# Patient Record
Sex: FEMALE | Race: WHITE | Employment: UNEMPLOYED | URBAN - METROPOLITAN AREA
[De-identification: names, ages, dates, MRNs, and addresses within clinical notes are randomized per-mention and may not be internally consistent; named-entity substitution may affect disease eponyms.]

---

## 2017-02-01 ENCOUNTER — APPOINTMENT (OUTPATIENT)
Dept: GENERAL RADIOLOGY | Age: 82
DRG: 516 | End: 2017-02-01
Attending: EMERGENCY MEDICINE
Payer: MEDICARE

## 2017-02-01 ENCOUNTER — HOSPITAL ENCOUNTER (INPATIENT)
Age: 82
LOS: 6 days | Discharge: NURSING, REHAB OR LONG-TERM CARE | DRG: 516 | End: 2017-02-07
Attending: EMERGENCY MEDICINE | Admitting: INTERNAL MEDICINE
Payer: MEDICARE
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
## 2017-02-01 DIAGNOSIS — E87.1 HYPONATREMIA: ICD-10-CM

## 2017-02-01 DIAGNOSIS — S32.000A LUMBAR COMPRESSION FRACTURE, CLOSED, INITIAL ENCOUNTER (HCC): Primary | ICD-10-CM

## 2017-02-01 PROBLEM — M48.50XA COMPRESSION FRACTURE OF VERTEBRA (HCC): Status: ACTIVE | Noted: 2017-02-01

## 2017-02-01 LAB
ALBUMIN SERPL BCP-MCNC: 2.8 G/DL (ref 3.5–5)
ALBUMIN SERPL BCP-MCNC: 3.2 G/DL (ref 3.5–5)
ALBUMIN/GLOB SERPL: 0.6 {RATIO} (ref 1.1–2.2)
ALBUMIN/GLOB SERPL: 0.7 {RATIO} (ref 1.1–2.2)
ALP SERPL-CCNC: 48 U/L (ref 45–117)
ALP SERPL-CCNC: 60 U/L (ref 45–117)
ALT SERPL-CCNC: 12 U/L (ref 12–78)
ALT SERPL-CCNC: 15 U/L (ref 12–78)
ANION GAP BLD CALC-SCNC: 5 MMOL/L (ref 5–15)
ANION GAP BLD CALC-SCNC: 8 MMOL/L (ref 5–15)
APTT PPP: 35.8 SEC (ref 22.1–32.5)
AST SERPL W P-5'-P-CCNC: ABNORMAL U/L (ref 15–37)
AST SERPL W P-5'-P-CCNC: ABNORMAL U/L (ref 15–37)
BASOPHILS # BLD AUTO: 0 K/UL (ref 0–0.1)
BASOPHILS # BLD: 0 % (ref 0–1)
BILIRUB SERPL-MCNC: 1 MG/DL (ref 0.2–1)
BILIRUB SERPL-MCNC: 1.4 MG/DL (ref 0.2–1)
BUN SERPL-MCNC: 15 MG/DL (ref 6–20)
BUN SERPL-MCNC: 16 MG/DL (ref 6–20)
BUN/CREAT SERPL: 20 (ref 12–20)
BUN/CREAT SERPL: 26 (ref 12–20)
CALCIUM SERPL-MCNC: 10.1 MG/DL (ref 8.5–10.1)
CALCIUM SERPL-MCNC: 9 MG/DL (ref 8.5–10.1)
CHLORIDE SERPL-SCNC: 93 MMOL/L (ref 97–108)
CHLORIDE SERPL-SCNC: 97 MMOL/L (ref 97–108)
CO2 SERPL-SCNC: 25 MMOL/L (ref 21–32)
CO2 SERPL-SCNC: 27 MMOL/L (ref 21–32)
CREAT SERPL-MCNC: 0.58 MG/DL (ref 0.55–1.02)
CREAT SERPL-MCNC: 0.79 MG/DL (ref 0.55–1.02)
EOSINOPHIL # BLD: 0 K/UL (ref 0–0.4)
EOSINOPHIL NFR BLD: 0 % (ref 0–7)
ERYTHROCYTE [DISTWIDTH] IN BLOOD BY AUTOMATED COUNT: 13.5 % (ref 11.5–14.5)
GLOBULIN SER CALC-MCNC: 4 G/DL (ref 2–4)
GLOBULIN SER CALC-MCNC: 5.2 G/DL (ref 2–4)
GLUCOSE SERPL-MCNC: 109 MG/DL (ref 65–100)
GLUCOSE SERPL-MCNC: 92 MG/DL (ref 65–100)
HCT VFR BLD AUTO: 46 % (ref 35–47)
HGB BLD-MCNC: 16.2 G/DL (ref 11.5–16)
INR PPP: 1.9 (ref 0.9–1.1)
LYMPHOCYTES # BLD AUTO: 7 % (ref 12–49)
LYMPHOCYTES # BLD: 0.8 K/UL (ref 0.8–3.5)
MCH RBC QN AUTO: 33.6 PG (ref 26–34)
MCHC RBC AUTO-ENTMCNC: 35.2 G/DL (ref 30–36.5)
MCV RBC AUTO: 95.4 FL (ref 80–99)
MONOCYTES # BLD: 1.8 K/UL (ref 0–1)
MONOCYTES NFR BLD AUTO: 16 % (ref 5–13)
NEUTS SEG # BLD: 8.8 K/UL (ref 1.8–8)
NEUTS SEG NFR BLD AUTO: 77 % (ref 32–75)
PLATELET # BLD AUTO: 253 K/UL (ref 150–400)
POTASSIUM SERPL-SCNC: ABNORMAL MMOL/L (ref 3.5–5.1)
POTASSIUM SERPL-SCNC: ABNORMAL MMOL/L (ref 3.5–5.1)
PROT SERPL-MCNC: 6.8 G/DL (ref 6.4–8.2)
PROT SERPL-MCNC: 8.4 G/DL (ref 6.4–8.2)
PROTHROMBIN TIME: 18.9 SEC (ref 9–11.1)
RBC # BLD AUTO: 4.82 M/UL (ref 3.8–5.2)
SODIUM SERPL-SCNC: 125 MMOL/L (ref 136–145)
SODIUM SERPL-SCNC: 130 MMOL/L (ref 136–145)
THERAPEUTIC RANGE,PTTT: ABNORMAL SECS (ref 58–77)
WBC # BLD AUTO: 11.4 K/UL (ref 3.6–11)

## 2017-02-01 PROCEDURE — 80053 COMPREHEN METABOLIC PANEL: CPT | Performed by: INTERNAL MEDICINE

## 2017-02-01 PROCEDURE — 80053 COMPREHEN METABOLIC PANEL: CPT | Performed by: EMERGENCY MEDICINE

## 2017-02-01 PROCEDURE — 74011250636 HC RX REV CODE- 250/636: Performed by: EMERGENCY MEDICINE

## 2017-02-01 PROCEDURE — 74011250637 HC RX REV CODE- 250/637: Performed by: INTERNAL MEDICINE

## 2017-02-01 PROCEDURE — 93005 ELECTROCARDIOGRAM TRACING: CPT

## 2017-02-01 PROCEDURE — 85610 PROTHROMBIN TIME: CPT | Performed by: INTERNAL MEDICINE

## 2017-02-01 PROCEDURE — 65270000029 HC RM PRIVATE

## 2017-02-01 PROCEDURE — 72100 X-RAY EXAM L-S SPINE 2/3 VWS: CPT

## 2017-02-01 PROCEDURE — 96375 TX/PRO/DX INJ NEW DRUG ADDON: CPT

## 2017-02-01 PROCEDURE — 74011250636 HC RX REV CODE- 250/636: Performed by: INTERNAL MEDICINE

## 2017-02-01 PROCEDURE — 85730 THROMBOPLASTIN TIME PARTIAL: CPT | Performed by: INTERNAL MEDICINE

## 2017-02-01 PROCEDURE — 96374 THER/PROPH/DIAG INJ IV PUSH: CPT

## 2017-02-01 PROCEDURE — 99285 EMERGENCY DEPT VISIT HI MDM: CPT

## 2017-02-01 PROCEDURE — 36415 COLL VENOUS BLD VENIPUNCTURE: CPT | Performed by: INTERNAL MEDICINE

## 2017-02-01 PROCEDURE — 85025 COMPLETE CBC W/AUTO DIFF WBC: CPT | Performed by: EMERGENCY MEDICINE

## 2017-02-01 RX ORDER — FENTANYL CITRATE 50 UG/ML
50 INJECTION, SOLUTION INTRAMUSCULAR; INTRAVENOUS ONCE
Status: COMPLETED | OUTPATIENT
Start: 2017-02-01 | End: 2017-02-01

## 2017-02-01 RX ORDER — ATENOLOL 25 MG/1
25 TABLET ORAL DAILY
COMMUNITY
End: 2017-06-30 | Stop reason: SDUPTHER

## 2017-02-01 RX ORDER — ATENOLOL 25 MG/1
25 TABLET ORAL DAILY
Status: DISCONTINUED | OUTPATIENT
Start: 2017-02-02 | End: 2017-02-07 | Stop reason: HOSPADM

## 2017-02-01 RX ORDER — FAMOTIDINE 20 MG/1
20 TABLET, FILM COATED ORAL 2 TIMES DAILY
Status: DISCONTINUED | OUTPATIENT
Start: 2017-02-02 | End: 2017-02-07 | Stop reason: HOSPADM

## 2017-02-01 RX ORDER — FLUTICASONE PROPIONATE 50 MCG
2 SPRAY, SUSPENSION (ML) NASAL DAILY
Status: DISCONTINUED | OUTPATIENT
Start: 2017-02-02 | End: 2017-02-07 | Stop reason: HOSPADM

## 2017-02-01 RX ORDER — HYDROMORPHONE HYDROCHLORIDE 1 MG/ML
1 INJECTION, SOLUTION INTRAMUSCULAR; INTRAVENOUS; SUBCUTANEOUS
Status: DISCONTINUED | OUTPATIENT
Start: 2017-02-01 | End: 2017-02-05

## 2017-02-01 RX ORDER — FUROSEMIDE 40 MG/1
40 TABLET ORAL DAILY
Status: DISCONTINUED | OUTPATIENT
Start: 2017-02-02 | End: 2017-02-07 | Stop reason: HOSPADM

## 2017-02-01 RX ORDER — SODIUM CHLORIDE 0.9 % (FLUSH) 0.9 %
5-10 SYRINGE (ML) INJECTION EVERY 8 HOURS
Status: DISCONTINUED | OUTPATIENT
Start: 2017-02-01 | End: 2017-02-07 | Stop reason: HOSPADM

## 2017-02-01 RX ORDER — SPIRONOLACTONE 25 MG/1
50 TABLET ORAL DAILY
Status: DISCONTINUED | OUTPATIENT
Start: 2017-02-02 | End: 2017-02-07 | Stop reason: HOSPADM

## 2017-02-01 RX ORDER — WARFARIN 3 MG/1
1.5 TABLET ORAL
Status: ON HOLD | COMMUNITY
End: 2018-07-31

## 2017-02-01 RX ORDER — PRAVASTATIN SODIUM 20 MG/1
20 TABLET ORAL
Status: DISCONTINUED | OUTPATIENT
Start: 2017-02-01 | End: 2017-02-07 | Stop reason: HOSPADM

## 2017-02-01 RX ORDER — SODIUM CHLORIDE 0.9 % (FLUSH) 0.9 %
5-10 SYRINGE (ML) INJECTION AS NEEDED
Status: DISCONTINUED | OUTPATIENT
Start: 2017-02-01 | End: 2017-02-07 | Stop reason: HOSPADM

## 2017-02-01 RX ORDER — ACETAMINOPHEN 325 MG/1
650 TABLET ORAL
Status: DISCONTINUED | OUTPATIENT
Start: 2017-02-01 | End: 2017-02-07 | Stop reason: HOSPADM

## 2017-02-01 RX ORDER — POLYETHYLENE GLYCOL 3350 17 G/17G
17 POWDER, FOR SOLUTION ORAL DAILY
Status: DISCONTINUED | OUTPATIENT
Start: 2017-02-02 | End: 2017-02-07 | Stop reason: HOSPADM

## 2017-02-01 RX ORDER — ONDANSETRON 2 MG/ML
4 INJECTION INTRAMUSCULAR; INTRAVENOUS
Status: COMPLETED | OUTPATIENT
Start: 2017-02-01 | End: 2017-02-01

## 2017-02-01 RX ORDER — HYDROMORPHONE HYDROCHLORIDE 1 MG/ML
1 INJECTION, SOLUTION INTRAMUSCULAR; INTRAVENOUS; SUBCUTANEOUS
Status: COMPLETED | OUTPATIENT
Start: 2017-02-01 | End: 2017-02-01

## 2017-02-01 RX ORDER — WARFARIN 3 MG/1
3 TABLET ORAL
Status: ON HOLD | COMMUNITY
End: 2018-07-31

## 2017-02-01 RX ORDER — ALPRAZOLAM 0.5 MG/1
0.5 TABLET ORAL
Status: DISCONTINUED | OUTPATIENT
Start: 2017-02-01 | End: 2017-02-07 | Stop reason: HOSPADM

## 2017-02-01 RX ORDER — POTASSIUM CHLORIDE 750 MG/1
20 TABLET, FILM COATED, EXTENDED RELEASE ORAL DAILY
Status: DISCONTINUED | OUTPATIENT
Start: 2017-02-02 | End: 2017-02-07 | Stop reason: HOSPADM

## 2017-02-01 RX ORDER — LEVOTHYROXINE SODIUM 100 UG/1
100 TABLET ORAL
Status: DISCONTINUED | OUTPATIENT
Start: 2017-02-02 | End: 2017-02-07 | Stop reason: HOSPADM

## 2017-02-01 RX ADMIN — FENTANYL CITRATE 50 MCG: 50 INJECTION, SOLUTION INTRAMUSCULAR; INTRAVENOUS at 17:03

## 2017-02-01 RX ADMIN — Medication 10 ML: at 22:33

## 2017-02-01 RX ADMIN — PRAVASTATIN SODIUM 20 MG: 20 TABLET ORAL at 22:33

## 2017-02-01 RX ADMIN — ONDANSETRON 4 MG: 2 INJECTION INTRAMUSCULAR; INTRAVENOUS at 17:03

## 2017-02-01 RX ADMIN — HYDROMORPHONE HYDROCHLORIDE 1 MG: 1 INJECTION, SOLUTION INTRAMUSCULAR; INTRAVENOUS; SUBCUTANEOUS at 18:34

## 2017-02-01 RX ADMIN — HYDROMORPHONE HYDROCHLORIDE 1 MG: 1 INJECTION, SOLUTION INTRAMUSCULAR; INTRAVENOUS; SUBCUTANEOUS at 22:33

## 2017-02-01 NOTE — IP AVS SNAPSHOT
2700 [facility] [address] [place] [address] 
[phone] Patient: Arvis Cranker MRN: [id number] NGD:[birth year] You are allergic to the following Allergen Reactions Latex, Natural Rubber Other (comments) \"I get black where ever it touches\" Codeine Other (comments) \"It just sends me up the wall\" Adhesive Rash Other (comments)  
 blisters Cortisone Unknown (comments) Egg Unknown (comments) Pet pt. She states that she is allergic to eggs. Recent Documentation Height Weight BMI OB Status Smoking Status 1.6 m 68 kg 26.57 kg/m2 Hysterectomy Former Smoker Emergency Contacts Name Discharge Info Relation Home Work Mobile Vern Oro DISCHARGE CAREGIVER [3] Sister [23]   [phone] About your hospitalization You were admitted on:  February 1, 2017 You last received care in the:  [address] [place] [address] You were discharged on:  February 7, 2017 Unit phone number:  [phone] Why you were hospitalized Your primary diagnosis was:  Closed Compression Fracture Of Lumbar Vertebra (Hcc) Your diagnoses also included:  Dementia, A-Fib (Hcc), Compression Fracture Of Vertebra (Hcc), Uti (Urinary Tract Infection), Cad (Coronary Artery Disease) Providers Seen During Your Hospitalizations Provider Role Specialty Primary office phone Wilmer Underwood MD Attending Provider Emergency Medicine [phone] Alee Sibley MD Attending Provider Family Practice [phone] Your Primary Care Physician (PCP) Primary Care Physician Office Phone Office Fax Jonelle Angelique [phone] Follow-up Information Follow up With Details Comments Contact Info [place] Sibley MD   [address] [address] 
[phone] [facility] Go on 2/7/2017  [address] [phone] Your Appointments Wednesday February 22, 2017 COLONOSCOPY with Yelena Abdullahi MD  
[facility] ENDOSCOPY (RI OR PRE ASSESSMENT) [address]  
[phone] OP Registration: YUNIER Kate 78 Telephone: [phone] Fax: [phone] ** Pt will need to arrive 30 min prior unless appointment prep instructions indicate otherwise** **** Send All ENDO pt to the MAIN Admitting Department, off the [facility] main lobby at [employer]. ****  
  
    
OP Registration: YUNIER ConjuGon- [address] [place] [address] Telephone: [phone] Fax: [phone] ** Pt will need to arrive 30 min prior unless appointment prep instructions indicate otherwise** **** Send All ENDO pt to the MAIN Admitting Department, off the [facility] main lobby at [employer]. **** Current Discharge Medication List  
  
START taking these medications Dose & Instructions Dispensing Information Comments Morning Noon Evening Bedtime  
 acetaminophen 325 mg tablet Commonly known as:  TYLENOL Your next dose is: Today, Tomorrow Other:  _________ Dose:  650 mg Take 2 Tabs by mouth every four (4) hours as needed. Quantity:  60 Tab Refills:  0  
     
   
   
   
  
 bisacodyl 10 mg suppository Commonly known as:  DULCOLAX Your next dose is: Today, Tomorrow Other:  _________ Dose:  10 mg Insert 10 mg into rectum daily as needed. Quantity:  10 Each Refills:  0 HYDROcodone-acetaminophen 5-325 mg per tablet Commonly known as:  Wayna Glaze Your next dose is: Today, Tomorrow Other:  _________ Dose:  1 Tab Take 1 Tab by mouth every six (6) hours as needed. Max Daily Amount: 4 Tabs. Quantity:  30 Tab Refills:  0  
     
   
   
   
  
 levoFLOXacin 250 mg tablet Commonly known as:  Miley [place] Your next dose is: Today, Tomorrow Other:  _________ Dose:  250 mg Take 1 Tab by mouth every twenty-four (24) hours. Quantity:  10 Tab Refills:  0 CONTINUE these medications which have NOT CHANGED Dose & Instructions Dispensing Information Comments Morning Noon Evening Bedtime ALPRAZolam 0.5 mg tablet Commonly known as:  Ricardo Sequin Your next dose is: Today, Tomorrow Other:  _________ TAKE 1 TABLET BY MOUTH TWICE A DAY AS NEEDED FOR ANXIETY. Quantity:  60 Tab Refills:  0  
     
   
   
   
  
 atenolol 25 mg tablet Commonly known as:  TENORMIN Your next dose is: Today, Tomorrow Other:  _________ Dose:  25 mg Take 25 mg by mouth daily. Refills:  0 Biotin 2,500 mcg Cap Your next dose is: Today, Tomorrow Other:  _________ Dose:  5000 mcg Take 5,000 mcg by mouth. Refills:  0  
     
   
   
   
  
 famotidine 20 mg tablet Commonly known as:  PEPCID Your next dose is: Today, Tomorrow Other:  _________ TAKE 1 TABLET BY MOUTH TWICE A DAY. Quantity:  60 Tab Refills:  5  
     
   
   
   
  
 fluticasone 50 mcg/actuation nasal spray Commonly known as:  Filbert Chard Your next dose is: Today, Tomorrow Other:  _________ Dose:  2 Spray 2 Sprays by Both Nostrils route daily. Refills:  0  
     
   
   
   
  
 furosemide 40 mg tablet Commonly known as:  LASIX Your next dose is: Today, Tomorrow Other:  _________ TAKE 1 TABLET BY MOUTH DAILY. Quantity:  30 Tab Refills:  1  
     
   
   
   
  
 levothyroxine 100 mcg tablet Commonly known as:  SYNTHROID Your next dose is: Today, Tomorrow Other:  _________ TAKE 1 TABLET BY MOUTH DAILY BEFORE BREAKFAST. Quantity:  30 Tab Refills:  3  
     
   
   
   
  
 nystatin topical cream  
Commonly known as:  MYCOSTATIN  
   
 Your next dose is: Today, Tomorrow Other:  _________ APPLY TO AFFECTED AREA TWO (2) TIMES A DAY. Quantity:  30 g Refills:  2  
     
   
   
   
  
 polyethylene glycol 17 gram packet Commonly known as:  Scot Allouez Your next dose is: Today, Tomorrow Other:  _________ Dose:  17 g Take 1 Packet by mouth daily. Quantity:  1 Packet Refills:  11  
     
   
   
   
  
 potassium chloride 20 mEq tablet Commonly known as:  K-DUR, KLOR-CON Your next dose is: Today, Tomorrow Other:  _________ TAKE 1 TABLET BY MOUTH EVERY DAY. Quantity:  30 Tab Refills:  5  
     
   
   
   
  
 pravastatin 20 mg tablet Commonly known as:  PRAVACHOL Your next dose is: Today, Tomorrow Other:  _________ Dose:  20 mg Take 1 Tab by mouth nightly. Quantity:  30 Tab Refills:  5  
     
   
   
   
  
 spironolactone 50 mg tablet Commonly known as:  ALDACTONE Your next dose is: Today, Tomorrow Other:  _________ TAKE 1 TABLET BY MOUTH DAILY FOR HYPERTENSION. (SAME AS ALDACTONE) Quantity:  30 Tab Refills:  5  
     
   
   
   
  
 * warfarin 3 mg tablet Commonly known as:  COUMADIN Your next dose is: Today, Tomorrow Other:  _________ Dose:  3 mg Take 3 mg by mouth every Tuesday, Thursday, Saturday & Sunday. In afternoon Refills:  0  
     
   
   
   
  
 * warfarin 3 mg tablet Commonly known as:  COUMADIN Your next dose is: Today, Tomorrow Other:  _________ Dose:  1.5 mg Take 1.5 mg by mouth every Monday, Wednesday, Friday. In afternoon Refills:  0  
     
   
   
   
  
 * Notice: This list has 2 medication(s) that are the same as other medications prescribed for you. Read the directions carefully, and ask your doctor or other care provider to review them with you. Where to Get Your Medications These medications were sent to [address] [place] [address] Phone:  [phone]  
  bisacodyl 10 mg suppository  
 levoFLOXacin 250 mg tablet Information on where to get these meds will be given to you by the nurse or doctor. ! Ask your nurse or doctor about these medications  
  acetaminophen 325 mg tablet HYDROcodone-acetaminophen 5-325 mg per tablet Discharge Instructions Patient Discharge Instructions Melva Senior / [id number] : 1931 Admitted 2017 Discharged: 2017 Take Home Medications · It is important that you take the medication exactly as they are prescribed. · Keep your medication in the bottles provided by the pharmacist and keep a list of the medication names, dosages, and times to be taken in your wallet. · Do not take other medications without consulting your doctor. What to do at [facility] Recommended diet: Regular Diet, Recommended activity: Activity as tolerated, PT/OT consult INR in 2 days- this Thursday- call Dr. Yandel Rojas with results If you experience any of the following symptoms increased pain, please follow up with Dr. Yandel Rojas. Follow-up Appointments Procedures  FOLLOW UP VISIT Appointment in: Other (Specify) Will follow at [facility]  
  Will follow at [facility]  
  Standing Status:   Standing Number of Occurrences:   1 Order Specific Question:   Appointment in Answer: Other (Specify) Information obtained by : 
I understand that if any problems occur once I am at home I am to contact my physician. I understand and acknowledge receipt of the instructions indicated above. Physician's or R.N.'s Signature                                                                  Date/Time Patient or Representative Signature                                                          Date/Time Discharge Orders None Introducing [facility] SERVICES! Alexys Liang introduces [facility] patient portal. Now you can access parts of your medical record, email your doctor's office, and request medication refills online. 1. In your internet browser, go to [email or web address]. [place] 2. Click on the First Time User? Click Here link in the Sign In box. You will see the New Member Sign Up page. 3. Enter your [facility] Access Code exactly as it appears below. You will not need to use this code after youve completed the sign-up process. If you do not sign up before the expiration date, you must request a new code. · [facility] Access Code: [email or web address] Expires: 5/2/2017  4:25 PM 
 
4. Enter the last four digits of your Social Security Number (xxxx) and Date of Birth (mm/dd/yyyy) as indicated and click Submit. You will be taken to the next sign-up page. 5. Create a [facility] ID. This will be your [facility] login ID and cannot be changed, so think of one that is secure and easy to remember. 6. Create a [facility] password. You can change your password at any time. 7. Enter your Password Reset Question and Answer. This can be used at a later time if you forget your password. 8. Enter your e-mail address. You will receive e-mail notification when new information is available in [address] 9. Click Sign Up. You can now view and download portions of your medical record. 10. Click the Download Summary menu link to download a portable copy of your medical information.  
 
If you have questions, please visit the Frequently Asked Questions section of the [employer]. Remember, MyChart is NOT to be used for urgent needs. For medical emergencies, dial 911. Now available from your iPhone and Android! General Information Please provide this summary of care documentation to your next provider. Patient Signature:  ____________________________________________________________ Date:  ____________________________________________________________  
  
Althia Kras Provider Signature:  ____________________________________________________________ Date:  ____________________________________________________________

## 2017-02-01 NOTE — ED TRIAGE NOTES
Pt states that she fell about 5 days ago after \"blacking out\" causing her to hurt her entire back. Pt states that she was not seen in the hospital after blacking out. Pt denies chest pain, SOB, and NVD. Pt denies having any syncopal episodes since.

## 2017-02-01 NOTE — PROGRESS NOTES
Admission Medication Reconciliation:    Information obtained from: patient, Rx Query    Significant PMH/Disease States:   Past Medical History   Diagnosis Date    A-fib [facility])     Arrhythmia      A FIB    Arthritis     Back pain     CAD (coronary artery disease)      PT DENIES    Chronic pain     Dementia 2016    Hypercholesterolemia     Psychiatric disorder      ANXIETY    Shoulder pain     Thyroid disease     Tremor, essential        Chief Complaint for this Admission:    Chief Complaint   Patient presents with    Back Pain    Syncope       Allergies:  Latex, natural rubber; Codeine; Adhesive; Cortisone; and Egg    Prior to Admission Medications:   Prior to Admission Medications   Prescriptions Last Dose Informant Patient Reported? Taking? ALPRAZolam (XANAX) 0.5 mg tablet   No Yes   Sig: TAKE 1 TABLET BY MOUTH TWICE A DAY AS NEEDED FOR ANXIETY. Biotin 2,500 mcg cap   Yes Yes   Sig: Take 5,000 mcg by mouth. atenolol (TENORMIN) 25 mg tablet   Yes Yes   Sig: Take 25 mg by mouth daily. famotidine (PEPCID) 20 mg tablet   No Yes   Sig: TAKE 1 TABLET BY MOUTH TWICE A DAY. fluticasone (FLONASE) 50 mcg/actuation nasal spray   Yes Yes   Si Sprays by Both Nostrils route daily. furosemide (LASIX) 40 mg tablet   No Yes   Sig: TAKE 1 TABLET BY MOUTH DAILY. levothyroxine (SYNTHROID) 100 mcg tablet   No Yes   Sig: TAKE 1 TABLET BY MOUTH DAILY BEFORE BREAKFAST. nystatin (MYCOSTATIN) topical cream   No Yes   Sig: APPLY TO AFFECTED AREA TWO (2) TIMES A DAY. polyethylene glycol (MIRALAX) 17 gram packet   No Yes   Sig: Take 1 Packet by mouth daily. potassium chloride (K-DUR, KLOR-CON) 20 mEq tablet   No Yes   Sig: TAKE 1 TABLET BY MOUTH EVERY DAY. pravastatin (PRAVACHOL) 20 mg tablet Not Taking at Unknown time  No No   Sig: Take 1 Tab by mouth nightly.    spironolactone (ALDACTONE) 50 mg tablet   No Yes   Sig: TAKE 1 TABLET BY MOUTH DAILY FOR HYPERTENSION. (SAME AS ALDACTONE)   warfarin (COUMADIN) 3 mg tablet 2017 at pm  Yes Yes   Sig: Take 3 mg by mouth every Tuesday, Thursday, Saturday & . In afternoon   warfarin (COUMADIN) 3 mg tablet 2017  Yes Yes   Sig: Take 1.5 mg by mouth every Monday, Wednesday, Friday. In afternoon      Facility-Administered Medications: None         Comments/Recommendations: Patient not able to provide medication information. States she would not recognize names if I provided them. She did tell me her warfarin dose. She gets her medicines from the [facility] and they are closed for the evening. Records with recent Rx Query fills I marked as taking and I removed meds without and meds for acute conditions that were .

## 2017-02-01 NOTE — ED PROVIDER NOTES
HPI Comments: 80 y.o. female with past medical history significant for back pain, tremor, A-fib, thyroid disease, hypercholesterolemia, arthritis, CAD, anxiety, dementia, appendectomy, tonsillectomy, adenoidectomy who presents accompanied by sister with chief complaint of back pain. Patient presents in ED complaining of lower back pain secondary to fall ~5 days ago. Patient states she was shopping in [address] ~5 days ago when she suddenly fell backwards and  \"ended up on the ground\". Patient notes \"I do not know what happened\". She is unsure if she had syncopal episode but notes \"I came to immediately\". Patient denies any head injury. Patient claims she has an allergy to cortisone and notes she had recently received a cortisone shot in knee secondary to orthopedic procedure. She is unsure if this had anything to do with her fall. Patient reports associated gradual onset of lower back pain later that day. She notes pain has gotten progressively worse since onset and claims she has had difficulty moving and performing everyday activities because of this. In ED, patient notes her pain is \"10/10\". She denies taking any alleviating medications. Patient ambulates with cane at baseline. She reports non-acute numbness in  B/l legs secondary to \"having fluid in legs\". Patient notes she has not had BM in ~3 days likely because \"I have not been eating much lately\". Patient denies difficulty urinating, fever, chills, cough, chest pain, SOB, abdominal pain. There are no other acute medical concerns at this time. Social hx: former smoker; quit date: 1/7/1994; 0.5 packs per day for 10 years. +ETOH; 2 glasses of wine per night. Resides at [facility] in independent living. PCP: Marce Melendrez MD    Note written by Fawn Hernadez. Marcia Strong, as dictated by Rufus Rios MD 4:45 PM      The history is provided by the patient. No  was used.         Past Medical History:   Diagnosis Date    A-fib ([facility] [address]     Arrhythmia      A FIB    Arthritis     Back pain     CAD (coronary artery disease)      PT DENIES    Chronic pain     Dementia 2/9/2016    Hypercholesterolemia     Psychiatric disorder      ANXIETY    Shoulder pain     Thyroid disease     Tremor, essential        Past Surgical History:   Procedure Laterality Date    Hx appendectomy      Hx pola and bso       AGE 39    Hx tonsil and adenoidectomy           Family History:   Problem Relation Age of Onset    Dementia Mother     Arthritis-osteo Father        Social History     Social History    Marital status:      Spouse name: N/A    Number of children: N/A    Years of education: N/A     Occupational History    Not on file. Social History Main Topics    Smoking status: Former Smoker     Packs/day: 0.50     Years: 10.00     Quit date: 1/7/1994    Smokeless tobacco: Never Used    Alcohol use Yes      Comment: NIGHTLY 2 GLASSES WINE    Drug use: No    Sexual activity: Not on file     Other Topics Concern    Not on file     Social History Narrative         ALLERGIES: Latex, natural rubber; Codeine; Adhesive; Cortisone; and Egg    Review of Systems   Constitutional: Negative for chills, diaphoresis and fever. HENT: Negative for congestion, postnasal drip, rhinorrhea and sore throat. Eyes: Negative for photophobia, discharge, redness and visual disturbance. Respiratory: Negative for cough, chest tightness, shortness of breath and wheezing. Cardiovascular: Negative for chest pain, palpitations and leg swelling. Gastrointestinal: Negative for abdominal distention, abdominal pain, blood in stool, constipation, diarrhea, nausea and vomiting. Genitourinary: Negative for difficulty urinating, dysuria, frequency, hematuria and urgency. Musculoskeletal: Positive for back pain (lower). Negative for arthralgias, joint swelling and myalgias. Skin: Negative for color change and rash.    Neurological: Positive for numbness (legs b/l. non-acute). Negative for dizziness, speech difficulty, weakness, light-headedness and headaches. Syncope: possible. Psychiatric/Behavioral: Negative for confusion. The patient is not nervous/anxious. All other systems reviewed and are negative. Vitals:    02/01/17 1603 02/01/17 1845   BP: (!) 135/91 121/71   Pulse: 77 76   Resp: 16 15   Temp: 97.2 °F (36.2 °C)    SpO2: 99% 96%   Weight: 68 kg (150 lb)    Height: 5' 3\" (1.6 m)             Physical Exam   Constitutional: She is oriented to person, place, and time. She appears well-developed and well-nourished. No distress. HENT:   Head: Normocephalic and atraumatic. Right Ear: External ear normal.   Left Ear: External ear normal.   Nose: Nose normal.   Mouth/Throat: Oropharynx is clear and moist.   Eyes: Conjunctivae and EOM are normal. Pupils are equal, round, and reactive to light. No scleral icterus. Neck: Normal range of motion. Neck supple. No JVD present. No tracheal deviation present. No thyromegaly present. Cardiovascular: Normal rate and normal heart sounds. An irregularly irregular rhythm present. Exam reveals no gallop and no friction rub. No murmur heard. Pulmonary/Chest: Effort normal and breath sounds normal. No respiratory distress. She has no wheezes. She has no rales. She exhibits no tenderness. Abdominal: Soft. Bowel sounds are normal. She exhibits no distension and no mass. There is no tenderness. There is no rebound and no guarding. Musculoskeletal: Normal range of motion. She exhibits tenderness. She exhibits no edema. Tenderness over lower L spine in midline. Lymphadenopathy:     She has no cervical adenopathy. Neurological: She is alert and oriented to person, place, and time. She has normal strength. She displays no atrophy and no tremor. No cranial nerve deficit. She exhibits normal muscle tone. Coordination and gait normal.   Straight leg raise unable to perform due to back pain.  Able to flex and extend in feet with 4/5 strengths b/l. Skin: Skin is warm and dry. No rash noted. She is not diaphoretic. No erythema. Psychiatric: She has a normal mood and affect. Her behavior is normal. Judgment and thought content normal.   Nursing note and vitals reviewed. Note written by Gi Sutherland. Jan Michaels, as dictated by Lizzeth Grover MD 4:45 PM        MDM  Number of Diagnoses or Management Options  Fall, initial encounter:   Hyponatremia:   Lumbar compression fracture, closed, initial encounter [facility]):   Diagnosis management comments: Impression: 80-year-old female here with acute onset of back pain after a fall 5 days prior. No neurologic signs or symptoms secondary to the fall. Differential includes musculoskeletal strain more likely compression fractures. The patient is requiring pain medication for even the slightest movement in the emergency department. Plan of care will be baseline labs, analgesic medication, as well as x-rays of the lumbosacral spine. ED Course       Procedures    ED EKG interpretation:  Rhythm: atrial fib with variable AV block with premature ventricular or abberantly conducted complexes Rate (approx.): 76; Axis: normal; ST/T wave: abnormality, consider lateral ischemia. T wave changes compared to EKG done on 2/9/2016. Note written by Gi Sutherland. Jan Michaels, as dictated by Lizzeth Grover MD 4:30 PM      CONSULT NOTE:  6:57 Bita Doan MD spoke with Dr. Sherlyn Torres for PCP. Discussed available diagnostic tests and clinical findings. He is in agreement with care plans as outlined. Dr. Roseann Pearce will admit.

## 2017-02-02 PROBLEM — N39.0 UTI (URINARY TRACT INFECTION): Status: ACTIVE | Noted: 2017-02-02

## 2017-02-02 LAB
APPEARANCE UR: ABNORMAL
ATRIAL RATE: 278 BPM
BACTERIA URNS QL MICRO: NEGATIVE /HPF
BILIRUB UR QL CFM: NEGATIVE
CALCULATED R AXIS, ECG10: 21 DEGREES
CALCULATED T AXIS, ECG11: -152 DEGREES
COLOR UR: ABNORMAL
DIAGNOSIS, 93000: NORMAL
EPITH CASTS URNS QL MICRO: ABNORMAL /LPF
GLUCOSE UR STRIP.AUTO-MCNC: NEGATIVE MG/DL
HGB UR QL STRIP: ABNORMAL
HYALINE CASTS URNS QL MICRO: ABNORMAL /LPF (ref 0–5)
KETONES UR QL STRIP.AUTO: 15 MG/DL
LEUKOCYTE ESTERASE UR QL STRIP.AUTO: ABNORMAL
NITRITE UR QL STRIP.AUTO: POSITIVE
PH UR STRIP: 5.5 [PH] (ref 5–8)
PROT UR STRIP-MCNC: 100 MG/DL
Q-T INTERVAL, ECG07: 356 MS
QRS DURATION, ECG06: 78 MS
QTC CALCULATION (BEZET), ECG08: 400 MS
RBC #/AREA URNS HPF: ABNORMAL /HPF (ref 0–5)
SP GR UR REFRACTOMETRY: 1.02 (ref 1–1.03)
UROBILINOGEN UR QL STRIP.AUTO: 1 EU/DL (ref 0.2–1)
VENTRICULAR RATE, ECG03: 76 BPM
WBC URNS QL MICRO: ABNORMAL /HPF (ref 0–4)

## 2017-02-02 PROCEDURE — 81001 URINALYSIS AUTO W/SCOPE: CPT | Performed by: INTERNAL MEDICINE

## 2017-02-02 PROCEDURE — 74011250637 HC RX REV CODE- 250/637: Performed by: FAMILY MEDICINE

## 2017-02-02 PROCEDURE — 74011250636 HC RX REV CODE- 250/636: Performed by: INTERNAL MEDICINE

## 2017-02-02 PROCEDURE — 74011250637 HC RX REV CODE- 250/637: Performed by: INTERNAL MEDICINE

## 2017-02-02 PROCEDURE — 87077 CULTURE AEROBIC IDENTIFY: CPT | Performed by: FAMILY MEDICINE

## 2017-02-02 PROCEDURE — 87086 URINE CULTURE/COLONY COUNT: CPT | Performed by: FAMILY MEDICINE

## 2017-02-02 PROCEDURE — 65270000029 HC RM PRIVATE

## 2017-02-02 PROCEDURE — 87186 SC STD MICRODIL/AGAR DIL: CPT | Performed by: FAMILY MEDICINE

## 2017-02-02 RX ORDER — CEPHALEXIN 250 MG/1
250 CAPSULE ORAL EVERY 8 HOURS
Status: DISCONTINUED | OUTPATIENT
Start: 2017-02-02 | End: 2017-02-04

## 2017-02-02 RX ADMIN — CEPHALEXIN 250 MG: 250 CAPSULE ORAL at 16:52

## 2017-02-02 RX ADMIN — HYDROMORPHONE HYDROCHLORIDE 1 MG: 1 INJECTION, SOLUTION INTRAMUSCULAR; INTRAVENOUS; SUBCUTANEOUS at 08:08

## 2017-02-02 RX ADMIN — HYDROMORPHONE HYDROCHLORIDE 1 MG: 1 INJECTION, SOLUTION INTRAMUSCULAR; INTRAVENOUS; SUBCUTANEOUS at 12:05

## 2017-02-02 RX ADMIN — ATENOLOL 25 MG: 25 TABLET ORAL at 08:49

## 2017-02-02 RX ADMIN — POLYETHYLENE GLYCOL 3350 17 G: 17 POWDER, FOR SOLUTION ORAL at 08:52

## 2017-02-02 RX ADMIN — FAMOTIDINE 20 MG: 20 TABLET ORAL at 08:50

## 2017-02-02 RX ADMIN — LEVOTHYROXINE SODIUM 100 MCG: 100 TABLET ORAL at 07:11

## 2017-02-02 RX ADMIN — Medication 10 ML: at 21:52

## 2017-02-02 RX ADMIN — SPIRONOLACTONE 50 MG: 25 TABLET, FILM COATED ORAL at 08:51

## 2017-02-02 RX ADMIN — CEPHALEXIN 250 MG: 250 CAPSULE ORAL at 08:50

## 2017-02-02 RX ADMIN — FAMOTIDINE 20 MG: 20 TABLET ORAL at 16:52

## 2017-02-02 RX ADMIN — POTASSIUM CHLORIDE 20 MEQ: 750 TABLET, FILM COATED, EXTENDED RELEASE ORAL at 08:50

## 2017-02-02 RX ADMIN — Medication 10 ML: at 07:11

## 2017-02-02 RX ADMIN — HYDROMORPHONE HYDROCHLORIDE 1 MG: 1 INJECTION, SOLUTION INTRAMUSCULAR; INTRAVENOUS; SUBCUTANEOUS at 16:54

## 2017-02-02 RX ADMIN — LACTULOSE 20 G: 10 SOLUTION ORAL at 08:52

## 2017-02-02 RX ADMIN — HYDROMORPHONE HYDROCHLORIDE 1 MG: 1 INJECTION, SOLUTION INTRAMUSCULAR; INTRAVENOUS; SUBCUTANEOUS at 22:22

## 2017-02-02 NOTE — PROGRESS NOTES
Benito Arndt, Violeta Larson, Kenn[place], and Nick Stovall Date: 2/1/2017      Subjective:     Patient with descent pain control. Multiple lumbar compression fractures. ..       Current Facility-Administered Medications   Medication Dose Route Frequency    cephALEXin (KEFLEX) capsule 250 mg  250 mg Oral TID    ALPRAZolam (XANAX) tablet 0.5 mg  0.5 mg Oral BID PRN    atenolol (TENORMIN) tablet 25 mg  25 mg Oral DAILY    famotidine (PEPCID) tablet 20 mg  20 mg Oral BID    fluticasone (FLONASE) 50 mcg/actuation nasal spray 2 Spray  2 Spray Both Nostrils DAILY    furosemide (LASIX) tablet 40 mg  40 mg Oral DAILY    levothyroxine (SYNTHROID) tablet 100 mcg  100 mcg Oral ACB    polyethylene glycol (MIRALAX) packet 17 g  17 g Oral DAILY    potassium chloride SR (KLOR-CON 10) tablet 20 mEq  20 mEq Oral DAILY    pravastatin (PRAVACHOL) tablet 20 mg  20 mg Oral QHS    spironolactone (ALDACTONE) tablet 50 mg  50 mg Oral DAILY    sodium chloride (NS) flush 5-10 mL  5-10 mL IntraVENous Q8H    sodium chloride (NS) flush 5-10 mL  5-10 mL IntraVENous PRN    acetaminophen (TYLENOL) tablet 650 mg  650 mg Oral Q4H PRN    HYDROmorphone (PF) (DILAUDID) injection 1 mg  1 mg IntraVENous Q4H PRN          Objective:     Patient Vitals for the past 8 hrs:   BP Temp Pulse Resp SpO2   02/02/17 0754 108/53 98.3 °F (36.8 °C) 70 16 95 %   02/02/17 0319 142/89 97.8 °F (36.6 °C) 98 16 94 %             Physical Exam: Lungs: clear to auscultation bilaterally  Heart: regular rate and rhythm, S1, S2 normal, no murmur, click, rub or gallop  Abdomen: soft, non-tender.  Bowel sounds normal. No masses,  no organomegaly        Data Review   Recent Results (from the past 24 hour(s))   EKG, 12 LEAD, INITIAL    Collection Time: 02/01/17  4:11 PM   Result Value Ref Range    Ventricular Rate 76 BPM    Atrial Rate 278 BPM    QRS Duration 78 ms    Q-T Interval 356 ms    QTC Calculation (Bezet) 400 ms    Calculated R Axis 21 degrees    Calculated T Axis -152 degrees    Diagnosis       Atrial flutter with variable AV block with premature ventricular or   aberrantly conducted complexes  Septal infarct , age undetermined  ST & T wave abnormality, consider lateral ischemia  When compared with ECG of 09-FEB-2016 15:39,  Atrial flutter has replaced Atrial fibrillation  Vent. rate has decreased BY  50 BPM  T wave inversion more evident in Lateral leads     CBC WITH AUTOMATED DIFF    Collection Time: 02/01/17  4:17 PM   Result Value Ref Range    WBC 11.4 (H) 3.6 - 11.0 K/uL    RBC 4.82 3.80 - 5.20 M/uL    HGB 16.2 (H) 11.5 - 16.0 g/dL    HCT 46.0 35.0 - 47.0 %    MCV 95.4 80.0 - 99.0 FL    MCH 33.6 26.0 - 34.0 PG    MCHC 35.2 30.0 - 36.5 g/dL    RDW 13.5 11.5 - 14.5 %    PLATELET 800 731 - 131 K/uL    NEUTROPHILS 77 (H) 32 - 75 %    LYMPHOCYTES 7 (L) 12 - 49 %    MONOCYTES 16 (H) 5 - 13 %    EOSINOPHILS 0 0 - 7 %    BASOPHILS 0 0 - 1 %    ABS. NEUTROPHILS 8.8 (H) 1.8 - 8.0 K/UL    ABS. LYMPHOCYTES 0.8 0.8 - 3.5 K/UL    ABS. MONOCYTES 1.8 (H) 0.0 - 1.0 K/UL    ABS. EOSINOPHILS 0.0 0.0 - 0.4 K/UL    ABS. BASOPHILS 0.0 0.0 - 0.1 K/UL   METABOLIC PANEL, COMPREHENSIVE    Collection Time: 02/01/17  4:17 PM   Result Value Ref Range    Sodium 125 (L) 136 - 145 mmol/L    Potassium HEMOLYZED,RECOLLECT REQUESTED 3.5 - 5.1 mmol/L    Chloride 93 (L) 97 - 108 mmol/L    CO2 27 21 - 32 mmol/L    Anion gap 5 5 - 15 mmol/L    Glucose 109 (H) 65 - 100 mg/dL    BUN 16 6 - 20 MG/DL    Creatinine 0.79 0.55 - 1.02 MG/DL    BUN/Creatinine ratio 20 12 - 20      GFR est AA >60 >60 ml/min/1.73m2    GFR est non-AA >60 >60 ml/min/1.73m2    Calcium 10.1 8.5 - 10.1 MG/DL    Bilirubin, total 1.4 (H) 0.2 - 1.0 MG/DL    ALT (SGPT) 15 12 - 78 U/L    AST (SGOT) HEMOLYZED,RECOLLECT REQUESTED 15 - 37 U/L    Alk.  phosphatase 60 45 - 117 U/L    Protein, total 8.4 (H) 6.4 - 8.2 g/dL    Albumin 3.2 (L) 3.5 - 5.0 g/dL    Globulin 5.2 (H) 2.0 - 4.0 g/dL    A-G Ratio 0.6 (L) 1.1 - 2.2     PROTHROMBIN TIME + INR Collection Time: 02/01/17  9:10 PM   Result Value Ref Range    INR 1.9 (H) 0.9 - 1.1      Prothrombin time 18.9 (H) 9.0 - 11.1 sec   PTT    Collection Time: 02/01/17  9:10 PM   Result Value Ref Range    aPTT 35.8 (H) 22.1 - 32.5 sec    aPTT, therapeutic range     58.0 - 52.0 SECS   METABOLIC PANEL, COMPREHENSIVE    Collection Time: 02/01/17  9:10 PM   Result Value Ref Range    Sodium 130 (L) 136 - 145 mmol/L    Potassium HEMOLYZED,RECOLLECT REQUESTED 3.5 - 5.1 mmol/L    Chloride 97 97 - 108 mmol/L    CO2 25 21 - 32 mmol/L    Anion gap 8 5 - 15 mmol/L    Glucose 92 65 - 100 mg/dL    BUN 15 6 - 20 MG/DL    Creatinine 0.58 0.55 - 1.02 MG/DL    BUN/Creatinine ratio 26 (H) 12 - 20      GFR est AA >60 >60 ml/min/1.73m2    GFR est non-AA >60 >60 ml/min/1.73m2    Calcium 9.0 8.5 - 10.1 MG/DL    Bilirubin, total 1.0 0.2 - 1.0 MG/DL    ALT (SGPT) 12 12 - 78 U/L    AST (SGOT) HEMOLYZED,RECOLLECT REQUESTED 15 - 37 U/L    Alk.  phosphatase 48 45 - 117 U/L    Protein, total 6.8 6.4 - 8.2 g/dL    Albumin 2.8 (L) 3.5 - 5.0 g/dL    Globulin 4.0 2.0 - 4.0 g/dL    A-G Ratio 0.7 (L) 1.1 - 2.2     URINALYSIS W/MICROSCOPIC    Collection Time: 02/02/17  3:41 AM   Result Value Ref Range    Color DARK YELLOW      Appearance CLOUDY (A) CLEAR      Specific gravity 1.024 1.003 - 1.030      pH (UA) 5.5 5.0 - 8.0      Protein 100 (A) NEG mg/dL    Glucose NEGATIVE  NEG mg/dL    Ketone 15 (A) NEG mg/dL    Blood LARGE (A) NEG      Urobilinogen 1.0 0.2 - 1.0 EU/dL    Nitrites POSITIVE (A) NEG      Leukocyte Esterase SMALL (A) NEG      WBC 20-50 0 - 4 /hpf    RBC 5-10 0 - 5 /hpf    Epithelial cells FEW FEW /lpf    Bacteria NEGATIVE  NEG /hpf    Hyaline cast 2-5 0 - 5 /lpf   BILIRUBIN, CONFIRM    Collection Time: 02/02/17  3:41 AM   Result Value Ref Range    Bilirubin UA, confirm NEGATIVE  NEG             Assessment:     Active Problems:    A-fib (HCC) ()      Arthritis ()      Dementia (2/9/2016)      Closed compression fracture of lumbar vertebra ([facility] (2/1/2017)      Compression fracture of vertebra ([address] (2/1/2017)      UTI (urinary tract infection) (2/2/2017)        Plan:     1) MRI lumbar spine today with IR consult to eval for kyphoplasty  2) Keflex for UTI  3) Miralax

## 2017-02-02 NOTE — PROGRESS NOTES
The following herbal, alternative, and/or nutritional/dietary supplement product(s) has been discontinued  per [facility] approved policy:    Biotin 6276 mcg    Please reorder upon discharge if appropriate.

## 2017-02-02 NOTE — PROGRESS NOTES
Chart reviewed for disposition needs. Awaiting MRI w/ possible kyphoplasty per MD note. CM will continue to follow and be available as disposition needs arise.      Taran Hu, MSW

## 2017-02-02 NOTE — H&P
[address] [place] [address], [address] [place] [address]   HISTORY AND PHYSICAL       Name:  Marine Stokes   MR#:  [id number]   :  [id number]   Account #:  [de-identified]        Date of Adm:  2017       CHIEF COMPLAINT: An 77-year-old white female admitted with acute   L2 compression fracture. HISTORY OF PRESENT ILLNESS: The patient is followed by Dr. Lauro Rivera and fell 4 days ago and in the past 1-2 days, has   developed escalating low back isolated pain prompting her to come to   the emergency room, where a lumbar x-ray revealed acute L2   compression fracture and possible L5 compression fracture. She was   treated with a fentanyl with no improvement, and subsequently   Dilaudid 1 mg IV with some improvement, however, she still now  experiences   pain even trying to sit up in bed. She is therefore admitted to full   inpatient admission for further evaluation and treatment. PAST MEDICAL HISTORY/ILLNESSES: Chronic atrial fibrillation,   hyperlipidemia, arthritis, back pain, dementia, hypercholesterolemia,   anxiety, shoulder pain, thyroid disease, essential tremor. PAST SURGICAL HISTORY: Appendectomy, KADIE/BSO,   tonsillectomy, adenoidectomy, left knee replacement surgery. MEDICATIONS    1. Xanax 0.5 mg p.o. b.i.d. p.r.n. anxiety. 2. Tenormin 25 mg p.o. daily. 3. Biotin 5000 mcg p.o.   4. Pepcid 20 mg p.o. b.i.d.   5. Flonase 2 sprays by both nostrils daily. 6. Furosemide 40 mg daily. 7. Levothyroxine 100 mcg p.o. daily. 8. Nystatin cream b.i.d.   9. Polyethylene glycol 17 grams daily. 10. KCl 20 mEq daily. 11. Pravastatin 20 mg at bedtime. 12. Spironolactone 50 mg daily. 13. Warfarin 3 mg Tuesday, Thursday, Saturday, ; 1.5 mg   Monday, Wednesday, Friday. ADVERSE DRUG REACTIONS   1. CODEINE \"DROVE ME UP THE WALL\". 2. LATEX \"I GET BLACK WHEREVER IT TOUCHES\". 3. ADHESIVE, BLISTERS. 4. CORTISONE, UNCLEAR. 5. EGGS, UNCLEAR ALLERGY.      FAMILY HISTORY: Mother had dementia. Father had osteoarthritis. She has 2 sisters that are alive. SOCIAL HISTORY: Former smoker. ETOH, 2 drinks nightly on   average. She lives at [facility]. CODE STATUS: I broached code status, but she is thinking it over. REVIEW OF SYSTEMS: She denies head trauma, chest pain,   shortness of breath, palpitations. She reports some dysuria, no bowel   movement in 4 days. Her legs chronically feel \"numb\", but she   attributes that to edema. She denies acute numbness in her legs. She   has some non-acute urinary incontinence, but denies fecal   incontinence. She denies leg weakness. PHYSICAL EXAMINATION   VITAL SIGNS: Temperature 97.2, /91, respirations 16, O2   saturation 99%. GENERAL: Elderly white female experiencing pain when she tries to   sit up even with assistance. The pain is in the low back. HEENT: Negative. LUNGS: Clear. HEART: Irregular S1, S2, without murmur. ABDOMEN: BS positive, soft, no mass felt, nontender. BACK: In the area of L2 there is on exam, minor punch tenderness,   minor left paravertebral tenderness palpable. No rash is seen. EXTREMITIES: Passive hip rotation intact bilaterally, but left hip   passive rotation aggravates the low back pain. Left knee minor pain on   moderate flexion, well-healed TKR scar on left. Right knee nontender,   no swelling noted. No pedal edema, 2+ DP pulses. NEUROLOGIC: Alert, oriented to person, place, but not the year. Note,   she has had Fentanyl and Dilaudid already this evening. Remainder of   her neurologic exam is nonfocal.    DATA/RADIOLOGY: X-ray lumbar spine: Acute vertebral compression   deformity at L2 and possibly at L5. ADDITIONAL DATA: Hemolyzed blood sample. Sodium 125, chloride   93, glucose 109. Total bilirubin 1.4. ALT, AST normal. WBC 11.4,   hemoglobin 16.2, hematocrit 46.0, neutrophils 77% lymphocytes 7%,   monocytes 16%. Urinalysis pending.  EKG: Atrial flutter with variable AV block with premature ventricular aberrantly conducted complexes;   there is nonspecific T-wave changes; Q-waves in V1 and V2; atrial   flutter has replaced atrial fibrillation; Q-waves in V1 and V2 are old. IMPRESSION   1. Ground-level fall 4 days ago with acute L2 and possibly acute L5   compression fractures with back pain, not completely responding to   fentanyl and Dilaudid. 2. Dementia. 3. Chronic atrial fibrillation. INR pending. 4. Hyponatremia on a hemolyzed admission sample of unclear   significance. Will repeat. 5. Dysuria. Check urinalysis. 6. Left total knee replacement with chronic left knee pain. 7. The patient reports she had some right knee discomfort and had an   injection in the right knee a number of days ago by Dr. Nelda Epps   predating her fall. The patient is admitted for my partner, Dr. Clemencia Dunne.         MD ISAÍAS [facility]   D:  02/01/2017   20:12   T:  02/01/2017   20:56   Job #:  [id number]

## 2017-02-02 NOTE — ROUTINE PROCESS
Primary Nurse Bandar Short RN and Mc Reynolds RN performed a dual skin assessment on this patient No impairment noted  Tre score is 21

## 2017-02-02 NOTE — ED NOTES
TRANSFER - OUT REPORT:    Verbal report given to Lionel Mcardle, RN (name) on Lynette Gonzalez  being transferred to [facility] (unit) for routine progression of care       Report consisted of patients Situation, Background, Assessment and   Recommendations(SBAR). Information from the following report(s) SBAR, Kardex, ED Summary, Intake/Output, MAR, Recent Results and Cardiac Rhythm a-flutter was reviewed with the receiving nurse. Lines:   Peripheral IV 02/01/17 Right Antecubital (Active)   Site Assessment Clean, dry, & intact 2/1/2017  4:17 PM   Phlebitis Assessment 0 2/1/2017  4:17 PM   Infiltration Assessment 0 2/1/2017  4:17 PM   Dressing Status Clean, dry, & intact 2/1/2017  4:17 PM   Dressing Type Transparent 2/1/2017  4:17 PM   Hub Color/Line Status Pink 2/1/2017  4:17 PM   Action Taken Blood drawn 2/1/2017  4:17 PM        Opportunity for questions and clarification was provided.

## 2017-02-02 NOTE — H&P
H and P dict  Cross cover admission . Attending Dr. Jordi Sosa . 1. GLF 4 d ago with worsening low back pain and Lumbar Xray +Acute L2 Compression Fx and possible L5 compression fx . Back pain now trying to sit up even after Fentanyl and Dilaudid. She cannot recall whether she could take Oxycodone or Hydrocodone. However, Codeine gave agitation in the past.     2. Dementia - A and O x 2 , not year, but has had Fentanyl and Dilaudid. 3. Chronic Afib , on Warfarin . INR pending    4. Hyponatremia on a hemolyzed admission sample. Will repeat     5. Dysuria . Check Urinalysis. 6. S/p Left TKR in past. Chronic left knee pain     P: full inpt admission . Analgesia . Check Labs. Consider MRI in am .     D/w sister , here.

## 2017-02-03 ENCOUNTER — APPOINTMENT (OUTPATIENT)
Dept: INTERVENTIONAL RADIOLOGY/VASCULAR | Age: 82
DRG: 516 | End: 2017-02-03
Attending: FAMILY MEDICINE
Payer: MEDICARE

## 2017-02-03 ENCOUNTER — ANESTHESIA (OUTPATIENT)
Dept: INTERVENTIONAL RADIOLOGY/VASCULAR | Age: 82
DRG: 516 | End: 2017-02-03
Payer: MEDICARE

## 2017-02-03 ENCOUNTER — APPOINTMENT (OUTPATIENT)
Dept: MRI IMAGING | Age: 82
DRG: 516 | End: 2017-02-03
Attending: FAMILY MEDICINE
Payer: MEDICARE

## 2017-02-03 ENCOUNTER — ANESTHESIA EVENT (OUTPATIENT)
Dept: INTERVENTIONAL RADIOLOGY/VASCULAR | Age: 82
DRG: 516 | End: 2017-02-03
Payer: MEDICARE

## 2017-02-03 LAB
ALBUMIN SERPL BCP-MCNC: 3.2 G/DL (ref 3.5–5)
ALBUMIN/GLOB SERPL: 0.8 {RATIO} (ref 1.1–2.2)
ALP SERPL-CCNC: 49 U/L (ref 45–117)
ALT SERPL-CCNC: 9 U/L (ref 12–78)
ANION GAP BLD CALC-SCNC: 10 MMOL/L (ref 5–15)
AST SERPL W P-5'-P-CCNC: 17 U/L (ref 15–37)
BILIRUB SERPL-MCNC: 1.1 MG/DL (ref 0.2–1)
BUN SERPL-MCNC: 19 MG/DL (ref 6–20)
BUN/CREAT SERPL: 29 (ref 12–20)
CALCIUM SERPL-MCNC: 9.9 MG/DL (ref 8.5–10.1)
CHLORIDE SERPL-SCNC: 95 MMOL/L (ref 97–108)
CO2 SERPL-SCNC: 28 MMOL/L (ref 21–32)
CREAT SERPL-MCNC: 0.65 MG/DL (ref 0.55–1.02)
ERYTHROCYTE [DISTWIDTH] IN BLOOD BY AUTOMATED COUNT: 13.2 % (ref 11.5–14.5)
GLOBULIN SER CALC-MCNC: 3.9 G/DL (ref 2–4)
GLUCOSE SERPL-MCNC: 90 MG/DL (ref 65–100)
HCT VFR BLD AUTO: 41.1 % (ref 35–47)
HGB BLD-MCNC: 13.7 G/DL (ref 11.5–16)
INR PPP: 1.6 (ref 0.9–1.1)
MCH RBC QN AUTO: 32.9 PG (ref 26–34)
MCHC RBC AUTO-ENTMCNC: 33.3 G/DL (ref 30–36.5)
MCV RBC AUTO: 98.8 FL (ref 80–99)
PLATELET # BLD AUTO: 241 K/UL (ref 150–400)
POTASSIUM SERPL-SCNC: 4.1 MMOL/L (ref 3.5–5.1)
PROT SERPL-MCNC: 7.1 G/DL (ref 6.4–8.2)
PROTHROMBIN TIME: 16.8 SEC (ref 9–11.1)
RBC # BLD AUTO: 4.16 M/UL (ref 3.8–5.2)
SODIUM SERPL-SCNC: 133 MMOL/L (ref 136–145)
WBC # BLD AUTO: 9.1 K/UL (ref 3.6–11)

## 2017-02-03 PROCEDURE — 72148 MRI LUMBAR SPINE W/O DYE: CPT

## 2017-02-03 PROCEDURE — 85027 COMPLETE CBC AUTOMATED: CPT | Performed by: INTERNAL MEDICINE

## 2017-02-03 PROCEDURE — 85610 PROTHROMBIN TIME: CPT | Performed by: INTERNAL MEDICINE

## 2017-02-03 PROCEDURE — 74011250636 HC RX REV CODE- 250/636: Performed by: RADIOLOGY

## 2017-02-03 PROCEDURE — 77030003666 HC NDL SPINAL BD -A

## 2017-02-03 PROCEDURE — 74011250636 HC RX REV CODE- 250/636

## 2017-02-03 PROCEDURE — 77030034842 HC TAMP SPN BN INFL EXP II KYPH -I

## 2017-02-03 PROCEDURE — 74011250636 HC RX REV CODE- 250/636: Performed by: INTERNAL MEDICINE

## 2017-02-03 PROCEDURE — 77030021783 HC SYS CEM DEL MEDT -D

## 2017-02-03 PROCEDURE — 77030021782 HC SYS CEM CART DEL KYPH -C

## 2017-02-03 PROCEDURE — 74011000250 HC RX REV CODE- 250: Performed by: RADIOLOGY

## 2017-02-03 PROCEDURE — 0QS03ZZ REPOSITION LUMBAR VERTEBRA, PERCUTANEOUS APPROACH: ICD-10-PCS | Performed by: RADIOLOGY

## 2017-02-03 PROCEDURE — 74011250637 HC RX REV CODE- 250/637: Performed by: INTERNAL MEDICINE

## 2017-02-03 PROCEDURE — 80053 COMPREHEN METABOLIC PANEL: CPT | Performed by: INTERNAL MEDICINE

## 2017-02-03 PROCEDURE — 65270000029 HC RM PRIVATE

## 2017-02-03 PROCEDURE — 0QU03JZ SUPPLEMENT LUMBAR VERTEBRA WITH SYNTHETIC SUBSTITUTE, PERCUTANEOUS APPROACH: ICD-10-PCS | Performed by: RADIOLOGY

## 2017-02-03 PROCEDURE — 74011250637 HC RX REV CODE- 250/637: Performed by: FAMILY MEDICINE

## 2017-02-03 PROCEDURE — 36415 COLL VENOUS BLD VENIPUNCTURE: CPT | Performed by: INTERNAL MEDICINE

## 2017-02-03 PROCEDURE — C1713 ANCHOR/SCREW BN/BN,TIS/BN: HCPCS

## 2017-02-03 PROCEDURE — 74011636320 HC RX REV CODE- 636/320: Performed by: RADIOLOGY

## 2017-02-03 PROCEDURE — 22514 PERQ VERTEBRAL AUGMENTATION: CPT

## 2017-02-03 RX ORDER — DEXAMETHASONE SODIUM PHOSPHATE 4 MG/ML
INJECTION, SOLUTION INTRA-ARTICULAR; INTRALESIONAL; INTRAMUSCULAR; INTRAVENOUS; SOFT TISSUE AS NEEDED
Status: DISCONTINUED | OUTPATIENT
Start: 2017-02-03 | End: 2017-02-03 | Stop reason: HOSPADM

## 2017-02-03 RX ORDER — CEFAZOLIN SODIUM IN 0.9 % NACL 2 G/50 ML
2 INTRAVENOUS SOLUTION, PIGGYBACK (ML) INTRAVENOUS ONCE
Status: COMPLETED | OUTPATIENT
Start: 2017-02-03 | End: 2017-02-03

## 2017-02-03 RX ORDER — SODIUM CHLORIDE 9 MG/ML
25 INJECTION, SOLUTION INTRAVENOUS CONTINUOUS
Status: DISCONTINUED | OUTPATIENT
Start: 2017-02-03 | End: 2017-02-04

## 2017-02-03 RX ORDER — BUPIVACAINE HYDROCHLORIDE 5 MG/ML
20 INJECTION, SOLUTION EPIDURAL; INTRACAUDAL
Status: COMPLETED | OUTPATIENT
Start: 2017-02-03 | End: 2017-02-03

## 2017-02-03 RX ORDER — FENTANYL CITRATE 50 UG/ML
INJECTION, SOLUTION INTRAMUSCULAR; INTRAVENOUS AS NEEDED
Status: DISCONTINUED | OUTPATIENT
Start: 2017-02-03 | End: 2017-02-03 | Stop reason: HOSPADM

## 2017-02-03 RX ORDER — ONDANSETRON 2 MG/ML
INJECTION INTRAMUSCULAR; INTRAVENOUS AS NEEDED
Status: DISCONTINUED | OUTPATIENT
Start: 2017-02-03 | End: 2017-02-03 | Stop reason: HOSPADM

## 2017-02-03 RX ORDER — LIDOCAINE HYDROCHLORIDE 20 MG/ML
0.2 INJECTION, SOLUTION INFILTRATION; PERINEURAL ONCE
Status: COMPLETED | OUTPATIENT
Start: 2017-02-03 | End: 2017-02-03

## 2017-02-03 RX ORDER — PROPOFOL 10 MG/ML
INJECTION, EMULSION INTRAVENOUS
Status: DISCONTINUED | OUTPATIENT
Start: 2017-02-03 | End: 2017-02-03 | Stop reason: HOSPADM

## 2017-02-03 RX ORDER — BUPIVACAINE HYDROCHLORIDE 5 MG/ML
INJECTION, SOLUTION EPIDURAL; INTRACAUDAL
Status: DISPENSED
Start: 2017-02-03 | End: 2017-02-04

## 2017-02-03 RX ORDER — LIDOCAINE HYDROCHLORIDE 20 MG/ML
20 INJECTION, SOLUTION INFILTRATION; PERINEURAL
Status: ACTIVE | OUTPATIENT
Start: 2017-02-03 | End: 2017-02-04

## 2017-02-03 RX ORDER — LIDOCAINE HYDROCHLORIDE 20 MG/ML
INJECTION, SOLUTION INFILTRATION; PERINEURAL
Status: DISPENSED
Start: 2017-02-03 | End: 2017-02-04

## 2017-02-03 RX ORDER — BUPIVACAINE HYDROCHLORIDE 5 MG/ML
30 INJECTION, SOLUTION EPIDURAL; INTRACAUDAL
Status: ACTIVE | OUTPATIENT
Start: 2017-02-03 | End: 2017-02-04

## 2017-02-03 RX ORDER — PROPOFOL 10 MG/ML
INJECTION, EMULSION INTRAVENOUS AS NEEDED
Status: DISCONTINUED | OUTPATIENT
Start: 2017-02-03 | End: 2017-02-03 | Stop reason: HOSPADM

## 2017-02-03 RX ADMIN — IOHEXOL 10 ML: 240 INJECTION, SOLUTION INTRATHECAL; INTRAVASCULAR; INTRAVENOUS; ORAL at 16:18

## 2017-02-03 RX ADMIN — HYDROMORPHONE HYDROCHLORIDE 1 MG: 1 INJECTION, SOLUTION INTRAMUSCULAR; INTRAVENOUS; SUBCUTANEOUS at 12:06

## 2017-02-03 RX ADMIN — POLYETHYLENE GLYCOL 3350 17 G: 17 POWDER, FOR SOLUTION ORAL at 10:06

## 2017-02-03 RX ADMIN — CEPHALEXIN 250 MG: 250 CAPSULE ORAL at 10:06

## 2017-02-03 RX ADMIN — PROPOFOL 50 MCG/KG/MIN: 10 INJECTION, EMULSION INTRAVENOUS at 15:24

## 2017-02-03 RX ADMIN — HYDROMORPHONE HYDROCHLORIDE 1 MG: 1 INJECTION, SOLUTION INTRAMUSCULAR; INTRAVENOUS; SUBCUTANEOUS at 23:20

## 2017-02-03 RX ADMIN — ATENOLOL 25 MG: 25 TABLET ORAL at 10:07

## 2017-02-03 RX ADMIN — CEPHALEXIN 250 MG: 250 CAPSULE ORAL at 00:12

## 2017-02-03 RX ADMIN — ALPRAZOLAM 0.5 MG: 0.5 TABLET ORAL at 08:26

## 2017-02-03 RX ADMIN — DEXAMETHASONE SODIUM PHOSPHATE 4 MG: 4 INJECTION, SOLUTION INTRA-ARTICULAR; INTRALESIONAL; INTRAMUSCULAR; INTRAVENOUS; SOFT TISSUE at 15:28

## 2017-02-03 RX ADMIN — Medication 10 ML: at 08:27

## 2017-02-03 RX ADMIN — FENTANYL CITRATE 25 MCG: 50 INJECTION, SOLUTION INTRAMUSCULAR; INTRAVENOUS at 15:51

## 2017-02-03 RX ADMIN — ONDANSETRON 4 MG: 2 INJECTION INTRAMUSCULAR; INTRAVENOUS at 15:28

## 2017-02-03 RX ADMIN — PROPOFOL 20 MG: 10 INJECTION, EMULSION INTRAVENOUS at 15:24

## 2017-02-03 RX ADMIN — CEFAZOLIN 2 G: 1 INJECTION, POWDER, FOR SOLUTION INTRAMUSCULAR; INTRAVENOUS; PARENTERAL at 15:40

## 2017-02-03 RX ADMIN — FAMOTIDINE 20 MG: 20 TABLET ORAL at 10:06

## 2017-02-03 RX ADMIN — HYDROMORPHONE HYDROCHLORIDE 1 MG: 1 INJECTION, SOLUTION INTRAMUSCULAR; INTRAVENOUS; SUBCUTANEOUS at 02:50

## 2017-02-03 RX ADMIN — SODIUM CHLORIDE: 900 INJECTION, SOLUTION INTRAVENOUS at 15:12

## 2017-02-03 RX ADMIN — LEVOTHYROXINE SODIUM 100 MCG: 100 TABLET ORAL at 06:32

## 2017-02-03 RX ADMIN — FENTANYL CITRATE 25 MCG: 50 INJECTION, SOLUTION INTRAMUSCULAR; INTRAVENOUS at 16:12

## 2017-02-03 RX ADMIN — HYDROMORPHONE HYDROCHLORIDE 1 MG: 1 INJECTION, SOLUTION INTRAMUSCULAR; INTRAVENOUS; SUBCUTANEOUS at 18:50

## 2017-02-03 RX ADMIN — HYDROMORPHONE HYDROCHLORIDE 1 MG: 1 INJECTION, SOLUTION INTRAMUSCULAR; INTRAVENOUS; SUBCUTANEOUS at 06:32

## 2017-02-03 RX ADMIN — BUPIVACAINE HYDROCHLORIDE 15 ML: 5 INJECTION, SOLUTION EPIDURAL; INTRACAUDAL at 16:16

## 2017-02-03 RX ADMIN — POTASSIUM CHLORIDE 20 MEQ: 750 TABLET, FILM COATED, EXTENDED RELEASE ORAL at 10:07

## 2017-02-03 RX ADMIN — LIDOCAINE HYDROCHLORIDE 9 ML: 20 INJECTION, SOLUTION INFILTRATION; PERINEURAL at 16:16

## 2017-02-03 RX ADMIN — SODIUM BICARBONATE 1 ML: 0.2 INJECTION, SOLUTION INTRAVENOUS at 16:17

## 2017-02-03 RX ADMIN — FAMOTIDINE 20 MG: 20 TABLET ORAL at 18:50

## 2017-02-03 RX ADMIN — Medication 10 ML: at 23:24

## 2017-02-03 RX ADMIN — FENTANYL CITRATE 25 MCG: 50 INJECTION, SOLUTION INTRAMUSCULAR; INTRAVENOUS at 15:40

## 2017-02-03 RX ADMIN — FENTANYL CITRATE 25 MCG: 50 INJECTION, SOLUTION INTRAMUSCULAR; INTRAVENOUS at 15:47

## 2017-02-03 NOTE — PROGRESS NOTES
Orientee was appropriately supervised during medication administration, assessment and documentation  by preceptor.     Shena Segovia RN

## 2017-02-03 NOTE — H&P
Radiology History and Physical    Patient: Lexii Holguin 80 y.o. female       Chief Complaint: Back Pain and Syncope      History of Present Illness: L2 fracture    History:    Past Medical History   Diagnosis Date    A-fib ([address]     Arrhythmia      A FIB    Arthritis     Back pain     CAD (coronary artery disease)      PT DENIES    Chronic pain     Dementia 2/9/2016    Hypercholesterolemia     Psychiatric disorder      ANXIETY    Shoulder pain     Thyroid disease     Tremor, essential      Family History   Problem Relation Age of Onset    Dementia Mother     Arthritis-osteo Father      Social History     Social History    Marital status:      Spouse name: N/A    Number of children: N/A    Years of education: N/A     Occupational History    Not on file. Social History Main Topics    Smoking status: Former Smoker     Packs/day: 0.50     Years: 10.00     Quit date: 1/7/1994    Smokeless tobacco: Never Used    Alcohol use Yes      Comment: NIGHTLY 2 GLASSES WINE    Drug use: No    Sexual activity: Not on file     Other Topics Concern    Not on file     Social History Narrative       Allergies: Allergies   Allergen Reactions    Latex, Natural Rubber Other (comments)     \"I get black where ever it touches\"    Codeine Other (comments)     \"It just sends me up the wall\"    Adhesive Rash and Other (comments)     blisters    Cortisone Unknown (comments)    Egg Unknown (comments)     Pet pt. She states that she is allergic to eggs.        Current Medications:  Current Facility-Administered Medications   Medication Dose Route Frequency    sodium phosphate (FLEET'S) enema 118 mL  1 Enema Rectal NOW    ceFAZolin in 0.9% NS (ANCEF) IVPB soln 2 g  2 g IntraVENous ONCE    0.9% sodium chloride infusion  25 mL/hr IntraVENous CONTINUOUS    cephALEXin (KEFLEX) capsule 250 mg  250 mg Oral Q8H    ALPRAZolam (XANAX) tablet 0.5 mg  0.5 mg Oral BID PRN    atenolol (TENORMIN) tablet 25 mg  25 mg Oral DAILY    famotidine (PEPCID) tablet 20 mg  20 mg Oral BID    fluticasone (FLONASE) 50 mcg/actuation nasal spray 2 Spray  2 Spray Both Nostrils DAILY    furosemide (LASIX) tablet 40 mg  40 mg Oral DAILY    levothyroxine (SYNTHROID) tablet 100 mcg  100 mcg Oral ACB    polyethylene glycol (MIRALAX) packet 17 g  17 g Oral DAILY    potassium chloride SR (KLOR-CON 10) tablet 20 mEq  20 mEq Oral DAILY    pravastatin (PRAVACHOL) tablet 20 mg  20 mg Oral QHS    spironolactone (ALDACTONE) tablet 50 mg  50 mg Oral DAILY    sodium chloride (NS) flush 5-10 mL  5-10 mL IntraVENous Q8H    sodium chloride (NS) flush 5-10 mL  5-10 mL IntraVENous PRN    acetaminophen (TYLENOL) tablet 650 mg  650 mg Oral Q4H PRN    HYDROmorphone (PF) (DILAUDID) injection 1 mg  1 mg IntraVENous Q4H PRN        Physical Exam:  Blood pressure (!) 132/99, pulse 86, temperature 98.2 °F (36.8 °C), resp. rate 16, height 5' 3\" (1.6 m), weight 68 kg (150 lb), SpO2 97 %.   LUNG: clear to auscultation bilaterally, HEART: regular rate and rhythm, S1, S2 normal, no murmur, click, rub or gallop      Alerts:    Hospital Problems  Date Reviewed: 2/1/2017          Codes Class Noted POA    UTI (urinary tract infection) ICD-10-CM: N39.0  ICD-9-CM: 599.0  2/2/2017 Unknown        Closed compression fracture of lumbar vertebra ([facility] ICD-10-CM: S32.000A  ICD-9-CM: 805.4  2/1/2017 Yes        Compression fracture of vertebra ([facility] ICD-10-CM: M48.50XA  ICD-9-CM: 805.8  2/1/2017 Unknown        Dementia ICD-10-CM: F03.90  ICD-9-CM: 294.20  2/9/2016 Yes        A-fib ([facility] ICD-10-CM: I48.91  ICD-9-CM: 427.31  Unknown Yes        Arthritis ICD-10-CM: M19.90  ICD-9-CM: 716.90  Unknown Yes              Laboratory:      Recent Labs      02/03/17   0708   HGB  13.7   HCT  41.1   WBC  9.1   PLT  241   INR  1.6*   BUN  19   CREA  0.65   K  4.1         Plan of Care/Planned Procedure:  Risks, benefits, and alternatives reviewed with patient and she agrees to proceed with the procedure.      Plan is for Aisha Cao MD

## 2017-02-03 NOTE — PROGRESS NOTES
Problem: Patient Education: Go to Patient Education Activity  Goal: Patient/Family Education  Outcome: Progressing Towards Goal  White board in use to communicate medication side effects and times

## 2017-02-03 NOTE — PROGRESS NOTES
1200- patient's notified face to face and via phone regarding kyphoplasty this afternoon. 1530- Patient off unit to Radiology for kyphoplasty. Bedside shift change report given to Camilla Dunn and Mike Castillo RN (oncoming nurse) by Noah Monte (offgoing nurse). Report included the following information SBAR, Kardex, Procedure Summary, Intake/Output, MAR and Recent Results.

## 2017-02-03 NOTE — PROGRESS NOTES
Benito Johnson, Demarcus Diaz, and Jd Keen Date: 2/1/2017      Subjective:     Patient unchanged. Still await MRI lumbar spine. She refused it last PM as she was nauseated. Still no BM. Dinesh Gardner Current Facility-Administered Medications   Medication Dose Route Frequency    cephALEXin (KEFLEX) capsule 250 mg  250 mg Oral Q8H    ALPRAZolam (XANAX) tablet 0.5 mg  0.5 mg Oral BID PRN    atenolol (TENORMIN) tablet 25 mg  25 mg Oral DAILY    famotidine (PEPCID) tablet 20 mg  20 mg Oral BID    fluticasone (FLONASE) 50 mcg/actuation nasal spray 2 Spray  2 Spray Both Nostrils DAILY    furosemide (LASIX) tablet 40 mg  40 mg Oral DAILY    levothyroxine (SYNTHROID) tablet 100 mcg  100 mcg Oral ACB    polyethylene glycol (MIRALAX) packet 17 g  17 g Oral DAILY    potassium chloride SR (KLOR-CON 10) tablet 20 mEq  20 mEq Oral DAILY    pravastatin (PRAVACHOL) tablet 20 mg  20 mg Oral QHS    spironolactone (ALDACTONE) tablet 50 mg  50 mg Oral DAILY    sodium chloride (NS) flush 5-10 mL  5-10 mL IntraVENous Q8H    sodium chloride (NS) flush 5-10 mL  5-10 mL IntraVENous PRN    acetaminophen (TYLENOL) tablet 650 mg  650 mg Oral Q4H PRN    HYDROmorphone (PF) (DILAUDID) injection 1 mg  1 mg IntraVENous Q4H PRN          Objective:     Patient Vitals for the past 8 hrs:   BP Temp Pulse Resp SpO2   02/03/17 0323 148/80 98.1 °F (36.7 °C) 70 15 93 %        02/01 1901 - 02/03 0700  In: -   Out: 100 [Urine:100]    Physical Exam: Lungs: clear to auscultation bilaterally  Heart: regular rate and rhythm, S1, S2 normal, no murmur, click, rub or gallop  Abdomen: soft, non-tender. Bowel sounds normal. No masses,  no organomegaly        Data Review No results found for this or any previous visit (from the past 24 hour(s)).         Assessment:     Active Problems:    A-fib (HCC) ()      Arthritis ()      Dementia (2/9/2016)      Closed compression fracture of lumbar vertebra (HCC) (2/1/2017)      Compression fracture of vertebra ([address] (2/1/2017)      UTI (urinary tract infection) (2/2/2017)        Plan:     1) MRI lumbar spine  2) IR to eval for kyphoplasty  3) Fleets today
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
## 2017-02-03 NOTE — ANESTHESIA PREPROCEDURE EVALUATION
Anesthetic History               Review of Systems / Medical History  Patient summary reviewed, nursing notes reviewed and pertinent labs reviewed    Pulmonary                   Neuro/Psych         Psychiatric history     Cardiovascular            Dysrhythmias   CAD         GI/Hepatic/Renal                Endo/Other      Hypothyroidism  Obesity and blood dyscrasia     Other Findings              Physical Exam    Airway  Mallampati: II  TM Distance: 4 - 6 cm  Neck ROM: decreased range of motion   Mouth opening: Normal     Cardiovascular  Regular rate and rhythm,  S1 and S2 normal,  no murmur, click, rub, or gallop             Dental    Dentition: Caps/crowns     Pulmonary  Breath sounds clear to auscultation               Abdominal  GI exam deferred       Other Findings            Anesthetic Plan    ASA: 3  Anesthesia type: MAC    Monitoring Plan: BIS      Induction: Intravenous  Anesthetic plan and risks discussed with: Patient

## 2017-02-04 PROBLEM — M48.50XA COMPRESSION FRACTURE OF VERTEBRA (HCC): Status: RESOLVED | Noted: 2017-02-01 | Resolved: 2017-02-04

## 2017-02-04 LAB
BACTERIA SPEC CULT: NORMAL
BACTERIA SPEC CULT: NORMAL
CC UR VC: NORMAL
INR PPP: 1.6 (ref 0.9–1.1)
PROTHROMBIN TIME: 16.8 SEC (ref 9–11.1)
SERVICE CMNT-IMP: NORMAL

## 2017-02-04 PROCEDURE — 65270000029 HC RM PRIVATE

## 2017-02-04 PROCEDURE — 74011250637 HC RX REV CODE- 250/637: Performed by: INTERNAL MEDICINE

## 2017-02-04 PROCEDURE — 74011250636 HC RX REV CODE- 250/636: Performed by: INTERNAL MEDICINE

## 2017-02-04 PROCEDURE — 85610 PROTHROMBIN TIME: CPT | Performed by: INTERNAL MEDICINE

## 2017-02-04 PROCEDURE — 74011250637 HC RX REV CODE- 250/637: Performed by: FAMILY MEDICINE

## 2017-02-04 PROCEDURE — 36415 COLL VENOUS BLD VENIPUNCTURE: CPT | Performed by: INTERNAL MEDICINE

## 2017-02-04 RX ORDER — LEVOFLOXACIN 250 MG/1
250 TABLET ORAL EVERY 24 HOURS
Status: DISCONTINUED | OUTPATIENT
Start: 2017-02-04 | End: 2017-02-07 | Stop reason: HOSPADM

## 2017-02-04 RX ORDER — WARFARIN 1 MG/1
3 TABLET ORAL ONCE
Status: COMPLETED | OUTPATIENT
Start: 2017-02-04 | End: 2017-02-04

## 2017-02-04 RX ORDER — FACIAL-BODY WIPES
10 EACH TOPICAL DAILY PRN
Status: DISCONTINUED | OUTPATIENT
Start: 2017-02-04 | End: 2017-02-07 | Stop reason: HOSPADM

## 2017-02-04 RX ADMIN — LEVOTHYROXINE SODIUM 100 MCG: 100 TABLET ORAL at 06:48

## 2017-02-04 RX ADMIN — CEPHALEXIN 250 MG: 250 CAPSULE ORAL at 00:53

## 2017-02-04 RX ADMIN — FAMOTIDINE 20 MG: 20 TABLET ORAL at 10:34

## 2017-02-04 RX ADMIN — BISACODYL 10 MG: 10 SUPPOSITORY RECTAL at 13:15

## 2017-02-04 RX ADMIN — Medication 10 ML: at 22:00

## 2017-02-04 RX ADMIN — HYDROMORPHONE HYDROCHLORIDE 1 MG: 1 INJECTION, SOLUTION INTRAMUSCULAR; INTRAVENOUS; SUBCUTANEOUS at 18:58

## 2017-02-04 RX ADMIN — FAMOTIDINE 20 MG: 20 TABLET ORAL at 17:44

## 2017-02-04 RX ADMIN — POLYETHYLENE GLYCOL 3350 17 G: 17 POWDER, FOR SOLUTION ORAL at 10:34

## 2017-02-04 RX ADMIN — HYDROMORPHONE HYDROCHLORIDE 1 MG: 1 INJECTION, SOLUTION INTRAMUSCULAR; INTRAVENOUS; SUBCUTANEOUS at 15:11

## 2017-02-04 RX ADMIN — ACETAMINOPHEN 650 MG: 325 TABLET, FILM COATED ORAL at 13:15

## 2017-02-04 RX ADMIN — CEPHALEXIN 250 MG: 250 CAPSULE ORAL at 10:34

## 2017-02-04 RX ADMIN — POTASSIUM CHLORIDE 20 MEQ: 750 TABLET, FILM COATED, EXTENDED RELEASE ORAL at 10:35

## 2017-02-04 RX ADMIN — WARFARIN SODIUM 3 MG: 1 TABLET ORAL at 17:44

## 2017-02-04 RX ADMIN — LEVOFLOXACIN 250 MG: 250 TABLET, FILM COATED ORAL at 13:15

## 2017-02-04 RX ADMIN — HYDROMORPHONE HYDROCHLORIDE 1 MG: 1 INJECTION, SOLUTION INTRAMUSCULAR; INTRAVENOUS; SUBCUTANEOUS at 03:52

## 2017-02-04 RX ADMIN — Medication 10 ML: at 15:12

## 2017-02-04 NOTE — PROGRESS NOTES
Bedside and Verbal shift change report given to Cordelia (oncoming nurse) by Kalyan Mercado (offgoing nurse). Report included the following information SBAR, Kardex, Intake/Output, MAR, Accordion and Recent Results.

## 2017-02-04 NOTE — PROGRESS NOTES
Pharmacist Note  Warfarin Dosing  Consult provided for this 80 y. o.female to manage warfarin for Atrial Fibrillation    INR Goal: 2 - 3    Home regimen/ tablet size: 1.5 mg MWF; 3 mg all other days    Drugs that may increase INR: Quinolones, Levothyroxine  Drugs that may decrease INR: None  Other current anticoagulants/ drugs that may increase bleeding risk: None  Risk factors: Age > 65  Daily INR ordered: YES    Recent Labs      02/04/17   0243  02/03/17   0708  02/01/17   2110  02/01/17   1617   HGB   --   13.7   --   16.2*   INR  1.6*  1.6*  1.9*   --      Date               INR                  Dose  2/4  1.6  3 mg                                                                                Assessment/ Plan: Will order warfarin 3 mg PO x 1 dose. Pharmacy will continue to monitor daily and adjust therapy as indicated. Please contact the pharmacist at x [phone] or  for outpatient recommendations if needed.      Wolf Ellis, PharmD

## 2017-02-04 NOTE — PROGRESS NOTES
Bhavyas Allison Cyndee    Admit Date: 2/1/2017    Subjective: It seems the kyphoplasty helped her back pain some, but not totally. Urine cx growing Klebsiella & Pseudomonas. No new complaints. Current Facility-Administered Medications   Medication Dose Route Frequency    levoFLOXacin (LEVAQUIN) tablet 250 mg  250 mg Oral Q24H    ALPRAZolam (XANAX) tablet 0.5 mg  0.5 mg Oral BID PRN    atenolol (TENORMIN) tablet 25 mg  25 mg Oral DAILY    famotidine (PEPCID) tablet 20 mg  20 mg Oral BID    fluticasone (FLONASE) 50 mcg/actuation nasal spray 2 Spray  2 Spray Both Nostrils DAILY    furosemide (LASIX) tablet 40 mg  40 mg Oral DAILY    levothyroxine (SYNTHROID) tablet 100 mcg  100 mcg Oral ACB    polyethylene glycol (MIRALAX) packet 17 g  17 g Oral DAILY    potassium chloride SR (KLOR-CON 10) tablet 20 mEq  20 mEq Oral DAILY    pravastatin (PRAVACHOL) tablet 20 mg  20 mg Oral QHS    spironolactone (ALDACTONE) tablet 50 mg  50 mg Oral DAILY    sodium chloride (NS) flush 5-10 mL  5-10 mL IntraVENous Q8H    sodium chloride (NS) flush 5-10 mL  5-10 mL IntraVENous PRN    acetaminophen (TYLENOL) tablet 650 mg  650 mg Oral Q4H PRN    HYDROmorphone (PF) (DILAUDID) injection 1 mg  1 mg IntraVENous Q4H PRN          Objective:     Patient Vitals for the past 8 hrs:   BP Temp Pulse Resp SpO2   02/04/17 1027 96/58 97.6 °F (36.4 °C) 96 16 96 %        02/02 1901 - 02/04 0700  In: 370 [P.O.:120; I.V.:250]  Out: 1     Physical Exam: NAD. Alert. Neck -- Supple. No JVD. Heart -- RRR. Lungs -- CTA. Abd -- Benign. Ext -- No LE edema, b/l.       Data Review   Recent Results (from the past 24 hour(s))   PROTHROMBIN TIME + INR    Collection Time: 02/04/17  2:43 AM   Result Value Ref Range    INR 1.6 (H) 0.9 - 1.1      Prothrombin time 16.8 (H) 9.0 - 11.1 sec           Assessment:     Principal Problem:    Closed compression fracture of lumbar vertebra -- s/p Kypho 2/3      Active Problems:    A-fib (HCC) ()      CAD (coronary artery disease) ()      Dementia (2/9/2016)      UTI (urinary tract infection) (2/2/2017)        Plan:     1. Cont pain management. 2. Change Keflex to Levaquin, considering susceptibilities. 3. Pharmacy to dose coumadin -- Need to consider her being started on Levaquin. 4. PT/OT. 5. Pt will likely need SNF at discharge. ... Rebecca Akers MD

## 2017-02-04 NOTE — ROUTINE PROCESS
Bedside and Verbal shift change report given to Linwood Rojas RN (oncoming nurse) by Ingrid Bustos RN (offgoing nurse). Report included the following information SBAR, Kardex, ED Summary, OR Summary, Procedure Summary, Intake/Output, MAR, Accordion, Recent Results and Med Rec Status.

## 2017-02-05 LAB
ANION GAP BLD CALC-SCNC: 9 MMOL/L (ref 5–15)
BASOPHILS # BLD AUTO: 0 K/UL (ref 0–0.1)
BASOPHILS # BLD: 0 % (ref 0–1)
BUN SERPL-MCNC: 20 MG/DL (ref 6–20)
BUN/CREAT SERPL: 31 (ref 12–20)
CALCIUM SERPL-MCNC: 9.7 MG/DL (ref 8.5–10.1)
CHLORIDE SERPL-SCNC: 96 MMOL/L (ref 97–108)
CO2 SERPL-SCNC: 28 MMOL/L (ref 21–32)
CREAT SERPL-MCNC: 0.64 MG/DL (ref 0.55–1.02)
EOSINOPHIL # BLD: 0.1 K/UL (ref 0–0.4)
EOSINOPHIL NFR BLD: 1 % (ref 0–7)
ERYTHROCYTE [DISTWIDTH] IN BLOOD BY AUTOMATED COUNT: 12.8 % (ref 11.5–14.5)
GLUCOSE SERPL-MCNC: 103 MG/DL (ref 65–100)
HCT VFR BLD AUTO: 42.3 % (ref 35–47)
HGB BLD-MCNC: 14.5 G/DL (ref 11.5–16)
INR PPP: 1.7 (ref 0.9–1.1)
LYMPHOCYTES # BLD AUTO: 13 % (ref 12–49)
LYMPHOCYTES # BLD: 1.4 K/UL (ref 0.8–3.5)
MCH RBC QN AUTO: 32.9 PG (ref 26–34)
MCHC RBC AUTO-ENTMCNC: 34.3 G/DL (ref 30–36.5)
MCV RBC AUTO: 95.9 FL (ref 80–99)
MONOCYTES # BLD: 1.9 K/UL (ref 0–1)
MONOCYTES NFR BLD AUTO: 17 % (ref 5–13)
NEUTS SEG # BLD: 7.6 K/UL (ref 1.8–8)
NEUTS SEG NFR BLD AUTO: 69 % (ref 32–75)
PLATELET # BLD AUTO: 273 K/UL (ref 150–400)
POTASSIUM SERPL-SCNC: 4.2 MMOL/L (ref 3.5–5.1)
PROTHROMBIN TIME: 17.4 SEC (ref 9–11.1)
RBC # BLD AUTO: 4.41 M/UL (ref 3.8–5.2)
SODIUM SERPL-SCNC: 133 MMOL/L (ref 136–145)
WBC # BLD AUTO: 11 K/UL (ref 3.6–11)

## 2017-02-05 PROCEDURE — 65270000029 HC RM PRIVATE

## 2017-02-05 PROCEDURE — 36415 COLL VENOUS BLD VENIPUNCTURE: CPT | Performed by: INTERNAL MEDICINE

## 2017-02-05 PROCEDURE — 85025 COMPLETE CBC W/AUTO DIFF WBC: CPT | Performed by: INTERNAL MEDICINE

## 2017-02-05 PROCEDURE — 74011250637 HC RX REV CODE- 250/637: Performed by: INTERNAL MEDICINE

## 2017-02-05 PROCEDURE — 74011250637 HC RX REV CODE- 250/637: Performed by: FAMILY MEDICINE

## 2017-02-05 PROCEDURE — 80048 BASIC METABOLIC PNL TOTAL CA: CPT | Performed by: INTERNAL MEDICINE

## 2017-02-05 PROCEDURE — 85610 PROTHROMBIN TIME: CPT | Performed by: FAMILY MEDICINE

## 2017-02-05 PROCEDURE — 74011250636 HC RX REV CODE- 250/636: Performed by: INTERNAL MEDICINE

## 2017-02-05 RX ORDER — WARFARIN 1 MG/1
3 TABLET ORAL ONCE
Status: COMPLETED | OUTPATIENT
Start: 2017-02-05 | End: 2017-02-05

## 2017-02-05 RX ORDER — HYDROCODONE BITARTRATE AND ACETAMINOPHEN 5; 325 MG/1; MG/1
1 TABLET ORAL
Status: DISCONTINUED | OUTPATIENT
Start: 2017-02-05 | End: 2017-02-07 | Stop reason: HOSPADM

## 2017-02-05 RX ORDER — ONDANSETRON 4 MG/1
4 TABLET, ORALLY DISINTEGRATING ORAL
Status: DISCONTINUED | OUTPATIENT
Start: 2017-02-05 | End: 2017-02-07 | Stop reason: HOSPADM

## 2017-02-05 RX ADMIN — Medication 10 ML: at 07:10

## 2017-02-05 RX ADMIN — PRAVASTATIN SODIUM 20 MG: 20 TABLET ORAL at 21:38

## 2017-02-05 RX ADMIN — ATENOLOL 25 MG: 25 TABLET ORAL at 09:08

## 2017-02-05 RX ADMIN — SPIRONOLACTONE 50 MG: 25 TABLET, FILM COATED ORAL at 09:09

## 2017-02-05 RX ADMIN — FUROSEMIDE 40 MG: 40 TABLET ORAL at 09:09

## 2017-02-05 RX ADMIN — FAMOTIDINE 20 MG: 20 TABLET ORAL at 17:13

## 2017-02-05 RX ADMIN — POLYETHYLENE GLYCOL 3350 17 G: 17 POWDER, FOR SOLUTION ORAL at 10:33

## 2017-02-05 RX ADMIN — LEVOTHYROXINE SODIUM 100 MCG: 100 TABLET ORAL at 07:10

## 2017-02-05 RX ADMIN — ALPRAZOLAM 0.5 MG: 0.5 TABLET ORAL at 21:38

## 2017-02-05 RX ADMIN — ONDANSETRON 4 MG: 4 TABLET, ORALLY DISINTEGRATING ORAL at 15:12

## 2017-02-05 RX ADMIN — LEVOFLOXACIN 250 MG: 250 TABLET, FILM COATED ORAL at 12:35

## 2017-02-05 RX ADMIN — POTASSIUM CHLORIDE 20 MEQ: 750 TABLET, FILM COATED, EXTENDED RELEASE ORAL at 09:08

## 2017-02-05 RX ADMIN — HYDROCODONE BITARTRATE AND ACETAMINOPHEN 1 TABLET: 5; 325 TABLET ORAL at 19:44

## 2017-02-05 RX ADMIN — HYDROCODONE BITARTRATE AND ACETAMINOPHEN 1 TABLET: 5; 325 TABLET ORAL at 12:35

## 2017-02-05 RX ADMIN — FAMOTIDINE 20 MG: 20 TABLET ORAL at 09:08

## 2017-02-05 RX ADMIN — HYDROMORPHONE HYDROCHLORIDE 1 MG: 1 INJECTION, SOLUTION INTRAMUSCULAR; INTRAVENOUS; SUBCUTANEOUS at 01:30

## 2017-02-05 RX ADMIN — Medication 10 ML: at 22:00

## 2017-02-05 RX ADMIN — WARFARIN SODIUM 3 MG: 1 TABLET ORAL at 17:13

## 2017-02-05 RX ADMIN — Medication 10 ML: at 14:00

## 2017-02-05 NOTE — PROGRESS NOTES
Consult received and chart reviewed. Spoke with nursing and with patient. Patient adamantly refused any therapy interventions. She states she is nauseated and in too much pain to attempt any movement whatsoever. Suggested that perhaps sitting EOB would alleviate the nausea, but she still refused. When stating we would return tomorrow morning, she replied, \"Don't come early. I don't do early. \"  We will follow up in AM as patient will allow.     Evaristo Reyes, PT

## 2017-02-05 NOTE — ROUTINE PROCESS
After multiple attempts patient continues to refuse blood work, change linen or to turn.  RN will reassess later

## 2017-02-05 NOTE — ANESTHESIA POSTPROCEDURE EVALUATION
Post-Anesthesia Evaluation and Assessment    Patient: Carlos Buckley MRN: [id number]  SSN: [id number]    YOB: 1931  Age: 80 y.o. Sex: female       Cardiovascular Function/Vital Signs  Visit Vitals    /76 (BP 1 Location: Right arm, BP Patient Position: At rest)    Pulse 74    Temp 36.7 °C (98 °F)    Resp 16    Ht 5' 3\" (1.6 m)    Wt 68 kg (150 lb)    SpO2 95%    BMI 26.57 kg/m2       Patient is status post MAC anesthesia for * No procedures listed *. Nausea/Vomiting: None    Postoperative hydration reviewed and adequate. Pain:  Pain Scale 1: Numeric (0 - 10) (02/05/17 1355)  Pain Intensity 1: 10 (02/05/17 1355)   Managed    Neurological Status:   Neuro  Neurologic State: Alert (02/04/17 2054)  Orientation Level: Oriented to person;Oriented to situation (02/04/17 2054)  Cognition: Decreased attention/concentration (02/04/17 2054)  Speech: Clear (02/04/17 2054)   At baseline    Mental Status and Level of Consciousness: Arousable    Pulmonary Status:   O2 Device: Room air (02/05/17 0300)   Adequate oxygenation and airway patent    Complications related to anesthesia: None    Post-anesthesia assessment completed.  No concerns    Signed By: Uma Armenta MD     February 5, 2017

## 2017-02-05 NOTE — PROGRESS NOTES
Emili Ly, Claudetta Cools, [facility]    Admit Date: 2/1/2017    Subjective:       No new complaints, but RN says pt gets a little confused after receiving the Dilaudid. Current Facility-Administered Medications   Medication Dose Route Frequency    HYDROcodone-acetaminophen (NORCO) 5-325 mg per tablet 1 Tab  1 Tab Oral Q4H PRN    levoFLOXacin (LEVAQUIN) tablet 250 mg  250 mg Oral Q24H    Warfarin Pharmacy Dosing   Other Rx Dosing/Monitoring    bisacodyl (DULCOLAX) suppository 10 mg  10 mg Rectal DAILY PRN    ALPRAZolam (XANAX) tablet 0.5 mg  0.5 mg Oral BID PRN    atenolol (TENORMIN) tablet 25 mg  25 mg Oral DAILY    famotidine (PEPCID) tablet 20 mg  20 mg Oral BID    fluticasone (FLONASE) 50 mcg/actuation nasal spray 2 Spray  2 Spray Both Nostrils DAILY    furosemide (LASIX) tablet 40 mg  40 mg Oral DAILY    levothyroxine (SYNTHROID) tablet 100 mcg  100 mcg Oral ACB    polyethylene glycol (MIRALAX) packet 17 g  17 g Oral DAILY    potassium chloride SR (KLOR-CON 10) tablet 20 mEq  20 mEq Oral DAILY    pravastatin (PRAVACHOL) tablet 20 mg  20 mg Oral QHS    spironolactone (ALDACTONE) tablet 50 mg  50 mg Oral DAILY    sodium chloride (NS) flush 5-10 mL  5-10 mL IntraVENous Q8H    sodium chloride (NS) flush 5-10 mL  5-10 mL IntraVENous PRN    acetaminophen (TYLENOL) tablet 650 mg  650 mg Oral Q4H PRN          Objective:     Patient Vitals for the past 8 hrs:   BP Temp Pulse Resp SpO2   02/05/17 0859 146/75 98 °F (36.7 °C) 70 16 95 %             Physical Exam: NAD. Alert. Neck -- Supple. No JVD. Heart -- RRR. Lungs -- CTA. Abd -- Benign. Ext -- No LE edema, b/l.       Data Review   Recent Results (from the past 24 hour(s))   CBC WITH AUTOMATED DIFF    Collection Time: 02/05/17 10:56 AM   Result Value Ref Range    WBC 11.0 3.6 - 11.0 K/uL    RBC 4. 41 3.80 - 5.20 M/uL    HGB 14.5 11.5 - 16.0 g/dL    HCT 42.3 35.0 - 47.0 %    MCV 95.9 80.0 - 99.0 FL    MCH 32.9 26.0 - 34.0 PG    MCHC 34.3 30.0 - 36.5 g/dL    RDW 12.8 11.5 - 14.5 %    PLATELET 984 507 - 554 K/uL    NEUTROPHILS 69 32 - 75 %    LYMPHOCYTES 13 12 - 49 %    MONOCYTES 17 (H) 5 - 13 %    EOSINOPHILS 1 0 - 7 %    BASOPHILS 0 0 - 1 %    ABS. NEUTROPHILS 7.6 1.8 - 8.0 K/UL    ABS. LYMPHOCYTES 1.4 0.8 - 3.5 K/UL    ABS. MONOCYTES 1.9 (H) 0.0 - 1.0 K/UL    ABS. EOSINOPHILS 0.1 0.0 - 0.4 K/UL    ABS. BASOPHILS 0.0 0.0 - 0.1 K/UL   METABOLIC PANEL, BASIC    Collection Time: 02/05/17 10:56 AM   Result Value Ref Range    Sodium 133 (L) 136 - 145 mmol/L    Potassium 4.2 3.5 - 5.1 mmol/L    Chloride 96 (L) 97 - 108 mmol/L    CO2 28 21 - 32 mmol/L    Anion gap 9 5 - 15 mmol/L    Glucose 103 (H) 65 - 100 mg/dL    BUN 20 6 - 20 MG/DL    Creatinine 0.64 0.55 - 1.02 MG/DL    BUN/Creatinine ratio 31 (H) 12 - 20      GFR est AA >60 >60 ml/min/1.73m2    GFR est non-AA >60 >60 ml/min/1.73m2    Calcium 9.7 8.5 - 10.1 MG/DL   PROTHROMBIN TIME + INR    Collection Time: 02/05/17 10:56 AM   Result Value Ref Range    INR 1.7 (H) 0.9 - 1.1      Prothrombin time 17.4 (H) 9.0 - 11.1 sec           Assessment:     Principal Problem:    Closed compression fracture of lumbar vertebra -- s/p Kypho 2/3      Active Problems:    A-fib (HCC) ()      CAD (coronary artery disease) ()      Dementia (2/9/2016)      UTI (urinary tract infection) (2/2/2017)        Plan:     1. Cont pain management -- Change IV Dilaudid to PRN Lortab. 2. Cont Levaquin, considering urine cx susceptibilities. 3. Pharmacy dosing coumadin -- Need to consider her being started on Levaquin. 4. PT/OT. 5. Pt will likely need SNF at discharge. ... Nighat Mcgarry MD

## 2017-02-05 NOTE — PROGRESS NOTES
Pharmacist Note  Warfarin Dosing  Consult provided for this 80 y. o.female to manage warfarin for Atrial Fibrillation    INR Goal: 2 - 3  Home regimen/ tablet size: 1.5 mg MWF; 3 mg all other days    Drugs that may increase INR: Quinolones, Levothyroxine  Drugs that may decrease INR: None  Other current anticoagulants/ drugs that may increase bleeding risk: None  Risk factors: Age > 65  Daily INR ordered: YES    Recent Labs      02/05/17   1056  02/04/17   0243  02/03/17   0708   HGB  14.5   --   13.7   INR  1.7*  1.6*  1.6*     Date               INR                  Dose  2/4  1.6  3 mg   2/5                   1.7                  3 mg                                                                               Assessment/ Plan: Will order warfarin 3 mg PO x 1 dose. Pharmacy will continue to monitor daily and adjust therapy as indicated. Please contact the pharmacist at  for outpatient recommendations if needed.        Delbert Linton, PharmD, [address], BCPS

## 2017-02-06 LAB
ANION GAP BLD CALC-SCNC: 9 MMOL/L (ref 5–15)
BUN SERPL-MCNC: 23 MG/DL (ref 6–20)
BUN/CREAT SERPL: 32 (ref 12–20)
CALCIUM SERPL-MCNC: 9.8 MG/DL (ref 8.5–10.1)
CHLORIDE SERPL-SCNC: 94 MMOL/L (ref 97–108)
CO2 SERPL-SCNC: 27 MMOL/L (ref 21–32)
CREAT SERPL-MCNC: 0.71 MG/DL (ref 0.55–1.02)
GLUCOSE SERPL-MCNC: 93 MG/DL (ref 65–100)
INR PPP: 1.9 (ref 0.9–1.1)
POTASSIUM SERPL-SCNC: 3.8 MMOL/L (ref 3.5–5.1)
PROTHROMBIN TIME: 19.8 SEC (ref 9–11.1)
SODIUM SERPL-SCNC: 130 MMOL/L (ref 136–145)

## 2017-02-06 PROCEDURE — G8987 SELF CARE CURRENT STATUS: HCPCS

## 2017-02-06 PROCEDURE — 74011250637 HC RX REV CODE- 250/637: Performed by: INTERNAL MEDICINE

## 2017-02-06 PROCEDURE — 80048 BASIC METABOLIC PNL TOTAL CA: CPT | Performed by: FAMILY MEDICINE

## 2017-02-06 PROCEDURE — 85610 PROTHROMBIN TIME: CPT | Performed by: FAMILY MEDICINE

## 2017-02-06 PROCEDURE — 97161 PT EVAL LOW COMPLEX 20 MIN: CPT

## 2017-02-06 PROCEDURE — 77030032490 HC SLV COMPR SCD KNE COVD -B

## 2017-02-06 PROCEDURE — 97165 OT EVAL LOW COMPLEX 30 MIN: CPT

## 2017-02-06 PROCEDURE — 74011250637 HC RX REV CODE- 250/637: Performed by: FAMILY MEDICINE

## 2017-02-06 PROCEDURE — 65270000029 HC RM PRIVATE

## 2017-02-06 PROCEDURE — G8978 MOBILITY CURRENT STATUS: HCPCS

## 2017-02-06 PROCEDURE — 97535 SELF CARE MNGMENT TRAINING: CPT

## 2017-02-06 PROCEDURE — 36415 COLL VENOUS BLD VENIPUNCTURE: CPT | Performed by: FAMILY MEDICINE

## 2017-02-06 PROCEDURE — G8988 SELF CARE GOAL STATUS: HCPCS

## 2017-02-06 PROCEDURE — G8979 MOBILITY GOAL STATUS: HCPCS

## 2017-02-06 RX ORDER — WARFARIN 1 MG/1
3 TABLET ORAL ONCE
Status: COMPLETED | OUTPATIENT
Start: 2017-02-06 | End: 2017-02-06

## 2017-02-06 RX ADMIN — LEVOFLOXACIN 250 MG: 250 TABLET, FILM COATED ORAL at 12:47

## 2017-02-06 RX ADMIN — LEVOTHYROXINE SODIUM 100 MCG: 100 TABLET ORAL at 06:50

## 2017-02-06 RX ADMIN — FAMOTIDINE 20 MG: 20 TABLET ORAL at 17:21

## 2017-02-06 RX ADMIN — Medication 10 ML: at 12:46

## 2017-02-06 RX ADMIN — POTASSIUM CHLORIDE 20 MEQ: 750 TABLET, FILM COATED, EXTENDED RELEASE ORAL at 10:00

## 2017-02-06 RX ADMIN — ACETAMINOPHEN 650 MG: 325 TABLET, FILM COATED ORAL at 10:10

## 2017-02-06 RX ADMIN — Medication 10 ML: at 22:03

## 2017-02-06 RX ADMIN — PRAVASTATIN SODIUM 20 MG: 20 TABLET ORAL at 22:05

## 2017-02-06 RX ADMIN — WARFARIN SODIUM 3 MG: 1 TABLET ORAL at 17:21

## 2017-02-06 RX ADMIN — ALPRAZOLAM 0.5 MG: 0.5 TABLET ORAL at 06:50

## 2017-02-06 RX ADMIN — FAMOTIDINE 20 MG: 20 TABLET ORAL at 09:59

## 2017-02-06 RX ADMIN — SPIRONOLACTONE 50 MG: 25 TABLET, FILM COATED ORAL at 10:00

## 2017-02-06 RX ADMIN — ATENOLOL 25 MG: 25 TABLET ORAL at 10:09

## 2017-02-06 NOTE — PROGRESS NOTES
Bedside and Verbal shift change report given to [facility] and Celina Beauchamp RN (oncoming nurse) by  Elliott Kearney and April RN (offgoing nurse). Report included the following information SBAR, Kardex, Intake/Output and MAR.

## 2017-02-06 NOTE — PROGRESS NOTES
Pharmacist Note  Warfarin Dosing  Consult provided for this 80 y. o.female to manage warfarin for Atrial Fibrillation. INR Goal: 2 - 3    Home regimen: 1.5 mg MWF; 3 mg all other days (INR 1.9 on admission)    Drugs that may increase INR:  Levofloxacin, Levothyroxine  Drugs that may decrease INR:  None  Other current anticoagulants/ drugs that may increase bleeding risk: None  Risk factors: Age > 65  Daily INR ordered: YES  Recent Labs      02/06/17   0143  02/05/17   1056  02/04/17   0243   HGB   --   14.5   --    INR  1.9*  1.7*  1.6*     Date               INR                  Dose  2/4  1.6  3 mg   2/5                   1.7                  3 mg  2/6                   1.9                  3 mg                                                                               Assessment/ Plan: Will order warfarin 3 mg po x 1 dose. Pharmacy will continue to monitor daily and adjust therapy as indicated.

## 2017-02-06 NOTE — PROGRESS NOTES
Problem: Mobility Impaired (Adult and Pediatric)  Goal: *Acute Goals and Plan of Care (Insert Text)  Physical Therapy Goals  Initiated 2/6/2017    1. Patient will move from supine to sit and sit to supine and roll side to side in bed with modified independence within 7 days. 2. Patient will perform sit to stand with modified independence within 7 days. 3. Patient will ambulate with modified independence for 100 feet with the least restrictive device within 7 days. 4. Patient will verbalize and demonstrate understanding of spinal precautions (No bending, lifting greater than 5 lbs, or twisting; log-roll technique; frequent repositioning as instructed) within 7 days. PHYSICAL THERAPY EVALUATION  Patient: Vanessa Masters (48 y.o. female)  Date: 2/6/2017  Primary Diagnosis: Compression fracture of vertebra [facility])        Precautions:   Fall, Back, Bed Alarm, No bending, no lifting greater than 5 lbs, no twisting, log-roll technique      ASSESSMENT :  Based on the objective data described below, the patient presents with confusion,  decreased insight into her deficits, and self limiting behaviors, back precautions due to compression fracture, severe back pain, decreased generalized strength, decreased standing balance, and impaired functional mobility from her baseline level. Pt thinks she is at her apartment and required encouragement to transfer [address] [place] [address]. She declined ambulating but did agree to sit up in her chair. Pt required the use of the bed rail to transfer OOB and minimal assist x 2 to complete side lying to sit edge of bed. She required 2 attempts to stand with CGA of 2 for safety. Pt side stepped from bed to chair with rolling walker support and CGA x 2. Pt was educated on the back precautions and log rolling. Pt will benefit from rehab to regain her functional independence prior to retuning to Independent living. Patient will benefit from skilled intervention to address the above impairments.   Patients rehabilitation potential is considered to be Good  Factors which may influence rehabilitation potential include:   [ ]         None noted  [X]         Mental ability/status  [ ]         Medical condition  [X]         Home/family situation and support systems  [X]         Safety awareness  [X]         Pain tolerance/management  [ ]         Other:        PLAN :  Recommendations and Planned Interventions:  [X]           Bed Mobility Training             [ ]    Neuromuscular Re-Education  [X]           Transfer Training                   [ ]    Orthotic/Prosthetic Training  [X]           Gait Training                         [ ]    Modalities  [ ]           Therapeutic Exercises           [ ]    Edema Management/Control  [ ]           Therapeutic Activities            [X]    Patient and Family Training/Education  [ ]           Other (comment):     Frequency/Duration: Patient will be followed by physical therapy  daily to address goals. Discharge Recommendations: Skilled Nursing Facility  Further Equipment Recommendations for Discharge: to be determined        SUBJECTIVE:   Patient stated Don't pull me!\"  Pt thinks she is in her apartment and wants to move on her own. She required encouragement to transfer OOB but declined ambulating. Pt also frequently asking therapist not to help her but eventually requiring assistance to complete task.        OBJECTIVE DATA SUMMARY:   HISTORY:    Past Medical History   Diagnosis Date    A-fib [facility])      Arrhythmia         A FIB    Arthritis      Back pain      CAD (coronary artery disease)         PT DENIES    Chronic pain      Dementia 2/9/2016    Hypercholesterolemia      Psychiatric disorder         ANXIETY    Shoulder pain      Thyroid disease      Tremor, essential       Past Surgical History   Procedure Laterality Date    Hx appendectomy        Hx pola and bso           AGE 39    Hx tonsil and adenoidectomy         Prior Level of Function/Home Situation: Resides in Independent living, ambulates with a cane, pt reports she falls [place] lot\"  Personal factors and/or comorbidities impacting plan of care:      Home Situation  Home Environment: Independent living [facility])  # Steps to Enter: 0  One/Two Story Residence: One story  Living Alone: Yes  Support Systems: Family member(s), sister  Patient Expects to be Discharged to[de-identified] Apartment  Current DME Used/Available at Home: Cane, straight     EXAMINATION/PRESENTATION/DECISION MAKING:   Critical Behavior:  Neurologic State: Confused, Alert  Orientation Level: Oriented to person, Disoriented to situation, Disoriented to time, Disoriented to place  Cognition: Decreased attention/concentration, Poor safety awareness, Follows commands, Impaired decision making  Safety/Judgement: Decreased awareness of environment, Decreased awareness of need for assistance, Decreased awareness of need for safety, Decreased insight into deficits  Skin:  Dressing intact  Strength:     generally decreased, functional                     Tone & Sensation:    intact                              Range Of Motion:   within functional limits                           Functional Mobility:  Bed Mobility:  Rolling: Modified independent, used bed rail  Supine to Sit: Minimum assistance; Additional time;Assist x2, used bed rail      Scooting: Supervision  Transfers:  Sit to Stand: Contact guard assistance; Additional time;Assist x2, used rolling walker, pt unable to complete transfer on 1st attempt   Stand to Sit: Contact guard assistance; Additional time;Assist x1                       Balance:   Sitting: Intact  Standing: Impaired  Standing - Static: Fair  Standing - Dynamic : Fair  Functional Measure:  Tinetti test:      Sitting Balance: 1  Arises: 1  Attempts to Rise: 1  Immediate Standing Balance: 1  Standing Balance: 1  Nudged: 0  Eyes Closed: 0  Turn 360 Degrees - Continuous/Discontinuous: 0  Turn 360 Degrees - Steady/Unsteady: 0  Sitting Down: 1  Balance Score: 6  Indication of Gait: 0  R Step Length/Height: 0  L Step Length/Height: 0  R Foot Clearance: 0  L Foot Clearance: 0  Step Symmetry: 0  Step Continuity: 0  Path: 0  Trunk: 0  Walking Time: 0  Gait Score: 0  Total Score: 6         Tinetti Test and G-code impairment scale:  Percentage of Impairment CH     0%    CI     1-19% CJ     20-39% CK     40-59% CL     60-79% CM     80-99% CN      100%   Tinetti  Score 0-28 28 23-27 17-22 12-16 6-11 1-5 0          Tinetti Tool Score Risk of Falls  <19 = High Fall Risk  19-24 = Moderate Fall Risk  25-28 = Low Fall Risk  Tinetti ME. Performance-Oriented Assessment of Mobility Problems in Elderly Patients. [address] 66; [id number]. (Scoring Description: PT Bulletin Feb. 10, 1993)     Older adults: Annie Honeycutt et al, 2009; n = 1000 [facility] elderly evaluated with ABC, DEBORAH, ADL, and IADL)  · Mean DEBORAH score for males aged 69-68 years = 26.21(3.40)  · Mean DEBORAH score for females age 69-68 years = 25.16(4.30)  · Mean DEBORAH score for males over 80 years = 23.29(6.02)  · Mean DEBORAH score for females over 80 years = 17.20(8.32)         G codes: In compliance with CMSs Claims Based Outcome Reporting, the following G-code set was chosen for this patient based on their primary functional limitation being treated: The outcome measure chosen to determine the severity of the functional limitation was the Tinetti with a score of 6/28 which was correlated with the impairment scale.       · Mobility - Walking and Moving Around:               - CURRENT STATUS:    CL - 60%-79% impaired, limited or restricted               - GOAL STATUS:           CK - 40%-59% impaired, limited or restricted               - D/C STATUS:                       ---------------To be determined---------------      Physical Therapy Evaluation Charge Determination   History Examination Presentation Decision-Making   MEDIUM  Complexity : 1-2 comorbidities / personal factors will impact the outcome/ POC  MEDIUM Complexity : 3 Standardized tests and measures addressing body structure, function, activity limitation and / or participation in recreation  LOW Complexity : Stable, uncomplicated  MEDIUM Complexity : FOTO score of 26-74      Based on the above components, the patient evaluation is determined to be of the following complexity level: LOW      Pain:  Pain Scale 1: Numeric (0 - 10)  Pain Intensity 1: 10  Location: back  Intervention: see MAR              Activity Tolerance:   Fair, pt self limiting, c/o severe back pain      After treatment:   [X]         Patient left in no apparent distress sitting up in chair  [ ]         Patient left in no apparent distress in bed  [X]         Call bell left within reach  [X]         Nursing notified  [ ]         Caregiver present  [X]         Bed alarm activated      COMMUNICATION/EDUCATION:   The patients plan of care was discussed with: Registered Nurse.  [X]         Fall prevention education was provided and the patient/caregiver indicated understanding. [ ]         Patient/family have participated as able in goal setting and plan of care. [ ]         Patient/family agree to work toward stated goals and plan of care. [ ]         Patient understands intent and goals of therapy, but is neutral about his/her participation. [X]         Patient is unable to participate in goal setting and plan of care.      Thank you for this referral.  Mary Kate Lackey   Time Calculation: 14 mins

## 2017-02-06 NOTE — PROGRESS NOTES
Chart reviewed for disposition needs. CM noted consult for placement. CM met with pt's sister Gelacio Chapman [facility]) this PM to discuss discharge plan. Note pt's sister Gelacio Chapman has been pt's primary caregiver over the past years. Pt resides at [address] [place] [address]. Prior to admission, pt's sister Gelacio Chapman was transporting pt to and from doctor appointments. Pt has been able to provide her own meals (frozen). Pt ambulates with her cane. Pt's sister Gelacio Chapman is currently in the middle of moving- states she can no longer \"deal with this\".  advised CM to contact pt's other sister- Kailee Allred ([phone]) to discuss discharge as well. FOZIA contacted [facility] sloan. Awaiting response. CM provided Gelacio Chapman with contact information and advised that consult was received for placement at [facility] agreeable with plan- states pt has been to [facility] in the past. CM briefly discussed pt finances. Per Gelacio Chapman, she believes pt would be eligible for Medicaid. CM will follow up with [facility] staff to assist with eligibility/application process. CM then met with pt at the bedside to introduce role and discuss plan. Pt is agreeable to going to [facility] for rehab- states she has been there before and it is [place] [facility] place. \" CM advised pt that transportation will be arranged via ambulance. Anticipate discharge to [facility] tomorrow if accepted. CM provided opportunity for questions/concerns. None voiced at this time. CM will continue to follow and confirm final disposition. Care Management Interventions  PCP Verified by CM: Yes  Mode of Transport at Discharge: BLS  Transition of Care Consult (CM Consult): Discharge Planning  MyChart Signup: No  Discharge Durable Medical Equipment: No  Health Maintenance Reviewed: Yes  Physical Therapy Consult: Yes  Occupational Therapy Consult: Yes  Speech Therapy Consult: No  Current Support Network:  Other ([address] [facility])  Confirm Follow Up Transport: Family  Plan discussed with Pt/Family/Caregiver: Yes  Freedom of Choice Offered: Yes  Discharge Location  Discharge Placement: Skilled nursing facility [facility])    PILI Sandoval

## 2017-02-06 NOTE — PROGRESS NOTES
Problem: Self Care Deficits Care Plan (Adult)  Goal: *Acute Goals and Plan of Care (Insert Text)  Occupational Therapy Goals  Initiated 2/6/2017  1. Patient will perform grooming standing at the sink with minimal assistance/contact guard assist within 7 day(s). 2. Patient will perform lower body dressing with moderate assistance using AE PRN within 7 day(s). 3. Patient will perform bathing with minimal assistance/contact guard assist within 7 day(s). 4. Patient will perform toilet transfers with minimal assistance/contact guard assist within 7 day(s). 5. Patient will perform all aspects of toileting with maximal assistance within 7 day(s). 6. Patient will participate in upper extremity therapeutic exercise/activities with supervision/set-up within 7 day(s). 7. Patient will utilize fall prevention techniques during functional activities with verbal cues within 7 day(s). OCCUPATIONAL THERAPY EVALUATION  Patient: Steven Wang (44 y.o. female)  Date: 2/6/2017  Primary Diagnosis: Compression fracture of vertebra [facility])        Precautions:  Fall, Back, Bed Alarm      ASSESSMENT :  Based on the objective data described below, the patient presents with confusion, increased back pain with activity, decreased functional reach to feet, decreased standing tolerance and balance limiting independence in ADLs. Pt received supine in bed with saturated sheets; pt required significant encouragement to get OOB, perform hygiene and change sheets. Pt prefers to complete task without \"people pulling on her\" and was able to complete basic bed mobility and sit to stand/transfers with CGA to MIN A X 2. Pt declined attempting basic ADLs such as LB dressing/bathing at EOB due to increased back pain requiring total assist for these tasks. Pt able to perform bladder hygiene with VCs in supine position.   Pt can be particular at times and responded best to therapists avoiding confronting confusion over her current situation and redirection. Prefers to wear maternity underpants and does not like diapers. Pt left seated in chair with breakfast tray present and chair alarm on.  BP elevated but stable. Anticipate pt will need 24 hour care; feel if this can provided in her home situation pt may do better in her familiar environment with 24 hour care and home health PT/OT. If this cannot be provided pt will need SNF. Recommend trialing AE next session or working on toilet transfers to [facility]. Darion Min Recommend with nursing patient to complete as able in order to maintain strength, endurance and independence: OOB to chair 3x/day with assist X 1 - 2/RW, ADLs with assist for LB and mobilizing to the [facility] for toileting with assist X 2/RW. Thank you for your assistance. Patient will benefit from skilled intervention to address the above impairments.   Patients rehabilitation potential is considered to be Fair  Factors which may influence rehabilitation potential include:   [ ]             None noted  [X]             Mental ability/status (hx of dementia; confusion)  [ ]             Medical condition  [X]             Home/family situation and support systems (lives alone)  [X]             Safety awareness  [X]             Pain tolerance/management  [ ]             Other:        PLAN :  Recommendations and Planned Interventions:  [X]               Self Care Training                  [X]        Therapeutic Activities  [X]               Functional Mobility Training    [ ]        Cognitive Retraining  [X]               Therapeutic Exercises           [X]        Endurance Activities  [X]               Balance Training                   [ ]        Neuromuscular Re-Education  [ ]               Visual/Perceptual Training     [X]   Home Safety Training  [X]               Patient Education                 [X]        Family Training/Education  [ ]               Other (comment):     Frequency/Duration: Patient will be followed by occupational therapy 5 times a week to address goals. Discharge Recommendations: Home Health with 24 hour care in IND living apartment vs [facility]  Further Equipment Recommendations for Discharge: RW, [facility]       SUBJECTIVE:   Patient stated I need to get up to my room on the 12th floor; I need to order some things from pharmacy.       OBJECTIVE DATA SUMMARY:   HISTORY:   Past Medical History   Diagnosis Date    A-fib ([address]      Arrhythmia         A FIB    Arthritis      Back pain      CAD (coronary artery disease)         PT DENIES    Chronic pain      Dementia 2/9/2016    Hypercholesterolemia      Psychiatric disorder         ANXIETY    Shoulder pain      Thyroid disease      Tremor, essential       Past Surgical History   Procedure Laterality Date    Hx appendectomy        Hx pola and bso           AGE 39    Hx tonsil and adenoidectomy            Prior Level of Function/Home Situation: Lives at [facility] [address] in [facility] and reports being IND with all ADLs and ambulation. Unreliable historian given hx of dementia and level of confusion today  Expanded or extensive additional review of patient history:      Home Situation  Home Environment: Independent living ([facility] [facility])  # Steps to Enter: 0  One/Two Story Residence: One story  Living Alone: Yes  Support Systems: Family member(s)  Patient Expects to be Discharged to[de-identified] Apartment  Current DME Used/Available at Home: Cane, straight  [X]  Right hand dominant             [ ]  Left hand dominant     EXAMINATION OF PERFORMANCE DEFICITS:  Cognitive/Behavioral Status:  Neurologic State: Confused; Alert  Orientation Level: Oriented to person;Disoriented to situation;Disoriented to time;Disoriented to place  Cognition: Decreased attention/concentration;Poor safety awareness; Follows commands; Impaired decision making  Perception: Appears intact  Perseveration: Perseverates during conversation  Safety/Judgement: Decreased awareness of environment;Decreased awareness of need for assistance;Decreased awareness of need for safety;Decreased insight into deficits  Skin: intact  Edema: None noted  Vision/Perceptual:         Range of Motion:  AROM: Generally decreased, functional        Strength:  Strength: Generally decreased, functional  Coordination:  Coordination: Generally decreased, functional  Fine Motor Skills-Upper: Left Intact; Right Intact    Gross Motor Skills-Upper: Left Intact; Right Intact  Tone & Sensation:  Tone: Normal     Balance:  Sitting: Intact  Standing: Impaired  Standing - Static: Fair  Standing - Dynamic : Fair     Functional Mobility and Transfers for ADLs:  Bed Mobility:  Rolling: Modified independent  Supine to Sit: Minimum assistance; Additional time;Assist x2  Scooting: Supervision     Transfers:  Sit to Stand: Contact guard assistance; Additional time;Assist x2  Stand to Sit: Contact guard assistance; Additional time;Assist x1  Toilet Transfer : Minimum assistance (recommend [facility])     ADL Assessment:  Feeding: Setup     Oral Facial Hygiene/Grooming: Setup     Bathing: Moderate assistance (infer assist for distal LEs and bottom)     Upper Body Dressing: Supervision     Lower Body Dressing: Total assistance (declined attempting )     Toileting: Maximum assistance (able to perform supine bladder hygiene in supine)     ADL Intervention and task modifications:     Lower Body Dressing Assistance  Underpants:  Total assistance (dependent) (declined attempting to jose herself)  Position Performed: Seated edge of bed     Toileting  Toileting Assistance:  (linens saturated with urine; assisted to change underpants)     Cognitive Retraining  Safety/Judgement: Decreased awareness of environment;Decreased awareness of need for assistance;Decreased awareness of need for safety;Decreased insight into deficits     Functional Measure:  Barthel Index:      Bathin  Bladder: 5  Bowels: 5  Groomin  Dressin  Feedin  Mobility: 0  Stairs: 0  Toilet Use: 5  Transfer (Bed to Chair and Back): 10  Total: 40         Barthel and G-code impairment scale:  Percentage of impairment CH  0% CI  1-19% CJ  20-39% CK  40-59% CL  60-79% CM  80-99% CN  100%   Barthel Score 0-100 100 99-80 79-60 59-40 20-39 1-19    0   Barthel Score 0-20 20 17-19 13-16 9-12 5-8 1-4 0      The Barthel ADL Index: Guidelines  1. The index should be used as a record of what a patient does, not as a record of what a patient could do. 2. The main aim is to establish degree of independence from any help, physical or verbal, however minor and for whatever reason. 3. The need for supervision renders the patient not independent. 4. A patient's performance should be established using the best available evidence. Asking the patient, friends/relatives and nurses are the usual sources, but direct observation and common sense are also important. However direct testing is not needed. 5. Usually the patient's performance over the preceding 24-48 hours is important, but occasionally longer periods will be relevant. 6. Middle categories imply that the patient supplies over 50 per cent of the effort. 7. Use of aids to be independent is allowed. Patria Champion., Barthel, D.W. (3407). Functional evaluation: the Barthel Index. [facility] (14)2. FRANK Beck, Hemanth Caicedo., [facility] Paul.[facility], [address] [place] [address] (1999). Measuring the change indisability after inpatient rehabilitation; comparison of the responsiveness of the Barthel Index and Functional [place] Measure. Journal of Neurology, Neurosurgery, and Psychiatry, 66(4), 491-943. Glo Kim, N.J.A, LAUREN Wolfe.ROBIN, & Buzz Collins M.A. (2004.) Assessment of post-stroke quality of life in cost-effectiveness studies: The usefulness of the Barthel Index and the EuroQoL-5D. Quality of Life Research, 13, 273-93         G codes:   In compliance with CMSs Claims Based Outcome Reporting, the following G-code set was chosen for this patient based on their primary functional limitation being treated: The outcome measure chosen to determine the severity of the functional limitation was the Barthel Index with a score of 40/100 which was correlated with the impairment scale. · Self Care:               - CURRENT STATUS:    CK - 40%-59% impaired, limited or restricted               - GOAL STATUS:           CI - 1%-19% impaired, limited or restricted               - D/C STATUS:                       ---------------To be determined---------------      Occupational Therapy Evaluation Charge Determination   History Examination Decision-Making   LOW Complexity : Brief history review  LOW Complexity : 1-3 performance deficits relating to physical, cognitive , or psychosocial skils that result in activity limitations and / or participation restrictions  LOW Complexity : No comorbidities that affect functional and no verbal or physical assistance needed to complete eval tasks       Based on the above components, the patient evaluation is determined to be of the following complexity level: LOW   Pain:  Pain Scale 1:  (pt confused, seems to be in pain, tylenol given)  Pain Intensity 1: 0           Pain Intervention(s) 1: Medication (see MAR); Repositioned  Activity Tolerance:   No c/o dizziness; BP elevated once in chair but stable. Vitals:     02/05/17 1944 02/06/17 0140 02/06/17 0804 02/06/17 0934   BP: 148/84 138/78 128/81 121/61   BP 1 Location: Right arm Right arm Left arm     BP Patient Position: At rest At rest At rest Pre-activity;Supine   Pulse: 98 80 74 80   Resp: 16 16 16     Temp: 98 °F (36.7 °C) 98 °F (36.7 °C) 97.5 °F (36.4 °C)     SpO2: 95% 95% 94%     Weight:           Height:             Please refer to the flowsheet for vital signs taken during this treatment.   After treatment:   [X] Patient left in no apparent distress sitting up in chair  [ ] Patient left in no apparent distress in bed  [ ] Call bell left within reach  [X] Nursing notified  [ ] Caregiver present  [X] Chair alarm activated      COMMUNICATION/EDUCATION:   The patients plan of care was discussed with: Physical Therapist and Registered Nurse.  [X] Home safety education was provided and the patient/caregiver indicated understanding. [X] Patient/family have participated as able in goal setting and plan of care. [X] Patient/family agree to work toward stated goals and plan of care. [ ] Patient understands intent and goals of therapy, but is neutral about his/her participation. [ ] Patient is unable to participate in goal setting and plan of care. This patients plan of care is appropriate for delegation to [facility].      Thank you for this referral.  Sahil Nolasco OT  Time Calculation: 24 mins

## 2017-02-06 NOTE — PROGRESS NOTES
Bedside shift change report given to Artemio Holloway (oncoming nurse) by Adrienne Man (offgoing nurse). Report included the following information SBAR.

## 2017-02-06 NOTE — PROGRESS NOTES
Benito Reed, Kennedi Lenz, and Judi Schwab Date: 2/1/2017      Subjective:     Patient somewhat confused this AM but verbal. Back pain is somewhat improved. ..       Current Facility-Administered Medications   Medication Dose Route Frequency    HYDROcodone-acetaminophen (NORCO) 5-325 mg per tablet 1 Tab  1 Tab Oral Q4H PRN    ondansetron (ZOFRAN ODT) tablet 4 mg  4 mg Oral Q6H PRN    levoFLOXacin (LEVAQUIN) tablet 250 mg  250 mg Oral Q24H    Warfarin Pharmacy Dosing   Other Rx Dosing/Monitoring    bisacodyl (DULCOLAX) suppository 10 mg  10 mg Rectal DAILY PRN    ALPRAZolam (XANAX) tablet 0.5 mg  0.5 mg Oral BID PRN    atenolol (TENORMIN) tablet 25 mg  25 mg Oral DAILY    famotidine (PEPCID) tablet 20 mg  20 mg Oral BID    fluticasone (FLONASE) 50 mcg/actuation nasal spray 2 Spray  2 Spray Both Nostrils DAILY    furosemide (LASIX) tablet 40 mg  40 mg Oral DAILY    levothyroxine (SYNTHROID) tablet 100 mcg  100 mcg Oral ACB    polyethylene glycol (MIRALAX) packet 17 g  17 g Oral DAILY    potassium chloride SR (KLOR-CON 10) tablet 20 mEq  20 mEq Oral DAILY    pravastatin (PRAVACHOL) tablet 20 mg  20 mg Oral QHS    spironolactone (ALDACTONE) tablet 50 mg  50 mg Oral DAILY    sodium chloride (NS) flush 5-10 mL  5-10 mL IntraVENous Q8H    sodium chloride (NS) flush 5-10 mL  5-10 mL IntraVENous PRN    acetaminophen (TYLENOL) tablet 650 mg  650 mg Oral Q4H PRN          Objective:     Patient Vitals for the past 8 hrs:   BP Temp Pulse Resp SpO2   02/06/17 0804 128/81 97.5 °F (36.4 °C) 74 16 94 %   02/06/17 0140 138/78 98 °F (36.7 °C) 80 16 95 %             Physical Exam: Lungs: clear to auscultation bilaterally  Heart: regular rate and rhythm, S1, S2 normal, no murmur, click, rub or gallop  Abdomen: soft, non-tender.  Bowel sounds normal. No masses,  no organomegaly        Data Review   Recent Results (from the past 24 hour(s))   CBC WITH AUTOMATED DIFF    Collection Time: 02/05/17 10:56 AM Result Value Ref Range    WBC 11.0 3.6 - 11.0 K/uL    RBC 4.41 3.80 - 5.20 M/uL    HGB 14.5 11.5 - 16.0 g/dL    HCT 42.3 35.0 - 47.0 %    MCV 95.9 80.0 - 99.0 FL    MCH 32.9 26.0 - 34.0 PG    MCHC 34.3 30.0 - 36.5 g/dL    RDW 12.8 11.5 - 14.5 %    PLATELET 270 124 - 501 K/uL    NEUTROPHILS 69 32 - 75 %    LYMPHOCYTES 13 12 - 49 %    MONOCYTES 17 (H) 5 - 13 %    EOSINOPHILS 1 0 - 7 %    BASOPHILS 0 0 - 1 %    ABS. NEUTROPHILS 7.6 1.8 - 8.0 K/UL    ABS. LYMPHOCYTES 1.4 0.8 - 3.5 K/UL    ABS. MONOCYTES 1.9 (H) 0.0 - 1.0 K/UL    ABS. EOSINOPHILS 0.1 0.0 - 0.4 K/UL    ABS.  BASOPHILS 0.0 0.0 - 0.1 K/UL   METABOLIC PANEL, BASIC    Collection Time: 02/05/17 10:56 AM   Result Value Ref Range    Sodium 133 (L) 136 - 145 mmol/L    Potassium 4.2 3.5 - 5.1 mmol/L    Chloride 96 (L) 97 - 108 mmol/L    CO2 28 21 - 32 mmol/L    Anion gap 9 5 - 15 mmol/L    Glucose 103 (H) 65 - 100 mg/dL    BUN 20 6 - 20 MG/DL    Creatinine 0.64 0.55 - 1.02 MG/DL    BUN/Creatinine ratio 31 (H) 12 - 20      GFR est AA >60 >60 ml/min/1.73m2    GFR est non-AA >60 >60 ml/min/1.73m2    Calcium 9.7 8.5 - 10.1 MG/DL   PROTHROMBIN TIME + INR    Collection Time: 02/05/17 10:56 AM   Result Value Ref Range    INR 1.7 (H) 0.9 - 1.1      Prothrombin time 17.4 (H) 9.0 - 19.7 sec   METABOLIC PANEL, BASIC    Collection Time: 02/06/17  1:43 AM   Result Value Ref Range    Sodium 130 (L) 136 - 145 mmol/L    Potassium 3.8 3.5 - 5.1 mmol/L    Chloride 94 (L) 97 - 108 mmol/L    CO2 27 21 - 32 mmol/L    Anion gap 9 5 - 15 mmol/L    Glucose 93 65 - 100 mg/dL    BUN 23 (H) 6 - 20 MG/DL    Creatinine 0.71 0.55 - 1.02 MG/DL    BUN/Creatinine ratio 32 (H) 12 - 20      GFR est AA >60 >60 ml/min/1.73m2    GFR est non-AA >60 >60 ml/min/1.73m2    Calcium 9.8 8.5 - 10.1 MG/DL   PROTHROMBIN TIME + INR    Collection Time: 02/06/17  1:43 AM   Result Value Ref Range    INR 1.9 (H) 0.9 - 1.1      Prothrombin time 19.8 (H) 9.0 - 11.1 sec           Assessment:     Principal Problem: Closed compression fracture of lumbar vertebra ([facility] [address] (2/1/2017)    Active Problems:    A-fib (HCC) ()      CAD (coronary artery disease) ()      Dementia (2/9/2016)      UTI (urinary tract infection) (2/2/2017)        Plan:     1) Continue Levaquin for Klebsiella UTI  2) Minimize pain meds  3) PT   4) Plan for discharge to [facility] tomorrow

## 2017-02-07 VITALS
RESPIRATION RATE: 16 BRPM | HEART RATE: 74 BPM | DIASTOLIC BLOOD PRESSURE: 85 MMHG | OXYGEN SATURATION: 93 % | WEIGHT: 150 LBS | TEMPERATURE: 97.6 F | HEIGHT: 63 IN | SYSTOLIC BLOOD PRESSURE: 125 MMHG | BODY MASS INDEX: 26.58 KG/M2

## 2017-02-07 LAB
INR PPP: 2.1 (ref 0.9–1.1)
PROTHROMBIN TIME: 21.5 SEC (ref 9–11.1)

## 2017-02-07 PROCEDURE — 74011250637 HC RX REV CODE- 250/637: Performed by: INTERNAL MEDICINE

## 2017-02-07 PROCEDURE — 85610 PROTHROMBIN TIME: CPT | Performed by: FAMILY MEDICINE

## 2017-02-07 PROCEDURE — 36415 COLL VENOUS BLD VENIPUNCTURE: CPT | Performed by: FAMILY MEDICINE

## 2017-02-07 RX ORDER — FACIAL-BODY WIPES
10 EACH TOPICAL
Qty: 10 EACH | Refills: 0 | Status: ON HOLD | OUTPATIENT
Start: 2017-02-07 | End: 2018-07-31

## 2017-02-07 RX ORDER — LEVOFLOXACIN 250 MG/1
250 TABLET ORAL EVERY 24 HOURS
Qty: 10 TAB | Refills: 0 | Status: SHIPPED | OUTPATIENT
Start: 2017-02-07 | End: 2018-07-20 | Stop reason: ALTCHOICE

## 2017-02-07 RX ORDER — HYDROCODONE BITARTRATE AND ACETAMINOPHEN 5; 325 MG/1; MG/1
1 TABLET ORAL
Qty: 30 TAB | Refills: 0 | Status: SHIPPED | OUTPATIENT
Start: 2017-02-07 | End: 2017-03-09 | Stop reason: SDUPTHER

## 2017-02-07 RX ORDER — ACETAMINOPHEN 325 MG/1
650 TABLET ORAL
Qty: 60 TAB | Refills: 0 | Status: ON HOLD | OUTPATIENT
Start: 2017-02-07 | End: 2018-07-31

## 2017-02-07 RX ORDER — WARFARIN 1 MG/1
3 TABLET ORAL ONCE
Status: DISCONTINUED | OUTPATIENT
Start: 2017-02-07 | End: 2017-02-07 | Stop reason: HOSPADM

## 2017-02-07 RX ADMIN — ACETAMINOPHEN 650 MG: 325 TABLET, FILM COATED ORAL at 09:55

## 2017-02-07 RX ADMIN — FUROSEMIDE 40 MG: 40 TABLET ORAL at 09:54

## 2017-02-07 RX ADMIN — LEVOTHYROXINE SODIUM 100 MCG: 100 TABLET ORAL at 09:51

## 2017-02-07 RX ADMIN — FAMOTIDINE 20 MG: 20 TABLET ORAL at 09:55

## 2017-02-07 RX ADMIN — SPIRONOLACTONE 50 MG: 25 TABLET, FILM COATED ORAL at 09:54

## 2017-02-07 RX ADMIN — POTASSIUM CHLORIDE 20 MEQ: 750 TABLET, FILM COATED, EXTENDED RELEASE ORAL at 09:55

## 2017-02-07 RX ADMIN — LEVOFLOXACIN 250 MG: 250 TABLET, FILM COATED ORAL at 12:56

## 2017-02-07 RX ADMIN — ATENOLOL 25 MG: 25 TABLET ORAL at 09:53

## 2017-02-07 NOTE — PROGRESS NOTES
Bedside shift change report given to MARY JANE Buchanan (oncoming nurse) by Ruffin Gaucher, RN (offgoing nurse). Report included the following information SBAR, Kardex, Procedure Summary, Intake/Output, MAR, Accordion and Recent Results.

## 2017-02-07 NOTE — PROGRESS NOTES
Discharge noted. Pt has been accepted to [facility] for SNF placement. CM contacted pt's sister hospitals by phone this AM to discuss disposition plan. CM also received call back from pt's other sister Bev Carrington ([phone]). Pt/family agreeable with plan. CM has arranged for BLS transport via [facility] @ 1300. CM completed discharge folder which has been placed in bedside chart to include: MAR, kardex, discharge summary, prescription, h&p, facesheet, and PCS form. Nurse to call report to [phone]; unit 1. CM faxed discharge summary and med list to facility ([phone]). No barriers to discharge identified. Note CM will complete a [facility] pre-screening within the next 24 hours to fax to facility to initiate a Medicaid application for pt. CM has spoken to facility liaison regarding pt assistance in filling out an application once there. Per hospitals, she will be bring pt belongings to [facility] later this evening. (SNF- [facility]).     Leopold How, MSW

## 2017-02-07 NOTE — PROGRESS NOTES
Problem: Discharge Planning  Goal: *Discharge to safe environment  Outcome: Resolved/Met Date Met:  02/07/17  Pt discharged to [facility] via [facility] transport @ 1300 for SNF placement. No barriers to discharge identified.      PILI Courtney

## 2017-02-07 NOTE — CDMP QUERY
Please clarify if this patient is being treated/managed for:    =>Likely Encephalopathy due to medications superimposed upon dementia in the post-op setting requiring increased safety precautions   =>Other Explanation of clinical findings  =>Unable to Determine (no explanation of clinical findings)    The medical record reflects the following clinical findings, treatment, and risk factors:    Risk Factors: post-op, dementia   Clinical Indicators: 2/5-No new complaints, but RN says pt gets a little confused after receiving the Dilaudid. 2/6-Patient somewhat confused this AM but verbal  Treatment: safety level 3    Please clarify and document your clinical opinion in the progress notes and discharge summary including the definitive and/or presumptive diagnosis, (suspected or probable), related to the above clinical findings. Please include clinical findings supporting your diagnosis.     Thank you,         Reilly Dee [address] [place] [address]

## 2017-02-07 NOTE — DISCHARGE INSTRUCTIONS
Patient Discharge Instructions    Catalina Quiñonez / [id number] : 1931    Admitted 2017 Discharged: 2017     Take Home Medications            · It is important that you take the medication exactly as they are prescribed. · Keep your medication in the bottles provided by the pharmacist and keep a list of the medication names, dosages, and times to be taken in your wallet. · Do not take other medications without consulting your doctor. What to do at Home    Recommended diet: Regular Diet,     Recommended activity: Activity as tolerated, PT/OT consult    INR in 2 days- this Thursday- call Dr. Marcos Murillo with results    If you experience any of the following symptoms increased pain, please follow up with Dr. Marcos Murillo. Follow-up Appointments   Procedures    FOLLOW UP VISIT Appointment in: Other (Specify) Will follow at [facility]     Will follow at [facility]     Standing Status:   Standing     Number of Occurrences:   1     Order Specific Question:   Appointment in     Answer: Other (Specify)            Information obtained by :  I understand that if any problems occur once I am at home I am to contact my physician. I understand and acknowledge receipt of the instructions indicated above.                                                                                                                                            Physician's or R.N.'s Signature                                                                  Date/Time                                                                                                                                              Patient or Representative Signature                                                          Date/Time

## 2017-02-07 NOTE — PROGRESS NOTES
Pharmacist Note  Warfarin Dosing  Consult provided for this 80 y. o.female to manage warfarin for Atrial Fibrillation. INR Goal: 2 - 3    Home regimen: 1.5 mg MWF; 3 mg all other days (INR 1.9 on admission)    Drugs that may increase INR:  Levofloxacin, Levothyroxine  Drugs that may decrease INR:  None  Other current anticoagulants/ drugs that may increase bleeding risk: None  Risk factors: Age > 65  Daily INR ordered: YES  Recent Labs      02/07/17   0404  02/06/17   0143  02/05/17   1056   HGB   --    --   14.5   INR  2.1*  1.9*  1.7*     Date               INR                  Dose  2/4  1.6  3 mg   2/5                   1.7                  3 mg  2/6                   1.9                  3 mg  2/7                   2.1                  3 mg                                                                                Assessment/ Plan: Will order warfarin 3 mg po x 1 dose. Pharmacy will continue to monitor daily and adjust therapy as indicated.

## 2017-02-09 ENCOUNTER — TELEPHONE (OUTPATIENT)
Dept: CASE MANAGEMENT | Age: 82
End: 2017-02-09

## 2017-02-09 NOTE — TELEPHONE ENCOUNTER
CM completing [facility] pre-screening to initiate Medicaid application. Spoke with facility liaison yesterday afternoon regarding [facility]- will fax completed copy to [facility] so that staff can complete application with pt at facility for future placement needs.     PILI Pink

## 2017-02-23 NOTE — DISCHARGE SUMMARY
Physician Discharge Summary     Patient ID:  Rudi Zavaleta  [id number]  89 y.o.  6/12/1931    Admit date: 2/1/2017    Discharge date and time: 2/23/2017    Admission Diagnoses: Compression fracture of vertebra [facility])    Discharge Diagnoses:  Principal Diagnosis Closed compression fracture of lumbar vertebra [facility])                                            Principal Problem:    Closed compression fracture of lumbar vertebra ([address] (2/1/2017)    Active Problems:    A-fib (HCC) ()      CAD (coronary artery disease) ()      Dementia (2/9/2016)      UTI (urinary tract infection) (2/2/2017)           Hospital Course: Lumbar back pain unable to walk for the last few days. MRI showed L2 compression fracture. Also with UTI. Kyphoplasty performed on compression fracture. Patient tolerated well. Had some confusion post procedure. UTI treated with appropriate abx. Discharged to [facility] for rehab. PCP: NILO Preston    Consults: Interventional Radiology        Discharge Exam:  Visit Vitals    /85    Pulse 74    Temp 97.6 °F (36.4 °C)    Resp 16    Ht 5' 3\" (1.6 m)    Wt 150 lb (68 kg)    SpO2 93%    BMI 26.57 kg/m2     Neck: supple, symmetrical, trachea midline, no adenopathy, thyroid: not enlarged, symmetric, no tenderness/mass/nodules, no carotid bruit and no JVD  Lungs: clear to auscultation bilaterally  Heart: regular rate and rhythm, S1, S2 normal, no murmur, click, rub or gallop  Abdomen: soft, non-tender. Bowel sounds normal. No masses,  no organomegaly  Extremities: extremities normal, atraumatic, no cyanosis or edema  Neurologic: Grossly normal    Disposition: SNF    Patient Instructions:   Cannot display discharge medications since this patient is not currently admitted.     Activity: Activity as tolerated  Diet: Regular Diet      Follow-up Appointments   Procedures    FOLLOW UP VISIT Appointment in: Other (Specify) Will follow at [facility]     Will follow at [facility] Standing Status:   Standing     Number of Occurrences:   1     Order Specific Question:   Appointment in     Answer:    Other (Specify)          Signed:  Zane Savage MD  2/23/2017  8:00 AM

## 2017-03-09 ENCOUNTER — PATIENT OUTREACH (OUTPATIENT)
Dept: INTERNAL MEDICINE CLINIC | Age: 82
End: 2017-03-09

## 2017-03-17 ENCOUNTER — PATIENT OUTREACH (OUTPATIENT)
Dept: GERIATRIC MEDICINE | Age: 82
End: 2017-03-17

## 2017-03-17 NOTE — PROGRESS NOTES
Late note:  Meeting was held at [facility] on 3/16/17 at approximately 11:00 [address] [facility] [address] for Patient in [facility]      Patient discharged from [facility] to [facility], [facility], on 2/7/17 (date). Hospital Discharge diagnosis: Fractured vertebrae. SNF Attending Provider: Dr. Matty Waters     Anticipated discharge date from SNF: 3/22/17  Plan is for patient to return to independent living apartment at [facility].         PCP : Brigitte Nuñez MD

## 2017-03-23 ENCOUNTER — PATIENT OUTREACH (OUTPATIENT)
Dept: INTERNAL MEDICINE CLINIC | Age: 82
End: 2017-03-23

## 2017-03-24 NOTE — PROGRESS NOTES
Late note: Meeting was held at [facility] on 3/23/17 at approximately 11:00 AM  [id number] [facility] Team Documentation for Patient in [facility]      Patient discharged from [facility] to [facility], [facility], on 2/7/17 (date).   [facility] Discharge diagnosis: Fractured vertebrae.     SNF Attending Provider: Dr. Selvin Moreno     Anticipated discharge date from SNF: 3/25/17  Patient appealed 3/22 discharge date. She will be discharged on 3/25/17 if appeal is lost.  Patient is on coumadin - INR therapeutic. Goals strengthening and safety.   Plan is for patient to return to independent living apartment at [facility].        PCP : Corwin Fabry, MD

## 2017-03-30 ENCOUNTER — PATIENT OUTREACH (OUTPATIENT)
Dept: INTERNAL MEDICINE CLINIC | Age: 82
End: 2017-03-30

## 2017-03-30 NOTE — PROGRESS NOTES
[facility] Team Documentation for Patient in [facility]  Subsequent Follow up     Patient discharged [facility] ([facility]). See previous [facility] Team notes.     discharge date from SNF:  3/29/17   discharge plan:  Discharged to [facility] at [facility] with [id number]    PCP : Dr. Sayda Nicholson

## 2017-12-13 ENCOUNTER — HOSPITAL ENCOUNTER (OUTPATIENT)
Dept: GENERAL RADIOLOGY | Age: 82
Discharge: HOME | End: 2017-12-13
Attending: FAMILY MEDICINE
Payer: MEDICARE

## 2017-12-13 DIAGNOSIS — M25.552 PAIN, JOINT, PELVIC REGION, LEFT: ICD-10-CM

## 2017-12-13 DIAGNOSIS — M79.645 THUMB PAIN, LEFT: ICD-10-CM

## 2017-12-13 PROCEDURE — 72170 X-RAY EXAM OF PELVIS: CPT

## 2017-12-13 PROCEDURE — 73140 X-RAY EXAM OF FINGER(S): CPT

## 2018-03-11 ENCOUNTER — HOSPITAL ENCOUNTER (OUTPATIENT)
Dept: MRI IMAGING | Age: 83
Discharge: HOME | End: 2018-03-11
Attending: ORTHOPAEDIC SURGERY
Payer: MEDICARE

## 2018-03-11 DIAGNOSIS — M54.6 PAIN IN THORACIC SPINE: ICD-10-CM

## 2018-03-11 DIAGNOSIS — M48.061 STENOSIS, SPINAL, LUMBAR: ICD-10-CM

## 2018-03-11 PROCEDURE — 72146 MRI CHEST SPINE W/O DYE: CPT

## 2018-03-11 PROCEDURE — 72148 MRI LUMBAR SPINE W/O DYE: CPT

## 2018-07-29 ENCOUNTER — APPOINTMENT (OUTPATIENT)
Dept: GENERAL RADIOLOGY | Age: 83
DRG: 481 | End: 2018-07-29
Attending: EMERGENCY MEDICINE
Payer: MEDICARE

## 2018-07-29 ENCOUNTER — HOSPITAL ENCOUNTER (INPATIENT)
Age: 83
LOS: 5 days | Discharge: NURSING, REHAB OR LONG-TERM CARE | DRG: 481 | End: 2018-08-03
Attending: EMERGENCY MEDICINE | Admitting: FAMILY MEDICINE
Payer: MEDICARE

## 2018-07-29 ENCOUNTER — APPOINTMENT (OUTPATIENT)
Dept: CT IMAGING | Age: 83
DRG: 481 | End: 2018-07-29
Attending: EMERGENCY MEDICINE
Payer: MEDICARE

## 2018-07-29 DIAGNOSIS — S72.001A CLOSED FRACTURE OF RIGHT HIP, INITIAL ENCOUNTER (HCC): ICD-10-CM

## 2018-07-29 DIAGNOSIS — Z79.01 ANTICOAGULANT LONG-TERM USE: ICD-10-CM
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
## 2018-07-29 PROBLEM — S70.01XA HIP HEMATOMA, RIGHT, INITIAL ENCOUNTER: Status: ACTIVE | Noted: 2018-07-29

## 2018-07-29 LAB
ALBUMIN SERPL-MCNC: 3.2 G/DL (ref 3.5–5)
ALBUMIN/GLOB SERPL: 0.8 {RATIO} (ref 1.1–2.2)
ALP SERPL-CCNC: 50 U/L (ref 45–117)
ALT SERPL-CCNC: 16 U/L (ref 12–78)
AMMONIA PLAS-SCNC: <10 UMOL/L
ANION GAP SERPL CALC-SCNC: 9 MMOL/L (ref 5–15)
APTT PPP: 33.2 SEC (ref 22.1–32)
AST SERPL-CCNC: 33 U/L (ref 15–37)
BASOPHILS # BLD: 0 K/UL (ref 0–0.1)
BASOPHILS NFR BLD: 0 % (ref 0–1)
BILIRUB SERPL-MCNC: 1.9 MG/DL (ref 0.2–1)
BUN SERPL-MCNC: 18 MG/DL (ref 6–20)
BUN/CREAT SERPL: 41 (ref 12–20)
CALCIUM SERPL-MCNC: 9.8 MG/DL (ref 8.5–10.1)
CHLORIDE SERPL-SCNC: 93 MMOL/L (ref 97–108)
CK SERPL-CCNC: 278 U/L (ref 26–192)
CO2 SERPL-SCNC: 28 MMOL/L (ref 21–32)
CREAT SERPL-MCNC: 0.44 MG/DL (ref 0.55–1.02)
DIFFERENTIAL METHOD BLD: ABNORMAL
EOSINOPHIL # BLD: 0.1 K/UL (ref 0–0.4)
EOSINOPHIL NFR BLD: 0 % (ref 0–7)
ERYTHROCYTE [DISTWIDTH] IN BLOOD BY AUTOMATED COUNT: 11.9 % (ref 11.5–14.5)
GLOBULIN SER CALC-MCNC: 3.8 G/DL (ref 2–4)
GLUCOSE SERPL-MCNC: 99 MG/DL (ref 65–100)
HCT VFR BLD AUTO: 41.4 % (ref 35–47)
HGB BLD-MCNC: 13.7 G/DL (ref 11.5–16)
IMM GRANULOCYTES # BLD: 0.1 K/UL (ref 0–0.04)
IMM GRANULOCYTES NFR BLD AUTO: 1 % (ref 0–0.5)
INR PPP: 1.3 (ref 0.9–1.1)
LYMPHOCYTES # BLD: 1 K/UL (ref 0.8–3.5)
LYMPHOCYTES NFR BLD: 7 % (ref 12–49)
MCH RBC QN AUTO: 32.1 PG (ref 26–34)
MCHC RBC AUTO-ENTMCNC: 33.1 G/DL (ref 30–36.5)
MCV RBC AUTO: 97 FL (ref 80–99)
MONOCYTES # BLD: 1.9 K/UL (ref 0–1)
MONOCYTES NFR BLD: 14 % (ref 5–13)
NEUTS SEG # BLD: 11.2 K/UL (ref 1.8–8)
NEUTS SEG NFR BLD: 79 % (ref 32–75)
NRBC # BLD: 0 K/UL (ref 0–0.01)
NRBC BLD-RTO: 0 PER 100 WBC
PLATELET # BLD AUTO: 258 K/UL (ref 150–400)
PMV BLD AUTO: 11.7 FL (ref 8.9–12.9)
POTASSIUM SERPL-SCNC: 3.4 MMOL/L (ref 3.5–5.1)
PROT SERPL-MCNC: 7 G/DL (ref 6.4–8.2)
PROTHROMBIN TIME: 13.4 SEC (ref 9–11.1)
RBC # BLD AUTO: 4.27 M/UL (ref 3.8–5.2)
SODIUM SERPL-SCNC: 130 MMOL/L (ref 136–145)
THERAPEUTIC RANGE,PTTT: ABNORMAL SECS (ref 58–77)
TROPONIN I SERPL-MCNC: 0.05 NG/ML
WBC # BLD AUTO: 14.3 K/UL (ref 3.6–11)

## 2018-07-29 PROCEDURE — 86900 BLOOD TYPING SEROLOGIC ABO: CPT | Performed by: ORTHOPAEDIC SURGERY

## 2018-07-29 PROCEDURE — 51702 INSERT TEMP BLADDER CATH: CPT

## 2018-07-29 PROCEDURE — 82550 ASSAY OF CK (CPK): CPT | Performed by: EMERGENCY MEDICINE

## 2018-07-29 PROCEDURE — 72125 CT NECK SPINE W/O DYE: CPT

## 2018-07-29 PROCEDURE — 74176 CT ABD & PELVIS W/O CONTRAST: CPT

## 2018-07-29 PROCEDURE — 93005 ELECTROCARDIOGRAM TRACING: CPT

## 2018-07-29 PROCEDURE — 74011250636 HC RX REV CODE- 250/636: Performed by: FAMILY MEDICINE

## 2018-07-29 PROCEDURE — 85730 THROMBOPLASTIN TIME PARTIAL: CPT | Performed by: EMERGENCY MEDICINE

## 2018-07-29 PROCEDURE — 84484 ASSAY OF TROPONIN QUANT: CPT | Performed by: EMERGENCY MEDICINE

## 2018-07-29 PROCEDURE — 85610 PROTHROMBIN TIME: CPT | Performed by: EMERGENCY MEDICINE

## 2018-07-29 PROCEDURE — 85025 COMPLETE CBC W/AUTO DIFF WBC: CPT | Performed by: EMERGENCY MEDICINE

## 2018-07-29 PROCEDURE — 74011250636 HC RX REV CODE- 250/636: Performed by: EMERGENCY MEDICINE

## 2018-07-29 PROCEDURE — 99285 EMERGENCY DEPT VISIT HI MDM: CPT

## 2018-07-29 PROCEDURE — 70450 CT HEAD/BRAIN W/O DYE: CPT

## 2018-07-29 PROCEDURE — 96374 THER/PROPH/DIAG INJ IV PUSH: CPT

## 2018-07-29 PROCEDURE — 65660000000 HC RM CCU STEPDOWN

## 2018-07-29 PROCEDURE — 77030034850

## 2018-07-29 PROCEDURE — 80053 COMPREHEN METABOLIC PANEL: CPT | Performed by: EMERGENCY MEDICINE

## 2018-07-29 PROCEDURE — 71045 X-RAY EXAM CHEST 1 VIEW: CPT

## 2018-07-29 PROCEDURE — 82140 ASSAY OF AMMONIA: CPT | Performed by: EMERGENCY MEDICINE

## 2018-07-29 PROCEDURE — 36415 COLL VENOUS BLD VENIPUNCTURE: CPT | Performed by: EMERGENCY MEDICINE

## 2018-07-29 RX ORDER — FENTANYL CITRATE 50 UG/ML
25 INJECTION, SOLUTION INTRAMUSCULAR; INTRAVENOUS
Status: DISCONTINUED | OUTPATIENT
Start: 2018-07-29 | End: 2018-07-29

## 2018-07-29 RX ORDER — FENTANYL CITRATE 50 UG/ML
50 INJECTION, SOLUTION INTRAMUSCULAR; INTRAVENOUS
Status: COMPLETED | OUTPATIENT
Start: 2018-07-29 | End: 2018-07-29

## 2018-07-29 RX ORDER — FENTANYL CITRATE 50 UG/ML
25 INJECTION, SOLUTION INTRAMUSCULAR; INTRAVENOUS
Status: COMPLETED | OUTPATIENT
Start: 2018-07-29 | End: 2018-07-29

## 2018-07-29 RX ORDER — SODIUM CHLORIDE 9 MG/ML
125 INJECTION, SOLUTION INTRAVENOUS CONTINUOUS
Status: DISCONTINUED | OUTPATIENT
Start: 2018-07-29 | End: 2018-07-31 | Stop reason: SDUPTHER

## 2018-07-29 RX ADMIN — FENTANYL CITRATE 50 MCG: 50 INJECTION, SOLUTION INTRAMUSCULAR; INTRAVENOUS at 23:21

## 2018-07-29 RX ADMIN — SODIUM CHLORIDE 125 ML/HR: 900 INJECTION, SOLUTION INTRAVENOUS at 23:30

## 2018-07-29 RX ADMIN — FENTANYL CITRATE 25 MCG: 50 INJECTION, SOLUTION INTRAMUSCULAR; INTRAVENOUS at 21:43

## 2018-07-30 ENCOUNTER — ANESTHESIA EVENT (OUTPATIENT)
Dept: SURGERY | Age: 83
DRG: 481 | End: 2018-07-30
Payer: MEDICARE

## 2018-07-30 PROBLEM — S72.141A FRACTURE, INTERTROCHANTERIC, RIGHT FEMUR, CLOSED, INITIAL ENCOUNTER (HCC): Status: ACTIVE | Noted: 2018-07-29

## 2018-07-30 LAB
ANION GAP SERPL CALC-SCNC: 13 MMOL/L (ref 5–15)
ATRIAL RATE: 144 BPM
BASOPHILS # BLD: 0 K/UL (ref 0–0.1)
BASOPHILS NFR BLD: 0 % (ref 0–1)
BUN SERPL-MCNC: 19 MG/DL (ref 6–20)
BUN/CREAT SERPL: 39 (ref 12–20)
CALCIUM SERPL-MCNC: 9 MG/DL (ref 8.5–10.1)
CALCULATED R AXIS, ECG10: 62 DEGREES
CALCULATED T AXIS, ECG11: -25 DEGREES
CHLORIDE SERPL-SCNC: 95 MMOL/L (ref 97–108)
CO2 SERPL-SCNC: 25 MMOL/L (ref 21–32)
CREAT SERPL-MCNC: 0.49 MG/DL (ref 0.55–1.02)
DIAGNOSIS, 93000: NORMAL
DIFFERENTIAL METHOD BLD: ABNORMAL
EOSINOPHIL # BLD: 0 K/UL (ref 0–0.4)
EOSINOPHIL NFR BLD: 0 % (ref 0–7)
ERYTHROCYTE [DISTWIDTH] IN BLOOD BY AUTOMATED COUNT: 12 % (ref 11.5–14.5)
GLUCOSE SERPL-MCNC: 90 MG/DL (ref 65–100)
HCT VFR BLD AUTO: 34 % (ref 35–47)
HGB BLD-MCNC: 11.2 G/DL (ref 11.5–16)
IMM GRANULOCYTES # BLD: 0.1 K/UL (ref 0–0.04)
IMM GRANULOCYTES NFR BLD AUTO: 1 % (ref 0–0.5)
LYMPHOCYTES # BLD: 0.8 K/UL (ref 0.8–3.5)
LYMPHOCYTES NFR BLD: 6 % (ref 12–49)
MAGNESIUM SERPL-MCNC: 1.5 MG/DL (ref 1.6–2.4)
MCH RBC QN AUTO: 32.4 PG (ref 26–34)
MCHC RBC AUTO-ENTMCNC: 32.9 G/DL (ref 30–36.5)
MCV RBC AUTO: 98.3 FL (ref 80–99)
MONOCYTES # BLD: 1.8 K/UL (ref 0–1)
MONOCYTES NFR BLD: 13 % (ref 5–13)
NEUTS SEG # BLD: 11.1 K/UL (ref 1.8–8)
NEUTS SEG NFR BLD: 80 % (ref 32–75)
NRBC # BLD: 0 K/UL (ref 0–0.01)
NRBC BLD-RTO: 0 PER 100 WBC
PLATELET # BLD AUTO: 240 K/UL (ref 150–400)
PLATELET COMMENTS,PCOM: ABNORMAL
PMV BLD AUTO: 11.7 FL (ref 8.9–12.9)
POTASSIUM SERPL-SCNC: 3.5 MMOL/L (ref 3.5–5.1)
Q-T INTERVAL, ECG07: 358 MS
QRS DURATION, ECG06: 108 MS
QTC CALCULATION (BEZET), ECG08: 486 MS
RBC # BLD AUTO: 3.46 M/UL (ref 3.8–5.2)
RBC MORPH BLD: ABNORMAL
SODIUM SERPL-SCNC: 133 MMOL/L (ref 136–145)
VENTRICULAR RATE, ECG03: 111 BPM
WBC # BLD AUTO: 13.8 K/UL (ref 3.6–11)

## 2018-07-30 PROCEDURE — 74011000258 HC RX REV CODE- 258: Performed by: FAMILY MEDICINE

## 2018-07-30 PROCEDURE — 74011250636 HC RX REV CODE- 250/636: Performed by: FAMILY MEDICINE

## 2018-07-30 PROCEDURE — 80048 BASIC METABOLIC PNL TOTAL CA: CPT | Performed by: FAMILY MEDICINE

## 2018-07-30 PROCEDURE — 83735 ASSAY OF MAGNESIUM: CPT | Performed by: NURSE PRACTITIONER

## 2018-07-30 PROCEDURE — 74011000250 HC RX REV CODE- 250: Performed by: FAMILY MEDICINE

## 2018-07-30 PROCEDURE — 74011250637 HC RX REV CODE- 250/637: Performed by: INTERNAL MEDICINE

## 2018-07-30 PROCEDURE — 65660000000 HC RM CCU STEPDOWN

## 2018-07-30 PROCEDURE — 85025 COMPLETE CBC W/AUTO DIFF WBC: CPT | Performed by: FAMILY MEDICINE

## 2018-07-30 PROCEDURE — 36415 COLL VENOUS BLD VENIPUNCTURE: CPT | Performed by: FAMILY MEDICINE

## 2018-07-30 PROCEDURE — 74011250637 HC RX REV CODE- 250/637: Performed by: PHYSICIAN ASSISTANT

## 2018-07-30 PROCEDURE — 77030034850

## 2018-07-30 RX ORDER — POTASSIUM CHLORIDE 7.45 MG/ML
10 INJECTION INTRAVENOUS
Status: COMPLETED | OUTPATIENT
Start: 2018-07-30 | End: 2018-07-30

## 2018-07-30 RX ORDER — ATENOLOL 25 MG/1
25 TABLET ORAL DAILY
Status: DISCONTINUED | OUTPATIENT
Start: 2018-07-30 | End: 2018-07-31 | Stop reason: SDUPTHER

## 2018-07-30 RX ORDER — TRAMADOL HYDROCHLORIDE 50 MG/1
50 TABLET ORAL
Status: DISCONTINUED | OUTPATIENT
Start: 2018-07-30 | End: 2018-08-03 | Stop reason: HOSPADM

## 2018-07-30 RX ORDER — SODIUM CHLORIDE 0.9 % (FLUSH) 0.9 %
5-10 SYRINGE (ML) INJECTION EVERY 8 HOURS
Status: DISCONTINUED | OUTPATIENT
Start: 2018-07-30 | End: 2018-08-03 | Stop reason: HOSPADM

## 2018-07-30 RX ORDER — ACETAMINOPHEN 325 MG/1
650 TABLET ORAL EVERY 6 HOURS
Status: DISCONTINUED | OUTPATIENT
Start: 2018-07-30 | End: 2018-07-31 | Stop reason: SDUPTHER

## 2018-07-30 RX ORDER — SODIUM CHLORIDE 0.9 % (FLUSH) 0.9 %
5-10 SYRINGE (ML) INJECTION AS NEEDED
Status: DISCONTINUED | OUTPATIENT
Start: 2018-07-30 | End: 2018-08-03 | Stop reason: HOSPADM

## 2018-07-30 RX ORDER — FENTANYL CITRATE 50 UG/ML
25 INJECTION, SOLUTION INTRAMUSCULAR; INTRAVENOUS
Status: DISCONTINUED | OUTPATIENT
Start: 2018-07-30 | End: 2018-07-31 | Stop reason: SDUPTHER

## 2018-07-30 RX ORDER — CEFAZOLIN SODIUM/WATER 2 G/20 ML
2 SYRINGE (ML) INTRAVENOUS ONCE
Status: COMPLETED | OUTPATIENT
Start: 2018-07-31 | End: 2018-07-31

## 2018-07-30 RX ORDER — ONDANSETRON 2 MG/ML
4 INJECTION INTRAMUSCULAR; INTRAVENOUS
Status: DISCONTINUED | OUTPATIENT
Start: 2018-07-30 | End: 2018-07-31 | Stop reason: SDUPTHER

## 2018-07-30 RX ADMIN — Medication 10 ML: at 09:47

## 2018-07-30 RX ADMIN — DILTIAZEM HYDROCHLORIDE 5 MG/HR: 5 INJECTION INTRAVENOUS at 01:42

## 2018-07-30 RX ADMIN — SODIUM CHLORIDE 125 ML/HR: 900 INJECTION, SOLUTION INTRAVENOUS at 13:09

## 2018-07-30 RX ADMIN — TRAMADOL HYDROCHLORIDE 50 MG: 50 TABLET, FILM COATED ORAL at 17:59

## 2018-07-30 RX ADMIN — FENTANYL CITRATE 25 MCG: 50 INJECTION, SOLUTION INTRAMUSCULAR; INTRAVENOUS at 02:08

## 2018-07-30 RX ADMIN — ONDANSETRON 4 MG: 2 INJECTION, SOLUTION INTRAMUSCULAR; INTRAVENOUS at 05:42

## 2018-07-30 RX ADMIN — ACETAMINOPHEN 650 MG: 325 TABLET, FILM COATED ORAL at 17:59

## 2018-07-30 RX ADMIN — POTASSIUM CHLORIDE 10 MEQ: 10 INJECTION, SOLUTION INTRAVENOUS at 00:42

## 2018-07-30 RX ADMIN — FENTANYL CITRATE 25 MCG: 50 INJECTION, SOLUTION INTRAMUSCULAR; INTRAVENOUS at 05:42

## 2018-07-30 RX ADMIN — Medication 10 ML: at 22:41

## 2018-07-30 RX ADMIN — SODIUM CHLORIDE 125 ML/HR: 900 INJECTION, SOLUTION INTRAVENOUS at 22:40

## 2018-07-30 RX ADMIN — ATENOLOL 25 MG: 25 TABLET ORAL at 13:09

## 2018-07-30 RX ADMIN — ACETAMINOPHEN 650 MG: 325 TABLET, FILM COATED ORAL at 13:09

## 2018-07-30 RX ADMIN — POTASSIUM CHLORIDE 10 MEQ: 10 INJECTION, SOLUTION INTRAVENOUS at 01:30

## 2018-07-30 NOTE — PROGRESS NOTES
Attempted qureshi x2 w/charge RN. Pt incontinent, placed purewick cath. Bedside shift change report given to CAPTAIN OCTAVIA PANTOJA SIDRA [facility] (oncoming nurse) by Bipin Garcia (offgoing nurse). Report included the following information SBAR, Kardex, Intake/Output and MAR.

## 2018-07-30 NOTE — ED PROVIDER NOTES
HPI Comments: Patient is a 80year old female with a past medical history significant for Chronic back pain, A-fib, Hypothyroidism, HLD, Arthritis, CAD, Anxiety, Dementia and a past surgical history of Appendectomy, KADIE &BSO, Tonsillectomy, Adenoidectomy who presents to the ED via EMS with c/o GLF onset 3 days ago. Per pt's friend, pt apparently fell 3 days ago in her home and was found today on the floor. She reports that pt has a neighbor with whom pt speaks to regularly, and she had not heard from pt. police were called and they got into the house and found that the pt had fallen. Pt is currently c/o pain with movement of her rt side, and notes some swelling to the rt side of the face and UE/LE. Pt specifically denies any HA, shortness of breath, chest pain, fever, cough, chills, n/v/d or any other acute sx. Pt is a former smoker, reports occasional EtOH. There are no additional medical complaints at this time. Signed by: danielle Mccormick for Emmie Issa MD on July 29th, 2018 at 21:30pm. 
 
 
 
The history is provided by the patient and a friend. No  was used. Past Medical History:  
Diagnosis Date  A-fib ([address]  Arrhythmia A FIB  Arthritis  Back pain  CAD (coronary artery disease) PT DENIES  Chronic pain  Dementia 2/9/2016  Hypercholesterolemia  Psychiatric disorder ANXIETY  Shoulder pain  Thyroid disease  Tremor, essential   
 
 
Past Surgical History:  
Procedure Laterality Date  HX APPENDECTOMY  HX KADIE AND BSO AGE 39  
 HX TONSIL AND ADENOIDECTOMY Family History:  
Problem Relation Age of Onset  Dementia Mother  Arthritis-osteo Father Social History Social History  Marital status:  Spouse name: N/A  
 Number of children: N/A  
 Years of education: N/A Occupational History  Not on file. Social History Main Topics  Smoking status: Former Smoker Packs/day: 0.50 Years: 10.00 Quit date: 1/7/1994  Smokeless tobacco: Never Used  Alcohol use Yes Comment: NIGHTLY 2 GLASSES WINE  
 Drug use: No  
 Sexual activity: Not on file Other Topics Concern  Not on file Social History Narrative ALLERGIES: Latex, natural rubber; Codeine; Adhesive; Cortisone; and Egg Review of Systems Constitutional: Negative for chills and fever. Respiratory: Negative for cough and shortness of breath. Cardiovascular: Negative for chest pain and palpitations. Gastrointestinal: Negative for nausea and vomiting. Musculoskeletal:  
     -Positive for RLE pain Neurological: Negative for numbness and headaches. All other systems reviewed and are negative. There were no vitals filed for this visit. Physical Exam  
Constitutional: She is oriented to person, place, and time. She appears well-developed and well-nourished. No distress. HENT:  
Head: Normocephalic. Swelling to R side of head Eyes: Pupils are equal, round, and reactive to light. No scleral icterus. Neck: Normal range of motion. Neck supple. No thyromegaly present. Cardiovascular: Regular rhythm, normal heart sounds and intact distal pulses. No murmur heard. tachycardic Pulmonary/Chest: Breath sounds normal. No respiratory distress. tachypnic Abdominal: Soft. Bowel sounds are normal. She exhibits no distension. There is no tenderness. Musculoskeletal: Normal range of motion. She exhibits no edema. R hip pain and swelling. Externally rotated and shortened. Neurological: She is alert and oriented to person, place, and time. Skin: Skin is warm and dry. No rash noted. She is not diaphoretic. Nursing note and vitals reviewed. MDM Number of Diagnoses or Management Options Anticoagulant long-term use:  
Closed fracture of right hip, initial encounter [facility]):  
Fall, initial encounter:  
Diagnosis management comments: A: 81yo F with GLF 3 days ago. On floor since. R hip pain. Tachycardic and tachypnic. Pt on coumadin. P: 
CT head and c-spine Ct pelvis Labs 
ecg UA Mock CXR Pain meds ED Course Procedures ED EKG interpretation: 
Rhythm: sinus tach. Rate (approx.): 111. Axis: normal.  ST segment:  No concerning ST elevations or depressions. This EKG was interpreted by Tena Ortiz MD,ED Provider. WBC=14.3 Io=828 
IGU=320 Trop 0.05 INR:  1.3 Portable Chest Xray: 
Portable chest xray showing no signs of acute disease. This CXR was interpreted by Tena Ortiz MD, ED Provider. Head CT: Impression: 1. No evidence of acute infarct or intracranial hemorrhage. 2. Periventricular white matter disease is likely secondary to chronic small 
vessel ischemic changes. 3. Right frontal and temporal scalp soft tissue swelling. CT Abd/Pelvis: 
IMPRESSION: 
 
1. Acute intertrochanteric right femoral neck fracture. 2. Associated right hip adductor intramuscular hematoma measuring 8 x 4.5 cm. 
3. No acute intra-abdominal or intrapelvic pathology. 4. Cholelithiasis. 5. Small-to-moderate right pleural effusion. 11:00 PM 
Dr. Marilu Robb ([facility]) and Kristine notified via [employer] regarding hip fracture. Dr. Jesus Alberto Bro notified via [employer] who will have pt eval'd for admission.

## 2018-07-30 NOTE — CONSULTS
ORTHOPAEDIC CONSULT NOTE    Subjective:     Date of Consultation:  July 30, 2018  Referring Physician:  Micki Lowery is a 80 y.o. female with PMH significant for A fib on warfarin, CAD, dementia with anxiety and OA s/p left TKA is seen for right sided hip and groin pain after a fall over the weekend which was unwitnessed. She states that she was getting up from watching a movie and was going to bed when she lost her footing and fell onto her right side. Patient lives alone at home and hadn't been seen for a few days by her neighbor with whom she speaks regularly and she had a wellness check by the police who found her down on the floor. Patient with history of dementia and no family around but she provides a relatively clear history of her fall but cannot pin down exact timing. She states she has deep pain in her right hip and groin with some radiation towards the knee but she denies any low back or ankle pain. She denies tingling or numbness. She was brought to the ED and found to have a right hip fracture for which we have been asked to see her.     Patient Active Problem List    Diagnosis Date Noted    Fall 07/29/2018    Fracture, intertrochanteric, right femur, closed, initial encounter ([address] 07/29/2018    Hip hematoma, right, initial encounter 07/29/2018    UTI (urinary tract infection) 02/02/2017    Closed compression fracture of lumbar vertebra ([address] 02/01/2017    Sepsis ([address] 02/09/2016    Altered mental status 02/09/2016    Dementia 02/09/2016    Arthritis of knee, left 01/13/2014    A-fib ([address]     Thyroid disease     Hypercholesterolemia     Arthritis     CAD (coronary artery disease)      Family History   Problem Relation Age of Onset    Dementia Mother     Arthritis-osteo Father       Social History   Substance Use Topics    Smoking status: Former Smoker     Packs/day: 0.50     Years: 10.00     Quit date: 1/7/1994    Smokeless tobacco: Never Used    Alcohol use Yes Comment: NIGHTLY 2 GLASSES WINE     Past Medical History:   Diagnosis Date    [facility]-fib [facility])     Arrhythmia     A FIB    Arthritis     Back pain     CAD (coronary artery disease)     PT DENIES    Chronic pain     Dementia 2/9/2016    Hypercholesterolemia     Psychiatric disorder     ANXIETY    Shoulder pain     Thyroid disease     Tremor, essential       Past Surgical History:   Procedure Laterality Date    HX APPENDECTOMY      HX KADIE AND BSO      AGE 39    HX TONSIL AND ADENOIDECTOMY        Prior to Admission medications    Medication Sig Start Date End Date Taking? Authorizing Provider   warfarin (COUMADIN) 2.5 mg tablet TAKE 1 TABLET BY MOUTH EVERY EVENING 7/20/18  Yes Susan Romero MD   atenolol (TENORMIN) 25 mg tablet TAKE 1 TABLET BY MOUTH EVERY DAY 7/20/18  Yes Susan Romero MD   potassium chloride (K-DUR, KLOR-CON) 20 mEq tablet TAKE 1 TABLET BY MOUTH EVERY DAY 7/20/18  Yes Susan Romero MD   cephALEXin (KEFLEX) 250 mg capsule Take 1 Cap by mouth three (3) times daily. 1/29/18  Yes Susan Romero MD   levothyroxine (SYNTHROID) 100 mcg tablet TAKE 1 TABLET BY MOUTH DAILY BEFORE BREAKFAST. 12/23/17  Yes Susan Romero MD   ALPRAZolam [facility]) 0.5 mg tablet TAKE 1 TABLET BY MOUTH THREE TIMES DAILY AS NEEDED FOR ANXIETY 7/17/18   Susan Romero MD   furosemide (LASIX) 40 mg tablet TAKE 1 TABLET BY MOUTH EVERY DAY. 5/9/18   Susan Romero MD   traMADol Julián Priestly) 50 mg tablet Take 1 Tab by mouth daily as needed for Pain. 3/29/18   Susan Romero MD   nystatin (MYCOSTATIN) topical cream APPLY TO AFFECTED AREA TWO (2) TIMES A DAY. 3/13/18   Justin Thao NP   spironolactone (ALDACTONE) 50 mg tablet TAKE 1 TABLET BY MOUTH DAILY FOR HYPERTENSION. (SAME AS ALDACTONE) 11/3/17   Susan Romero MD   famotidine (PEPCID) 20 mg tablet TAKE 1 TABLET BY MOUTH TWICE A DAY. 10/24/17   Susan Romero MD   sertraline (ZOLOFT) 25 mg tablet Take 1 Tab by mouth daily.  9/18/17   Susan Romero MD fluticasone (FLONASE) 50 mcg/actuation nasal spray TWO SPRAYS IN EACH NOSTRIL DAILY. 8/4/17   Alee Sibley MD   cefUROXime (CEFTIN) 250 mg tablet Take 1 Tab by mouth two (2) times a day. 6/22/17   Alee Sibley MD   HYDROcodone-acetaminophen (NORCO) 5-325 mg per tablet TAKE 1 TABLET BY MOUTH DAILY AS NEEDED. 5/23/17   Alee Sibley MD   potassium chloride SA (MICRO-K) 10 mEq capsule  3/24/17   Historical Provider   acetaminophen (TYLENOL) 325 mg tablet Take 2 Tabs by mouth every four (4) hours as needed. 2/7/17   Alee Sibley MD   bisacodyl (DULCOLAX) 10 mg suppository Insert 10 mg into rectum daily as needed. 2/7/17   Alee Sibley MD   warfarin (COUMADIN) 3 mg tablet Take 3 mg by mouth every Tuesday, Thursday, Saturday & Sunday. In afternoon    Historical Provider   warfarin (COUMADIN) 3 mg tablet Take 1.5 mg by mouth every Monday, Wednesday, Friday. In afternoon    Historical Provider   Biotin 2,500 mcg cap Take 5,000 mcg by mouth. Historical Provider   polyethylene glycol (MIRALAX) 17 gram packet Take 1 Packet by mouth daily.  2/13/16   Berry Bullock MD     Current Facility-Administered Medications   Medication Dose Route Frequency    sodium chloride (NS) flush 5-10 mL  5-10 mL IntraVENous Q8H    sodium chloride (NS) flush 5-10 mL  5-10 mL IntraVENous PRN    dilTIAZem (CARDIZEM) 125 mg in dextrose 5% 125 mL infusion  5-15 mg/hr IntraVENous TITRATE    fentaNYL citrate (PF) injection 25 mcg  25 mcg IntraVENous Q4H PRN    ondansetron (ZOFRAN) injection 4 mg  4 mg IntraVENous Q6H PRN    acetaminophen (TYLENOL) tablet 650 mg  650 mg Oral Q6H    traMADol (ULTRAM) tablet 50 mg  50 mg Oral Q6H PRN    [START ON 7/31/2018] ceFAZolin (ANCEF) 2 g/20 mL in sterile water IV syringe  2 g IntraVENous ONCE    0.9% sodium chloride infusion  125 mL/hr IntraVENous CONTINUOUS     Current Outpatient Prescriptions   Medication Sig    warfarin (COUMADIN) 2.5 mg tablet TAKE 1 TABLET BY MOUTH EVERY EVENING    atenolol (TENORMIN) 25 mg tablet TAKE 1 TABLET BY MOUTH EVERY DAY    potassium chloride (K-DUR, KLOR-CON) 20 mEq tablet TAKE 1 TABLET BY MOUTH EVERY DAY    cephALEXin (KEFLEX) 250 mg capsule Take 1 Cap by mouth three (3) times daily.  levothyroxine (SYNTHROID) 100 mcg tablet TAKE 1 TABLET BY MOUTH DAILY BEFORE BREAKFAST.  ALPRAZolam (XANAX) 0.5 mg tablet TAKE 1 TABLET BY MOUTH THREE TIMES DAILY AS NEEDED FOR ANXIETY    furosemide (LASIX) 40 mg tablet TAKE 1 TABLET BY MOUTH EVERY DAY.  traMADol (ULTRAM) 50 mg tablet Take 1 Tab by mouth daily as needed for Pain.  nystatin (MYCOSTATIN) topical cream APPLY TO AFFECTED AREA TWO (2) TIMES A DAY.  spironolactone (ALDACTONE) 50 mg tablet TAKE 1 TABLET BY MOUTH DAILY FOR HYPERTENSION. (SAME AS ALDACTONE)    famotidine (PEPCID) 20 mg tablet TAKE 1 TABLET BY MOUTH TWICE A DAY.  sertraline (ZOLOFT) 25 mg tablet Take 1 Tab by mouth daily.  fluticasone (FLONASE) 50 mcg/actuation nasal spray TWO SPRAYS IN EACH NOSTRIL DAILY.  cefUROXime (CEFTIN) 250 mg tablet Take 1 Tab by mouth two (2) times a day.  HYDROcodone-acetaminophen (NORCO) 5-325 mg per tablet TAKE 1 TABLET BY MOUTH DAILY AS NEEDED.  potassium chloride SA (MICRO-K) 10 mEq capsule     acetaminophen (TYLENOL) 325 mg tablet Take 2 Tabs by mouth every four (4) hours as needed.  bisacodyl (DULCOLAX) 10 mg suppository Insert 10 mg into rectum daily as needed.  warfarin (COUMADIN) 3 mg tablet Take 3 mg by mouth every Tuesday, Thursday, Saturday & Sunday. In afternoon    warfarin (COUMADIN) 3 mg tablet Take 1.5 mg by mouth every Monday, Wednesday, Friday. In afternoon    Biotin 2,500 mcg cap Take 5,000 mcg by mouth.  polyethylene glycol (MIRALAX) 17 gram packet Take 1 Packet by mouth daily.       Allergies   Allergen Reactions    Latex, Natural Rubber Other (comments)     \"I get black where ever it touches\"    Codeine Other (comments)     \"It just sends me up the wall\"    Adhesive Rash and Other (comments)     blisters    Cortisone Unknown (comments)    Egg Unknown (comments)     Pet pt. She states that she is allergic to eggs. Review of Systems:  Pertinent items are noted in HPI. Mental Status: mild dementia    Objective:     Patient Vitals for the past 8 hrs:   BP Pulse Resp SpO2   07/30/18 0630 128/42 (!) 101 20 91 %   07/30/18 0600 (!) 122/39 (!) 103 20 (!) 89 %   07/30/18 0530 (!) 134/37 (!) 101 23 93 %   07/30/18 0500 (!) 130/37 99 21 95 %   07/30/18 0430 134/47 (!) 101 25 94 %   07/30/18 0400 123/45 (!) 105 18 98 %   07/30/18 0330 (!) 115/39 (!) 104 19 95 %   07/30/18 0300 131/43 98 19 94 %   07/30/18 0230 128/40 (!) 103 19 94 %     No data recorded. EXAM: Asleep but arouses to voice easily; NAD; agreeable to exam  Lying in right lateral decub position with Mild TTP about the lateral hip  Lateral swelling noted with ecchymosis  Right leg shortened when compared to left  Rotation of hip increases pain  5/5 strength for ankle plantar and dorsiflexion bilaterally  Distal sensory function grossly intact  Distal pulses palpable    Imaging Review: EXAM:  CT ABD PELV WO CONT     INDICATION: fall, tachycardia, pelvic pain     COMPARISON: None     CONTRAST:  None.     TECHNIQUE:   Thin axial images were obtained through the abdomen and pelvis. Coronal and  sagittal reconstructions were generated. Oral contrast was not administered. CT  dose reduction was achieved through use of a standardized protocol tailored for  this examination and automatic exposure control for dose modulation.      The absence of intravenous contrast material reduces the sensitivity for  evaluation of the solid parenchymal organs of the abdomen.      FINDINGS:   LUNG BASES: There is a moderate right pleural effusion with underlying right  lower lobe atelectasis  INCIDENTALLY IMAGED HEART AND MEDIASTINUM: Visualized heart is enlarged and  there are coronary artery calcifications.   LIVER: No mass or biliary dilatation. GALLBLADDER: Gallstones are noted. SPLEEN: No mass. PANCREAS: No mass or ductal dilatation. ADRENALS: Unremarkable. KIDNEYS/URETERS: No mass, calculus, or hydronephrosis. STOMACH: Unremarkable. SMALL BOWEL: No dilatation or wall thickening. COLON: Diverticulosis of the colon, with no evidence of acute diverticulitis. APPENDIX: Not visualized  PERITONEUM: No ascites or pneumoperitoneum. RETROPERITONEUM: No lymphadenopathy or aortic aneurysm. There is diffuse  calcific aortic atherosclerosis. REPRODUCTIVE ORGANS: Status post hysterectomy. URINARY BLADDER: No mass or calculus. BONES: There is an acute intertrochanteric right femoral neck fracture with  varus angulation and comminution involving the trochanters. There is overlying  soft tissue swelling, as well as an intramuscular right hip adductor haematoma  measuring 8 x 4.5 cm. .  ADDITIONAL COMMENTS: There is evidence of kyphoplasty at L2. No acute spine  fracture is identified. There is a chronic deformity of the coccyx.     IMPRESSION  IMPRESSION:     1. Acute intertrochanteric right femoral neck fracture. 2. Associated right hip adductor intramuscular hematoma measuring 8 x 4.5 cm.  3. No acute intra-abdominal or intrapelvic pathology. 4. Cholelithiasis.   5. Small-to-moderate right pleural effusion.       Labs:   Recent Results (from the past 24 hour(s))   CBC WITH AUTOMATED DIFF    Collection Time: 07/29/18  9:20 PM   Result Value Ref Range    WBC 14.3 (H) 3.6 - 11.0 K/uL    RBC 4.27 3.80 - 5.20 M/uL    HGB 13.7 11.5 - 16.0 g/dL    HCT 41.4 35.0 - 47.0 %    MCV 97.0 80.0 - 99.0 FL    MCH 32.1 26.0 - 34.0 PG    MCHC 33.1 30.0 - 36.5 g/dL    RDW 11.9 11.5 - 14.5 %    PLATELET 037 205 - 289 K/uL    MPV 11.7 8.9 - 12.9 FL    NRBC 0.0 0  WBC    ABSOLUTE NRBC 0.00 0.00 - 0.01 K/uL    NEUTROPHILS 79 (H) 32 - 75 %    LYMPHOCYTES 7 (L) 12 - 49 %    MONOCYTES 14 (H) 5 - 13 %    EOSINOPHILS 0 0 - 7 %    BASOPHILS 0 0 - 1 %    IMMATURE GRANULOCYTES 1 (H) 0.0 - 0.5 %    ABS. NEUTROPHILS 11.2 (H) 1.8 - 8.0 K/UL    ABS. LYMPHOCYTES 1.0 0.8 - 3.5 K/UL    ABS. MONOCYTES 1.9 (H) 0.0 - 1.0 K/UL    ABS. EOSINOPHILS 0.1 0.0 - 0.4 K/UL    ABS. BASOPHILS 0.0 0.0 - 0.1 K/UL    ABS. IMM. GRANS. 0.1 (H) 0.00 - 0.04 K/UL    DF AUTOMATED     METABOLIC PANEL, COMPREHENSIVE    Collection Time: 07/29/18  9:20 PM   Result Value Ref Range    Sodium 130 (L) 136 - 145 mmol/L    Potassium 3.4 (L) 3.5 - 5.1 mmol/L    Chloride 93 (L) 97 - 108 mmol/L    CO2 28 21 - 32 mmol/L    Anion gap 9 5 - 15 mmol/L    Glucose 99 65 - 100 mg/dL    BUN 18 6 - 20 MG/DL    Creatinine 0.44 (L) 0.55 - 1.02 MG/DL    BUN/Creatinine ratio 41 (H) 12 - 20      GFR est AA >60 >60 ml/min/1.73m2    GFR est non-AA >60 >60 ml/min/1.73m2    Calcium 9.8 8.5 - 10.1 MG/DL    Bilirubin, total 1.9 (H) 0.2 - 1.0 MG/DL    ALT (SGPT) 16 12 - 78 U/L    AST (SGOT) 33 15 - 37 U/L    Alk.  phosphatase 50 45 - 117 U/L    Protein, total 7.0 6.4 - 8.2 g/dL    Albumin 3.2 (L) 3.5 - 5.0 g/dL    Globulin 3.8 2.0 - 4.0 g/dL    A-G Ratio 0.8 (L) 1.1 - 2.2     CK    Collection Time: 07/29/18  9:20 PM   Result Value Ref Range     (H) 26 - 192 U/L   TROPONIN I    Collection Time: 07/29/18  9:20 PM   Result Value Ref Range    Troponin-I, Qt. 0.05 (H) <0.05 ng/mL   PROTHROMBIN TIME + INR    Collection Time: 07/29/18  9:20 PM   Result Value Ref Range    INR 1.3 (H) 0.9 - 1.1      Prothrombin time 13.4 (H) 9.0 - 11.1 sec   PTT    Collection Time: 07/29/18  9:20 PM   Result Value Ref Range    aPTT 33.2 (H) 22.1 - 32.0 sec    aPTT, therapeutic range     58.0 - 77.0 SECS   AMMONIA    Collection Time: 07/29/18  9:20 PM   Result Value Ref Range    Ammonia <10 <32 UMOL/L   EKG, 12 LEAD, INITIAL    Collection Time: 07/29/18  9:25 PM   Result Value Ref Range    Ventricular Rate 111 BPM    Atrial Rate 144 BPM    QRS Duration 108 ms    Q-T Interval 358 ms    QTC Calculation (Bezet) 486 ms    Calculated R Axis 62 degrees    Calculated T Axis -25 degrees    Diagnosis       Atrial fibrillation with premature ventricular or aberrantly conducted   complexes  Right bundle branch block  Septal infarct (cited on or before 01-FEB-2017)  When compared with ECG of 01-FEB-2017 16:11,  Atrial fibrillation has replaced Atrial flutter  Right bundle branch block is now present  Confirmed by Ladi Aguilar M.D., Emileeon Arthur ([id number]) on 7/30/2018 0:17:95 AM     METABOLIC PANEL, BASIC    Collection Time: 07/30/18  4:50 AM   Result Value Ref Range    Sodium 133 (L) 136 - 145 mmol/L    Potassium 3.5 3.5 - 5.1 mmol/L    Chloride 95 (L) 97 - 108 mmol/L    CO2 25 21 - 32 mmol/L    Anion gap 13 5 - 15 mmol/L    Glucose 90 65 - 100 mg/dL    BUN 19 6 - 20 MG/DL    Creatinine 0.49 (L) 0.55 - 1.02 MG/DL    BUN/Creatinine ratio 39 (H) 12 - 20      GFR est AA >60 >60 ml/min/1.73m2    GFR est non-AA >60 >60 ml/min/1.73m2    Calcium 9.0 8.5 - 10.1 MG/DL   CBC WITH AUTOMATED DIFF    Collection Time: 07/30/18  4:50 AM   Result Value Ref Range    WBC 13.8 (H) 3.6 - 11.0 K/uL    RBC 3.46 (L) 3.80 - 5.20 M/uL    HGB 11.2 (L) 11.5 - 16.0 g/dL    HCT 34.0 (L) 35.0 - 47.0 %    MCV 98.3 80.0 - 99.0 FL    MCH 32.4 26.0 - 34.0 PG    MCHC 32.9 30.0 - 36.5 g/dL    RDW 12.0 11.5 - 14.5 %    PLATELET 263 959 - 044 K/uL    MPV 11.7 8.9 - 12.9 FL    NRBC 0.0 0  WBC    ABSOLUTE NRBC 0.00 0.00 - 0.01 K/uL    NEUTROPHILS 80 (H) 32 - 75 %    LYMPHOCYTES 6 (L) 12 - 49 %    MONOCYTES 13 5 - 13 %    EOSINOPHILS 0 0 - 7 %    BASOPHILS 0 0 - 1 %    IMMATURE GRANULOCYTES 1 (H) 0.0 - 0.5 %    ABS. NEUTROPHILS 11.1 (H) 1.8 - 8.0 K/UL    ABS. LYMPHOCYTES 0.8 0.8 - 3.5 K/UL    ABS. MONOCYTES 1.8 (H) 0.0 - 1.0 K/UL    ABS. EOSINOPHILS 0.0 0.0 - 0.4 K/UL    ABS. BASOPHILS 0.0 0.0 - 0.1 K/UL    ABS. IMM.  GRANS. 0.1 (H) 0.00 - 0.04 K/UL    DF SMEAR SCANNED      PLATELET COMMENTS Large Platelets      RBC COMMENTS ANISOCYTOSIS  1+       MAGNESIUM    Collection Time: 07/30/18  4:50 AM   Result Value Ref Range Magnesium 1.5 (L) 1.6 - 2.4 mg/dL         Impression:     Principal Problem:    Fracture, intertrochanteric, right femur, closed, initial encounter ([facility] [address] (7/29/2018)    Active Problems:    A-fib ([facility] ()      Fall (7/29/2018)      Hip hematoma, right, initial encounter (7/29/2018)        Plan:     Acute right intertrochanteric hip fracture - will require surgical fixation with IM nail  Admitted to Medicine for pre-op eval and medical management - requesting Cards clearance for arrhythmia   Medicine requesting to hold surgery for a day  Hold warfarin for now - heparin if absolutely necessary but otherwise SCDs for DVT prophylaxis until after surgery  Bed rest  May eat today - NPO after midnight  Pain control ordered  Consent for IM nail fixation of right hip  To OR sometime Tuesday    Lizette Chandler PA-C  Orthopedic Trauma Service  [id number] [facility]

## 2018-07-30 NOTE — CONSULTS
Note          Subjective:      Date of  Admission: 7/29/2018  8:49 PM     79 yo female with h/o chronic atrial fibrillation for which she is anticoagulated with coumadin who had a fall which likely occurred on Thursday of last week. Police were called to check on her after neighbors noted she was not seen for a few days. She was found on the ground at home. She recalls getting up from watching TV and tripping over a rug. She is found to have a right intertrochanteric femoral neck fracture for which she is scheduled for surgery tomorrow. Her INR is 1.3 and she likely has not taken any medications since last Wednesday. She has a history of dementia and is unable to provide further history. She has a caregiver who is with her two days a week and last saw her Thursday afternoon. No other family in the area. Prior cardiac evaluation includes a stress test in 2013 which demonstrated normal function without ischemia. Recent echo from earlier this month showed EF 50% with severe pulmonary hypertension.       Patient Active Problem List    Diagnosis Date Noted    Fall 07/29/2018    Fracture, intertrochanteric, right femur, closed, initial encounter ([address] 07/29/2018    Hip hematoma, right, initial encounter 07/29/2018    UTI (urinary tract infection) 02/02/2017    Closed compression fracture of lumbar vertebra ([address] 02/01/2017    Sepsis ([address] 02/09/2016    Altered mental status 02/09/2016    Dementia 02/09/2016    Arthritis of knee, left 01/13/2014    A-fib ([address]     Thyroid disease     Hypercholesterolemia     Arthritis     CAD (coronary artery disease)       Alee Sibley MD  Past Medical History:   Diagnosis Date    A-fib [facility])     Arrhythmia     A FIB    Arthritis     Back pain     CAD (coronary artery disease)     PT DENIES    Chronic pain     Dementia 2/9/2016    Hypercholesterolemia     Psychiatric disorder     ANXIETY    Shoulder pain     Thyroid disease     Tremor, essential Past Surgical History:   Procedure Laterality Date    HX APPENDECTOMY      HX KADIE AND BSO      AGE 39    HX TONSIL AND ADENOIDECTOMY       Allergies   Allergen Reactions    Latex, Natural Rubber Other (comments)     \"I get black where ever it touches\"    Codeine Other (comments)     \"It just sends me up the wall\"    Adhesive Rash and Other (comments)     blisters    Cortisone Unknown (comments)    Egg Unknown (comments)     Pet pt. She states that she is allergic to eggs. Family History   Problem Relation Age of Onset    Dementia Mother     Arthritis-osteo Father       Current Facility-Administered Medications   Medication Dose Route Frequency    sodium chloride (NS) flush 5-10 mL  5-10 mL IntraVENous Q8H    sodium chloride (NS) flush 5-10 mL  5-10 mL IntraVENous PRN    dilTIAZem (CARDIZEM) 125 mg in dextrose 5% 125 mL infusion  5-15 mg/hr IntraVENous TITRATE    fentaNYL citrate (PF) injection 25 mcg  25 mcg IntraVENous Q4H PRN    ondansetron (ZOFRAN) injection 4 mg  4 mg IntraVENous Q6H PRN    acetaminophen (TYLENOL) tablet 650 mg  650 mg Oral Q6H    traMADol (ULTRAM) tablet 50 mg  50 mg Oral Q6H PRN    [START ON 7/31/2018] ceFAZolin (ANCEF) 2 g/20 mL in sterile water IV syringe  2 g IntraVENous ONCE    0.9% sodium chloride infusion  125 mL/hr IntraVENous CONTINUOUS     Current Outpatient Prescriptions   Medication Sig    warfarin (COUMADIN) 2.5 mg tablet TAKE 1 TABLET BY MOUTH EVERY EVENING    atenolol (TENORMIN) 25 mg tablet TAKE 1 TABLET BY MOUTH EVERY DAY    potassium chloride (K-DUR, KLOR-CON) 20 mEq tablet TAKE 1 TABLET BY MOUTH EVERY DAY    cephALEXin (KEFLEX) 250 mg capsule Take 1 Cap by mouth three (3) times daily.  levothyroxine (SYNTHROID) 100 mcg tablet TAKE 1 TABLET BY MOUTH DAILY BEFORE BREAKFAST.  ALPRAZolam (XANAX) 0.5 mg tablet TAKE 1 TABLET BY MOUTH THREE TIMES DAILY AS NEEDED FOR ANXIETY    furosemide (LASIX) 40 mg tablet TAKE 1 TABLET BY MOUTH EVERY DAY.     traMADol (ULTRAM) 50 mg tablet Take 1 Tab by mouth daily as needed for Pain.  nystatin (MYCOSTATIN) topical cream APPLY TO AFFECTED AREA TWO (2) TIMES A DAY.  spironolactone (ALDACTONE) 50 mg tablet TAKE 1 TABLET BY MOUTH DAILY FOR HYPERTENSION. (SAME AS ALDACTONE)    famotidine (PEPCID) 20 mg tablet TAKE 1 TABLET BY MOUTH TWICE A DAY.  sertraline (ZOLOFT) 25 mg tablet Take 1 Tab by mouth daily.  fluticasone (FLONASE) 50 mcg/actuation nasal spray TWO SPRAYS IN EACH NOSTRIL DAILY.  cefUROXime (CEFTIN) 250 mg tablet Take 1 Tab by mouth two (2) times a day.  HYDROcodone-acetaminophen (NORCO) 5-325 mg per tablet TAKE 1 TABLET BY MOUTH DAILY AS NEEDED.  potassium chloride SA (MICRO-K) 10 mEq capsule     acetaminophen (TYLENOL) 325 mg tablet Take 2 Tabs by mouth every four (4) hours as needed.  bisacodyl (DULCOLAX) 10 mg suppository Insert 10 mg into rectum daily as needed.  warfarin (COUMADIN) 3 mg tablet Take 3 mg by mouth every Tuesday, Thursday, Saturday & Sunday. In afternoon    warfarin (COUMADIN) 3 mg tablet Take 1.5 mg by mouth every Monday, Wednesday, Friday. In afternoon    Biotin 2,500 mcg cap Take 5,000 mcg by mouth.  polyethylene glycol (MIRALAX) 17 gram packet Take 1 Packet by mouth daily. Prior to Admission Medications:  Prior to Admission medications    Medication Sig Start Date End Date Taking? Authorizing Provider   warfarin (COUMADIN) 2.5 mg tablet TAKE 1 TABLET BY MOUTH EVERY EVENING 7/20/18  Yes Kadeem Aiken MD   atenolol (TENORMIN) 25 mg tablet TAKE 1 TABLET BY MOUTH EVERY DAY 7/20/18  Yes Kadeem Aiken MD   potassium chloride (K-DUR, KLOR-CON) 20 mEq tablet TAKE 1 TABLET BY MOUTH EVERY DAY 7/20/18  Yes Kadeem Aiken MD   cephALEXin (KEFLEX) 250 mg capsule Take 1 Cap by mouth three (3) times daily. 1/29/18  Yes Kadeem Aiken MD   levothyroxine (SYNTHROID) 100 mcg tablet TAKE 1 TABLET BY MOUTH DAILY BEFORE BREAKFAST.  12/23/17  Yes Kadeem Aiken MD ALPRAZolam (XANAX) 0.5 mg tablet TAKE 1 TABLET BY MOUTH THREE TIMES DAILY AS NEEDED FOR ANXIETY 7/17/18   Marce Melendrez MD   furosemide (LASIX) 40 mg tablet TAKE 1 TABLET BY MOUTH EVERY DAY. 5/9/18   Marce Melendrez MD   traMADol Anjelica Sade) 50 mg tablet Take 1 Tab by mouth daily as needed for Pain. 3/29/18   Marce Melendrez MD   nystatin (MYCOSTATIN) topical cream APPLY TO AFFECTED AREA TWO (2) TIMES A DAY. 3/13/18   Kamar Hoff NP   spironolactone (ALDACTONE) 50 mg tablet TAKE 1 TABLET BY MOUTH DAILY FOR HYPERTENSION. (SAME AS ALDACTONE) 11/3/17   Marce Melendrez MD   famotidine (PEPCID) 20 mg tablet TAKE 1 TABLET BY MOUTH TWICE A DAY. 10/24/17   Marce Melendrez MD   sertraline (ZOLOFT) 25 mg tablet Take 1 Tab by mouth daily. 9/18/17   Marce Melendrez MD   fluticasone (FLONASE) 50 mcg/actuation nasal spray TWO SPRAYS IN EACH NOSTRIL DAILY. 8/4/17   Marce Melendrez MD   cefUROXime (CEFTIN) 250 mg tablet Take 1 Tab by mouth two (2) times a day. 6/22/17   Marce Melendrez MD   HYDROcodone-acetaminophen (NORCO) 5-325 mg per tablet TAKE 1 TABLET BY MOUTH DAILY AS NEEDED. 5/23/17   Marce Melendrez MD   potassium chloride SA (MICRO-K) 10 mEq capsule  3/24/17   Historical Provider   acetaminophen (TYLENOL) 325 mg tablet Take 2 Tabs by mouth every four (4) hours as needed. 2/7/17   Marce Melendrez MD   bisacodyl (DULCOLAX) 10 mg suppository Insert 10 mg into rectum daily as needed. 2/7/17   Marce Melendrez MD   warfarin (COUMADIN) 3 mg tablet Take 3 mg by mouth every Tuesday, Thursday, Saturday & Sunday. In afternoon    Historical Provider   warfarin (COUMADIN) 3 mg tablet Take 1.5 mg by mouth every Monday, Wednesday, Friday. In afternoon    Historical Provider   Biotin 2,500 mcg cap Take 5,000 mcg by mouth. Historical Provider   polyethylene glycol (MIRALAX) 17 gram packet Take 1 Packet by mouth daily.  2/13/16   Kevin Morocho MD        Review of Symptoms:  Difficult to obtain due to underlying dementia Subjective:    24 hr VS reviewed, overall VSSAF  No data recorded. Patient Vitals for the past 8 hrs:   Pulse   07/30/18 0630 (!) 101   07/30/18 0600 (!) 103   07/30/18 0530 (!) 101   07/30/18 0500 99   07/30/18 0430 (!) 101   07/30/18 0400 (!) 105   07/30/18 0330 (!) 104    Patient Vitals for the past 8 hrs:   Resp   07/30/18 0630 20   07/30/18 0600 20   07/30/18 0530 23   07/30/18 0500 21   07/30/18 0430 25   07/30/18 0400 18   07/30/18 0330 19    Patient Vitals for the past 8 hrs:   BP   07/30/18 0630 128/42   07/30/18 0600 (!) 122/39   07/30/18 0530 (!) 134/37   07/30/18 0500 (!) 130/37   07/30/18 0430 134/47   07/30/18 0400 123/45   07/30/18 0330 (!) 115/39        No intake or output data in the 24 hours ending 07/30/18 1122      Physical Exam (complete single organ system exam)    Cons: The patient is no distress. Appears stated age. HEENT: Normal conjunctivae and palate. No xanthelasma. Neck: Flat JVP without appreciable HJR. Resp: Normal respiratory effort with clear lungs bilaterally. CV: irregularly irregular   No gallop or rubs appreciated. No murmur apprciated. Intact pedal pulses. No peripheral edema. GI: No abd mass noted, soft; no organomegaly noted. Bowel sounds present. Ext: No cyanosis, clubbing, or stigmata of peripheral embolization. Derm: No ulcers or stasis dermatitis of lower extremities. Neuro: alert and conversant.           Cardiographics    Telemetry: a fib, controlled rate  Labs:   Recent Results (from the past 24 hour(s))   CBC WITH AUTOMATED DIFF    Collection Time: 07/29/18  9:20 PM   Result Value Ref Range    WBC 14.3 (H) 3.6 - 11.0 K/uL    RBC 4.27 3.80 - 5.20 M/uL    HGB 13.7 11.5 - 16.0 g/dL    HCT 41.4 35.0 - 47.0 %    MCV 97.0 80.0 - 99.0 FL    MCH 32.1 26.0 - 34.0 PG    MCHC 33.1 30.0 - 36.5 g/dL    RDW 11.9 11.5 - 14.5 %    PLATELET 429 551 - 986 K/uL    MPV 11.7 8.9 - 12.9 FL    NRBC 0.0 0  WBC    ABSOLUTE NRBC 0.00 0.00 - 0.01 K/uL NEUTROPHILS 79 (H) 32 - 75 %    LYMPHOCYTES 7 (L) 12 - 49 %    MONOCYTES 14 (H) 5 - 13 %    EOSINOPHILS 0 0 - 7 %    BASOPHILS 0 0 - 1 %    IMMATURE GRANULOCYTES 1 (H) 0.0 - 0.5 %    ABS. NEUTROPHILS 11.2 (H) 1.8 - 8.0 K/UL    ABS. LYMPHOCYTES 1.0 0.8 - 3.5 K/UL    ABS. MONOCYTES 1.9 (H) 0.0 - 1.0 K/UL    ABS. EOSINOPHILS 0.1 0.0 - 0.4 K/UL    ABS. BASOPHILS 0.0 0.0 - 0.1 K/UL    ABS. IMM. GRANS. 0.1 (H) 0.00 - 0.04 K/UL    DF AUTOMATED     METABOLIC PANEL, COMPREHENSIVE    Collection Time: 07/29/18  9:20 PM   Result Value Ref Range    Sodium 130 (L) 136 - 145 mmol/L    Potassium 3.4 (L) 3.5 - 5.1 mmol/L    Chloride 93 (L) 97 - 108 mmol/L    CO2 28 21 - 32 mmol/L    Anion gap 9 5 - 15 mmol/L    Glucose 99 65 - 100 mg/dL    BUN 18 6 - 20 MG/DL    Creatinine 0.44 (L) 0.55 - 1.02 MG/DL    BUN/Creatinine ratio 41 (H) 12 - 20      GFR est AA >60 >60 ml/min/1.73m2    GFR est non-AA >60 >60 ml/min/1.73m2    Calcium 9.8 8.5 - 10.1 MG/DL    Bilirubin, total 1.9 (H) 0.2 - 1.0 MG/DL    ALT (SGPT) 16 12 - 78 U/L    AST (SGOT) 33 15 - 37 U/L    Alk.  phosphatase 50 45 - 117 U/L    Protein, total 7.0 6.4 - 8.2 g/dL    Albumin 3.2 (L) 3.5 - 5.0 g/dL    Globulin 3.8 2.0 - 4.0 g/dL    A-G Ratio 0.8 (L) 1.1 - 2.2     CK    Collection Time: 07/29/18  9:20 PM   Result Value Ref Range     (H) 26 - 192 U/L   TROPONIN I    Collection Time: 07/29/18  9:20 PM   Result Value Ref Range    Troponin-I, Qt. 0.05 (H) <0.05 ng/mL   PROTHROMBIN TIME + INR    Collection Time: 07/29/18  9:20 PM   Result Value Ref Range    INR 1.3 (H) 0.9 - 1.1      Prothrombin time 13.4 (H) 9.0 - 11.1 sec   PTT    Collection Time: 07/29/18  9:20 PM   Result Value Ref Range    aPTT 33.2 (H) 22.1 - 32.0 sec    aPTT, therapeutic range     58.0 - 77.0 SECS   AMMONIA    Collection Time: 07/29/18  9:20 PM   Result Value Ref Range    Ammonia <10 <32 UMOL/L   EKG, 12 LEAD, INITIAL    Collection Time: 07/29/18  9:25 PM   Result Value Ref Range    Ventricular Rate 111 BPM Atrial Rate 144 BPM    QRS Duration 108 ms    Q-T Interval 358 ms    QTC Calculation (Bezet) 486 ms    Calculated R Axis 62 degrees    Calculated T Axis -25 degrees    Diagnosis       Atrial fibrillation with premature ventricular or aberrantly conducted   complexes  Right bundle branch block  Septal infarct (cited on or before 01-FEB-2017)  When compared with ECG of 01-FEB-2017 16:11,  Atrial fibrillation has replaced Atrial flutter  Right bundle branch block is now present  Confirmed by David Bradford M.D., Janeth Tony [id number]) on 7/30/2018 8:25:82 AM     METABOLIC PANEL, BASIC    Collection Time: 07/30/18  4:50 AM   Result Value Ref Range    Sodium 133 (L) 136 - 145 mmol/L    Potassium 3.5 3.5 - 5.1 mmol/L    Chloride 95 (L) 97 - 108 mmol/L    CO2 25 21 - 32 mmol/L    Anion gap 13 5 - 15 mmol/L    Glucose 90 65 - 100 mg/dL    BUN 19 6 - 20 MG/DL    Creatinine 0.49 (L) 0.55 - 1.02 MG/DL    BUN/Creatinine ratio 39 (H) 12 - 20      GFR est AA >60 >60 ml/min/1.73m2    GFR est non-AA >60 >60 ml/min/1.73m2    Calcium 9.0 8.5 - 10.1 MG/DL   CBC WITH AUTOMATED DIFF    Collection Time: 07/30/18  4:50 AM   Result Value Ref Range    WBC 13.8 (H) 3.6 - 11.0 K/uL    RBC 3.46 (L) 3.80 - 5.20 M/uL    HGB 11.2 (L) 11.5 - 16.0 g/dL    HCT 34.0 (L) 35.0 - 47.0 %    MCV 98.3 80.0 - 99.0 FL    MCH 32.4 26.0 - 34.0 PG    MCHC 32.9 30.0 - 36.5 g/dL    RDW 12.0 11.5 - 14.5 %    PLATELET 878 601 - 722 K/uL    MPV 11.7 8.9 - 12.9 FL    NRBC 0.0 0  WBC    ABSOLUTE NRBC 0.00 0.00 - 0.01 K/uL    NEUTROPHILS 80 (H) 32 - 75 %    LYMPHOCYTES 6 (L) 12 - 49 %    MONOCYTES 13 5 - 13 %    EOSINOPHILS 0 0 - 7 %    BASOPHILS 0 0 - 1 %    IMMATURE GRANULOCYTES 1 (H) 0.0 - 0.5 %    ABS. NEUTROPHILS 11.1 (H) 1.8 - 8.0 K/UL    ABS. LYMPHOCYTES 0.8 0.8 - 3.5 K/UL    ABS. MONOCYTES 1.8 (H) 0.0 - 1.0 K/UL    ABS. EOSINOPHILS 0.0 0.0 - 0.4 K/UL    ABS. BASOPHILS 0.0 0.0 - 0.1 K/UL    ABS. IMM.  GRANS. 0.1 (H) 0.00 - 0.04 K/UL    DF SMEAR SCANNED PLATELET COMMENTS Large Platelets      RBC COMMENTS ANISOCYTOSIS  1+       MAGNESIUM    Collection Time: 07/30/18  4:50 AM   Result Value Ref Range    Magnesium 1.5 (L) 1.6 - 2.4 mg/dL       Assessment/Plan:    1. Preoperative assessment:  Acceptable risk for upcoming intermediate risk noncardiac surgery. Stress testing 5 years ago without ischemia, recent echo with normal EF. INR not therapeutic due to no medications over the past few days. Continue to hold coumadin until surgery is completed. 2.  R femoral neck fracture: to OR with orthopedics tomorrow. 2.  A fib:  Chronic. Rate controlled. Resume coumadin after surgery. Resume beta blocker.           Ivette Oneill MD

## 2018-07-30 NOTE — ED TRIAGE NOTES
Triage Report:  Patient arrives from home, slid off of the bed on Friday, has been unable to get up. Friends and neighbors were concerned, called police and chain was broken to gain entry. Patient is A&O x4, able to make needs known. 2156:  Attempted x2 to obtain qureshi without success. Attempt made by this RN and by Charge Nurse. 2239:  Please contact Coleman Brown when patient is moved upstairs or any major changes. Patient is family friend, helps patient with care. [phone]. 
 
0012:   Patient is resting with eyes closed, deep even respirations noted. No acute s/s of distress or discomfort. Call bell within reach, will continue to monitor. 0218:  Patient c/o leg and arm pain, given prn pain medication. Will monitor pain levels. 8051:  Patient cleaned, attempted to place brief under patient but due to continued pain with hip fx, patient refused any further attempts. Incontinent of large amount of urine, forest-care provided. Patient moved to hospital bed. Lights dimmed for comfort. 200:  Checked patient for incontinence. Patient not incontinent at this time. Caregiver at bedside, will continue to monitor.

## 2018-07-30 NOTE — ROUTINE PROCESS
TRANSFER - OUT REPORT: 
 
Verbal report given to Anjelica Nickerson RN (name) on César Irizarry  being transferred to [facility](unit) for routine progression of care Report consisted of patients Situation, Background, Assessment and  
Recommendations(SBAR). Information from the following report(s) SBAR was reviewed with the receiving nurse. Lines:    
 
Opportunity for questions and clarification was provided. Patient transported with: 
 transport

## 2018-07-30 NOTE — PROGRESS NOTES
Benito Malave, Raven Michelle, and Kristie Rosado Admit Date: 7/29/2018 Subjective:  
 
Patient doing OK this AM. Pain control adequate. .  
 
 
Current Facility-Administered Medications Medication Dose Route Frequency  sodium chloride (NS) flush 5-10 mL  5-10 mL IntraVENous Q8H  
 sodium chloride (NS) flush 5-10 mL  5-10 mL IntraVENous PRN  
 dilTIAZem (CARDIZEM) 125 mg in dextrose 5% 125 mL infusion  5-15 mg/hr IntraVENous TITRATE  fentaNYL citrate (PF) injection 25 mcg  25 mcg IntraVENous Q4H PRN  
 ondansetron (ZOFRAN) injection 4 mg  4 mg IntraVENous Q6H PRN  
 0.9% sodium chloride infusion  125 mL/hr IntraVENous CONTINUOUS Current Outpatient Prescriptions Medication Sig  warfarin (COUMADIN) 2.5 mg tablet TAKE 1 TABLET BY MOUTH EVERY EVENING  
 atenolol (TENORMIN) 25 mg tablet TAKE 1 TABLET BY MOUTH EVERY DAY  potassium chloride (K-DUR, KLOR-CON) 20 mEq tablet TAKE 1 TABLET BY MOUTH EVERY DAY  cephALEXin (KEFLEX) 250 mg capsule Take 1 Cap by mouth three (3) times daily.  levothyroxine (SYNTHROID) 100 mcg tablet TAKE 1 TABLET BY MOUTH DAILY BEFORE BREAKFAST.  ALPRAZolam (XANAX) 0.5 mg tablet TAKE 1 TABLET BY MOUTH THREE TIMES DAILY AS NEEDED FOR ANXIETY  furosemide (LASIX) 40 mg tablet TAKE 1 TABLET BY MOUTH EVERY DAY.  traMADol (ULTRAM) 50 mg tablet Take 1 Tab by mouth daily as needed for Pain.  nystatin (MYCOSTATIN) topical cream APPLY TO AFFECTED AREA TWO (2) TIMES A DAY.  spironolactone (ALDACTONE) 50 mg tablet TAKE 1 TABLET BY MOUTH DAILY FOR HYPERTENSION. (SAME AS ALDACTONE)  famotidine (PEPCID) 20 mg tablet TAKE 1 TABLET BY MOUTH TWICE A DAY.  sertraline (ZOLOFT) 25 mg tablet Take 1 Tab by mouth daily.  fluticasone (FLONASE) 50 mcg/actuation nasal spray TWO SPRAYS IN EACH NOSTRIL DAILY.  cefUROXime (CEFTIN) 250 mg tablet Take 1 Tab by mouth two (2) times a day.   
 HYDROcodone-acetaminophen (NORCO) 5-325 mg per tablet TAKE 1 TABLET BY MOUTH DAILY AS NEEDED.  potassium chloride SA (MICRO-K) 10 mEq capsule  acetaminophen (TYLENOL) 325 mg tablet Take 2 Tabs by mouth every four (4) hours as needed.  bisacodyl (DULCOLAX) 10 mg suppository Insert 10 mg into rectum daily as needed.  warfarin (COUMADIN) 3 mg tablet Take 3 mg by mouth every Tuesday, Thursday, Saturday & Sunday. In afternoon  warfarin (COUMADIN) 3 mg tablet Take 1.5 mg by mouth every Monday, Wednesday, Friday. In afternoon  Biotin 2,500 mcg cap Take 5,000 mcg by mouth.  polyethylene glycol (MIRALAX) 17 gram packet Take 1 Packet by mouth daily. Objective:  
 
Patient Vitals for the past 8 hrs: 
 BP Pulse Resp SpO2 Height Weight  
07/30/18 0630 128/42 (!) 101 20 91 % - -  
07/30/18 0600 (!) 122/39 (!) 103 20 (!) 89 % - -  
07/30/18 0530 (!) 134/37 (!) 101 23 93 % - -  
07/30/18 0500 (!) 130/37 99 21 95 % - -  
07/30/18 0430 134/47 (!) 101 25 94 % - -  
07/30/18 0400 123/45 (!) 105 18 98 % - -  
07/30/18 0330 (!) 115/39 (!) 104 19 95 % - -  
07/30/18 0300 131/43 98 19 94 % - -  
07/30/18 0230 128/40 (!) 103 19 94 % - -  
07/30/18 0200 124/47 100 21 94 % - -  
07/30/18 0132 - - - - 5' 3\" (1.6 m) 119 lb 14.9 oz (54.4 kg) 07/30/18 0130 133/63 94 17 94 % - -  
07/30/18 0100 (!) 133/35 (!) 102 18 95 % - - Physical Exam: Lungs: clear to auscultation bilaterally Heart: regular rate and rhythm, S1, S2 normal, no murmur, click, rub or gallop Abdomen: soft, non-tender. Bowel sounds normal. No masses,  no organomegaly Data Review Recent Results (from the past 24 hour(s)) CBC WITH AUTOMATED DIFF Collection Time: 07/29/18  9:20 PM  
Result Value Ref Range WBC 14.3 (H) 3.6 - 11.0 K/uL  
 RBC 4.27 3.80 - 5.20 M/uL  
 HGB 13.7 11.5 - 16.0 g/dL HCT 41.4 35.0 - 47.0 % MCV 97.0 80.0 - 99.0 FL  
 MCH 32.1 26.0 - 34.0 PG  
 MCHC 33.1 30.0 - 36.5 g/dL  
 RDW 11.9 11.5 - 14.5 % PLATELET 897 751 - 269 K/uL  MPV 11.7 8.9 - 12.9 FL  
 NRBC 0.0 0  WBC ABSOLUTE NRBC 0.00 0.00 - 0.01 K/uL NEUTROPHILS 79 (H) 32 - 75 % LYMPHOCYTES 7 (L) 12 - 49 % MONOCYTES 14 (H) 5 - 13 % EOSINOPHILS 0 0 - 7 % BASOPHILS 0 0 - 1 % IMMATURE GRANULOCYTES 1 (H) 0.0 - 0.5 % ABS. NEUTROPHILS 11.2 (H) 1.8 - 8.0 K/UL  
 ABS. LYMPHOCYTES 1.0 0.8 - 3.5 K/UL  
 ABS. MONOCYTES 1.9 (H) 0.0 - 1.0 K/UL  
 ABS. EOSINOPHILS 0.1 0.0 - 0.4 K/UL  
 ABS. BASOPHILS 0.0 0.0 - 0.1 K/UL  
 ABS. IMM. GRANS. 0.1 (H) 0.00 - 0.04 K/UL  
 DF AUTOMATED METABOLIC PANEL, COMPREHENSIVE Collection Time: 07/29/18  9:20 PM  
Result Value Ref Range Sodium 130 (L) 136 - 145 mmol/L Potassium 3.4 (L) 3.5 - 5.1 mmol/L Chloride 93 (L) 97 - 108 mmol/L  
 CO2 28 21 - 32 mmol/L Anion gap 9 5 - 15 mmol/L Glucose 99 65 - 100 mg/dL BUN 18 6 - 20 MG/DL Creatinine 0.44 (L) 0.55 - 1.02 MG/DL  
 BUN/Creatinine ratio 41 (H) 12 - 20 GFR est AA >60 >60 ml/min/1.73m2 GFR est non-AA >60 >60 ml/min/1.73m2 Calcium 9.8 8.5 - 10.1 MG/DL Bilirubin, total 1.9 (H) 0.2 - 1.0 MG/DL  
 ALT (SGPT) 16 12 - 78 U/L  
 AST (SGOT) 33 15 - 37 U/L Alk. phosphatase 50 45 - 117 U/L Protein, total 7.0 6.4 - 8.2 g/dL Albumin 3.2 (L) 3.5 - 5.0 g/dL Globulin 3.8 2.0 - 4.0 g/dL A-G Ratio 0.8 (L) 1.1 - 2.2 CK Collection Time: 07/29/18  9:20 PM  
Result Value Ref Range  (H) 26 - 192 U/L  
TROPONIN I Collection Time: 07/29/18  9:20 PM  
Result Value Ref Range Troponin-I, Qt. 0.05 (H) <0.05 ng/mL PROTHROMBIN TIME + INR Collection Time: 07/29/18  9:20 PM  
Result Value Ref Range INR 1.3 (H) 0.9 - 1.1 Prothrombin time 13.4 (H) 9.0 - 11.1 sec PTT Collection Time: 07/29/18  9:20 PM  
Result Value Ref Range aPTT 33.2 (H) 22.1 - 32.0 sec  
 aPTT, therapeutic range     58.0 - 77.0 SECS  
AMMONIA Collection Time: 07/29/18  9:20 PM  
Result Value Ref Range Ammonia <10 <32 UMOL/L  
EKG, 12 LEAD, INITIAL  Collection Time: 07/29/18  9:25 PM  
Result Value Ref Range Ventricular Rate 111 BPM  
 Atrial Rate 144 BPM  
 QRS Duration 108 ms Q-T Interval 358 ms QTC Calculation (Bezet) 486 ms Calculated R Axis 62 degrees Calculated T Axis -25 degrees Diagnosis Atrial fibrillation with premature ventricular or aberrantly conducted  
complexes Right bundle branch block Septal infarct (cited on or before 01-FEB-2017) When compared with ECG of 01-FEB-2017 16:11, 
Atrial fibrillation has replaced Atrial flutter Right bundle branch block is now present METABOLIC PANEL, BASIC Collection Time: 07/30/18  4:50 AM  
Result Value Ref Range Sodium 133 (L) 136 - 145 mmol/L Potassium 3.5 3.5 - 5.1 mmol/L Chloride 95 (L) 97 - 108 mmol/L  
 CO2 25 21 - 32 mmol/L Anion gap 13 5 - 15 mmol/L Glucose 90 65 - 100 mg/dL BUN 19 6 - 20 MG/DL Creatinine 0.49 (L) 0.55 - 1.02 MG/DL  
 BUN/Creatinine ratio 39 (H) 12 - 20 GFR est AA >60 >60 ml/min/1.73m2 GFR est non-AA >60 >60 ml/min/1.73m2 Calcium 9.0 8.5 - 10.1 MG/DL  
CBC WITH AUTOMATED DIFF Collection Time: 07/30/18  4:50 AM  
Result Value Ref Range WBC 13.8 (H) 3.6 - 11.0 K/uL  
 RBC 3.46 (L) 3.80 - 5.20 M/uL  
 HGB 11.2 (L) 11.5 - 16.0 g/dL HCT 34.0 (L) 35.0 - 47.0 % MCV 98.3 80.0 - 99.0 FL  
 MCH 32.4 26.0 - 34.0 PG  
 MCHC 32.9 30.0 - 36.5 g/dL  
 RDW 12.0 11.5 - 14.5 % PLATELET 310 056 - 445 K/uL MPV 11.7 8.9 - 12.9 FL  
 NRBC 0.0 0  WBC ABSOLUTE NRBC 0.00 0.00 - 0.01 K/uL NEUTROPHILS 80 (H) 32 - 75 % LYMPHOCYTES 6 (L) 12 - 49 % MONOCYTES 13 5 - 13 % EOSINOPHILS 0 0 - 7 % BASOPHILS 0 0 - 1 % IMMATURE GRANULOCYTES 1 (H) 0.0 - 0.5 % ABS. NEUTROPHILS 11.1 (H) 1.8 - 8.0 K/UL  
 ABS. LYMPHOCYTES 0.8 0.8 - 3.5 K/UL  
 ABS. MONOCYTES 1.8 (H) 0.0 - 1.0 K/UL  
 ABS. EOSINOPHILS 0.0 0.0 - 0.4 K/UL  
 ABS. BASOPHILS 0.0 0.0 - 0.1 K/UL  
 ABS. IMM.  GRANS. 0.1 (H) 0.00 - 0.04 K/UL  
 DF SMEAR SCANNED    
 PLATELET COMMENTS Large Platelets RBC COMMENTS ANISOCYTOSIS 
1+ MAGNESIUM Collection Time: 07/30/18  4:50 AM  
Result Value Ref Range Magnesium 1.5 (L) 1.6 - 2.4 mg/dL Assessment:  
 
Principal Problem: 
  Fracture of femoral neck, right, closed ([address] (7/29/2018) Active Problems: 
  A-fib ([facility]) () Fall (7/29/2018) Hip hematoma, right, initial encounter (7/29/2018) Plan:  
 
1) Ortho to see 2) Cardiology to see re rapid a fib. She used to follow with Dr Vadnana Sanchez. 3) With her fall- poor Warfarin candidate

## 2018-07-30 NOTE — H&P
[address] HISTORY AND PHYSICAL Name:Ron MORALES 
MR#: [id number] : 1931 ACCOUNT #: [de-identified] ADMIT DATE: 2018 CHIEF COMPLAINT:  Right hip pain. HISTORY OF PRESENT ILLNESS:  An 54-year-old white female with past medical history of atrial fibrillation, arthritis, back pain, chronic pain, dementia, hyperlipidemia, anxiety, essential tremor, hypothyroidism presented to the emergency department via EMS from home status post ground level fall. Patient is a limited historian. Majority of history has been obtained from review of ED medical reports. Per the collective reports, the patient had not been heard from by her neighbors for a few days. Her neighbors reportedly called the police who then entered the patient's home and found the patient lying on the floor. Patient provided limited history and details in regards to the fall. Had not been reported she had lost consciousness; however, again she was not able to fully detail the events over the last few days. She complains of pain in her right hip with swelling in the right side of her face as well as right lower extremity. EMS was called to the scene and transported the patient to the ED. On arrival initial recorded vital signs, blood pressure 151/79, heart rate 110, respiratory rate of 34, saturation 97% on room air. A 12-lead EKG showed atrial fibrillation, premature ventricular complexes, right bundle branch block, ST changes in the septal leads at 111 beats per minute. Workup included CT abdomen and pelvis without contrast showed acute intertrochanteric right femoral neck fracture with associated right hip adductor intramuscular hematoma measuring approximately 8.0 cm x 4.5 cm with no acute intra-abdominal or intrapelvic pathology with small to moderate right pleural effusion, cholelithiasis.   CT of the head without contrast showed no acute infarct or intracranial hemorrhage with periventricular white matter disease, chronic small vessel ischemic changes with noted right frontal and temporal scalp soft tissue swelling. The patient was given fentanyl 25 mcg plus an additional 50 mcg IV. The patient still complains of pain. She is now seen for admission to the hospitalist service for continued evaluation and treatment. At baseline patient reportedly normally uses a cane. She lives alone. At current, she is not complaining of headache, neck pain, back pain, chest pain, shortness of breath, cough, congestion, abdominal pain, nausea, vomiting, diarrhea, melena, dysuria, hematuria, calf pain, swelling, fever, chills, rash. PAST MEDICAL HISTORY: 
1. Atrial fibrillation. 2.  Back pain. 3.  Chronic pain. 4.  Dementia. 5.  Hyperlipidemia. 6.  Anxiety. 7.  Shoulder pain. 8.  Hypothyroidism. 9.  Essential tremors. PAST SURGICAL HISTORY: 
1. Appendectomy. 2.  TAHBSO. 3.  Tonsillectomy and adenoidectomy. MEDICATIONS:  Complete medication list reviewed and noted on chart records.  
 
 Taking Last Dose Start Date End Date Provider    
   acetaminophen (TYLENOL) 325 mg tablet  Unknown  02/07/17  -- Marisabel Wesley MD  
   Take 2 Tabs by mouth every four (4) hours as needed.  
   ALPRAZolam (XANAX) 0.5 mg tablet  Unknown  07/17/18  -- Marisabel Wesley MD  
   TAKE 1 TABLET BY MOUTH THREE TIMES DAILY AS NEEDED FOR ANXIETY  
   Notes:  Generic For:*XANAX    0.5MG faxed dr on alprazolam 0.5 for rr 07/16/18  
   atenolol (TENORMIN) 25 mg tablet  7/23/2018 07/20/18  -- Marisabel Wesley MD  
   TAKE 1 TABLET BY MOUTH EVERY DAY  
   Notes:  Generic For:*TENORMIN   25MG  07/20/2018 10:33:35 AM  
   Biotin 2,500 mcg cap  Not Taking  --  --  Historical Provider  
   Take 5,000 mcg by mouth.  
   bisacodyl (DULCOLAX) 10 mg suppository  Not Taking  02/07/17  -- Marisabel Wesley MD  
   Insert 10 mg into rectum daily as needed.  
   cefUROXime (CEFTIN) 250 mg tablet  Not Taking  06/22/17  -- Marisabel Wesley MD  
   Take 1 Tab by mouth two (2) times a day.  
   cephALEXin (KEFLEX) 250 mg capsule  7/23/2018 01/29/18  -- Adriane Bloch, MD  
   Take 1 Cap by mouth three (3) times daily.  
   famotidine (PEPCID) 20 mg tablet  Not Taking  10/24/17  -- Adriane Bloch, MD  
   TAKE 1 TABLET BY MOUTH TWICE A DAY.   
   fluticasone (FLONASE) 50 mcg/actuation nasal spray  Unknown  08/04/17  -- Adriane Bloch, MD  
   TWO SPRAYS IN EACH NOSTRIL DAILY.  
   furosemide (LASIX) 40 mg tablet    05/09/18  -- Adriane Bloch, MD  
   TAKE 1 TABLET BY MOUTH EVERY DAY.  
   HYDROcodone-acetaminophen (NORCO) 5-325 mg per tablet    05/23/17  -- Adriane Bloch, MD  
   TAKE 1 TABLET BY MOUTH DAILY AS NEEDED.  
   levothyroxine (SYNTHROID) 100 mcg tablet  7/23/2018 12/23/17  -- Adriane Bloch, MD  
   TAKE 1 TABLET BY MOUTH DAILY BEFORE BREAKFAST.  
   nystatin (MYCOSTATIN) topical cream  Unknown  03/13/18  -- Isaiah Rose NP  
   APPLY TO AFFECTED AREA TWO (2) TIMES A DAY.  
   Notes:  Patient needs appointment after this refill no further will be given  
   polyethylene glycol (MIRALAX) 17 gram packet  Not Taking  02/13/16  -- Nick Kaur MD  
   Take 1 Packet by mouth daily.  
   potassium chloride (K-DUR, KLOR-CON) 20 mEq tablet  7/23/2018 07/20/18  -- Adriane Bloch, MD  
   TAKE 1 TABLET BY MOUTH EVERY DAY  
   Notes:  Generic For:*K-DUR 20 MEQ TABLET SA  07/20/2018 10:33:13 AM  
   potassium chloride SA (MICRO-K) 10 mEq capsule  Not Taking  03/24/17  --  Historical Provider  
     
   Notes:   Received from: External Pharmacy  
   sertraline (ZOLOFT) 25 mg tablet  Not Taking  09/18/17  -- Adriane Bloch, MD  
   Take 1 Tab by mouth daily.  
   spironolactone (ALDACTONE) 50 mg tablet  Unknown  11/03/17  -- Adriane Bloch, MD  
   TAKE 1 TABLET BY MOUTH DAILY FOR HYPERTENSION. (SAME AS ALDACTONE)  
   traMADol (ULTRAM) 50 mg tablet  Not Taking  03/29/18  -- Adriane Bloch, MD  
   Take 1 Tab by mouth daily as needed for Pain.  
   warfarin (COUMADIN) 2.5 mg tablet  7/23/2018 07/20/18  -- Carie Mc MD  
   TAKE 1 TABLET BY MOUTH EVERY EVENING  
   Notes:  Generic For:*COUMADIN   2.5MG  07/20/2018 10:33:48 AM  
   warfarin (COUMADIN) 3 mg tablet  Not Taking  --  --  Historical Provider  
   Take 3 mg by mouth every Tuesday, Thursday, Saturday & Sunday. In afternoon  
   warfarin (COUMADIN) 3 mg tablet  Not Taking  --  --  Historical Provider  
   Take 1.5 mg by mouth every Monday, Wednesday, Friday. In afternoon  
   
 
 
ALLERGIES:  CODEINE, CORTISONE, LATEX, NATURAL RUBBER, ADHESIVES, EGGS. SOCIAL HISTORY:  Former smoker of cigarettes, reportedly quit in 1994. Per patient drinks approximately 1 glass of wine occasionally. Denies daily consumption. FAMILY HISTORY:  Dementia in mother. Osteoarthritis father. REVIEW OF SYSTEMS:  Pertinent positive as noted in the HPI. All other systems are reviewed and negative. PHYSICAL EXAMINATION: 
VITAL SIGNS:  Blood pressure 149/56, heart rate 109, respiratory rate of 24, saturation 96% on room air. GENERAL:  Elderly female in no acute respiratory distress. PSYCHIATRIC:  The patient is awake, alert, oriented x2 to person and place only, confused the year, anxious. NEUROLOGIC:  GCS of 14 (E4, V4, M6) spontaneous opening, occasionally inappropriate verbal response and follows commands, moves extremities x4 with generalized weakness. Sensation is grossly intact without slurred speech, facial droop or pronator drift. HEENT:  Normocephalic, atraumatic. PERRLA. EOMs intact. Sclerae are anicteric. Conjunctivae clear. Nares are patent. Oropharynx clear. Tongue is midline, not edematous. NECK:  Supple, without lymphadenopathy, JVD, carotid bruits, no thyromegaly, nontender. No acute palpable soft tissue or bone deformity. LYMPH:  Negative for cervical or supraclavicular adenopathy. RESPIRATORY:  Lungs clear to auscultation bilaterally.  
CARDIOVASCULAR:  Heart tachycardic, irregular rhythm without murmurs, rubs, gallops. ABDOMEN:  Soft, nontender, nondistended, normoactive bowel sounds. No rebound, guarding, rigidity. No auscultated abdominal bruits, no palpable abdominal mass. MUSCULOSKELETAL:  Significant right lower extremity external rotation and foreshortening with noted significant swelling, edema and tenderness of the right hip proximal thigh. No other acute palpable soft tissue bony deformity of the bilateral upper extremities and left lower extremity. Negative for calf tenderness. VASCULAR:  2+ radial, 1+ dorsalis pedis pulses with some mild cyanosis at the tip of the fingers of both hands. No clubbing. Edema is noted of the right thigh and right hip area. Positive varicose veins. SKIN:  Warm and dry. LABORATORY DATA:  I reviewed as follows:  Sodium 130, potassium 3.4, chloride 93, CO2 of 28, BUN 18, creatinine 0.44, glucose 99, anion gap of 9, calcium 9.8, GFR greater than 60, total bilirubin 1.9, total protein 7.0, albumin 3.2, ALT 15, AST 33, alkaline phosphatase 50. CK total 278, troponin I 0.05, ammonia less than 10. WBC 14.3, hemoglobin 13.7, hematocrit 41.4, platelets 050, neutrophils 79%. INR 1.3, PT of 13.4, PTT of 33.2. CT head without contrast results noted in HPI. CT of the cervical spine without contrast:  No acute osseous abnormality with multilevel degenerative spondylosis. CT abdomen and pelvis without contrast, results noted in HPI. Chest x-ray, portable, no evidence of acute cardiopulmonary process. A 12-lead EKG:  Atrial fibrillation, premature ventricular complexes, right bundle branch block, ST changes in the septal leads at 111 beats per minute. IMPRESSION AND PLAN: 
1. Acute closed right femoral neck fracture, initial encounter. Admit patient to intermediate care/telemetry unit. Place on fall precautions. The patient will be provided pain management medication -- ordered fentanyl.   Consult with orthopedic surgeon. 2.  Right hip adductor intramuscular hematoma -- traumatic. Plan as noted above. Consult and follow orthopedic team and also repeat CBC, check coagulation panel. 3.  Atrial fibrillation with rapid ventricular response. Place the patient on Cardizem IV, titrated infusion. Also, continue to manage pain with patient's noted tachycardia. 4.  Right lower extremity pain. Provide pain management. Supportive cares and prevention. 5.  Small to moderate right pleural effusion. Placed on oximetry monitoring, provide supplemental oxygen if needed. Currently not in any shortness of breath. 6.  Dementia. Place on neurovascular checks. 7.  Acute hypokalemia. Replace potassium and repeat potassium levels post replacement. 8.  Leukocytosis. Repeat CBC. At current no defined source for infection. 9.  Anxiety. Continue to monitor. 10.  Long-term anticoagulation with Coumadin. Placed on hold pending possible surgery. Also, concern for noted hematoma. 11.  Right frontal/temporal scalp contusion. No tenderness noted on the forearm or significant deformity noted on external exam. 
12.  VT prophylaxis. SCD to bilateral lower extremities. MD CONCEPCIÓN Bravo/[place] 
D: 07/30/2018 00:43 T: 07/30/2018 02:44 JOB #: [id number]

## 2018-07-31 ENCOUNTER — APPOINTMENT (OUTPATIENT)
Dept: GENERAL RADIOLOGY | Age: 83
DRG: 481 | End: 2018-07-31
Attending: FAMILY MEDICINE
Payer: MEDICARE

## 2018-07-31 ENCOUNTER — ANESTHESIA (OUTPATIENT)
Dept: SURGERY | Age: 83
DRG: 481 | End: 2018-07-31
Payer: MEDICARE

## 2018-07-31 LAB
ABO + RH BLD: NORMAL
BLOOD GROUP ANTIBODIES SERPL: NORMAL
SPECIMEN EXP DATE BLD: NORMAL

## 2018-07-31 PROCEDURE — C1769 GUIDE WIRE: HCPCS | Performed by: ORTHOPAEDIC SURGERY

## 2018-07-31 PROCEDURE — 74011250636 HC RX REV CODE- 250/636: Performed by: PHYSICIAN ASSISTANT

## 2018-07-31 PROCEDURE — 73501 X-RAY EXAM HIP UNI 1 VIEW: CPT

## 2018-07-31 PROCEDURE — C1713 ANCHOR/SCREW BN/BN,TIS/BN: HCPCS | Performed by: ORTHOPAEDIC SURGERY

## 2018-07-31 PROCEDURE — 76000 FLUOROSCOPY <1 HR PHYS/QHP: CPT

## 2018-07-31 PROCEDURE — 77030031139 HC SUT VCRL2 J&J -A: Performed by: ORTHOPAEDIC SURGERY

## 2018-07-31 PROCEDURE — 77030028224 HC PDNG CST BSNM -A: Performed by: ORTHOPAEDIC SURGERY

## 2018-07-31 PROCEDURE — 74011000250 HC RX REV CODE- 250

## 2018-07-31 PROCEDURE — 77030020788: Performed by: ORTHOPAEDIC SURGERY

## 2018-07-31 PROCEDURE — 74011250636 HC RX REV CODE- 250/636

## 2018-07-31 PROCEDURE — 74011250636 HC RX REV CODE- 250/636: Performed by: ANESTHESIOLOGY

## 2018-07-31 PROCEDURE — 77030008467 HC STPLR SKN COVD -B: Performed by: ORTHOPAEDIC SURGERY

## 2018-07-31 PROCEDURE — 74011250637 HC RX REV CODE- 250/637: Performed by: PHYSICIAN ASSISTANT

## 2018-07-31 PROCEDURE — 76210000016 HC OR PH I REC 1 TO 1.5 HR: Performed by: ORTHOPAEDIC SURGERY

## 2018-07-31 PROCEDURE — 76060000035 HC ANESTHESIA 2 TO 2.5 HR: Performed by: ORTHOPAEDIC SURGERY

## 2018-07-31 PROCEDURE — 65660000000 HC RM CCU STEPDOWN

## 2018-07-31 PROCEDURE — 77030011640 HC PAD GRND REM COVD -A: Performed by: ORTHOPAEDIC SURGERY

## 2018-07-31 PROCEDURE — 77030018836 HC SOL IRR NACL ICUM -A: Performed by: ORTHOPAEDIC SURGERY

## 2018-07-31 PROCEDURE — 74011250636 HC RX REV CODE- 250/636: Performed by: FAMILY MEDICINE

## 2018-07-31 PROCEDURE — 77030038020 HC MANFLD NEPTUNE STRY -B: Performed by: ORTHOPAEDIC SURGERY

## 2018-07-31 PROCEDURE — 77030016474 HC BIT DRL QC3 SYNT -C: Performed by: ORTHOPAEDIC SURGERY

## 2018-07-31 PROCEDURE — 76010000131 HC OR TIME 2 TO 2.5 HR: Performed by: ORTHOPAEDIC SURGERY

## 2018-07-31 DEVICE — SCREW BNE L105MM DIA10.35MM G TI CANN PERF FOR PROX FEM: Type: IMPLANTABLE DEVICE | Site: HIP | Status: FUNCTIONAL

## 2018-07-31 DEVICE — SCREW BNE L36MM DIA5MM NONSTERILE TIB LT GRN TI ST CANN LOK: Type: IMPLANTABLE DEVICE | Site: HIP | Status: FUNCTIONAL

## 2018-07-31 DEVICE — IMPLANTABLE DEVICE: Type: IMPLANTABLE DEVICE | Site: HIP | Status: FUNCTIONAL

## 2018-07-31 RX ORDER — ONDANSETRON 4 MG/1
4 TABLET, ORALLY DISINTEGRATING ORAL
Status: DISCONTINUED | OUTPATIENT
Start: 2018-07-31 | End: 2018-08-03 | Stop reason: HOSPADM

## 2018-07-31 RX ORDER — SODIUM CHLORIDE, SODIUM LACTATE, POTASSIUM CHLORIDE, CALCIUM CHLORIDE 600; 310; 30; 20 MG/100ML; MG/100ML; MG/100ML; MG/100ML
125 INJECTION, SOLUTION INTRAVENOUS CONTINUOUS
Status: DISCONTINUED | OUTPATIENT
Start: 2018-07-31 | End: 2018-07-31 | Stop reason: HOSPADM

## 2018-07-31 RX ORDER — POLYETHYLENE GLYCOL 3350 17 G/17G
17 POWDER, FOR SOLUTION ORAL DAILY
Status: DISCONTINUED | OUTPATIENT
Start: 2018-08-01 | End: 2018-08-03 | Stop reason: HOSPADM

## 2018-07-31 RX ORDER — FENTANYL CITRATE 50 UG/ML
25 INJECTION, SOLUTION INTRAMUSCULAR; INTRAVENOUS
Status: DISCONTINUED | OUTPATIENT
Start: 2018-07-31 | End: 2018-08-03 | Stop reason: HOSPADM

## 2018-07-31 RX ORDER — NYSTATIN 100000 U/G
CREAM TOPICAL 2 TIMES DAILY
Status: DISCONTINUED | OUTPATIENT
Start: 2018-07-31 | End: 2018-07-31

## 2018-07-31 RX ORDER — ACETAMINOPHEN 325 MG/1
650 TABLET ORAL EVERY 6 HOURS
Status: DISCONTINUED | OUTPATIENT
Start: 2018-07-31 | End: 2018-08-03 | Stop reason: HOSPADM

## 2018-07-31 RX ORDER — SODIUM CHLORIDE 9 MG/ML
125 INJECTION, SOLUTION INTRAVENOUS CONTINUOUS
Status: DISPENSED | OUTPATIENT
Start: 2018-07-31 | End: 2018-08-01

## 2018-07-31 RX ORDER — ATENOLOL 25 MG/1
25 TABLET ORAL DAILY
Status: DISCONTINUED | OUTPATIENT
Start: 2018-08-01 | End: 2018-08-03 | Stop reason: HOSPADM

## 2018-07-31 RX ORDER — FACIAL-BODY WIPES
10 EACH TOPICAL DAILY PRN
Status: DISCONTINUED | OUTPATIENT
Start: 2018-08-02 | End: 2018-08-03 | Stop reason: HOSPADM

## 2018-07-31 RX ORDER — DIPHENHYDRAMINE HYDROCHLORIDE 50 MG/ML
12.5 INJECTION, SOLUTION INTRAMUSCULAR; INTRAVENOUS AS NEEDED
Status: DISCONTINUED | OUTPATIENT
Start: 2018-07-31 | End: 2018-07-31 | Stop reason: HOSPADM

## 2018-07-31 RX ORDER — LEVOTHYROXINE SODIUM 50 UG/1
100 TABLET ORAL
Status: DISCONTINUED | OUTPATIENT
Start: 2018-08-01 | End: 2018-08-03 | Stop reason: HOSPADM

## 2018-07-31 RX ORDER — MIDAZOLAM HYDROCHLORIDE 1 MG/ML
1 INJECTION, SOLUTION INTRAMUSCULAR; INTRAVENOUS
Status: DISCONTINUED | OUTPATIENT
Start: 2018-07-31 | End: 2018-07-31 | Stop reason: HOSPADM

## 2018-07-31 RX ORDER — DEXTROSE, SODIUM CHLORIDE, SODIUM LACTATE, POTASSIUM CHLORIDE, AND CALCIUM CHLORIDE 5; .6; .31; .03; .02 G/100ML; G/100ML; G/100ML; G/100ML; G/100ML
125 INJECTION, SOLUTION INTRAVENOUS CONTINUOUS
Status: DISCONTINUED | OUTPATIENT
Start: 2018-07-31 | End: 2018-07-31 | Stop reason: HOSPADM

## 2018-07-31 RX ORDER — FAMOTIDINE 20 MG/1
20 TABLET, FILM COATED ORAL
Status: DISCONTINUED | OUTPATIENT
Start: 2018-07-31 | End: 2018-08-03 | Stop reason: HOSPADM

## 2018-07-31 RX ORDER — SODIUM CHLORIDE 0.9 % (FLUSH) 0.9 %
5-10 SYRINGE (ML) INJECTION EVERY 8 HOURS
Status: DISCONTINUED | OUTPATIENT
Start: 2018-07-31 | End: 2018-07-31 | Stop reason: HOSPADM

## 2018-07-31 RX ORDER — ALPRAZOLAM 0.25 MG/1
0.5 TABLET ORAL
Status: DISCONTINUED | OUTPATIENT
Start: 2018-07-31 | End: 2018-08-03 | Stop reason: HOSPADM

## 2018-07-31 RX ORDER — KETAMINE HYDROCHLORIDE 10 MG/ML
INJECTION, SOLUTION INTRAMUSCULAR; INTRAVENOUS AS NEEDED
Status: DISCONTINUED | OUTPATIENT
Start: 2018-07-31 | End: 2018-07-31 | Stop reason: HOSPADM

## 2018-07-31 RX ORDER — NEOSTIGMINE METHYLSULFATE 1 MG/ML
INJECTION INTRAVENOUS AS NEEDED
Status: DISCONTINUED | OUTPATIENT
Start: 2018-07-31 | End: 2018-07-31 | Stop reason: HOSPADM

## 2018-07-31 RX ORDER — ENOXAPARIN SODIUM 100 MG/ML
40 INJECTION SUBCUTANEOUS DAILY
Status: DISCONTINUED | OUTPATIENT
Start: 2018-08-01 | End: 2018-08-03 | Stop reason: HOSPADM

## 2018-07-31 RX ORDER — ROCURONIUM BROMIDE 10 MG/ML
INJECTION, SOLUTION INTRAVENOUS AS NEEDED
Status: DISCONTINUED | OUTPATIENT
Start: 2018-07-31 | End: 2018-07-31 | Stop reason: HOSPADM

## 2018-07-31 RX ORDER — ONDANSETRON 2 MG/ML
INJECTION INTRAMUSCULAR; INTRAVENOUS AS NEEDED
Status: DISCONTINUED | OUTPATIENT
Start: 2018-07-31 | End: 2018-07-31 | Stop reason: HOSPADM

## 2018-07-31 RX ORDER — FLUTICASONE PROPIONATE 50 MCG
2 SPRAY, SUSPENSION (ML) NASAL DAILY
Status: DISCONTINUED | OUTPATIENT
Start: 2018-08-01 | End: 2018-07-31

## 2018-07-31 RX ORDER — DEXMEDETOMIDINE HYDROCHLORIDE 4 UG/ML
INJECTION, SOLUTION INTRAVENOUS AS NEEDED
Status: DISCONTINUED | OUTPATIENT
Start: 2018-07-31 | End: 2018-07-31 | Stop reason: HOSPADM

## 2018-07-31 RX ORDER — CEFAZOLIN SODIUM/WATER 2 G/20 ML
2 SYRINGE (ML) INTRAVENOUS ONCE
Status: COMPLETED | OUTPATIENT
Start: 2018-07-31 | End: 2018-07-31

## 2018-07-31 RX ORDER — FERROUS SULFATE, DRIED 160(50) MG
1 TABLET, EXTENDED RELEASE ORAL
Status: DISCONTINUED | OUTPATIENT
Start: 2018-08-01 | End: 2018-08-03 | Stop reason: HOSPADM

## 2018-07-31 RX ORDER — SODIUM CHLORIDE 0.9 % (FLUSH) 0.9 %
5-10 SYRINGE (ML) INJECTION EVERY 8 HOURS
Status: DISCONTINUED | OUTPATIENT
Start: 2018-08-01 | End: 2018-08-03 | Stop reason: HOSPADM

## 2018-07-31 RX ORDER — SODIUM CHLORIDE 0.9 % (FLUSH) 0.9 %
5-10 SYRINGE (ML) INJECTION AS NEEDED
Status: DISCONTINUED | OUTPATIENT
Start: 2018-07-31 | End: 2018-07-31 | Stop reason: HOSPADM

## 2018-07-31 RX ORDER — SPIRONOLACTONE 25 MG/1
50 TABLET ORAL DAILY
Status: DISCONTINUED | OUTPATIENT
Start: 2018-08-01 | End: 2018-08-03 | Stop reason: HOSPADM

## 2018-07-31 RX ORDER — PHENYLEPHRINE HCL IN 0.9% NACL 0.4MG/10ML
SYRINGE (ML) INTRAVENOUS AS NEEDED
Status: DISCONTINUED | OUTPATIENT
Start: 2018-07-31 | End: 2018-07-31 | Stop reason: HOSPADM

## 2018-07-31 RX ORDER — FENTANYL CITRATE 50 UG/ML
INJECTION, SOLUTION INTRAMUSCULAR; INTRAVENOUS AS NEEDED
Status: DISCONTINUED | OUTPATIENT
Start: 2018-07-31 | End: 2018-07-31 | Stop reason: HOSPADM

## 2018-07-31 RX ORDER — FUROSEMIDE 40 MG/1
40 TABLET ORAL DAILY
Status: DISCONTINUED | OUTPATIENT
Start: 2018-08-01 | End: 2018-07-31

## 2018-07-31 RX ORDER — CEFAZOLIN SODIUM/WATER 2 G/20 ML
2 SYRINGE (ML) INTRAVENOUS EVERY 8 HOURS
Status: COMPLETED | OUTPATIENT
Start: 2018-07-31 | End: 2018-08-01

## 2018-07-31 RX ORDER — LIDOCAINE HYDROCHLORIDE 10 MG/ML
0.5 INJECTION, SOLUTION EPIDURAL; INFILTRATION; INTRACAUDAL; PERINEURAL AS NEEDED
Status: DISCONTINUED | OUTPATIENT
Start: 2018-07-31 | End: 2018-07-31 | Stop reason: HOSPADM

## 2018-07-31 RX ORDER — CEFAZOLIN SODIUM/WATER 2 G/20 ML
2 SYRINGE (ML) INTRAVENOUS ONCE
Status: DISCONTINUED | OUTPATIENT
Start: 2018-07-31 | End: 2018-07-31 | Stop reason: SDUPTHER

## 2018-07-31 RX ORDER — ONDANSETRON 2 MG/ML
4 INJECTION INTRAMUSCULAR; INTRAVENOUS
Status: ACTIVE | OUTPATIENT
Start: 2018-07-31 | End: 2018-08-01

## 2018-07-31 RX ORDER — MIDAZOLAM HYDROCHLORIDE 1 MG/ML
1 INJECTION, SOLUTION INTRAMUSCULAR; INTRAVENOUS AS NEEDED
Status: DISCONTINUED | OUTPATIENT
Start: 2018-07-31 | End: 2018-07-31 | Stop reason: HOSPADM

## 2018-07-31 RX ORDER — MORPHINE SULFATE 10 MG/ML
2 INJECTION, SOLUTION INTRAMUSCULAR; INTRAVENOUS
Status: DISCONTINUED | OUTPATIENT
Start: 2018-07-31 | End: 2018-07-31 | Stop reason: HOSPADM

## 2018-07-31 RX ORDER — GLYCOPYRROLATE 0.2 MG/ML
INJECTION INTRAMUSCULAR; INTRAVENOUS AS NEEDED
Status: DISCONTINUED | OUTPATIENT
Start: 2018-07-31 | End: 2018-07-31 | Stop reason: HOSPADM

## 2018-07-31 RX ORDER — SODIUM CHLORIDE 0.9 % (FLUSH) 0.9 %
5-10 SYRINGE (ML) INJECTION AS NEEDED
Status: DISCONTINUED | OUTPATIENT
Start: 2018-07-31 | End: 2018-08-03 | Stop reason: HOSPADM

## 2018-07-31 RX ORDER — SERTRALINE HYDROCHLORIDE 50 MG/1
25 TABLET, FILM COATED ORAL DAILY
Status: DISCONTINUED | OUTPATIENT
Start: 2018-08-01 | End: 2018-07-31

## 2018-07-31 RX ORDER — SODIUM CHLORIDE, SODIUM LACTATE, POTASSIUM CHLORIDE, CALCIUM CHLORIDE 600; 310; 30; 20 MG/100ML; MG/100ML; MG/100ML; MG/100ML
INJECTION, SOLUTION INTRAVENOUS
Status: DISCONTINUED | OUTPATIENT
Start: 2018-07-31 | End: 2018-07-31 | Stop reason: HOSPADM

## 2018-07-31 RX ORDER — FENTANYL CITRATE 50 UG/ML
25 INJECTION, SOLUTION INTRAMUSCULAR; INTRAVENOUS
Status: COMPLETED | OUTPATIENT
Start: 2018-07-31 | End: 2018-07-31

## 2018-07-31 RX ORDER — AMOXICILLIN 250 MG
1 CAPSULE ORAL 2 TIMES DAILY
Status: DISCONTINUED | OUTPATIENT
Start: 2018-07-31 | End: 2018-08-03 | Stop reason: HOSPADM

## 2018-07-31 RX ORDER — PROPOFOL 10 MG/ML
INJECTION, EMULSION INTRAVENOUS AS NEEDED
Status: DISCONTINUED | OUTPATIENT
Start: 2018-07-31 | End: 2018-07-31 | Stop reason: HOSPADM

## 2018-07-31 RX ORDER — ESMOLOL HYDROCHLORIDE 10 MG/ML
INJECTION INTRAVENOUS AS NEEDED
Status: DISCONTINUED | OUTPATIENT
Start: 2018-07-31 | End: 2018-07-31 | Stop reason: HOSPADM

## 2018-07-31 RX ORDER — NALOXONE HYDROCHLORIDE 0.4 MG/ML
0.4 INJECTION, SOLUTION INTRAMUSCULAR; INTRAVENOUS; SUBCUTANEOUS AS NEEDED
Status: DISCONTINUED | OUTPATIENT
Start: 2018-07-31 | End: 2018-08-03 | Stop reason: HOSPADM

## 2018-07-31 RX ORDER — POTASSIUM CHLORIDE 750 MG/1
20 TABLET, FILM COATED, EXTENDED RELEASE ORAL DAILY
Status: DISCONTINUED | OUTPATIENT
Start: 2018-08-01 | End: 2018-08-03 | Stop reason: HOSPADM

## 2018-07-31 RX ORDER — FAMOTIDINE 20 MG/1
20 TABLET, FILM COATED ORAL 2 TIMES DAILY
Status: DISCONTINUED | OUTPATIENT
Start: 2018-07-31 | End: 2018-07-31

## 2018-07-31 RX ORDER — MELOXICAM 7.5 MG/1
7.5 TABLET ORAL DAILY
Status: DISCONTINUED | OUTPATIENT
Start: 2018-08-01 | End: 2018-08-03 | Stop reason: HOSPADM

## 2018-07-31 RX ORDER — OXYCODONE HYDROCHLORIDE 5 MG/1
2.5-5 TABLET ORAL
Status: DISCONTINUED | OUTPATIENT
Start: 2018-07-31 | End: 2018-08-03 | Stop reason: HOSPADM

## 2018-07-31 RX ORDER — FENTANYL CITRATE 50 UG/ML
50 INJECTION, SOLUTION INTRAMUSCULAR; INTRAVENOUS AS NEEDED
Status: DISCONTINUED | OUTPATIENT
Start: 2018-07-31 | End: 2018-07-31 | Stop reason: HOSPADM

## 2018-07-31 RX ORDER — ONDANSETRON 2 MG/ML
4 INJECTION INTRAMUSCULAR; INTRAVENOUS AS NEEDED
Status: DISCONTINUED | OUTPATIENT
Start: 2018-07-31 | End: 2018-07-31 | Stop reason: HOSPADM

## 2018-07-31 RX ORDER — OXYCODONE HYDROCHLORIDE 5 MG/1
5 TABLET ORAL AS NEEDED
Status: DISCONTINUED | OUTPATIENT
Start: 2018-07-31 | End: 2018-07-31 | Stop reason: HOSPADM

## 2018-07-31 RX ORDER — HYDROXYZINE HYDROCHLORIDE 10 MG/1
10 TABLET, FILM COATED ORAL
Status: DISCONTINUED | OUTPATIENT
Start: 2018-07-31 | End: 2018-08-03 | Stop reason: HOSPADM

## 2018-07-31 RX ORDER — SUCCINYLCHOLINE CHLORIDE 20 MG/ML
INJECTION INTRAMUSCULAR; INTRAVENOUS AS NEEDED
Status: DISCONTINUED | OUTPATIENT
Start: 2018-07-31 | End: 2018-07-31 | Stop reason: HOSPADM

## 2018-07-31 RX ORDER — LIDOCAINE HYDROCHLORIDE 20 MG/ML
INJECTION, SOLUTION EPIDURAL; INFILTRATION; INTRACAUDAL; PERINEURAL AS NEEDED
Status: DISCONTINUED | OUTPATIENT
Start: 2018-07-31 | End: 2018-07-31 | Stop reason: HOSPADM

## 2018-07-31 RX ADMIN — Medication 10 ML: at 22:00

## 2018-07-31 RX ADMIN — ACETAMINOPHEN 650 MG: 325 TABLET, FILM COATED ORAL at 17:36

## 2018-07-31 RX ADMIN — DOCUSATE SODIUM AND SENNOSIDES 1 TABLET: 8.6; 5 TABLET, FILM COATED ORAL at 17:36

## 2018-07-31 RX ADMIN — PROPOFOL 20 MG: 10 INJECTION, EMULSION INTRAVENOUS at 12:44

## 2018-07-31 RX ADMIN — SODIUM CHLORIDE, SODIUM LACTATE, POTASSIUM CHLORIDE, CALCIUM CHLORIDE: 600; 310; 30; 20 INJECTION, SOLUTION INTRAVENOUS at 10:54

## 2018-07-31 RX ADMIN — Medication 2 G: at 11:13

## 2018-07-31 RX ADMIN — ESMOLOL HYDROCHLORIDE 10 MG: 10 INJECTION INTRAVENOUS at 12:19

## 2018-07-31 RX ADMIN — FENTANYL CITRATE 25 MCG: 50 INJECTION, SOLUTION INTRAMUSCULAR; INTRAVENOUS at 13:30

## 2018-07-31 RX ADMIN — ROCURONIUM BROMIDE 10 MG: 10 INJECTION, SOLUTION INTRAVENOUS at 11:26

## 2018-07-31 RX ADMIN — DEXMEDETOMIDINE HYDROCHLORIDE 4 MCG: 4 INJECTION, SOLUTION INTRAVENOUS at 12:18

## 2018-07-31 RX ADMIN — SODIUM CHLORIDE 125 ML/HR: 900 INJECTION, SOLUTION INTRAVENOUS at 06:57

## 2018-07-31 RX ADMIN — ROCURONIUM BROMIDE 15 MG: 10 INJECTION, SOLUTION INTRAVENOUS at 11:03

## 2018-07-31 RX ADMIN — DEXMEDETOMIDINE HYDROCHLORIDE 4 MCG: 4 INJECTION, SOLUTION INTRAVENOUS at 12:42

## 2018-07-31 RX ADMIN — Medication 2 G: at 08:00

## 2018-07-31 RX ADMIN — OXYCODONE HYDROCHLORIDE 5 MG: 5 TABLET ORAL at 15:42

## 2018-07-31 RX ADMIN — FENTANYL CITRATE 25 MCG: 50 INJECTION, SOLUTION INTRAMUSCULAR; INTRAVENOUS at 13:35

## 2018-07-31 RX ADMIN — SUCCINYLCHOLINE CHLORIDE 140 MG: 20 INJECTION INTRAMUSCULAR; INTRAVENOUS at 10:59

## 2018-07-31 RX ADMIN — SODIUM CHLORIDE, SODIUM LACTATE, POTASSIUM CHLORIDE, CALCIUM CHLORIDE: 600; 310; 30; 20 INJECTION, SOLUTION INTRAVENOUS at 12:43

## 2018-07-31 RX ADMIN — FENTANYL CITRATE 25 MCG: 50 INJECTION, SOLUTION INTRAMUSCULAR; INTRAVENOUS at 12:19

## 2018-07-31 RX ADMIN — FENTANYL CITRATE 25 MCG: 50 INJECTION, SOLUTION INTRAMUSCULAR; INTRAVENOUS at 13:40

## 2018-07-31 RX ADMIN — FENTANYL CITRATE 25 MCG: 50 INJECTION, SOLUTION INTRAMUSCULAR; INTRAVENOUS at 10:54

## 2018-07-31 RX ADMIN — NEOSTIGMINE METHYLSULFATE 3 MG: 1 INJECTION INTRAVENOUS at 12:08

## 2018-07-31 RX ADMIN — GLYCOPYRROLATE 0.4 MG: 0.2 INJECTION INTRAMUSCULAR; INTRAVENOUS at 12:08

## 2018-07-31 RX ADMIN — KETAMINE HYDROCHLORIDE 20 MG: 10 INJECTION, SOLUTION INTRAMUSCULAR; INTRAVENOUS at 11:16

## 2018-07-31 RX ADMIN — SODIUM CHLORIDE 125 ML/HR: 900 INJECTION, SOLUTION INTRAVENOUS at 13:00

## 2018-07-31 RX ADMIN — ACETAMINOPHEN 650 MG: 325 TABLET, FILM COATED ORAL at 06:58

## 2018-07-31 RX ADMIN — Medication 10 ML: at 14:00

## 2018-07-31 RX ADMIN — FENTANYL CITRATE 25 MCG: 50 INJECTION, SOLUTION INTRAMUSCULAR; INTRAVENOUS at 13:45

## 2018-07-31 RX ADMIN — ONDANSETRON 4 MG: 2 INJECTION INTRAMUSCULAR; INTRAVENOUS at 12:03

## 2018-07-31 RX ADMIN — PROPOFOL 130 MG: 10 INJECTION, EMULSION INTRAVENOUS at 10:59

## 2018-07-31 RX ADMIN — Medication 80 MCG: at 12:29

## 2018-07-31 RX ADMIN — ROCURONIUM BROMIDE 5 MG: 10 INJECTION, SOLUTION INTRAVENOUS at 10:59

## 2018-07-31 RX ADMIN — Medication 10 ML: at 06:58

## 2018-07-31 RX ADMIN — FENTANYL CITRATE 50 MCG: 50 INJECTION, SOLUTION INTRAMUSCULAR; INTRAVENOUS at 11:49

## 2018-07-31 RX ADMIN — SODIUM CHLORIDE, SODIUM LACTATE, POTASSIUM CHLORIDE, AND CALCIUM CHLORIDE 125 ML/HR: 600; 310; 30; 20 INJECTION, SOLUTION INTRAVENOUS at 10:51

## 2018-07-31 RX ADMIN — LIDOCAINE HYDROCHLORIDE 80 MG: 20 INJECTION, SOLUTION EPIDURAL; INFILTRATION; INTRACAUDAL; PERINEURAL at 10:59

## 2018-07-31 RX ADMIN — Medication 2 G: at 18:23

## 2018-07-31 NOTE — PROGRESS NOTES
Benito Strickland, Raven Pérez, and Minerva Peguero Admit Date: 7/29/2018 Subjective:  
 
Patient feeling OK with good pain control. AF,VSSLayla Jose Current Facility-Administered Medications Medication Dose Route Frequency  sodium chloride (NS) flush 5-10 mL  5-10 mL IntraVENous Q8H  
 sodium chloride (NS) flush 5-10 mL  5-10 mL IntraVENous PRN  
 fentaNYL citrate (PF) injection 25 mcg  25 mcg IntraVENous Q4H PRN  
 ondansetron (ZOFRAN) injection 4 mg  4 mg IntraVENous Q6H PRN  
 acetaminophen (TYLENOL) tablet 650 mg  650 mg Oral Q6H  
 traMADol (ULTRAM) tablet 50 mg  50 mg Oral Q6H PRN  
 atenolol (TENORMIN) tablet 25 mg  25 mg Oral DAILY  0.9% sodium chloride infusion  125 mL/hr IntraVENous CONTINUOUS Objective:  
 
Patient Vitals for the past 8 hrs: 
 BP Temp Pulse Resp SpO2  
07/31/18 0408 119/62 97.8 °F (36.6 °C) 80 16 95 % Physical Exam: Lungs: clear to auscultation bilaterally Heart: regular rate and rhythm, S1, S2 normal, no murmur, click, rub or gallop Abdomen: soft, non-tender. Bowel sounds normal. No masses,  no organomegaly Data Review No results found for this or any previous visit (from the past 24 hour(s)). Assessment:  
 
Principal Problem: 
  Fracture, intertrochanteric, right femur, closed, initial encounter ([facility] [address] (7/29/2018) Active Problems: 
  A-fib (HCC) () Fall (7/29/2018) Hip hematoma, right, initial encounter (7/29/2018) Plan:  
 
1) For repair R hip today 2) check labs this AM

## 2018-07-31 NOTE — PROGRESS NOTES
The patient is post op day 0 for Right hip surgery. CRM will follow in the am for full assessment and discharge planning.  REINALDO

## 2018-07-31 NOTE — BRIEF OP NOTE
BRIEF OPERATIVE NOTE Date of Procedure: 7/31/2018 Preoperative Diagnosis: RIGHT HIP INTERTROCHANTERIC FRACTURE Postoperative Diagnosis: RIGHT HIP INTERTROCHANTERIC Procedure(s): RIGHT FEMORAL INTERTROCHANTERIC NAIL (LATEX ALLERGY) Surgeon(s) and Role: Jose Aldrich MD - Primary Surgical Assistant: Assistant: JEANA Garvin Surgical Staff: 
Circ-1: Rosey Phillips Physician Assistant: Milvia Melendez PA-C Radiology Technician: Sergio Hardy RT Scrub RN-1: Poornima Cespedes RN Event Time In Incision Start [phone] Incision Close Anesthesia: general 
Estimated Blood Loss: 100cc Specimens: * No specimens in log * Findings: as above Complications: none Implants:  
Implant Name Type Inv. Item Serial No.  Lot No. LRB No. Used Action NAIL STEVE 130D [id number] STRL -- TFNA - SNA  NAIL STEVE 130D [id number] STRL -- TFNA NA SYNTHES [place] [id number] Right 1 Implanted SCR FEN 105MM GLD STRL -- TFNA - SNA  SCR FEN 105MM GLD STRL -- TFNA NA SYNTHES [place] [id number] Right 1 Implanted SCR BNE LCK T25 F/IM NL [id number] --  - SNA   SCR BNE LCK T25 F/IM NL [id number] --  NA SYNTHES [place] NA Right 1 Implanted

## 2018-07-31 NOTE — ANESTHESIA POSTPROCEDURE EVALUATION
Post-Anesthesia Evaluation and Assessment Patient: Gary Heart MRN: [id number]  SSN: [id number] YOB: 1931  Age: 80 y.o. Sex: female Cardiovascular Function/Vital Signs Visit Vitals  /47  Pulse 79  Temp 36.7 °C (98 °F)  Resp 17  Ht 5' 3\" (1.6 m)  Wt 54.4 kg (119 lb 14.9 oz)  SpO2 95%  BMI 21.24 kg/m2 Patient is status post general anesthesia for Procedure(s): RIGHT FEMORAL INTERTROCHANTERIC NAIL (LATEX ALLERGY). Nausea/Vomiting: None Postoperative hydration reviewed and adequate. Pain: 
Pain Scale 1: Visual (07/31/18 1345) Pain Intensity 1: 0 (07/31/18 1302) Managed Neurological Status:  
Neuro (WDL): Exceptions to WDL (07/31/18 1345) Neuro Neurologic State: Confused (07/31/18 1345) Orientation Level: Oriented to person (07/31/18 0915) Cognition: Follows commands; Impaired decision making;Poor safety awareness (07/31/18 0915) Speech: Clear (07/31/18 1302) LUE Motor Response: Purposeful (07/31/18 0915) LLE Motor Response: Purposeful (07/31/18 0915) RUE Motor Response: Purposeful (07/31/18 0915) RLE Motor Response: Weak (07/31/18 1345) At baseline Mental Status and Level of Consciousness: Arousable Pulmonary Status:  
O2 Device: Nasal cannula (07/31/18 1345) Adequate oxygenation and airway patent Complications related to anesthesia: None Post-anesthesia assessment completed. No concerns Signed By: Zaynab Izquierdo MD   
 July 31, 2018

## 2018-07-31 NOTE — PROGRESS NOTES
4:22 PM 
Bedside and Verbal shift change report given to [employer], RN (oncoming nurse) by Jennifer Grant RN (offgoing nurse). Report included the following information SBAR, Kardex, OR Summary, Intake/Output, MAR and Recent Results.

## 2018-07-31 NOTE — PROGRESS NOTES
Salina Coreas 22 and Delmer Allen RN performed a dual skin assessment on this patient No impairment noted Tre score is 20

## 2018-07-31 NOTE — PROGRESS NOTES
Date of Surgery Update: 
Arvis Cranker was seen and examined. Pt w/ c/o pain in RLE. Past Medical History:  
Diagnosis Date  A-fib ([address]  Arrhythmia A FIB  Arthritis  Back pain  CAD (coronary artery disease) PT DENIES  Chronic pain  Dementia 2/9/2016  Hypercholesterolemia  Psychiatric disorder ANXIETY  Shoulder pain  Thyroid disease  Tremor, essential   
 
Prior to Admission Medications Prescriptions Last Dose Informant Patient Reported? Taking? ALPRAZolam (XANAX) 0.5 mg tablet Unknown at Unknown time  No No  
Sig: TAKE 1 TABLET BY MOUTH THREE TIMES DAILY AS NEEDED FOR ANXIETY Biotin 2,500 mcg cap Not Taking at Unknown time  Yes No  
Sig: Take 5,000 mcg by mouth. HYDROcodone-acetaminophen (NORCO) 5-325 mg per tablet Unknown at Unknown time  No No  
Sig: TAKE 1 TABLET BY MOUTH DAILY AS NEEDED. acetaminophen (TYLENOL) 325 mg tablet Unknown at Unknown time  No No  
Sig: Take 2 Tabs by mouth every four (4) hours as needed. atenolol (TENORMIN) 25 mg tablet 7/23/2018 at Unknown time  No Yes Sig: TAKE 1 TABLET BY MOUTH EVERY DAY  
bisacodyl (DULCOLAX) 10 mg suppository Not Taking at Unknown time  No No  
Sig: Insert 10 mg into rectum daily as needed. cefUROXime (CEFTIN) 250 mg tablet Not Taking at Unknown time  No No  
Sig: Take 1 Tab by mouth two (2) times a day. cephALEXin (KEFLEX) 250 mg capsule 7/23/2018 at Unknown time  No Yes Sig: Take 1 Cap by mouth three (3) times daily. famotidine (PEPCID) 20 mg tablet Not Taking at Unknown time  No No  
Sig: TAKE 1 TABLET BY MOUTH TWICE A DAY. fluticasone (FLONASE) 50 mcg/actuation nasal spray Unknown at Unknown time  No No  
Sig: TWO SPRAYS IN EACH NOSTRIL DAILY. furosemide (LASIX) 40 mg tablet Unknown at Unknown time  No No  
Sig: TAKE 1 TABLET BY MOUTH EVERY DAY. levothyroxine (SYNTHROID) 100 mcg tablet 7/23/2018 at Unknown time  No Yes Sig: TAKE 1 TABLET BY MOUTH DAILY BEFORE BREAKFAST. nystatin (MYCOSTATIN) topical cream Unknown at Unknown time  No No  
Sig: APPLY TO AFFECTED AREA TWO (2) TIMES A DAY. polyethylene glycol (MIRALAX) 17 gram packet Not Taking at Unknown time  No No  
Sig: Take 1 Packet by mouth daily. potassium chloride (K-DUR, KLOR-CON) 20 mEq tablet 7/23/2018 at Unknown time  No Yes Sig: TAKE 1 TABLET BY MOUTH EVERY DAY  
potassium chloride SA (MICRO-K) 10 mEq capsule Not Taking at Unknown time  Yes No  
sertraline (ZOLOFT) 25 mg tablet Not Taking at Unknown time  No No  
Sig: Take 1 Tab by mouth daily. spironolactone (ALDACTONE) 50 mg tablet Unknown at Unknown time  No No  
Sig: TAKE 1 TABLET BY MOUTH DAILY FOR HYPERTENSION. (SAME AS ALDACTONE)  
traMADol (ULTRAM) 50 mg tablet Not Taking at Unknown time  No No  
Sig: Take 1 Tab by mouth daily as needed for Pain.  
warfarin (COUMADIN) 2.5 mg tablet 7/23/2018 at Unknown time  No Yes Sig: TAKE 1 TABLET BY MOUTH EVERY EVENING  
warfarin (COUMADIN) 3 mg tablet Not Taking at Unknown time  Yes No  
Sig: Take 3 mg by mouth every Tuesday, Thursday, Saturday & Sunday. In afternoon  
warfarin (COUMADIN) 3 mg tablet Not Taking at Unknown time  Yes No  
Sig: Take 1.5 mg by mouth every Monday, Wednesday, Friday. In afternoon Facility-Administered Medications: None Allergy to:Latex, natural rubber; Codeine; Adhesive; Cortisone; and Egg Physical Examination: General appearance - alert, well appearing, and in no distress Chest - clear to auscultation, no wheezes, rales or rhonchi, symmetric air entry Heart - atrial fibrillation Abdomen - soft, nontender, nondistended, no masses or organomegaly Musculoskeletal - pt refusing movement of RLE 2/2 pain. 1+ pedal edema present RLE; pedal pulses symmetric bilaterally, no calf tenderness or swelling, calf is soft History and physical has been reviewed. The patient has been examined.  There have been no significant clinical changes since the completion of the originally dated History and Physical. 
 
Signed By: Cheryl Rodriguez PA-C  July 31, 2018 10:45 AM

## 2018-07-31 NOTE — ANESTHESIA PREPROCEDURE EVALUATION
Anesthetic History Review of Systems / Medical History Patient summary reviewed, nursing notes reviewed and pertinent labs reviewed Pulmonary Neuro/Psych Psychiatric history Cardiovascular Dysrhythmias CAD 
 
 
  
GI/Hepatic/Renal 
  
 
 
 
 
 
 Endo/Other Hypothyroidism Obesity and blood dyscrasia Other Findings Physical Exam 
 
Airway Mallampati: II 
TM Distance: 4 - 6 cm Neck ROM: decreased range of motion Mouth opening: Normal 
 
 Cardiovascular Regular rate and rhythm,  S1 and S2 normal,  no murmur, click, rub, or gallop Dental 
 
Dentition: Caps/crowns Pulmonary Breath sounds clear to auscultation Abdominal 
GI exam deferred Other Findings Anesthetic Plan ASA: 3 Anesthesia type: general 
 
Monitoring Plan: BIS Induction: Intravenous Anesthetic plan and risks discussed with: Patient

## 2018-07-31 NOTE — OP NOTES
[address] [place] [address] REPORT    Name:Jhoan MORALES  MR#: [id number]  : 1931  ACCOUNT #: [de-identified]   DATE OF SERVICE: 2018    ATTENDING PHYSICIAN:  Elida Fenton MD    ASSISTANT:  Ale Barnes, physician assistant. PREOPERATIVE DIAGNOSIS:  Right hip intertrochanteric fracture. POSTOPERATIVE DIAGNOSIS:  Right hip intertrochanteric fracture. PROCEDURE PERFORMED:  Open reduction internal fixation, right hip intertrochanteric fracture with Synthes troch nail. SURGEON:  Elida Fenton MD    ANESTHESIA:  General.    ESTIMATED BLOOD LOSS:  100 mL. SPECIMENS REMOVED:  None. IMPLANTS:  Synthes short trochanteric nail. COMPLICATIONS:  None. INDICATIONS:  An 51-year-old ambulatory patient with comminuted intertrochanteric fracture. OPERATION:  The patient was taken to the operating room and underwent general inhalational anesthesia. She was placed on the fracture table with the right foot in a boot and the left hip flexed and abducted. The fracture was closed reduced under fluoroscopic guidance. The right hip and leg were prepped and draped in the usual fashion. A small incision was made proximal to the greater trochanter down through skin and subcutaneous tissue. Fascia was incised longitudinally. I palpated the greater trochanter and advanced the guidewire through the tip of the greater trochanter down the intramedullary canal under fluoroscopic guidance. One step reamer was used to ream the intramedullary canal.  A short size 11 nail was advanced over the guidewire down the intramedullary canal to the appropriate level. The targeting guide was placed, a small stab incision was made distally. This was advanced to the lateral femoral cortex. Guide pin was advanced to the lateral femoral cortex up into the femoral head. I aimed inferiorly on the AP view and centrally on the lateral view. This was measured and overdrilled. The appropriate length lag screw was advanced with excellent fixation into the subchondral bone. Fluoroscopy was used to confirm reduction of the fracture and placement of hardware. Lag screw was stopped just short of the femoral head cortex. Locking screw was set. Distal targeting guide was placed and a distal locking screw was placed. Insertion device was removed. Fluoroscopy was used to confirm placement of all hardware and reduction of the fracture. The wounds were extensively irrigated with saline solution. Fascia was repaired using 0 Vicryl in interrupted fashion. Subcutaneous tissue was approximated using 2-0 Vicryl in interrupted fashion. Skin edges were approximated using stapling apparatus. A bulky sterile dressing was applied. The patient was awakened, extubated, and transported to recovery room in stable condition. There were no complications.       MD RUFINA Abdi / [place]  D: 07/31/2018 12:15     T: 07/31/2018 12:31  JOB #: [id number]

## 2018-07-31 NOTE — ANESTHESIA POSTPROCEDURE EVALUATION
Post-Anesthesia Evaluation and Assessment Patient: Matthias Walton MRN: [id number]  SSN: [id number] YOB: 1931  Age: 80 y.o. Sex: female Cardiovascular Function/Vital Signs Visit Vitals  /47  Pulse 79  Temp 36.7 °C (98 °F)  Resp 17  Ht 5' 3\" (1.6 m)  Wt 54.4 kg (119 lb 14.9 oz)  SpO2 95%  BMI 21.24 kg/m2 Patient is status post general anesthesia for Procedure(s): RIGHT FEMORAL INTERTROCHANTERIC NAIL (LATEX ALLERGY). Nausea/Vomiting: None Postoperative hydration reviewed and adequate. Pain: 
Pain Scale 1: Visual (07/31/18 1345) Pain Intensity 1: 0 (07/31/18 1302) Managed Neurological Status:  
Neuro (WDL): Exceptions to WDL (07/31/18 1345) Neuro Neurologic State: Confused (07/31/18 1345) Orientation Level: Oriented to person (07/31/18 0915) Cognition: Follows commands; Impaired decision making;Poor safety awareness (07/31/18 0915) Speech: Clear (07/31/18 1302) LUE Motor Response: Purposeful (07/31/18 0915) LLE Motor Response: Purposeful (07/31/18 0915) RUE Motor Response: Purposeful (07/31/18 0915) RLE Motor Response: Weak (07/31/18 1345) At baseline Mental Status and Level of Consciousness: Arousable Pulmonary Status:  
O2 Device: Nasal cannula (07/31/18 1345) Adequate oxygenation and airway patent Complications related to anesthesia: None Post-anesthesia assessment completed. No concerns Signed By: Jimbo Blank MD   
 July 31, 2018

## 2018-07-31 NOTE — PERIOP NOTES
TRANSFER - OUT REPORT: 
 
Verbal report given to Estephania(name) on Gary [facility]  being transferred to (unit) for routine post - op Report consisted of patients Situation, Background, Assessment and  
Recommendations(SBAR). Time Pre op antibiotic given:1113 Anesthesia Stop time: 1302 Mock Present on Transfer to floor:no Order for Mock on Chart:no Discharge Prescriptions with Chart:no Information from the following report(s) SBAR, OR Summary, Intake/Output, MAR and Cardiac Rhythm Afib was reviewed with the receiving nurse. Opportunity for questions and clarification was provided. Is the patient on 02? YES 
     L/Min 2 Is the patient on a monitor? NO Is the nurse transporting with the patient? NO Surgical Waiting Area notified of patient's transfer from PACU? NO; pt's aid is in pt's room, RN will call pt sister The following personal items collected during your admission accompanied patient upon transfer:  
Dental Appliance: Dental Appliances: None Vision:   
Hearing Aid:   
Jewelry: Jewelry: Watch (watch removed and returned to nursing unit) Clothing: Clothing: None Other Valuables: Other Valuables: None Valuables sent to safe:

## 2018-07-31 NOTE — PROGRESS NOTES
Bedside shift change report given to Estephania (oncoming nurse) by Jess Grijalva (offgoing nurse). Report included the following information SBAR, Kardex, Intake/Output, MAR and Recent Results.

## 2018-08-01 LAB
ANION GAP SERPL CALC-SCNC: 8 MMOL/L (ref 5–15)
BUN SERPL-MCNC: 20 MG/DL (ref 6–20)
BUN/CREAT SERPL: 45 (ref 12–20)
CALCIUM SERPL-MCNC: 8.5 MG/DL (ref 8.5–10.1)
CHLORIDE SERPL-SCNC: 100 MMOL/L (ref 97–108)
CO2 SERPL-SCNC: 27 MMOL/L (ref 21–32)
CREAT SERPL-MCNC: 0.44 MG/DL (ref 0.55–1.02)
GLUCOSE SERPL-MCNC: 116 MG/DL (ref 65–100)
HGB BLD-MCNC: 9 G/DL (ref 11.5–16)
POTASSIUM SERPL-SCNC: 3.4 MMOL/L (ref 3.5–5.1)
SODIUM SERPL-SCNC: 135 MMOL/L (ref 136–145)

## 2018-08-01 PROCEDURE — G8987 SELF CARE CURRENT STATUS: HCPCS

## 2018-08-01 PROCEDURE — 80048 BASIC METABOLIC PNL TOTAL CA: CPT | Performed by: PHYSICIAN ASSISTANT

## 2018-08-01 PROCEDURE — 74011250636 HC RX REV CODE- 250/636: Performed by: PHYSICIAN ASSISTANT

## 2018-08-01 PROCEDURE — 97165 OT EVAL LOW COMPLEX 30 MIN: CPT

## 2018-08-01 PROCEDURE — 51798 US URINE CAPACITY MEASURE: CPT

## 2018-08-01 PROCEDURE — 74011250637 HC RX REV CODE- 250/637: Performed by: PHYSICIAN ASSISTANT

## 2018-08-01 PROCEDURE — 97161 PT EVAL LOW COMPLEX 20 MIN: CPT

## 2018-08-01 PROCEDURE — 85018 HEMOGLOBIN: CPT | Performed by: PHYSICIAN ASSISTANT

## 2018-08-01 PROCEDURE — 36415 COLL VENOUS BLD VENIPUNCTURE: CPT | Performed by: PHYSICIAN ASSISTANT

## 2018-08-01 PROCEDURE — G8988 SELF CARE GOAL STATUS: HCPCS

## 2018-08-01 PROCEDURE — 65660000000 HC RM CCU STEPDOWN

## 2018-08-01 RX ADMIN — OXYCODONE HYDROCHLORIDE 5 MG: 5 TABLET ORAL at 14:07

## 2018-08-01 RX ADMIN — OXYCODONE HYDROCHLORIDE 5 MG: 5 TABLET ORAL at 03:57

## 2018-08-01 RX ADMIN — Medication 10 ML: at 22:00

## 2018-08-01 RX ADMIN — CALCIUM CARBONATE-VITAMIN D TAB 500 MG-200 UNIT 1 TABLET: 500-200 TAB at 09:46

## 2018-08-01 RX ADMIN — OXYCODONE HYDROCHLORIDE 5 MG: 5 TABLET ORAL at 19:24

## 2018-08-01 RX ADMIN — Medication 10 ML: at 14:12

## 2018-08-01 RX ADMIN — ENOXAPARIN SODIUM 40 MG: 100 INJECTION SUBCUTANEOUS at 09:45

## 2018-08-01 RX ADMIN — LEVOTHYROXINE SODIUM 100 MCG: 50 TABLET ORAL at 06:25

## 2018-08-01 RX ADMIN — ACETAMINOPHEN 650 MG: 325 TABLET, FILM COATED ORAL at 17:11

## 2018-08-01 RX ADMIN — CALCIUM CARBONATE-VITAMIN D TAB 500 MG-200 UNIT 1 TABLET: 500-200 TAB at 17:11

## 2018-08-01 RX ADMIN — ACETAMINOPHEN 650 MG: 325 TABLET, FILM COATED ORAL at 06:24

## 2018-08-01 RX ADMIN — OXYCODONE HYDROCHLORIDE 5 MG: 5 TABLET ORAL at 09:10

## 2018-08-01 RX ADMIN — CALCIUM CARBONATE-VITAMIN D TAB 500 MG-200 UNIT 1 TABLET: 500-200 TAB at 12:48

## 2018-08-01 RX ADMIN — Medication 2 G: at 03:00

## 2018-08-01 RX ADMIN — SPIRONOLACTONE 50 MG: 25 TABLET, FILM COATED ORAL at 09:46

## 2018-08-01 RX ADMIN — ATENOLOL 25 MG: 25 TABLET ORAL at 09:46

## 2018-08-01 RX ADMIN — MELOXICAM 7.5 MG: 7.5 TABLET ORAL at 09:45

## 2018-08-01 RX ADMIN — Medication 10 ML: at 08:14

## 2018-08-01 RX ADMIN — ACETAMINOPHEN 650 MG: 325 TABLET, FILM COATED ORAL at 12:48

## 2018-08-01 RX ADMIN — DOCUSATE SODIUM AND SENNOSIDES 1 TABLET: 8.6; 5 TABLET, FILM COATED ORAL at 18:00

## 2018-08-01 RX ADMIN — POTASSIUM CHLORIDE 20 MEQ: 750 TABLET, EXTENDED RELEASE ORAL at 09:46

## 2018-08-01 NOTE — PROGRESS NOTES
Problem: Mobility Impaired (Adult and Pediatric) Goal: *Acute Goals and Plan of Care (Insert Text) Physical Therapy Goals Initiated 8/1/2018 1. Patient will move from supine to sit and sit to supine , scoot up and down and roll side to side in bed with moderate assistance  within 7 day(s). 2.  Patient will transfer from bed to chair and chair to bed with maximal assistance using the least restrictive device within 7 day(s). 3.  Patient will perform sit to stand with maximal assistance within 7 day(s). 4.  Patient will ambulate with maximal assistance for 10 feet with the least restrictive device within 7 day(s). physical Therapy EVALUATION Patient: Edwardo Betancourt (34 y.o. female) Date: 8/1/2018 Primary Diagnosis: Fracture of femoral neck, right, closed ([facility] [address] Hip hematoma, right, initial encounter A-fib ([facility] [address] Fall RIGHT HIP INTERTROCHANTERIC FRACTURE  
unknown Procedure(s) (LRB): 
RIGHT FEMORAL INTERTROCHANTERIC NAIL (LATEX ALLERGY) (Right) 1 Day Post-Op Precautions:  Fall, WBAT 
 
ASSESSMENT : 
Based on the objective data described below, the patient presents with decreased concentration/attention to task, tangential conversation throughout session, demanding nature with self limitation, and increased pain with generalized weakness which all then contribute to impaired functional mobility well below baseline. Patient with baseline dementia, however she lives alone and has personal care aids 2x/week. Patient intermittently agitated by PT/OT's questions and unable to obtain a detailed PLOF. Patient required max encouragement and redirection to task throughout session as patient easily distracted throughout session, asking PT/OT questions about unrelated topics. Able to mobilize L LE to L side EOB with min A and assist also provided to attempt to move R LE to EOB.  Patient stiff and resisting assistance and midway through attempt to sit EOB, patient began to refuse further therapy or assist. Unsafe to proceed with further intervention, as patient adamantly deferring session and ceased all active participation. Highly recommend SNF. Patient will benefit from skilled intervention to address the above impairments. Patients rehabilitation potential is considered to be Fair Factors which may influence rehabilitation potential include:  
[]         None noted 
[x]         Mental ability/status []         Medical condition 
[]         Home/family situation and support systems 
[x]         Safety awareness [x]         Pain tolerance/management 
[]         Other: PLAN : 
Recommendations and Planned Interventions: 
[x]           Bed Mobility Training             [x]    Neuromuscular Re-Education 
[x]           Transfer Training                   []    Orthotic/Prosthetic Training 
[x]           Gait Training                         []    Modalities [x]           Therapeutic Exercises           []    Edema Management/Control 
[x]           Therapeutic Activities            [x]    Patient and Family Training/Education 
[]           Other (comment): Frequency/Duration: Patient will be followed by physical therapy  daily to address goals. (not appropriate for BID due to refusal and Discharge Recommendations: Av Mckay Further Equipment Recommendations for Discharge: TBD at SNF SUBJECTIVE:  
Patient stated Oh come on, you can do better than that, Vera!  when PT removed SCD from surgical LE OBJECTIVE DATA SUMMARY:  
HISTORY:   
Past Medical History:  
Diagnosis Date  A-fib ([facility] [address]  Arrhythmia A FIB  Arthritis  Back pain  CAD (coronary artery disease) PT DENIES  Chronic pain  Dementia 2/9/2016  Hypercholesterolemia  Psychiatric disorder ANXIETY  Shoulder pain  Thyroid disease  Tremor, essential   
 
Past Surgical History:  
Procedure Laterality Date  HX APPENDECTOMY  HX ORTHOPAEDIC    
 left knee replacement  HX KADIE AND BSO AGE 39  
 HX TONSIL AND ADENOIDECTOMY Prior Level of Function/Home Situation: Living alone, nursing aid 2x/week, unable to obtain further history due to patient's lack of concentration/willingness to provide details Personal factors and/or comorbidities impacting plan of care: PMH, confusion, dementia, demanding/self limiting Home Situation Home Environment: Private residence One/Two Story Residence: One story Living Alone: Yes Support Systems: Home care staff, Friends \ neighbors, Family member(s) (aid 2x/week) Patient Expects to be Discharged to[de-identified] Rehabilitation facility Current DME Used/Available at Home: Cane, straight EXAMINATION/PRESENTATION/DECISION MAKING:  
Critical Behavior: 
Neurologic State: Alert, Appropriate for age Orientation Level: Oriented X4 Cognition: Memory loss Hearing: Auditory Auditory Impairment: None Range Of Motion: 
AROM: Generally decreased, functional 
Strength:   
Strength: Generally decreased, functional 
Tone & Sensation:  
Tone: Normal 
Coordination: 
Coordination: Generally decreased, functional 
Functional Mobility: 
Bed Mobility: 
Supine to Sit: Total assistance;Assist x2 (inferred) Sit to Supine: Total assistance;Assist x2 (inferred) Scooting: Total assistance Transfers: 
Sit to Stand: Total assistance (inferred) Balance:  
Sitting: Impaired (inferred, unable to sit EOB due to unwillingness) Standing: Impaired; With support (inferred, not willing to participate) Functional Measure: 
Tinetti test: 
 
Sitting Balance: 0 Arises: 0 Attempts to Rise: 0 Immediate Standing Balance: 0 Standing Balance: 0 Nudged: 0 Eyes Closed: 0 Turn 360 Degrees - Continuous/Discontinuous: 0 Turn 360 Degrees - Steady/Unsteady: 0 Sitting Down: 0 Balance Score: 0 Indication of Gait: 0 
R Step Length/Height: 0 
L Step Length/Height: 0 
R Foot Clearance: 0 
L Foot Clearance: 0 Step Symmetry: 0 Step Continuity: 0 Path: 0 Trunk: 0 Walking Time: 0 Gait Score: 0 Total Score: 0 Tinetti Test and G-code impairment scale: 
Percentage of Impairment CH 
 
0% 
 CI 
 
1-19% CJ 
 
20-39% CK 
 
40-59% CL 
 
60-79% CM 
 
80-99% CN  
 
100% Tinetti Score 0-28 28 23-27 17-22 12-16 6-11 1-5 0 Tinetti Tool Score Risk of Falls 
<19 = High Fall Risk 19-24 = Moderate Fall Risk 25-28 = Low Fall Risk Tinetti ME. Performance-Oriented Assessment of Mobility Problems in Elderly Patients. Roman [address]; [id number]. (Scoring Description: PT Bulletin Feb. 10, 1993) Older adults: Jeremiah Schwab et al, 2009; n = [address] elderly evaluated with ABC, DEBORAH, ADL, and IADL) · Mean DEBORAH score for males aged 69-68 years = 26.21(3.40) · Mean DEBORAH score for females age 69-68 years = 25.16(4.30) · Mean DEBORAH score for males over 80 years = 23.29(6.02) · Mean DEBORAH score for females over 80 years = 17.20(8.32) G codes: In compliance with CMSs Claims Based Outcome Reporting, the following G-code set was chosen for this patient based on their primary functional limitation being treated: The outcome measure chosen to determine the severity of the functional limitation was the Tinetti with a score of 0/28 which was correlated with the impairment scale. ? Mobility - Walking and Moving Around:  
  - CURRENT STATUS: CN - 100% impaired, limited or restricted  - GOAL STATUS: CM - 80%-99% impaired, limited or restricted  - D/C STATUS:  ---------------To be determined--------------- Physical Therapy Evaluation Charge Determination History Examination Presentation Decision-Making HIGH Complexity :3+ comorbidities / personal factors will impact the outcome/ POC  MEDIUM Complexity : 3 Standardized tests and measures addressing body structure, function, activity limitation and / or participation in recreation  LOW Complexity : Stable, uncomplicated  Other outcome measures Tinetti 0/28  HIGH Based on the above components, the patient evaluation is determined to be of the following complexity level: LOW Pain: 
Pain Scale 1: Numeric (0 - 10) Pain Intensity 1: 9 Pain Location 1: Hip Pain Orientation 1: Right Pain Description 1: Aching Pain Intervention(s) 1: Medication (see MAR) Activity Tolerance:  
Self limiting Please refer to the flowsheet for vital signs taken during this treatment. After treatment:  
[]         Patient left in no apparent distress sitting up in chair 
[x]         Patient left in no apparent distress in bed 
[x]         Call bell left within reach [x]         Nursing notified 
[]         Caregiver present 
[]         Bed alarm activated COMMUNICATION/EDUCATION:  
The patients plan of care was discussed with: Occupational Therapist, Registered Nurse and . [x]         Fall prevention education was provided and the patient/caregiver indicated understanding. [x]         Patient/family have participated as able in goal setting and plan of care. [x]         Patient/family agree to work toward stated goals and plan of care. []         Patient understands intent and goals of therapy, but is neutral about his/her participation. []         Patient is unable to participate in goal setting and plan of care. Thank you for this referral. 
Indigo Gambino, PT, DPT Time Calculation: 23 mins

## 2018-08-01 NOTE — PROGRESS NOTES
Problem: Self Care Deficits Care Plan (Adult) Goal: *Acute Goals and Plan of Care (Insert Text) Occupational Therapy Goals Initiated 8/1/2018 1. Patient will perform lower body ADLs with AE moderate assistance  within 7 day(s). 2.  Patient will perform upper body ADLs seated EOB for 5 mins without fatigue or LOB with supervision/set-up within 7 day(s). 3.  Patient will perform toilet transfer with maximal assistance within 7 day(s). 4.  Patient will perform all aspects of toileting with maximal assistance within 7 day(s). 5.  Patient will participate in upper extremity therapeutic exercise/activities with supervision/set-up for 5 minutes within 7 day(s). 6.  Patient will utilize energy conservation techniques during functional activities without cues within 7 day(s). Occupational Therapy EVALUATION Patient: Vimal Kim (53 y.o. female) Date: 8/1/2018 Primary Diagnosis: Fracture of femoral neck, right, closed ([address] Hip hematoma, right, initial encounter A-fib ([facility] [address] Fall RIGHT HIP INTERTROCHANTERIC FRACTURE  
unknown Procedure(s) (LRB): 
RIGHT FEMORAL INTERTROCHANTERIC NAIL (LATEX ALLERGY) (Right) 1 Day Post-Op Precautions:   Fall, WBAT 
 
ASSESSMENT : 
Based on the objective data described below, the patient presents with decreased ability to complete ADLs and functional mobility/bed mobility s/p POD1 R hip IM nailing post fall, baseline dementia but lives alone, limited by confusion with resistant behavior to assist and participation, followed up with patient earlier to schedule a time due to patient requesting rest initially, upon return patient agreeable for therapy but then becoming resistant and demanding once attempted to assist to EOB, adamantly refused once B LE were near EOB, poor historian on PLOF, unable to answer yes/no questions, changing subject and asking therapist many questions as well. Unable to return home alone, will need SNF . Patient will benefit from skilled intervention to address the above impairments. Patients rehabilitation potential is considered to be Fair Factors which may influence rehabilitation potential include:  
[]                None noted [x]                Mental ability/status []                Medical condition [x]                Home/family situation and support systems []                Safety awareness [x]                Pain tolerance/management 
[]                Other: PLAN : 
Recommendations and Planned Interventions: 
[x]                  Self Care Training                  [x]           Therapeutic Activities [x]                  Functional Mobility Training    []           Cognitive Retraining 
[]                  Therapeutic Exercises           [x]           Endurance Activities [x]                  Balance Training                   []           Neuromuscular Re-Education []                  Visual/Perceptual Training     []      Home Safety Training 
[x]                  Patient Education                 []           Family Training/Education []                  Other (comment): Frequency/Duration: Patient will be followed by occupational therapy 5 times a week to address goals. Discharge Recommendations: Av Mckay Further Equipment Recommendations for Discharge: TBD SUBJECTIVE:  
Patient stated Cook Stalker are you doing that like that? You could do so much better. Ana Pert: attempting to lift R LE 
 
OBJECTIVE DATA SUMMARY:  
HISTORY:  
Past Medical History:  
Diagnosis Date  A-fib ([facility] [address]  Arrhythmia A FIB  Arthritis  Back pain  CAD (coronary artery disease) PT DENIES  Chronic pain  Dementia 2/9/2016  Hypercholesterolemia  Psychiatric disorder ANXIETY  Shoulder pain  Thyroid disease  Tremor, essential   
 
Past Surgical History:  
Procedure Laterality Date  HX APPENDECTOMY  HX ORTHOPAEDIC    
 left knee replacement  HX KADIE AND BSO  AGE 39  
 HX TONSIL AND ADENOIDECTOMY Prior Level of Function/Environment/Context: poor historian, frustrated with therapist asking PLOF questions- Per chart patient had aide assist 2x weekly only, otherwise lived alone Occupations in which the patient is/was successful, what are the barriers preventing that success:  
Performance Patterns (routines, roles, habits, and rituals):  
Personal Interests and/or values:  
Expanded or extensive additional review of patient history:  
 
Home Situation Home Environment: Private residence One/Two Story Residence: One story Living Alone: Yes Support Systems: Home care staff, Friends \ neighbors, Family member(s) (aid 2x/week) Patient Expects to be Discharged to[de-identified] Rehabilitation facility Current DME Used/Available at Home: Cane, straight Hand dominance: Right EXAMINATION OF PERFORMANCE DEFICITS: 
Cognitive/Behavioral Status: 
  
  
  
  
  
  
 
Skin: intact Edema: moderate B LE R>L Hearing: Auditory Auditory Impairment: None Vision/Perceptual:   
    
    
    
  
    
    
  
 
Range of Motion: 
B UE not formally assessed, functionally intact AROM: Generally decreased, functional 
  
  
  
  
  
  
  
 
Strength: 
B UE not formally assessed, functional able attempted to scoot Strength: Generally decreased, functional 
  
  
  
  
 
Coordination: 
Coordination: Generally decreased, functional 
Fine Motor Skills-Upper: Left Intact; Right Intact Tone & Sensation: 
B UE NT Tone: Normal 
  
  
  
  
  
  
  
 
Balance: 
Sitting: Impaired (inferred, unable to sit EOB due to unwillingness) Standing: Impaired; With support (inferred, not willing to participate) Functional Mobility and Transfers for ADLs: 
Bed Mobility: 
Supine to Sit: Total assistance;Assist x2 (inferred) Sit to Supine: Total assistance;Assist x2 (inferred) Scooting: Total assistance Transfers: 
Sit to Stand: Total assistance (inferred) Toilet Transfer : Total assistance ADL Assessment: 
Feeding: Setup Oral Facial Hygiene/Grooming: Setup Bathing: Total assistance Upper Body Dressing: Total assistance Lower Body Dressing: Total assistance Toileting: Total assistance ADL Intervention and task modifications: 
  
 
  
 
  
Completed OT evaluation and ADLs from bed positioning due to patient being unable to tolerate EOB or standing, refusing further functional mobility even with more assist. Educated on safety and endurance training with encouragement for full participation in ADLs while in hospital. POOR understanding noted. Therapeutic Exercise: 
 
 
Functional Measure: 
Barthel Index: 
 
Bathin Bladder: 0 Bowels: 0 Groomin Dressin Feedin Mobility: 0 Stairs: 0 Toilet Use: 0 Transfer (Bed to Chair and Back): 5 Total: 15 Barthel and G-code impairment scale: 
Percentage of impairment CH 
0% CI 
1-19% CJ 
20-39% CK 
40-59% CL 
60-79% CM 
80-99% CN 
100% Barthel Score 0-100 100 99-80 79-60 59-40 20-39 1-19 
 0 Barthel Score 0-20 20 17-19 13-16 9-12 5-8 1-4 0 The Barthel ADL Index: Guidelines 1. The index should be used as a record of what a patient does, not as a record of what a patient could do. 2. The main aim is to establish degree of independence from any help, physical or verbal, however minor and for whatever reason. 3. The need for supervision renders the patient not independent. 4. A patient's performance should be established using the best available evidence. Asking the patient, friends/relatives and nurses are the usual sources, but direct observation and common sense are also important. However direct testing is not needed. 5. Usually the patient's performance over the preceding 24-48 hours is important, but occasionally longer periods will be relevant. 6. Middle categories imply that the patient supplies over 50 per cent of the effort. 7. Use of aids to be independent is allowed.  
 
Latoya Chadwick., Barthel, DLaylaW. (1965). Functional evaluation: the Barthel Index. [address] (14)2. FRANK Klein, Dylan Chamberlain., [place], [address] (1999). Measuring the change indisability after inpatient rehabilitation; comparison of the responsiveness of the Barthel Index and Functional [place] Measure. Journal of Neurology, Neurosurgery, and Psychiatry, 66(4), 792-888. ADELAIDE Mcgee, JOANIE Wolfe, & Franchesca Geller MLaylaA. (2004.) Assessment of post-stroke quality of life in cost-effectiveness studies: The usefulness of the Barthel Index and the EuroQoL-5D. [facility], 56, 227-60 G codes: In compliance with CMSs Claims Based Outcome Reporting, the following G-code set was chosen for this patient based on their primary functional limitation being treated: The outcome measure chosen to determine the severity of the functional limitation was the barthel index with a score of 15/100 which was correlated with the impairment scale. ? Carrying, Moving, and Handling Objects:  
  - CURRENT STATUS: CM - 80%-99% impaired, limited or restricted  - GOAL STATUS: CK - 40%-59% impaired, limited or restricted  - D/C STATUS:  ---------------To be determined--------------- Occupational Therapy Evaluation Charge Determination History Examination Decision-Making LOW Complexity : Brief history review  HIGH Complexity : 5 or more performance deficits relating to physical, cognitive , or psychosocial skils that result in activity limitations and / or participation restrictions HIGH Complexity : Patient presents with comorbidities that affect occupational performance. Signifigant modification of tasks or assistance (eg, physical or verbal) with assessment (s) is necessary to enable patient to complete evaluation Based on the above components, the patient evaluation is determined to be of the following complexity level: MEDIUM Pain: 
Pain Scale 1: Numeric (0 - 10) Pain Intensity 1: 9 Pain Location 1: Hip Pain Orientation 1: Right Pain Description 1: Aching Pain Intervention(s) 1: Medication (see MAR) Activity Tolerance:  
poor Please refer to the flowsheet for vital signs taken during this treatment. After treatment:  
[] Patient left in no apparent distress sitting up in chair 
[x] Patient left in no apparent distress in bed 
[x] Call bell left within reach [x] Nursing notified 
[] Caregiver present 
[] Bed alarm activated COMMUNICATION/EDUCATION:  
The patients plan of care was discussed with: Physical Therapist and Registered Nurse. [x]    Home safety education was provided and the patient/caregiver indicated understanding. [x]    Patient/family have participated as able in goal setting and plan of care. [x]    Patient/family agree to work toward stated goals and plan of care. []    Patient understands intent and goals of therapy, but is neutral about his/her participation. []    Patient is unable to participate in goal setting and plan of care. This patients plan of care is appropriate for delegation to [facility]. Thank you for this referral. 
Bam Singer OT Time Calculation: 20 mins

## 2018-08-01 NOTE — PROGRESS NOTES
Care Management Interventions PCP Verified by CM: Yes (Dr Shaniqua Vazquez) Palliative Care Criteria Met (RRAT>21 & CHF Dx)?: No 
Mode of Transport at Discharge: BLS Transition of Care Consult (CM Consult): SNF (Referral to [facility]) Partner SNF: Yes MyChart Signup: No 
Discharge Durable Medical Equipment: No 
Health Maintenance Reviewed: Yes Physical Therapy Consult: Yes Occupational Therapy Consult: Yes Speech Therapy Consult: No 
Current Support Network: Own Home, Lives Alone (Care giver 2 or 3 times a week) Confirm Follow Up Transport: Family Plan discussed with Pt/Family/Caregiver: Yes Freedom of Choice Offered: Yes [employer] Information Provided?: No 
Discharge Location Discharge Placement: Skilled nursing facility Reason for Admission:   Right Femoral Intertrochanteric Nail post op day #1 RRAT Score:  13 Plan for utilizing home health:      No. SNF referral to [facility] 
                 
Likelihood of Readmission: low if going to SNF Transition of Care Plan:        SNF           
 
CRM met the patient. She has been to [facility] before Feb 2017. The patient would like to go back. CRM also spoke with the patient's sister Fermin Ta [phone]. REINALDO BERG spoke with [facility] with [facility]. They have accepted the patient for SNF. CRM will follow for [facility] when medically stable.  REINALDO

## 2018-08-01 NOTE — PROGRESS NOTES
Bedside and Verbal shift change report given to Shawn Dinero RN (oncoming nurse) by Kg Bridges RN (offgoing nurse). Report included the following information SBAR, Kardex, OR Summary, Procedure Summary, Intake/Output, MAR and Recent Results.

## 2018-08-01 NOTE — PROGRESS NOTES
NUTRITION COMPLETE ASSESSMENT 
 
RECOMMENDATIONS:  
1. Regular 2. RD add Ensure Enlive chocolate x 1 day 3. Staff set-up meal trays 4. NSG please re-weigh 5. Diet office assist with menu selections Interventions/Plan:  
Food/Nutrient Delivery:  General/healthful diet Commercial supplement (Ensure chocolate x 1 day) Meal setup Nutrition Education:    
Coordination of Care:   
Nutrition Counseling:     
 
Assessment:  
Reason for Assessment:  
[x] Provider Consult: Geriatric hip fx Diet: Regular Supplements: RD add one Ensure Enlive (chocolate) daily Nutritionally Significant Medications: [x] Reviewed & Includes: Oscal D, Miralax, Pericolace, Klor, Synthroid Meal Intake: Patient Vitals for the past 100 hrs: 
 % Diet Eaten 08/01/18 0800 50 % 07/31/18 1823 25 % Subjective: 
Appetite goes up and down, poor now. I weigh on a scale at home ~125#, never weighed 150s. Natural teeth chew ok. I'm not allergic to eggs. I had them bring me an omelet for breakfast. I'm constipated, don't like Miralax, can I have prune juice. I'll try an Ensure. Objective: Hx hypothyroid, Rx Synthroid; A-fib; Admit R-hip fx, s/p intertrochanteric nail. No recent prior wts to trend. Remote wt 125# 12/13/2017. Pt also reports UBW as 125#, claims no recent wt changes. Admit wt listed 158# and pt questions this wt; Bedscale. Appetite poor now and claims usually fluctuates, \"hard to describe\". No difficulty chew/swallow. C/O constipation which may also be affecting appetite. RD to add one Ensure Enlive day 350 kcal & 20 gm pro meets 21% kcal and 24% pro needs respectively. NSG assisted to adjust tray and CA can assist with menu selections in effort to improve intake. Last B12 2/10/16 WDL. No hx DM;  today, 90 yesterday. Estimated Nutrition Needs:  
Kcals/day: 0985 Kcals/day Protein: 85 g (~1.2 gm/kg ) Fluid: 1700 ml (~1 mL/kcal) Based On: Walt Hernandez (MSJ x 1.3) Weight Used: 71.7 kg Pt expected to meet estimated nutrient needs: [x] Unable to predict at this time: Intake fluctuates, poor now Comparative Standards:  ;  ;   
 
Nutrition Diagnosis:  
1. Increased nutrient needs related to protein as evidenced by s/p ortho surgery in senior with poor appetite Goals:   
 Consume 100% one Ensure Enlive daily starting next 24 hr 
  
Monitoring & Evaluation: - Total energy intake, Protein intake - Weight/weight change, Lean body mass, fat free mass, GI 
  
Previous Nutrition Goals Met: N/A Previous Recommendations: N/A Education & Discharge Needs: 
 [x] Identified and addressed: Discussed protein for healing  
 [x] Participated in care plan, discharge planning, and/or interdisciplinary rounds Cultural, Anabaptism and ethnic food preferences identified: 
 None Skin Integrity: [x]Intact Edema: [x]None Last BM: 7/27/18 Food Allergies: [x]None; Egg listed as allergy however, pt claims she routinely eats eggs. PharmD to remove egg allergy Anthropometrics:   
Weight Loss Metrics 7/31/2018 12/13/2017 8/30/2017 2/1/2017 8/8/2016 2/13/2016 7/13/2015 Today's Wt 158 lb 1.6 oz 120 lb 120 lb 150 lb 167 lb 167 lb 4.8 oz 164 lb BMI 28.01 kg/m2 21.26 kg/m2 21.26 kg/m2 26.57 kg/m2 30.54 kg/m2 30.59 kg/m2 30.49 kg/m2 Last 3 Recorded Weights in this Encounter 07/30/18 0132 07/31/18 1541 Weight: 54.4 kg (119 lb 14.9 oz) 71.7 kg (158 lb 1.6 oz) Weight Source: Bed Height: 5' 3\" (160 cm), Body mass index is 28.01 kg/(m^2). IBW : 52.2 kg (115 lb), Usual Body Weight: 56.7 kg (125 lb),   
 
Labs:  Lab Results Component Value Date/Time  Sodium 135 (L) 08/01/2018 06:45 AM  
 Potassium 3.4 (L) 08/01/2018 06:45 AM  
 Chloride 100 08/01/2018 06:45 AM  
 CO2 27 08/01/2018 06:45 AM  
 Glucose 116 (H) 08/01/2018 06:45 AM  
 BUN 20 08/01/2018 06:45 AM  
 Creatinine 0.44 (L) 08/01/2018 06:45 AM  
 Calcium 8.5 08/01/2018 06:45 AM  
 Magnesium 1.5 (L) 07/30/2018 04:50 AM  
 Albumin 3.2 (L) 07/29/2018 09:20 PM  
 
Lab Results Component Value Date/Time Hemoglobin A1c 5.8 01/07/2014 03:26 PM  
 Hemoglobin A1c (POC) 5.7 (A) 09/27/2012 12:00 PM  
 
Lab Results Component Value Date/Time Glucose 116 (H) 08/01/2018 06:45 AM  
  
Lab Results Component Value Date/Time ALT (SGPT) 16 07/29/2018 09:20 PM  
 AST (SGOT) 33 07/29/2018 09:20 PM  
 Alk.  phosphatase 50 07/29/2018 09:20 PM  
 Bilirubin, total 1.9 (H) 07/29/2018 09:20 PM  
  
 
Roxana Haynes RD

## 2018-08-01 NOTE — DISCHARGE INSTRUCTIONS
Post op Discharge Instructions Hip Fracture   Migel Wallace MD  [facility]  [phone]    Patient Name: Ambrose Bernal  Date of admission:  7/29/2018  Date of procedure: 7/31/2018   Procedure: Procedure(s):  RIGHT FEMORAL INTERTROCHANTERIC NAIL (LATEX ALLERGY)  PCP: Myesha Duffy MD  Date of discharge: No discharge date for patient encounter. Follow up office visit   See Dr. Dagoberto Siddiqui approximately 3-4 weeks from date of surgery. Call [phone] to make an appointment. Activity  Walk with your walker with weight bearing restrictions as instructed by your physical therapist (partial weight bearing on your surgical leg). Continue using your walker until seen for follow-up visit. You should walk daily with the physical therapist  Perform your exercise routine 3 times a day as instructed by the physical therapist.    Incision Care  The Aquacel (brown, waterproof) surgical dressing is to remain on the hip for 7 days. On the 7th day gently peel the dressing off by carefully lifting the edge and stretching it slightly to break the adhesive seal    If your Aquacel dressing comes loose/off before the 7th day, you may replace it with a dry sterile gauze dressing; change it daily. Once the incision is not draining, leave it open to air    You may take a shower with the Aquacel dressing in place. Once the Aquacel is removed,  may shower and get your incision wet but do not submerge your incision under water in a bath tub, hot tub or swimming pool for 6 weeks after surgery.     Preventing blood clots  Lovenox 40mg sub q once daily until 14 days post-op then Enteric coated Aspirin 325 mg one tablet once a day for 2 weeks    Pain management  Continue pain medication as prescribed in the hospital  Continue home medications per medication reconciliation  Place an ice bag on the hip for 15-20 minutes after exercising and as needed throughout the day and night    Diet  Resume usual diet; encourage fluids; provide foods high in fiber, calcium and vitamin D  Provide stool softeners/laxatives as needed        After [facility] for SNF/Rehab (to be followed by post-acute provider)    Nursing  Complete head to toe assessment, vital signs  Medication reconciliation  Review pain management  Manage chronic medical conditions  Remove Aquacel dressing 7 days after surgery    Physical Therapy-status at discharge from the hospital    Weight bearing status:             Mobility Status:                Gait:                ADL status overall composite:                Physical Therapy-exercises, transfers, gait-training (partial weight bearing on the surgical leg)    Exercises related to strengthening the surgical hip:  Supine Exercises: Ankle pumps  Quad Sets  Gluteal Sets  Hip Abduction slides supine  Hip external rotation  Heel Slides    Standing Exercises:  Heel Raises  Mini-squats  Heel/toe touches and knee bend  Marching   Hip Abduction  Hip External Rotation  Hip Extension    Advance exercises with equipment for strengthening, flexibility, balance needs as appropriate per setting    Repetitions and number of sets to be established per patient tolerance     Reasons to call the Surgeon  1. Increased redness, swelling or drainage from the incision site  2. Temperature consistently greater than 101 degrees  3.   Increased pain or unrelieved pain in hip or calf        Blg-8/2014

## 2018-08-01 NOTE — PROGRESS NOTES
Occupational Therapy S:Orders received chart reviewed, patient in room lights off asleep with b-fest tray in room not eating. 
O:\"What are you doing coming in here and waking me up\" Re:Educated on role of OT and benefits post surgery. \"I am not going to do that, can't you see, now let me rest\". Attempted to set up a time for therapy, will follow up at lunch time around \"12\". A: Attempted to set up a time for therapy, will follow up at lunch time around \"12\". P: Needs OT evaluation.

## 2018-08-01 NOTE — PROGRESS NOTES
Cardiology Progress Note 2018     Admit Date: 2018 Admit Diagnosis: Fracture of femoral neck, right, closed ([facility] [address] Hip hematoma, right, initial encounter A-fib ([facility] [address] Fall RIGHT HIP INTERTROCHANTERIC FRACTURE  
unknown  CC: none currently Assessment:  
Principal Problem: 
  Fracture, intertrochanteric, right femur, closed, initial encounter ([facility] [address] (2018) Active Problems: 
  A-fib (HCC) () Fall (2018) Hip hematoma, right, initial encounter (2018) Plan:  
 
Continue current cardiac meds Restart coumadin when appropriate from surgical perspective Subjective:   
 
Wolf Chaves has no cardiac c/o this AM. JURGEN Objective:  
 Physical Exam: 
Overall VSSAF;   
Visit Vitals  /68 (BP 1 Location: Right arm, BP Patient Position: At rest)  Pulse 92  Temp 97.6 °F (36.4 °C)  Resp 16  
 Ht 5' 3\" (1.6 m)  Wt 71.7 kg (158 lb 1.6 oz)  SpO2 97%  BMI 28.01 kg/m2 Temp (24hrs), Av °F (36.7 °C), Min:97.4 °F (36.3 °C), Max:98.5 °F (36.9 °C) Patient Vitals for the past 8 hrs: 
 Pulse 18 0800 92  
18 0344 95 Patient Vitals for the past 8 hrs: 
 Resp  
18 0800 16  
18 0344 16 Patient Vitals for the past 8 hrs: 
 BP  
18 0800 116/68  
18 0344 140/66  
  
 1901 -  0700 In: 3969 [P.O.:100; I.V.:3869] Out: 800 [Urine:700] General Appearance: Well developed, well nourished, no acute distress. Ears/Nose/Mouth/Throat:   Normal MM; anicteric. JVP: WNL Resp:   Lungs clear to auscultation bilaterally. Nl resp effort. Cardiovascular:  I/I Abdomen:   Soft, non-tender, bowel sounds are present. Extremities: No edema bilaterally. Skin: 
Neuro: Warm and dry. A/O x3, grossly nonfocal  
                     
Data Review:    
Telemetry independently reviewed :   AFIB Labs:  
Recent Results (from the past 24 hour(s)) METABOLIC PANEL, BASIC  Collection Time: 18  6:45 AM Result Value Ref Range Sodium 135 (L) 136 - 145 mmol/L Potassium 3.4 (L) 3.5 - 5.1 mmol/L Chloride 100 97 - 108 mmol/L  
 CO2 27 21 - 32 mmol/L Anion gap 8 5 - 15 mmol/L Glucose 116 (H) 65 - 100 mg/dL BUN 20 6 - 20 MG/DL Creatinine 0.44 (L) 0.55 - 1.02 MG/DL  
 BUN/Creatinine ratio 45 (H) 12 - 20 GFR est AA >60 >60 ml/min/1.73m2 GFR est non-AA >60 >60 ml/min/1.73m2 Calcium 8.5 8.5 - 10.1 MG/DL  
HEMOGLOBIN Collection Time: 08/01/18  6:45 AM  
Result Value Ref Range HGB 9.0 (L) 11.5 - 16.0 g/dL Current medications reviewed Doc MD Afia

## 2018-08-01 NOTE — PROGRESS NOTES
Benito Álvarez, Raven Flynn, and Tania Admit Date: 7/29/2018 Subjective:  
 
Patient is awake and alert today. Minimal nausea. .. Current Facility-Administered Medications Medication Dose Route Frequency  ALPRAZolam (XANAX) tablet 0.5 mg  0.5 mg Oral TID PRN  
 atenolol (TENORMIN) tablet 25 mg  25 mg Oral DAILY  levothyroxine (SYNTHROID) tablet 100 mcg  100 mcg Oral 6am  
 potassium chloride SR (KLOR-CON 10) tablet 20 mEq  20 mEq Oral DAILY  spironolactone (ALDACTONE) tablet 50 mg  50 mg Oral DAILY  0.9% sodium chloride infusion  125 mL/hr IntraVENous CONTINUOUS  
 sodium chloride (NS) flush 5-10 mL  5-10 mL IntraVENous Q8H  
 sodium chloride (NS) flush 5-10 mL  5-10 mL IntraVENous PRN  
 naloxone (NARCAN) injection 0.4 mg  0.4 mg IntraVENous PRN  
 calcium-vitamin D (OS-LOW) 500 mg-200 unit tablet  1 Tab Oral TID WITH MEALS  senna-docusate (PERICOLACE) 8.6-50 mg per tablet 1 Tab  1 Tab Oral BID  polyethylene glycol (MIRALAX) packet 17 g  17 g Oral DAILY  [START ON 8/2/2018] bisacodyl (DULCOLAX) suppository 10 mg  10 mg Rectal DAILY PRN  
 acetaminophen (TYLENOL) tablet 650 mg  650 mg Oral Q6H  
 oxyCODONE IR (ROXICODONE) tablet 2.5-5 mg  2.5-5 mg Oral Q4H PRN  
 fentaNYL citrate (PF) injection 25 mcg  25 mcg IntraVENous Q4H PRN  
 hydrOXYzine HCl (ATARAX) tablet 10 mg  10 mg Oral Q8H PRN  
 ondansetron (ZOFRAN ODT) tablet 4 mg  4 mg Oral Q4H PRN  
 ondansetron (ZOFRAN) injection 4 mg  4 mg IntraVENous Q4H PRN  
 enoxaparin (LOVENOX) injection 40 mg  40 mg SubCUTAneous DAILY  meloxicam (MOBIC) tablet 7.5 mg  7.5 mg Oral DAILY  famotidine (PEPCID) tablet 20 mg  20 mg Oral BID PRN  
 sodium chloride (NS) flush 5-10 mL  5-10 mL IntraVENous Q8H  
 sodium chloride (NS) flush 5-10 mL  5-10 mL IntraVENous PRN  
 traMADol (ULTRAM) tablet 50 mg  50 mg Oral Q6H PRN Objective:  
 
Patient Vitals for the past 8 hrs: 
 BP Temp Pulse Resp SpO2 08/01/18 0344 140/66 97.4 °F (36.3 °C) 95 16 99 % 07/31/18 2315 123/64 98.1 °F (36.7 °C) 90 16 95 % 07/30 0701 - 07/31 1900 In: 3969 [P.O.:100; I.V.:3869] Out: 500 [Urine:400] Physical Exam: Lungs: clear to auscultation bilaterally Heart: regular rate and rhythm, S1, S2 normal, no murmur, click, rub or gallop Abdomen: soft, non-tender. Bowel sounds normal. No masses,  no organomegaly Data Review No results found for this or any previous visit (from the past 24 hour(s)). Assessment:  
 
Principal Problem: 
  Fracture, intertrochanteric, right femur, closed, initial encounter ([facility] [address] (7/29/2018) Active Problems: 
  A-fib (HCC) () Fall (7/29/2018) Hip hematoma, right, initial encounter (7/29/2018) Plan:  
 
1) S/P repair R hip Advance as tolerated Will likely need a nursing home rehab stay on discharge.  She has been at [facility] in past.

## 2018-08-01 NOTE — PROGRESS NOTES
4:35 PM 
Bedside and Verbal shift change report given to Ingrid Wilkinson, [facility] (oncoming nurse) by Emperatriz Underwood RN (offgoing nurse). Report included the following information SBAR, Kardex, OR Summary, Intake/Output, MAR and Recent Results.

## 2018-08-01 NOTE — PROGRESS NOTES
Bedside shift change report given to Radha Potter RN (oncoming nurse) by Daniel Wong RN (offgoing nurse). Report included the following information SBAR, Kardex and Recent Results.

## 2018-08-02 LAB — HGB BLD-MCNC: 9.6 G/DL (ref 11.5–16)

## 2018-08-02 PROCEDURE — 36415 COLL VENOUS BLD VENIPUNCTURE: CPT | Performed by: PHYSICIAN ASSISTANT

## 2018-08-02 PROCEDURE — 97530 THERAPEUTIC ACTIVITIES: CPT

## 2018-08-02 PROCEDURE — 97535 SELF CARE MNGMENT TRAINING: CPT

## 2018-08-02 PROCEDURE — 65660000000 HC RM CCU STEPDOWN

## 2018-08-02 PROCEDURE — 51798 US URINE CAPACITY MEASURE: CPT

## 2018-08-02 PROCEDURE — 74011250636 HC RX REV CODE- 250/636: Performed by: PHYSICIAN ASSISTANT

## 2018-08-02 PROCEDURE — 74011250637 HC RX REV CODE- 250/637: Performed by: PHYSICIAN ASSISTANT

## 2018-08-02 PROCEDURE — 0QS634Z REPOSITION RIGHT UPPER FEMUR WITH INTERNAL FIXATION DEVICE, PERCUTANEOUS APPROACH: ICD-10-PCS | Performed by: ORTHOPAEDIC SURGERY

## 2018-08-02 PROCEDURE — 85018 HEMOGLOBIN: CPT | Performed by: PHYSICIAN ASSISTANT

## 2018-08-02 RX ADMIN — ACETAMINOPHEN 650 MG: 325 TABLET, FILM COATED ORAL at 06:43

## 2018-08-02 RX ADMIN — LEVOTHYROXINE SODIUM 100 MCG: 50 TABLET ORAL at 06:44

## 2018-08-02 RX ADMIN — POTASSIUM CHLORIDE 20 MEQ: 750 TABLET, EXTENDED RELEASE ORAL at 10:33

## 2018-08-02 RX ADMIN — Medication 10 ML: at 13:14

## 2018-08-02 RX ADMIN — ACETAMINOPHEN 650 MG: 325 TABLET, FILM COATED ORAL at 17:57

## 2018-08-02 RX ADMIN — OXYCODONE HYDROCHLORIDE 5 MG: 5 TABLET ORAL at 10:43

## 2018-08-02 RX ADMIN — ENOXAPARIN SODIUM 40 MG: 100 INJECTION SUBCUTANEOUS at 10:34

## 2018-08-02 RX ADMIN — ATENOLOL 25 MG: 25 TABLET ORAL at 10:33

## 2018-08-02 RX ADMIN — Medication 10 ML: at 06:45

## 2018-08-02 RX ADMIN — ACETAMINOPHEN 650 MG: 325 TABLET, FILM COATED ORAL at 13:13

## 2018-08-02 RX ADMIN — DOCUSATE SODIUM AND SENNOSIDES 1 TABLET: 8.6; 5 TABLET, FILM COATED ORAL at 17:59

## 2018-08-02 RX ADMIN — CALCIUM CARBONATE-VITAMIN D TAB 500 MG-200 UNIT 1 TABLET: 500-200 TAB at 17:57

## 2018-08-02 RX ADMIN — CALCIUM CARBONATE-VITAMIN D TAB 500 MG-200 UNIT 1 TABLET: 500-200 TAB at 13:13

## 2018-08-02 RX ADMIN — DOCUSATE SODIUM AND SENNOSIDES 1 TABLET: 8.6; 5 TABLET, FILM COATED ORAL at 10:33

## 2018-08-02 RX ADMIN — CALCIUM CARBONATE-VITAMIN D TAB 500 MG-200 UNIT 1 TABLET: 500-200 TAB at 08:30

## 2018-08-02 RX ADMIN — Medication 10 ML: at 21:47

## 2018-08-02 RX ADMIN — OXYCODONE HYDROCHLORIDE 5 MG: 5 TABLET ORAL at 06:43

## 2018-08-02 RX ADMIN — OXYCODONE HYDROCHLORIDE 5 MG: 5 TABLET ORAL at 18:10

## 2018-08-02 RX ADMIN — MELOXICAM 7.5 MG: 7.5 TABLET ORAL at 10:33

## 2018-08-02 RX ADMIN — SPIRONOLACTONE 50 MG: 25 TABLET, FILM COATED ORAL at 10:32

## 2018-08-02 NOTE — PROGRESS NOTES
6:29 PM 
Bedside and Verbal shift change report given to Katie Castaneda RN (oncoming nurse) by Chapis Bueno RN (offgoing nurse). Report included the following information SBAR, Kardex, OR Summary, Intake/Output, MAR and Recent Results.

## 2018-08-02 NOTE — PROGRESS NOTES
Cardiology Progress Note 2018     Admit Date: 2018 Admit Diagnosis: Fracture of femoral neck, right, closed ([address] Hip hematoma, right, initial encounter A-fib ([address] Fall RIGHT HIP INTERTROCHANTERIC FRACTURE  
unknown  CC: none currently Assessment:  
Principal Problem: 
  Fracture, intertrochanteric, right femur, closed, initial encounter ([facility] [address] (2018) Active Problems: 
  A-fib (HCC) () Fall (2018) Hip hematoma, right, initial encounter (2018) Plan:  
 
Currently rate controlled, no changes to meds Restart coumadin when appropriate Subjective:   
 
Amelie Like has some nausea this AM. JURGEN Objective:  
 Physical Exam: 
Overall VSSAF;   
Visit Vitals  /74 (BP 1 Location: Right arm)  Pulse 88  Temp 98.1 °F (36.7 °C)  Resp 16  
 Ht 5' 3\" (1.6 m)  Wt 71.7 kg (158 lb 1.6 oz)  SpO2 95%  BMI 28.01 kg/m2 Temp (24hrs), Av °F (36.7 °C), Min:97.6 °F (36.4 °C), Max:98.1 °F (36.7 °C) Patient Vitals for the past 8 hrs: 
 Pulse 18 0852 88  
18 0346 88 Patient Vitals for the past 8 hrs: 
 Resp  
18 0852 16  
18 0346 16 Patient Vitals for the past 8 hrs: 
 BP  
18 0852 141/74  
18 0346 147/77  
  
 190 -  0700 In: -  
Out: 750 [Urine:750] General Appearance: Well developed, well nourished, no acute distress. Ears/Nose/Mouth/Throat:   Normal MM; anicteric. JVP: WNL Resp:   Lungs clear to auscultation bilaterally. Nl resp effort. Cardiovascular:  I/I Abdomen:   Soft, non-tender, bowel sounds are present. Extremities: No edema bilaterally. Skin: 
Neuro: Warm and dry. A/O x3, grossly nonfocal  
                     
Data Review:    
Telemetry independently reviewed :   AFIB Labs:  
Recent Results (from the past 24 hour(s)) HEMOGLOBIN Collection Time: 18  7:01 AM  
Result Value Ref Range HGB 9.6 (L) 11.5 - 16.0 g/dL  Current medications reviewed Celsa Manriquez MD

## 2018-08-02 NOTE — PROGRESS NOTES
Bedside and Verbal shift change report given to Jessica Walton RN (oncoming nurse) by Denis Flores RN (offgoing nurse). Report included the following information SBAR, Kardex, OR Summary, Procedure Summary, Intake/Output, MAR and Recent Results.

## 2018-08-02 NOTE — PROGRESS NOTES
Problem: Self Care Deficits Care Plan (Adult) Goal: *Acute Goals and Plan of Care (Insert Text) Occupational Therapy Goals Initiated 8/1/2018 1. Patient will perform lower body ADLs with AE moderate assistance  within 7 day(s). 2.  Patient will perform upper body ADLs seated EOB for 5 mins without fatigue or LOB with supervision/set-up within 7 day(s). 3.  Patient will perform toilet transfer with maximal assistance within 7 day(s). 4.  Patient will perform all aspects of toileting with maximal assistance within 7 day(s). 5.  Patient will participate in upper extremity therapeutic exercise/activities with supervision/set-up for 5 minutes within 7 day(s). 6.  Patient will utilize energy conservation techniques during functional activities without cues within 7 day(s). Occupational Therapy TREATMENT Patient: Carlos Buckley (89 y.o. female) Date: 8/2/2018 Diagnosis: Fracture of femoral neck, right, closed ([address] Hip hematoma, right, initial encounter A-fib ([address] Fall RIGHT HIP INTERTROCHANTERIC FRACTURE  
unknown Fracture, intertrochanteric, right femur, closed, initial encounter ([address] Procedure(s) (LRB): 
RIGHT FEMORAL INTERTROCHANTERIC NAIL (LATEX ALLERGY) (Right) 2 Days Post-Op Precautions: Fall, WBAT Chart, occupational therapy assessment, plan of care, and goals were reviewed. ASSESSMENT: 
Patient progressing with sitting up in bed to complete self care with setup for grooming and self feeding. Patient requires max encouragement to increase independence. Recommend rehab - SNF level. Progression toward goals: 
[]       Improving appropriately and progressing toward goals [x]       Improving slowly and progressing toward goals 
[]       Not making progress toward goals and plan of care will be adjusted PLAN: 
Patient continues to benefit from skilled intervention to address the above impairments. Continue treatment per established plan of care.  
Discharge Recommendations:  Skilled Nursing Facility Further Equipment Recommendations for Discharge:  TBD SUBJECTIVE:  
Patient stated I have heard that song and dance before. The doctor said it will all happen in due time. It is not due time yet.  OBJECTIVE DATA SUMMARY:  
Cognitive/Behavioral Status: 
  
  
 Alert, oriented to self, place, and situation. Intermittent confusion. Seems to have anxiety and fear of falling. Education provided on falls, the psychological impact on functional mobility and ADLs, and how to move forward. Functional Mobility and Transfers for ADLs: 
Bed Mobility: 
  patient instruction on benefits of the bed in chair position to prep the body for EOB, standing etc. Patient declining all efforts. Patient declining all functional mobility to decrease stiffness and pain, but agreeable to ice. Transfers: 
 Patient received slouched down in the bed. Declining to scoot up bed/to be repositioned. Balance: ADL Intervention: 
  
Patient declining OT services. With education and coaxing patient did complete:  
Grooming Washing Face: Supervision/set-up Washing Hands: Supervision/set-up Brushing/Combing Hair: Supervision/set-up Feeding: setup containers, encouragement to complete on own but declined. Neuro Re-Education: 
  
  
  
Therapeutic Exercises:  
Patient instructed on benefits, declining to participate at this time. \"I move in the bed all night and talk enough that my lungs and arms will be just fine\". Pain: 
Pain Scale 1: Numeric (0 - 10) Pain Intensity 1: 5 Pain Location 1: Leg 
Pain Orientation 1: Anterior Pain Description 1: Aching Pain Intervention(s) 1: Medication (see MAR); Distraction; Food Activity Tolerance: VSS Please refer to the flowsheet for vital signs taken during this treatment.  
After treatment:  
[] Patient left in no apparent distress sitting up in chair 
[x] Patient left in no apparent distress in bed 
[x] Call bell left within reach [x] Nursing notified 
[] Caregiver present 
[] Bed alarm activated COMMUNICATION/COLLABORATION:  
The patients plan of care was discussed with: Physical Therapist 
 
Valerie Rosales Time Calculation: 11 mins

## 2018-08-02 NOTE — PROGRESS NOTES
Ortho Daily Progress Note Patient: Glenys Galaviz                   MRN: [id number]  Sex: female YOB: 1931           Age: 80 y.o. 
 
2 Days Post-Op Procedure(s): RIGHT FEMORAL INTERTROCHANTERIC NAIL (LATEX ALLERGY) Subjective: Complaining of right hip pain and nausea Visit Vitals  /74 (BP 1 Location: Right arm)  Pulse 88  Temp 98.1 °F (36.7 °C)  Resp 16  
 Ht 5' 3\" (1.6 m)  Wt 71 kg (156 lb 9.6 oz)  SpO2 95%  BMI 27.74 kg/m2 Lab Results: HGB Date/Time Value Ref Range Status 08/02/2018 07:01 AM 9.6 (L) 11.5 - 16.0 g/dL Final  
 
INR Date/Time Value Ref Range Status 07/29/2018 09:20 PM 1.3 (H) 0.9 - 1.1   Final  
  Comment:  
  A single therapeutic range for Vit K antagonists may not be optimal for all indications - see June, 2008 issue of Chest, American College of Chest Physicians Evidence-Based Clinical Practice Guidelines, 8th Edition. Physical Exam: 
 
GENERAL: alert, cooperative, no distress, congested sounding cough DRESSING: clean/dry WOUND: wound clean and dry no evidence of infection SWELLING: mild MOTION: normal right hip ROM 
NEUROLOGICAL: intact PULSE:yes DVT Exam: Posterior calf tenderness present. Plan: 
 
Encourage incentive spirometry DVT prophylaxisLovenox Weight bearing restrictionWBAT Pain Control:stable, mild-to-moderate joint symptoms intermittently, reasonably well controlled by current meds Dispo: [address] 
8/2/2018 11:20 AM 
 
 
.

## 2018-08-02 NOTE — PROGRESS NOTES
Physical Therapy 
8.2.18 
 
S: \"Don't talk to me like that. \" when PT informed patient that is only 3:30 in the afternoon after patient complained that \"You keep coming at night. \"  
 
O: Patient agreeable to attempt mobilizing to EOB with PT assist, once encouraged. Overall max Ax1 to mobilize R LE towards L side of bed. Required ~18 minutes to mobilize R LE ~8 inches before patient stated she was NOT going to sit up and \"let the leg hang down. \" Assisted with repositioning in bed. A: Continues to be self limiting due to pain and patient very head strong in what she will and will not attempt. Intermittently confused with tangential conversation requiring max cues to attend to task. P: Recommend SNF. F/u tomorrow as able. Thank you. Vera Bailey, PT, DPT Time calculation: 21 minutes

## 2018-08-02 NOTE — PROGRESS NOTES
Benito Malave, Raven Michelle, and Tania Admit Date: 7/29/2018 Subjective:  
 
Patient doing OK. No complaints. Mayur Childs Current Facility-Administered Medications Medication Dose Route Frequency  ALPRAZolam (XANAX) tablet 0.5 mg  0.5 mg Oral TID PRN  
 atenolol (TENORMIN) tablet 25 mg  25 mg Oral DAILY  levothyroxine (SYNTHROID) tablet 100 mcg  100 mcg Oral 6am  
 potassium chloride SR (KLOR-CON 10) tablet 20 mEq  20 mEq Oral DAILY  spironolactone (ALDACTONE) tablet 50 mg  50 mg Oral DAILY  sodium chloride (NS) flush 5-10 mL  5-10 mL IntraVENous Q8H  
 sodium chloride (NS) flush 5-10 mL  5-10 mL IntraVENous PRN  
 naloxone (NARCAN) injection 0.4 mg  0.4 mg IntraVENous PRN  
 calcium-vitamin D (OS-LOW) 500 mg-200 unit tablet  1 Tab Oral TID WITH MEALS  senna-docusate (PERICOLACE) 8.6-50 mg per tablet 1 Tab  1 Tab Oral BID  polyethylene glycol (MIRALAX) packet 17 g  17 g Oral DAILY  bisacodyl (DULCOLAX) suppository 10 mg  10 mg Rectal DAILY PRN  
 acetaminophen (TYLENOL) tablet 650 mg  650 mg Oral Q6H  
 oxyCODONE IR (ROXICODONE) tablet 2.5-5 mg  2.5-5 mg Oral Q4H PRN  
 fentaNYL citrate (PF) injection 25 mcg  25 mcg IntraVENous Q4H PRN  
 hydrOXYzine HCl (ATARAX) tablet 10 mg  10 mg Oral Q8H PRN  
 ondansetron (ZOFRAN ODT) tablet 4 mg  4 mg Oral Q4H PRN  
 enoxaparin (LOVENOX) injection 40 mg  40 mg SubCUTAneous DAILY  meloxicam (MOBIC) tablet 7.5 mg  7.5 mg Oral DAILY  famotidine (PEPCID) tablet 20 mg  20 mg Oral BID PRN  
 sodium chloride (NS) flush 5-10 mL  5-10 mL IntraVENous Q8H  
 sodium chloride (NS) flush 5-10 mL  5-10 mL IntraVENous PRN  
 traMADol (ULTRAM) tablet 50 mg  50 mg Oral Q6H PRN Objective:  
 
Patient Vitals for the past 8 hrs: 
 BP Temp Pulse Resp SpO2  
08/02/18 0346 147/77 98.1 °F (36.7 °C) 88 16 94 % 07/31 1901 - 08/02 0700 In: -  
Out: 400 [Urine:400] Physical Exam: Lungs: clear to auscultation bilaterally Heart: regular rate and rhythm, S1, S2 normal, no murmur, click, rub or gallop Abdomen: soft, non-tender. Bowel sounds normal. No masses,  no organomegaly Data Review No results found for this or any previous visit (from the past 24 hour(s)). Assessment:  
 
Principal Problem: 
  Fracture, intertrochanteric, right femur, closed, initial encounter ([facility] [address] (7/29/2018) Active Problems: 
  A-fib (HCC) () Fall (7/29/2018) Hip hematoma, right, initial encounter (7/29/2018) Plan:  
 
1) mobilize as jessica 
2) to go to [facility] [place] [facility]\""

## 2018-08-02 NOTE — PROGRESS NOTES
Bedside and Verbal shift change report given to MARY JANE Francisco (oncoming nurse) by Lupillo Muller RN (offgoing nurse). Report included the following information SBAR, Kardex, Intake/Output, MAR and Recent Results.

## 2018-08-03 VITALS
BODY MASS INDEX: 27.75 KG/M2 | WEIGHT: 156.6 LBS | RESPIRATION RATE: 15 BRPM | TEMPERATURE: 97.8 F | HEART RATE: 67 BPM | HEIGHT: 63 IN | OXYGEN SATURATION: 95 % | SYSTOLIC BLOOD PRESSURE: 143 MMHG | DIASTOLIC BLOOD PRESSURE: 81 MMHG

## 2018-08-03 PROCEDURE — 74011250637 HC RX REV CODE- 250/637: Performed by: PHYSICIAN ASSISTANT

## 2018-08-03 PROCEDURE — 74011250636 HC RX REV CODE- 250/636: Performed by: PHYSICIAN ASSISTANT

## 2018-08-03 PROCEDURE — 94760 N-INVAS EAR/PLS OXIMETRY 1: CPT

## 2018-08-03 PROCEDURE — 74011250637 HC RX REV CODE- 250/637: Performed by: ORTHOPAEDIC SURGERY

## 2018-08-03 RX ORDER — MELOXICAM 7.5 MG/1
7.5 TABLET ORAL DAILY
Qty: 30 TAB | Refills: 1 | Status: SHIPPED | OUTPATIENT
Start: 2018-08-04 | End: 2018-10-10 | Stop reason: SDUPTHER

## 2018-08-03 RX ORDER — ONDANSETRON 4 MG/1
4 TABLET, ORALLY DISINTEGRATING ORAL
Qty: 12 TAB | Refills: 0 | Status: SHIPPED | OUTPATIENT
Start: 2018-08-03 | End: 2018-10-17 | Stop reason: SDUPTHER

## 2018-08-03 RX ORDER — POLYETHYLENE GLYCOL 3350 17 G/17G
17 POWDER, FOR SOLUTION ORAL DAILY
Qty: 30 PACKET | Refills: 1 | Status: SHIPPED | OUTPATIENT
Start: 2018-08-04 | End: 2018-10-10 | Stop reason: SDUPTHER

## 2018-08-03 RX ORDER — FAMOTIDINE 20 MG/1
20 TABLET, FILM COATED ORAL
Qty: 60 TAB | Refills: 3 | Status: SHIPPED | OUTPATIENT
Start: 2018-08-03 | End: 2018-10-10 | Stop reason: SDUPTHER

## 2018-08-03 RX ORDER — FERROUS SULFATE, DRIED 160(50) MG
1 TABLET, EXTENDED RELEASE ORAL
Qty: 100 TAB | Refills: 0 | Status: SHIPPED | OUTPATIENT
Start: 2018-08-03 | End: 2018-10-10 | Stop reason: SDUPTHER

## 2018-08-03 RX ORDER — OXYCODONE HYDROCHLORIDE 5 MG/1
2.5-5 TABLET ORAL
Qty: 30 TAB | Refills: 0 | Status: SHIPPED | OUTPATIENT
Start: 2018-08-03 | End: 2018-12-17 | Stop reason: ALTCHOICE

## 2018-08-03 RX ORDER — TRAMADOL HYDROCHLORIDE 50 MG/1
50 TABLET ORAL
Qty: 40 TAB | Refills: 0 | Status: SHIPPED | OUTPATIENT
Start: 2018-08-03 | End: 2018-10-17 | Stop reason: SDUPTHER

## 2018-08-03 RX ORDER — ACETAMINOPHEN 325 MG/1
650 TABLET ORAL EVERY 6 HOURS
Qty: 100 TAB | Refills: 0 | Status: SHIPPED | OUTPATIENT
Start: 2018-08-03 | End: 2019-10-04

## 2018-08-03 RX ORDER — AMOXICILLIN 250 MG
1 CAPSULE ORAL 2 TIMES DAILY
Qty: 60 TAB | Refills: 1 | Status: SHIPPED | OUTPATIENT
Start: 2018-08-03 | End: 2018-10-10 | Stop reason: SDUPTHER

## 2018-08-03 RX ADMIN — ALPRAZOLAM 0.5 MG: 0.25 TABLET ORAL at 14:54

## 2018-08-03 RX ADMIN — ACETAMINOPHEN 650 MG: 325 TABLET, FILM COATED ORAL at 18:28

## 2018-08-03 RX ADMIN — TRAMADOL HYDROCHLORIDE 50 MG: 50 TABLET, FILM COATED ORAL at 10:01

## 2018-08-03 RX ADMIN — WARFARIN SODIUM 6 MG: 5 TABLET ORAL at 16:33

## 2018-08-03 RX ADMIN — POTASSIUM CHLORIDE 20 MEQ: 750 TABLET, EXTENDED RELEASE ORAL at 09:55

## 2018-08-03 RX ADMIN — Medication 10 ML: at 05:43

## 2018-08-03 RX ADMIN — Medication 10 ML: at 16:33

## 2018-08-03 RX ADMIN — ATENOLOL 25 MG: 25 TABLET ORAL at 09:55

## 2018-08-03 RX ADMIN — ONDANSETRON 4 MG: 4 TABLET, ORALLY DISINTEGRATING ORAL at 14:54

## 2018-08-03 RX ADMIN — ACETAMINOPHEN 650 MG: 325 TABLET, FILM COATED ORAL at 12:20

## 2018-08-03 RX ADMIN — MELOXICAM 7.5 MG: 7.5 TABLET ORAL at 09:55

## 2018-08-03 RX ADMIN — SPIRONOLACTONE 50 MG: 25 TABLET, FILM COATED ORAL at 09:55

## 2018-08-03 RX ADMIN — DOCUSATE SODIUM AND SENNOSIDES 1 TABLET: 8.6; 5 TABLET, FILM COATED ORAL at 09:54

## 2018-08-03 RX ADMIN — TRAMADOL HYDROCHLORIDE 50 MG: 50 TABLET, FILM COATED ORAL at 18:37

## 2018-08-03 RX ADMIN — DOCUSATE SODIUM AND SENNOSIDES 1 TABLET: 8.6; 5 TABLET, FILM COATED ORAL at 18:28

## 2018-08-03 RX ADMIN — ACETAMINOPHEN 650 MG: 325 TABLET, FILM COATED ORAL at 05:43

## 2018-08-03 RX ADMIN — ENOXAPARIN SODIUM 40 MG: 100 INJECTION SUBCUTANEOUS at 09:55

## 2018-08-03 RX ADMIN — LEVOTHYROXINE SODIUM 100 MCG: 50 TABLET ORAL at 05:43

## 2018-08-03 RX ADMIN — OXYCODONE HYDROCHLORIDE 5 MG: 5 TABLET ORAL at 00:00

## 2018-08-03 RX ADMIN — ACETAMINOPHEN 650 MG: 325 TABLET, FILM COATED ORAL at 00:00

## 2018-08-03 NOTE — PROGRESS NOTES
Pharmacist Note  Warfarin Dosing Consult provided for this 80 y.o. female to manage warfarin for AFib INR Goal: 2 - 3 Home Dose PTA:  2.5 mg q PM (per chart review. Unable to confirm with private RN - left ms for call back from [facility] at [phone]\") Drugs that may increase INR: None Drugs that may decrease INR: None Other current anticoagulants/ drugs that may increase bleeding risk: Enoxaparin and NSAID Risk factors: Age > 72 Daily INR ordered: YES Recent Labs 08/02/18 
 0701  08/01/18 
 1840 HGB  9.6*  9.0* Hgb, Hct & INR (1 Year) Recent Labs 08/02/18 
 0701  08/01/18 
 0645  07/30/18 
 0450  07/29/18 
 2120  03/29/18 
 1130  02/12/18 
 1600  12/13/17 
 1426 HGB  9.6*  9.0*  11.2*  13.7   --    --    --   
HCT   --    --   34.0*  41.4   --    --    --   
INR   --    --    --   1.3*  1.6*  1.7*  1.7* Date               INR                 Dose 7/23  ---  2.5 mg 
7/29  1.3  --- 
8/03  ---  6 mg Assessment/ Plan: Will order warfarin 6 mg PO x 1 dose prior to discharge Today. On discharge, would recommend therapeutic bridging with  Lovenox and warfarin 3 mg daily until INR therapeutic. Pharmacy will continue to monitor daily and adjust therapy as indicated.

## 2018-08-03 NOTE — PROGRESS NOTES
Hospital to [facility] Handoff - Lynette Gonzalez 
                                                                      80 y.o.   female [address]   Room: [address] JOINT  Unit Phone# :  [phone] [address] [address] Dept: [phone] Loc: [phone] SITUATION Admitted:  7/29/2018         Attending Provider:  No att. providers found Consultations:  IP CONSULT TO ORTHOPEDIC SURGERY 
IP CONSULT TO CARDIOLOGY 
 
PCP:  Pablo Rubio MD   [phone] Treatment Team: Consulting Provider: Lul Avalos MD; Consulting Provider: Matthias Stuart MD; Surgeon: Lul Avalos MD; Consulting Provider: Timothy Hood PA-C; Utilization Review: [place]; Care Manager: EVY Mullen Admitting Dx: Fracture of femoral neck, right, closed ([address] Hip hematoma, right, initial encounter A-fib ([address] Fall RIGHT HIP INTERTROCHANTERIC FRACTURE  
unknown Principal Problem: Fracture, intertrochanteric, right femur, closed, initial encounter ([address] 3 Days Post-Op of  
Procedure(s): RIGHT FEMORAL INTERTROCHANTERIC NAIL (LATEX ALLERGY) BY: Lul Avalos MD             ON: 7/31/2018 Code Status: Full Code Advance Directives:  
Advance Care Planning 7/30/2018 Patient's Healthcare Decision Maker is: Legal Next of Kin Primary Decision Maker Name Sunni Moura Primary Decision Maker Phone Number [phone] Primary Decision Maker Relationship to Patient Sibling Confirm Advance Directive None Patient Would Like to Complete Advance Directive - (Send w/patient) Verified Isolation:  There are currently no Active Isolations       MDRO: No current active infections Pain Medications given:  Tramadol    Last dose: 8/3/2018 at  1837 Special Equipment needed: yes  Type of equipment: walker, gait belt, etc. 
 
 
(Not currently on dialysis) (Not currently on dialysis) (Not currently on dialysis) BACKGROUND Allergies: Allergies Allergen Reactions  Latex, Natural Rubber Other (comments) \"I get black where ever it touches\"  Codeine Other (comments) \"It just sends me up the wall\"  Adhesive Rash and Other (comments)  
  blisters  Cortisone Unknown (comments) Past Medical History:  
Diagnosis Date  A-fib ([address]  Arrhythmia A FIB  Arthritis  Back pain  CAD (coronary artery disease) PT DENIES  Chronic pain  Dementia 2/9/2016  Hypercholesterolemia  Psychiatric disorder ANXIETY  Shoulder pain  Thyroid disease  Tremor, essential   
 
 
Past Surgical History:  
Procedure Laterality Date  HX APPENDECTOMY  HX ORTHOPAEDIC    
 left knee replacement  HX KADIE AND BSO AGE 39  
 HX TONSIL AND ADENOIDECTOMY No prescriptions prior to admission. Hard scripts included in transfer packet yes Vaccinations:   
Immunization History Administered Date(s) Administered  Pneumococcal Polysaccharide (PPSV-23) 01/16/2014 Readmission Risks:    Known Risks: unstable gait, limited mobility, pain, advanced age The Charlson Co[employer]bit[employer] Index tool is an evidenced based tool that has more automatic generated information. The tool looks at many different items such as the age of the patient, how many times they were admitted in the last calendar year, current length of stay in the hospital and their diagnosis. All of these items are pulled automatically from information documented in the chart from various places and will generate a score that predicts whether a patient is at low (less than 13), medium (13-20) or high (21 or greater) risk of being readmitted. ASSESSMENT Temp: 97.8 °F (36.6 °C) (08/03/18 1409) Pulse (Heart Rate): 67 (08/03/18 1409) Resp Rate: 15 (08/03/18 1409)           BP: 143/81 (08/03/18 1409) O2 Sat (%): 95 % (08/03/18 1409) Weight: 71 kg (156 lb 9.6 oz)    Height: 5' 3\" (160 cm) (07/30/18 0132) If above not within 1 hour of discharge: 
 
BP:_____  P:____  R:____ T:_____ O2 Sat: ___%  O2: ______ Active Orders Diet DIET REGULAR Orientation: only aware of  time, person and situation Active Behaviors: None Active Lines/Drains:  (Peg Tube / Mock / CL or S/L?): no 
 
Urinary Status: External catheter, Voiding     Last BM: Last Bowel Movement Date: 07/29/18 Skin Integrity: Incision (comment) Wound Hip Right-DRESSING STATUS: Clean, dry, and intact Wound Hip Right-DRESSING TYPE: ABD pad Mobility: Very limited Weight Bearing Status: WBAT (Weight Bearing as Tolerated) Lab Results Component Value Date/Time Glucose 116 (H) 08/01/2018 06:45 AM  
 Hemoglobin A1c 5.8 01/07/2014 03:26 PM  
 INR 1.3 (H) 07/29/2018 09:20 PM  
 INR 2.1 (H) 02/07/2017 04:04 AM  
 INR POC 1.6 (A) 03/29/2018 11:30 AM  
 INR POC 1.7 (A) 02/12/2018 04:00 PM  
 HGB 9.6 (L) 08/02/2018 07:01 AM  
 HGB 9.0 (L) 08/01/2018 06:45 AM  
  
  RECOMMENDATION See After Visit Summary (AVS) for: · Discharge instructions · After [facility] · Special equipment needed (entered pre-discharge by Care Management) · Medication Reconciliation · Follow up Appointment(s) Report given/sent by:  Henrry Mckenna Verbal report given to: [place] [facility] [place][phone])  [place] [facility] [place]imated discharge time:  8/3/2018 at [address]

## 2018-08-03 NOTE — PROGRESS NOTES
Occupational Therapy Note: Attempted to see pt for continued training. Pt received supine in bed and reporting increased amounts of pain. Pt declining OOB activities at this time and requesting pain medication. Offered ice pack and repositioned her in bed slightly but aborted further activity. Pt requesting therapy to follow up at noon today. Will attempt to follow up as able.   
 
 
Flaco Maciel, OT

## 2018-08-03 NOTE — ROUTINE PROCESS
Bedside shift change report given to Joyce Deras (oncoming nurse) by Eden Rao (offgoing nurse). Report included the following information SBAR.

## 2018-08-03 NOTE — PROGRESS NOTES
Problem: Falls - Risk of 
Goal: *Absence of Falls Document Mat Neal Fall Risk and appropriate interventions in the flowsheet. Outcome: Progressing Towards Goal 
Fall Risk Interventions: 
Mobility Interventions: Patient to call before getting OOB Mentation Interventions: Increase mobility Medication Interventions: Patient to call before getting OOB Elimination Interventions: Call light in reach, Toileting schedule/hourly rounds History of Falls Interventions: Door open when patient unattended, Investigate reason for fall, Utilize gait belt for transfer/ambulation

## 2018-08-03 NOTE — PROGRESS NOTES
Benito Ham, [facility], Ilya Stein, and Barrera Dash Admit Date: 7/29/2018 Subjective:  
 
Patient with OK pain control. .  
 
 
Current Facility-Administered Medications Medication Dose Route Frequency  ALPRAZolam (XANAX) tablet 0.5 mg  0.5 mg Oral TID PRN  
 atenolol (TENORMIN) tablet 25 mg  25 mg Oral DAILY  levothyroxine (SYNTHROID) tablet 100 mcg  100 mcg Oral 6am  
 potassium chloride SR (KLOR-CON 10) tablet 20 mEq  20 mEq Oral DAILY  spironolactone (ALDACTONE) tablet 50 mg  50 mg Oral DAILY  sodium chloride (NS) flush 5-10 mL  5-10 mL IntraVENous Q8H  
 sodium chloride (NS) flush 5-10 mL  5-10 mL IntraVENous PRN  
 naloxone (NARCAN) injection 0.4 mg  0.4 mg IntraVENous PRN  
 calcium-vitamin D (OS-LOW) 500 mg-200 unit tablet  1 Tab Oral TID WITH MEALS  senna-docusate (PERICOLACE) 8.6-50 mg per tablet 1 Tab  1 Tab Oral BID  polyethylene glycol (MIRALAX) packet 17 g  17 g Oral DAILY  bisacodyl (DULCOLAX) suppository 10 mg  10 mg Rectal DAILY PRN  
 acetaminophen (TYLENOL) tablet 650 mg  650 mg Oral Q6H  
 oxyCODONE IR (ROXICODONE) tablet 2.5-5 mg  2.5-5 mg Oral Q4H PRN  
 fentaNYL citrate (PF) injection 25 mcg  25 mcg IntraVENous Q4H PRN  
 hydrOXYzine HCl (ATARAX) tablet 10 mg  10 mg Oral Q8H PRN  
 ondansetron (ZOFRAN ODT) tablet 4 mg  4 mg Oral Q4H PRN  
 enoxaparin (LOVENOX) injection 40 mg  40 mg SubCUTAneous DAILY  meloxicam (MOBIC) tablet 7.5 mg  7.5 mg Oral DAILY  famotidine (PEPCID) tablet 20 mg  20 mg Oral BID PRN  
 sodium chloride (NS) flush 5-10 mL  5-10 mL IntraVENous Q8H  
 sodium chloride (NS) flush 5-10 mL  5-10 mL IntraVENous PRN  
 traMADol (ULTRAM) tablet 50 mg  50 mg Oral Q6H PRN Objective:  
 
Patient Vitals for the past 8 hrs: 
 BP Temp Pulse Resp SpO2  
08/03/18 0257 151/81 98.2 °F (36.8 °C) 87 16 93 % 08/01 1901 - 08/03 0700 In: -  
Out: [place] Physical Exam: Lungs: clear to auscultation bilaterally Heart: regular rate and rhythm, S1, S2 normal, no murmur, click, rub or gallop Abdomen: soft, non-tender. Bowel sounds normal. No masses,  no organomegaly Data Review No results found for this or any previous visit (from the past 24 hour(s)). Assessment:  
 
Principal Problem: 
  Fracture, intertrochanteric, right femur, closed, initial encounter ([facility] [address] (7/29/2018) Active Problems: 
  A-fib (HCC) () Fall (7/29/2018) Hip hematoma, right, initial encounter (7/29/2018) Plan:  
 
1) Mobilize as jessica 
2) [address]

## 2018-08-03 NOTE — PROGRESS NOTES
Cardiology Progress Note 8/3/2018     Admit Date: 2018 Admit Diagnosis: Fracture of femoral neck, right, closed ([address] Hip hematoma, right, initial encounter A-fib ([address] Fall RIGHT HIP INTERTROCHANTERIC FRACTURE  
unknown  CC: none currently Assessment:  
Principal Problem: 
  Fracture, intertrochanteric, right femur, closed, initial encounter ([address] (2018) Active Problems: 
  A-fib (HCC) () Fall (2018) Hip hematoma, right, initial encounter (2018) Plan:  
 
No changes to meds No further cardiac procedures planned Restart warfarin when OK from surgical perspective Signing off, please call with question Subjective:   
 
Mynor Bowen has some nausea this AM. JURGEN Objective:  
 Physical Exam: 
Overall VSSAF;   
Visit Vitals  /81 (BP 1 Location: Left arm, BP Patient Position: At rest)  Pulse 87  Temp 98.2 °F (36.8 °C)  Resp 16  
 Ht 5' 3\" (1.6 m)  Wt 71 kg (156 lb 9.6 oz)  SpO2 93%  BMI 27.74 kg/m2 Temp (24hrs), Av °F (36.7 °C), Min:97.5 °F (36.4 °C), Max:98.2 °F (36.8 °C) Patient Vitals for the past 8 hrs: 
 Pulse 18 0257 87 Patient Vitals for the past 8 hrs: 
 Resp  
18 0257 16 Patient Vitals for the past 8 hrs: 
 BP  
18 0257 151/81  
  
 1901 -  0700 In: -  
Out: [facility] General Appearance: Well developed, well nourished, no acute distress. Ears/Nose/Mouth/Throat:   Normal MM; anicteric. JVP: WNL Resp:   Lungs clear to auscultation bilaterally. Nl resp effort. Cardiovascular:  I/I Abdomen:   Soft, non-tender, bowel sounds are present. Extremities: No edema bilaterally. Skin: 
Neuro: Warm and dry. A/O x3, grossly nonfocal  
                     
Data Review:    
Telemetry independently reviewed :   AFIB Labs:  
No results found for this or any previous visit (from the past 24 hour(s)). Current medications reviewed Lorrane Jacqui, MD

## 2018-08-03 NOTE — PROGRESS NOTES
ORTH FRACTURE PROGRESS NOTE August 3, 2018 Admit Date:  
2018 Post Op day: 3 Days Post-Op Subjective:   
Ambrose Bernal states that she has pain in her thigh and has had trouble mobilizing with PT. No new complaints. Tolerating diet Denies N/V/SOB or CP 
  
 
PT/OT:  
Gait:    
            
 
Vital Signs:   
Patient Vitals for the past 8 hrs: 
 BP Temp Pulse Resp SpO2  
18 0822 142/72 98.2 °F (36.8 °C) 85 15 95 % 18 0257 151/81 98.2 °F (36.8 °C) 87 16 93 % Temp (24hrs), Av °F (36.7 °C), Min:97.5 °F (36.4 °C), Max:98.2 °F (36.8 °C) Pain Control:  
Pain Assessment Pain Scale 1: Numeric (0 - 10) Pain Intensity 1: 2 Pain Onset 1: post op Pain Location 1: Hip Pain Orientation 1: Right Pain Description 1: Aching Pain Intervention(s) 1: Medication (see MAR) Meds: 
 
Current Facility-Administered Medications Medication Dose Route Frequency  ALPRAZolam (XANAX) tablet 0.5 mg  0.5 mg Oral TID PRN  
 atenolol (TENORMIN) tablet 25 mg  25 mg Oral DAILY  levothyroxine (SYNTHROID) tablet 100 mcg  100 mcg Oral 6am  
 potassium chloride SR (KLOR-CON 10) tablet 20 mEq  20 mEq Oral DAILY  spironolactone (ALDACTONE) tablet 50 mg  50 mg Oral DAILY  sodium chloride (NS) flush 5-10 mL  5-10 mL IntraVENous Q8H  
 sodium chloride (NS) flush 5-10 mL  5-10 mL IntraVENous PRN  
 naloxone (NARCAN) injection 0.4 mg  0.4 mg IntraVENous PRN  
 calcium-vitamin D (OS-LOW) 500 mg-200 unit tablet  1 Tab Oral TID WITH MEALS  senna-docusate (PERICOLACE) 8.6-50 mg per tablet 1 Tab  1 Tab Oral BID  polyethylene glycol (MIRALAX) packet 17 g  17 g Oral DAILY  bisacodyl (DULCOLAX) suppository 10 mg  10 mg Rectal DAILY PRN  
 acetaminophen (TYLENOL) tablet 650 mg  650 mg Oral Q6H  
 oxyCODONE IR (ROXICODONE) tablet 2.5-5 mg  2.5-5 mg Oral Q4H PRN  
 fentaNYL citrate (PF) injection 25 mcg  25 mcg IntraVENous Q4H PRN  
 hydrOXYzine HCl (ATARAX) tablet 10 mg  10 mg Oral Q8H PRN  ondansetron (ZOFRAN ODT) tablet 4 mg  4 mg Oral Q4H PRN  
 enoxaparin (LOVENOX) injection 40 mg  40 mg SubCUTAneous DAILY  meloxicam (MOBIC) tablet 7.5 mg  7.5 mg Oral DAILY  famotidine (PEPCID) tablet 20 mg  20 mg Oral BID PRN  
 sodium chloride (NS) flush 5-10 mL  5-10 mL IntraVENous Q8H  
 sodium chloride (NS) flush 5-10 mL  5-10 mL IntraVENous PRN  
 traMADol (ULTRAM) tablet 50 mg  50 mg Oral Q6H PRN  
 
 
LAB:   
Recent Labs 08/02/18 
 0701 HGB  9.6* Transfuse PRBC's:   
 
Assessment & Physician's Comment: 
Dressing is clean, dry, and intact Neurovascular checks within normal limits Orientation:  Disoriented (baseline) Principal Problem: 
  Fracture, intertrochanteric, right femur, closed, initial encounter ([facility] [address] (7/29/2018) Active Problems: 
  A-fib (HCC) () Fall (7/29/2018) Hip hematoma, right, initial encounter (7/29/2018) Plan: 
 
Cont PT/OT - WBAT Cont Tylenol for pain with oxycodone PRN; will add Tramadol order to ensure good pain coverage - want to limit narcotics to minimize confusion Lovenox for DVT prophylaxis Ice packs to hip PRN Medical management per primary team 
Case Management for disposition planning - [address] [place] [address] Orthopedic Trauma Service [address] 
 
Ortho Attending As above OK to resume coumadin as per cardiology rec's, would start coumadin today, would bridge with lovenox for 3 days.

## 2018-08-03 NOTE — PROGRESS NOTES
Discharge order noted. ARM requested for 4pm. Discharge folder located on the hard chart with report #. CRM spoke with John Paul Au at [facility] with the update. REINALDO 
 
6:30pm ARM time confirmed. Loi Taylor RN was notified.  REINALDO

## 2018-08-17 ENCOUNTER — EXTERNAL NURSING HOME DOCUMENTATION (OUTPATIENT)
Dept: INTERNAL MEDICINE CLINIC | Age: 83
End: 2018-08-17

## 2018-08-17 DIAGNOSIS — Z96.641 STATUS POST TOTAL REPLACEMENT OF RIGHT HIP: ICD-10-CM

## 2018-08-17 DIAGNOSIS — E03.9 ACQUIRED HYPOTHYROIDISM: ICD-10-CM

## 2018-08-17 DIAGNOSIS — M17.12 ARTHRITIS OF KNEE, LEFT: ICD-10-CM

## 2018-08-17 DIAGNOSIS — I25.10 CORONARY ARTERY DISEASE INVOLVING NATIVE HEART WITHOUT ANGINA PECTORIS, UNSPECIFIED VESSEL OR LESION TYPE: ICD-10-CM

## 2018-08-17 DIAGNOSIS — I48.20 CHRONIC ATRIAL FIBRILLATION (HCC): ICD-10-CM

## 2018-08-17 DIAGNOSIS — E78.00 HYPERCHOLESTEROLEMIA: ICD-10-CM

## 2018-08-17 DIAGNOSIS — S72.141A FRACTURE, INTERTROCHANTERIC, RIGHT FEMUR, CLOSED, INITIAL ENCOUNTER (HCC): Primary | ICD-10-CM

## 2018-08-17 NOTE — PROGRESS NOTES
History of Present Illness: This is an 80year-old pleasant, white female with past medical history significant for atrial fibrillation, chronic pain, back pain, dementia, hyperlipidemia, anxiety, hypothyroidism. She presented to [facility] Emergency Room with a ground level fall. In the hospital, the patient reported that she had lost consciousness. She was found to have atrial fibrillation with premature ventricular complex, right bundle-branch block, ST-T changes in the septal leads with a heart rate of 111. Workup included CT of the abdomen and pelvis that showed acute intertrochanteric right femoral neck fracture with associated right hip adductor intramuscular hematoma. Hematoma size was 8 x 4.5 cm with no acute intrapelvic pathology. She has some small to moderate size pleural effusion also and gallstones. CT of the head done showed no acute intracranial hemorrhage. The patient was admitted to the hospital and underwent right hip intramedullary nail placement. She tolerated the procedure well. Hematoma in the right adductor muscle has improved. She was seen by cardiologist and atrial fibrillation was under control. She has received IV Cardizem drip followed by p.o. medications, which controlled her heart rate. She was stabilized and was transferred to [facility]. I am seeing her in the rehab. She is doing well. No palpitations, no chest pain right now. She has moderate intensity right hip pain. She continues with physical therapy. Past Medical History:   1. Atrial fibrillation. 2. Chronic back pain. 3. Dementia. 4. Hyperlipidemia. 5. Hypothyroidism. 6. Essential tremor. 7. Arthritis. Past Surgical History:    1. Appendectomy. 2. KADIE and BSO. 3. Tonsillectomy and adenoidectomy. Allergies:  She is allergic to Codeine, cortisone, latex, natural rubber, adhesive. Family History: Mother has dementia. Father has osteoarthritis. Social History:  The patient is a former smoker, no cigarettes, quit in 1994. She drinks one glass of wine occasionally. She denies any drug use. She is living at home. Medications:  Alprazolam 0.5 mg one tablet three times a day as needed, Atenolol 25 mg tablets once a day, Biotin capsules 5000 mcg once a day, Famotidine 20 mg tablets twice a day as needed, Levothyroxine 100 mcg once a day, Meloxicam 7.5 mg tablets once a day, MiraLax 17 grams once a day, Nystatin powder 100,000 units apply topical (see orders), Oxycodone 5 mg every four hours as needed, calcium with vitamin D one tablet three times a day, Senna Plus 8.6/50 one tablet a day, Tramadol 50 mg every six hours as needed, Spironolactone 50 mg tablets one tablet a day, Tylenol 325 mg two tablets every six hours as needed, Warfarin 2.5 mg tablets in the afternoon. Review of Systems:  A complete review of systems was done. Right now, significant for right hip pain. Physical Examination:     GENERAL:  This is a pleasant, white female not appearing in any distress. VITALS:  T:  98.4 degrees Fahrenheit. P:  69 per minute. BP:  112/60 mmHg. SaO2:  96% on room air. Weight is 154 pounds. HEENT:  No abnormality detected. NECK:  Supple, no JVD, no carotid bruits, no thyromegaly. CHEST:  Chest is clear to auscultation bilaterally. No rales, no rhonchi. CARDIOVASCULAR:  S1, S2 normal.  Regular rate and rhythm. ABDOMEN:  Soft, nontender, nondistended, bowel sounds present. EXTREMITIES:  No edema is present. Dorsalis pedis pulse normal.  Right hip has a long incision under bandage. No sign of infection. NEUROLOGICAL:  Alert and oriented x3. Cranial nerves II - XII grossly intact. Motor is 5/5 bilaterally. Sensory is within normal limits. Assessment and Plan:   1. Right hip intertrochanteric fracture, status post intramedullary nail placement.    The patient continues with physical therapy and occupational therapy. She is also on Tramadol and Oxycodone for pain management. She is improving slowly. She needs to see an orthopedic surgeon as an outpatient. 2. Right hip adductor intramuscular hematoma. Improving for now. 3. Atrial fibrillation with RVR. The patient has received IV Cardizem. Right now, she is on Atenolol. Rate and rhythm are controlled right now. 4. Hypothyroidism. She is taking Levothyroxine. We will continue it for now. 5. DJD and chronic back pain. She is taking Oxycodone and Meloxicam.  She is doing well. THIS IS NOT A COMPLETE MEDICAL RECORD ON THIS PATIENT.    THIS IS A PATIENT AT [facility].    PLEASE CONTACT THE FACILITY LISTED BELOW FOR MORE INFORMATION ON THIS PATIENT.    THE COMPLETE RECORD RESIDES WITH THIS LONG TERM CARE FACILITY

## 2018-08-20 NOTE — DISCHARGE SUMMARY
Physician Discharge Summary     Patient ID:  César Irizarry  [id number]  41 y.o.  6/12/1931    Admit date: 7/29/2018    Discharge date and time: 8/20/2018    Admission Diagnoses: Fracture of femoral neck, right, closed ([facility] [address] Hip hematoma,*    Discharge Diagnoses:  Principal Diagnosis Fracture, intertrochanteric, right femur, closed, initial encounter [facility])                                            Principal Problem:    Fracture, intertrochanteric, right femur, closed, initial encounter ([facility] [address] (7/29/2018)    Active Problems:    A-fib ([facility] [address] ()      Fall (7/29/2018)      Hip hematoma, right, initial encounter (7/29/2018)           Hospital Course: Admitted with acute R femoral neck fracture after GLF. She also was in rapid A fib. Seen by Ortho. Also placed initially on Cardizem drip which was taper back to her baseline PO meds. Had surgical repair of hip without incident. She was transitioned out to [facility] for rehab. She was doing well with good appetite, pain control, and mental status. PCP: Yovany Graham    Consults: Cardiology and Orthopedic Surgery        Discharge Exam:  Visit Vitals    /81    Pulse 67    Temp 97.8 °F (36.6 °C)    Resp 15    Ht 5' 3\" (1.6 m)    Wt 156 lb 9.6 oz (71 kg)    SpO2 95%    BMI 27.74 kg/m2     Lungs: clear to auscultation bilaterally  Heart: irregularly irregular rhythm, rate in the 80s  Abdomen: soft, non-tender. Bowel sounds normal. No masses,  no organomegaly  Extremities: extremities normal, atraumatic, no cyanosis or edema  Neurologic: Grossly normal    Disposition: SNF    Patient Instructions:   Cannot display discharge medications since this patient is not currently admitted.     Activity: Activity as tolerated  Diet: Regular Diet  Wound Care: Keep wound clean and dry    Follow-up Appointments   Procedures    FOLLOW UP VISIT Appointment in: Other (Specify) Dr Shaniqua Vazquez 2 weeks after discharge from [facility] care     Dr Shaniqua Vazquez 2 weeks after discharge from [facility] care     Standing Status:   Standing     Number of Occurrences:   1     Order Specific Question:   Appointment in     Answer:    Other (Specify)          Signed:  Melissa Bauer MD  8/20/2018  8:51 AM

## 2018-09-07 ENCOUNTER — EXTERNAL NURSING HOME DOCUMENTATION (OUTPATIENT)
Dept: INTERNAL MEDICINE CLINIC | Age: 83
End: 2018-09-07

## 2018-09-07 DIAGNOSIS — E78.00 HYPERCHOLESTEROLEMIA: ICD-10-CM

## 2018-09-07 DIAGNOSIS — S72.001G CLOSED FRACTURE OF NECK OF RIGHT FEMUR WITH DELAYED HEALING, SUBSEQUENT ENCOUNTER: ICD-10-CM

## 2018-09-07 DIAGNOSIS — I25.10 CORONARY ARTERY DISEASE INVOLVING NATIVE HEART WITHOUT ANGINA PECTORIS, UNSPECIFIED VESSEL OR LESION TYPE: ICD-10-CM

## 2018-09-07 DIAGNOSIS — I48.20 CHRONIC ATRIAL FIBRILLATION (HCC): Primary | ICD-10-CM

## 2018-09-07 NOTE — PROGRESS NOTES
Progress Note Subjective:  Ms. Jeffery Gregory had right femoral neck fracture. The patient was seen by orthopedics. The area has not healed up yet. Still she is bed bound. She is able to transfer from bed, but not able to stand on her feet. Therapist is working with her. She also had right sided hip hematoma, which was traumatic and hematoma has almost resolved. She has a good appetite. No other discomfort. Objective: VITALS:  T:  98.2 degrees Fahrenheit. P:  80 per minute. BP:  130/74 mmHg. SaO2:  99% on room air. Weight is 154 pounds. HEENT:  No abnormality detected. NECK:  Supple, no JVD, no carotid bruits, no thyromegaly. CHEST:  Chest is clear to auscultation bilaterally. No rales, no rhonchi. CARDIOVASCULAR:  S1, S2 normal.  Regular rate and rhythm. ABDOMEN:  Soft, nontender, nondistended, bowel sounds present. EXTREMITIES:  No edema is noticed. Dorsalis pedis pulse normal.  
NEUROLOGICAL:  Alert and oriented x3. No neurological deficits. Laboratory Data:   The patient had chest x-ray done that showed pneumonia. The patient finished antibiotics for that. Assessment and Plan: 1. Bilateral pneumonia. The patient finished up Levaquin. She is feeling better. No further cough. 2. Right femoral neck fracture. The patient will continue with physical therapy and occupational therapy. She is not quite progressing well. She is still not weightbearing. She is able to transfer. Pain is controlled with pain medications. 3. Right hip hematoma, which has mostly resolved. She is doing well. 4. Atrial fibrillation. Rate and rhythm controlled. She is on medications and also Coumadin, doing well. 5. Hypothyroidism. She is taking Synthroid doing well. THIS IS NOT A COMPLETE MEDICAL RECORD ON THIS PATIENT.   
THIS IS A PATIENT AT [facility]. PLEASE CONTACT THE FACILITY LISTED BELOW FOR MORE INFORMATION ON THIS PATIENT.   
 THE COMPLETE RECORD RESIDES WITH THIS LONG TERM CARE FACILITY

## 2018-09-11 PROBLEM — S72.001A CLOSED FRACTURE OF NECK OF RIGHT FEMUR (HCC): Status: ACTIVE | Noted: 2018-09-11

## 2018-09-12 ENCOUNTER — EXTERNAL NURSING HOME DOCUMENTATION (OUTPATIENT)
Dept: INTERNAL MEDICINE CLINIC | Age: 83
End: 2018-09-12

## 2018-09-12 DIAGNOSIS — E03.9 HYPOTHYROIDISM, UNSPECIFIED TYPE: ICD-10-CM

## 2018-09-12 DIAGNOSIS — F03.90 DEMENTIA WITHOUT BEHAVIORAL DISTURBANCE, UNSPECIFIED DEMENTIA TYPE: ICD-10-CM

## 2018-09-12 DIAGNOSIS — S72.001D CLOSED FRACTURE OF NECK OF RIGHT FEMUR WITH ROUTINE HEALING, SUBSEQUENT ENCOUNTER: Primary | ICD-10-CM

## 2018-09-12 DIAGNOSIS — E78.00 HYPERCHOLESTEROLEMIA: ICD-10-CM

## 2018-09-12 DIAGNOSIS — I48.20 CHRONIC ATRIAL FIBRILLATION (HCC): ICD-10-CM

## 2018-09-12 NOTE — PROGRESS NOTES
THIS IS NOT A COMPLETE MEDICAL RECORD ON THIS PATIENT. THIS IS A PATIENT AT [facility]. PLEASE CONTACT THE FACILITY LISTED BELOW FOR MORE INFORMATION ON THIS PATIENT. THE COMPLETE RECORD RESIDES WITH THIS LONG TERM CARE FACILITY. HISTORY OF PRESENT ILLNESS Edwardo Betancourt is a 80 y.o. female. This patient is currently under the care of Dr. Javed Morales at [facility]. 
The patient was admitted to this facility following hospitalization related to fall at home with right femoral neck fracture. Her past medical history includes Afib, arthritis, back pain, chronic pain, dementia, hyperlipidemia, anxiety, essential tremor, and hypothyroidism. The patient is seen today for follow-up. She states that she is generally feeling well at the time of today's visit. She denies chest pain and shortness of breath. She does describe some intermittent pain to right hip, but states ordered medications help. She believes that she is progressing with therapy services. Nursing does not report any concerns or changes in condition at this time. HPI Review of Systems Constitutional: Negative for chills and fever. HENT: Negative for congestion. Respiratory: Negative for cough and shortness of breath. Cardiovascular: Negative for chest pain and leg swelling. Gastrointestinal: Negative for abdominal pain. Genitourinary: Negative. Musculoskeletal: Positive for joint pain. Neurological: Negative. Psychiatric/Behavioral: Negative. Physical Exam  
Constitutional: She is oriented to person, place, and time. She appears well-developed. No distress. HENT:  
Head: Normocephalic. Cardiovascular: Normal rate and regular rhythm. Pulmonary/Chest: Effort normal and breath sounds normal. No respiratory distress. She has no wheezes. She has no rales. Abdominal: Soft. Bowel sounds are normal. She exhibits no distension. There is no tenderness. Neurological: She is alert and oriented to person, place, and time. Skin: Skin is warm and dry. Healed surgical incision to right hip Psychiatric: She has a normal mood and affect. ASSESSMENT and PLAN Encounter Diagnoses Name Primary?  Closed fracture of neck of right femur with routine healing, subsequent encounter Yes  Chronic atrial fibrillation ([facility] [address]  Hypothyroidism, unspecified type  Dementia without behavioral disturbance, unspecified dementia type  Hypercholesterolemia Continue PT/OT as tolerated. Reviewed medications and side effects in detail. Continue current medications at this time. Will order CBC, CMP next lab day. Per chart review, patient had orthopedic follow-up on 08/21/18. Nursing to continue to monitor closely and notify MD/NP of any change in condition.

## 2018-10-05 ENCOUNTER — EXTERNAL NURSING HOME DOCUMENTATION (OUTPATIENT)
Dept: INTERNAL MEDICINE CLINIC | Age: 83
End: 2018-10-05

## 2018-10-05 DIAGNOSIS — E03.9 ACQUIRED HYPOTHYROIDISM: ICD-10-CM

## 2018-10-05 DIAGNOSIS — I25.10 CORONARY ARTERY DISEASE INVOLVING NATIVE CORONARY ARTERY OF NATIVE HEART WITHOUT ANGINA PECTORIS: ICD-10-CM

## 2018-10-05 DIAGNOSIS — I48.20 CHRONIC ATRIAL FIBRILLATION (HCC): Primary | ICD-10-CM

## 2018-10-05 DIAGNOSIS — S72.141A FRACTURE, INTERTROCHANTERIC, RIGHT FEMUR, CLOSED, INITIAL ENCOUNTER (HCC): ICD-10-CM

## 2018-10-05 DIAGNOSIS — E07.9 THYROID DISEASE: ICD-10-CM

## 2018-10-05 NOTE — PROGRESS NOTES
Progress Note Subjective:   Ms. Jena Reyna is in rehab for the last several weeks. She continues with physical therapy and occupational therapy for right hip fracture. Sometimes her hip hurts with certain movements. She has seen orthopedics. Hip is healing okay. She had atrial fibrillation. No palpitations right now. She has a good appetite. Otherwise, she is doing well. Objective: VITALS:  T:  98 degrees Fahrenheit. P:  76 per minute. BP:  128/76 mmHg. SaO2:  98% on room air. Weight is 111 pounds. HEENT:  No abnormality detected. NECK:  Supple, no JVD, no carotid bruits, no thyromegaly. CHEST:  Chest is clear to auscultation bilaterally. No rales, no rhonchi. CARDIOVASCULAR:  S1, S2 normal.  Regular rate and rhythm. ABDOMEN:  Soft, nontender, nondistended, bowel sounds present. EXTREMITIES:  No edema is noticed. Dorsalis pedis pulse normal.   
NEUROLOGICAL:  Alert and oriented x3. No neurological deficits. Laboratory Data:   Labs were reviewed. CMP is stable. PT and INR are stable. Assessment and Plan: 1. Atrial fibrillation. The patient is on Atenolol. Rate and rhythm are controlled for now. On blood thinner also. 2. Hypothyroidism. She is taking Levothyroxine. TSH is stable. 3. Right intertrochanteric fracture, status post ORIF. She will continue with physical therapy and occupational therapy. She is on pain medicine for pain control. 4. DJD with chronic back pain. She is taking Oxycodone and Meloxicam, doing well. THIS IS NOT A COMPLETE MEDICAL RECORD ON THIS PATIENT.   
THIS IS A PATIENT AT [facility]. PLEASE CONTACT THE FACILITY LISTED BELOW FOR MORE INFORMATION ON THIS PATIENT.   
THE COMPLETE RECORD RESIDES WITH THIS LONG TERM CARE FACILITY

## 2018-10-09 ENCOUNTER — EXTERNAL NURSING HOME DOCUMENTATION (OUTPATIENT)
Dept: INTERNAL MEDICINE CLINIC | Age: 83
End: 2018-10-09

## 2018-10-09 DIAGNOSIS — E78.00 HYPERCHOLESTEROLEMIA: ICD-10-CM

## 2018-10-09 DIAGNOSIS — G89.29 OTHER CHRONIC PAIN: ICD-10-CM

## 2018-10-09 DIAGNOSIS — I25.10 CORONARY ARTERY DISEASE INVOLVING NATIVE HEART WITHOUT ANGINA PECTORIS, UNSPECIFIED VESSEL OR LESION TYPE: ICD-10-CM

## 2018-10-09 DIAGNOSIS — S72.001D CLOSED FRACTURE OF NECK OF RIGHT FEMUR WITH ROUTINE HEALING, SUBSEQUENT ENCOUNTER: Primary | ICD-10-CM

## 2018-10-09 DIAGNOSIS — I48.91 ATRIAL FIBRILLATION, UNSPECIFIED TYPE (HCC): ICD-10-CM

## 2018-10-09 DIAGNOSIS — E03.9 ACQUIRED HYPOTHYROIDISM: ICD-10-CM

## 2018-10-09 DIAGNOSIS — F41.9 ANXIETY: ICD-10-CM

## 2018-10-09 DIAGNOSIS — F03.90 DEMENTIA WITHOUT BEHAVIORAL DISTURBANCE, UNSPECIFIED DEMENTIA TYPE: ICD-10-CM

## 2018-10-09 DIAGNOSIS — M19.90 ARTHRITIS: ICD-10-CM

## 2018-10-09 NOTE — PROGRESS NOTES
THIS IS NOT A COMPLETE MEDICAL RECORD ON THIS PATIENT. THIS IS A PATIENT AT [facility]. PLEASE CONTACT THE FACILITY LISTED BELOW FOR MORE INFORMATION ON THIS PATIENT. THE COMPLETE RECORD RESIDES WITH THIS LONG TERM CARE FACILITY. HISTORY OF PRESENT ILLNESS Liliana Sheldon is a 80 y.o. female. HPI This patient is currently under the care of Dr. Margot Mack at [facility]. 
The patient was admitted to this facility following hospitalization related to fall at home with right femoral neck fracture. Her past medical history includes Afib, arthritis, back pain, chronic pain, dementia, hyperlipidemia, anxiety, essential tremor, and hypothyroidism. Per social work, the patient is scheduled to discharge home 10/12/18. She states she is generally feeling well at time of visit. Nursing does not report any concerns or changes in condition at this time. Review of Systems Constitutional: Negative for chills and fever. HENT: Negative for congestion. Respiratory: Negative for cough and shortness of breath. Cardiovascular: Negative for chest pain and leg swelling. Gastrointestinal: Negative for abdominal pain. Genitourinary: Negative. Musculoskeletal: Negative. Neurological: Negative. Psychiatric/Behavioral: Negative. Physical Exam  
Constitutional: She is oriented to person, place, and time. She appears well-developed. No distress. HENT:  
Head: Normocephalic. Cardiovascular: Normal rate and regular rhythm. Pulmonary/Chest: Effort normal and breath sounds normal. No respiratory distress. She has no wheezes. She has no rales. Abdominal: Soft. Bowel sounds are normal. She exhibits no distension. There is no tenderness. Neurological: She is alert and oriented to person, place, and time. Skin: Skin is warm and dry. Psychiatric: She has a normal mood and affect. ASSESSMENT and PLAN Encounter Diagnoses Name Primary?  Closed fracture of neck of right femur with routine healing, subsequent encounter Yes  Acquired hypothyroidism  Atrial fibrillation, unspecified type ([facility] [address]  Dementia without behavioral disturbance, unspecified dementia type  Hypercholesterolemia  Arthritis  Other chronic pain  Coronary artery disease involving native heart without angina pectoris, unspecified vessel or lesion type  Anxiety Patient to have home health services upon discharge. Will provide prescriptions for 30 day supply of medications at discharge. [facility]  reviewed and appropriate. Patient to follow-up with PCP within 2 weeks of discharge and specialists as scheduled. Nursing to continue to monitor closely and notify MD/NP of any change in condition prior to discharge.

## 2018-10-10 ENCOUNTER — TELEPHONE (OUTPATIENT)
Dept: INTERNAL MEDICINE CLINIC | Age: 83
End: 2018-10-10

## 2018-10-10 RX ORDER — ATENOLOL 25 MG/1
TABLET ORAL
Qty: 30 TAB | Refills: 0 | Status: SHIPPED | OUTPATIENT
Start: 2018-10-10 | End: 2018-10-17 | Stop reason: SDUPTHER

## 2018-10-10 RX ORDER — AMOXICILLIN 250 MG
1 CAPSULE ORAL 2 TIMES DAILY
Qty: 60 TAB | Refills: 0 | Status: SHIPPED | OUTPATIENT
Start: 2018-10-10 | End: 2019-05-27

## 2018-10-10 RX ORDER — MELOXICAM 7.5 MG/1
7.5 TABLET ORAL DAILY
Qty: 30 TAB | Refills: 0 | Status: SHIPPED | OUTPATIENT
Start: 2018-10-10 | End: 2018-10-17 | Stop reason: SDUPTHER

## 2018-10-10 RX ORDER — POLYETHYLENE GLYCOL 3350 17 G/17G
17 POWDER, FOR SOLUTION ORAL DAILY
Qty: 30 PACKET | Refills: 0 | Status: SHIPPED | OUTPATIENT
Start: 2018-10-10 | End: 2018-10-17 | Stop reason: SDUPTHER

## 2018-10-10 RX ORDER — POTASSIUM CHLORIDE 20 MEQ/1
TABLET, EXTENDED RELEASE ORAL
Qty: 30 TAB | Refills: 0 | Status: SHIPPED | OUTPATIENT
Start: 2018-10-10 | End: 2018-10-17 | Stop reason: SDUPTHER

## 2018-10-10 RX ORDER — WARFARIN 2.5 MG/1
TABLET ORAL
Qty: 30 TAB | Refills: 0 | Status: SHIPPED | OUTPATIENT
Start: 2018-10-10 | End: 2018-10-17 | Stop reason: SDUPTHER

## 2018-10-10 RX ORDER — LEVOTHYROXINE SODIUM 100 UG/1
TABLET ORAL
Qty: 30 TAB | Refills: 0 | Status: SHIPPED | OUTPATIENT
Start: 2018-10-10 | End: 2018-10-17 | Stop reason: SDUPTHER

## 2018-10-10 RX ORDER — FAMOTIDINE 20 MG/1
20 TABLET, FILM COATED ORAL
Qty: 60 TAB | Refills: 0 | Status: SHIPPED | OUTPATIENT
Start: 2018-10-10 | End: 2018-10-17 | Stop reason: SDUPTHER

## 2018-10-10 RX ORDER — NYSTATIN 100000 [USP'U]/G
POWDER TOPICAL
Qty: 60 G | Refills: 0 | Status: SHIPPED | OUTPATIENT
Start: 2018-10-10 | End: 2019-05-27

## 2018-10-10 RX ORDER — SPIRONOLACTONE 50 MG/1
TABLET, FILM COATED ORAL
Qty: 30 TAB | Refills: 0 | Status: SHIPPED | OUTPATIENT
Start: 2018-10-10 | End: 2018-10-17 | Stop reason: SDUPTHER

## 2018-10-10 RX ORDER — FERROUS SULFATE, DRIED 160(50) MG
1 TABLET, EXTENDED RELEASE ORAL
Qty: 90 TAB | Refills: 0 | Status: SHIPPED | OUTPATIENT
Start: 2018-10-10 | End: 2019-09-27

## 2018-10-10 NOTE — TELEPHONE ENCOUNTER
Patient discharging from [facility] 10/12/18. [facility] was preferred pharmacy provided to my by  at [facility].

## 2018-10-10 NOTE — TELEPHONE ENCOUNTER
----- Message from Kevin Mobley sent at 10/10/2018 10:11 AM EDT -----  Regarding: Juancarlos Zepeda MD/ telephone  Sera Guzmán from [facility]. is calling to ask if there might be a mistake with pt's 15 prescriptions that were sent by NP. Pt has not used this pharmacy since 2014. Sera Guzmán from [facility] [phone].

## 2018-10-15 ENCOUNTER — HOME HEALTH ADMISSION (OUTPATIENT)
Dept: HOME HEALTH SERVICES | Facility: HOME HEALTH | Age: 83
End: 2018-10-15

## 2018-10-17 ENCOUNTER — HOME CARE VISIT (OUTPATIENT)
Dept: HOME HEALTH SERVICES | Facility: HOME HEALTH | Age: 83
End: 2018-10-17

## 2018-11-28 ENCOUNTER — HOME HEALTH ADMISSION (OUTPATIENT)
Dept: HOME HEALTH SERVICES | Facility: HOME HEALTH | Age: 83
End: 2018-11-28
Payer: MEDICARE

## 2018-11-29 ENCOUNTER — HOME CARE VISIT (OUTPATIENT)
Dept: SCHEDULING | Facility: HOME HEALTH | Age: 83
End: 2018-11-29
Payer: MEDICARE

## 2018-11-29 PROCEDURE — G0151 HHCP-SERV OF PT,EA 15 MIN: HCPCS

## 2018-11-29 PROCEDURE — 3331090002 HH PPS REVENUE DEBIT

## 2018-11-29 PROCEDURE — 400013 HH SOC

## 2018-11-29 PROCEDURE — 3331090001 HH PPS REVENUE CREDIT

## 2018-11-30 ENCOUNTER — HOME CARE VISIT (OUTPATIENT)
Dept: SCHEDULING | Facility: HOME HEALTH | Age: 83
End: 2018-11-30
Payer: MEDICARE

## 2018-11-30 VITALS
RESPIRATION RATE: 16 BRPM | DIASTOLIC BLOOD PRESSURE: 70 MMHG | OXYGEN SATURATION: 98 % | SYSTOLIC BLOOD PRESSURE: 130 MMHG

## 2018-11-30 PROCEDURE — 3331090002 HH PPS REVENUE DEBIT

## 2018-11-30 PROCEDURE — G0299 HHS/HOSPICE OF RN EA 15 MIN: HCPCS

## 2018-11-30 PROCEDURE — 3331090001 HH PPS REVENUE CREDIT

## 2018-12-01 PROCEDURE — 3331090001 HH PPS REVENUE CREDIT

## 2018-12-01 PROCEDURE — 3331090002 HH PPS REVENUE DEBIT

## 2018-12-02 PROCEDURE — 3331090002 HH PPS REVENUE DEBIT

## 2018-12-02 PROCEDURE — 3331090001 HH PPS REVENUE CREDIT

## 2018-12-03 ENCOUNTER — HOME CARE VISIT (OUTPATIENT)
Dept: HOME HEALTH SERVICES | Facility: HOME HEALTH | Age: 83
End: 2018-12-03
Payer: MEDICARE

## 2018-12-03 VITALS
TEMPERATURE: 98 F | DIASTOLIC BLOOD PRESSURE: 60 MMHG | HEART RATE: 86 BPM | SYSTOLIC BLOOD PRESSURE: 120 MMHG | OXYGEN SATURATION: 99 % | RESPIRATION RATE: 16 BRPM

## 2018-12-03 PROCEDURE — 3331090002 HH PPS REVENUE DEBIT

## 2018-12-03 PROCEDURE — 3331090001 HH PPS REVENUE CREDIT

## 2018-12-04 ENCOUNTER — HOME CARE VISIT (OUTPATIENT)
Dept: SCHEDULING | Facility: HOME HEALTH | Age: 83
End: 2018-12-04
Payer: MEDICARE

## 2018-12-04 PROCEDURE — 3331090001 HH PPS REVENUE CREDIT

## 2018-12-04 PROCEDURE — 3331090002 HH PPS REVENUE DEBIT

## 2018-12-05 ENCOUNTER — HOME CARE VISIT (OUTPATIENT)
Dept: SCHEDULING | Facility: HOME HEALTH | Age: 83
End: 2018-12-05
Payer: MEDICARE

## 2018-12-05 VITALS
SYSTOLIC BLOOD PRESSURE: 118 MMHG | HEART RATE: 50 BPM | OXYGEN SATURATION: 97 % | TEMPERATURE: 98.2 F | RESPIRATION RATE: 16 BRPM | DIASTOLIC BLOOD PRESSURE: 64 MMHG

## 2018-12-05 LAB
INR BLD: 1.6 (ref 0.9–1.1)
PT POC: 19.5 SECONDS (ref 11.8–14.9)

## 2018-12-05 PROCEDURE — 3331090001 HH PPS REVENUE CREDIT

## 2018-12-05 PROCEDURE — G0299 HHS/HOSPICE OF RN EA 15 MIN: HCPCS

## 2018-12-05 PROCEDURE — G0151 HHCP-SERV OF PT,EA 15 MIN: HCPCS

## 2018-12-05 PROCEDURE — 3331090002 HH PPS REVENUE DEBIT

## 2018-12-06 VITALS
TEMPERATURE: 98.1 F | RESPIRATION RATE: 16 BRPM | SYSTOLIC BLOOD PRESSURE: 128 MMHG | DIASTOLIC BLOOD PRESSURE: 60 MMHG | OXYGEN SATURATION: 98 %

## 2018-12-06 PROCEDURE — 3331090001 HH PPS REVENUE CREDIT

## 2018-12-06 PROCEDURE — 3331090002 HH PPS REVENUE DEBIT

## 2018-12-07 ENCOUNTER — HOME CARE VISIT (OUTPATIENT)
Dept: SCHEDULING | Facility: HOME HEALTH | Age: 83
End: 2018-12-07
Payer: MEDICARE

## 2018-12-07 ENCOUNTER — HOME CARE VISIT (OUTPATIENT)
Dept: HOME HEALTH SERVICES | Facility: HOME HEALTH | Age: 83
End: 2018-12-07
Payer: MEDICARE

## 2018-12-07 PROCEDURE — G0151 HHCP-SERV OF PT,EA 15 MIN: HCPCS

## 2018-12-07 PROCEDURE — 3331090002 HH PPS REVENUE DEBIT

## 2018-12-07 PROCEDURE — 3331090001 HH PPS REVENUE CREDIT

## 2018-12-08 VITALS
SYSTOLIC BLOOD PRESSURE: 128 MMHG | RESPIRATION RATE: 16 BRPM | TEMPERATURE: 98 F | OXYGEN SATURATION: 98 % | DIASTOLIC BLOOD PRESSURE: 70 MMHG

## 2018-12-08 PROCEDURE — 3331090001 HH PPS REVENUE CREDIT

## 2018-12-08 PROCEDURE — 3331090002 HH PPS REVENUE DEBIT

## 2018-12-09 ENCOUNTER — HOME CARE VISIT (OUTPATIENT)
Dept: HOME HEALTH SERVICES | Facility: HOME HEALTH | Age: 83
End: 2018-12-09
Payer: MEDICARE

## 2018-12-09 PROCEDURE — 3331090002 HH PPS REVENUE DEBIT

## 2018-12-09 PROCEDURE — 3331090001 HH PPS REVENUE CREDIT

## 2018-12-10 ENCOUNTER — HOME CARE VISIT (OUTPATIENT)
Dept: SCHEDULING | Facility: HOME HEALTH | Age: 83
End: 2018-12-10
Payer: MEDICARE

## 2018-12-10 ENCOUNTER — HOME CARE VISIT (OUTPATIENT)
Dept: HOME HEALTH SERVICES | Facility: HOME HEALTH | Age: 83
End: 2018-12-10
Payer: MEDICARE

## 2018-12-10 PROCEDURE — 3331090001 HH PPS REVENUE CREDIT

## 2018-12-10 PROCEDURE — 3331090002 HH PPS REVENUE DEBIT

## 2018-12-11 PROCEDURE — 3331090002 HH PPS REVENUE DEBIT

## 2018-12-11 PROCEDURE — 3331090001 HH PPS REVENUE CREDIT

## 2018-12-12 ENCOUNTER — HOME CARE VISIT (OUTPATIENT)
Dept: SCHEDULING | Facility: HOME HEALTH | Age: 83
End: 2018-12-12
Payer: MEDICARE

## 2018-12-12 VITALS
RESPIRATION RATE: 16 BRPM | HEART RATE: 61 BPM | DIASTOLIC BLOOD PRESSURE: 76 MMHG | TEMPERATURE: 98.4 F | SYSTOLIC BLOOD PRESSURE: 128 MMHG | OXYGEN SATURATION: 97 %

## 2018-12-12 PROCEDURE — 3331090002 HH PPS REVENUE DEBIT

## 2018-12-12 PROCEDURE — 3331090001 HH PPS REVENUE CREDIT

## 2018-12-12 PROCEDURE — G0299 HHS/HOSPICE OF RN EA 15 MIN: HCPCS

## 2018-12-13 ENCOUNTER — HOME CARE VISIT (OUTPATIENT)
Dept: SCHEDULING | Facility: HOME HEALTH | Age: 83
End: 2018-12-13
Payer: MEDICARE

## 2018-12-13 PROCEDURE — 3331090002 HH PPS REVENUE DEBIT

## 2018-12-13 PROCEDURE — 3331090001 HH PPS REVENUE CREDIT

## 2018-12-14 PROCEDURE — 3331090002 HH PPS REVENUE DEBIT

## 2018-12-14 PROCEDURE — 3331090001 HH PPS REVENUE CREDIT

## 2018-12-15 PROCEDURE — 3331090002 HH PPS REVENUE DEBIT

## 2018-12-15 PROCEDURE — 3331090001 HH PPS REVENUE CREDIT

## 2018-12-16 PROCEDURE — 3331090001 HH PPS REVENUE CREDIT

## 2018-12-16 PROCEDURE — 3331090002 HH PPS REVENUE DEBIT

## 2018-12-17 ENCOUNTER — HOME CARE VISIT (OUTPATIENT)
Dept: SCHEDULING | Facility: HOME HEALTH | Age: 83
End: 2018-12-17
Payer: MEDICARE

## 2018-12-17 PROCEDURE — 3331090001 HH PPS REVENUE CREDIT

## 2018-12-17 PROCEDURE — 3331090002 HH PPS REVENUE DEBIT

## 2018-12-18 PROCEDURE — 3331090002 HH PPS REVENUE DEBIT

## 2018-12-18 PROCEDURE — 3331090001 HH PPS REVENUE CREDIT

## 2018-12-19 ENCOUNTER — HOME CARE VISIT (OUTPATIENT)
Dept: HOME HEALTH SERVICES | Facility: HOME HEALTH | Age: 83
End: 2018-12-19
Payer: MEDICARE

## 2018-12-19 ENCOUNTER — HOME CARE VISIT (OUTPATIENT)
Dept: SCHEDULING | Facility: HOME HEALTH | Age: 83
End: 2018-12-19
Payer: MEDICARE

## 2018-12-19 PROCEDURE — 3331090002 HH PPS REVENUE DEBIT

## 2018-12-19 PROCEDURE — 3331090001 HH PPS REVENUE CREDIT

## 2018-12-19 PROCEDURE — 3331090003 HH PPS REVENUE ADJ

## 2018-12-20 PROCEDURE — 3331090002 HH PPS REVENUE DEBIT

## 2018-12-20 PROCEDURE — 3331090001 HH PPS REVENUE CREDIT

## 2019-04-07 ENCOUNTER — HOSPITAL ENCOUNTER (OUTPATIENT)
Dept: MRI IMAGING | Age: 84
Discharge: HOME | End: 2019-04-07
Attending: ORTHOPAEDIC SURGERY
Payer: MEDICARE

## 2019-04-07 ENCOUNTER — HOSPITAL ENCOUNTER (EMERGENCY)
Age: 84
Discharge: STILL ADMITTED OR ADMINISTRATIVE | End: 2019-04-07
Attending: EMERGENCY MEDICINE
Payer: MEDICARE

## 2019-04-07 DIAGNOSIS — M48.061 LUMBAR STENOSIS: ICD-10-CM

## 2019-04-07 PROCEDURE — 72148 MRI LUMBAR SPINE W/O DYE: CPT

## 2019-04-07 PROCEDURE — 75810000275 HC EMERGENCY DEPT VISIT NO LEVEL OF CARE

## 2019-04-16 ENCOUNTER — HOSPITAL ENCOUNTER (OUTPATIENT)
Dept: INTERVENTIONAL RADIOLOGY/VASCULAR | Age: 84
Discharge: HOME | End: 2019-04-16
Attending: ORTHOPAEDIC SURGERY

## 2019-04-23 ENCOUNTER — HOSPITAL ENCOUNTER (OUTPATIENT)
Dept: INTERVENTIONAL RADIOLOGY/VASCULAR | Age: 84
Discharge: HOME | DRG: 982 | End: 2019-04-23
Attending: ORTHOPAEDIC SURGERY | Admitting: RADIOLOGY
Payer: MEDICARE

## 2019-04-23 ENCOUNTER — APPOINTMENT (OUTPATIENT)
Dept: GENERAL RADIOLOGY | Age: 84
DRG: 982 | End: 2019-04-23
Attending: EMERGENCY MEDICINE
Payer: MEDICARE

## 2019-04-23 ENCOUNTER — APPOINTMENT (OUTPATIENT)
Dept: GENERAL RADIOLOGY | Age: 84
DRG: 982 | End: 2019-04-23
Attending: INTERNAL MEDICINE
Payer: MEDICARE

## 2019-04-23 ENCOUNTER — HOSPITAL ENCOUNTER (INPATIENT)
Age: 84
LOS: 4 days | Discharge: NURSING, REHAB OR LONG-TERM CARE | DRG: 982 | End: 2019-04-29
Attending: EMERGENCY MEDICINE | Admitting: FAMILY MEDICINE
Payer: MEDICARE

## 2019-04-23 VITALS
HEART RATE: 78 BPM | SYSTOLIC BLOOD PRESSURE: 147 MMHG | TEMPERATURE: 97.4 F | OXYGEN SATURATION: 98 % | DIASTOLIC BLOOD PRESSURE: 63 MMHG | RESPIRATION RATE: 18 BRPM | WEIGHT: 120 LBS | HEIGHT: 63 IN | BODY MASS INDEX: 21.26 KG/M2

## 2019-04-23 DIAGNOSIS — S32.020S COMPRESSION FRACTURE OF L2 LUMBAR VERTEBRA, SEQUELA: ICD-10-CM

## 2019-04-23 DIAGNOSIS — L89.601 PRESSURE INJURY OF HEEL, STAGE 1, UNSPECIFIED LATERALITY: ICD-10-CM

## 2019-04-23 DIAGNOSIS — L89.221 PRESSURE INJURY OF LEFT HIP, STAGE 1: Primary | ICD-10-CM

## 2019-04-23 DIAGNOSIS — E86.0 DEHYDRATION: ICD-10-CM

## 2019-04-23 PROBLEM — S71.102A WOUND, OPEN, HIP OR THIGH, LEFT, INITIAL ENCOUNTER: Status: ACTIVE | Noted: 2019-04-23

## 2019-04-23 PROBLEM — S71.002A WOUND, OPEN, HIP OR THIGH, LEFT, INITIAL ENCOUNTER: Status: ACTIVE | Noted: 2019-04-23

## 2019-04-23 PROBLEM — S91.301A NON-HEALING WOUND OF RIGHT HEEL: Status: ACTIVE | Noted: 2019-04-23

## 2019-04-23 LAB
ALBUMIN SERPL-MCNC: 1.7 G/DL (ref 3.5–5)
ALBUMIN/GLOB SERPL: 0.5 {RATIO} (ref 1.1–2.2)
ALP SERPL-CCNC: 75 U/L (ref 45–117)
ALT SERPL-CCNC: 10 U/L (ref 12–78)
ANION GAP SERPL CALC-SCNC: 6 MMOL/L (ref 5–15)
AST SERPL-CCNC: ABNORMAL U/L (ref 15–37)
BASOPHILS # BLD: 0.1 K/UL (ref 0–0.1)
BASOPHILS NFR BLD: 1 % (ref 0–1)
BILIRUB SERPL-MCNC: 0.5 MG/DL (ref 0.2–1)
BUN SERPL-MCNC: 18 MG/DL (ref 6–20)
BUN/CREAT SERPL: 45 (ref 12–20)
CALCIUM SERPL-MCNC: 7.5 MG/DL (ref 8.5–10.1)
CHLORIDE SERPL-SCNC: 109 MMOL/L (ref 97–108)
CO2 SERPL-SCNC: 20 MMOL/L (ref 21–32)
COMMENT, HOLDF: NORMAL
CREAT SERPL-MCNC: 0.4 MG/DL (ref 0.55–1.02)
DIFFERENTIAL METHOD BLD: ABNORMAL
EOSINOPHIL # BLD: 0.1 K/UL (ref 0–0.4)
EOSINOPHIL NFR BLD: 1 % (ref 0–7)
ERYTHROCYTE [DISTWIDTH] IN BLOOD BY AUTOMATED COUNT: 14.9 % (ref 11.5–14.5)
GLOBULIN SER CALC-MCNC: 3.5 G/DL (ref 2–4)
GLUCOSE SERPL-MCNC: 92 MG/DL (ref 65–100)
HCT VFR BLD AUTO: 47.5 % (ref 35–47)
HGB BLD-MCNC: 14.3 G/DL (ref 11.5–16)
IMM GRANULOCYTES # BLD AUTO: 0.1 K/UL (ref 0–0.04)
IMM GRANULOCYTES NFR BLD AUTO: 1 % (ref 0–0.5)
INR PPP: 1.1 (ref 0.9–1.1)
LYMPHOCYTES # BLD: 1.1 K/UL (ref 0.8–3.5)
LYMPHOCYTES NFR BLD: 10 % (ref 12–49)
MCH RBC QN AUTO: 28.4 PG (ref 26–34)
MCHC RBC AUTO-ENTMCNC: 30.1 G/DL (ref 30–36.5)
MCV RBC AUTO: 94.2 FL (ref 80–99)
MONOCYTES # BLD: 1.8 K/UL (ref 0–1)
MONOCYTES NFR BLD: 17 % (ref 5–13)
NEUTS SEG # BLD: 7.8 K/UL (ref 1.8–8)
NEUTS SEG NFR BLD: 70 % (ref 32–75)
NRBC # BLD: 0 K/UL (ref 0–0.01)
NRBC BLD-RTO: 0 PER 100 WBC
PLATELET # BLD AUTO: 234 K/UL (ref 150–400)
PMV BLD AUTO: 11.6 FL (ref 8.9–12.9)
POTASSIUM SERPL-SCNC: ABNORMAL MMOL/L (ref 3.5–5.1)
PROT SERPL-MCNC: 5.2 G/DL (ref 6.4–8.2)
PROTHROMBIN TIME: 11.4 SEC (ref 9–11.1)
RBC # BLD AUTO: 5.04 M/UL (ref 3.8–5.2)
SAMPLES BEING HELD,HOLD: NORMAL
SODIUM SERPL-SCNC: 135 MMOL/L (ref 136–145)
WBC # BLD AUTO: 10.9 K/UL (ref 3.6–11)

## 2019-04-23 PROCEDURE — 36415 COLL VENOUS BLD VENIPUNCTURE: CPT

## 2019-04-23 PROCEDURE — 77030003666 HC NDL SPINAL BD -A

## 2019-04-23 PROCEDURE — 99284 EMERGENCY DEPT VISIT MOD MDM: CPT

## 2019-04-23 PROCEDURE — 0QS03ZZ REPOSITION LUMBAR VERTEBRA, PERCUTANEOUS APPROACH: ICD-10-PCS | Performed by: RADIOLOGY

## 2019-04-23 PROCEDURE — 77030030399

## 2019-04-23 PROCEDURE — 99218 HC RM OBSERVATION: CPT

## 2019-04-23 PROCEDURE — 71045 X-RAY EXAM CHEST 1 VIEW: CPT

## 2019-04-23 PROCEDURE — 77030021782 HC SYS CEM CART DEL KYPH -C

## 2019-04-23 PROCEDURE — C1713 ANCHOR/SCREW BN/BN,TIS/BN: HCPCS

## 2019-04-23 PROCEDURE — 85610 PROTHROMBIN TIME: CPT

## 2019-04-23 PROCEDURE — 80053 COMPREHEN METABOLIC PANEL: CPT

## 2019-04-23 PROCEDURE — 74011250636 HC RX REV CODE- 250/636: Performed by: EMERGENCY MEDICINE

## 2019-04-23 PROCEDURE — 74011636320 HC RX REV CODE- 636/320: Performed by: RADIOLOGY

## 2019-04-23 PROCEDURE — 77030021783 HC SYS CEM DEL MEDT -D

## 2019-04-23 PROCEDURE — 74011250636 HC RX REV CODE- 250/636: Performed by: RADIOLOGY

## 2019-04-23 PROCEDURE — 85025 COMPLETE CBC W/AUTO DIFF WBC: CPT

## 2019-04-23 PROCEDURE — 77030011256 HC DRSG MEPILEX <16IN NO BORD MOLN -A

## 2019-04-23 PROCEDURE — 77030034842 HC TAMP SPN BN INFL EXP II KYPH -I

## 2019-04-23 PROCEDURE — 0QU03JZ SUPPLEMENT LUMBAR VERTEBRA WITH SYNTHETIC SUBSTITUTE, PERCUTANEOUS APPROACH: ICD-10-PCS | Performed by: RADIOLOGY

## 2019-04-23 PROCEDURE — 74011000250 HC RX REV CODE- 250: Performed by: RADIOLOGY

## 2019-04-23 PROCEDURE — 99152 MOD SED SAME PHYS/QHP 5/>YRS: CPT

## 2019-04-23 RX ORDER — SODIUM CHLORIDE 9 MG/ML
50 INJECTION, SOLUTION INTRAVENOUS CONTINUOUS
Status: DISCONTINUED | OUTPATIENT
Start: 2019-04-23 | End: 2019-04-23

## 2019-04-23 RX ORDER — ATENOLOL 25 MG/1
25 TABLET ORAL DAILY
Status: DISCONTINUED | OUTPATIENT
Start: 2019-04-24 | End: 2019-04-29 | Stop reason: HOSPADM

## 2019-04-23 RX ORDER — SODIUM CHLORIDE 9 MG/ML
75 INJECTION, SOLUTION INTRAVENOUS CONTINUOUS
Status: DISCONTINUED | OUTPATIENT
Start: 2019-04-23 | End: 2019-04-25

## 2019-04-23 RX ORDER — BUPIVACAINE HYDROCHLORIDE 5 MG/ML
30 INJECTION, SOLUTION EPIDURAL; INTRACAUDAL ONCE
Status: COMPLETED | OUTPATIENT
Start: 2019-04-23 | End: 2019-04-23

## 2019-04-23 RX ORDER — DIPHENHYDRAMINE HYDROCHLORIDE 50 MG/ML
25 INJECTION, SOLUTION INTRAMUSCULAR; INTRAVENOUS ONCE
Status: DISCONTINUED | OUTPATIENT
Start: 2019-04-23 | End: 2019-04-23

## 2019-04-23 RX ORDER — POTASSIUM CHLORIDE 750 MG/1
20 TABLET, FILM COATED, EXTENDED RELEASE ORAL DAILY
Status: DISCONTINUED | OUTPATIENT
Start: 2019-04-24 | End: 2019-04-29 | Stop reason: HOSPADM

## 2019-04-23 RX ORDER — MIDAZOLAM HYDROCHLORIDE 1 MG/ML
5 INJECTION, SOLUTION INTRAMUSCULAR; INTRAVENOUS
Status: DISCONTINUED | OUTPATIENT
Start: 2019-04-23 | End: 2019-04-23

## 2019-04-23 RX ORDER — CEFAZOLIN SODIUM/WATER 2 G/20 ML
2 SYRINGE (ML) INTRAVENOUS ONCE
Status: COMPLETED | OUTPATIENT
Start: 2019-04-23 | End: 2019-04-23

## 2019-04-23 RX ORDER — ALPRAZOLAM 0.25 MG/1
0.5 TABLET ORAL
Status: DISCONTINUED | OUTPATIENT
Start: 2019-04-23 | End: 2019-04-29 | Stop reason: HOSPADM

## 2019-04-23 RX ORDER — LIDOCAINE HYDROCHLORIDE 10 MG/ML
30 INJECTION, SOLUTION EPIDURAL; INFILTRATION; INTRACAUDAL; PERINEURAL ONCE
Status: COMPLETED | OUTPATIENT
Start: 2019-04-23 | End: 2019-04-23

## 2019-04-23 RX ORDER — ONDANSETRON 2 MG/ML
4 INJECTION INTRAMUSCULAR; INTRAVENOUS
Status: DISCONTINUED | OUTPATIENT
Start: 2019-04-23 | End: 2019-04-29 | Stop reason: HOSPADM

## 2019-04-23 RX ORDER — POLYETHYLENE GLYCOL 3350 17 G/17G
17 POWDER, FOR SOLUTION ORAL
Status: DISCONTINUED | OUTPATIENT
Start: 2019-04-23 | End: 2019-04-29 | Stop reason: HOSPADM

## 2019-04-23 RX ORDER — ACETAMINOPHEN 325 MG/1
650 TABLET ORAL
Status: DISCONTINUED | OUTPATIENT
Start: 2019-04-23 | End: 2019-04-29 | Stop reason: HOSPADM

## 2019-04-23 RX ORDER — FAMOTIDINE 20 MG/1
20 TABLET, FILM COATED ORAL 2 TIMES DAILY
Status: DISCONTINUED | OUTPATIENT
Start: 2019-04-23 | End: 2019-04-29 | Stop reason: HOSPADM

## 2019-04-23 RX ORDER — FENTANYL CITRATE 50 UG/ML
200 INJECTION, SOLUTION INTRAMUSCULAR; INTRAVENOUS
Status: DISCONTINUED | OUTPATIENT
Start: 2019-04-23 | End: 2019-04-23

## 2019-04-23 RX ORDER — LEVOTHYROXINE SODIUM 100 UG/1
100 TABLET ORAL
Status: DISCONTINUED | OUTPATIENT
Start: 2019-04-24 | End: 2019-04-29 | Stop reason: HOSPADM

## 2019-04-23 RX ORDER — KETOROLAC TROMETHAMINE 30 MG/ML
15 INJECTION, SOLUTION INTRAMUSCULAR; INTRAVENOUS
Status: DISCONTINUED | OUTPATIENT
Start: 2019-04-23 | End: 2019-04-23 | Stop reason: ALTCHOICE

## 2019-04-23 RX ADMIN — MIDAZOLAM HYDROCHLORIDE 0.5 MG: 1 INJECTION, SOLUTION INTRAMUSCULAR; INTRAVENOUS at 11:04

## 2019-04-23 RX ADMIN — IOHEXOL 20 ML: 240 INJECTION, SOLUTION INTRATHECAL; INTRAVASCULAR; INTRAVENOUS; ORAL at 11:34

## 2019-04-23 RX ADMIN — MIDAZOLAM HYDROCHLORIDE 1 MG: 1 INJECTION, SOLUTION INTRAMUSCULAR; INTRAVENOUS at 11:09

## 2019-04-23 RX ADMIN — FENTANYL CITRATE 25 MCG: 50 INJECTION, SOLUTION INTRAMUSCULAR; INTRAVENOUS at 11:09

## 2019-04-23 RX ADMIN — FENTANYL CITRATE 12.5 MCG: 50 INJECTION, SOLUTION INTRAMUSCULAR; INTRAVENOUS at 11:30

## 2019-04-23 RX ADMIN — SODIUM CHLORIDE 500 ML: 900 INJECTION, SOLUTION INTRAVENOUS at 20:06

## 2019-04-23 RX ADMIN — LIDOCAINE HYDROCHLORIDE 30 ML: 10 INJECTION, SOLUTION EPIDURAL; INFILTRATION; INTRACAUDAL; PERINEURAL at 11:15

## 2019-04-23 RX ADMIN — KETOROLAC TROMETHAMINE 15 MG: 60 INJECTION, SOLUTION INTRAMUSCULAR at 10:40

## 2019-04-23 RX ADMIN — Medication 2 G: at 10:39

## 2019-04-23 RX ADMIN — SODIUM CHLORIDE 75 ML/HR: 900 INJECTION, SOLUTION INTRAVENOUS at 20:07

## 2019-04-23 RX ADMIN — FENTANYL CITRATE 25 MCG: 50 INJECTION, SOLUTION INTRAMUSCULAR; INTRAVENOUS at 10:55

## 2019-04-23 RX ADMIN — MIDAZOLAM HYDROCHLORIDE 0.5 MG: 1 INJECTION, SOLUTION INTRAMUSCULAR; INTRAVENOUS at 11:00

## 2019-04-23 RX ADMIN — FENTANYL CITRATE 25 MCG: 50 INJECTION, SOLUTION INTRAMUSCULAR; INTRAVENOUS at 11:13

## 2019-04-23 RX ADMIN — BUPIVACAINE HYDROCHLORIDE 20 ML: 5 INJECTION, SOLUTION EPIDURAL; INTRACAUDAL; PERINEURAL at 11:14

## 2019-04-23 RX ADMIN — MIDAZOLAM HYDROCHLORIDE 0.5 MG: 1 INJECTION, SOLUTION INTRAMUSCULAR; INTRAVENOUS at 11:14

## 2019-04-23 NOTE — DISCHARGE INSTRUCTIONS
[address] [facility]   Department of Interventional Radiology    KYPHOPLASTY/VERTEBROPLASTY DISCHARGE INSTRUCTIONS    General Information: This procedure is done to help with the back pain that is associated with compression fractures in the spine. The kyphoplasty involves placing a balloon into the space of the vertebrae that is fractured, blowing up the balloon, therefore realigning the broken pieces of bone, and then injecting cement into the space to strengthen the vertebrae. The vertebroplasty is only slightly different in that there is no balloon used. The Interventional Radiologist will speak with you to decide which procedure is best for you. The pain experienced from compression fractures is caused by the vertebrae not being stabilized. The cement stabilizes the bone, therefore reducing the pain. Home Care Instructions: You can resume your regular diet and medication regimen. Do not drink alcohol, drive, or make any important legal decisions in the next 24 hours. Do not lift anything heavier than a gallon of milk, or do anything strenuous for the next 24 hours. You will notice a dressing on your lower back after your procedure. This dressing can be removed in 24 hours. Showering is acceptable in 24 hours, but you should refrain from tub baths or swimming for 5 days. You will be asked to come back in for a recheck about 30 days after your procedure. At that time, our physician will ask you questions about your pain and if it has improved. Call If:     You should call your Physician and/or the Radiology Nurse if you have any bleeding other than a small spot on your bandage. Call if you have any signs of infection, fever, or increased pain at the site. Call if you should have new or worsening pain in your back, or if you lose control of your bladder or bowel. Any tingling or loss of feeling or movement in your legs should also be reported.     Follow-Up Instructions: Please see your ordering doctor as he/she has requested. Radiologist:  Dr. Pushpa Vazquez    Date: 4/23/2019       Kyphoplasty Discharge Instructions    Go home and rest  and restrict your activity the next 24 hours. You have been given sedating medications, so do not drive or drink alcohol today. Resume your previous diet and medications. You may take Tylenol, as directed on the label, for pain or discomfort. Avoid Ibuprofen (Advil, Motrin etc.) and Aspirin today as they may increase your risk of bleeding. Avoid heavy lifting (nothing greater than 5 pounds),  excessive bending,  and pushing or pulling movements for one week. Then begin to advance your activity as tolerated. Be sure to follow up with your physician, and let him know how you are progressing. If a biopsy sample was collected, your results will be sent to your ordering physician. If you have new severe pain, numbness, tingling, or weakness in your legs go to the nearest emergency department, and tell them you have had a Kyphoplasty. Side effects of sedation medications and other medications used today have been reviewed. Notify us of nausea, itching, hives, dizziness, or anything else out of the ordinary. Should you experience any of these significant changes, please call [phone] between the hours of 7:30 am and 10 pm or [phone] after hours.  After hours, ask the  to page the [address] Technologist, and describe the problem to the technologist.           Patient Signature:  Date: 4/23/2019  Discharging Nurse: Carter Harmon RN

## 2019-04-23 NOTE — ED TRIAGE NOTES
Patient presents from angio with complaints of unstable necrotic decub on L hip. Patient lives alone, but does have a caretaker that comes 2 hours every day to help care for her. Caretaker reports sometimes when she returns the next day the patient is in the same spot as when she left the day before . Patient has a history of dementia, but is oriented to Person and Place. Patient did have a R hip fracture about 5-6 months ago

## 2019-04-23 NOTE — PROGRESS NOTES
Patient tolerated procedure well. VS stable during recovery. Lunch tray given. Incontinent of urine. In providing incontinent care, a large unstagable decubitus noted on left hip and nickel sized necrotic area right heel. Right leg rotated inward. Spoke with  in ED who recommended to follow up with PCP Dr. Kely Aguilera. Contacted Dr. Naheed Maurer who ordered patient to be sent to ED for evaluation unstageable left hip decub and overall deconditioned status.

## 2019-04-23 NOTE — PROGRESS NOTES
Physical Therapy Screening: A referral to physical therapy order is indicated when the patient is medically stable. A screening was performed on this patient's chart based on their entrance into the emergency department and potential need for physical therapy identified. The patients chart was reviewed and the patient interviewed/screened and found to be appropriate for a skilled therapy evaluation. Please consult for physical therapy if you would like an evaluation to be completed. Thank you. [id number] - Met with patient and her private duty caregiver Rea Mattson. Rea Mattson is with patient 2 hours/ day. Rea Mattson started out as the patient's house keeper with her role expanding into a private duty caregiver role. When Rea Mattson returns to work the following day, she arrives to patient in the same position she left her in the evening prior. The patient relies on a manual wheelchair for mobility though voiced being ambulatory prior to her injury and currently performing her own transfers. Patient was uncertain if she used an assistive device or no device when walking. Physical therapy may be indicated to assess patient's ability to safely perform transfers on her own prior to discharging home to her independent living apartment where she is alone for most of the day/ night. Rounded with [facility] CM and NP. NP to meet with patient. Please consult for physical therapy if you would like an evaluation. Thank you.  
 
[id number] - Met with [facility] NP and CM who later met with [facility] outside of patient's presence. NP informed this PT that Rea Mattson states patient is not able to perform her own transfers and is non ambulatory at present.

## 2019-04-23 NOTE — H&P
History and Physical 
 
Subjective:  
 
Lynette Gonzalez is a 80 y.o. white female with PMH as below. She was recently found to have an acute vertebral compression fx. She underwent kyphoplasty of this. However, she was noted to have a L hip pressure wound, so she was sent to the ER. It appears pt lives alone at [facility] with hired help. However, she is not mobile much, if at all. She appears to be clinically dehydrated, and she was found to have a L hip Pressure wound and a smaller R heel pressure wound. Not all of her labs are back yet, but she is being placed on observation for IVF & wound care. She denies any f/c. No other new c/o's. Past Medical History:  
Diagnosis Date  A-fib ([facility] [address]  Arrhythmia A FIB  Arthritis  Back pain  CAD (coronary artery disease) PT DENIES  Chronic pain  Dementia 2/9/2016  Hypercholesterolemia  Psychiatric disorder ANXIETY  Shoulder pain  Thyroid disease  Tremor, essential   
 
Allergies Allergen Reactions  Latex, Natural Rubber Other (comments) \"I get black where ever it touches\"  Codeine Other (comments) \"It just sends me up the wall\"  Adhesive Rash and Other (comments)  
  blisters  Cortisone Unknown (comments) Prior to Admission medications Medication Sig Start Date End Date Taking?  Authorizing Provider  
atenolol (TENORMIN) 25 mg tablet TAKE 1 TABLET BY MOUTH ONCE DAILY 4/17/19   Yuniel Ulloa MD  
ALPRAZolam Annye Acostar) 0.5 mg tablet TAKE 1 TABLET BY MOUTH THREE TIMES DAILY AS NEEDED 4/11/19   Yuniel Ulloa MD  
levothyroxine (SYNTHROID) 100 mcg tablet TAKE 1 TABLET BY MOUTH EVERY MORNING BEFORE BREAKFAST 4/8/19   Yuniel Ulloa MD  
warfarin (JANTOVEN) 2.5 mg tablet TAKE 1 TABLET BY MOUTH EVERY EVENING FOR BLOOD THINNER 3/13/19   Yuniel Ulloa MD  
potassium chloride (K-DUR, KLOR-CON) 20 mEq tablet TAKE 1 TABLET BY MOUTH EVERY DAY 1/14/19   Yuniel Ulloa MD  
 nystatin (MYCOSTATIN) topical cream Apply  to affected area three (3) times daily. apply thin layer to groin    Provider, Historical  
ondansetron (ZOFRAN ODT) 4 mg disintegrating tablet Take 1 Tab by mouth every four (4) hours as needed. Patient not taking: Reported on 2018 10/17/18   Angelica Ndiaye MD  
spironolactone (ALDACTONE) 50 mg tablet TAKE 1 TABLET BY MOUTH DAILY FOR HYPERTENSION. (SAME AS ALDACTONE) 10/17/18   Angelica Ndiaye MD  
polyethylene glycol (MIRALAX) 17 gram packet Take 1 Packet by mouth daily. Patient not taking: Reported on 2018 10/17/18   Angelica Ndiaye MD  
famotidine (PEPCID) 20 mg tablet Take 1 Tab by mouth two (2) times daily as needed (indigestion/heartburn/acid reflux). 10/17/18   Angelica Ndiaye MD  
nystatin (NYSTOP) powder Apply topically to groin and breast folds tid Patient taking differently: Apply thin layer, topically to groin and breast folds tid 10/10/18   Deysi García NP  
calcium-vitamin D (OYSTER SHELL) 500 mg(1,250mg) -200 unit per tablet Take 1 Tab by mouth three (3) times daily (with meals). 10/10/18   Deysi García NP  
senna-docusate (PERICOLACE) 8.6-50 mg per tablet Take 1 Tab by mouth two (2) times a day. Patient not taking: Reported on 2018 10/10/18   Deysi García NP  
acetaminophen (TYLENOL) 325 mg tablet Take 2 Tabs by mouth every six (6) hours. 8/3/18   Angelica Ndiaye MD  
Biotin 2,500 mcg cap Take 5,000 mcg by mouth daily. Provider, Historical  
 
Social History Tobacco Use  Smoking status: Former Smoker Packs/day: 0.50 Years: 10.00 Pack years: 5.00 Last attempt to quit: 1994 Years since quittin.3  Smokeless tobacco: Never Used Substance Use Topics  Alcohol use: Yes Comment: NIGHTLY 2 GLASSES WINE Family History Problem Relation Age of Onset  Dementia Mother  Arthritis-osteo Father Review of Systems: As above. Objective: Physical Exam: In NAD. Alert. Probable mild cognitive impairment. HEENT -- Unremarkable. Neck -- Supple. No JVD. Heart -- Irr Irr (Rate OK). Lungs -- CTA. Abdomen -- Soft. Non-tender. Non-distended. No masses. Bowel sounds present. Extremeties -- No edema. There is a large, stage 2-3 L hip pressure wound with some eschar. No sign of infection. There is a closed R heel pressure area without sign of infection. Data Review:  
Recent Results (from the past 24 hour(s)) CBC WITH AUTOMATED DIFF Collection Time: 04/23/19  5:48 PM  
Result Value Ref Range WBC 10.9 3.6 - 11.0 K/uL  
 RBC 5.04 3.80 - 5.20 M/uL  
 HGB 14.3 11.5 - 16.0 g/dL HCT 47.5 (H) 35.0 - 47.0 % MCV 94.2 80.0 - 99.0 FL  
 MCH 28.4 26.0 - 34.0 PG  
 MCHC 30.1 30.0 - 36.5 g/dL  
 RDW 14.9 (H) 11.5 - 14.5 % PLATELET 864 550 - 219 K/uL MPV 11.6 8.9 - 12.9 FL  
 NRBC 0.0 0  WBC ABSOLUTE NRBC 0.00 0.00 - 0.01 K/uL NEUTROPHILS 70 32 - 75 % LYMPHOCYTES 10 (L) 12 - 49 % MONOCYTES 17 (H) 5 - 13 % EOSINOPHILS 1 0 - 7 % BASOPHILS 1 0 - 1 % IMMATURE GRANULOCYTES 1 (H) 0.0 - 0.5 % ABS. NEUTROPHILS 7.8 1.8 - 8.0 K/UL  
 ABS. LYMPHOCYTES 1.1 0.8 - 3.5 K/UL  
 ABS. MONOCYTES 1.8 (H) 0.0 - 1.0 K/UL  
 ABS. EOSINOPHILS 0.1 0.0 - 0.4 K/UL  
 ABS. BASOPHILS 0.1 0.0 - 0.1 K/UL  
 ABS. IMM. GRANS. 0.1 (H) 0.00 - 0.04 K/UL  
 DF AUTOMATED Assessment:  
 
Principal Problem: 
  Dehydration (4/23/2019) Active Problems: 
  Possible L PNA by portable CXR, but no sx's. Recent Lumbar vertebral compression fx, s/p kypho today. A-fib (HCC) () Thyroid disease () Wound, open, hip or thigh, left, initial encounter (4/23/2019) Non-healing wound of right heel (4/23/2019) Plan: 1. Gentle IVF & f/u lytes. 2.  Wound care consult ordered. 3.  PA/LAT CXR to better look at L base -- Will hold off on abx unless this definitively shows PNA. 4.  Pharmacy to dose continued coumadin. 5.  PT/OT. 6.   to help with dispo. 7.  I discussed code status, and she wishes for FULL CODE at this point. This should probably be re-addressed. D/w one of pt caretakers, Sharona Schwab, present. Signed By: Kimmie Mcgarry MD   
 April 23, 2019

## 2019-04-23 NOTE — ED NOTES
Bedside and Verbal shift change report given to Raul [facility] (oncoming nurse) by Dalila Nation (offgoing nurse). Report included the following information SBAR, Kardex, ED Summary, [facility] and Recent Results.

## 2019-04-23 NOTE — PROGRESS NOTES
CM contacted by RN for family concerns for discharge plan. Pt currently lives at [facility] and has a caregiver that comes into the home for 2 hours a day. Pt currently undergoing OP procedure and to be discharged home. CM inquired of pt's orientation; pt alert and oriented with periods of confusion. CM notified RN that facility and PCP should be made aware to assist with facilitating transition to assisted living if pt agreeable.      Lizz Keen RN, BSN  Care Management Department

## 2019-04-23 NOTE — PROGRESS NOTES
CM notified of SSED consult. CM reviewed chart. CM aware of concerns noted from earlier OP procedure. CM met with pt and caregiver at bedside. CM discussed with pt concern for safety at home in independent living. CM noted pt caregiver, Steph Carranza [facility]), is with pt from [phone] 6 days a week and there for a full day on Tuesday. CM inquired about ADLs after caregiver leaves, pt reports \"I don't do much\". CM inquired if pt would be open to transitioning to assisted living; pt declined. CM to await MD assessment and further orders should any needs arise. CM will continue to monitor and assess. Jolene Durbin RN, BSN Care Management Department 1830: 
           
Reason for Admission: Dehydration RRAT Score: 13- MOD Do you (patient/family) have any concerns for transition/discharge? Pt reports no concerns for discharge at this time. Plan for utilizing home health: TBD Current Advanced Directive/Advance Care Plan: Pt has an AMD on file that currently states C.L. Sigmound as mPOA. Pt reports she has taken him off as mPOA. CM encouraged pt or family/caregiver to bring in paperwork stating he is no longer mPOA. CM inquired who would assist helping pt make medical decisions, pt stated \"my mother\". Likelihood of readmission? MOD Transition of Care Plan: Aniyah Bee CM and this CM met with pt at bedside to discuss CM role and to assess pt needs. CM verified demographic information including insurance and emergency contact. Pt reports she does have caregivers that come in the evening sometime to assist her with ADLs but unable to verbalize when (days or frequency) or specifically who comes aside from caregiver, Jaclyn Connell, friend, MARY.L. Sigmound, and friend, Mckenna Sandoval. Pt reports caregiver, Elton, takes her to all appointments. Pt verified PCP and reports last visit was within the last month.  Pt uses a wheelchair at home as DME. Pt reports income via [employer]. CM inquired if pt would be open to hiring additional caregivers in home to assist; pt agreeable. CM will continue to follow. Care Management Interventions PCP Verified by CM: Yes 
Palliative Care Criteria Met (RRAT>21 & CHF Dx)?: No 
Mode of Transport at Discharge: Other (see comment)(TBD) Transition of Care Consult (CM Consult): Discharge Planning MyChart Signup: No 
Discharge Durable Medical Equipment: No 
Physical Therapy Consult: No 
Occupational Therapy Consult: No 
Speech Therapy Consult: No 
Current Support Network: Nursing Facility, Lives Alone Confirm Follow Up Transport: Other (see comment)(TBD) Plan discussed with Pt/Family/Caregiver: Yes Discharge Location Discharge Placement: Home(Disposition needs TBD/subject to change pending care recommendations.) Rickie Gabriel RN, BSN Care Management Department

## 2019-04-23 NOTE — ED NOTES
1644: Assumed care of patient at this time. Patient is resting in stretcher. 1700: Attempted to get IV line was not successful. Will get additional staff to attempt.

## 2019-04-23 NOTE — ED PROVIDER NOTES
80 y.o. female with past medical history significant for A-fib, thyroid disease, hypercholesterolemia, arthritis, CAD, dementia, arrhythmia who presents in a wheelchair to the ED with cc of skin problem. Pt has a worsening necrotic decubitus ulcer on her left hip. The pt was sent to the ED by her PCP for evaluation, case management consult, and possible admission. She has a caretaker who comes daily for 2 hours to help care for her, but she states that sometimes when she returns the next day the pt is in the same spot as when she left the day before. Patient also endorses baseline edema in RLE that \"comes and goes. \" Pt c/o some generalized myalgias. She denies fever, shortness of breath, nausea, vomiting or appetite change. There are no other acute medical concerns at this time. Social Hx: No Tobacco use, yes EtOH use, np Illicit Drug use PCP: Anmol Duval MD 
 
Note written by Radha Vitale, as dictated by Nallely Collins MD 4:53 PM 
 
The history is provided by the patient. No  was used. Past Medical History:  
Diagnosis Date  A-fib ([facility] [address]  Arrhythmia A FIB  Arthritis  Back pain  CAD (coronary artery disease) PT DENIES  Chronic pain  Dementia 2/9/2016  Hypercholesterolemia  Psychiatric disorder ANXIETY  Shoulder pain  Thyroid disease  Tremor, essential   
 
 
Past Surgical History:  
Procedure Laterality Date  HX APPENDECTOMY  HX ORTHOPAEDIC    
 left knee replacement  HX KADIE AND BSO AGE 39  
 HX TONSIL AND ADENOIDECTOMY  IR KYPHOPLASTY LUMBAR  4/23/2019 Family History:  
Problem Relation Age of Onset  Dementia Mother  Arthritis-osteo Father Social History Socioeconomic History  Marital status:  Spouse name: Not on file  Number of children: Not on file  Years of education: Not on file  Highest education level: Not on file Occupational History  Not on file Social Needs  Financial resource strain: Not on file  Food insecurity:  
  Worry: Not on file Inability: Not on file  Transportation needs:  
  Medical: Not on file Non-medical: Not on file Tobacco Use  Smoking status: Former Smoker Packs/day: 0.50 Years: 10.00 Pack years: 5.00 Last attempt to quit: 1994 Years since quittin.3  Smokeless tobacco: Never Used Substance and Sexual Activity  Alcohol use: Yes Comment: NIGHTLY 2 GLASSES WINE  
 Drug use: No  
 Sexual activity: Not on file Lifestyle  Physical activity:  
  Days per week: Not on file Minutes per session: Not on file  Stress: Not on file Relationships  Social connections:  
  Talks on phone: Not on file Gets together: Not on file Attends Christian service: Not on file Active member of club or organization: Not on file Attends meetings of clubs or organizations: Not on file Relationship status: Not on file  Intimate partner violence:  
  Fear of current or ex partner: Not on file Emotionally abused: Not on file Physically abused: Not on file Forced sexual activity: Not on file Other Topics Concern  Not on file Social History Narrative  Not on file ALLERGIES: Latex, natural rubber; Codeine; Adhesive; and Cortisone Review of Systems Constitutional: Negative for activity change, appetite change, fatigue and fever. HENT: Negative for ear pain, facial swelling, sore throat and trouble swallowing. Eyes: Negative for pain, discharge and visual disturbance. Respiratory: Negative for chest tightness, shortness of breath and wheezing. Cardiovascular: Negative for chest pain and palpitations. Gastrointestinal: Negative for abdominal pain, blood in stool, nausea and vomiting. Genitourinary: Negative for difficulty urinating, flank pain and hematuria. Musculoskeletal: Negative for arthralgias, joint swelling, myalgias and neck pain. Skin: Positive for wound (sores in left hip and left leg). Negative for color change and rash. Neurological: Negative for dizziness, weakness, numbness and headaches. Hematological: Negative for adenopathy. Does not bruise/bleed easily. Psychiatric/Behavioral: Negative for behavioral problems, confusion and sleep disturbance. All other systems reviewed and are negative. Vitals:  
 04/23/19 1644 04/23/19 1715 04/23/19 2001 04/23/19 2030 BP: 112/56 126/57 151/80 132/78 Pulse: 80  100 92 Resp: 16  20 17 Temp: 97.6 °F (36.4 °C)  97.6 °F (36.4 °C) SpO2: 97% 98% 96% 97% Physical Exam  
Constitutional: She is oriented to person, place, and time. She appears well-developed and well-nourished. No distress. HENT:  
Head: Normocephalic and atraumatic. Nose: Nose normal.  
Mouth/Throat: Mucous membranes are dry. Eyes: Pupils are equal, round, and reactive to light. Conjunctivae and EOM are normal. No scleral icterus. Neck: Normal range of motion. Neck supple. No JVD present. No tracheal deviation present. No thyromegaly present. No carotid bruits noted. Cardiovascular: Normal rate, regular rhythm, normal heart sounds and intact distal pulses. Exam reveals no gallop and no friction rub. No murmur heard. Pulmonary/Chest: Effort normal and breath sounds normal. No respiratory distress. She has no wheezes. She has no rales. She exhibits no tenderness. Abdominal: Soft. Bowel sounds are normal. She exhibits no distension and no mass. There is no tenderness. There is no rebound and no guarding. Musculoskeletal: Normal range of motion. She exhibits no edema or tenderness. Lymphadenopathy:  
  She has no cervical adenopathy. Neurological: She is alert and oriented to person, place, and time. She has normal reflexes. No cranial nerve deficit.  Coordination normal.  
 Skin: Skin is warm and dry. No rash noted. No erythema. Left ankle with early stages pressure sore, 3 cm in diameter over the posterior heel. Skin is dusky with a little blistering. Left hip with area of open skin with scabbing, erythema, induration, and tenderness. Psychiatric: She has a normal mood and affect. Her behavior is normal. Judgment and thought content normal.  
Nursing note and vitals reviewed. Note written by Radha Jacobs, as dictated by Germán Bragg MD 4:53 PM 
 
MDM Number of Diagnoses or Management Options Dehydration: new and requires workup Pressure injury of heel, stage 1, unspecified laterality: new and requires workup Pressure injury of left hip, stage 1: new and requires workup Amount and/or Complexity of Data Reviewed Clinical lab tests: ordered and reviewed Decide to obtain previous medical records or to obtain history from someone other than the patient: yes Review and summarize past medical records: yes Risk of Complications, Morbidity, and/or Mortality Presenting problems: moderate Diagnostic procedures: moderate Management options: moderate Procedures CONSULT NOTE: 
4:40 PM Germán Bragg MD spoke with Dr. Artemio Sanchez, Consult for PCP. Discussed available diagnostic tests and clinical findings. He will come see the pt and likely admit. CONSULT NOTE: 
5:00 PM Wilian Allen MD spoke with Dr. Artemio Sanchez, Consult for PCP. Discussed available diagnostic tests and clinical findings. He will admit the pt. 
 
5:00 PM 
Patient is being admitted to the hospital.  The results of their tests and reasons for their admission have been discussed with them and/or available family. They convey agreement and understanding for the need to be admitted and for their admission diagnosis.

## 2019-04-23 NOTE — PROGRESS NOTES
Patient here with caregiver Rea Mattson [phone] for Kyphoplasty lumbar 4 compression fracture. Patient forgetful, requires repetition with direction, alert, oriented to person, place. Complains of chronic low back pain 7/10, hopeful this procedure will alleviate this. All assessments completed and consent obtained. Coumadin on hold for 6 days per caregiver. Istat INR 1.1. Patient is wheelchair bound, nonambulatory, difficulty standing and bearing weight, requires two with transfers. Lives alone in independent living. Caregiver acertains she will stay with her and get her settled at home upon discharge.

## 2019-04-23 NOTE — SENIOR SERVICES NOTE
PT called me to assess patient. Pt had kyphoplasty today and was sent to the ED 2/2 unstageable wound on left hip and small nickel size necrotic area on right heel. [phone], the patient's  states that since the hip surgery, patient has not walked. Reports that she has to lift the patient out of the bed to the [facility] and put patient back to bed. Reports that patient is now incontinent of B&B. Reports that she is at the home for 2 hours/day in the mornings except for Sundays. Reports that patient has been at [facility] independent living for 5 months and prior to that was at a senior living apartment on [address]. When patient was on [address], she was paying Elton to come in the morning and again in the evening but stopped the evening visit when she moved to [facility]. Chantel Mejia states that the patient has a sister but that they do not have a good relationship. Reports that the patient states that Humberto Velásquez (a friend from the old apartments) will help her on days that Chantel Mejia is not able to but Chantel Mejia states that Humberto Velásquez has told her that she will not because the patient doesn't want to pay her. Difficult to make recommendations on mentation/cognition as I do not have baseline comparison. Will await Dr. Dexter Schumacher. If medically necessary, inpatient admission x 3 days recommended so that patient can go to SNF under Medicare benefit.   Pt has been to [facility] in the past.

## 2019-04-23 NOTE — PROGRESS NOTES
Care Management - Called patient's sister - Kalyan Moran ([phone]) to obtain additional information. She said that patient has 2 sisters, Ms. Shanel Rhoades herself and their younger sister Pamela Bryson who lives in [address] [place] [address]. She said the last 6 months or maybe a little bit longer, patient does not answer their phone calls. Ms. Shanel Rhoades has not actually spoken to patient in at least 6 weeks. She has not seen her since around 2019. Patient's mother  \"a long time ago\". Her mother had dementia in her late 76s. Patient has moved from [place] to [place] and then back to [place] a few months ago. Ms. Shanel Rhoades stated she feels the only person who knows what is going on is Garrett. Ms. Shanel Rhoades said the phone number for JL Luevano is [phone]. She said that patient has been \"chasing him away\" because she has not been answering the phone for him either. She said that Neelam Holman (home [phone], work [phone]) helps patient with her money. She works at [employer]. She said she \"knows all about her money\", Tayo Baeza helps her pay her bills\", and \"helps her keep her money straight\". She states patient Jake Brower money to pay for a facility\".  
 
PILI Jenkins

## 2019-04-24 ENCOUNTER — APPOINTMENT (OUTPATIENT)
Dept: GENERAL RADIOLOGY | Age: 84
DRG: 982 | End: 2019-04-24
Attending: INTERNAL MEDICINE
Payer: MEDICARE

## 2019-04-24 LAB — INR BLD: 1.1 (ref 0.9–1.2)

## 2019-04-24 PROCEDURE — 71046 X-RAY EXAM CHEST 2 VIEWS: CPT

## 2019-04-24 PROCEDURE — 74011250637 HC RX REV CODE- 250/637: Performed by: FAMILY MEDICINE

## 2019-04-24 PROCEDURE — 97167 OT EVAL HIGH COMPLEX 60 MIN: CPT

## 2019-04-24 PROCEDURE — 97535 SELF CARE MNGMENT TRAINING: CPT

## 2019-04-24 PROCEDURE — 74011250636 HC RX REV CODE- 250/636: Performed by: INTERNAL MEDICINE

## 2019-04-24 PROCEDURE — 99218 HC RM OBSERVATION: CPT

## 2019-04-24 PROCEDURE — 74011250637 HC RX REV CODE- 250/637: Performed by: INTERNAL MEDICINE

## 2019-04-24 RX ORDER — WARFARIN SODIUM 5 MG/1
5 TABLET ORAL ONCE
Status: COMPLETED | OUTPATIENT
Start: 2019-04-24 | End: 2019-04-24

## 2019-04-24 RX ORDER — BALSAM PERU/CASTOR OIL
OINTMENT (GRAM) TOPICAL EVERY 8 HOURS
Status: DISCONTINUED | OUTPATIENT
Start: 2019-04-24 | End: 2019-04-29 | Stop reason: HOSPADM

## 2019-04-24 RX ADMIN — ALPRAZOLAM 0.5 MG: 0.25 TABLET ORAL at 11:05

## 2019-04-24 RX ADMIN — LEVOTHYROXINE SODIUM 100 MCG: 100 TABLET ORAL at 07:15

## 2019-04-24 RX ADMIN — FAMOTIDINE 20 MG: 20 TABLET ORAL at 17:23

## 2019-04-24 RX ADMIN — POTASSIUM CHLORIDE 20 MEQ: 750 TABLET, EXTENDED RELEASE ORAL at 09:44

## 2019-04-24 RX ADMIN — SODIUM CHLORIDE 75 ML/HR: 900 INJECTION, SOLUTION INTRAVENOUS at 09:43

## 2019-04-24 RX ADMIN — ALPRAZOLAM 0.5 MG: 0.25 TABLET ORAL at 22:30

## 2019-04-24 RX ADMIN — FAMOTIDINE 20 MG: 20 TABLET ORAL at 09:44

## 2019-04-24 RX ADMIN — ATENOLOL 25 MG: 25 TABLET ORAL at 09:44

## 2019-04-24 RX ADMIN — WARFARIN SODIUM 5 MG: 5 TABLET ORAL at 12:36

## 2019-04-24 RX ADMIN — ALPRAZOLAM 0.5 MG: 0.25 TABLET ORAL at 02:53

## 2019-04-24 NOTE — ED NOTES
1900: Patient extremely difficult stick. Tech at bedside trying to redraw CMP and PTT  
 
2000: Patient turned and cleaned of incontinence.

## 2019-04-24 NOTE — PROGRESS NOTES
Chart reviewed and RN consulted. Attempted to see patient today for PT evaluation. Patient refused evaluation today, reporting in too much pain. Requesting to come back at a later time today for PT evaluation. Patient adamantly refused PT evaluation today. Will follow back tomorrow.

## 2019-04-24 NOTE — H&P
Interventional and Vascular Radiology History and Physical    Patient: Justus Olszewski 80 y.o. female       Chief Complaint: No chief complaint on file.       History of Present Illness: L4 vertebral fracture     History:    Past Medical History:   Diagnosis Date    A-fib ([address]     Arrhythmia     A FIB    Arthritis     Back pain     CAD (coronary artery disease)     PT DENIES    Chronic pain     Dementia 2016    Hypercholesterolemia     Psychiatric disorder     ANXIETY    Shoulder pain     Thyroid disease     Tremor, essential      Family History   Problem Relation Age of Onset    Dementia Mother     Arthritis-osteo Father      Social History     Socioeconomic History    Marital status:      Spouse name: Not on file    Number of children: Not on file    Years of education: Not on file    Highest education level: Not on file   Occupational History    Not on file   Social Needs    Financial resource strain: Not on file    Food insecurity:     Worry: Not on file     Inability: Not on file    Transportation needs:     Medical: Not on file     Non-medical: Not on file   Tobacco Use    Smoking status: Former Smoker     Packs/day: 0.50     Years: 10.00     Pack years: 5.00     Last attempt to quit: 1994     Years since quittin.3    Smokeless tobacco: Never Used   Substance and Sexual Activity    Alcohol use: Yes     Comment: NIGHTLY 2 GLASSES WINE    Drug use: No    Sexual activity: Not on file   Lifestyle    Physical activity:     Days per week: Not on file     Minutes per session: Not on file    Stress: Not on file   Relationships    Social connections:     Talks on phone: Not on file     Gets together: Not on file     Attends [employer]ism service: Not on file     Active member of club or organization: Not on file     Attends meetings of clubs or organizations: Not on file     Relationship status: Not on file    Intimate partner violence:     Fear of current or ex partner: Not on file     Emotionally abused: Not on file     Physically abused: Not on file     Forced sexual activity: Not on file   Other Topics Concern    Not on file   Social History Narrative    Not on file       Allergies: Allergies   Allergen Reactions    Latex, Natural Rubber Other (comments)     \"I get black where ever it touches\"    Codeine Other (comments)     \"It just sends me up the wall\"    Adhesive Rash and Other (comments)     blisters    Cortisone Unknown (comments)       Current Medications:  No current facility-administered medications for this encounter. No current outpatient medications on file. Facility-Administered Medications Ordered in Other Encounters   Medication Dose Route Frequency    collagenase (SANTYL) 250 unit/gram ointment   Topical DAILY    balsam peru-castor oil (VENELEX) ointment   Topical Q8H    0.9% sodium chloride infusion  75 mL/hr IntraVENous CONTINUOUS    ALPRAZolam (XANAX) tablet 0.5 mg  0.5 mg Oral TID PRN    atenolol (TENORMIN) tablet 25 mg  25 mg Oral DAILY    famotidine (PEPCID) tablet 20 mg  20 mg Oral BID    levothyroxine (SYNTHROID) tablet 100 mcg  100 mcg Oral ACB    polyethylene glycol (MIRALAX) packet 17 g  17 g Oral DAILY PRN    potassium chloride SR (KLOR-CON 10) tablet 20 mEq  20 mEq Oral DAILY    ondansetron (ZOFRAN) injection 4 mg  4 mg IntraVENous Q6H PRN    acetaminophen (TYLENOL) tablet 650 mg  650 mg Oral Q4H PRN    Warfarin - Pharmacy dosing   Other Rx Dosing/Monitoring        Physical Exam:  Blood pressure 147/63, pulse 78, temperature 97.4 °F (36.3 °C), resp. rate 18, height 5' 3\" (1.6 m), weight 54.4 kg (120 lb), SpO2 98 %.   LUNG: clear to auscultation bilaterally, HEART: regular rate and rhythm, S1, S2 normal, no murmur, click, rub or gallop      Alerts:    Hospital Problems  Date Reviewed: 7/31/2018    None          Laboratory:      Recent Labs     04/23/19 2233 04/23/19  1948 04/23/19  1748   HGB  --   --  14.3   HCT  --   -- 47.5*   WBC  --   --  10.9   PLT  --   --  234   INR 1.1  --   --    BUN  --  18  --    CREA  --  0.40*  --    K  --  HEMOLYZED,RECOLLECT REQUESTED  --          Plan of Care/Planned Procedure:  Risks, benefits, and alternatives reviewed with patient and she agrees to proceed with the procedure. Conscious sedation will be performed with IV fentanyl and versed.  Plan is for L4 kyphoplasty       Enmanuel Dean MD

## 2019-04-24 NOTE — PROGRESS NOTES
Problem: Self Care Deficits Care Plan (Adult) Goal: *Acute Goals and Plan of Care (Insert Text) Description Occupational Therapy Goals Initiated 4/24/2019 1. Patient will complete feeing with independence within 7 days. 2.  Patient will complete grooming with supervision within 7 days. 3.  Patient will complete gentle UE ROM with supervision within 7 days. 4.  Patient will tolerate sitting EOB with mod A for 3 minutes in preparation for ADL activities within 7 days. Outcome: Not Met OCCUPATIONAL THERAPY EVALUATION Patient: Valeria Owens (89 y.o. female) Date: 4/24/2019 Primary Diagnosis: Dehydration [E86.0] Precautions:   Fall ASSESSMENT : 
Based on the objective data described below, the patient presents with decreased independence with self care and all functional mobility following admission for compression fracture and dehydration. Pt's care giver Rea Mattson entered room during assessment to shed some light into the situation at home. PLOF:  Pt has a hired care giver 2 hours per day daily and all day on Tuesdays. This is the only help that she currently has at home. Care giver assists with changing bed linens, house keeping, and meal preparation. Pt will only gets OOB when she is willing which may be one day at most per week. Care giver reports she picks her up out of bed in a cradled position to place her in the w/c when she is agreeable. She does have a w/c at home and walker but has not been able to  greater than 9 months following right hip fracture (Aug. 2018). Currently Level of Performance: She requires total care for grooming, bathing, and dressing activities. She is unable to transfer to [facility] as she is unable to stand. She is able to feed herself following total setup. Discharge Recommendations:  pt is unsafe to return to her current apartment.   She is unable to meet her basic needs with current level of help.  Recommend increasing care givers to 24 hours a day versus LTC. Pt has poor insight into her current situation and need for great support. Patient will benefit from skilled intervention to address the above impairments. Patients rehabilitation potential is considered to be Guarded Factors which may influence rehabilitation potential include: ? None noted ? Mental ability/status ? Medical condition ? Home/family situation and support systems ? Safety awareness ? Pain tolerance/management ? Other: PLAN : 
Recommendations and Planned Interventions: 
?               Self Care Training                  ? Therapeutic Activities ? Functional Mobility Training    ? Cognitive Retraining 
? Therapeutic Exercises           ? Endurance Activities ? Balance Training                   ? Neuromuscular Re-Education ? Visual/Perceptual Training     ? Home Safety Training 
? Patient Education                 ? Family Training/Education ? Other (comment): Frequency/Duration: Patient will be followed by occupational therapy 3 times a week to address goals. Discharge Recommendations: 24 hour care v. LTC Further Equipment Recommendations for Discharge: TBD SUBJECTIVE:  
Patient stated I am feeling alright.  OBJECTIVE DATA SUMMARY:  
HISTORY:  
Past Medical History:  
Diagnosis Date  A-fib ([facility] [address]  Arrhythmia A FIB  Arthritis  Back pain  CAD (coronary artery disease) PT DENIES  Chronic pain  Dementia 2/9/2016  Hypercholesterolemia  Psychiatric disorder ANXIETY  Shoulder pain  Thyroid disease  Tremor, essential   
 
Past Surgical History:  
Procedure Laterality Date  HX APPENDECTOMY  HX ORTHOPAEDIC    
 left knee replacement  HX KADIE AND BSO AGE 39  
 HX TONSIL AND ADENOIDECTOMY  IR KYPHOPLASTY LUMBAR  4/23/2019 Prior Level of Function/Environment/Context: pt reports she is \"independent\". See above assessment for further information. Expanded or extensive additional review of patient history:  
 
Home Situation Home Environment: Independent living([facility]) # Steps to Enter: 0 One/Two Story Residence: One story Living Alone: No 
Support Systems: Home care staff Patient Expects to be Discharged to[de-identified] Rehabilitation facility Current DME Used/Available at Home: You Carson, helga, Wheelchair, [place] [employer], straight, [address] [employer] chair Tub or Shower Type: Shower Hand dominance: Right EXAMINATION OF PERFORMANCE DEFICITS: 
Cognitive/Behavioral Status: 
Neurologic State: Alert Orientation Level: Oriented to person;Oriented to place; Disoriented to situation;Disoriented to time Cognition: Decreased attention/concentration;Decreased command following Perception: Appears intact Perseveration: No perseveration noted Safety/Judgement: Decreased awareness of environment;Decreased awareness of need for assistance;Decreased awareness of need for safety;Decreased insight into deficits Skin: see nursing notes Edema: none noted Hearing: Auditory Auditory Impairment: None Vision/Perceptual:   
    
    
    
  
    
Acuity: Within Defined Limits Range of Motion: 
 
AROM: Generally decreased, functional 
PROM: Generally decreased, functional 
  
  
  
  
  
  
Strength: 
 
Strength: Generally decreased, functional 
  
  
  
  
Coordination: 
Coordination: Generally decreased, functional 
Fine Motor Skills-Upper: Right Intact; Left Intact Gross Motor Skills-Upper: Right Impaired;Left Impaired Tone & Sensation: 
 
Tone: Normal 
Sensation: Intact Balance: 
Sitting: Impaired(bed in modified chair position only; poor tolerance) Sitting - Static: Poor (constant support) Sitting - Dynamic: Poor (constant support) Functional Mobility and Transfers for ADLs: 
Bed Mobility: 
Supine to Sit: (pt refusing to progress to EOB) Sit to Supine: (refusing to progress to EOB) Transfers: 
Sit to Stand: (refusing to progress to EOB) Stand to Sit: (refusing to progress to EOB) Bed to Chair: (refusing to progress to EOB) Bathroom Mobility: (refusing to progress to EOB) Toilet Transfer : (refusing to progress to EOB) ADL Assessment: 
Feeding: Supervision(requires total setup but can physically feed herself) Oral Facial Hygiene/Grooming: Supervision Bathing: Total assistance Upper Body Dressing: Total assistance Lower Body Dressing: Total assistance Toileting: Total assistance ADL Intervention and task modifications: 
  
Pt attempted to reposition in bed to allow her to eat breakfast.  Pt repositioned with bed in modified chair position to help with self feeding activities. Pt with very poor tolerance for any movement or ROM of LE's at this time. She reports increased pain with attempts to reposition. RLE positioned in hip flexion and significant IR. Pt unable to obtain neutral right hip position. Cognitive Retraining Safety/Judgement: Decreased awareness of environment;Decreased awareness of need for assistance;Decreased awareness of need for safety;Decreased insight into deficits Functional Measure: 
Barthel Index: 
 
Bathin Bladder: 0 Bowels: 0 Groomin Dressin Feedin Mobility: 0 Stairs: 0 Toilet Use: 0 Transfer (Bed to Chair and Back): 0 Total: 5/100 Percentage of impairment  
0% 1-19% 20-39% 40-59% 60-79% 80-99% 100% Barthel Score 0-100 100 99-80 79-60 59-40 20-39 1-19 
 0 The Barthel ADL Index: Guidelines 1. The index should be used as a record of what a patient does, not as a record of what a patient could do. 2. The main aim is to establish degree of independence from any help, physical or verbal, however minor and for whatever reason. 3. The need for supervision renders the patient not independent. 4. A patient's performance should be established using the best available evidence. Asking the patient, friends/relatives and nurses are the usual sources, but direct observation and common sense are also important. However direct testing is not needed. 5. Usually the patient's performance over the preceding 24-48 hours is important, but occasionally longer periods will be relevant. 6. Middle categories imply that the patient supplies over 50 per cent of the effort. 7. Use of aids to be independent is allowed. Campbell Thompson., Barthel, D.W. (1455). Functional evaluation: the Barthel Index. [address] [facility] (14)2. FRANK Baldwin, Kerri Vera., Marlin Martinez., [facility], [address] [place] [address] (1999). Measuring the change indisability after inpatient rehabilitation; comparison of the responsiveness of the Barthel Index and Functional [place] Measure. Journal of Neurology, Neurosurgery, and Psychiatry, 66(4), 823-442. Yana Mendoza, N.J.A, Derrick Villarreal,  BOBBY.J.ROBIN, & Kane Durbin M.A. (2004.) Assessment of post-stroke quality of life in cost-effectiveness studies: The usefulness of the Barthel Index and the EuroQoL-5D. [facility], 13, 273-17 Occupational Therapy Evaluation Charge Determination History Examination Decision-Making HIGH Complexity : Extensive review of history including physical, cognitive and psychosocial history  HIGH Complexity : 5 or more performance deficits relating to physical, cognitive , or psychosocial skils that result in activity limitations and / or participation restrictions HIGH Complexity : Patient presents with comorbidities that affect occupational performance.  Signifigant modification of tasks or assistance (eg, physical or verbal) with assessment (s) is necessary to enable patient to complete evaluation Based on the above components, the patient evaluation is determined to be of the following complexity level: HIGH Pain: 
  
  
  
  
  
  
Activity Tolerance: VSS throughout session. After treatment:  
? Patient left in no apparent distress sitting up in chair ? Patient left in no apparent distress in bed 
? Call bell left within reach ? Nursing notified ? Caregiver present ? Bed alarm activated COMMUNICATION/EDUCATION:  
The patients plan of care was discussed with: Physical Therapist and Registered Nurse. 
? Home safety education was provided and the patient/caregiver indicated understanding. ? Patient/family have participated as able in goal setting and plan of care. ? Patient/family agree to work toward stated goals and plan of care. ? Patient understands intent and goals of therapy, but is neutral about his/her participation. ? Patient is unable to participate in goal setting and plan of care. This patients plan of care is appropriate for delegation to [facility]. Thank you for this referral. 
Manny Thomas OT Time Calculation: 23 mins

## 2019-04-24 NOTE — PROGRESS NOTES
Benito Nice, Nataliya Matt, and AcAdmit Date: 4/23/2019 Subjective:  
 
Patient feeling OK this AM. . Current Facility-Administered Medications Medication Dose Route Frequency  0.9% sodium chloride infusion  75 mL/hr IntraVENous CONTINUOUS  
 ALPRAZolam (XANAX) tablet 0.5 mg  0.5 mg Oral TID PRN  
 atenolol (TENORMIN) tablet 25 mg  25 mg Oral DAILY  famotidine (PEPCID) tablet 20 mg  20 mg Oral BID  levothyroxine (SYNTHROID) tablet 100 mcg  100 mcg Oral ACB  polyethylene glycol (MIRALAX) packet 17 g  17 g Oral DAILY PRN  potassium chloride SR (KLOR-CON 10) tablet 20 mEq  20 mEq Oral DAILY  ondansetron (ZOFRAN) injection 4 mg  4 mg IntraVENous Q6H PRN  
 acetaminophen (TYLENOL) tablet 650 mg  650 mg Oral Q4H PRN  
 Warfarin - Pharmacy dosing   Other Rx Dosing/Monitoring Objective:  
 
Patient Vitals for the past 8 hrs: 
 BP Temp Pulse Resp SpO2  
04/24/19 0425 125/66 98.6 °F (37 °C) 84 16 98 % No intake/output data recorded. 04/22 1901 - 04/24 0700 In: 575 [P.O.:75; I.V.:500] Out: - Physical Exam: Lungs: clear to auscultation bilaterally Heart: regular rate and rhythm, S1, S2 normal, no murmur, click, rub or gallop Abdomen: soft, non-tender. Bowel sounds normal. No masses,  no organomegaly Data Review Recent Results (from the past 24 hour(s)) CBC WITH AUTOMATED DIFF Collection Time: 04/23/19  5:48 PM  
Result Value Ref Range WBC 10.9 3.6 - 11.0 K/uL  
 RBC 5.04 3.80 - 5.20 M/uL  
 HGB 14.3 11.5 - 16.0 g/dL HCT 47.5 (H) 35.0 - 47.0 % MCV 94.2 80.0 - 99.0 FL  
 MCH 28.4 26.0 - 34.0 PG  
 MCHC 30.1 30.0 - 36.5 g/dL  
 RDW 14.9 (H) 11.5 - 14.5 % PLATELET 850 728 - 334 K/uL MPV 11.6 8.9 - 12.9 FL  
 NRBC 0.0 0  WBC ABSOLUTE NRBC 0.00 0.00 - 0.01 K/uL NEUTROPHILS 70 32 - 75 % LYMPHOCYTES 10 (L) 12 - 49 % MONOCYTES 17 (H) 5 - 13 % EOSINOPHILS 1 0 - 7 % BASOPHILS 1 0 - 1 % IMMATURE GRANULOCYTES 1 (H) 0.0 - 0.5 % ABS. NEUTROPHILS 7.8 1.8 - 8.0 K/UL  
 ABS. LYMPHOCYTES 1.1 0.8 - 3.5 K/UL  
 ABS. MONOCYTES 1.8 (H) 0.0 - 1.0 K/UL  
 ABS. EOSINOPHILS 0.1 0.0 - 0.4 K/UL  
 ABS. BASOPHILS 0.1 0.0 - 0.1 K/UL  
 ABS. IMM. GRANS. 0.1 (H) 0.00 - 0.04 K/UL  
 DF AUTOMATED    
SAMPLES BEING HELD Collection Time: 04/23/19  7:45 PM  
Result Value Ref Range SAMPLES BEING HELD 1BLU   
 COMMENT Add-on orders for these samples will be processed based on acceptable specimen integrity and analyte stability, which may vary by analyte. METABOLIC PANEL, COMPREHENSIVE Collection Time: 04/23/19  7:48 PM  
Result Value Ref Range Sodium 135 (L) 136 - 145 mmol/L Potassium HEMOLYZED,RECOLLECT REQUESTED 3.5 - 5.1 mmol/L Chloride 109 (H) 97 - 108 mmol/L  
 CO2 20 (L) 21 - 32 mmol/L Anion gap 6 5 - 15 mmol/L Glucose 92 65 - 100 mg/dL BUN 18 6 - 20 MG/DL Creatinine 0.40 (L) 0.55 - 1.02 MG/DL  
 BUN/Creatinine ratio 45 (H) 12 - 20 GFR est AA >60 >60 ml/min/1.73m2 GFR est non-AA >60 >60 ml/min/1.73m2 Calcium 7.5 (L) 8.5 - 10.1 MG/DL Bilirubin, total 0.5 0.2 - 1.0 MG/DL  
 ALT (SGPT) 10 (L) 12 - 78 U/L  
 AST (SGOT) HEMOLYZED,RECOLLECT REQUESTED 15 - 37 U/L Alk. phosphatase 75 45 - 117 U/L Protein, total 5.2 (L) 6.4 - 8.2 g/dL Albumin 1.7 (L) 3.5 - 5.0 g/dL Globulin 3.5 2.0 - 4.0 g/dL A-G Ratio 0.5 (L) 1.1 - 2.2 PROTHROMBIN TIME + INR Collection Time: 04/23/19 10:33 PM  
Result Value Ref Range INR 1.1 0.9 - 1.1 Prothrombin time 11.4 (H) 9.0 - 11.1 sec Assessment:  
 
Principal Problem: 
  Dehydration (4/23/2019) Active Problems: 
  A-fib (HCC) () Thyroid disease () Wound, open, hip or thigh, left, initial encounter (4/23/2019) Non-healing wound of right heel (4/23/2019) Plan:  
 
1) wound care 2) IVF 
3) case management- patient lives in [address] [place] [address]- do not think is safe for her to return there.

## 2019-04-24 NOTE — PROGRESS NOTES
Bedside and Verbal shift change report given to [address] [place] [address] (oncoming nurse) by Jessica Prince (offgoing nurse). Report included the following information SBAR.

## 2019-04-24 NOTE — PROGRESS NOTES
Chart reviewed. Noted previous CM notes. Noted OT eval today indicates patient is unsafe to return to prior living situation and needs 24 hour caregivers vs LTC. PTA patient was living in independent living at [facility] with caregiver 2 hours per day and Tuesdays all day. CM spoke with sister [facility] via phone ([phone]). [facility] is concerned and feels that patient definitely needs LTC in a nursing home. Noted patient has been to [facility] in the past. [facility] stated that patient makes her own decisions, however chart indicates patient is disorientated to situation and time. Paperwork in chart indicates JL John ([phone]) is POA. CM met with patient at bedside. Patient was alert but showed some confusion. CM asked about JL John being POA, patient stated \"You don't even know CL and I make my own decisions. \" CM asked patient if it is possible to increase her caregivers to 24 hour care. Patient stated \"I don't know because it's very expensive. \" CM asked patient if she would be agreeable to going to a SNF (if upgraded to inpatient status), however patient refused. Patient them became agitated and asked if CM can come back \"when she is awake. \" Attempt was made to deliver observation letters, patient refused to sign. CM attempted to contact DONTA John ([phone]), number was out of service. CM sent e-mail to utilization review to review for possible upgrade to inpatient status which will give more options for discharge planning (possible SNF placement). CM will continue to follow. EVY Gutierrez/CRM

## 2019-04-24 NOTE — ROUTINE PROCESS
TRANSFER - OUT REPORT: 
 
Verbal report given to [employer] RN (name) on Clari Park  being transferred to  (unit) for routine progression of care Report consisted of patients Situation, Background, Assessment and  
Recommendations(SBAR). Information from the following report(s) SBAR, Kardex, ED Summary, Intake/Output and Recent Results was reviewed with the receiving nurse. Lines:  
Peripheral IV 04/23/19 Right Wrist (Active) Site Assessment Clean, dry, & intact 4/23/2019  5:54 PM  
Phlebitis Assessment 0 4/23/2019  5:54 PM  
Infiltration Assessment 0 4/23/2019  5:54 PM  
Dressing Status Clean, dry, & intact 4/23/2019  5:54 PM  
Dressing Type Transparent 4/23/2019  5:54 PM  
Hub Color/Line Status Patent; Flushed;Pink 4/23/2019  5:54 PM  
Action Taken Blood drawn 4/23/2019  5:54 PM  
   
Peripheral IV 04/23/19 Left Hand (Active) Site Assessment Clean, dry, & intact 4/23/2019  8:13 PM  
Phlebitis Assessment 0 4/23/2019  8:13 PM  
Infiltration Assessment 0 4/23/2019  8:13 PM  
Dressing Status Clean, dry, & intact 4/23/2019  8:13 PM  
  
 
Opportunity for questions and clarification was provided. Patient transported with: 
Transport

## 2019-04-24 NOTE — PROGRESS NOTES
Patient continues to refuse to have labs drawn this morning. Patient states, \"so many was drawn in the ED, my answer is please no. \" Patient also states it is very hard to draw labs from her. Continue to rehydrate per orders.

## 2019-04-24 NOTE — ROUTINE PROCESS
Bedside shift change report given to Dc Alejandro (oncoming nurse) by Kristina Deutsch (offgoing nurse). Report included the following information SBAR.

## 2019-04-24 NOTE — PROGRESS NOTES
Pharmacist Note  Warfarin Dosing Consult provided for this 80 y. o.female to manage warfarin for Atrial Fibrillation INR Goal: 2 - 3 Home regimen/ tablet size: 2.5 mg daily Drugs that may increase INR: None Drugs that may decrease INR: None Other current anticoagulants/ drugs that may increase bleeding risk: None Risk factors: Age > 65, dehydration Daily INR ordered: YES Recent Labs  
  04/23/19 
2233 04/23/19 
1748 04/23/19 
1024 HGB  --  14.3  --   
INR 1.1  --  1.1 Date               INR                  Dose 4/23  1.1  -- 
4/24  --  5 mg Assessment/ Plan: Will order warfarin 5 mg PO x 1 dose. Pharmacy will continue to monitor daily and adjust therapy as indicated. Please contact the pharmacist at  or  for outpatient recommendations if needed.

## 2019-04-24 NOTE — PROGRESS NOTES
TRANSFER - IN REPORT: 
 
Verbal report received from MARY JANE Wilson(name) on Valeria Owens  being received from ED(unit) for routine progression of care Report consisted of patients Situation, Background, Assessment and  
Recommendations(SBAR). Information from the following report(s) SBAR, Kardex and Recent Results was reviewed with the receiving nurse. Opportunity for questions and clarification was provided. Assessment completed upon patients arrival to unit and care assumed. Primary Nurse Laci Dennis RN and Khadra Busch RN performed a dual skin assessment on this patient Impairment noted- see wound doc flow sheet Tre score is 12 Patient has right pressure ulcer on heel and on left hip. Mepilex applied to left hip. Patient also had recent kyphoplasty and has 2 gauze dressings on either side of the spine [address] [facility] Bedside shift change report given to Kiko Coto RN (oncoming nurse) by [employer], RN (offgoing nurse). Report included the following information SBAR, Kardex and Recent Results.

## 2019-04-24 NOTE — PROGRESS NOTES
NUTRITION COMPLETE ASSESSMENT 
 
RECOMMENDATIONS:  
1. Regular diet; adjust texture PRN any mastication difficulty with broken tooth 2. Please obtain measured weight (admit stated and appetite poor) 3. RD add Boost pudding Pollo Curiel, and Ensure iLinc" & yogurt 4. Staff assist with meal tray set-up and selections (appetite poor and confused) Interventions/Plan:  
Food/Nutrient Delivery:  General/healthful diet Commercial supplement(Boost pudding van; [employer] van) Meal setup Nutrition Education:    
Coordination of Care:   
Nutrition Counseling:     
 
Assessment:  
Reason for Assessment:  
 
[x]BPA (L-hip unstageable PI) Referral  
 
Diet: Regular Supplements: RD to add Boost pudding Katfordeasandy Kia) + Ensure Compact van shake Nutritionally Significant Medications: [x] Reviewed & Includes: KCL, Coumadin, Synthroid, Pepcid Meal Intake: 4/24/19 lunch 5% (few bites vanilla pudding.) Subjective: 
I weighed 110#, but can't remember when. Not hungry, I'm stuffed, just ate lunch. (tray still in room and meal untouched, vanilla pudding, few bites). Refused Ensure Enlive chocolate. Prior admit accepted Ensure [facility], but now claims, never been to [facility] and won't drink Ensure. Natural teeth, one broken tooth. I love yogurt (except blueberry) for breakfast every day Objective: 
[place] [facility] [place] resident, admitted with dehydration and PI. Hx hypothyroid; R-hip fx 8/2018, Ortho surgery [facility]. Vertebral compression fx w/recent kyphoplasty. Admit L-unstageable PI and necrotic PI R-heel. Hx poor appetite & ONS 8/2018 [facility]. Admit wt is stated. Pt recalls she recently weighed 110# (? Recall with dementia). RN to zero bed and obtain bed scale wt. Non-ambulatory now. Last recorded wt 125# MD OV, but ? Source. Weighed 120# last year (5/29/2018). Appetite poor today, few bites pudding for lunch and reports, \"being stuffed\".   Although accepted Ensure Enlive chocolate last admit 8/2018, now claims won't drink Ensure and doesn't like. Pt likes pudding and yogurt. RD plan to add Boost pudding van (240 kcal, 7 gm pro) and yogurt daily. Also likes van milkshakes. Plan to add Ensure Compact + ice cream & milk ~ 400 kcal, 17 gm pro. Increased nutrient needs wound healing and repletion with hx poor intake and admit dehydration. Reports one broken tooth. Unsure if this is causing any mastication difficulty. No hx dysphagia. Plan to modify diet texture PRN. Last B12 1071 WDL (2/10/2016); Wound LDA noted; ? Last BM. Hx incontinence. ? Hx constipation. Dehydrated. Increased fluid need for PI healing and rehydration. Lives [facility] independent, alone with limited care taker help. ? Meal consumption. None ambulatory and question if goes to DR? And ? If able to prepare own meals. ONS appropriate and will need assistance, reminders to eat and encouragement/reminders to take PO fluids. Estimated Nutrition Needs:  
Kcals/day: 6817 Kcals/day Protein: 70 g(~1.3 gm/kg IBW) Fluid: 1800 ml(~35 mL/kg IBW; wound, re-hydration) Based On: Walt Hernandez(MSJ x 1.2 + 500 kcal; wt gain/repletion) Weight Used: 54.4 kg admit stated wt Pt expected to meet estimated nutrient needs:  [x]  No, not with current poor PO Comparative Standards:  ;  ;   
 
Nutrition Diagnosis:  
1. Inadequate oral intake related to mentation, appetite as evidenced by lunch few bites pudding and \"feels stuffed\"; breakfast intake poor, admit dehydration and suspected underweight. 2. Increased nutrient needs, protein/fluid/kcal related to PI healing and hydration as evidenced by clinical dehydration on admit, poor PO, unstageable hip PI and necrotic area heel. Goals:   
 Consume minimum 100% one ONS daily and at least 50% all meals starting next 24 hr.  
  
Monitoring & Evaluation: - Total energy intake, Protein intake, Oral fluids amount, Liquid meal replacement - Weight/weight change, Lean body mass, fat free mass, Other (comment), Protein profile, GI(hydration) Previous Nutrition Goals Met: N/A Previous Recommendations: N/A Education & Discharge Needs: 
 [x] Dementia, pt unable to comprehend nutritional needs [x] Participated in care plan, discharge planning, and/or interdisciplinary rounds Cultural, Buddhist and ethnic food preferences identified: 
 None Skin Integrity: [x] Wound LDA noted: L-hip un-stageable 6.8x 5.5 x 0.1 cm depth POA; R-heel 2 x 1.5, 0 depth cm. Edema: [x]None Last BM: PTA Food Allergies: [x]NKFA Anthropometrics:   
Weight Loss Metrics 4/23/2019 STATED WT 8/2/2018 12/13/2017 8/30/2017 2/1/2017 8/8/2016 2/13/2016 Today's Wt 120 lb 156 lb 9.6 oz 120 lb 120 lb 150 lb 167 lb 167 lb 4.8 oz  
BMI 21.26 kg/m2 27.74 kg/m2 21.26 kg/m2 21.26 kg/m2 26.57 kg/m2 30.54 kg/m2 30.59 kg/m2 There were no vitals filed for this visit. ,   
There is no height or weight on file to calculate BMI. IBW : 52.2 kg (115 lb), Usual Body Weight: (Unknown, pt w/dementia), Labs:   
Lab Results Component Value Date/Time Sodium 135 (L) 04/23/2019 07:48 PM  
 Potassium HEMOLYZED,RECOLLECT REQUESTED 04/23/2019 07:48 PM  
 Chloride 109 (H) 04/23/2019 07:48 PM  
 CO2 20 (L) 04/23/2019 07:48 PM  
 Glucose 92 04/23/2019 07:48 PM  
 BUN 18 04/23/2019 07:48 PM  
 Creatinine 0.40 (L) 04/23/2019 07:48 PM  
 Calcium 7.5 (L) 04/23/2019 07:48 PM  
 Magnesium 1.5 (L) 07/30/2018 04:50 AM  
 Albumin 1.7 (L) 04/23/2019 07:48 PM  
 
Lab Results Component Value Date/Time Hemoglobin A1c 5.8 01/07/2014 03:26 PM  
 Hemoglobin A1c (POC) 5.7 (A) 09/27/2012 12:00 PM  
 
Lab Results Component Value Date/Time Glucose 92 04/23/2019 07:48 PM  
  
Lab Results Component Value Date/Time ALT (SGPT) 10 (L) 04/23/2019 07:48 PM  
 AST (SGOT) HEMOLYZED,RECOLLECT REQUESTED 04/23/2019 07:48 PM  
 Alk.  phosphatase 75 04/23/2019 07:48 PM  
 Bilirubin, total 0.5 04/23/2019 07:48 PM  
  
 
Aubree Mackenzie, RD

## 2019-04-25 LAB
INR PPP: 1.2 (ref 0.9–1.1)
PROTHROMBIN TIME: 12.1 SEC (ref 9–11.1)

## 2019-04-25 PROCEDURE — 65270000029 HC RM PRIVATE

## 2019-04-25 PROCEDURE — 99218 HC RM OBSERVATION: CPT

## 2019-04-25 PROCEDURE — 85610 PROTHROMBIN TIME: CPT

## 2019-04-25 PROCEDURE — 74011250636 HC RX REV CODE- 250/636: Performed by: INTERNAL MEDICINE

## 2019-04-25 PROCEDURE — 36415 COLL VENOUS BLD VENIPUNCTURE: CPT

## 2019-04-25 PROCEDURE — 74011250637 HC RX REV CODE- 250/637: Performed by: INTERNAL MEDICINE

## 2019-04-25 PROCEDURE — 74011250637 HC RX REV CODE- 250/637: Performed by: FAMILY MEDICINE

## 2019-04-25 RX ORDER — NYSTATIN 100000 [USP'U]/G
POWDER TOPICAL EVERY 12 HOURS
Status: DISCONTINUED | OUTPATIENT
Start: 2019-04-25 | End: 2019-04-29 | Stop reason: HOSPADM

## 2019-04-25 RX ORDER — WARFARIN SODIUM 5 MG/1
5 TABLET ORAL ONCE
Status: COMPLETED | OUTPATIENT
Start: 2019-04-25 | End: 2019-04-25

## 2019-04-25 RX ADMIN — ATENOLOL 25 MG: 25 TABLET ORAL at 10:50

## 2019-04-25 RX ADMIN — WARFARIN SODIUM 5 MG: 5 TABLET ORAL at 14:09

## 2019-04-25 RX ADMIN — COLLAGENASE SANTYL: 250 OINTMENT TOPICAL at 10:50

## 2019-04-25 RX ADMIN — CASTOR OIL AND BALSAM, PERU: 788; 87 OINTMENT TOPICAL at 21:09

## 2019-04-25 RX ADMIN — SODIUM CHLORIDE 75 ML/HR: 900 INJECTION, SOLUTION INTRAVENOUS at 00:59

## 2019-04-25 RX ADMIN — POTASSIUM CHLORIDE 20 MEQ: 750 TABLET, EXTENDED RELEASE ORAL at 10:50

## 2019-04-25 RX ADMIN — NYSTATIN: 100000 POWDER TOPICAL at 15:31

## 2019-04-25 RX ADMIN — FAMOTIDINE 20 MG: 20 TABLET ORAL at 17:04

## 2019-04-25 RX ADMIN — CASTOR OIL AND BALSAM, PERU: 788; 87 OINTMENT TOPICAL at 14:09

## 2019-04-25 RX ADMIN — ALPRAZOLAM 0.5 MG: 0.25 TABLET ORAL at 18:05

## 2019-04-25 RX ADMIN — FAMOTIDINE 20 MG: 20 TABLET ORAL at 10:50

## 2019-04-25 RX ADMIN — CASTOR OIL AND BALSAM, PERU: 788; 87 OINTMENT TOPICAL at 05:51

## 2019-04-25 RX ADMIN — ALPRAZOLAM 0.5 MG: 0.25 TABLET ORAL at 15:36

## 2019-04-25 RX ADMIN — NYSTATIN: 100000 POWDER TOPICAL at 21:10

## 2019-04-25 NOTE — PHYSICIAN ADVISORY
Letter of Status Determination:  
Recommend hospitalization status upgraded from OBSERVATION  to INPATIENT  Status Pt Name:  Ambrose Bernal  
MR#  
[facility] # [id number] / 
[id number] Payor: Anu Negrete / Plan: [address] / Product Type: Medicare /   
SHANNAN#  [id number] Room and Hospital  567/01  @ [address] hospital  
Hospitalization date  4/23/2019  3:17 PM  
Current Attending Physician  Brian Ontiveros MD  
Principal diagnosis  Dehydration [E86.0] Clinicals  80 y.o. y.o  female hospitalized with above diagnosis The pt has required IVF, and her PO intake has been poor. It is noted that complex care and supportive Rx are being afforded to the pt Due to appropriate and necessary medical care, this pt's hospitalization has now exceeded two midnights . [place] ([facility]) criteria Does  NOT apply STATUS DETERMINATION  This patient is at above high risk of deterioration based on documented presenting clinical data, comorbid conditions, high risk of adverse events and current acute care course. Ms. Ambrose Bernal now meets Inpatient Admission status criteria in accordance with CMS regulation Section 43 .3. Specifically, due to medical necessity the patient's stay now exceeds Two Midnights. It is our recommendation that this patient's hospitalization status should be upgraded from  OBSERVATION to INPATIENT status. The final decision of the patient's hospitalization status depends on the attending physician's judgment Additional comments Payor: Anu Negrete / Plan: [address] / Product Type: Medicare /   
  
 
Andrew Gross MD MPH FACP Cell: [phone] Physician Advisor [address]  
   
Cell  [phone]   
 
 
[phone] Reymundo Coleman

## 2019-04-25 NOTE — WOUND CARE
WOUND CARE NOTE: 
 
Re-consulted by nurse request for irritation and redness under left breast and along back. Patient has a maculopapular erythematous rash with satellite lesions consistent with yeast under left breast.  Nystatin powder will be ordered. She likes to stay on her left side due to right hip pain and her left back has blanchable erythema. Venelex ointment will be used here also. Sacrum is intact, bilateral buttocks with purple hue of blanchable erythema. Left hip dressing was already changed today and was not assessed. See note from yesterday for complete assessment. Spoke with Dr. Shaan Moralez office, wound care orders obtained. Recommendation: 
Continue with current wound care, Venelex ointment will be applied to left back/flank and Nystatin powder under left breast. 
 
Left hip- daily cleanse with normal saline, wipe wound bed clean and dry, apply nickel thickness of Santyl ointment, cover with gauze that has been slightly moistened with normal saline, cover with Mepilex border. 
  
Bilateral heels, sacrum, buttocks and left back/flank- Every 8 hours liberally apply Venelex ointment. Left breast- Every 12 hours cleanse with soap and water, blot dry, sprinkle Nystatin powder and gently rub into skin. Skin Care & Pressure Prevention: 
Minimize layers of linen/pads under patient to optimize support surface. Turn/reposition approximately every 2 hours and offload heels. Manage incontinence / promote continence  
  
Discussed above plan with patient & Ric Clemente RN 
  
Transition of Care: Plan to follow as needed while admitted to hospital. 
  
ORI Mtz, RN, [facility], [facility]. 
office [phone] 
pager [phone] or call  to page

## 2019-04-25 NOTE — PROGRESS NOTES
While changing the patient this morning, noticed areas of irritation and redness noticed under left breast and along the left side of the back. Will consult wound care.

## 2019-04-25 NOTE — PROGRESS NOTES
A Spiritual Care Partner Volunteer visited patient in  567 on 4/25/2019. Documented by: 
Chaplain Parker MDiv, MS, [facility] 
[phone])

## 2019-04-25 NOTE — PROGRESS NOTES
Bedside and Verbal shift change report given to [employer] and Celina (oncoming nurse) by Fly Warren (offgoing nurse). Report included the following information SBAR.

## 2019-04-25 NOTE — PROGRESS NOTES
Drs. Alene Canavan, Jonelle Azar, and AcAdmit Date: 4/23/2019 Subjective:  
 
Patient didn't sleep much last night, very sleepy now. Answers questions appropriately but not wanting to wake up . Grumpy. .  
 
 
Current Facility-Administered Medications Medication Dose Route Frequency  collagenase (SANTYL) 250 unit/gram ointment   Topical DAILY  balsam peru-castor oil (VENELEX) ointment   Topical Q8H  
 0.9% sodium chloride infusion  75 mL/hr IntraVENous CONTINUOUS  
 ALPRAZolam (XANAX) tablet 0.5 mg  0.5 mg Oral TID PRN  
 atenolol (TENORMIN) tablet 25 mg  25 mg Oral DAILY  famotidine (PEPCID) tablet 20 mg  20 mg Oral BID  levothyroxine (SYNTHROID) tablet 100 mcg  100 mcg Oral ACB  polyethylene glycol (MIRALAX) packet 17 g  17 g Oral DAILY PRN  potassium chloride SR (KLOR-CON 10) tablet 20 mEq  20 mEq Oral DAILY  ondansetron (ZOFRAN) injection 4 mg  4 mg IntraVENous Q6H PRN  
 acetaminophen (TYLENOL) tablet 650 mg  650 mg Oral Q4H PRN  
 Warfarin - Pharmacy dosing   Other Rx Dosing/Monitoring Objective:  
 
Patient Vitals for the past 8 hrs: 
 BP Temp Pulse Resp SpO2  
04/25/19 0245 126/66 98.8 °F (37.1 °C) 87 16 95 % No intake/output data recorded. 04/23 1901 - 04/25 0700 In: 575 [P.O.:75; I.V.:500] Out: - Physical Exam: Lungs: clear to auscultation bilaterally Heart: regular rate and rhythm, S1, S2 normal, no murmur, click, rub or gallop Abdomen: soft, non-tender. Bowel sounds normal. No masses,  no organomegaly Neurologic: Grossly normal 
   
 
Data Review Recent Results (from the past 24 hour(s)) PROTHROMBIN TIME + INR Collection Time: 04/25/19  6:05 AM  
Result Value Ref Range INR 1.2 (H) 0.9 - 1.1 Prothrombin time 12.1 (H) 9.0 - 11.1 sec Assessment:  
 
Principal Problem: 
  Dehydration (4/23/2019) Active Problems: 
  A-fib (HCC) () Thyroid disease () Wound, open, hip or thigh, left, initial encounter (4/23/2019) Non-healing wound of right heel (4/23/2019) Plan:  
 
1) Continue wound care 2) D/C IVF 3) Dispo

## 2019-04-25 NOTE — PROGRESS NOTES
Pharmacist Note  Warfarin Dosing Consult provided for this 80 y. o.female to manage warfarin for Atrial Fibrillation INR Goal: 2 - 3 Home regimen/ tablet size: 2.5 mg daily Drugs that may increase INR: None Drugs that may decrease INR: None Other current anticoagulants/ drugs that may increase bleeding risk: None Risk factors: Age > 72 Daily INR ordered: YES Recent Labs  
  04/25/19 
0605 04/23/19 
2233 04/23/19 
1748 04/23/19 
1024 HGB  --   --  14.3  --   
INR 1.2* 1.1  --  1.1 Date               INR                  Dose 4/23  1.1  -- 
4/24  --  5 mg 
4/25  1.2  5 mg Assessment/ Plan: Will order warfarin 5 mg PO x 1 dose. Pharmacy will continue to monitor daily and adjust therapy as indicated. Please contact the pharmacist at  or  for outpatient recommendations if needed.

## 2019-04-25 NOTE — PROGRESS NOTES
Bedside and Verbal shift change report given to Maren Porter (oncoming nurse) by Jose Cervantes (offgoing nurse). Report included the following information SBAR, Kardex, Intake/Output, MAR and Recent Results.

## 2019-04-25 NOTE — PROGRESS NOTES
Physical Therapy: 
 
Consult received, chart reviewed and nursing cleared patient for therapy session. Patient sleeping upon arrival, very somnolent but answered questions appropriately with short responses. Patient adamantly refusing to participate in any form of evaluation at this time, stating, \"I want to sleep\". Educated on need to evaluate to assist with disposition options but patient continued to adamantly refuse. Will f/u later today as able and per patient participation.  
 
Jackie Wilson, MS, PT

## 2019-04-25 NOTE — PROGRESS NOTES
Chart reviewed. CM met with patient at bedside. CM explained that we need to start making a plan for when it comes time for her to be discharged. Patient currently lives at [facility] in independent living and is not safe to return unless she has 24 hour care. Patient will not allow CM contact her caregiver Elton ([phone]), DONTA John (phone number out of service), or her sister Isabel Felix ([phone]) to assist with discharge planning. CM asked patient if anyone assists her with her money (previous CM note indicates that Violet Scott at [employer](home [phone], work [phone]) helps with her money and pays her bills), however patient stated she pay her bills herself. Patient refused to allow CM to contact [address] CM explained that in order for patient to leave the hospital we need to either have her caregivers increased to 24 hour care or she needs to consider going to a rehab facility. [facility] and does not want to go there again. However, patient may be open to going to a different SNF. Patient stated that going to a rehab facility may be better than returning to her current living situation. Patient asked that CM come back tomorrow to discuss this further. CM will continue to follow. Lauren Garrett, BSW/CRM

## 2019-04-26 LAB — INR BLD: 1.2 (ref 0.9–1.2)

## 2019-04-26 PROCEDURE — 74011250637 HC RX REV CODE- 250/637: Performed by: FAMILY MEDICINE

## 2019-04-26 PROCEDURE — 85610 PROTHROMBIN TIME: CPT

## 2019-04-26 PROCEDURE — 74011250637 HC RX REV CODE- 250/637: Performed by: INTERNAL MEDICINE

## 2019-04-26 PROCEDURE — 65270000029 HC RM PRIVATE

## 2019-04-26 PROCEDURE — 77030011256 HC DRSG MEPILEX <16IN NO BORD MOLN -A

## 2019-04-26 RX ORDER — WARFARIN SODIUM 5 MG/1
5 TABLET ORAL ONCE
Status: COMPLETED | OUTPATIENT
Start: 2019-04-26 | End: 2019-04-26

## 2019-04-26 RX ADMIN — COLLAGENASE SANTYL: 250 OINTMENT TOPICAL at 09:00

## 2019-04-26 RX ADMIN — CASTOR OIL AND BALSAM, PERU: 788; 87 OINTMENT TOPICAL at 22:18

## 2019-04-26 RX ADMIN — CASTOR OIL AND BALSAM, PERU: 788; 87 OINTMENT TOPICAL at 14:00

## 2019-04-26 RX ADMIN — ACETAMINOPHEN 650 MG: 325 TABLET ORAL at 22:04

## 2019-04-26 RX ADMIN — WARFARIN SODIUM 5 MG: 5 TABLET ORAL at 16:02

## 2019-04-26 RX ADMIN — CASTOR OIL AND BALSAM, PERU: 788; 87 OINTMENT TOPICAL at 06:00

## 2019-04-26 RX ADMIN — NYSTATIN: 100000 POWDER TOPICAL at 21:09

## 2019-04-26 RX ADMIN — POTASSIUM CHLORIDE 20 MEQ: 750 TABLET, EXTENDED RELEASE ORAL at 09:10

## 2019-04-26 RX ADMIN — ALPRAZOLAM 0.5 MG: 0.25 TABLET ORAL at 14:49

## 2019-04-26 RX ADMIN — ALPRAZOLAM 0.5 MG: 0.25 TABLET ORAL at 22:03

## 2019-04-26 RX ADMIN — ATENOLOL 25 MG: 25 TABLET ORAL at 09:10

## 2019-04-26 RX ADMIN — LEVOTHYROXINE SODIUM 100 MCG: 100 TABLET ORAL at 08:30

## 2019-04-26 NOTE — PROGRESS NOTES
Occupational Therapy Note:  
 
Pt attempted to work on grooming and feeding activities in room but she declined all ADL tasks. She reports she had completed brushing her hair and her teeth this morning. Pt declined to progress OOB or even sitting EOB. Will follow up for continued intervention as able. Pt will continue to require 24 hour care at discharge.    
 
 
Su Fernandez, OT

## 2019-04-26 NOTE — ROUTINE PROCESS
Patient refuses to have labs drawn this morning. Patient states \"that we mikhail them yesterday,so we should have enough\". Will attempt again when patient is more awake. 0700- patient refuses all draws

## 2019-04-26 NOTE — PROGRESS NOTES
Pharmacist Note  Warfarin Dosing Consult provided for this 80 y. o.female to manage warfarin for Atrial Fibrillation INR Goal: 2 - 3 Home regimen/ tablet size: 2.5 mg daily Drugs that may increase INR: None Drugs that may decrease INR: None Other current anticoagulants/ drugs that may increase bleeding risk: None Risk factors: Age > 72 Daily INR ordered: YES Recent Labs  
  04/26/19 
1442 04/25/19 
0801 04/23/19 
2233 04/23/19 
1748 HGB  --   --   --  14.3 INR 1.2* 1.2* 1.1  --   
 
Date               INR                  Dose 4/23  1.1  -- 
4/24  --  5 mg 
4/25  1.2  5 mg 
4/26  1.2  5 mg Assessment/ Plan: Will order warfarin 5 mg PO x 1 dose. Pharmacy will continue to monitor daily and adjust therapy as indicated. Please contact the pharmacist at  or  for outpatient recommendations if needed.

## 2019-04-26 NOTE — ROUTINE PROCESS
Bedside shift change report given to Xiao (oncoming nurse) by Isiah Cardona (offgoing nurse). Report included the following information SBAR.

## 2019-04-26 NOTE — PROGRESS NOTES
Physical Therapy Consult received, chart reviewed and nursing cleared patient for therapy session. Patient stated \"I don't need to get up right now\" and refused PT Evaluation. Will attempt again on Monday.  
Nilsa Blue

## 2019-04-26 NOTE — PROGRESS NOTES
Chart reviewed. CM met with patient to discuss SNF choice. CM provided education about SNF placement and offered freedom of choice. Patient stated she does not want [address] [place] [address], [place] [address], or [facility]. Her preference is [facility]. CM sent referral via [facility]. Patient's sister Yao Gallardo has left CM several messages asking for updates about patient. CM notified patient of this and patient agreeable for CM to call sister back and give an update. CM spoke with Yao Gallardo via phone and provided update. CM will continue to follow. 3:00 PM: Gracie Cain with [facility] is here to evaluate the patient. FLETCHER ZelayaW/CRM

## 2019-04-26 NOTE — PROGRESS NOTES
Benito Ventura, Nava Bates, and AcAdmit Date: 4/23/2019 Subjective:  
 
Patient awake and verbal . . Current Facility-Administered Medications Medication Dose Route Frequency  nystatin (MYCOSTATIN) 100,000 unit/gram powder   Topical Q12H  collagenase (SANTYL) 250 unit/gram ointment   Topical DAILY  balsam peru-castor oil (VENELEX) ointment   Topical Q8H  
 ALPRAZolam (XANAX) tablet 0.5 mg  0.5 mg Oral TID PRN  
 atenolol (TENORMIN) tablet 25 mg  25 mg Oral DAILY  famotidine (PEPCID) tablet 20 mg  20 mg Oral BID  levothyroxine (SYNTHROID) tablet 100 mcg  100 mcg Oral ACB  polyethylene glycol (MIRALAX) packet 17 g  17 g Oral DAILY PRN  potassium chloride SR (KLOR-CON 10) tablet 20 mEq  20 mEq Oral DAILY  ondansetron (ZOFRAN) injection 4 mg  4 mg IntraVENous Q6H PRN  
 acetaminophen (TYLENOL) tablet 650 mg  650 mg Oral Q4H PRN  
 Warfarin - Pharmacy dosing   Other Rx Dosing/Monitoring Objective:  
 
Patient Vitals for the past 8 hrs: 
 BP Temp Pulse Resp SpO2  
04/26/19 0244 146/79 97.8 °F (36.6 °C) 87 16 98 % No intake/output data recorded. 04/24 1901 - 04/26 0700 In: 800 [P.O.:800] Out: - Physical Exam: Lungs: clear to auscultation bilaterally Heart: regular rate and rhythm, S1, S2 normal, no murmur, click, rub or gallop Abdomen: soft, non-tender. Bowel sounds normal. No masses,  no organomegaly Data Review No results found for this or any previous visit (from the past 24 hour(s)). Assessment:  
 
Principal Problem: 
  Dehydration (4/23/2019) Active Problems: 
  A-fib (HCC) () Thyroid disease () Wound, open, hip or thigh, left, initial encounter (4/23/2019) Non-healing wound of right heel (4/23/2019) Plan:  
 
1) Will need to be discharged to [facility] [place].  She does not want [facility]. She will speak with  about other options but she understands she must be discharged to a nursing facility. 2) Wound care

## 2019-04-26 NOTE — PROGRESS NOTES
Bedside shift change report given to Kiko Coto RN (oncoming nurse) by MANE Frias RN (offgoing nurse). Report included the following information SBAR, Kardex and Recent Results.

## 2019-04-27 LAB
INR PPP: 1.4 (ref 0.9–1.1)
PROTHROMBIN TIME: 13.8 SEC (ref 9–11.1)

## 2019-04-27 PROCEDURE — 85610 PROTHROMBIN TIME: CPT

## 2019-04-27 PROCEDURE — 65270000029 HC RM PRIVATE

## 2019-04-27 PROCEDURE — 74011250637 HC RX REV CODE- 250/637: Performed by: FAMILY MEDICINE

## 2019-04-27 PROCEDURE — 36415 COLL VENOUS BLD VENIPUNCTURE: CPT

## 2019-04-27 PROCEDURE — 74011250637 HC RX REV CODE- 250/637: Performed by: INTERNAL MEDICINE

## 2019-04-27 RX ORDER — WARFARIN 2.5 MG/1
2.5 TABLET ORAL ONCE
Status: COMPLETED | OUTPATIENT
Start: 2019-04-27 | End: 2019-04-27

## 2019-04-27 RX ADMIN — CASTOR OIL AND BALSAM, PERU: 788; 87 OINTMENT TOPICAL at 14:00

## 2019-04-27 RX ADMIN — ALPRAZOLAM 0.5 MG: 0.25 TABLET ORAL at 11:42

## 2019-04-27 RX ADMIN — CASTOR OIL AND BALSAM, PERU: 788; 87 OINTMENT TOPICAL at 22:10

## 2019-04-27 RX ADMIN — FAMOTIDINE 20 MG: 20 TABLET ORAL at 14:00

## 2019-04-27 RX ADMIN — WARFARIN SODIUM 2.5 MG: 2.5 TABLET ORAL at 14:00

## 2019-04-27 RX ADMIN — ALPRAZOLAM 0.5 MG: 0.25 TABLET ORAL at 18:55

## 2019-04-27 RX ADMIN — ATENOLOL 25 MG: 25 TABLET ORAL at 14:00

## 2019-04-27 RX ADMIN — NYSTATIN: 100000 POWDER TOPICAL at 14:00

## 2019-04-27 RX ADMIN — ACETAMINOPHEN 650 MG: 325 TABLET ORAL at 23:10

## 2019-04-27 RX ADMIN — CASTOR OIL AND BALSAM, PERU: 788; 87 OINTMENT TOPICAL at 05:00

## 2019-04-27 RX ADMIN — NYSTATIN: 100000 POWDER TOPICAL at 22:16

## 2019-04-27 RX ADMIN — LEVOTHYROXINE SODIUM 100 MCG: 100 TABLET ORAL at 11:22

## 2019-04-27 RX ADMIN — COLLAGENASE SANTYL: 250 OINTMENT TOPICAL at 14:02

## 2019-04-27 RX ADMIN — POTASSIUM CHLORIDE 20 MEQ: 750 TABLET, EXTENDED RELEASE ORAL at 14:00

## 2019-04-27 NOTE — PROGRESS NOTES
Bedside and Verbal shift change report given to Yan Walter RN (oncoming nurse) by Paul Sifuentes RN (offgoing nurse). Report included the following information SBAR, Kardex, Intake/Output and MAR.

## 2019-04-27 NOTE — PROGRESS NOTES
Medical Progress Note NAME: Tiny Laurent :  1931 MRM:  [id number] Date/Time: 2019  9:23 AM 
 
Problem List:  
Principal Problem: 
  Dehydration (2019) Active Problems: 
  A-fib (HCC) () Thyroid disease () Wound, open, hip or thigh, left, initial encounter (2019) Non-healing wound of right heel (2019) Subjective:  
 
Patient sleeping, Past Medical History:  
Diagnosis Date  A-fib ([address]  Arrhythmia A FIB  Arthritis  Back pain  CAD (coronary artery disease) PT DENIES  Chronic pain  Dementia 2016  Hypercholesterolemia  Psychiatric disorder ANXIETY  Shoulder pain  Thyroid disease  Tremor, essential   
 
 
ROS:  Unable to obtain Objective:  
 
 
Vitals:  
 
 
  
Last 24hrs VS reviewed since prior progress note. Most recent are: 
 
Visit Vitals /70 (BP 1 Location: Right arm, BP Patient Position: At rest) Pulse 80 Temp 98.6 °F (37 °C) Resp 16 Wt 136 lb 7.4 oz (61.9 kg) SpO2 94% BMI 24.17 kg/m² SpO2 Readings from Last 6 Encounters:  
19 94% 19 98% 18 97% 18 98% 18 97% 18 98% No intake or output data in the 24 hours ending 19 8317 Exam:  
 
General   well developed, well nourished, appears stated age, in no acute distress Respiratory   clear Cardiology  regular Abdominal  Soft, non-tender, non-distended, positive bowel sounds, no hepatosplenomegaly Extremities  No clubbing, cyanosis, or edema. Pulses intact. Lab Data Reviewed: (see below) Medications Reviewed: (see below) 
 
______________________________________________________________________ Medications:  
 
Current Facility-Administered Medications Medication Dose Route Frequency  nystatin (MYCOSTATIN) 100,000 unit/gram powder   Topical Q12H  collagenase (SANTYL) 250 unit/gram ointment   Topical DAILY  balsam peru-castor oil (VENELEX) ointment   Topical Q8H  
 ALPRAZolam (XANAX) tablet 0.5 mg  0.5 mg Oral TID PRN  
 atenolol (TENORMIN) tablet 25 mg  25 mg Oral DAILY  famotidine (PEPCID) tablet 20 mg  20 mg Oral BID  levothyroxine (SYNTHROID) tablet 100 mcg  100 mcg Oral ACB  polyethylene glycol (MIRALAX) packet 17 g  17 g Oral DAILY PRN  potassium chloride SR (KLOR-CON 10) tablet 20 mEq  20 mEq Oral DAILY  ondansetron (ZOFRAN) injection 4 mg  4 mg IntraVENous Q6H PRN  
 acetaminophen (TYLENOL) tablet 650 mg  650 mg Oral Q4H PRN  
 Warfarin - Pharmacy dosing   Other Rx Dosing/Monitoring Lab Review: No results for input(s): WBC, HGB, HCT, PLT, HGBEXT, HCTEXT, PLTEXT in the last 72 hours. Recent Labs  
  04/27/19 
0727 04/26/19 
1442 04/25/19 
6825 INR 1.4* 1.2* 1.2* No results found for: Lyssa Seek No results for input(s): PH, PCO2, PO2, HCO3, FIO2 in the last 72 hours. Recent Labs  
  04/27/19 
0727 04/26/19 
1442 04/25/19 
1848 INR 1.4* 1.2* 1.2* Other pertinent lab: NA Assessment:  
 
Patient Active Problem List  
Diagnosis Code  A-fib ([facility]) I48.91  
 Thyroid disease E07.9  Hypercholesterolemia E78.00  Arthritis M19.90  
 CAD (coronary artery disease) I25.10  Arthritis of knee, left M17.12  Sepsis ([address] A41.9  Altered mental status R41.82  
 Dementia F03.90  Closed compression fracture of lumbar vertebra ([facility]) S32.000A  UTI (urinary tract infection) N39.0  Fall W19. Ramírez Remi  Fracture, intertrochanteric, right femur, closed, initial encounter ([address] S72.141A  Hip hematoma, right, initial encounter S70.01XA  Acquired hypothyroidism E03.9  Closed fracture of neck of right femur ([address] S72.001A  Dehydration E86.0  Wound, open, hip or thigh, left, initial encounter S71.002A, S71.102A  Non-healing wound of right heel S91.301A Plan: 1. A fib- stable 2. Dehydration=- improved 3. Failure to thrive- declines  PT  Will need snf    
 
        
___________________________________________________ Attending Physician: Albino Irizarry MD

## 2019-04-27 NOTE — PROGRESS NOTES
Patient stated she did not get any sleep last night and does not want to be bothered. Patient refused AM medications. Patient stated to come back when she wakes up.

## 2019-04-27 NOTE — PROGRESS NOTES
Pharmacist Note  Warfarin Dosing Consult provided for this 80 y. o.female to manage warfarin for Atrial Fibrillation INR Goal: 2 - 3 Home regimen/ tablet size: 2.5 mg daily Drugs that may increase INR: None Drugs that may decrease INR: None Other current anticoagulants/ drugs that may increase bleeding risk: None Risk factors: Age > 72 Daily INR ordered: YES Recent Labs  
  04/27/19 
0727 04/26/19 
1442 04/25/19 
0126 INR 1.4* 1.2* 1.2* Date               INR                  Dose 4/23  1.1  -- 
4/24  --  5 mg 
4/25  1.2  5 mg 
4/26  1.2  5 mg 
4/27  1.7  2.5 mg 
 
                                                                            
Assessment/ Plan: Will order warfarin 2.5 mg PO x 1 dose after warfarin \"load\" of 15mg over last three days. Pharmacy will continue to monitor daily and adjust therapy as indicated. Please contact the pharmacist at  for outpatient recommendations if needed. Kamala Mccallum, [address]

## 2019-04-28 LAB
INR PPP: 1.4 (ref 0.9–1.1)
PROTHROMBIN TIME: 14.3 SEC (ref 9–11.1)

## 2019-04-28 PROCEDURE — 65270000029 HC RM PRIVATE

## 2019-04-28 PROCEDURE — 74011250637 HC RX REV CODE- 250/637: Performed by: FAMILY MEDICINE

## 2019-04-28 PROCEDURE — 74011250637 HC RX REV CODE- 250/637: Performed by: INTERNAL MEDICINE

## 2019-04-28 PROCEDURE — 36415 COLL VENOUS BLD VENIPUNCTURE: CPT

## 2019-04-28 PROCEDURE — 85610 PROTHROMBIN TIME: CPT

## 2019-04-28 RX ORDER — WARFARIN SODIUM 5 MG/1
5 TABLET ORAL ONCE
Status: COMPLETED | OUTPATIENT
Start: 2019-04-28 | End: 2019-04-28

## 2019-04-28 RX ADMIN — NYSTATIN: 100000 POWDER TOPICAL at 10:12

## 2019-04-28 RX ADMIN — ALPRAZOLAM 0.5 MG: 0.25 TABLET ORAL at 02:30

## 2019-04-28 RX ADMIN — ALPRAZOLAM 0.5 MG: 0.25 TABLET ORAL at 08:47

## 2019-04-28 RX ADMIN — CASTOR OIL AND BALSAM, PERU: 788; 87 OINTMENT TOPICAL at 14:41

## 2019-04-28 RX ADMIN — ACETAMINOPHEN 650 MG: 325 TABLET ORAL at 08:47

## 2019-04-28 RX ADMIN — ACETAMINOPHEN 650 MG: 325 TABLET ORAL at 14:40

## 2019-04-28 RX ADMIN — CASTOR OIL AND BALSAM, PERU: 788; 87 OINTMENT TOPICAL at 07:34

## 2019-04-28 RX ADMIN — CASTOR OIL AND BALSAM, PERU: 788; 87 OINTMENT TOPICAL at 10:11

## 2019-04-28 RX ADMIN — LEVOTHYROXINE SODIUM 100 MCG: 100 TABLET ORAL at 06:59

## 2019-04-28 RX ADMIN — ATENOLOL 25 MG: 25 TABLET ORAL at 10:10

## 2019-04-28 RX ADMIN — FAMOTIDINE 20 MG: 20 TABLET ORAL at 19:22

## 2019-04-28 RX ADMIN — WARFARIN SODIUM 5 MG: 5 TABLET ORAL at 14:40

## 2019-04-28 RX ADMIN — POTASSIUM CHLORIDE 20 MEQ: 750 TABLET, EXTENDED RELEASE ORAL at 10:10

## 2019-04-28 RX ADMIN — ACETAMINOPHEN 650 MG: 325 TABLET ORAL at 02:38

## 2019-04-28 RX ADMIN — FAMOTIDINE 20 MG: 20 TABLET ORAL at 10:10

## 2019-04-28 RX ADMIN — COLLAGENASE SANTYL: 250 OINTMENT TOPICAL at 10:11

## 2019-04-28 NOTE — PROGRESS NOTES
Pharmacist Note  Warfarin Dosing Consult provided for this 80 y. o.female to manage warfarin for Atrial Fibrillation INR Goal: 2 - 3 Home regimen/ tablet size: 2.5 mg daily Drugs that may increase INR: None Drugs that may decrease INR: None Other current anticoagulants/ drugs that may increase bleeding risk: None Risk factors: Age > 72 Daily INR ordered: YES Recent Labs  
  04/28/19 
0247 04/27/19 
0727 04/26/19 
1442 INR 1.4* 1.4* 1.2* Date               INR                  Dose 4/23  1.1  -- 
4/24  --  5 mg 
4/25  1.2  5 mg 
4/26  1.2  5 mg 
4/27  1.4  2.5 mg 
4/28  1.4  5 mg Assessment/ Plan: Will order warfarin 5 mg PO x 1 dose. Pharmacy will continue to monitor daily and adjust therapy as indicated. Please contact the pharmacist at  for outpatient recommendations if needed. Maris Blanco, [address]

## 2019-04-28 NOTE — PROGRESS NOTES
Medical Progress Note NAME: Carroll Sweet :  1931 MRM:  [id number] Date/Time: 2019  8:27 AM 
 
Problem List:  
Principal Problem: 
  Dehydration (2019) Active Problems: 
  A-fib (HCC) () Thyroid disease () Wound, open, hip or thigh, left, initial encounter (2019) Non-healing wound of right heel (2019) Subjective:  
 
Patient refuses to answer questions. \"Let me sleep\" Past Medical History:  
Diagnosis Date  A-fib ([address]  Arrhythmia A FIB  Arthritis  Back pain  CAD (coronary artery disease) PT DENIES  Chronic pain  Dementia 2016  Hypercholesterolemia  Psychiatric disorder ANXIETY  Shoulder pain  Thyroid disease  Tremor, essential   
 
 
ROS:  declines Objective:  
 
 
Vitals:  
 
 
  
Last 24hrs VS reviewed since prior progress note. Most recent are: 
 
Visit Vitals /67 (BP 1 Location: Left arm, BP Patient Position: Post activity) Pulse 70 Temp 97.8 °F (36.6 °C) Resp 16 Wt 136 lb 7.4 oz (61.9 kg) SpO2 95% BMI 24.17 kg/m² SpO2 Readings from Last 6 Encounters:  
19 95% 19 98% 18 97% 18 98% 18 97% 18 98% No intake or output data in the 24 hours ending 19 0827 Exam:  
 
General   well developed, well nourished, appears stated age, in no acute distress Respiratory   Clear To Auscultation bilaterally - no wheezes, rales, rhonchi, or crackles Cardiology  Regular Rate and Rythmn  - no murmurs, rubs or gallops Abdominal  Soft, non-tender, non-distended, positive bowel sounds, no hepatosplenomegaly Extremities  No clubbing, cyanosis, or edema. Pulses intact. Lab Data Reviewed: (see below) Medications Reviewed: (see below) 
 
______________________________________________________________________ Medications:  
 
Current Facility-Administered Medications Medication Dose Route Frequency  nystatin (MYCOSTATIN) 100,000 unit/gram powder   Topical Q12H  collagenase (SANTYL) 250 unit/gram ointment   Topical DAILY  balsam peru-castor oil (VENELEX) ointment   Topical Q8H  
 ALPRAZolam (XANAX) tablet 0.5 mg  0.5 mg Oral TID PRN  
 atenolol (TENORMIN) tablet 25 mg  25 mg Oral DAILY  famotidine (PEPCID) tablet 20 mg  20 mg Oral BID  levothyroxine (SYNTHROID) tablet 100 mcg  100 mcg Oral ACB  polyethylene glycol (MIRALAX) packet 17 g  17 g Oral DAILY PRN  potassium chloride SR (KLOR-CON 10) tablet 20 mEq  20 mEq Oral DAILY  ondansetron (ZOFRAN) injection 4 mg  4 mg IntraVENous Q6H PRN  
 acetaminophen (TYLENOL) tablet 650 mg  650 mg Oral Q4H PRN  
 Warfarin - Pharmacy dosing   Other Rx Dosing/Monitoring Lab Review: No results for input(s): WBC, HGB, HCT, PLT, HGBEXT, HCTEXT, PLTEXT in the last 72 hours. Recent Labs  
  04/28/19 
0247 04/27/19 
0727 04/26/19 
1442 INR 1.4* 1.4* 1.2* No results found for: Miah Carrington No results for input(s): PH, PCO2, PO2, HCO3, FIO2 in the last 72 hours. Recent Labs  
  04/28/19 
0247 04/27/19 
0727 04/26/19 
1442 INR 1.4* 1.4* 1.2* Other pertinent lab: NA Assessment:  
 
Patient Active Problem List  
Diagnosis Code  A-fib ([facility]) I48.91  
 Thyroid disease E07.9  Hypercholesterolemia E78.00  Arthritis M19.90  
 CAD (coronary artery disease) I25.10  Arthritis of knee, left M17.12  Sepsis ([facility] A41.9  Altered mental status R41.82  
 Dementia F03.90  Closed compression fracture of lumbar vertebra ([facility]) S32.000A  UTI (urinary tract infection) N39.0  Fall W19. Kimo Mane  Fracture, intertrochanteric, right femur, closed, initial encounter ([facility] [address] [facility] S72.141A  Hip hematoma, right, initial encounter S70.01XA  Acquired hypothyroidism E03.9  Closed fracture of neck of right femur ([facility] S72.001A  Dehydration E86.0  Wound, open, hip or thigh, left, initial encounter S71.002A, S71.102A  Non-healing wound of right heel S91.301A Plan: 1. Dehydration- resolved 2. dispo?    
 
        
___________________________________________________ Attending Physician: Ramsey Sweet MD

## 2019-04-28 NOTE — PROGRESS NOTES
Bedside and Verbal shift change report given to Urszula HINTON (oncoming nurse) by Esperanza Nicole (offgoing nurse). Report included the following information SBAR, Kardex and MAR.

## 2019-04-28 NOTE — PROGRESS NOTES
Patient is somewhat resistant to some aspects of care. Prefers to be left alone unless she is asking for something. Continuing to encourage her to allow moving and turning q2h and elevation of feet and heels. She requested to have heel protector boots removed for awhile today. Plan to replace later and during night. She has been alert and otherwise appropriate today.

## 2019-04-28 NOTE — PROGRESS NOTES
Bedside and Verbal shift change report given to [address] (oncoming nurse) by Silke Sim (offgoing nurse). Report included the following information SBAR, Kardex, Intake/Output and MAR.

## 2019-04-29 VITALS
HEART RATE: 85 BPM | TEMPERATURE: 97.9 F | BODY MASS INDEX: 24.17 KG/M2 | RESPIRATION RATE: 14 BRPM | OXYGEN SATURATION: 95 % | SYSTOLIC BLOOD PRESSURE: 127 MMHG | WEIGHT: 136.47 LBS | DIASTOLIC BLOOD PRESSURE: 53 MMHG

## 2019-04-29 LAB
INR PPP: 1.8 (ref 0.9–1.1)
PROTHROMBIN TIME: 18 SEC (ref 9–11.1)

## 2019-04-29 PROCEDURE — 74011250637 HC RX REV CODE- 250/637: Performed by: FAMILY MEDICINE

## 2019-04-29 PROCEDURE — 36415 COLL VENOUS BLD VENIPUNCTURE: CPT

## 2019-04-29 PROCEDURE — 85610 PROTHROMBIN TIME: CPT

## 2019-04-29 PROCEDURE — 74011250637 HC RX REV CODE- 250/637: Performed by: INTERNAL MEDICINE

## 2019-04-29 RX ORDER — BALSAM PERU/CASTOR OIL
OINTMENT (GRAM) TOPICAL EVERY 8 HOURS
Qty: 60 G | Refills: 1 | Status: SHIPPED | OUTPATIENT
Start: 2019-04-29 | End: 2019-05-27

## 2019-04-29 RX ORDER — WARFARIN 2.5 MG/1
2.5 TABLET ORAL ONCE
Status: COMPLETED | OUTPATIENT
Start: 2019-04-29 | End: 2019-04-29

## 2019-04-29 RX ADMIN — WARFARIN SODIUM 2.5 MG: 2.5 TABLET ORAL at 13:26

## 2019-04-29 RX ADMIN — ACETAMINOPHEN 650 MG: 325 TABLET ORAL at 01:13

## 2019-04-29 RX ADMIN — CASTOR OIL AND BALSAM, PERU: 788; 87 OINTMENT TOPICAL at 13:26

## 2019-04-29 RX ADMIN — LEVOTHYROXINE SODIUM 100 MCG: 100 TABLET ORAL at 06:33

## 2019-04-29 RX ADMIN — ALPRAZOLAM 0.5 MG: 0.25 TABLET ORAL at 09:25

## 2019-04-29 RX ADMIN — ALPRAZOLAM 0.5 MG: 0.25 TABLET ORAL at 01:04

## 2019-04-29 RX ADMIN — ATENOLOL 25 MG: 25 TABLET ORAL at 09:25

## 2019-04-29 RX ADMIN — FAMOTIDINE 20 MG: 20 TABLET ORAL at 09:25

## 2019-04-29 RX ADMIN — CASTOR OIL AND BALSAM, PERU: 788; 87 OINTMENT TOPICAL at 06:14

## 2019-04-29 RX ADMIN — FAMOTIDINE 20 MG: 20 TABLET ORAL at 18:47

## 2019-04-29 RX ADMIN — POTASSIUM CHLORIDE 20 MEQ: 750 TABLET, EXTENDED RELEASE ORAL at 09:25

## 2019-04-29 RX ADMIN — COLLAGENASE SANTYL: 250 OINTMENT TOPICAL at 09:25

## 2019-04-29 NOTE — PROGRESS NOTES
Bedside and Verbal shift change report given to Nii Black RN (oncoming nurse) by Kathy Sheth RN (offgoing nurse). Report included the following information SBAR, Kardex, Intake/Output, MAR and Recent Results.

## 2019-04-29 NOTE — PROGRESS NOTES
Paged Dr. Karime Cooper regarding scripts for 0.5mg of Xanax. MD will fax the paper prescription to [facility]. TRANSFER - OUT REPORT: 
 
Verbal report given to RN (name) on Ambrose Bernal  being transferred to [facility] (unit) for routine progression of care Report consisted of patients Situation, Background, Assessment and  
Recommendations(SBAR). Information from the following report(s) SBAR, Kardex, Intake/Output, MAR and Recent Results was reviewed with the receiving nurse. Opportunity for questions and clarification was provided. Patient transported with: 
 [facility] Bedside and Verbal shift change report given to [address] (oncoming nurse) by Adri Jacobs (offgoing nurse). Report included the following information SBAR, Kardex and MAR.

## 2019-04-29 NOTE — PROGRESS NOTES
Chart reviewed. Noted patient has been accepted at [facility]. CM called Johnathon Sy with [facility] ([phone]) and left voicemail message to follow-up on bed availability. Spoke with Johnathon Sy at [facility]. They are able to accept patient today. CM met with patient at bedside to notify. Will plan for ambulance transport to [facility] this afternoon. CM requested stretcher transport with [facility]. Chhaya Tavares is aware. Discharge orders are needed. Patient is set for 3:30 PM stretcher transport to [facility]. Chhaya Tavares is aware. CM contacted patient's caregiver Elton ([phone]) and left voicemail message to notify of discharge plan. CM also called patient's sister \[facility]\"" ([phone]) and notified as well. Discharge folder located on hard chart to include ambulance form. RN to follow with discharge instructions, MAR, Kardex, and call report to #[phone]. 
 
4:11 PM: CM was notified by RN that transport still has not arrived. CM called Sondra Renteria with [facility]. Apparently [facility] cancelled this transport in error. They are working on securing another time. 4:26 PM: CM spoke with Cynthia with [facility]. They will be here within the hour. CM updated RN and Johnathon Sy at [facility]. Anna Boyce, BSW/CRM

## 2019-04-29 NOTE — PROGRESS NOTES
Pharmacist Note  Warfarin Dosing Consult provided for this 80 y. o.female to manage warfarin for Atrial Fibrillation INR Goal: 2 - 3 Home regimen/ tablet size: 2.5 mg daily Drugs that may increase INR: None Drugs that may decrease INR: None Other current anticoagulants/ drugs that may increase bleeding risk: None Risk factors: Age > 72 Daily INR ordered: YES Recent Labs  
  04/29/19 
0109 04/28/19 
0247 04/27/19 
3847 INR 1.8* 1.4* 1.4* Date               INR                  Dose 4/23  1.1  -- 
4/24  --  5 mg 
4/25  1.2  5 mg 
4/26  1.2  5 mg 
4/27  1.4  2.5 mg 
4/28  1.4  5 mg 
4/29  1.8  2.5 mg 
                                                                            
Assessment/ Plan: Will order warfarin 2.5 mg PO x 1 dose. Pharmacy will continue to monitor daily and adjust therapy as indicated. Please contact the pharmacist at  for outpatient recommendations if needed.

## 2019-04-29 NOTE — PROGRESS NOTES
Bedside and Verbal shift change report given to [address] [place] [address] Josiane (oncoming nurse) by Clive Thomas (offgoing nurse). Report included the following information SBAR, Kardex, Intake/Output, MAR, Accordion and Recent Results.

## 2019-04-29 NOTE — DISCHARGE INSTRUCTIONS
Patient Discharge Instructions    Jian Martini / [id number] : 1931    Admitted 2019 Discharged: 2019     Take Home Medications            · It is important that you take the medication exactly as they are prescribed. · Keep your medication in the bottles provided by the pharmacist and keep a list of the medication names, dosages, and times to be taken in your wallet. · Do not take other medications without consulting your doctor. What to do at Home    Recommended diet: Regular Diet, ***    Recommended activity: Activity as tolerated, PT/OT    Wound care heels and L hip pressure sore- send wound care recommendations with patient to [facility]. If you experience any of the following symptoms worsened pressure sores, please follow up with Dr Kirstin Patel. Follow-up Appointments   Procedures    FOLLOW UP VISIT Appointment in: One Week 1 week after discharge from NH     1 week after discharge from NH     Standing Status:   Standing     Number of Occurrences:   1     Order Specific Question:   Appointment in     Answer: One Week            Information obtained by :  I understand that if any problems occur once I am at home I am to contact my physician. I understand and acknowledge receipt of the instructions indicated above.                                                                                                                                            Physician's or R.N.'s Signature                                                                  Date/Time                                                                                                                                              Patient or Representative Signature                                                          Date/Time

## 2019-04-29 NOTE — PROGRESS NOTES
Benito Cobb, Meg [place], and AcAdmit Date: 4/23/2019 Subjective:  
 
Patient awake and verbal today. Does not speak in AM sometimes because she is angry that we come in so early and because of the \"lack of manners\" at the hospital. She wants to get moving and wants to do PT. .. Current Facility-Administered Medications Medication Dose Route Frequency  nystatin (MYCOSTATIN) 100,000 unit/gram powder   Topical Q12H  collagenase (SANTYL) 250 unit/gram ointment   Topical DAILY  balsam peru-castor oil (VENELEX) ointment   Topical Q8H  
 ALPRAZolam (XANAX) tablet 0.5 mg  0.5 mg Oral TID PRN  
 atenolol (TENORMIN) tablet 25 mg  25 mg Oral DAILY  famotidine (PEPCID) tablet 20 mg  20 mg Oral BID  levothyroxine (SYNTHROID) tablet 100 mcg  100 mcg Oral ACB  polyethylene glycol (MIRALAX) packet 17 g  17 g Oral DAILY PRN  potassium chloride SR (KLOR-CON 10) tablet 20 mEq  20 mEq Oral DAILY  ondansetron (ZOFRAN) injection 4 mg  4 mg IntraVENous Q6H PRN  
 acetaminophen (TYLENOL) tablet 650 mg  650 mg Oral Q4H PRN  
 Warfarin - Pharmacy dosing   Other Rx Dosing/Monitoring Objective:  
 
Patient Vitals for the past 8 hrs: 
 BP Temp Pulse Resp SpO2  
04/29/19 0049 114/67 97.2 °F (36.2 °C) 81 14 95 % No intake/output data recorded. 04/27 1901 - 04/29 0700 In: 360 [P.O.:360] Out: - Physical Exam: Lungs: clear to auscultation bilaterally Heart: regular rate and rhythm, S1, S2 normal, no murmur, click, rub or gallop Abdomen: soft, non-tender. Bowel sounds normal. No masses,  no organomegaly Data Review Recent Results (from the past 24 hour(s)) PROTHROMBIN TIME + INR Collection Time: 04/29/19  1:09 AM  
Result Value Ref Range INR 1.8 (H) 0.9 - 1.1 Prothrombin time 18.0 (H) 9.0 - 11.1 sec Assessment:  
 
Principal Problem: 
  Dehydration (4/23/2019) Active Problems: 
  A-fib (HCC) () Thyroid disease () Wound, open, hip or thigh, left, initial encounter (4/23/2019) Non-healing wound of right heel (4/23/2019) Plan:  
 
1) For NH rehab as soon as accepted and bed available 2) Continue wound care

## 2019-05-21 NOTE — DISCHARGE SUMMARY
Physician Discharge Summary     Patient ID:  Jaya Womack  [id number]  32 y.o.  6/12/1931    Admit date: 4/23/2019    Discharge date and time: 5/21/2019    Admission Diagnoses: Dehydration [E86.0]; Dehydration [E86.0]    Discharge Diagnoses:  Principal Diagnosis Dehydration                                            Principal Problem:    Dehydration (4/23/2019)    Active Problems:    A-fib (HCC) ()      Thyroid disease ()      Wound, open, hip or thigh, left, initial encounter (4/23/2019)      Non-healing wound of right heel (4/23/2019)           Hospital Course: Admitted for mild dehydration, L hip pressure sore, and R heel pressure area. Seen by wound care and hydrated with IV fluids. Seen by PT and rehab recommended. She was discharged for NH rehab in improved condition. PCP: JESSEE Preston            Discharge Exam:  Visit Vitals  /53 (BP 1 Location: Left arm)   Pulse 85   Temp 97.9 °F (36.6 °C)   Resp 14   Wt 136 lb 7.4 oz (61.9 kg)   SpO2 95%   BMI 24.17 kg/m²     Lungs: clear to auscultation bilaterally  Heart: regular rate and rhythm, S1, S2 normal, no murmur, click, rub or gallop  Abdomen: soft, non-tender. Bowel sounds normal. No masses,  no organomegaly  Extremities: extremities normal, atraumatic, no cyanosis or edema  Neurologic: Grossly normal    Disposition: SNF    Patient Instructions:   Cannot display discharge medications since this patient is not currently admitted. Activity: Activity as tolerated  Diet: Regular Diet  Wound Care: Wound care orders sent with patient to SNF    Follow-up Appointments   Procedures    FOLLOW UP VISIT Appointment in: One Week 1 week after discharge from NH     1 week after discharge from NH     Standing Status:   Standing     Number of Occurrences:   1     Order Specific Question:   Appointment in     Answer:    One Week          Signed:  Abdulaziz Puente MD  5/21/2019  7:55 AM

## 2019-05-27 ENCOUNTER — APPOINTMENT (OUTPATIENT)
Dept: GENERAL RADIOLOGY | Age: 84
DRG: 291 | End: 2019-05-27
Attending: EMERGENCY MEDICINE
Payer: MEDICARE

## 2019-05-27 ENCOUNTER — HOSPITAL ENCOUNTER (INPATIENT)
Age: 84
LOS: 9 days | Discharge: NURSING, REHAB OR LONG-TERM CARE | DRG: 291 | End: 2019-06-05
Attending: EMERGENCY MEDICINE | Admitting: FAMILY MEDICINE
Payer: MEDICARE

## 2019-05-27 DIAGNOSIS — I50.31 ACUTE DIASTOLIC (CONGESTIVE) HEART FAILURE (HCC): Primary | ICD-10-CM

## 2019-05-27 DIAGNOSIS — J81.0 ACUTE PULMONARY EDEMA (HCC): ICD-10-CM

## 2019-05-27 DIAGNOSIS — G89.29 OTHER CHRONIC PAIN: ICD-10-CM

## 2019-05-27 DIAGNOSIS — F41.9 ANXIETY: ICD-10-CM

## 2019-05-27 DIAGNOSIS — R60.9 DEPENDENT EDEMA: ICD-10-CM

## 2019-05-27 PROBLEM — I50.9 ACUTE CHF (CONGESTIVE HEART FAILURE) (HCC): Status: ACTIVE | Noted: 2019-05-27

## 2019-05-27 PROBLEM — J18.9 PNA (PNEUMONIA): Status: ACTIVE | Noted: 2019-05-27

## 2019-05-27 LAB
ALBUMIN SERPL-MCNC: 2.8 G/DL (ref 3.5–5)
ALBUMIN/GLOB SERPL: 0.7 {RATIO} (ref 1.1–2.2)
ALP SERPL-CCNC: 74 U/L (ref 45–117)
ALT SERPL-CCNC: 10 U/L (ref 12–78)
ANION GAP SERPL CALC-SCNC: 6 MMOL/L (ref 5–15)
APPEARANCE UR: CLEAR
AST SERPL-CCNC: 18 U/L (ref 15–37)
BACTERIA URNS QL MICRO: NEGATIVE /HPF
BASOPHILS # BLD: 0 K/UL (ref 0–0.1)
BASOPHILS NFR BLD: 1 % (ref 0–1)
BILIRUB SERPL-MCNC: 0.6 MG/DL (ref 0.2–1)
BILIRUB UR QL: NEGATIVE
BNP SERPL-MCNC: 6431 PG/ML
BUN SERPL-MCNC: 14 MG/DL (ref 6–20)
BUN/CREAT SERPL: 22 (ref 12–20)
CALCIUM SERPL-MCNC: 10.1 MG/DL (ref 8.5–10.1)
CHLORIDE SERPL-SCNC: 103 MMOL/L (ref 97–108)
CO2 SERPL-SCNC: 29 MMOL/L (ref 21–32)
COLOR UR: ABNORMAL
COMMENT, HOLDF: NORMAL
CREAT SERPL-MCNC: 0.63 MG/DL (ref 0.55–1.02)
DIFFERENTIAL METHOD BLD: ABNORMAL
EOSINOPHIL # BLD: 0 K/UL (ref 0–0.4)
EOSINOPHIL NFR BLD: 1 % (ref 0–7)
EPITH CASTS URNS QL MICRO: ABNORMAL /LPF
ERYTHROCYTE [DISTWIDTH] IN BLOOD BY AUTOMATED COUNT: 15.8 % (ref 11.5–14.5)
GLOBULIN SER CALC-MCNC: 3.8 G/DL (ref 2–4)
GLUCOSE SERPL-MCNC: 110 MG/DL (ref 65–100)
GLUCOSE UR STRIP.AUTO-MCNC: NEGATIVE MG/DL
HCT VFR BLD AUTO: 38.5 % (ref 35–47)
HGB BLD-MCNC: 11.8 G/DL (ref 11.5–16)
HGB UR QL STRIP: ABNORMAL
IMM GRANULOCYTES # BLD AUTO: 0 K/UL (ref 0–0.04)
IMM GRANULOCYTES NFR BLD AUTO: 0 % (ref 0–0.5)
INR PPP: 1.9 (ref 0.9–1.1)
KETONES UR QL STRIP.AUTO: NEGATIVE MG/DL
LEUKOCYTE ESTERASE UR QL STRIP.AUTO: ABNORMAL
LYMPHOCYTES # BLD: 0.7 K/UL (ref 0.8–3.5)
LYMPHOCYTES NFR BLD: 15 % (ref 12–49)
MCH RBC QN AUTO: 29.3 PG (ref 26–34)
MCHC RBC AUTO-ENTMCNC: 30.6 G/DL (ref 30–36.5)
MCV RBC AUTO: 95.5 FL (ref 80–99)
MONOCYTES # BLD: 0.6 K/UL (ref 0–1)
MONOCYTES NFR BLD: 14 % (ref 5–13)
NEUTS SEG # BLD: 3.2 K/UL (ref 1.8–8)
NEUTS SEG NFR BLD: 69 % (ref 32–75)
NITRITE UR QL STRIP.AUTO: NEGATIVE
NRBC # BLD: 0 K/UL (ref 0–0.01)
NRBC BLD-RTO: 0 PER 100 WBC
PH UR STRIP: 7 [PH] (ref 5–8)
PLATELET # BLD AUTO: 239 K/UL (ref 150–400)
PLATELET COMMENTS,PCOM: ABNORMAL
PMV BLD AUTO: 10.9 FL (ref 8.9–12.9)
POTASSIUM SERPL-SCNC: 4.1 MMOL/L (ref 3.5–5.1)
PROT SERPL-MCNC: 6.6 G/DL (ref 6.4–8.2)
PROT UR STRIP-MCNC: NEGATIVE MG/DL
PROTHROMBIN TIME: 18.9 SEC (ref 9–11.1)
RBC # BLD AUTO: 4.03 M/UL (ref 3.8–5.2)
RBC #/AREA URNS HPF: ABNORMAL /HPF (ref 0–5)
RBC MORPH BLD: ABNORMAL
RBC MORPH BLD: ABNORMAL
SAMPLES BEING HELD,HOLD: NORMAL
SODIUM SERPL-SCNC: 138 MMOL/L (ref 136–145)
SP GR UR REFRACTOMETRY: 1.01 (ref 1–1.03)
TROPONIN I SERPL-MCNC: 0.05 NG/ML
UA: UC IF INDICATED,UAUC: ABNORMAL
UROBILINOGEN UR QL STRIP.AUTO: 0.2 EU/DL (ref 0.2–1)
WBC # BLD AUTO: 4.5 K/UL (ref 3.6–11)
WBC URNS QL MICRO: ABNORMAL /HPF (ref 0–4)

## 2019-05-27 PROCEDURE — 99285 EMERGENCY DEPT VISIT HI MDM: CPT

## 2019-05-27 PROCEDURE — 74011250636 HC RX REV CODE- 250/636: Performed by: EMERGENCY MEDICINE

## 2019-05-27 PROCEDURE — 71045 X-RAY EXAM CHEST 1 VIEW: CPT

## 2019-05-27 PROCEDURE — 93005 ELECTROCARDIOGRAM TRACING: CPT

## 2019-05-27 PROCEDURE — 74011000258 HC RX REV CODE- 258: Performed by: INTERNAL MEDICINE

## 2019-05-27 PROCEDURE — 85025 COMPLETE CBC W/AUTO DIFF WBC: CPT

## 2019-05-27 PROCEDURE — 84484 ASSAY OF TROPONIN QUANT: CPT

## 2019-05-27 PROCEDURE — 65660000000 HC RM CCU STEPDOWN

## 2019-05-27 PROCEDURE — 36415 COLL VENOUS BLD VENIPUNCTURE: CPT

## 2019-05-27 PROCEDURE — 85610 PROTHROMBIN TIME: CPT

## 2019-05-27 PROCEDURE — 74011250636 HC RX REV CODE- 250/636: Performed by: INTERNAL MEDICINE

## 2019-05-27 PROCEDURE — 74011250637 HC RX REV CODE- 250/637: Performed by: INTERNAL MEDICINE

## 2019-05-27 PROCEDURE — 80053 COMPREHEN METABOLIC PANEL: CPT

## 2019-05-27 PROCEDURE — 81001 URINALYSIS AUTO W/SCOPE: CPT

## 2019-05-27 PROCEDURE — 83880 ASSAY OF NATRIURETIC PEPTIDE: CPT

## 2019-05-27 RX ORDER — ATENOLOL 25 MG/1
25 TABLET ORAL DAILY
Status: DISCONTINUED | OUTPATIENT
Start: 2019-05-28 | End: 2019-05-30

## 2019-05-27 RX ORDER — HYDROCODONE BITARTRATE AND ACETAMINOPHEN 5; 325 MG/1; MG/1
1-2 TABLET ORAL
Status: DISCONTINUED | OUTPATIENT
Start: 2019-05-27 | End: 2019-06-05 | Stop reason: HOSPADM

## 2019-05-27 RX ORDER — LEVOTHYROXINE SODIUM 50 UG/1
100 TABLET ORAL DAILY
Status: DISCONTINUED | OUTPATIENT
Start: 2019-05-28 | End: 2019-05-29

## 2019-05-27 RX ORDER — AMOXICILLIN 250 MG
1 CAPSULE ORAL 2 TIMES DAILY
Status: DISCONTINUED | OUTPATIENT
Start: 2019-05-27 | End: 2019-06-05 | Stop reason: HOSPADM

## 2019-05-27 RX ORDER — ONDANSETRON 2 MG/ML
4 INJECTION INTRAMUSCULAR; INTRAVENOUS
Status: DISCONTINUED | OUTPATIENT
Start: 2019-05-27 | End: 2019-06-05 | Stop reason: HOSPADM

## 2019-05-27 RX ORDER — POLYETHYLENE GLYCOL 3350 17 G/17G
17 POWDER, FOR SOLUTION ORAL DAILY PRN
Status: DISCONTINUED | OUTPATIENT
Start: 2019-05-27 | End: 2019-06-05 | Stop reason: HOSPADM

## 2019-05-27 RX ORDER — FAMOTIDINE 20 MG/1
20 TABLET, FILM COATED ORAL
Status: DISCONTINUED | OUTPATIENT
Start: 2019-05-27 | End: 2019-05-29

## 2019-05-27 RX ORDER — ALPRAZOLAM 0.5 MG/1
0.5 TABLET ORAL
COMMUNITY
End: 2019-07-25 | Stop reason: SDUPTHER

## 2019-05-27 RX ORDER — HYDROCODONE BITARTRATE AND ACETAMINOPHEN 5; 325 MG/1; MG/1
TABLET ORAL AS NEEDED
COMMUNITY
End: 2019-05-27

## 2019-05-27 RX ORDER — ALPRAZOLAM 0.5 MG/1
0.5 TABLET ORAL 3 TIMES DAILY
Status: DISCONTINUED | OUTPATIENT
Start: 2019-05-27 | End: 2019-06-05 | Stop reason: HOSPADM

## 2019-05-27 RX ORDER — WARFARIN 2.5 MG/1
2.5 TABLET ORAL ONCE
Status: COMPLETED | OUTPATIENT
Start: 2019-05-27 | End: 2019-05-27

## 2019-05-27 RX ORDER — FUROSEMIDE 10 MG/ML
40 INJECTION INTRAMUSCULAR; INTRAVENOUS DAILY
Status: DISCONTINUED | OUTPATIENT
Start: 2019-05-28 | End: 2019-06-03

## 2019-05-27 RX ORDER — POTASSIUM CHLORIDE 750 MG/1
10 TABLET, FILM COATED, EXTENDED RELEASE ORAL DAILY
Status: DISCONTINUED | OUTPATIENT
Start: 2019-05-28 | End: 2019-06-03

## 2019-05-27 RX ORDER — HYDROCODONE BITARTRATE AND ACETAMINOPHEN 7.5; 325 MG/1; MG/1
1 TABLET ORAL
COMMUNITY
End: 2019-06-05

## 2019-05-27 RX ORDER — FUROSEMIDE 10 MG/ML
40 INJECTION INTRAMUSCULAR; INTRAVENOUS
Status: COMPLETED | OUTPATIENT
Start: 2019-05-27 | End: 2019-05-27

## 2019-05-27 RX ORDER — ACETAMINOPHEN 325 MG/1
650 TABLET ORAL
Status: DISCONTINUED | OUTPATIENT
Start: 2019-05-27 | End: 2019-06-05 | Stop reason: HOSPADM

## 2019-05-27 RX ADMIN — WARFARIN SODIUM 2.5 MG: 2.5 TABLET ORAL at 17:19

## 2019-05-27 RX ADMIN — ALPRAZOLAM 0.5 MG: 0.5 TABLET ORAL at 21:05

## 2019-05-27 RX ADMIN — CEFTRIAXONE 1 G: 1 INJECTION, POWDER, FOR SOLUTION INTRAMUSCULAR; INTRAVENOUS at 18:05

## 2019-05-27 RX ADMIN — ALPRAZOLAM 0.5 MG: 0.5 TABLET ORAL at 17:19

## 2019-05-27 RX ADMIN — HYDROCODONE BITARTRATE AND ACETAMINOPHEN 1 TABLET: 5; 325 TABLET ORAL at 21:05

## 2019-05-27 RX ADMIN — FUROSEMIDE 40 MG: 10 INJECTION, SOLUTION INTRAMUSCULAR; INTRAVENOUS at 14:49

## 2019-05-27 RX ADMIN — AZITHROMYCIN DIHYDRATE 500 MG: 500 INJECTION, POWDER, LYOPHILIZED, FOR SOLUTION INTRAVENOUS at 18:05

## 2019-05-27 NOTE — PROGRESS NOTES
Admission Medication Reconciliation:    Information obtained from: Chart, RX Query, patient    Significant PMH/Disease States:   Past Medical History:   Diagnosis Date    A-fib [facility])     Arrhythmia     A FIB    Arthritis     Back pain     CAD (coronary artery disease)     PT DENIES    Chronic pain     Dementia 2/9/2016    Hypercholesterolemia     Psychiatric disorder     ANXIETY    Shoulder pain     Thyroid disease     Tremor, essential        Chief Complaint for this Admission: Breast pain     Allergies:  Latex, natural rubber; Codeine; Adhesive; and Cortisone    Prior to Admission Medications:   Prior to Admission Medications   Prescriptions Last Dose Informant Patient Reported? Taking? ALPRAZolam (XANAX) 0.5 mg tablet 5/27/2019 at Unknown time  Yes Yes   Sig: Take 0.5 mg by mouth three (3) times daily as needed for Anxiety. HYDROcodone-acetaminophen (NORCO) 7.5-325 mg per tablet Unknown at Unknown time  Yes No   Sig: Take 1 Tab by mouth every six (6) hours as needed for Pain. 8 day supply   acetaminophen (TYLENOL) 325 mg tablet   No No   Sig: Take 2 Tabs by mouth every six (6) hours. atenolol (TENORMIN) 25 mg tablet Unknown at Unknown time  No No   Sig: TAKE 1 TABLET BY MOUTH ONCE DAILY   calcium-vitamin D (OYSTER SHELL) 500 mg(1,250mg) -200 unit per tablet Unknown at Unknown time  No No   Sig: Take 1 Tab by mouth three (3) times daily (with meals). levothyroxine (SYNTHROID) 100 mcg tablet 5/27/2019 at Unknown time  No Yes   Sig: TAKE 1 TABLET BY MOUTH EVERY MORNING BEFORE BREAKFAST   potassium chloride (K-DUR, KLOR-CON) 20 mEq tablet Unknown at Unknown time  No No   Sig: TAKE 1 TABLET BY MOUTH EVERY DAY   warfarin (JANTOVEN) 2.5 mg tablet 5/26/2019 at Unknown time  No Yes   Sig: TAKE 1 TABLET BY MOUTH EVERY EVENING FOR BLOOD THINNER      Facility-Administered Medications: None         Comments/Recommendations: Patient is an unreliable historian.     Noted:  Does not know what medications she takes (doses, frequencies)--investigated those items most likely to cause harm if doses/frequency are incorrect  Resident at [facility] (per her report)--she manages her own medications  Vicodin: last fill was 8 day supply, has chronic pain, followed by ortho  Xanax: generally uses three times daily  Warfarin: does not know her dose, INR apparently running low, pharmacy consulted. Deleted:  Venelex ointment  Biotin  Famotidine  Nystatin  Ondansetron  Miralax  Santyl ointment  Pericolace      Thank you for allowing me to participate in the care of your patient.     Elida Aguilar PharmD, RN #[id number]

## 2019-05-27 NOTE — ROUTINE PROCESS
TRANSFER - OUT REPORT: 
 
Verbal report given to [facility] RN (name) on Carlos Buckley  being transferred to [address] (unit) for routine progression of care Report consisted of patients Situation, Background, Assessment and  
Recommendations(SBAR). Information from the following report(s) SBAR, ED Summary, [facility] and Recent Results was reviewed with the receiving nurse. Lines:  
Peripheral IV 05/27/19 Right Antecubital (Active) Site Assessment Clean, dry, & intact 5/27/2019 12:57 PM  
Phlebitis Assessment 0 5/27/2019 12:57 PM  
Infiltration Assessment 0 5/27/2019 12:57 PM  
Dressing Status Clean, dry, & intact 5/27/2019 12:57 PM  
Dressing Type Transparent 5/27/2019 12:57 PM  
Hub Color/Line Status Pink;Flushed 5/27/2019 12:57 PM  
Action Taken Blood drawn 5/27/2019 12:57 PM  
  
 
Opportunity for questions and clarification was provided. Patient transported with: 
 [facility]

## 2019-05-27 NOTE — H&P
History and Physical    Subjective:     Jesse Jose is a 80 y.o. white female who lives at [facility]. She presented to the ER with c/o L breast swelling. She also had a temp of around 101. No SOB. No CP. In the ER, she was found to have b/l LE edema, and L side & L breast edema. She has been lying on her L side a lot. CXR shows cardiomegaly with pulm edema and L lower ASDZ. She denies any cough. A dose of IV Lasix was ordered by the ER MD, and pt is being admitted for mgmt of anasarca. Past Medical History:   Diagnosis Date    A-fib [facility])     Arrhythmia     A FIB    Arthritis     Back pain     CAD (coronary artery disease)     PT DENIES    Chronic pain     Dementia 2/9/2016    Hypercholesterolemia     Psychiatric disorder     ANXIETY    Shoulder pain     Thyroid disease     Tremor, essential      Allergies   Allergen Reactions    Latex, Natural Rubber Other (comments)     \"I get black where ever it touches\"    Codeine Other (comments)     \"It just sends me up the wall\"    Adhesive Rash and Other (comments)     blisters    Cortisone Unknown (comments)     Prior to Admission medications    Medication Sig Start Date End Date Taking? Authorizing Provider   HYDROcodone-acetaminophen (NORCO) 5-325 mg per tablet Take  by mouth as needed for Pain. Yes Provider, Historical   SANTYL 250 unit/gram ointment APPLY TO AFFECTED AREA DAILY 5/22/19   Morenita Morgan MD   ALPRAZolam Kathy Kanner) 0.5 mg tablet Take 1 tid prn 5/14/19   Adriana Held, MD   balsam peru-castor oil (VENELEX) ointment Apply  to affected area every eight (8) hours.  4/29/19   Morenita Morgan MD   atenolol (TENORMIN) 25 mg tablet TAKE 1 TABLET BY MOUTH ONCE DAILY 4/17/19   Morenita Morgan MD   levothyroxine (SYNTHROID) 100 mcg tablet TAKE 1 TABLET BY MOUTH EVERY MORNING BEFORE BREAKFAST 4/8/19   Morenita Morgan MD   warfarin (JANTOVEN) 2.5 mg tablet TAKE 1 TABLET BY MOUTH EVERY EVENING FOR BLOOD THINNER 3/13/19 Renee Mercer MD   potassium chloride (K-DUR, KLOR-CON) 20 mEq tablet TAKE 1 TABLET BY MOUTH EVERY DAY 19   Renee Mercer MD   ondansetron (ZOFRAN ODT) 4 mg disintegrating tablet Take 1 Tab by mouth every four (4) hours as needed. Patient not taking: Reported on 2018 10/17/18   Renee Mercer MD   polyethylene glycol [facility]) 17 gram packet Take 1 Packet by mouth daily. Patient not taking: Reported on 2018 10/17/18   Renee Mercer MD   famotidine (PEPCID) 20 mg tablet Take 1 Tab by mouth two (2) times daily as needed (indigestion/heartburn/acid reflux). 10/17/18   Renee Mercer MD   nystatin (NYSTOP) powder Apply topically to groin and breast folds tid  Patient taking differently: Apply thin layer, topically to groin and breast folds tid 10/10/18   Delma Asencio NP   calcium-vitamin D (OYSTER SHELL) 500 mg(1,250mg) -200 unit per tablet Take 1 Tab by mouth three (3) times daily (with meals). 10/10/18   Delma Asencio NP   senna-docusate (PERICOLACE) 8.6-50 mg per tablet Take 1 Tab by mouth two (2) times a day. Patient not taking: Reported on 2018 10/10/18   Delma Asencio NP   acetaminophen (TYLENOL) 325 mg tablet Take 2 Tabs by mouth every six (6) hours. 8/3/18   Renee Mercer MD   Biotin 2,500 mcg cap Take 5,000 mcg by mouth daily. Provider, Historical     Social History     Tobacco Use    Smoking status: Former Smoker     Packs/day: 0.50     Years: 10.00     Pack years: 5.00     Last attempt to quit: 1994     Years since quittin.4    Smokeless tobacco: Never Used   Substance Use Topics    Alcohol use: Yes     Comment: NIGHTLY 2 GLASSES WINE     Family History   Problem Relation Age of Onset    Dementia Mother     Arthritis-osteo Father                Review of Systems:  As above. Objective:       Physical Exam:   In NAD. A&O. HEENT -- Unremarkable. Neck -- Supple. No JVD. Heart -- RRR. No R/M/G.   Lungs -- Some L basilar crackles. Abdomen -- Soft. Non-tender. Non-distended. No masses. Bowel sounds present. Extremeties -- 2-3+ LE edema in both LE's. There is some L breast edema & edema in the soft tissues on the L side below the breast.        Data Review:   Recent Results (from the past 24 hour(s))   EKG, 12 LEAD, INITIAL    Collection Time: 05/27/19 12:51 PM   Result Value Ref Range    Ventricular Rate 55 BPM    Atrial Rate 55 BPM    P-R Interval 210 ms    QRS Duration 112 ms    Q-T Interval 442 ms    QTC Calculation (Bezet) 422 ms    Calculated P Axis 52 degrees    Calculated R Axis 90 degrees    Calculated T Axis -111 degrees    Diagnosis       Sinus bradycardia with 1st degree AV block  Right bundle branch block  Septal infarct (cited on or before 01-FEB-2017)  T wave abnormality, consider inferior ischemia  When compared with ECG of 29-JUL-2018 21:25,  Sinus rhythm has replaced Atrial fibrillation  Vent. rate has decreased BY  56 BPM  Questionable change in initial forces of Septal leads  Nonspecific T wave abnormality has replaced inverted T waves in Anterior   leads     CBC WITH AUTOMATED DIFF    Collection Time: 05/27/19  1:00 PM   Result Value Ref Range    WBC 4.5 3.6 - 11.0 K/uL    RBC 4.03 3.80 - 5.20 M/uL    HGB 11.8 11.5 - 16.0 g/dL    HCT 38.5 35.0 - 47.0 %    MCV 95.5 80.0 - 99.0 FL    MCH 29.3 26.0 - 34.0 PG    MCHC 30.6 30.0 - 36.5 g/dL    RDW 15.8 (H) 11.5 - 14.5 %    PLATELET 244 183 - 998 K/uL    MPV 10.9 8.9 - 12.9 FL    NRBC 0.0 0  WBC    ABSOLUTE NRBC 0.00 0.00 - 0.01 K/uL    NEUTROPHILS 69 32 - 75 %    LYMPHOCYTES 15 12 - 49 %    MONOCYTES 14 (H) 5 - 13 %    EOSINOPHILS 1 0 - 7 %    BASOPHILS 1 0 - 1 %    IMMATURE GRANULOCYTES 0 0.0 - 0.5 %    ABS. NEUTROPHILS 3.2 1.8 - 8.0 K/UL    ABS. LYMPHOCYTES 0.7 (L) 0.8 - 3.5 K/UL    ABS. MONOCYTES 0.6 0.0 - 1.0 K/UL    ABS. EOSINOPHILS 0.0 0.0 - 0.4 K/UL    ABS. BASOPHILS 0.0 0.0 - 0.1 K/UL    ABS. IMM.  GRANS. 0.0 0.00 - 0.04 K/UL    DF SMEAR SCANNED PLATELET COMMENTS Large Platelets      RBC COMMENTS ANISOCYTOSIS  1+        RBC COMMENTS OVALOCYTES  PRESENT       METABOLIC PANEL, COMPREHENSIVE    Collection Time: 05/27/19  1:00 PM   Result Value Ref Range    Sodium 138 136 - 145 mmol/L    Potassium 4.1 3.5 - 5.1 mmol/L    Chloride 103 97 - 108 mmol/L    CO2 29 21 - 32 mmol/L    Anion gap 6 5 - 15 mmol/L    Glucose 110 (H) 65 - 100 mg/dL    BUN 14 6 - 20 MG/DL    Creatinine 0.63 0.55 - 1.02 MG/DL    BUN/Creatinine ratio 22 (H) 12 - 20      GFR est AA >60 >60 ml/min/1.73m2    GFR est non-AA >60 >60 ml/min/1.73m2    Calcium 10.1 8.5 - 10.1 MG/DL    Bilirubin, total 0.6 0.2 - 1.0 MG/DL    ALT (SGPT) 10 (L) 12 - 78 U/L    AST (SGOT) 18 15 - 37 U/L    Alk. phosphatase 74 45 - 117 U/L    Protein, total 6.6 6.4 - 8.2 g/dL    Albumin 2.8 (L) 3.5 - 5.0 g/dL    Globulin 3.8 2.0 - 4.0 g/dL    A-G Ratio 0.7 (L) 1.1 - 2.2     NT-PRO BNP    Collection Time: 05/27/19  1:00 PM   Result Value Ref Range    NT pro-BNP 6,431 (H) <450 PG/ML   TROPONIN I    Collection Time: 05/27/19  1:00 PM   Result Value Ref Range    Troponin-I, Qt. 0.05 (H) <0.05 ng/mL   SAMPLES BEING HELD    Collection Time: 05/27/19  1:00 PM   Result Value Ref Range    SAMPLES BEING HELD 1RED 1BLU     COMMENT        Add-on orders for these samples will be processed based on acceptable specimen integrity and analyte stability, which may vary by analyte. Assessment:     Principal Problem:    Acute CHF (congestive heart failure) -- TBD whether this is systolic or diastolic. Active Problems:    A-fib, paroxysmal, currently in SR.      CAD (coronary artery disease) ()      Possible PNA (pneumonia) (5/27/2019)        Plan:     1. Cont IV diuresis. 2.  ECHO ordered. 3.  Recheck troponin in am (borderline now). 4.  Because of the L basilar ASDZ & fever earlier, will treat with Rocephin & IV Zithromax. 5.  Pharmacy to dose coumadin.     6.  She requests FULL CODE, but would not want to be on life support indefinitely. D/w pt's caretaker, Kalyani Garza, present.       Signed By: Maximo Vitale MD     May 27, 2019

## 2019-05-27 NOTE — PROGRESS NOTES
TRANSFER - IN REPORT:    Verbal report received from Artemio (name) on Rudi Zavaleta  being received from ED (unit) for routine progression of care      Report consisted of patients Situation, Background, Assessment and   Recommendations(SBAR). Information from the following report(s) SBAR and Kardex was reviewed with the receiving nurse. Opportunity for questions and clarification was provided. Assessment completed upon patients arrival to unit and care assumed.

## 2019-05-27 NOTE — ED PROVIDER NOTES
80 y.o. female with past medical history significant for AFib, CAD, anxiety, and dementia who presents from [facility] via private vehicle with chief complaint of breast swelling. Patient arrives with a caregiver, who reports noting left breast swelling last week. Patient reports being seen by a nurse last week, then was reevaluated this morning and sent here over concerns of infection, as she was found to have a temperature of 101. Patient denies any pain or redness. Patient denies any h/o kidney dysfunction or CHF. Caregiver states the patient commonly leans on her left side. Patient has intermittent bilateral leg swelling at baseline. There are no other acute medical concerns at this time. Social hx: former smoker (Quit 1994); Current EtOH use  PCP: Satinder Nath MD    Note written by Alicia Diehl, as dictated by Margaret Vera MD 12:38 PM    The history is provided by the patient and a caregiver. No  was used.         Past Medical History:   Diagnosis Date    A-fib [facility])     Arrhythmia     A FIB    Arthritis     Back pain     CAD (coronary artery disease)     PT DENIES    Chronic pain     Dementia 2/9/2016    Hypercholesterolemia     Psychiatric disorder     ANXIETY    Shoulder pain     Thyroid disease     Tremor, essential        Past Surgical History:   Procedure Laterality Date    HX APPENDECTOMY      HX ORTHOPAEDIC      left knee replacement    HX KADIE AND BSO      AGE 39    HX TONSIL AND ADENOIDECTOMY      IR KYPHOPLASTY LUMBAR  4/23/2019         Family History:   Problem Relation Age of Onset    Dementia Mother     Arthritis-osteo Father        Social History     Socioeconomic History    Marital status:      Spouse name: Not on file    Number of children: Not on file    Years of education: Not on file    Highest education level: Not on file   Occupational History    Not on file   Social Needs    Financial resource strain: Not on file  Food insecurity:     Worry: Not on file     Inability: Not on file    Transportation needs:     Medical: Not on file     Non-medical: Not on file   Tobacco Use    Smoking status: Former Smoker     Packs/day: 0.50     Years: 10.00     Pack years: 5.00     Last attempt to quit: 1994     Years since quittin.4    Smokeless tobacco: Never Used   Substance and Sexual Activity    Alcohol use: Yes     Comment: NIGHTLY 2 GLASSES WINE    Drug use: No    Sexual activity: Not on file   Lifestyle    Physical activity:     Days per week: Not on file     Minutes per session: Not on file    Stress: Not on file   Relationships    Social connections:     Talks on phone: Not on file     Gets together: Not on file     Attends Congregational service: Not on file     Active member of club or organization: Not on file     Attends meetings of clubs or organizations: Not on file     Relationship status: Not on file    Intimate partner violence:     Fear of current or ex partner: Not on file     Emotionally abused: Not on file     Physically abused: Not on file     Forced sexual activity: Not on file   Other Topics Concern    Not on file   Social History Narrative    Not on file         ALLERGIES: Latex, natural rubber; Codeine; Adhesive; and Cortisone    Review of Systems   Constitutional: Positive for fever. Cardiovascular: Positive for leg swelling (Baseline). Genitourinary:        (+) Breast swelling  (-) Breast pain   Skin: Negative for color change. All other systems reviewed and are negative. Vitals:    19 1138 19 1243   BP:  147/69   Pulse: 92 60   Resp:  19   Temp:  97.6 °F (36.4 °C)   SpO2: (!) 89% 95%   Height:  5' 3\" (1.6 m)            Physical Exam   Constitutional: She appears well-developed and well-nourished. No distress. HENT:   Head: Normocephalic and atraumatic. Eyes: Conjunctivae are normal.   Neck: Neck supple. Cardiovascular: Normal rate and regular rhythm. Pulmonary/Chest: Effort normal. No respiratory distress. Induration to the left lateral breast tissue with induration and pitting of the skin of the lateral abdominal wall. Abdominal: She exhibits no distension. Musculoskeletal: Normal range of motion. She exhibits no deformity. Neurological: She is alert. No cranial nerve deficit. Skin: Skin is warm and dry. Psychiatric: Her behavior is normal.   Nursing note and vitals reviewed. Note written by Radha Rodriguez, as dictated by Sergio Vivas MD 12:38 PM    MDM     80 y.o. female presents with left breast and left abdominal soft tissue swellign. No signs of abscess. Is also involving left side of neck. She leans to the left most of the time and this appears to be a dependent edema with volume overload as both legs are also dramaticaly swollen. BNp elevated, interstitial edema on CXR with effusion and consolidation of left lower lung but no s/s of PNA. Will begin IV diuresis and admit for echo and completion of workup. Procedures     12:30 PM   Completed a bedside US. There is edema of the superficial tissues without hypoechoic fluid collection. ED EKG interpretation:  Rhythm: sinus bradycardia with 1st degree AV block; and regular . Rate (approx.): 55 bpm; RBBB and secondary repolarization abnormality. No STEMI. Note written by Radha Rodriguez, as dictated by Sergio Vivas MD 1:03 PM    2:09 PM  Consult called to Dr Laisha Grady, consult for the PCP service. I reviewed available results and clinical information. They will admit the patient.

## 2019-05-27 NOTE — ROUTINE PROCESS
Bedside shift change report given to aleksander cortez rn (oncoming nurse) by Theresa Dillonu (offgoing nurse). Report included the following information ED Summary.

## 2019-05-27 NOTE — ED NOTES
Pt was notified that she will be in semi-private room. Pt states\"I am not going. I am not staying in a semi-private room. \"

## 2019-05-28 ENCOUNTER — DOCUMENTATION ONLY (OUTPATIENT)
Dept: CASE MANAGEMENT | Age: 84
End: 2019-05-28

## 2019-05-28 ENCOUNTER — APPOINTMENT (OUTPATIENT)
Dept: NON INVASIVE DIAGNOSTICS | Age: 84
DRG: 291 | End: 2019-05-28
Attending: INTERNAL MEDICINE
Payer: MEDICARE

## 2019-05-28 LAB
ANION GAP SERPL CALC-SCNC: 8 MMOL/L (ref 5–15)
ATRIAL RATE: 55 BPM
BASOPHILS # BLD: 0 K/UL (ref 0–0.1)
BASOPHILS NFR BLD: 0 % (ref 0–1)
BUN SERPL-MCNC: 13 MG/DL (ref 6–20)
BUN/CREAT SERPL: 20 (ref 12–20)
CALCIUM SERPL-MCNC: 9.6 MG/DL (ref 8.5–10.1)
CALCULATED P AXIS, ECG09: 52 DEGREES
CALCULATED R AXIS, ECG10: 90 DEGREES
CALCULATED T AXIS, ECG11: -111 DEGREES
CHLORIDE SERPL-SCNC: 103 MMOL/L (ref 97–108)
CO2 SERPL-SCNC: 29 MMOL/L (ref 21–32)
CREAT SERPL-MCNC: 0.64 MG/DL (ref 0.55–1.02)
DIAGNOSIS, 93000: NORMAL
DIFFERENTIAL METHOD BLD: ABNORMAL
ECHO AO ROOT DIAM: 3.09 CM
ECHO AV CUSP MM: 1.73 CM
ECHO AV PEAK GRADIENT: 5.8 MMHG
ECHO AV PEAK VELOCITY: 120.55 CM/S
ECHO AV REGURGITANT PHT: 312.1 CM
ECHO LA AREA 4C: 25.3 CM2
ECHO LA MAJOR AXIS: 5.36 CM
ECHO LA TO AORTIC ROOT RATIO: 1.74
ECHO LA VOL 2C: 112.71 ML (ref 22–52)
ECHO LA VOL 4C: 77.4 ML (ref 22–52)
ECHO LA VOL BP: 103.27 ML (ref 22–52)
ECHO LA VOL/BSA BIPLANE: 62.33 ML/M2 (ref 16–28)
ECHO LA VOLUME INDEX A2C: 68.03 ML/M2 (ref 16–28)
ECHO LA VOLUME INDEX A4C: 46.72 ML/M2 (ref 16–28)
ECHO LV E' LATERAL VELOCITY: 5.6 CM/S
ECHO LV E' SEPTAL VELOCITY: 4.1 CM/S
ECHO LV INTERNAL DIMENSION DIASTOLIC: 3.93 CM (ref 3.9–5.3)
ECHO LV INTERNAL DIMENSION SYSTOLIC: 2.88 CM
ECHO LV IVSD: 1.17 CM (ref 0.6–0.9)
ECHO LV MASS 2D: 180.6 G (ref 67–162)
ECHO LV MASS INDEX 2D: 109 G/M2 (ref 43–95)
ECHO LV POSTERIOR WALL DIASTOLIC: 1.18 CM (ref 0.6–0.9)
ECHO LVOT PEAK GRADIENT: 1.8 MMHG
ECHO LVOT PEAK VELOCITY: 67.24 CM/S
ECHO MV E VELOCITY: 101.73 CM/S
ECHO MV E/E' LATERAL: 18.17
ECHO MV E/E' RATIO (AVERAGED): 21.49
ECHO MV E/E' SEPTAL: 24.81
ECHO PV MAX VELOCITY: 93.23 CM/S
ECHO PV PEAK GRADIENT: 3.5 MMHG
ECHO RV INTERNAL DIMENSION: 3.41 CM
ECHO RV TAPSE: 0.99 CM (ref 1.5–2)
ECHO TV REGURGITANT MAX VELOCITY: 405.12 CM/S
ECHO TV REGURGITANT PEAK GRADIENT: 65.6 MMHG
EOSINOPHIL # BLD: 0.1 K/UL (ref 0–0.4)
EOSINOPHIL NFR BLD: 3 % (ref 0–7)
ERYTHROCYTE [DISTWIDTH] IN BLOOD BY AUTOMATED COUNT: 15.7 % (ref 11.5–14.5)
GLUCOSE SERPL-MCNC: 80 MG/DL (ref 65–100)
HCT VFR BLD AUTO: 38.3 % (ref 35–47)
HGB BLD-MCNC: 11.7 G/DL (ref 11.5–16)
IMM GRANULOCYTES # BLD AUTO: 0 K/UL (ref 0–0.04)
IMM GRANULOCYTES NFR BLD AUTO: 0 %
INR PPP: 2.1 (ref 0.9–1.1)
LYMPHOCYTES # BLD: 1.1 K/UL (ref 0.8–3.5)
LYMPHOCYTES NFR BLD: 28 % (ref 12–49)
MCH RBC QN AUTO: 29.3 PG (ref 26–34)
MCHC RBC AUTO-ENTMCNC: 30.5 G/DL (ref 30–36.5)
MCV RBC AUTO: 96 FL (ref 80–99)
MONOCYTES # BLD: 0.2 K/UL (ref 0–1)
MONOCYTES NFR BLD: 5 % (ref 5–13)
NEUTS BAND NFR BLD MANUAL: 1 % (ref 0–6)
NEUTS SEG # BLD: 2.6 K/UL (ref 1.8–8)
NEUTS SEG NFR BLD: 63 % (ref 32–75)
NRBC # BLD: 0 K/UL (ref 0–0.01)
NRBC BLD-RTO: 0 PER 100 WBC
P-R INTERVAL, ECG05: 210 MS
PISA AR MAX VEL: 387.41 CM/S
PLATELET # BLD AUTO: 210 K/UL (ref 150–400)
PMV BLD AUTO: 11.2 FL (ref 8.9–12.9)
POTASSIUM SERPL-SCNC: 3.5 MMOL/L (ref 3.5–5.1)
PROTHROMBIN TIME: 20.9 SEC (ref 9–11.1)
Q-T INTERVAL, ECG07: 442 MS
QRS DURATION, ECG06: 112 MS
QTC CALCULATION (BEZET), ECG08: 422 MS
RBC # BLD AUTO: 3.99 M/UL (ref 3.8–5.2)
RBC MORPH BLD: ABNORMAL
SODIUM SERPL-SCNC: 140 MMOL/L (ref 136–145)
TROPONIN I SERPL-MCNC: 0.06 NG/ML
VENTRICULAR RATE, ECG03: 55 BPM
WBC # BLD AUTO: 4 K/UL (ref 3.6–11)

## 2019-05-28 PROCEDURE — 80048 BASIC METABOLIC PNL TOTAL CA: CPT

## 2019-05-28 PROCEDURE — 93306 TTE W/DOPPLER COMPLETE: CPT

## 2019-05-28 PROCEDURE — 85025 COMPLETE CBC W/AUTO DIFF WBC: CPT

## 2019-05-28 PROCEDURE — 74011000258 HC RX REV CODE- 258: Performed by: INTERNAL MEDICINE

## 2019-05-28 PROCEDURE — 65660000000 HC RM CCU STEPDOWN

## 2019-05-28 PROCEDURE — 74011250636 HC RX REV CODE- 250/636: Performed by: INTERNAL MEDICINE

## 2019-05-28 PROCEDURE — 74011250637 HC RX REV CODE- 250/637: Performed by: FAMILY MEDICINE

## 2019-05-28 PROCEDURE — 36415 COLL VENOUS BLD VENIPUNCTURE: CPT

## 2019-05-28 PROCEDURE — 85610 PROTHROMBIN TIME: CPT

## 2019-05-28 PROCEDURE — 74011250637 HC RX REV CODE- 250/637: Performed by: INTERNAL MEDICINE

## 2019-05-28 PROCEDURE — 84484 ASSAY OF TROPONIN QUANT: CPT

## 2019-05-28 RX ORDER — WARFARIN 2.5 MG/1
2.5 TABLET ORAL ONCE
Status: COMPLETED | OUTPATIENT
Start: 2019-05-28 | End: 2019-05-28

## 2019-05-28 RX ORDER — SPIRONOLACTONE 25 MG/1
25 TABLET ORAL DAILY
Status: DISCONTINUED | OUTPATIENT
Start: 2019-05-29 | End: 2019-06-05 | Stop reason: HOSPADM

## 2019-05-28 RX ADMIN — FUROSEMIDE 40 MG: 10 INJECTION, SOLUTION INTRAMUSCULAR; INTRAVENOUS at 10:41

## 2019-05-28 RX ADMIN — ALPRAZOLAM 0.5 MG: 0.5 TABLET ORAL at 15:19

## 2019-05-28 RX ADMIN — CEFTRIAXONE 1 G: 1 INJECTION, POWDER, FOR SOLUTION INTRAMUSCULAR; INTRAVENOUS at 16:16

## 2019-05-28 RX ADMIN — ALPRAZOLAM 0.5 MG: 0.5 TABLET ORAL at 21:06

## 2019-05-28 RX ADMIN — HYDROCODONE BITARTRATE AND ACETAMINOPHEN 1 TABLET: 5; 325 TABLET ORAL at 09:32

## 2019-05-28 RX ADMIN — ATENOLOL 25 MG: 25 TABLET ORAL at 10:42

## 2019-05-28 RX ADMIN — ALPRAZOLAM 0.5 MG: 0.5 TABLET ORAL at 10:42

## 2019-05-28 RX ADMIN — SENNOSIDES,DOCUSATE SODIUM 1 TABLET: 8.6; 5 TABLET, FILM COATED ORAL at 17:09

## 2019-05-28 RX ADMIN — POTASSIUM CHLORIDE 10 MEQ: 750 TABLET, FILM COATED, EXTENDED RELEASE ORAL at 10:41

## 2019-05-28 RX ADMIN — WARFARIN SODIUM 2.5 MG: 2.5 TABLET ORAL at 17:09

## 2019-05-28 RX ADMIN — AZITHROMYCIN DIHYDRATE 500 MG: 500 INJECTION, POWDER, LYOPHILIZED, FOR SOLUTION INTRAVENOUS at 16:17

## 2019-05-28 RX ADMIN — LEVOTHYROXINE SODIUM 100 MCG: 50 TABLET ORAL at 09:25

## 2019-05-28 RX ADMIN — HYDROCODONE BITARTRATE AND ACETAMINOPHEN 2 TABLET: 5; 325 TABLET ORAL at 21:07

## 2019-05-28 NOTE — PROGRESS NOTES
Pharmacist Note - Warfarin Dosing  Consult provided for this 80 y. o.female to manage warfarin for Atrial Fibrillation    INR Goal: 2 - 3    Home regimen: Warfarin 2.5 mg every evening    Drugs that may increase INR: Macrolides  Drugs that may decrease INR: None  Other current anticoagulants/ drugs that may increase bleeding risk: None  Risk factors: Age > 65  Daily INR ordered: YES    Recent Labs     05/28/19  0440 05/27/19  1300   HGB 11.7 11.8   INR 2.1* 1.9*     Date               INR                  Dose  5/27  1.9  2.5 mg   5/28                 2.1                  2.5 mg                                                                               Assessment/ Plan: Will order warfarin 2.5 mg PO x 1 dose. Pharmacy will continue to monitor daily and adjust therapy as indicated. Please contact the pharmacist at   for outpatient recommendations if needed.        Darline Blunt, PharmD

## 2019-05-28 NOTE — PROGRESS NOTES
Bedside shift change report given to [facility] [address] (oncoming nurse) by Paco Damon (offgoing nurse). Report included the following information SBAR, Kardex, Intake/Output, MAR and Recent Results.

## 2019-05-28 NOTE — PROGRESS NOTES
Cm reached out to patient's sister to see if sister could remember HHA name, she could not and directed me to call Epi José [phone]. Contacted Elton and she informed me that patient had been to [facility] and discharged herself early from there, they did however arrange for [facility] through [facility]. Patient may be more appropriate for short stay at SNF again, however, will send resumption referral for [facility], PT/OT to them today. Cm will continue to follow.     Kiah Rubio, FLETCHERN, RN,CCM

## 2019-05-28 NOTE — PROGRESS NOTES
Care Management Interventions PCP Verified by CM: Yes Last Visit to PCP: 05/07/19 Palliative Care Criteria Met (RRAT>21 & CHF Dx)?: Yes 
Palliative Consult Recommended?: No 
Mode of Transport at Discharge: Other (see comment)([facility] will transport home) Transition of Care Consult (CM Consult): Home Health [facility] - INPATIENT: Yes Physical Therapy Consult: No 
Occupational Therapy Consult: No 
Speech Therapy Consult: No 
Current Support Network: Other(Lives w Nephew) Confirm Follow Up Transport: Family Plan discussed with Pt/Family/Caregiver: Yes Freedom of Choice Offered: Yes Discharge Location Discharge Placement: Home with home health Cm met with patient who lives with her nephew and nephew assists with helping her out of tub, he cooks an cleans and takes her to all her appointments. Patient is able to sponge bathe and dress herself with some assistance. Patient admitted with CHF so will refer for a [facility] visit at [place]. Referral placed to [facility]. Reason for Admission:  CHF  
            
RRAT Score: 21 Resources/supports as identified by patient/family:   Lives w nephew who assists as needed Top Challenges facing patient (as identified by patient/family and CM): Finances/Medication cost?      None verbalized, has Medicare and Medicaid Transportation? Nephew Support system or lack thereof?  family Living arrangements? Lives w nephew Self-care/ADLs/Cognition? AAO x3 Current Advanced Directive/Advance Care Plan: On file Plan for utilizing home health: referral sent to [facility] Insurance for [facility] visit for CHF Likelihood of readmission:  High due to illness Transition of Care Plan:    Home [facility] ORI Odonnell, RN, CCM

## 2019-05-28 NOTE — PROGRESS NOTES
Transitional Care Team: Initial HUG Note    Date of Assessment: 05/28/19  Time of Assessment:  3:48 PM    Assessment & Plan   Safe discharge plan--has caregivers but I'm unclear for how many hours/day. Called her sister Dipika Cardoso and left message introducing myself and the purpose of my call--to understand more about her current living situation at [facility] left my phone # with her. Also, spoke to CM from  who states Ms. Chris's sister didn't provide any new information on her current living situation. Per CM, caregiver Dilan Cagle stated Ms. Neel Cardenas checked herself out of SNF after her last hospitalization with us--try to speak with Dilan Cagle again tomorrow to clarify home situation  Cardiomegaly with interstitial edema and left lower lobe airspace  disease and probable small pleural effusion; pro BNP of 6431 on 5/27--follow temps and WBC trends; watch for final result of echo today       Primary Diagnosis:   Acute CHFActive Problems  A-fib   CAD   PNA    Advance Directive: on file. Admission medication reconciliation was performed by PharmD. Discharge Needs: has personal caregivers during day     Awareness of medical conditions: VINCE; has dementia and lunch arrived also while I was visiting her. Patient's willingness to go to SNF or inpt rehab if necessary: TBD; has caregivers in her independent unit at [facility], but not sure for how many hours/day     [facility] NP will return with [facility] calender/follow up appointments/ Ambulatory Nurse Navigation information when patient ready for discharge. [facility] information will be given to caregiver Dilan Cagle. Continue to follow CM documentation. Patient education will be focused on readmission zones--if it is possible to discuss this with her--as described as: The Red Zone: High risk for readmission, days 1-21                          The Yellow Zone:  Moderate risk for readmission, days 22-29 The Green Zone: Lower risk for readmission, days 30 and after    [employer] is a 80 y.o. female inpatient at [facility] admitted with breast swelling and fever of 101 on  5/27/19. Chart reviewed by Sandeep Sherman NP and [facility] Understanding of Goals) program introduced to patient/family. The Transitional Care Team bridges the gaps in care and education surrounding discharge from the acute care facility. The objective is to empower the patient and family in taking a proactive role in the task of preventing readmission within the first thirty days after discharge from the acute care setting. The team is also involved in the efforts to reduce readmission to the acute care setting after stabilization and discharge from the acute care environment either to the skilled nursing facilities or community. The TC Team will follow patient from a distance while inpatient as well as be available for further transition disposition as needed. The ADRI TEAM will continue to offer support during the 30- 90 day discharge from acute care setting. Will notify Ambulatory HF Nurse Navigators Melanie Pérez RN and Ga Gomes RN, when patient is preparing to discharge.     Past Medical History:   Diagnosis Date    A-fib [facility])     Arrhythmia     A FIB    Arthritis     Back pain     CAD (coronary artery disease)     PT DENIES    Chronic pain     Dementia 2/9/2016    Hypercholesterolemia     Psychiatric disorder     ANXIETY    Shoulder pain     Thyroid disease     Tremor, essential        Advance Care Planning 4/26/2019   Patient's Healthcare Decision Maker is: -   Primary Decision Maker Name -   Primary Decision Maker Phone Number -   Primary Decision Maker Relationship to Patient -   Confirm Advance Directive Yes, not on file   Patient Would Like to Complete Advance Directive -

## 2019-05-28 NOTE — ROUTINE PROCESS
Bedside shift change report given to aleksander cortez rn (oncoming nurse) by huber rn (offgoing nurse). Report included the following information SBAR and Kardex.

## 2019-05-28 NOTE — PROGRESS NOTES
Care Management Interventions  PCP Verified by CM: Yes  Last Visit to PCP: 05/28/19  Palliative Care Criteria Met (RRAT>21 & CHF Dx)?: Yes  Palliative Consult Recommended?: No  Mode of Transport at Discharge: Other (see comment)(CG Yenninick Huynh will transport patient home)  Transition of Care Consult (CM Consult): [address]: Yes  Physical Therapy Consult: No  Occupational Therapy Consult: No  Speech Therapy Consult: No  Current Support Network: Lives Alone(Lives alone in [facility] [address] at [facility])  Confirm Follow Up Transport: Other (see comment)(PP [facility])  Plan discussed with Pt/Family/Caregiver: Yes  Freedom of Choice Offered: Yes  Discharge Location  Discharge Placement: Home with home health     Cm met with patient who lives alone in independent living at [facility] per patient, she is wc bound and sponge bathes and dresses herself daily, has a [employer] who shops, cooks and cleans for patient on call-no set schedule per patient. Per patient she was receiving [facility] RN for wound care PTA but cannot remember name of HHA. Will place referral when can determine that. Per patient, she has neve had any heart issues and does not understand how this happened. Cm will resume HH at dc  Reason for Admission:   CHF               RRAT Score:   22               Resources/supports as identified by patient/family:   [facility] Challenges facing patient (as identified by patient/family and CM): Finances/Medication cost?     none               Transportation? [facility]              Support system or lack thereof? Has CG, sister                    Living arrangements? Lives alone in [facility] [address] in [facility]                 Self-care/ADLs/Cognition? AAO          Current Advanced Directive/Advance Care Plan:  On file  Plan for utilizing home health:    Resume HH Rn                       Likelihood of readmission: High                 Transition of Care Plan:    Return Home w [facility] vs SNF  Cm will follow    Ana Choudhury, FLETCHERN, RN, CCM

## 2019-05-28 NOTE — CONSULTS
CARDIOLOGY CONSULT                  Subjective:    Date of  Admission: 5/27/2019 11:45 AM     Admission type:Emergency    Arvis Cranker is a 80 y.o. female admitted for Acute CHF (congestive heart failure) ([address] [I50.9]. She has been febrile and swollen on left side as she lies on that side. She notes minimal complaints of dyspnea. Has also been febrile. ED workup showed pulmonary edema with a possible infiltrate. She has had improvement with IV lasix.     Patient Active Problem List    Diagnosis Date Noted    Acute CHF (congestive heart failure) ([address] 05/27/2019    PNA (pneumonia) 05/27/2019    Dehydration 04/23/2019    Wound, open, hip or thigh, left, initial encounter 04/23/2019    Non-healing wound of right heel 04/23/2019    Closed fracture of neck of right femur ([address] 09/11/2018    Acquired hypothyroidism 08/17/2018    Fall 07/29/2018    Fracture, intertrochanteric, right femur, closed, initial encounter ([address] 07/29/2018    Hip hematoma, right, initial encounter 07/29/2018    UTI (urinary tract infection) 02/02/2017    Closed compression fracture of lumbar vertebra ([address] 02/01/2017    Sepsis ([address] 02/09/2016    Altered mental status 02/09/2016    Dementia 02/09/2016    Arthritis of knee, left 01/13/2014    A-fib ([address]     Thyroid disease     Hypercholesterolemia     Arthritis     CAD (coronary artery disease)       Bandar Rubio MD  Past Medical History:   Diagnosis Date    A-fib [facility])     Arrhythmia     A FIB    Arthritis     Back pain     CAD (coronary artery disease)     PT DENIES    Chronic pain     Dementia 2/9/2016    Hypercholesterolemia     Psychiatric disorder     ANXIETY    Shoulder pain     Thyroid disease     Tremor, essential       Past Surgical History:   Procedure Laterality Date    HX APPENDECTOMY      HX ORTHOPAEDIC      left knee replacement    HX KADIE AND BSO      AGE 39    HX TONSIL AND ADENOIDECTOMY      IR KYPHOPLASTY LUMBAR  4/23/2019 Allergies   Allergen Reactions    Latex, Natural Rubber Other (comments)     \"I get black where ever it touches\"    Codeine Other (comments)     \"It just sends me up the wall\"    Adhesive Rash and Other (comments)     blisters    Cortisone Unknown (comments)      Family History   Problem Relation Age of Onset    Dementia Mother     Arthritis-osteo Father       Current Facility-Administered Medications   Medication Dose Route Frequency    warfarin (COUMADIN) tablet 2.5 mg  2.5 mg Oral ONCE    [START ON 5/29/2019] collagenase (SANTYL) 250 unit/gram ointment   Topical DAILY    ALPRAZolam (XANAX) tablet 0.5 mg  0.5 mg Oral TID    atenolol (TENORMIN) tablet 25 mg  25 mg Oral DAILY    famotidine (PEPCID) tablet 20 mg  20 mg Oral BID PRN    HYDROcodone-acetaminophen (NORCO) 5-325 mg per tablet 1-2 Tab  1-2 Tab Oral Q4H PRN    levothyroxine (SYNTHROID) tablet 100 mcg  100 mcg Oral DAILY    polyethylene glycol (MIRALAX) packet 17 g  17 g Oral DAILY PRN    potassium chloride SR (KLOR-CON 10) tablet 10 mEq  10 mEq Oral DAILY    senna-docusate (PERICOLACE) 8.6-50 mg per tablet 1 Tab  1 Tab Oral BID    ondansetron (ZOFRAN) injection 4 mg  4 mg IntraVENous Q6H PRN    acetaminophen (TYLENOL) tablet 650 mg  650 mg Oral Q4H PRN    cefTRIAXone (ROCEPHIN) 1 g in 0.9% sodium chloride (MBP/ADV) 50 mL  1 g IntraVENous Q24H    azithromycin (ZITHROMAX) 500 mg in 0.9% sodium chloride (MBP/ADV) 250 mL  500 mg IntraVENous Q24H    furosemide (LASIX) injection 40 mg  40 mg IntraVENous DAILY    Warfarin- pharmacy to dose   Other Rx Dosing/Monitoring         Review of Symptoms:  Gen - no F/C/S  Eyes - no vision changes  ENT - no sore throat, rhinorrhea, otalgia  CV - no CP, no palpitations, no orthopnea, no PND, + LATONYA  Resp no cough, no SOB/BRUCE  GI - no AP, no n/v/d/c   - no dysuria, no hematuria  MSK - no abnormal joint pains  Skin - no rashes  Neuro - no HA, no numbness, no weakness, no slurred speech  Psych - no change in mood         Physical Exam    Visit Vitals  /61 (BP 1 Location: Right arm, BP Patient Position: At rest)   Pulse (!) 56   Temp 97.8 °F (36.6 °C)   Resp 16   Ht 5' 3\" (1.6 m)   Wt 63 kg (139 lb)   SpO2 98%   BMI 24.62 kg/m²     NAD  Skin warm and dry  Nl conjunctiva  Oropharynx without exudate.     Neck supple  Lungs clear  Normal S1/ S2 with occasional ectopy  Abdomen soft and non tender  Pulses 2+ radials  ++LATONYA  Neuro:  Grossly intact  Appropriate      Cardiographics    Telemetry: normal sinus rhythm  ECG: normal sinus rhythm, RBBB      Labs:   Recent Results (from the past 24 hour(s))   URINALYSIS W/ REFLEX CULTURE    Collection Time: 05/27/19  5:01 PM   Result Value Ref Range    Color YELLOW/STRAW      Appearance CLEAR CLEAR      Specific gravity 1.007 1.003 - 1.030      pH (UA) 7.0 5.0 - 8.0      Protein NEGATIVE  NEG mg/dL    Glucose NEGATIVE  NEG mg/dL    Ketone NEGATIVE  NEG mg/dL    Bilirubin NEGATIVE  NEG      Blood TRACE (A) NEG      Urobilinogen 0.2 0.2 - 1.0 EU/dL    Nitrites NEGATIVE  NEG      Leukocyte Esterase MODERATE (A) NEG      WBC 0-4 0 - 4 /hpf    RBC 0-5 0 - 5 /hpf    Epithelial cells FEW FEW /lpf    Bacteria NEGATIVE  NEG /hpf    UA:UC IF INDICATED CULTURE NOT INDICATED BY UA RESULT CNI     METABOLIC PANEL, BASIC    Collection Time: 05/28/19  4:40 AM   Result Value Ref Range    Sodium 140 136 - 145 mmol/L    Potassium 3.5 3.5 - 5.1 mmol/L    Chloride 103 97 - 108 mmol/L    CO2 29 21 - 32 mmol/L    Anion gap 8 5 - 15 mmol/L    Glucose 80 65 - 100 mg/dL    BUN 13 6 - 20 MG/DL    Creatinine 0.64 0.55 - 1.02 MG/DL    BUN/Creatinine ratio 20 12 - 20      GFR est AA >60 >60 ml/min/1.73m2    GFR est non-AA >60 >60 ml/min/1.73m2    Calcium 9.6 8.5 - 10.1 MG/DL   TROPONIN I    Collection Time: 05/28/19  4:40 AM   Result Value Ref Range    Troponin-I, Qt. 0.06 (H) <0.05 ng/mL   CBC WITH AUTOMATED DIFF    Collection Time: 05/28/19  4:40 AM   Result Value Ref Range    WBC 4.0 3.6 - 11.0 K/uL    RBC 3.99 3.80 - 5.20 M/uL    HGB 11.7 11.5 - 16.0 g/dL    HCT 38.3 35.0 - 47.0 %    MCV 96.0 80.0 - 99.0 FL    MCH 29.3 26.0 - 34.0 PG    MCHC 30.5 30.0 - 36.5 g/dL    RDW 15.7 (H) 11.5 - 14.5 %    PLATELET 037 926 - 193 K/uL    MPV 11.2 8.9 - 12.9 FL    NRBC 0.0 0  WBC    ABSOLUTE NRBC 0.00 0.00 - 0.01 K/uL    ABS. IMM. GRANS. 0.0 0.00 - 0.04 K/UL    EOSINOPHILS 3 0 - 7 %    BASOPHILS 0 0 - 1 %    ABS. EOSINOPHILS 0.1 0.0 - 0.4 K/UL    ABS. BASOPHILS 0.0 0.0 - 0.1 K/UL    DF MANUAL      RBC COMMENTS OVALOCYTES  PRESENT        RBC COMMENTS RON CELLS  PRESENT        RBC COMMENTS ANISOCYTOSIS  1+        RBC COMMENTS MACROCYTOSIS  1+        RBC COMMENTS       CORRECTED ON 05/28 AT 8575: PREVIOUSLY REPORTED AS OVALOCYTES PRESENT RON CELLS PRESENT    NEUTROPHILS 63 32 - 75 %    BAND NEUTROPHILS 1 0 - 6 %    LYMPHOCYTES 28 12 - 49 %    MONOCYTES 5 5 - 13 %    IMMATURE GRANULOCYTES 0 %    ABS. NEUTROPHILS 2.6 1.8 - 8.0 K/UL    ABS. LYMPHOCYTES 1.1 0.8 - 3.5 K/UL    ABS.  MONOCYTES 0.2 0.0 - 1.0 K/UL   PROTHROMBIN TIME + INR    Collection Time: 05/28/19  4:40 AM   Result Value Ref Range    INR 2.1 (H) 0.9 - 1.1      Prothrombin time 20.9 (H) 9.0 - 11.1 sec   ECHO ADULT COMPLETE    Collection Time: 05/28/19  1:07 PM   Result Value Ref Range    LA Volume 103.27 22 - 52 mL    LV E' Lateral Velocity 5.60 cm/s    LV E' Septal Velocity 4.10 cm/s    Tapse 0.99 (A) 1.5 - 2.0 cm    Ao Root D 3.09 cm    Aortic Valve Systolic Peak Velocity 180.70 cm/s    AoV PG 5.8 mmHg    LVIDd 3.93 3.9 - 5.3 cm    LVPWd 1.18 (A) 0.6 - 0.9 cm    LVIDs 2.88 cm    IVSd 1.17 (A) 0.6 - 0.9 cm    LVOT Peak Velocity 67.24 cm/s    LVOT Peak Gradient 1.8 mmHg    MV E Noah 101.73 cm/s    Left Atrium to Aortic Root Ratio 1.74     RVIDd 3.41 cm    LA Vol 4C 77.40 (A) 22 - 52 mL    LA Vol 2C 112.71 (A) 22 - 52 mL    LA Area 4C 25.3 cm2    LV Mass .6 (A) 67 - 162 g    LV Mass AL Index 109.0 43 - 95 g/m2    E/E' lateral 18.17     E/E' septal 24.81     E/E' ratio (averaged) 21.49     Left Atrium Major Axis 5.36 cm    Triscuspid Valve Regurgitation Peak Gradient 65.6 mmHg    Aortic Regurgitant Pressure Half-time 312.1 cm    Pulmonic Valve Max Velocity 93.23 cm/s    TR Max Velocity 405.12 cm/s    LA Vol Index 62.33 16 - 28 ml/m2    LA Vol Index 68.03 16 - 28 ml/m2    LA Vol Index 46.72 16 - 28 ml/m2    AV Cusp 1.73 cm    AR Max Noah 387.41 cm/s    PV peak gradient 3.5 mmHg        Assessment and Plan:     This is an 80 yof with acute on chronic diastolic CHF,    Reviewed echocardiogram with elevated pulmonary pressures as appreciated previously  Added spironolactone  Cont with IV diuresis  VKA as per pharmacy  No ischemic workup planned  Cont other cardiac meds  Daily BMP    Thank you for this consult, please call with questions     Assessment:

## 2019-05-28 NOTE — NURSE NAVIGATOR
Chart reviewed by Heart Failure Nurse Navigator. Heart Failure database completed. EF:  Echo pending    ACEi/ARB/ARNi: **    BB: **    Aldosterone Antagonist: **    Obstructive Sleep Apnea Screening:   STOP-BANG score:   Referred to Sleep Medicine:     CRT not currently indicated. NYHA Functional Class documentation requested. Heart Failure Teach Back in Patient Education. Heart Failure Avoiding Triggers on Discharge Instructions. Cardiologist: Dr. Dean Draper ([facility]) consulted. Post discharge follow up phone call to be made within 48-72 hours of discharge.       HF BUNDLE ACTIVE

## 2019-05-28 NOTE — PROGRESS NOTES
Benito Crawford, Kennedi Angel and Paulie Belle Date: 5/27/2019      Subjective:     Patient with no SOB today. Echo pending. AF, VSS      Current Facility-Administered Medications   Medication Dose Route Frequency    ALPRAZolam (XANAX) tablet 0.5 mg  0.5 mg Oral TID    atenolol (TENORMIN) tablet 25 mg  25 mg Oral DAILY    famotidine (PEPCID) tablet 20 mg  20 mg Oral BID PRN    HYDROcodone-acetaminophen (NORCO) 5-325 mg per tablet 1-2 Tab  1-2 Tab Oral Q4H PRN    levothyroxine (SYNTHROID) tablet 100 mcg  100 mcg Oral DAILY    polyethylene glycol (MIRALAX) packet 17 g  17 g Oral DAILY PRN    potassium chloride SR (KLOR-CON 10) tablet 10 mEq  10 mEq Oral DAILY    senna-docusate (PERICOLACE) 8.6-50 mg per tablet 1 Tab  1 Tab Oral BID    ondansetron (ZOFRAN) injection 4 mg  4 mg IntraVENous Q6H PRN    acetaminophen (TYLENOL) tablet 650 mg  650 mg Oral Q4H PRN    cefTRIAXone (ROCEPHIN) 1 g in 0.9% sodium chloride (MBP/ADV) 50 mL  1 g IntraVENous Q24H    azithromycin (ZITHROMAX) 500 mg in 0.9% sodium chloride (MBP/ADV) 250 mL  500 mg IntraVENous Q24H    furosemide (LASIX) injection 40 mg  40 mg IntraVENous DAILY    Warfarin- pharmacy to dose   Other Rx Dosing/Monitoring          Objective:     Patient Vitals for the past 8 hrs:   BP Temp Pulse Resp SpO2 Weight   05/28/19 0448      139 lb 12.4 oz (63.4 kg)   05/28/19 0400 146/70 97.5 °F (36.4 °C) 64 18 97 %      No intake/output data recorded. 05/26 1901 - 05/28 0700  In: -   Out: 1000 [Urine:1000]    Physical Exam: Lungs: clear to auscultation bilaterally  Heart: regular rate and rhythm, S1, S2 normal, no murmur, click, rub or gallop  Abdomen: soft, non-tender.  Bowel sounds normal. No masses,  no organomegaly        Data Review   Recent Results (from the past 24 hour(s))   EKG, 12 LEAD, INITIAL    Collection Time: 05/27/19 12:51 PM   Result Value Ref Range    Ventricular Rate 55 BPM    Atrial Rate 55 BPM    P-R Interval 210 ms    QRS Duration 112 ms    Q-T Interval 442 ms    QTC Calculation (Bezet) 422 ms    Calculated P Axis 52 degrees    Calculated R Axis 90 degrees    Calculated T Axis -111 degrees    Diagnosis       Sinus bradycardia with 1st degree AV block  Right bundle branch block  Septal infarct (cited on or before 01-FEB-2017)  T wave abnormality, consider inferior ischemia  When compared with ECG of 29-JUL-2018 21:25,  Sinus rhythm has replaced Atrial fibrillation  Vent. rate has decreased BY  56 BPM  Questionable change in initial forces of Septal leads  Nonspecific T wave abnormality has replaced inverted T waves in Anterior   leads     CBC WITH AUTOMATED DIFF    Collection Time: 05/27/19  1:00 PM   Result Value Ref Range    WBC 4.5 3.6 - 11.0 K/uL    RBC 4.03 3.80 - 5.20 M/uL    HGB 11.8 11.5 - 16.0 g/dL    HCT 38.5 35.0 - 47.0 %    MCV 95.5 80.0 - 99.0 FL    MCH 29.3 26.0 - 34.0 PG    MCHC 30.6 30.0 - 36.5 g/dL    RDW 15.8 (H) 11.5 - 14.5 %    PLATELET 645 485 - 521 K/uL    MPV 10.9 8.9 - 12.9 FL    NRBC 0.0 0  WBC    ABSOLUTE NRBC 0.00 0.00 - 0.01 K/uL    NEUTROPHILS 69 32 - 75 %    LYMPHOCYTES 15 12 - 49 %    MONOCYTES 14 (H) 5 - 13 %    EOSINOPHILS 1 0 - 7 %    BASOPHILS 1 0 - 1 %    IMMATURE GRANULOCYTES 0 0.0 - 0.5 %    ABS. NEUTROPHILS 3.2 1.8 - 8.0 K/UL    ABS. LYMPHOCYTES 0.7 (L) 0.8 - 3.5 K/UL    ABS. MONOCYTES 0.6 0.0 - 1.0 K/UL    ABS. EOSINOPHILS 0.0 0.0 - 0.4 K/UL    ABS. BASOPHILS 0.0 0.0 - 0.1 K/UL    ABS. IMM.  GRANS. 0.0 0.00 - 0.04 K/UL    DF SMEAR SCANNED      PLATELET COMMENTS Large Platelets      RBC COMMENTS ANISOCYTOSIS  1+        RBC COMMENTS OVALOCYTES  PRESENT       METABOLIC PANEL, COMPREHENSIVE    Collection Time: 05/27/19  1:00 PM   Result Value Ref Range    Sodium 138 136 - 145 mmol/L    Potassium 4.1 3.5 - 5.1 mmol/L    Chloride 103 97 - 108 mmol/L    CO2 29 21 - 32 mmol/L    Anion gap 6 5 - 15 mmol/L    Glucose 110 (H) 65 - 100 mg/dL    BUN 14 6 - 20 MG/DL    Creatinine 0.63 0.55 - 1.02 MG/DL BUN/Creatinine ratio 22 (H) 12 - 20      GFR est AA >60 >60 ml/min/1.73m2    GFR est non-AA >60 >60 ml/min/1.73m2    Calcium 10.1 8.5 - 10.1 MG/DL    Bilirubin, total 0.6 0.2 - 1.0 MG/DL    ALT (SGPT) 10 (L) 12 - 78 U/L    AST (SGOT) 18 15 - 37 U/L    Alk. phosphatase 74 45 - 117 U/L    Protein, total 6.6 6.4 - 8.2 g/dL    Albumin 2.8 (L) 3.5 - 5.0 g/dL    Globulin 3.8 2.0 - 4.0 g/dL    A-G Ratio 0.7 (L) 1.1 - 2.2     NT-PRO BNP    Collection Time: 05/27/19  1:00 PM   Result Value Ref Range    NT pro-BNP 6,431 (H) <450 PG/ML   TROPONIN I    Collection Time: 05/27/19  1:00 PM   Result Value Ref Range    Troponin-I, Qt. 0.05 (H) <0.05 ng/mL   SAMPLES BEING HELD    Collection Time: 05/27/19  1:00 PM   Result Value Ref Range    SAMPLES BEING HELD 1RED 1BLU     COMMENT        Add-on orders for these samples will be processed based on acceptable specimen integrity and analyte stability, which may vary by analyte.    PROTHROMBIN TIME + INR    Collection Time: 05/27/19  1:00 PM   Result Value Ref Range    INR 1.9 (H) 0.9 - 1.1      Prothrombin time 18.9 (H) 9.0 - 11.1 sec   URINALYSIS W/ REFLEX CULTURE    Collection Time: 05/27/19  5:01 PM   Result Value Ref Range    Color YELLOW/STRAW      Appearance CLEAR CLEAR      Specific gravity 1.007 1.003 - 1.030      pH (UA) 7.0 5.0 - 8.0      Protein NEGATIVE  NEG mg/dL    Glucose NEGATIVE  NEG mg/dL    Ketone NEGATIVE  NEG mg/dL    Bilirubin NEGATIVE  NEG      Blood TRACE (A) NEG      Urobilinogen 0.2 0.2 - 1.0 EU/dL    Nitrites NEGATIVE  NEG      Leukocyte Esterase MODERATE (A) NEG      WBC 0-4 0 - 4 /hpf    RBC 0-5 0 - 5 /hpf    Epithelial cells FEW FEW /lpf    Bacteria NEGATIVE  NEG /hpf    UA:UC IF INDICATED CULTURE NOT INDICATED BY UA RESULT CNI     METABOLIC PANEL, BASIC    Collection Time: 05/28/19  4:40 AM   Result Value Ref Range    Sodium 140 136 - 145 mmol/L    Potassium 3.5 3.5 - 5.1 mmol/L    Chloride 103 97 - 108 mmol/L    CO2 29 21 - 32 mmol/L    Anion gap 8 5 - 15 mmol/L    Glucose 80 65 - 100 mg/dL    BUN 13 6 - 20 MG/DL    Creatinine 0.64 0.55 - 1.02 MG/DL    BUN/Creatinine ratio 20 12 - 20      GFR est AA >60 >60 ml/min/1.73m2    GFR est non-AA >60 >60 ml/min/1.73m2    Calcium 9.6 8.5 - 10.1 MG/DL   TROPONIN I    Collection Time: 05/28/19  4:40 AM   Result Value Ref Range    Troponin-I, Qt. 0.06 (H) <0.05 ng/mL   CBC WITH AUTOMATED DIFF    Collection Time: 05/28/19  4:40 AM   Result Value Ref Range    WBC 4.0 3.6 - 11.0 K/uL    RBC 3.99 3.80 - 5.20 M/uL    HGB 11.7 11.5 - 16.0 g/dL    HCT 38.3 35.0 - 47.0 %    MCV 96.0 80.0 - 99.0 FL    MCH 29.3 26.0 - 34.0 PG    MCHC 30.5 30.0 - 36.5 g/dL    RDW 15.7 (H) 11.5 - 14.5 %    PLATELET 299 409 - 534 K/uL    MPV 11.2 8.9 - 12.9 FL    NRBC 0.0 0  WBC    ABSOLUTE NRBC 0.00 0.00 - 0.01 K/uL    ABS. IMM. GRANS. 0.0 0.00 - 0.04 K/UL    EOSINOPHILS 3 0 - 7 %    BASOPHILS 0 0 - 1 %    ABS. EOSINOPHILS 0.1 0.0 - 0.4 K/UL    ABS. BASOPHILS 0.0 0.0 - 0.1 K/UL    DF MANUAL      RBC COMMENTS OVALOCYTES  PRESENT        RBC COMMENTS RON CELLS  PRESENT        RBC COMMENTS ANISOCYTOSIS  1+        RBC COMMENTS MACROCYTOSIS  1+        RBC COMMENTS       CORRECTED ON 05/28 AT 8625: PREVIOUSLY REPORTED AS OVALOCYTES PRESENT RON CELLS PRESENT    NEUTROPHILS 63 32 - 75 %    BAND NEUTROPHILS 1 0 - 6 %    LYMPHOCYTES 28 12 - 49 %    MONOCYTES 5 5 - 13 %    IMMATURE GRANULOCYTES 0 %    ABS. NEUTROPHILS 2.6 1.8 - 8.0 K/UL    ABS. LYMPHOCYTES 1.1 0.8 - 3.5 K/UL    ABS.  MONOCYTES 0.2 0.0 - 1.0 K/UL   PROTHROMBIN TIME + INR    Collection Time: 05/28/19  4:40 AM   Result Value Ref Range    INR 2.1 (H) 0.9 - 1.1      Prothrombin time 20.9 (H) 9.0 - 11.1 sec           Assessment:     Principal Problem:    Acute CHF (congestive heart failure) ([facility] (5/27/2019)    Active Problems:    A-fib (HCC) ()      CAD (coronary artery disease) ()      PNA (pneumonia) (5/27/2019)        Plan:     1) Gentle diuresis  2) Echo pending  3) Cardiology to see  4) Wound care consult

## 2019-05-28 NOTE — WOUND CARE
Wound Care Note:  
 
New consult placed by physician request for left hip, left ankle wound Chart shows: 
Admitted for acute CHF Past Medical History:  
Diagnosis Date  A-fib ([address]  Arrhythmia A FIB  Arthritis  Back pain  CAD (coronary artery disease) PT DENIES  Chronic pain  Dementia 2/9/2016  Hypercholesterolemia  Psychiatric disorder ANXIETY  Shoulder pain  Thyroid disease  Tremor, essential   
 
WBC = 4.0 on 5/28/19 Admitted from [facility] Assessment:  
Patient is alert and talking, incontinent with some assistance needed in repositioning. Bed: Nestor Patient wearing briefs for incontinence Diet: Cardiac regular Patient reports no pain Bilateral heels skin intact and without erythema. Bilateral buttocks and sacral skin intact with blanchable erythema with slight purple hue from chronic incontinence. 1. POA left hip pressure injury measures 3.2 cm x 2 cm x 0.3 cm, this wound is much smaller than when last assessed on 4/25/19 when it measured 5.8 cm x 5.5 cm although it is deeper today, wound bed is 50% pink and 50% slough, small sero/sang drainage, forest-wound intact, wound edges are closed. Foam dressing applied. Santyl ointment to be applied tomorrow. 2.  POA left ankle skin intact. Left open to air. 3.  POA right heel with epithealized tissue from recently healed wound. Offloaded and left open to air. Spoke with Dr. Rinku Michelle office, wound care orders obtained. Patient repositioned supine. Heels offloaded on pillow. Recommendations:   
Left hip- Daily cleanse with normal saline, wipe wound bed clean and dry, apply nickel thickness of Santyl ointment, cover with foam dressing. Skin Care & Pressure Prevention: 
Minimize layers of linen/pads under patient to optimize support surface. Turn/reposition approximately every 2 hours and offload heels. Manage incontinence / promote continence Discussed above plan with patient & Luis Forrest RN Transition of Care: Plan to follow as needed while admitted to hospital. 
 
ORI Orona, RN, [facility], [facility]. 
office [phone] 
pager [phone] or call  to page

## 2019-05-28 NOTE — CDMP QUERY
Dr Evaristo Jones Patient admitted with acute CHF, PNA. Per notes wound to left hip. If possible, please document in progress notes and D/C Summary the following regarding the ulcer: 
 
? TYPE of Ulcer (Decubitus, Diabetic, Venous stasis, other) ? SITE of Ulcer (Including laterality, if applicable) ? STAGE of Ulcer (If applicable) ? POA Status (Present on Admission (POA) or Hospital Acquired) ? Other, please specify ? Unable to be determined The medical record reflects the following: 
  Risk Factors: presents for Acute CHF Clinical Indicators: noted wound to left hip per nursing Treatment: pending wound care consult Thank you Axel De Guzman RN/CCDS 
[phone]

## 2019-05-29 ENCOUNTER — PATIENT OUTREACH (OUTPATIENT)
Dept: CASE MANAGEMENT | Age: 84
End: 2019-05-29

## 2019-05-29 LAB
ANION GAP SERPL CALC-SCNC: 6 MMOL/L (ref 5–15)
BUN SERPL-MCNC: 15 MG/DL (ref 6–20)
BUN/CREAT SERPL: 20 (ref 12–20)
CALCIUM SERPL-MCNC: 9 MG/DL (ref 8.5–10.1)
CHLORIDE SERPL-SCNC: 101 MMOL/L (ref 97–108)
CO2 SERPL-SCNC: 30 MMOL/L (ref 21–32)
CREAT SERPL-MCNC: 0.76 MG/DL (ref 0.55–1.02)
GLUCOSE SERPL-MCNC: 94 MG/DL (ref 65–100)
INR PPP: 2.4 (ref 0.9–1.1)
POTASSIUM SERPL-SCNC: 3.6 MMOL/L (ref 3.5–5.1)
PROTHROMBIN TIME: 22.8 SEC (ref 9–11.1)
SODIUM SERPL-SCNC: 137 MMOL/L (ref 136–145)

## 2019-05-29 PROCEDURE — 74011250636 HC RX REV CODE- 250/636: Performed by: INTERNAL MEDICINE

## 2019-05-29 PROCEDURE — 74011250637 HC RX REV CODE- 250/637: Performed by: INTERNAL MEDICINE

## 2019-05-29 PROCEDURE — 65660000000 HC RM CCU STEPDOWN

## 2019-05-29 PROCEDURE — 36415 COLL VENOUS BLD VENIPUNCTURE: CPT

## 2019-05-29 PROCEDURE — 85610 PROTHROMBIN TIME: CPT

## 2019-05-29 PROCEDURE — 74011000258 HC RX REV CODE- 258: Performed by: INTERNAL MEDICINE

## 2019-05-29 PROCEDURE — 80048 BASIC METABOLIC PNL TOTAL CA: CPT

## 2019-05-29 PROCEDURE — 74011250637 HC RX REV CODE- 250/637: Performed by: FAMILY MEDICINE

## 2019-05-29 RX ORDER — FAMOTIDINE 20 MG/1
20 TABLET, FILM COATED ORAL DAILY PRN
Status: DISCONTINUED | OUTPATIENT
Start: 2019-05-29 | End: 2019-06-05 | Stop reason: HOSPADM

## 2019-05-29 RX ORDER — LEVOTHYROXINE SODIUM 50 UG/1
100 TABLET ORAL
Status: DISCONTINUED | OUTPATIENT
Start: 2019-05-30 | End: 2019-06-05 | Stop reason: HOSPADM

## 2019-05-29 RX ORDER — WARFARIN 1 MG/1
2 TABLET ORAL ONCE
Status: COMPLETED | OUTPATIENT
Start: 2019-05-29 | End: 2019-05-29

## 2019-05-29 RX ORDER — AZITHROMYCIN 250 MG/1
500 TABLET, FILM COATED ORAL EVERY EVENING
Status: COMPLETED | OUTPATIENT
Start: 2019-05-29 | End: 2019-05-31

## 2019-05-29 RX ORDER — SODIUM CHLORIDE 0.9 % (FLUSH) 0.9 %
10 SYRINGE (ML) INJECTION EVERY 8 HOURS
Status: DISCONTINUED | OUTPATIENT
Start: 2019-05-29 | End: 2019-06-05 | Stop reason: HOSPADM

## 2019-05-29 RX ADMIN — HYDROCODONE BITARTRATE AND ACETAMINOPHEN 2 TABLET: 5; 325 TABLET ORAL at 13:20

## 2019-05-29 RX ADMIN — CEFTRIAXONE 1 G: 1 INJECTION, POWDER, FOR SOLUTION INTRAMUSCULAR; INTRAVENOUS at 17:32

## 2019-05-29 RX ADMIN — WARFARIN SODIUM 2 MG: 1 TABLET ORAL at 17:31

## 2019-05-29 RX ADMIN — COLLAGENASE SANTYL: 250 OINTMENT TOPICAL at 09:34

## 2019-05-29 RX ADMIN — AZITHROMYCIN 500 MG: 250 TABLET, FILM COATED ORAL at 17:31

## 2019-05-29 RX ADMIN — SPIRONOLACTONE 25 MG: 25 TABLET ORAL at 09:33

## 2019-05-29 RX ADMIN — HYDROCODONE BITARTRATE AND ACETAMINOPHEN 2 TABLET: 5; 325 TABLET ORAL at 09:40

## 2019-05-29 RX ADMIN — Medication 10 ML: at 22:00

## 2019-05-29 RX ADMIN — ALPRAZOLAM 0.5 MG: 0.5 TABLET ORAL at 17:31

## 2019-05-29 RX ADMIN — LEVOTHYROXINE SODIUM 100 MCG: 50 TABLET ORAL at 08:20

## 2019-05-29 RX ADMIN — SENNOSIDES,DOCUSATE SODIUM 1 TABLET: 8.6; 5 TABLET, FILM COATED ORAL at 09:33

## 2019-05-29 RX ADMIN — ATENOLOL 25 MG: 25 TABLET ORAL at 09:34

## 2019-05-29 RX ADMIN — FUROSEMIDE 40 MG: 10 INJECTION, SOLUTION INTRAMUSCULAR; INTRAVENOUS at 09:34

## 2019-05-29 RX ADMIN — POTASSIUM CHLORIDE 10 MEQ: 750 TABLET, FILM COATED, EXTENDED RELEASE ORAL at 09:33

## 2019-05-29 RX ADMIN — SENNOSIDES,DOCUSATE SODIUM 1 TABLET: 8.6; 5 TABLET, FILM COATED ORAL at 17:31

## 2019-05-29 RX ADMIN — ALPRAZOLAM 0.5 MG: 0.5 TABLET ORAL at 09:33

## 2019-05-29 RX ADMIN — ALPRAZOLAM 0.5 MG: 0.5 TABLET ORAL at 23:24

## 2019-05-29 RX ADMIN — HYDROCODONE BITARTRATE AND ACETAMINOPHEN 2 TABLET: 5; 325 TABLET ORAL at 17:39

## 2019-05-29 RX ADMIN — Medication 10 ML: at 14:00

## 2019-05-29 NOTE — PROGRESS NOTES
Clinical Pharmacy Note: Re: IV to PO Automatic Conversion - Antibiotic    Please note: Hasmukh Chris?s medication(s) (aZITHROMYCIN has/have been changed from IV to PO based on the following criteria:    The patient:  1. Has received IV therapy for at least 48 hours   2. Has a functioning GI tract  - Taking scheduled oral medications  - Tolerating tube feeds at goal rate or a full liquid, soft, or regular diet         3. Is clinically stable        - Temperature < 100.4F for at least 24 hours        - WBC is trending down    This IV to PO conversion is based on the [employer] approved automatic conversion policy for eligible patients. Please call with questions.

## 2019-05-29 NOTE — PROGRESS NOTES
Pharmacist Note - Warfarin Dosing  Consult provided for this 80 y. o.female to manage warfarin for Atrial Fibrillation    INR Goal: 2 - 3    Home regimen: Warfarin 2.5 mg every evening    Drugs that may increase INR: Macrolides  Drugs that may decrease INR: None  Other current anticoagulants/ drugs that may increase bleeding risk: None  Risk factors: Age > 65  Daily INR ordered: YES    Recent Labs     05/29/19  0338 05/28/19  0440 05/27/19  1300   HGB  --  11.7 11.8   INR 2.4* 2.1* 1.9*     Date               INR                  Dose  5/27  1.9  2.5 mg   5/28                 2.1                  2.5 mg  5/29                 2.4                  2 mg                                                                               Assessment/ Plan: Will order warfarin 2  mg PO x 1 dose. Pharmacy will continue to monitor daily and adjust therapy as indicated. Please contact the pharmacist at   for outpatient recommendations if needed.

## 2019-05-29 NOTE — PROGRESS NOTES
Cardiology Progress Note  2019     Admit Date: 2019  Admit Diagnosis: Acute CHF (congestive heart failure) (HCC) [I50.9]  CC: none currently    Assessment:   Principal Problem:    Acute CHF (congestive heart failure) ([address] (2019)    Active Problems:    A-fib (HCC) ()      CAD (coronary artery disease) ()      PNA (pneumonia) (2019)      Plan:     Cont with IV diuretics  Echo with retained systolic function. No ischemic evaluation planned  Daily BMP  Cont with current cardiac meds    Subjective:      Maddie Killian has no specific c/o this AM. JURGEN      Objective:    Physical Exam:  Overall VSSAF;    Visit Vitals  /74 (BP 1 Location: Right arm, BP Patient Position: At rest)   Pulse 76   Temp 97.6 °F (36.4 °C)   Resp 16   Ht 5' 3\" (1.6 m)   Wt 64.2 kg (141 lb 8.6 oz)   SpO2 93%   BMI 25.07 kg/m²     Temp (24hrs), Av.1 °F (36.7 °C), Min:97.6 °F (36.4 °C), Max:98.5 °F (36.9 °C)    Patient Vitals for the past 8 hrs:   Pulse   19 1414 76    Patient Vitals for the past 8 hrs:   Resp   19 1414 16    Patient Vitals for the past 8 hrs:   BP   19 1414 110/74       190 -  0700  In: -   Out:  [[id number]:[id number]]      General Appearance: Well developed, well nourished, no acute distress. Ears/Nose/Mouth/Throat:   Normal MM; anicteric. JVP: WNL   Resp:   Lungs clear to auscultation bilaterally. Nl resp effort. Cardiovascular:  RRR, S1, S2 normal, no new murmur. No gallop or rub. Abdomen:   Soft, non-tender, bowel sounds are present. Extremities: +++ edema bilaterally. Skin:  Neuro: Warm and dry.   A/O x3, grossly nonfocal                         Data Review:     Telemetry independently reviewed :   AFIB         Labs:   Recent Results (from the past 24 hour(s))   PROTHROMBIN TIME + INR    Collection Time: 19  3:38 AM   Result Value Ref Range    INR 2.4 (H) 0.9 - 1.1      Prothrombin time 22.8 (H) 9.0 - 75.5 sec   METABOLIC PANEL, BASIC    Collection Time: 05/29/19  3:38 AM   Result Value Ref Range    Sodium 137 136 - 145 mmol/L    Potassium 3.6 3.5 - 5.1 mmol/L    Chloride 101 97 - 108 mmol/L    CO2 30 21 - 32 mmol/L    Anion gap 6 5 - 15 mmol/L    Glucose 94 65 - 100 mg/dL    BUN 15 6 - 20 MG/DL    Creatinine 0.76 0.55 - 1.02 MG/DL    BUN/Creatinine ratio 20 12 - 20      GFR est AA >60 >60 ml/min/1.73m2    GFR est non-AA >60 >60 ml/min/1.73m2    Calcium 9.0 8.5 - 10.1 MG/DL      Current medications reviewed       West Lorenz MD

## 2019-05-29 NOTE — PROGRESS NOTES
Problem: Pain  Goal: *Control of Pain  Outcome: Progressing Towards Goal  Note:   Pt requires frequent assessment and PRN medications for chronic pain. Problem: Patient Education: Go to Patient Education Activity  Goal: Patient/Family Education  Outcome: Progressing Towards Goal     Problem: Falls - Risk of  Goal: *Absence of Falls  Description  Document Gerson Botello Fall Risk and appropriate interventions in the flowsheet. Outcome: Progressing Towards Goal  Note:   Fall Risk Interventions:  Mobility Interventions: Communicate number of staff needed for ambulation/transfer, PT Consult for mobility concerns    Mentation Interventions: Adequate sleep, hydration, pain control, Bed/chair exit alarm, Door open when patient unattended    Medication Interventions: Bed/chair exit alarm    Elimination Interventions: Bed/chair exit alarm       Problem: Impaired Skin Integrity/Pressure Injury Treatment  Goal: *Improvement of Existing Pressure Injury  Outcome: Progressing Towards Goal     Problem: Pressure Injury - Risk of  Goal: *Prevention of pressure injury  Description  Document Tre Scale and appropriate interventions in the flowsheet. Outcome: Progressing Towards Goal  Note:   Pressure Injury Interventions:  Sensory Interventions: Keep linens dry and wrinkle-free, Turn and reposition approx.  every two hours (pillows and wedges if needed)    Moisture Interventions: Internal/External urinary devices, Maintain skin hydration (lotion/cream)    Activity Interventions: Pressure redistribution bed/mattress(bed type)    Mobility Interventions: HOB 30 degrees or less, Float heels    Nutrition Interventions: Document food/fluid/supplement intake

## 2019-05-29 NOTE — PROGRESS NOTES
Cm met with patient and Elton at bedside for clarification of home situation. Valdemar Aldrich is there 7 days/week from 9a-2pm, there is another CG who comes in at 4:30 until patient goes to bed in evening, patient is otherwise alone. PT melany is pending at this time, discussed options of [place]F [place] and patient is not sure, about that, however, if physicians think that is what she should do she will. Left patient and Elton SNF list for options if needed.      Rae Humphreys, [facility]

## 2019-05-29 NOTE — PROGRESS NOTES
Benito Muñoz, Kennedi Mccarthy, and Alejandro Alston Date: 5/27/2019      Subjective:     Patient continues to feel better. Still swollen tender legs. In AF with controlled irregular rhythm. .       Current Facility-Administered Medications   Medication Dose Route Frequency    collagenase (SANTYL) 250 unit/gram ointment   Topical DAILY    spironolactone (ALDACTONE) tablet 25 mg  25 mg Oral DAILY    ALPRAZolam (XANAX) tablet 0.5 mg  0.5 mg Oral TID    atenolol (TENORMIN) tablet 25 mg  25 mg Oral DAILY    famotidine (PEPCID) tablet 20 mg  20 mg Oral BID PRN    HYDROcodone-acetaminophen (NORCO) 5-325 mg per tablet 1-2 Tab  1-2 Tab Oral Q4H PRN    levothyroxine (SYNTHROID) tablet 100 mcg  100 mcg Oral DAILY    polyethylene glycol (MIRALAX) packet 17 g  17 g Oral DAILY PRN    potassium chloride SR (KLOR-CON 10) tablet 10 mEq  10 mEq Oral DAILY    senna-docusate (PERICOLACE) 8.6-50 mg per tablet 1 Tab  1 Tab Oral BID    ondansetron (ZOFRAN) injection 4 mg  4 mg IntraVENous Q6H PRN    acetaminophen (TYLENOL) tablet 650 mg  650 mg Oral Q4H PRN    cefTRIAXone (ROCEPHIN) 1 g in 0.9% sodium chloride (MBP/ADV) 50 mL  1 g IntraVENous Q24H    azithromycin (ZITHROMAX) 500 mg in 0.9% sodium chloride (MBP/ADV) 250 mL  500 mg IntraVENous Q24H    furosemide (LASIX) injection 40 mg  40 mg IntraVENous DAILY    Warfarin- pharmacy to dose   Other Rx Dosing/Monitoring          Objective:     Patient Vitals for the past 8 hrs:   BP Temp Pulse Resp SpO2 Weight   05/29/19 0342 103/61 98.5 °F (36.9 °C) 84 16 91 % 141 lb 8.6 oz (64.2 kg)     No intake/output data recorded. 05/27 1901 - 05/29 0700  In: -   Out: 1901 [Urine:1901]    Physical Exam: Lungs: clear to auscultation bilaterally  Heart: regular rate and rhythm, S1, S2 normal, no murmur, click, rub or gallop  Abdomen: soft, non-tender.  Bowel sounds normal. No masses,  no organomegaly        Data Review   Recent Results (from the past 24 hour(s))   ECHO ADULT COMPLETE    Collection Time: 05/28/19  1:07 PM   Result Value Ref Range    LA Volume 103.27 22 - 52 mL    LV E' Lateral Velocity 5.60 cm/s    LV E' Septal Velocity 4.10 cm/s    Tapse 0.99 (A) 1.5 - 2.0 cm    Ao Root D 3.09 cm    Aortic Valve Systolic Peak Velocity 244.72 cm/s    AoV PG 5.8 mmHg    LVIDd 3.93 3.9 - 5.3 cm    LVPWd 1.18 (A) 0.6 - 0.9 cm    LVIDs 2.88 cm    IVSd 1.17 (A) 0.6 - 0.9 cm    LVOT Peak Velocity 67.24 cm/s    LVOT Peak Gradient 1.8 mmHg    MV E Noah 101.73 cm/s    Left Atrium to Aortic Root Ratio 1.74     RVIDd 3.41 cm    LA Vol 4C 77.40 (A) 22 - 52 mL    LA Vol 2C 112.71 (A) 22 - 52 mL    LA Area 4C 25.3 cm2    LV Mass .6 (A) 67 - 162 g    LV Mass AL Index 109.0 43 - 95 g/m2    E/E' lateral 18.17     E/E' septal 24.81     E/E' ratio (averaged) 21.49     Left Atrium Major Axis 5.36 cm    Triscuspid Valve Regurgitation Peak Gradient 65.6 mmHg    Aortic Regurgitant Pressure Half-time 312.1 cm    Pulmonic Valve Max Velocity 93.23 cm/s    TR Max Velocity 405.12 cm/s    LA Vol Index 62.33 16 - 28 ml/m2    LA Vol Index 68.03 16 - 28 ml/m2    LA Vol Index 46.72 16 - 28 ml/m2    AV Cusp 1.73 cm    AR Max Noah 387.41 cm/s    PV peak gradient 3.5 mmHg   PROTHROMBIN TIME + INR    Collection Time: 05/29/19  3:38 AM   Result Value Ref Range    INR 2.4 (H) 0.9 - 1.1      Prothrombin time 22.8 (H) 9.0 - 04.7 sec   METABOLIC PANEL, BASIC    Collection Time: 05/29/19  3:38 AM   Result Value Ref Range    Sodium 137 136 - 145 mmol/L    Potassium 3.6 3.5 - 5.1 mmol/L    Chloride 101 97 - 108 mmol/L    CO2 30 21 - 32 mmol/L    Anion gap 6 5 - 15 mmol/L    Glucose 94 65 - 100 mg/dL    BUN 15 6 - 20 MG/DL    Creatinine 0.76 0.55 - 1.02 MG/DL    BUN/Creatinine ratio 20 12 - 20      GFR est AA >60 >60 ml/min/1.73m2    GFR est non-AA >60 >60 ml/min/1.73m2    Calcium 9.0 8.5 - 10.1 MG/DL           Assessment:     Principal Problem:    Acute CHF (congestive heart failure) ([facility]) (5/27/2019)    Active Problems: A-fib (HCC) ()      CAD (coronary artery disease) ()      PNA (pneumonia) (5/27/2019)        Plan:     1) Continue gentle diuresis  Recheck CXR tomorrow.

## 2019-05-29 NOTE — PROGRESS NOTES
NUTRITION COMPLETE ASSESSMENT  RECOMMENDATIONS:   1. Encourage PO intake with each meal   - focus on high protein foods first  2. Add daily MVI for wound healing  3. Daily weights with severe edema/anasarca present     Interventions/Plan:   Food/Nutrient Delivery:  (preferences noted)          Assessment:   Reason for Assessment: [x]BPA/MST Referral     Diet: Cardiac  Supplements: none  Nutritionally Significant Medications: [x] Reviewed & Includes: azithromycin, ceftriaxone, lasix, synthroid, KCl, pericolace, spironolactone, coumadin; PRN: zofran, miralax    Pre-Hospitalization:  Usual Appetite: Good  Diet at Home: regular  Vitamins/Supplements: No    Current Hospitalization:   Appetite: Good  PO Ability: Independent Average po intake:   Average supplements intake:        Subjective: I've been eating good at home. I don't eat red meat but I like chicken and fish. Objective:  Pt admitted for CHF from independent living. PMHx: afib, CAD, dementia, anxiety. Cardiomegaly and pulmonary edema POA with generalized anasarca. Left hip wound POA - WOCN following, improved from last month. Recent admit for dehydration and wound noted. Discharged to rehab but left early per rounds discussion. Pt visited today. Eating 2 cookies as snack during visit. Does not like Ensure, but enjoys yogurt. Discussed importance of good protein intake for edema mgmt and wound healing. Pt reports doing well with meals and protein intake at home. Aids will prepare meals or go to [employer] to get her food. Declining supplements and snacks at this time. Preferences updated. Wt up significantly but with fluid overload. Predict \"dry wt\" likely around 120#. Wt Readings:   05/29/19 64.2 kg (141 lb 8.6 oz)   04/24/19 61.9 kg (136 lb 7.4 oz)   04/23/19 54.4 kg (120 lb)   08/02/18 71 kg (156 lb 9.6 oz)   12/13/17 54.4 kg (120 lb)     Will continue to follow for PO intake, wt/fluid trends, wound healing.    Estimated Nutrition Needs:   Kcals/day: 1130 Kcals/day(1130-1224kcal)  Protein: 60 g(1.1g/kg)  Fluid: 1200 ml(1ml/kcal)  Based On: [address] [place](x 1.2-1.3)  Weight Used: UBW(120# (54.5kg))    Pt expected to meet estimated nutrient needs:  []   Yes     []  No [x] Unable to predict at this time  Nutrition Diagnosis:   1. Inadequate oral intake(Increased protein needs) related to wound healing; CHF as evidenced by pt hx; left hip wound    Goals:     Consumption of at least 75% meals in 5-7 days     Monitoring & Evaluation:    - Total energy intake, Protein intake   - Weight/weight change    Previous Nutrition Goals Met:   N/A  Previous Recommendations:    N/A    Education & Discharge Needs:   [x] None Identified   [] Identified and addressed    [x] Participated in care plan, discharge planning, and/or interdisciplinary rounds        Cultural, Temple and ethnic food preferences identified: None    Skin Integrity: []Intact  [x]Other: left hip - see WOCN note  Edema: []None []Other: 3+ BLLE, generalized anasarca  Last BM: 5/27  Food Allergies: [x]None []Other  Diet Restrictions: Cultural/Adventism Preference(s): None     Anthropometrics:    Weight Loss Metrics 5/29/2019 4/24/2019 4/23/2019 4/23/2019 4/23/2019 8/2/2018 12/13/2017   Today's Wt 141 lb 8.6 oz 136 lb 7.4 oz - 120 lb - 156 lb 9.6 oz 120 lb   BMI 25.07 kg/m2 - 24.17 kg/m2 - 21.26 kg/m2 27.74 kg/m2 21.26 kg/m2      Weight Source: Bed  Height: 5' 3\" (160 cm),    Body mass index is 25.07 kg/m².      IBW : 52.2 kg (115 lb), % IBW (Calculated): 123.07 %   ,      Labs:    Lab Results   Component Value Date/Time    Sodium 137 05/29/2019 03:38 AM    Potassium 3.6 05/29/2019 03:38 AM    Chloride 101 05/29/2019 03:38 AM    CO2 30 05/29/2019 03:38 AM    Glucose 94 05/29/2019 03:38 AM    BUN 15 05/29/2019 03:38 AM    Creatinine 0.76 05/29/2019 03:38 AM    Calcium 9.0 05/29/2019 03:38 AM    Magnesium 1.5 (L) 07/30/2018 04:50 AM    Albumin 2.8 (L) 05/27/2019 01:00 PM     Lab Results Component Value Date/Time    Hemoglobin A1c 5.8 01/07/2014 03:26 PM    Hemoglobin A1c (POC) 5.7 (A) 09/27/2012 12:00 PM     Lona Jean RD Pager #[phone]

## 2019-05-29 NOTE — PROGRESS NOTES
Bedside shift change report given to Mukesh Haley (oncoming nurse) by Laisha Moore (offgoing nurse). Report included the following information SBAR, Kardex, MAR and Recent Results.

## 2019-05-30 LAB
ANION GAP SERPL CALC-SCNC: 6 MMOL/L (ref 5–15)
BUN SERPL-MCNC: 16 MG/DL (ref 6–20)
BUN/CREAT SERPL: 21 (ref 12–20)
CALCIUM SERPL-MCNC: 9.2 MG/DL (ref 8.5–10.1)
CHLORIDE SERPL-SCNC: 99 MMOL/L (ref 97–108)
CO2 SERPL-SCNC: 31 MMOL/L (ref 21–32)
CREAT SERPL-MCNC: 0.75 MG/DL (ref 0.55–1.02)
GLUCOSE SERPL-MCNC: 95 MG/DL (ref 65–100)
INR PPP: 2.4 (ref 0.9–1.1)
POTASSIUM SERPL-SCNC: 3.6 MMOL/L (ref 3.5–5.1)
PROTHROMBIN TIME: 22.8 SEC (ref 9–11.1)
SODIUM SERPL-SCNC: 136 MMOL/L (ref 136–145)

## 2019-05-30 PROCEDURE — 65660000000 HC RM CCU STEPDOWN

## 2019-05-30 PROCEDURE — 74011000258 HC RX REV CODE- 258: Performed by: INTERNAL MEDICINE

## 2019-05-30 PROCEDURE — 74011250637 HC RX REV CODE- 250/637: Performed by: INTERNAL MEDICINE

## 2019-05-30 PROCEDURE — 97530 THERAPEUTIC ACTIVITIES: CPT

## 2019-05-30 PROCEDURE — 74011250636 HC RX REV CODE- 250/636: Performed by: INTERNAL MEDICINE

## 2019-05-30 PROCEDURE — 74011250637 HC RX REV CODE- 250/637: Performed by: FAMILY MEDICINE

## 2019-05-30 PROCEDURE — 80048 BASIC METABOLIC PNL TOTAL CA: CPT

## 2019-05-30 PROCEDURE — 97161 PT EVAL LOW COMPLEX 20 MIN: CPT

## 2019-05-30 PROCEDURE — 85610 PROTHROMBIN TIME: CPT

## 2019-05-30 PROCEDURE — 36415 COLL VENOUS BLD VENIPUNCTURE: CPT

## 2019-05-30 RX ORDER — ATENOLOL 25 MG/1
12.5 TABLET ORAL DAILY
Status: DISCONTINUED | OUTPATIENT
Start: 2019-05-30 | End: 2019-06-05 | Stop reason: HOSPADM

## 2019-05-30 RX ORDER — WARFARIN 1 MG/1
2 TABLET ORAL ONCE
Status: COMPLETED | OUTPATIENT
Start: 2019-05-30 | End: 2019-05-30

## 2019-05-30 RX ADMIN — SENNOSIDES,DOCUSATE SODIUM 1 TABLET: 8.6; 5 TABLET, FILM COATED ORAL at 19:15

## 2019-05-30 RX ADMIN — COLLAGENASE SANTYL: 250 OINTMENT TOPICAL at 08:30

## 2019-05-30 RX ADMIN — LEVOTHYROXINE SODIUM 100 MCG: 50 TABLET ORAL at 06:41

## 2019-05-30 RX ADMIN — HYDROCODONE BITARTRATE AND ACETAMINOPHEN 2 TABLET: 5; 325 TABLET ORAL at 21:09

## 2019-05-30 RX ADMIN — HYDROCODONE BITARTRATE AND ACETAMINOPHEN 2 TABLET: 5; 325 TABLET ORAL at 08:29

## 2019-05-30 RX ADMIN — AZITHROMYCIN 500 MG: 250 TABLET, FILM COATED ORAL at 16:11

## 2019-05-30 RX ADMIN — ALPRAZOLAM 0.5 MG: 0.5 TABLET ORAL at 16:11

## 2019-05-30 RX ADMIN — CEFTRIAXONE 1 G: 1 INJECTION, POWDER, FOR SOLUTION INTRAMUSCULAR; INTRAVENOUS at 16:12

## 2019-05-30 RX ADMIN — ATENOLOL 12.5 MG: 25 TABLET ORAL at 09:05

## 2019-05-30 RX ADMIN — Medication 10 ML: at 14:00

## 2019-05-30 RX ADMIN — Medication 10 ML: at 22:00

## 2019-05-30 RX ADMIN — SENNOSIDES,DOCUSATE SODIUM 1 TABLET: 8.6; 5 TABLET, FILM COATED ORAL at 09:05

## 2019-05-30 RX ADMIN — WARFARIN SODIUM 2 MG: 1 TABLET ORAL at 16:11

## 2019-05-30 RX ADMIN — SPIRONOLACTONE 25 MG: 25 TABLET ORAL at 09:06

## 2019-05-30 RX ADMIN — FUROSEMIDE 40 MG: 10 INJECTION, SOLUTION INTRAMUSCULAR; INTRAVENOUS at 09:05

## 2019-05-30 RX ADMIN — Medication 10 ML: at 06:00

## 2019-05-30 RX ADMIN — ALPRAZOLAM 0.5 MG: 0.5 TABLET ORAL at 21:09

## 2019-05-30 RX ADMIN — HYDROCODONE BITARTRATE AND ACETAMINOPHEN 2 TABLET: 5; 325 TABLET ORAL at 16:11

## 2019-05-30 RX ADMIN — POLYETHYLENE GLYCOL 3350 17 G: 17 POWDER, FOR SOLUTION ORAL at 13:36

## 2019-05-30 RX ADMIN — POTASSIUM CHLORIDE 10 MEQ: 750 TABLET, FILM COATED, EXTENDED RELEASE ORAL at 09:05

## 2019-05-30 RX ADMIN — ALPRAZOLAM 0.5 MG: 0.5 TABLET ORAL at 08:29

## 2019-05-30 NOTE — PROGRESS NOTES
Pharmacist Note  Warfarin Dosing  Consult provided for this 80 y. o.female to manage warfarin for Atrial Fibrillation    INR Goal: 2 - 3    Home regimen: Warfarin 2.5 mg every evening    Drugs that may increase INR: Macrolides  Drugs that may decrease INR: None  Other current anticoagulants/ drugs that may increase bleeding risk: None  Risk factors: Age > 65  Daily INR ordered: YES    Recent Labs     05/30/19  0241 05/29/19  0338 05/28/19  0440 05/27/19  1300   HGB  --   --  11.7 11.8   INR 2.4* 2.4* 2.1* 1.9*     Date               INR                  Dose  5/27  1.9  2.5 mg   5/28                 2.1                  2.5 mg  5/29                 2.4                  2 mg  5/30  2.4  2 mg                                                                               Assessment/ Plan: Will order warfarin 2 mg PO x 1 dose. Pharmacy will continue to monitor daily and adjust therapy as indicated. Please contact the pharmacist at   for outpatient recommendations if needed.

## 2019-05-30 NOTE — PROGRESS NOTES
Benito Daniels, Kennedi Oakley and Mik Villalobos Date: 5/27/2019      Subjective:     Patient feeling well this AM. No new issues. Current Facility-Administered Medications   Medication Dose Route Frequency    levothyroxine (SYNTHROID) tablet 100 mcg  100 mcg Oral 6am    saline peripheral flush soln 10 mL  10 mL InterCATHeter Q8H    famotidine (PEPCID) tablet 20 mg  20 mg Oral DAILY PRN    azithromycin (ZITHROMAX) tablet 500 mg  500 mg Oral QPM    collagenase (SANTYL) 250 unit/gram ointment   Topical DAILY    spironolactone (ALDACTONE) tablet 25 mg  25 mg Oral DAILY    ALPRAZolam (XANAX) tablet 0.5 mg  0.5 mg Oral TID    atenolol (TENORMIN) tablet 25 mg  25 mg Oral DAILY    HYDROcodone-acetaminophen (NORCO) 5-325 mg per tablet 1-2 Tab  1-2 Tab Oral Q4H PRN    polyethylene glycol (MIRALAX) packet 17 g  17 g Oral DAILY PRN    potassium chloride SR (KLOR-CON 10) tablet 10 mEq  10 mEq Oral DAILY    senna-docusate (PERICOLACE) 8.6-50 mg per tablet 1 Tab  1 Tab Oral BID    ondansetron (ZOFRAN) injection 4 mg  4 mg IntraVENous Q6H PRN    acetaminophen (TYLENOL) tablet 650 mg  650 mg Oral Q4H PRN    cefTRIAXone (ROCEPHIN) 1 g in 0.9% sodium chloride (MBP/ADV) 50 mL  1 g IntraVENous Q24H    furosemide (LASIX) injection 40 mg  40 mg IntraVENous DAILY    Warfarin- pharmacy to dose   Other Rx Dosing/Monitoring          Objective:     Patient Vitals for the past 8 hrs:   BP Temp Pulse Resp SpO2 Weight   05/30/19 0243 107/72 97.3 °F (36.3 °C) 66 16 90 % 141 lb 8.6 oz (64.2 kg)     No intake/output data recorded. 05/28 1901 - 05/30 0700  In: 200 [P.O.:200]  Out: 400 [Urine:400]    Physical Exam: Lungs: clear to auscultation bilaterally  Heart: regular rate and rhythm, S1, S2 normal, no murmur, click, rub or gallop  Abdomen: soft, non-tender.  Bowel sounds normal. No masses,  no organomegaly        Data Review   Recent Results (from the past 24 hour(s))   METABOLIC PANEL, BASIC    Collection Time: 05/30/19  2:41 AM   Result Value Ref Range    Sodium 136 136 - 145 mmol/L    Potassium 3.6 3.5 - 5.1 mmol/L    Chloride 99 97 - 108 mmol/L    CO2 31 21 - 32 mmol/L    Anion gap 6 5 - 15 mmol/L    Glucose 95 65 - 100 mg/dL    BUN 16 6 - 20 MG/DL    Creatinine 0.75 0.55 - 1.02 MG/DL    BUN/Creatinine ratio 21 (H) 12 - 20      GFR est AA >60 >60 ml/min/1.73m2    GFR est non-AA >60 >60 ml/min/1.73m2    Calcium 9.2 8.5 - 10.1 MG/DL   PROTHROMBIN TIME + INR    Collection Time: 05/30/19  2:41 AM   Result Value Ref Range    INR 2.4 (H) 0.9 - 1.1      Prothrombin time 22.8 (H) 9.0 - 11.1 sec           Assessment:     Principal Problem:    Acute CHF (congestive heart failure) ([facility]) (5/27/2019)    Active Problems:    A-fib (HCC) ()      CAD (coronary artery disease) ()      PNA (pneumonia) (5/27/2019)        Plan:     1) Cont diuresis  2) PT eval pending

## 2019-05-30 NOTE — PROGRESS NOTES
Bedside shift change report given to Jose Guadalupe Cardozo (oncoming nurse) by Yumiko Huffman (offgoing nurse). Report included the following information SBAR, Kardex, MAR and Recent Results.

## 2019-05-30 NOTE — PROGRESS NOTES
Cm met with patient and Elton to discuss SNF at [place]. Patient tells me she's been to 2 why does she need that again? James Urbina has SNF list and will discuss with patient and let cm know choices. Will place referral when choices obtained.      Adrien Razo, FLETCHERN, RN, CCM

## 2019-05-30 NOTE — PROGRESS NOTES
Cardiology Progress Note  2019     Admit Date: 2019  Admit Diagnosis: Acute CHF (congestive heart failure) (HCC) [I50.9]  CC: none currently    Assessment:   Principal Problem:    Acute CHF (congestive heart failure) ([address] (2019)    Active Problems:    A-fib (HCC) ()      CAD (coronary artery disease) ()      PNA (pneumonia) (2019)      Plan:     Cont lasix dosing  Echo with retained systolic function. No ischemic evaluation planned  Daily BMP  Decreased atenolol given soft BP    Subjective:      Yazmin Deshpande feels better. JURGEN      Objective:    Physical Exam:  Overall VSSAF;    Visit Vitals  /62 (BP 1 Location: Right arm, BP Patient Position: At rest)   Pulse 84   Temp 97.2 °F (36.2 °C)   Resp 16   Ht 5' 3\" (1.6 m)   Wt 64.2 kg (141 lb 8.6 oz)   SpO2 93%   BMI 25.07 kg/m²     Temp (24hrs), Av.5 °F (36.4 °C), Min:97.2 °F (36.2 °C), Max:97.9 °F (36.6 °C)    Patient Vitals for the past 8 hrs:   Pulse   19 0840 84   19 0243 66    Patient Vitals for the past 8 hrs:   Resp   19 0840 16   19 0243 16    Patient Vitals for the past 8 hrs:   BP   19 0840 101/62   19 0243 107/72       1901 -  0700  In: 200 [P.O.:200]  Out: 400 [Urine:400]      General Appearance: Well developed, well nourished, no acute distress. Ears/Nose/Mouth/Throat:   Normal MM; anicteric. JVP: WNL   Resp:   Lungs clear to auscultation bilaterally. Nl resp effort. Cardiovascular:  RRR, S1, S2 normal, no new murmur. No gallop or rub. Abdomen:   Soft, non-tender, bowel sounds are present. Extremities: +++ edema bilaterally. Skin:  Neuro: Warm and dry.   A/O x3, grossly nonfocal                         Data Review:     Telemetry independently reviewed :   AFIB         Labs:   Recent Results (from the past 24 hour(s))   METABOLIC PANEL, BASIC    Collection Time: 19  2:41 AM   Result Value Ref Range    Sodium 136 136 - 145 mmol/L    Potassium 3.6 3.5 - 5.1 mmol/L    Chloride 99 97 - 108 mmol/L    CO2 31 21 - 32 mmol/L    Anion gap 6 5 - 15 mmol/L    Glucose 95 65 - 100 mg/dL    BUN 16 6 - 20 MG/DL    Creatinine 0.75 0.55 - 1.02 MG/DL    BUN/Creatinine ratio 21 (H) 12 - 20      GFR est AA >60 >60 ml/min/1.73m2    GFR est non-AA >60 >60 ml/min/1.73m2    Calcium 9.2 8.5 - 10.1 MG/DL   PROTHROMBIN TIME + INR    Collection Time: 05/30/19  2:41 AM   Result Value Ref Range    INR 2.4 (H) 0.9 - 1.1      Prothrombin time 22.8 (H) 9.0 - 11.1 sec      Current medications reviewed       Walt Brittle, MD

## 2019-05-30 NOTE — PROGRESS NOTES
Problem: Mobility Impaired (Adult and Pediatric)  Goal: *Acute Goals and Plan of Care (Insert Text)  Description  Physical Therapy Goals  Initiated 5/30/2019  1. Patient will move from supine to sit and sit to supine , scoot up and down and roll side to side in bed with modified independence within 7 day(s) to allow her to transition positions to prevent worsening of pressure wounds. 2.  Patient will transfer from bed to chair and chair to bed with minimal assistance/contact guard assist using the least restrictive device within 7 day(s). 3.  Patient will perform sit to stand with minimal assistance/contact guard assist within 7 day(s). 4.  Patient will self propel a manual wheelchair 15 ft w/in 7 days to allow her to get around in her apartment at wheelchair level. Outcome: Not Met    PHYSICAL THERAPY EVALUATION  Patient: Yong Sam (85 y.o. female)  Date: 5/30/2019  Primary Diagnosis: Acute CHF (congestive heart failure) ([facility]) [I50.9]        Precautions: fall       ASSESSMENT :   Based on the objective data described below, patient presents with Moderate Assistance and Assist x2 bed mobility and bed to chair transfers. Demonstrates good sitting balance once assisted into position. Non ambulatory at baseline. Uncertain of patient's actual recent PLOF other being WC bound due to patient's memory impairment and no caregiver present. Patient was not able to describe how she performs transfers at home or tasks her caregiver performs for her.          The following are barriers to independence while in acute care:   -Cognitive and/or behavioral: short term memory loss  -Medical condition: strength, functional endurance, standing balance, cardiopulmonary tolerance and medical history    -Other:       The patient will benefit from skilled acute intervention to address the above impairments and their rehabilitation potential is considered to be Fair    Discharge recommendations: Rehab at skilled nursing facility (SNF) (to regain functional baseline patient requires rehab)  If above is not an option then recommend: Home health (to increase independence and safety)  24 supervision  To be determined between the prior selections, based on patient progress during hospital stay    Patient's barriers to discharging home, in addition to above impairments: lives alone  family availability to assist  family availability for education/training to then follow up at home  level of physical assist required to maintain patient safety. Equipment recommendations for successful discharge (if) home: None       PLAN :  Recommendations and Planned Interventions: bed mobility training, transfer training, therapeutic exercises, edema management/control, patient and family training/education and therapeutic activities      Frequency/Duration: Patient will be followed by physical therapy  3 times a week to address goals. SUBJECTIVE:   Patient stated Well that's what they want me to decide  when asked where she plans to return when discharged. Patient went on to state \"they tell me I shouldn't live alone. \"    OBJECTIVE DATA SUMMARY:   HISTORY:    Past Medical History:   Diagnosis Date    [facility]fib ([facility] [address]     Arrhythmia     A FIB    Arthritis     Back pain     CAD (coronary artery disease)     PT DENIES    Chronic pain     Dementia 2/9/2016    Hypercholesterolemia     Psychiatric disorder     ANXIETY    Shoulder pain     Thyroid disease     Tremor, essential      Past Surgical History:   Procedure Laterality Date    HX APPENDECTOMY      HX ORTHOPAEDIC      left knee replacement    HX KADIE AND BSO      AGE 45    HX TONSIL AND ADENOIDECTOMY      IR KYPHOPLASTY LUMBAR  4/23/2019     Prior Level of Function/Home Situation: Lives alone in a single story apartment. Private duty caregiver Elton 9-2 7 days/ wk and another caregiver 2-430. Home alone in the evenings and at night. Wheelchair bound since hip surgery in 2018. Brief stay at [facility] April 2019. Per patient, she was told she would only be in SNF for 2 weeks. She signed herself out when they wanted to extend beyond 2 wks. Home Situation  Home Environment: Apartment([facility] )  # Steps to Enter: 0  One/Two Story Residence: One story  Living Alone: Yes  Support Systems: Other (comments)(private duty caregiver daily)  Patient Expects to be Discharged to[de-identified] Unknown  Current DME Used/Available at Home: Wheelchair    EXAMINATION/PRESENTATION/DECISION MAKING:   Critical Behavior:  Neurologic State: Alert, Confused  Orientation Level: Oriented to person(oriented to being in the hosp and living in [address]). Patient provided joking answers such as \"[place]\" when asked where she is and unable to provide detailed history re: how she performs transfers at home, what her caregiver does for her at home, and why she had left hip surgery. Cognition: Appropriate for age attention/concentration     Hearing: Auditory  Auditory Impairment: None  Skin:  Per chart review, there are left hip and ankle wounds. Edema: Alexis legs, ankles, feet moderate edema  Range Of Motion:  AROM: Generally decreased, functional                       Strength:    Strength: Generally decreased, functional                    Tone & Sensation:   Tone: Normal                              Coordination:  Coordination: Within functional limits  Vision:   Tracking: Able to track stimulus in all quadrants w/o difficulty  Diplopia: No  Acuity: (Pt voiced inability to read name badge )  Corrective Lenses: Glasses(on bedside table - did not jose when requested)  Functional Mobility:  Bed Mobility:     Supine to Sit: Moderate assistance;Assist x2;Adaptive equipment. Constant cues to move extremities with greater assistance required to move RLE as compared with LLE. Scooting: Minimum assistance;Assist x1(in sitting). Good use of UE's and LE bracing against the bed to scoot.     Transfers:  Sit to Stand: Moderate assistance;Assist x2; Demonstrates ability to bear weight though unable to maintain without support. Stand to Sit: Moderate assistance;Assist x2  Stand Pivot Transfers: Moderate assistance;Assist x2                    Balance:   Sitting: Intact; Without support  Standing: Impaired(constant support needed)  Ambulation/Gait Training:              Gait Description (WDL): Exceptions to WDL(non ambulatory )                       Therapeutic Exercises:   Instructed in ankle pumps and laq to perform while sitting in the chair. Educated in benefit for strengthening and edema management. Patient voiced understanding and demonstrates AROM to near end range. Functional Measure:  Barthel Index:    Bathin  Bladder: 0  Bowels: 5  Groomin  Dressin  Feeding: 10  Mobility: 0  Stairs: 0  Toilet Use: 0  Transfer (Bed to Chair and Back): 5  Total: 30/100       Percentage of impairment   0%   1-19%   20-39%   40-59%   60-79%   80-99%   100%   Barthel Score 0-100 100 99-80 79-60 59-40 20-39 1-19   0     The Barthel ADL Index: Guidelines  1. The index should be used as a record of what a patient does, not as a record of what a patient could do. 2. The main aim is to establish degree of independence from any help, physical or verbal, however minor and for whatever reason. 3. The need for supervision renders the patient not independent. 4. A patient's performance should be established using the best available evidence. Asking the patient, friends/relatives and nurses are the usual sources, but direct observation and common sense are also important. However direct testing is not needed. 5. Usually the patient's performance over the preceding 24-48 hours is important, but occasionally longer periods will be relevant. 6. Middle categories imply that the patient supplies over 50 per cent of the effort. 7. Use of aids to be independent is allowed. Viral Sevilla., Barthel, D.W. ([id number]).  Functional evaluation: the Barthel Index. [address] [facility] (14)2. Conchis Power shannan FRANK Alex, Morenita Rose., Dodie Hawkins, [place], [address] (1999). Measuring the change indisability after inpatient rehabilitation; comparison of the responsiveness of the Barthel Index and Functional [place]wick Measure. Journal of Neurology, Neurosurgery, and Psychiatry, 66(4), 874-443. ADELAIDE Alanis, JOANIE Wolfe, & Maty Justin M.A. (2004.) Assessment of post-stroke quality of life in cost-effectiveness studies: The usefulness of the Barthel Index and the EuroQoL-5D. Quality of Life Research, 15, 772-            Physical Therapy Evaluation Charge Determination   History Examination Presentation Decision-Making   MEDIUM  Complexity : 1-2 comorbidities / personal factors will impact the outcome/ POC  LOW Complexity : 1-2 Standardized tests and measures addressing body structure, function, activity limitation and / or participation in recreation  MEDIUM Complexity : Evolving with changing characteristics  LOW Complexity : FOTO score of       Based on the above components, the patient evaluation is determined to be of the following complexity level: LOW     Pain:  Patient grimaced when therapy assisted to jose hosp socks. She reports pain in blanca LE's  - did not quantify. Activity Tolerance:   Good  Please refer to the flowsheet for vital signs taken during this treatment. After treatment patient left:   Up in chair  Call light within reach  RN notified that patient is sitting up in the chair with no chair alarm in place. COMMUNICATION/EDUCATION:   The patients plan of care was discussed with: Registered Nurse. Fall prevention education was provided and the patient/caregiver indicated understanding., Patient/family have participated as able in goal setting and plan of care. and Patient/family agree to work toward stated goals and plan of care.     Thank you for this referral.  Mirlande Belle, PT   Time Calculation: 33 mins

## 2019-05-30 NOTE — PROGRESS NOTES
Bedside shift change report given to Manisha Mcmillan (oncoming nurse) by Swati Puri RN (offgoing nurse). Report included the following information SBAR, Kardex and MAR.

## 2019-05-30 NOTE — PROGRESS NOTES
Physical Therapy  Order received, chart reviewed, met with patient. Patient agreeable to participate in therapy. Waiting for nursing for clearance. 0516 - [facility] cleared for activity. Eval complete. Note to follow.

## 2019-05-31 LAB
ANION GAP SERPL CALC-SCNC: 6 MMOL/L (ref 5–15)
ATRIAL RATE: 67 BPM
ATRIAL RATE: 74 BPM
BUN SERPL-MCNC: 17 MG/DL (ref 6–20)
BUN/CREAT SERPL: 24 (ref 12–20)
CALCIUM SERPL-MCNC: 9.1 MG/DL (ref 8.5–10.1)
CALCULATED R AXIS, ECG10: 100 DEGREES
CALCULATED R AXIS, ECG10: 93 DEGREES
CALCULATED T AXIS, ECG11: -73 DEGREES
CALCULATED T AXIS, ECG11: -80 DEGREES
CHLORIDE SERPL-SCNC: 99 MMOL/L (ref 97–108)
CO2 SERPL-SCNC: 31 MMOL/L (ref 21–32)
CREAT SERPL-MCNC: 0.72 MG/DL (ref 0.55–1.02)
DIAGNOSIS, 93000: NORMAL
DIAGNOSIS, 93000: NORMAL
GLUCOSE SERPL-MCNC: 87 MG/DL (ref 65–100)
INR PPP: 2.4 (ref 0.9–1.1)
POTASSIUM SERPL-SCNC: 3.8 MMOL/L (ref 3.5–5.1)
PROTHROMBIN TIME: 23.5 SEC (ref 9–11.1)
Q-T INTERVAL, ECG07: 384 MS
Q-T INTERVAL, ECG07: 396 MS
QRS DURATION, ECG06: 116 MS
QRS DURATION, ECG06: 118 MS
QTC CALCULATION (BEZET), ECG08: 454 MS
QTC CALCULATION (BEZET), ECG08: 462 MS
SODIUM SERPL-SCNC: 136 MMOL/L (ref 136–145)
VENTRICULAR RATE, ECG03: 79 BPM
VENTRICULAR RATE, ECG03: 87 BPM

## 2019-05-31 PROCEDURE — 74011000258 HC RX REV CODE- 258: Performed by: INTERNAL MEDICINE

## 2019-05-31 PROCEDURE — 36415 COLL VENOUS BLD VENIPUNCTURE: CPT

## 2019-05-31 PROCEDURE — 74011250637 HC RX REV CODE- 250/637: Performed by: INTERNAL MEDICINE

## 2019-05-31 PROCEDURE — 93005 ELECTROCARDIOGRAM TRACING: CPT

## 2019-05-31 PROCEDURE — 85610 PROTHROMBIN TIME: CPT

## 2019-05-31 PROCEDURE — 80048 BASIC METABOLIC PNL TOTAL CA: CPT

## 2019-05-31 PROCEDURE — 74011250636 HC RX REV CODE- 250/636: Performed by: INTERNAL MEDICINE

## 2019-05-31 PROCEDURE — 74011250637 HC RX REV CODE- 250/637: Performed by: FAMILY MEDICINE

## 2019-05-31 PROCEDURE — 65660000000 HC RM CCU STEPDOWN

## 2019-05-31 RX ORDER — WARFARIN 1 MG/1
2 TABLET ORAL ONCE
Status: COMPLETED | OUTPATIENT
Start: 2019-05-31 | End: 2019-05-31

## 2019-05-31 RX ADMIN — HYDROCODONE BITARTRATE AND ACETAMINOPHEN 2 TABLET: 5; 325 TABLET ORAL at 01:58

## 2019-05-31 RX ADMIN — CEFTRIAXONE 1 G: 1 INJECTION, POWDER, FOR SOLUTION INTRAMUSCULAR; INTRAVENOUS at 17:32

## 2019-05-31 RX ADMIN — POTASSIUM CHLORIDE 10 MEQ: 750 TABLET, FILM COATED, EXTENDED RELEASE ORAL at 09:17

## 2019-05-31 RX ADMIN — AZITHROMYCIN 500 MG: 250 TABLET, FILM COATED ORAL at 15:15

## 2019-05-31 RX ADMIN — Medication 10 ML: at 21:45

## 2019-05-31 RX ADMIN — ATENOLOL 12.5 MG: 25 TABLET ORAL at 09:17

## 2019-05-31 RX ADMIN — HYDROCODONE BITARTRATE AND ACETAMINOPHEN 2 TABLET: 5; 325 TABLET ORAL at 19:43

## 2019-05-31 RX ADMIN — HYDROCODONE BITARTRATE AND ACETAMINOPHEN 2 TABLET: 5; 325 TABLET ORAL at 09:33

## 2019-05-31 RX ADMIN — SENNOSIDES,DOCUSATE SODIUM 1 TABLET: 8.6; 5 TABLET, FILM COATED ORAL at 17:33

## 2019-05-31 RX ADMIN — SENNOSIDES,DOCUSATE SODIUM 1 TABLET: 8.6; 5 TABLET, FILM COATED ORAL at 09:19

## 2019-05-31 RX ADMIN — WARFARIN SODIUM 2 MG: 1 TABLET ORAL at 17:33

## 2019-05-31 RX ADMIN — ALPRAZOLAM 0.5 MG: 0.5 TABLET ORAL at 15:15

## 2019-05-31 RX ADMIN — Medication 10 ML: at 06:13

## 2019-05-31 RX ADMIN — LEVOTHYROXINE SODIUM 100 MCG: 50 TABLET ORAL at 06:13

## 2019-05-31 RX ADMIN — HYDROCODONE BITARTRATE AND ACETAMINOPHEN 2 TABLET: 5; 325 TABLET ORAL at 15:15

## 2019-05-31 RX ADMIN — FUROSEMIDE 40 MG: 10 INJECTION, SOLUTION INTRAMUSCULAR; INTRAVENOUS at 09:19

## 2019-05-31 RX ADMIN — ALPRAZOLAM 0.5 MG: 0.5 TABLET ORAL at 21:45

## 2019-05-31 RX ADMIN — COLLAGENASE SANTYL: 250 OINTMENT TOPICAL at 09:20

## 2019-05-31 RX ADMIN — SPIRONOLACTONE 25 MG: 25 TABLET ORAL at 09:17

## 2019-05-31 RX ADMIN — ALPRAZOLAM 0.5 MG: 0.5 TABLET ORAL at 09:19

## 2019-05-31 NOTE — NURSE NAVIGATOR
Follow up scheduled with [facility] with Dr. Navneet Hightower for 6/7/19 at 9:45 am.  Information on After Visit Summary.

## 2019-05-31 NOTE — PROGRESS NOTES
Pharmacist Note - Warfarin Dosing  Consult provided for this 80 y. o.female to manage warfarin for Atrial Fibrillation    INR Goal: 2 - 3    Home regimen: Warfarin 2.5 mg every evening    Drugs that may increase INR: Macrolides  Drugs that may decrease INR: None  Other current anticoagulants/ drugs that may increase bleeding risk: None  Risk factors: Age > 65  Daily INR ordered: YES    Recent Labs     05/31/19  0214 05/30/19  0241 05/29/19  0338   INR 2.4* 2.4* 2.4*     Date               INR                  Dose  5/27  1.9  2.5 mg   5/28                 2.1                  2.5 mg  5/29                 2.4                  2 mg  5/30  2.4  2 mg   5/31                2.4                   2mg                                                                           Assessment/ Plan: Will order warfarin 2  mg PO x 1 dose. Pharmacy will continue to monitor daily and adjust therapy as indicated. Please contact the pharmacist at   for outpatient recommendations if needed.

## 2019-05-31 NOTE — PROGRESS NOTES
Problem: Pain  Goal: *Control of Pain  Outcome: Progressing Towards Goal  Note:   Pt continues to require PRN pain medication for comfort. Problem: Falls - Risk of  Goal: *Absence of Falls  Description  Document Felicitas Loving Fall Risk and appropriate interventions in the flowsheet. Outcome: Progressing Towards Goal  Note:   Fall Risk Interventions:  Mobility Interventions: PT Consult for mobility concerns, Communicate number of staff needed for ambulation/transfer    Mentation Interventions: Adequate sleep, hydration, pain control    Medication Interventions: Evaluate medications/consider consulting pharmacy    Elimination Interventions: Toileting schedule/hourly rounds       Problem: Pressure Injury - Risk of  Goal: *Prevention of pressure injury  Description  Document Tre Scale and appropriate interventions in the flowsheet.   Outcome: Progressing Towards Goal  Note:   Pressure Injury Interventions:  Sensory Interventions: Keep linens dry and wrinkle-free, Discuss PT/OT consult with provider    Moisture Interventions: Internal/External urinary devices    Activity Interventions: Increase time out of bed    Mobility Interventions: HOB 30 degrees or less, Float heels    Nutrition Interventions: Document food/fluid/supplement intake    Friction and Shear Interventions: HOB 30 degrees or less

## 2019-05-31 NOTE — PROGRESS NOTES
TRANSFER - IN REPORT:    Verbal report received from [facility], RN(name) on [facility]  being received from [facility](unit) for       Report consisted of patients Situation, Background, Assessment and   Recommendations(SBAR). Information from the following report(s) SBAR, Kardex, [facility] and Recent Results was reviewed with the receiving nurse. Opportunity for questions and clarification was provided. Assessment completed upon patients arrival to unit and care assumed.

## 2019-05-31 NOTE — PROGRESS NOTES
Benito Gomes, Kennedi Curtis, and Comfort Gilliam Date: 5/27/2019      Subjective:     Patient feeling OK this AM. No new issues. .       Current Facility-Administered Medications   Medication Dose Route Frequency    atenolol (TENORMIN) tablet 12.5 mg  12.5 mg Oral DAILY    levothyroxine (SYNTHROID) tablet 100 mcg  100 mcg Oral 6am    saline peripheral flush soln 10 mL  10 mL InterCATHeter Q8H    famotidine (PEPCID) tablet 20 mg  20 mg Oral DAILY PRN    azithromycin (ZITHROMAX) tablet 500 mg  500 mg Oral QPM    collagenase (SANTYL) 250 unit/gram ointment   Topical DAILY    spironolactone (ALDACTONE) tablet 25 mg  25 mg Oral DAILY    ALPRAZolam (XANAX) tablet 0.5 mg  0.5 mg Oral TID    HYDROcodone-acetaminophen (NORCO) 5-325 mg per tablet 1-2 Tab  1-2 Tab Oral Q4H PRN    polyethylene glycol (MIRALAX) packet 17 g  17 g Oral DAILY PRN    potassium chloride SR (KLOR-CON 10) tablet 10 mEq  10 mEq Oral DAILY    senna-docusate (PERICOLACE) 8.6-50 mg per tablet 1 Tab  1 Tab Oral BID    ondansetron (ZOFRAN) injection 4 mg  4 mg IntraVENous Q6H PRN    acetaminophen (TYLENOL) tablet 650 mg  650 mg Oral Q4H PRN    cefTRIAXone (ROCEPHIN) 1 g in 0.9% sodium chloride (MBP/ADV) 50 mL  1 g IntraVENous Q24H    furosemide (LASIX) injection 40 mg  40 mg IntraVENous DAILY    Warfarin- pharmacy to dose   Other Rx Dosing/Monitoring          Objective:     Patient Vitals for the past 8 hrs:   BP Temp Pulse Resp SpO2 Weight   05/31/19 0518      146 lb 13.2 oz (66.6 kg)   05/31/19 0217 103/61 98.2 °F (36.8 °C) 85 16 91 %      No intake/output data recorded. 05/29 1901 - 05/31 0700  In: 240 [P.O.:240]  Out: 1800 [Urine:1800]    Physical Exam: Lungs: clear to auscultation bilaterally  Heart: regular rate and rhythm, S1, S2 normal, no murmur, click, rub or gallop  Abdomen: soft, non-tender.  Bowel sounds normal. No masses,  no organomegaly        Data Review   Recent Results (from the past 24 hour(s)) PROTHROMBIN TIME + INR    Collection Time: 05/31/19  2:14 AM   Result Value Ref Range    INR 2.4 (H) 0.9 - 1.1      Prothrombin time 23.5 (H) 9.0 - 11.1 sec           Assessment:     Principal Problem:    Acute CHF (congestive heart failure) ([facility] [address] (5/27/2019)    Active Problems:    A-fib (HCC) ()      CAD (coronary artery disease) ()      PNA (pneumonia) (5/27/2019)        Plan:     1) Continue diuresis  2) Likely to need to be in over weekend.    3) Seems to have hired home help during waking hours of the day  Am OK with her returning to this situation on discharge

## 2019-05-31 NOTE — PROGRESS NOTES
Bedside shift change report given to Manisha Mcmillan (oncoming nurse) by Reinaldo Rizzo RN (offgoing nurse). Report included the following information SBAR, Kardex and MAR.

## 2019-05-31 NOTE — PROGRESS NOTES
Chart reviewed, patient does NOT want to go to SNF and Dr Reyna Valencia concurs with this. Patient does have CG assistance during the day 7 days/week and is current with [facility] for wound care-they have accepted patient back and will resume HHRN and add PT. Anticipate patient will dc early next week.     Macarena Geller, FLETCHERN, RN, CCM

## 2019-05-31 NOTE — PROGRESS NOTES
Patient declined PT session today. Recommend OOB to recliner with LEs elevated over the weekend. Will follow back next week.     Mammie Less, PT

## 2019-05-31 NOTE — PROGRESS NOTES
Cardiology Progress Note  2019     Admit Date: 2019  Admit Diagnosis: Acute CHF (congestive heart failure) ([facility]) [I50.9]  CC: none currently    Assessment:   Principal Problem:    Acute CHF (congestive heart failure) ([address] (2019)    Active Problems:    A-fib (HCC) ()      CAD (coronary artery disease) ()      PNA (pneumonia) (2019)      Plan:     Cont current diuretic  Cont current cardiac meds  Daily BMP    Subjective:      Clari Park feels better. JURGEN      Objective:    Physical Exam:  Overall VSSAF;    Visit Vitals  BP 96/58 (BP 1 Location: Right arm, BP Patient Position: At rest)   Pulse 71   Temp 97.7 °F (36.5 °C)   Resp 16   Ht 5' 3\" (1.6 m)   Wt 66.6 kg (146 lb 13.2 oz)   SpO2 91%   BMI 26.01 kg/m²     Temp (24hrs), Av.5 °F (36.4 °C), Min:96.2 °F (35.7 °C), Max:98.2 °F (36.8 °C)    Patient Vitals for the past 8 hrs:   Pulse   19 1437 71   19 0837 87    Patient Vitals for the past 8 hrs:   Resp   19 1437 16   19 0837 16    Patient Vitals for the past 8 hrs:   BP   19 1437 96/58   19 0837 138/75       1901 -  0700  In: 240 [P.O.:240]  Out: 1800 [Urine:1800]      General Appearance: Well developed, well nourished, no acute distress. Ears/Nose/Mouth/Throat:   Normal MM; anicteric. JVP: WNL   Resp:   Lungs clear to auscultation bilaterally. Nl resp effort. Cardiovascular:  RRR, S1, S2 normal, no new murmur. No gallop or rub. Abdomen:   Soft, non-tender, bowel sounds are present. Extremities: +++ edema bilaterally. Skin:  Neuro: Warm and dry.   A/O x3, grossly nonfocal                         Data Review:     Telemetry independently reviewed :   AFIB         Labs:   Recent Results (from the past 24 hour(s))   EKG, 12 LEAD, INITIAL    Collection Time: 19 12:04 AM   Result Value Ref Range    Ventricular Rate 79 BPM    Atrial Rate 74 BPM    QRS Duration 116 ms    Q-T Interval 396 ms    QTC Calculation (Bezet) 454 ms Calculated R Axis 93 degrees    Calculated T Axis -80 degrees    Diagnosis       Atrial fibrillation  Right bundle branch block  Septal infarct (cited on or before 01-FEB-2017)  T wave abnormality, consider inferior ischemia or digitalis effect  When compared with ECG of 27-MAY-2019 12:51,  Atrial fibrillation has replaced Sinus rhythm  Questionable change in initial forces of Septal leads  Inverted T waves have replaced nonspecific T wave abnormality in Anterior   leads  Confirmed by Jenny Bustos MD ([id number]) on 5/31/2019 2:42:47 PM     PROTHROMBIN TIME + INR    Collection Time: 05/31/19  2:14 AM   Result Value Ref Range    INR 2.4 (H) 0.9 - 1.1      Prothrombin time 23.5 (H) 9.0 - 58.4 sec   METABOLIC PANEL, BASIC    Collection Time: 05/31/19  2:14 AM   Result Value Ref Range    Sodium 136 136 - 145 mmol/L    Potassium 3.8 3.5 - 5.1 mmol/L    Chloride 99 97 - 108 mmol/L    CO2 31 21 - 32 mmol/L    Anion gap 6 5 - 15 mmol/L    Glucose 87 65 - 100 mg/dL    BUN 17 6 - 20 MG/DL    Creatinine 0.72 0.55 - 1.02 MG/DL    BUN/Creatinine ratio 24 (H) 12 - 20      GFR est AA >60 >60 ml/min/1.73m2    GFR est non-AA >60 >60 ml/min/1.73m2    Calcium 9.1 8.5 - 10.1 MG/DL   EKG, 12 LEAD, INITIAL    Collection Time: 05/31/19  8:38 AM   Result Value Ref Range    Ventricular Rate 87 BPM    Atrial Rate 67 BPM    QRS Duration 118 ms    Q-T Interval 384 ms    QTC Calculation (Bezet) 462 ms    Calculated R Axis 100 degrees    Calculated T Axis -73 degrees    Diagnosis       Atrial fibrillation  Right bundle branch block  Confirmed by Jenny Bustos MD ([id number]) on 5/31/2019 11:01:20 AM        Current medications reviewed       Frederic Araya MD

## 2019-06-01 LAB
ANION GAP SERPL CALC-SCNC: 6 MMOL/L (ref 5–15)
BUN SERPL-MCNC: 16 MG/DL (ref 6–20)
BUN/CREAT SERPL: 24 (ref 12–20)
CALCIUM SERPL-MCNC: 9.4 MG/DL (ref 8.5–10.1)
CHLORIDE SERPL-SCNC: 96 MMOL/L (ref 97–108)
CO2 SERPL-SCNC: 32 MMOL/L (ref 21–32)
CREAT SERPL-MCNC: 0.68 MG/DL (ref 0.55–1.02)
ERYTHROCYTE [DISTWIDTH] IN BLOOD BY AUTOMATED COUNT: 15.2 % (ref 11.5–14.5)
GLUCOSE SERPL-MCNC: 90 MG/DL (ref 65–100)
HCT VFR BLD AUTO: 37.9 % (ref 35–47)
HGB BLD-MCNC: 11.6 G/DL (ref 11.5–16)
INR PPP: 2.1 (ref 0.9–1.1)
MCH RBC QN AUTO: 29.1 PG (ref 26–34)
MCHC RBC AUTO-ENTMCNC: 30.6 G/DL (ref 30–36.5)
MCV RBC AUTO: 95.2 FL (ref 80–99)
NRBC # BLD: 0 K/UL (ref 0–0.01)
NRBC BLD-RTO: 0 PER 100 WBC
PLATELET # BLD AUTO: 221 K/UL (ref 150–400)
PMV BLD AUTO: 11.3 FL (ref 8.9–12.9)
POTASSIUM SERPL-SCNC: 3.9 MMOL/L (ref 3.5–5.1)
PROTHROMBIN TIME: 20.9 SEC (ref 9–11.1)
RBC # BLD AUTO: 3.98 M/UL (ref 3.8–5.2)
SODIUM SERPL-SCNC: 134 MMOL/L (ref 136–145)
WBC # BLD AUTO: 6.1 K/UL (ref 3.6–11)

## 2019-06-01 PROCEDURE — 65660000000 HC RM CCU STEPDOWN

## 2019-06-01 PROCEDURE — 77030038269 HC DRN EXT URIN PURWCK BARD -A

## 2019-06-01 PROCEDURE — 74011250636 HC RX REV CODE- 250/636: Performed by: INTERNAL MEDICINE

## 2019-06-01 PROCEDURE — 74011250637 HC RX REV CODE- 250/637: Performed by: INTERNAL MEDICINE

## 2019-06-01 PROCEDURE — 85027 COMPLETE CBC AUTOMATED: CPT

## 2019-06-01 PROCEDURE — 36415 COLL VENOUS BLD VENIPUNCTURE: CPT

## 2019-06-01 PROCEDURE — 74011000258 HC RX REV CODE- 258: Performed by: INTERNAL MEDICINE

## 2019-06-01 PROCEDURE — 74011250637 HC RX REV CODE- 250/637: Performed by: FAMILY MEDICINE

## 2019-06-01 PROCEDURE — 80048 BASIC METABOLIC PNL TOTAL CA: CPT

## 2019-06-01 PROCEDURE — 85610 PROTHROMBIN TIME: CPT

## 2019-06-01 RX ORDER — WARFARIN 2.5 MG/1
2.5 TABLET ORAL ONCE
Status: COMPLETED | OUTPATIENT
Start: 2019-06-01 | End: 2019-06-01

## 2019-06-01 RX ADMIN — POTASSIUM CHLORIDE 10 MEQ: 750 TABLET, FILM COATED, EXTENDED RELEASE ORAL at 09:40

## 2019-06-01 RX ADMIN — Medication 10 ML: at 05:29

## 2019-06-01 RX ADMIN — Medication 10 ML: at 21:02

## 2019-06-01 RX ADMIN — ALPRAZOLAM 0.5 MG: 0.5 TABLET ORAL at 18:11

## 2019-06-01 RX ADMIN — ATENOLOL 12.5 MG: 25 TABLET ORAL at 09:40

## 2019-06-01 RX ADMIN — LEVOTHYROXINE SODIUM 100 MCG: 50 TABLET ORAL at 05:28

## 2019-06-01 RX ADMIN — COLLAGENASE SANTYL: 250 OINTMENT TOPICAL at 09:41

## 2019-06-01 RX ADMIN — FUROSEMIDE 40 MG: 10 INJECTION, SOLUTION INTRAMUSCULAR; INTRAVENOUS at 09:40

## 2019-06-01 RX ADMIN — CEFTRIAXONE 1 G: 1 INJECTION, POWDER, FOR SOLUTION INTRAMUSCULAR; INTRAVENOUS at 18:11

## 2019-06-01 RX ADMIN — ALPRAZOLAM 0.5 MG: 0.5 TABLET ORAL at 09:40

## 2019-06-01 RX ADMIN — WARFARIN SODIUM 2.5 MG: 2.5 TABLET ORAL at 18:10

## 2019-06-01 RX ADMIN — SPIRONOLACTONE 25 MG: 25 TABLET ORAL at 09:40

## 2019-06-01 NOTE — PROGRESS NOTES
Medical Progress Note      NAME: Clari Park   :  1931  MRM:  [id number]    Date/Time: 2019           Problem List:     Principal Problem:    Acute CHF (congestive heart failure) ([facility] [address] (2019)    Active Problems:    A-fib (HCC) ()      CAD (coronary artery disease) ()      PNA (pneumonia) (2019)             Subjective:     Breathing ok . Occasional wheeze. Denies cough problem   Non acute low back and legs discomfort     Past Medical History:   Diagnosis Date    A-fib ([facility] [address]     Arrhythmia     A FIB    Arthritis     Back pain     CAD (coronary artery disease)     PT DENIES    Chronic pain     Dementia 2016    Hypercholesterolemia     Psychiatric disorder     ANXIETY    Shoulder pain     Thyroid disease     Tremor, essential             Objective:         Vitals:      Last 24hrs VS reviewed since prior progress note. Most recent are:    Visit Vitals  /66 (BP 1 Location: Left arm)   Pulse 64   Temp 98.2 °F (36.8 °C)   Resp 16   Ht 5' 3\" (1.6 m)   Wt 139 lb 15.9 oz (63.5 kg)   SpO2 92%   BMI 24.80 kg/m²     SpO2 Readings from Last 6 Encounters:   19 92%   19 95%   19 98%   18 97%   18 98%   18 97%            Intake/Output Summary (Last 24 hours) at 2019 0806  Last data filed at 2019 0618  Gross per 24 hour   Intake 240 ml   Output 1700 ml   Net -1460 ml                  Exam:      General:  Alert, cooperative, no distress, appears stated age. Lungs:   Left base : few crackles. O/w clear to auscultation bilaterally. Heart:  Regular rate and rhythm, S1, S2 normal, no murmur, click, rub or gallop. Abdomen:   Soft, non-tender. Bowel sounds normal. No masses,  No organomegaly. Extremities: Hips mild decr rom .  Rt knee mod decr flexion  Pretibial 1 + edema b/l   2+ DP pulses     Leg strength grossly intact          Lab Data Reviewed: (see below)  Recent Results (from the past 24 hour(s))   EKG, 12 LEAD, INITIAL    Collection Time: 05/31/19  8:38 AM   Result Value Ref Range    Ventricular Rate 87 BPM    Atrial Rate 67 BPM    QRS Duration 118 ms    Q-T Interval 384 ms    QTC Calculation (Bezet) 462 ms    Calculated R Axis 100 degrees    Calculated T Axis -73 degrees    Diagnosis       Atrial fibrillation  Right bundle branch block  Confirmed by Paula Farley MD ([id number]) on 5/31/2019 11:01:20 AM     PROTHROMBIN TIME + INR    Collection Time: 06/01/19  2:37 AM   Result Value Ref Range    INR 2.1 (H) 0.9 - 1.1      Prothrombin time 20.9 (H) 9.0 - 11.1 sec   CBC W/O DIFF    Collection Time: 06/01/19  2:37 AM   Result Value Ref Range    WBC 6.1 3.6 - 11.0 K/uL    RBC 3.98 3.80 - 5.20 M/uL    HGB 11.6 11.5 - 16.0 g/dL    HCT 37.9 35.0 - 47.0 %    MCV 95.2 80.0 - 99.0 FL    MCH 29.1 26.0 - 34.0 PG    MCHC 30.6 30.0 - 36.5 g/dL    RDW 15.2 (H) 11.5 - 14.5 %    PLATELET 835 332 - 935 K/uL    MPV 11.3 8.9 - 12.9 FL    NRBC 0.0 0  WBC    ABSOLUTE NRBC 0.00 0.00 - 1.71 K/uL   METABOLIC PANEL, BASIC    Collection Time: 06/01/19  2:38 AM   Result Value Ref Range    Sodium 134 (L) 136 - 145 mmol/L    Potassium 3.9 3.5 - 5.1 mmol/L    Chloride 96 (L) 97 - 108 mmol/L    CO2 32 21 - 32 mmol/L    Anion gap 6 5 - 15 mmol/L    Glucose 90 65 - 100 mg/dL    BUN 16 6 - 20 MG/DL    Creatinine 0.68 0.55 - 1.02 MG/DL    BUN/Creatinine ratio 24 (H) 12 - 20      GFR est AA >60 >60 ml/min/1.73m2    GFR est non-AA >60 >60 ml/min/1.73m2    Calcium 9.4 8.5 - 10.1 MG/DL       Medications Reviewed: (see below)    ______________________________________________________________________    Medications:     Current Facility-Administered Medications   Medication Dose Route Frequency    atenolol (TENORMIN) tablet 12.5 mg  12.5 mg Oral DAILY    levothyroxine (SYNTHROID) tablet 100 mcg  100 mcg Oral 6am    saline peripheral flush soln 10 mL  10 mL InterCATHeter Q8H    famotidine (PEPCID) tablet 20 mg  20 mg Oral DAILY PRN    collagenase (SANTYL) 250 unit/gram ointment Topical DAILY    spironolactone (ALDACTONE) tablet 25 mg  25 mg Oral DAILY    ALPRAZolam (XANAX) tablet 0.5 mg  0.5 mg Oral TID    HYDROcodone-acetaminophen (NORCO) 5-325 mg per tablet 1-2 Tab  1-2 Tab Oral Q4H PRN    polyethylene glycol (MIRALAX) packet 17 g  17 g Oral DAILY PRN    potassium chloride SR (KLOR-CON 10) tablet 10 mEq  10 mEq Oral DAILY    senna-docusate (PERICOLACE) 8.6-50 mg per tablet 1 Tab  1 Tab Oral BID    ondansetron (ZOFRAN) injection 4 mg  4 mg IntraVENous Q6H PRN    acetaminophen (TYLENOL) tablet 650 mg  650 mg Oral Q4H PRN    cefTRIAXone (ROCEPHIN) 1 g in 0.9% sodium chloride (MBP/ADV) 50 mL  1 g IntraVENous Q24H    furosemide (LASIX) injection 40 mg  40 mg IntraVENous DAILY    Warfarin- pharmacy to dose   Other Rx Dosing/Monitoring                   Assessment:     CHF improving. Continue Lasix   PNA responding to Rocephin   CAF. Warfarin dosing per pharmacy   Suspect mechanical low back and leg discomfort. P: PT , Analgesia prn     Patient Active Problem List   Diagnosis Code    [facility]fib ([facility] I48.91    Thyroid disease E07.9    Hypercholesterolemia E78.00    Arthritis M19.90    CAD (coronary artery disease) I25.10    Arthritis of knee, left M17.12    Sepsis ([facility] [address] A41.9    Altered mental status R41.82    Dementia F03.90    Closed compression fracture of lumbar vertebra ([facility] [address] S32.000A    UTI (urinary tract infection) N39.0    Fall W19. XXXA    Fracture, intertrochanteric, right femur, closed, initial encounter ([facility] [address] S72.141A    Hip hematoma, right, initial encounter S70.01XA    Acquired hypothyroidism E03.9    Closed fracture of neck of right femur ([address] S72.001A    Dehydration E86.0    Wound, open, hip or thigh, left, initial encounter S71.002A, S71.102A    Non-healing wound of right heel S91.301A    Acute CHF (congestive heart failure) (HCC) I50.9    PNA (pneumonia) J18.9          Plan:     As above ___________________________________________________      Attending Physician: Amy Mendoza MD

## 2019-06-01 NOTE — PROGRESS NOTES
Cardiology Progress Note  2019     Admit Date: 2019  Admit Diagnosis: Acute CHF (congestive heart failure) (HCC) [I50.9]  CC: none currently    Assessment:   Principal Problem:    Acute CHF (congestive heart failure) ([address] (2019)    Active Problems:    A-fib (HCC) ()      CAD (coronary artery disease) ()      PNA (pneumonia) (2019)    Acute on chornic diastolic HF    Plan:   - cont warfarin for af  - cont IV lasix. (I/o -1.5L/24 hrs). Subjective:      Clari Ornelasnce has mild sob. No other complaints. Objective:    Physical Exam:  Overall VSSAF;    Visit Vitals  /66 (BP 1 Location: Left arm)   Pulse 64   Temp 98.2 °F (36.8 °C)   Resp 16   Ht 5' 3\" (1.6 m)   Wt 63.5 kg (139 lb 15.9 oz)   SpO2 92%   BMI 24.80 kg/m²     Temp (24hrs), Av.5 °F (36.4 °C), Min:96.2 °F (35.7 °C), Max:98.2 °F (36.8 °C)    Patient Vitals for the past 8 hrs:   Pulse   19 0200 64    Patient Vitals for the past 8 hrs:   Resp   19 0200 16    Patient Vitals for the past 8 hrs:   BP   19 0200 124/66      05 1901 -  0700  In: 240 [P.O.:240]  Out: 1700 [Urine:1700]      General Appearance: Well developed, well nourished, no acute distress. Ears/Nose/Mouth/Throat:   Normal MM; anicteric. JVP: WNL   Resp:   Lungs clear to auscultation bilaterally. Nl resp effort. Cardiovascular:  IRR, S1, S2 normal, no new murmur. No gallop or rub. Abdomen:   Soft, non-tender, bowel sounds are present. Extremities: 2+ edema bilaterally. Skin:  Neuro: Warm and dry.   A/O x3, grossly nonfocal                         Data Review:     Telemetry independently reviewed :   AFIB         Labs:   Recent Results (from the past 24 hour(s))   EKG, 12 LEAD, INITIAL    Collection Time: 19  8:38 AM   Result Value Ref Range    Ventricular Rate 87 BPM    Atrial Rate 67 BPM    QRS Duration 118 ms    Q-T Interval 384 ms    QTC Calculation (Bezet) 462 ms    Calculated R Axis 100 degrees    Calculated T Axis -73 degrees    Diagnosis       Atrial fibrillation  Right bundle branch block  Confirmed by She Adam MD ([id number]) on 5/31/2019 11:01:20 AM     PROTHROMBIN TIME + INR    Collection Time: 06/01/19  2:37 AM   Result Value Ref Range    INR 2.1 (H) 0.9 - 1.1      Prothrombin time 20.9 (H) 9.0 - 11.1 sec   CBC W/O DIFF    Collection Time: 06/01/19  2:37 AM   Result Value Ref Range    WBC 6.1 3.6 - 11.0 K/uL    RBC 3.98 3.80 - 5.20 M/uL    HGB 11.6 11.5 - 16.0 g/dL    HCT 37.9 35.0 - 47.0 %    MCV 95.2 80.0 - 99.0 FL    MCH 29.1 26.0 - 34.0 PG    MCHC 30.6 30.0 - 36.5 g/dL    RDW 15.2 (H) 11.5 - 14.5 %    PLATELET 319 922 - 785 K/uL    MPV 11.3 8.9 - 12.9 FL    NRBC 0.0 0  WBC    ABSOLUTE NRBC 0.00 0.00 - 8.85 K/uL   METABOLIC PANEL, BASIC    Collection Time: 06/01/19  2:38 AM   Result Value Ref Range    Sodium 134 (L) 136 - 145 mmol/L    Potassium 3.9 3.5 - 5.1 mmol/L    Chloride 96 (L) 97 - 108 mmol/L    CO2 32 21 - 32 mmol/L    Anion gap 6 5 - 15 mmol/L    Glucose 90 65 - 100 mg/dL    BUN 16 6 - 20 MG/DL    Creatinine 0.68 0.55 - 1.02 MG/DL    BUN/Creatinine ratio 24 (H) 12 - 20      GFR est AA >60 >60 ml/min/1.73m2    GFR est non-AA >60 >60 ml/min/1.73m2    Calcium 9.4 8.5 - 10.1 MG/DL      Current medications reviewed       Damion Luo MD

## 2019-06-01 NOTE — PROGRESS NOTES
Pharmacist Note - Warfarin Dosing  Consult provided for this 80 y. o.female to manage warfarin for Atrial Fibrillation    INR Goal: 2 - 3    Home regimen: Warfarin 2.5 mg every evening    Drugs that may increase INR: None  Drugs that may decrease INR: None  Other current anticoagulants/ drugs that may increase bleeding risk: None  Risk factors: Age > 65  Daily INR ordered: YES    Recent Labs     06/01/19  0237 05/31/19  0214 05/30/19  0241   HGB 11.6  --   --    INR 2.1* 2.4* 2.4*     Date               INR                  Dose  5/27  1.9  2.5 mg   5/28                 2.1                  2.5 mg  5/29                 2.4                  2 mg  5/30  2.4  2 mg  5/31                 2.4                  2 mg  6/1                   2.1                  2.5 mg                                                                           Assessment/ Plan: Will resume home dosing this evening and order warfarin 2.5 mg PO x 1 dose. Pharmacy will continue to monitor daily and adjust therapy as indicated. Please contact the pharmacist at   for outpatient recommendations if needed.      Nyasia Duncan

## 2019-06-01 NOTE — PROGRESS NOTES
Bedside shift change report given to Cachorro (oncoming nurse) by Isma Degroto RN (offgoing nurse). Report included the following information SBAR, Kardex, MAR and Recent Results.

## 2019-06-01 NOTE — PROGRESS NOTES
Physical Therapy  6/1/2019    Order received for \"see on Saturday June 1-- assess gait. \" Per PT's evaluation from 5/30 patient has been non-ambulatory since hip surgery in 2018, therefore gait assessment not indicated or appropriate. Note per chart, d/c not anticipated until early next week and patient without change in medical status prompting reassessment or weekend treatment. F/u Monday. Thank you.     Vera Gutierrez, PT, DPT

## 2019-06-02 LAB
ANION GAP SERPL CALC-SCNC: 5 MMOL/L (ref 5–15)
BUN SERPL-MCNC: 14 MG/DL (ref 6–20)
BUN/CREAT SERPL: 23 (ref 12–20)
CALCIUM SERPL-MCNC: 9.6 MG/DL (ref 8.5–10.1)
CHLORIDE SERPL-SCNC: 96 MMOL/L (ref 97–108)
CO2 SERPL-SCNC: 32 MMOL/L (ref 21–32)
CREAT SERPL-MCNC: 0.61 MG/DL (ref 0.55–1.02)
GLUCOSE SERPL-MCNC: 93 MG/DL (ref 65–100)
INR PPP: 1.8 (ref 0.9–1.1)
POTASSIUM SERPL-SCNC: 3.5 MMOL/L (ref 3.5–5.1)
PROTHROMBIN TIME: 17.7 SEC (ref 9–11.1)
SODIUM SERPL-SCNC: 133 MMOL/L (ref 136–145)

## 2019-06-02 PROCEDURE — 85610 PROTHROMBIN TIME: CPT

## 2019-06-02 PROCEDURE — 65660000000 HC RM CCU STEPDOWN

## 2019-06-02 PROCEDURE — 74011250637 HC RX REV CODE- 250/637: Performed by: FAMILY MEDICINE

## 2019-06-02 PROCEDURE — 36415 COLL VENOUS BLD VENIPUNCTURE: CPT

## 2019-06-02 PROCEDURE — 74011000258 HC RX REV CODE- 258: Performed by: INTERNAL MEDICINE

## 2019-06-02 PROCEDURE — 80048 BASIC METABOLIC PNL TOTAL CA: CPT

## 2019-06-02 PROCEDURE — 74011250637 HC RX REV CODE- 250/637: Performed by: INTERNAL MEDICINE

## 2019-06-02 PROCEDURE — 74011250636 HC RX REV CODE- 250/636: Performed by: INTERNAL MEDICINE

## 2019-06-02 RX ORDER — POTASSIUM CHLORIDE 750 MG/1
10 TABLET, FILM COATED, EXTENDED RELEASE ORAL
Status: COMPLETED | OUTPATIENT
Start: 2019-06-02 | End: 2019-06-02

## 2019-06-02 RX ORDER — WARFARIN 2.5 MG/1
2.5 TABLET ORAL ONCE
Status: COMPLETED | OUTPATIENT
Start: 2019-06-02 | End: 2019-06-02

## 2019-06-02 RX ADMIN — Medication 10 ML: at 18:03

## 2019-06-02 RX ADMIN — Medication 10 ML: at 06:00

## 2019-06-02 RX ADMIN — CEFTRIAXONE 1 G: 1 INJECTION, POWDER, FOR SOLUTION INTRAMUSCULAR; INTRAVENOUS at 18:03

## 2019-06-02 RX ADMIN — COLLAGENASE SANTYL: 250 OINTMENT TOPICAL at 09:10

## 2019-06-02 RX ADMIN — WARFARIN SODIUM 2.5 MG: 2.5 TABLET ORAL at 18:02

## 2019-06-02 RX ADMIN — SENNOSIDES,DOCUSATE SODIUM 1 TABLET: 8.6; 5 TABLET, FILM COATED ORAL at 09:08

## 2019-06-02 RX ADMIN — HYDROCODONE BITARTRATE AND ACETAMINOPHEN 2 TABLET: 5; 325 TABLET ORAL at 20:45

## 2019-06-02 RX ADMIN — SPIRONOLACTONE 25 MG: 25 TABLET ORAL at 09:08

## 2019-06-02 RX ADMIN — ATENOLOL 12.5 MG: 25 TABLET ORAL at 09:08

## 2019-06-02 RX ADMIN — HYDROCODONE BITARTRATE AND ACETAMINOPHEN 2 TABLET: 5; 325 TABLET ORAL at 02:48

## 2019-06-02 RX ADMIN — ALPRAZOLAM 0.5 MG: 0.5 TABLET ORAL at 09:08

## 2019-06-02 RX ADMIN — HYDROCODONE BITARTRATE AND ACETAMINOPHEN 2 TABLET: 5; 325 TABLET ORAL at 09:13

## 2019-06-02 RX ADMIN — SENNOSIDES,DOCUSATE SODIUM 1 TABLET: 8.6; 5 TABLET, FILM COATED ORAL at 18:02

## 2019-06-02 RX ADMIN — ALPRAZOLAM 0.5 MG: 0.5 TABLET ORAL at 20:45

## 2019-06-02 RX ADMIN — ALPRAZOLAM 0.5 MG: 0.5 TABLET ORAL at 18:02

## 2019-06-02 RX ADMIN — POTASSIUM CHLORIDE 10 MEQ: 750 TABLET, FILM COATED, EXTENDED RELEASE ORAL at 11:37

## 2019-06-02 RX ADMIN — POTASSIUM CHLORIDE 10 MEQ: 750 TABLET, FILM COATED, EXTENDED RELEASE ORAL at 09:09

## 2019-06-02 RX ADMIN — LEVOTHYROXINE SODIUM 100 MCG: 50 TABLET ORAL at 06:05

## 2019-06-02 RX ADMIN — FUROSEMIDE 40 MG: 10 INJECTION, SOLUTION INTRAMUSCULAR; INTRAVENOUS at 09:09

## 2019-06-02 RX ADMIN — Medication 10 ML: at 22:00

## 2019-06-02 NOTE — PROGRESS NOTES
Pharmacist Note - Warfarin Dosing  Consult provided for this 80 y. o.female to manage warfarin for Atrial Fibrillation    INR Goal: 2 - 3    Home regimen: Warfarin 2.5 mg every evening    Drugs that may increase INR: None  Drugs that may decrease INR: None  Other current anticoagulants/ drugs that may increase bleeding risk: None  Risk factors: Age > 65  Daily INR ordered: YES    Recent Labs     06/02/19  0252 06/01/19  0237 05/31/19  0214   HGB  --  11.6  --    INR 1.8* 2.1* 2.4*     Date               INR                  Dose  5/27  1.9  2.5 mg   5/28                 2.1                  2.5 mg  5/29                 2.4                  2 mg  5/30  2.4  2 mg  5/31                 2.4                  2 mg  6/1                   2.1                  2.5 mg  6/2                   1.8                  2.5 mg                                                                           Assessment/ Plan: Will continue home regimen and order warfarin 2.5 mg PO x 1 dose. Anticipate INR to remain low again tomorrow in the setting of lower dosing end of last week. Pharmacy will continue to monitor daily and adjust therapy as indicated. Please contact the pharmacist at   for outpatient recommendations if needed.      Nyasia Jeff

## 2019-06-02 NOTE — PROGRESS NOTES
Medical Progress Note      NAME: Justus Olszewski   :  1931  MRM:  [id number]    Date/Time: 2019           Problem List:     Principal Problem:    Acute CHF (congestive heart failure) ([address] (2019)    Active Problems:    A-fib (HCC) ()      CAD (coronary artery disease) ()      PNA (pneumonia) (2019)             Subjective:     Breathing ok . Occasional wheeze . denies cp or cough  Non acute LE discomfort at times     Past Medical History:   Diagnosis Date    A-fib [facility])     Arrhythmia     A FIB    Arthritis     Back pain     CAD (coronary artery disease)     PT DENIES    Chronic pain     Dementia 2016    Hypercholesterolemia     Psychiatric disorder     ANXIETY    Shoulder pain     Thyroid disease     Tremor, essential             Objective:         Vitals:      Last 24hrs VS reviewed since prior progress note. Most recent are:    Visit Vitals  /71 (BP 1 Location: Right arm, BP Patient Position: At rest)   Pulse 82   Temp 98.2 °F (36.8 °C)   Resp 16   Ht 5' 3\" (1.6 m)   Wt 139 lb 12.4 oz (63.4 kg)   SpO2 91%   BMI 24.76 kg/m²     SpO2 Readings from Last 6 Encounters:   19 91%   19 95%   19 98%   18 97%   18 98%   18 97%            Intake/Output Summary (Last 24 hours) at 2019 0906  Last data filed at 2019 0844  Gross per 24 hour   Intake    Output 4100 ml   Net -4100 ml                  Exam:      General:  Alert, cooperative, no distress, appears stated age. Lungs:   Clear to auscultation bilaterally. Heart:  Regular rate and rhythm, S1, S2 normal, no murmur, click, rub or gallop. Abdomen:   Soft, non-tender. Bowel sounds normal. No masses,  No organomegaly. Extremities:  decr rom knees R>L   Trace b/l LE edema .      Lab Data Reviewed: (see below)  Recent Results (from the past 24 hour(s))   PROTHROMBIN TIME + INR    Collection Time: 19  2:52 AM   Result Value Ref Range    INR 1.8 (H) 0.9 - 1.1      Prothrombin time 17.7 (H) 9.0 - 55.1 sec   METABOLIC PANEL, BASIC    Collection Time: 06/02/19  2:52 AM   Result Value Ref Range    Sodium 133 (L) 136 - 145 mmol/L    Potassium 3.5 3.5 - 5.1 mmol/L    Chloride 96 (L) 97 - 108 mmol/L    CO2 32 21 - 32 mmol/L    Anion gap 5 5 - 15 mmol/L    Glucose 93 65 - 100 mg/dL    BUN 14 6 - 20 MG/DL    Creatinine 0.61 0.55 - 1.02 MG/DL    BUN/Creatinine ratio 23 (H) 12 - 20      GFR est AA >60 >60 ml/min/1.73m2    GFR est non-AA >60 >60 ml/min/1.73m2    Calcium 9.6 8.5 - 10.1 MG/DL       Medications Reviewed: (see below)    ______________________________________________________________________    Medications:     Current Facility-Administered Medications   Medication Dose Route Frequency    atenolol (TENORMIN) tablet 12.5 mg  12.5 mg Oral DAILY    levothyroxine (SYNTHROID) tablet 100 mcg  100 mcg Oral 6am    saline peripheral flush soln 10 mL  10 mL InterCATHeter Q8H    famotidine (PEPCID) tablet 20 mg  20 mg Oral DAILY PRN    collagenase (SANTYL) 250 unit/gram ointment   Topical DAILY    spironolactone (ALDACTONE) tablet 25 mg  25 mg Oral DAILY    ALPRAZolam (XANAX) tablet 0.5 mg  0.5 mg Oral TID    HYDROcodone-acetaminophen (NORCO) 5-325 mg per tablet 1-2 Tab  1-2 Tab Oral Q4H PRN    polyethylene glycol (MIRALAX) packet 17 g  17 g Oral DAILY PRN    potassium chloride SR (KLOR-CON 10) tablet 10 mEq  10 mEq Oral DAILY    senna-docusate (PERICOLACE) 8.6-50 mg per tablet 1 Tab  1 Tab Oral BID    ondansetron (ZOFRAN) injection 4 mg  4 mg IntraVENous Q6H PRN    acetaminophen (TYLENOL) tablet 650 mg  650 mg Oral Q4H PRN    cefTRIAXone (ROCEPHIN) 1 g in 0.9% sodium chloride (MBP/ADV) 50 mL  1 g IntraVENous Q24H    furosemide (LASIX) injection 40 mg  40 mg IntraVENous DAILY    Warfarin- pharmacy to dose   Other Rx Dosing/Monitoring                   Assessment:   CHF improving. CAF. Warfarin per pharmacy   Possible PNA. Continue Rocephin   Mechanical LE discomfort.  P: she has Norco available prn   Patient Active Problem List   Diagnosis Code    A-fib ([facility] I48.91    Thyroid disease E07.9    Hypercholesterolemia E78.00    Arthritis M19.90    CAD (coronary artery disease) I25.10    Arthritis of knee, left M17.12    Sepsis ([facility] A41.9    Altered mental status R41.82    Dementia F03.90    Closed compression fracture of lumbar vertebra ([facility]) S32.000A    UTI (urinary tract infection) N39.0    Fall W19. XXXA    Fracture, intertrochanteric, right femur, closed, initial encounter ([facility] S72.141A    Hip hematoma, right, initial encounter S70.01XA    Acquired hypothyroidism E03.9    Closed fracture of neck of right femur ([facility] S72.001A    Dehydration E86.0    Wound, open, hip or thigh, left, initial encounter S71.002A, S71.102A    Non-healing wound of right heel S91.301A    Acute CHF (congestive heart failure) ([facility]) I50.9    PNA (pneumonia) J18.9          Plan:     Give additional KCl today for K+3.5 .  F/u labs in am                                                       ___________________________________________________      Attending Physician: Susy Damon MD

## 2019-06-03 LAB
ANION GAP SERPL CALC-SCNC: 4 MMOL/L (ref 5–15)
BUN SERPL-MCNC: 16 MG/DL (ref 6–20)
BUN/CREAT SERPL: 21 (ref 12–20)
CALCIUM SERPL-MCNC: 9.6 MG/DL (ref 8.5–10.1)
CHLORIDE SERPL-SCNC: 96 MMOL/L (ref 97–108)
CO2 SERPL-SCNC: 33 MMOL/L (ref 21–32)
CREAT SERPL-MCNC: 0.77 MG/DL (ref 0.55–1.02)
ERYTHROCYTE [DISTWIDTH] IN BLOOD BY AUTOMATED COUNT: 15.3 % (ref 11.5–14.5)
GLUCOSE SERPL-MCNC: 106 MG/DL (ref 65–100)
HCT VFR BLD AUTO: 39.4 % (ref 35–47)
HGB BLD-MCNC: 12 G/DL (ref 11.5–16)
INR PPP: 1.6 (ref 0.9–1.1)
MCH RBC QN AUTO: 29.7 PG (ref 26–34)
MCHC RBC AUTO-ENTMCNC: 30.5 G/DL (ref 30–36.5)
MCV RBC AUTO: 97.5 FL (ref 80–99)
NRBC # BLD: 0 K/UL (ref 0–0.01)
NRBC BLD-RTO: 0 PER 100 WBC
PLATELET # BLD AUTO: 247 K/UL (ref 150–400)
PMV BLD AUTO: 11 FL (ref 8.9–12.9)
POTASSIUM SERPL-SCNC: 3.6 MMOL/L (ref 3.5–5.1)
PROTHROMBIN TIME: 15.5 SEC (ref 9–11.1)
RBC # BLD AUTO: 4.04 M/UL (ref 3.8–5.2)
SODIUM SERPL-SCNC: 133 MMOL/L (ref 136–145)
WBC # BLD AUTO: 6.3 K/UL (ref 3.6–11)

## 2019-06-03 PROCEDURE — 85027 COMPLETE CBC AUTOMATED: CPT

## 2019-06-03 PROCEDURE — 74011250637 HC RX REV CODE- 250/637: Performed by: INTERNAL MEDICINE

## 2019-06-03 PROCEDURE — 80048 BASIC METABOLIC PNL TOTAL CA: CPT

## 2019-06-03 PROCEDURE — 97530 THERAPEUTIC ACTIVITIES: CPT

## 2019-06-03 PROCEDURE — 65660000000 HC RM CCU STEPDOWN

## 2019-06-03 PROCEDURE — 74011250637 HC RX REV CODE- 250/637: Performed by: FAMILY MEDICINE

## 2019-06-03 PROCEDURE — 85610 PROTHROMBIN TIME: CPT

## 2019-06-03 PROCEDURE — 36415 COLL VENOUS BLD VENIPUNCTURE: CPT

## 2019-06-03 RX ORDER — NYSTATIN 100000 [USP'U]/ML
500000 SUSPENSION ORAL 4 TIMES DAILY
Status: DISCONTINUED | OUTPATIENT
Start: 2019-06-03 | End: 2019-06-03

## 2019-06-03 RX ORDER — FUROSEMIDE 40 MG/1
40 TABLET ORAL DAILY
Status: DISCONTINUED | OUTPATIENT
Start: 2019-06-03 | End: 2019-06-05 | Stop reason: HOSPADM

## 2019-06-03 RX ORDER — POTASSIUM CHLORIDE 750 MG/1
20 TABLET, FILM COATED, EXTENDED RELEASE ORAL DAILY
Status: DISCONTINUED | OUTPATIENT
Start: 2019-06-03 | End: 2019-06-05 | Stop reason: HOSPADM

## 2019-06-03 RX ORDER — WARFARIN 4 MG/1
4 TABLET ORAL ONCE
Status: COMPLETED | OUTPATIENT
Start: 2019-06-03 | End: 2019-06-03

## 2019-06-03 RX ADMIN — POTASSIUM CHLORIDE 20 MEQ: 750 TABLET, FILM COATED, EXTENDED RELEASE ORAL at 09:40

## 2019-06-03 RX ADMIN — HYDROCODONE BITARTRATE AND ACETAMINOPHEN 1 TABLET: 5; 325 TABLET ORAL at 09:54

## 2019-06-03 RX ADMIN — Medication 10 ML: at 15:27

## 2019-06-03 RX ADMIN — ALPRAZOLAM 0.5 MG: 0.5 TABLET ORAL at 09:41

## 2019-06-03 RX ADMIN — ALPRAZOLAM 0.5 MG: 0.5 TABLET ORAL at 23:18

## 2019-06-03 RX ADMIN — FUROSEMIDE 40 MG: 40 TABLET ORAL at 09:41

## 2019-06-03 RX ADMIN — ATENOLOL 12.5 MG: 25 TABLET ORAL at 09:40

## 2019-06-03 RX ADMIN — WARFARIN SODIUM 4 MG: 4 TABLET ORAL at 17:05

## 2019-06-03 RX ADMIN — ALPRAZOLAM 0.5 MG: 0.5 TABLET ORAL at 17:05

## 2019-06-03 RX ADMIN — Medication 10 ML: at 23:18

## 2019-06-03 RX ADMIN — SPIRONOLACTONE 25 MG: 25 TABLET ORAL at 09:40

## 2019-06-03 RX ADMIN — Medication 10 ML: at 07:01

## 2019-06-03 RX ADMIN — COLLAGENASE SANTYL: 250 OINTMENT TOPICAL at 09:41

## 2019-06-03 RX ADMIN — LEVOTHYROXINE SODIUM 100 MCG: 50 TABLET ORAL at 07:01

## 2019-06-03 NOTE — PROGRESS NOTES
Benito Johnson, Demarcus Diaz, and Jd Keen Date: 5/27/2019      Subjective:     Patient feels OK. Now says she is interested in PT and returning for NH rehab after this hospital stay. .       Current Facility-Administered Medications   Medication Dose Route Frequency    potassium chloride SR (KLOR-CON 10) tablet 20 mEq  20 mEq Oral DAILY    atenolol (TENORMIN) tablet 12.5 mg  12.5 mg Oral DAILY    levothyroxine (SYNTHROID) tablet 100 mcg  100 mcg Oral 6am    saline peripheral flush soln 10 mL  10 mL InterCATHeter Q8H    famotidine (PEPCID) tablet 20 mg  20 mg Oral DAILY PRN    collagenase (SANTYL) 250 unit/gram ointment   Topical DAILY    spironolactone (ALDACTONE) tablet 25 mg  25 mg Oral DAILY    ALPRAZolam (XANAX) tablet 0.5 mg  0.5 mg Oral TID    HYDROcodone-acetaminophen (NORCO) 5-325 mg per tablet 1-2 Tab  1-2 Tab Oral Q4H PRN    polyethylene glycol (MIRALAX) packet 17 g  17 g Oral DAILY PRN    senna-docusate (PERICOLACE) 8.6-50 mg per tablet 1 Tab  1 Tab Oral BID    ondansetron (ZOFRAN) injection 4 mg  4 mg IntraVENous Q6H PRN    acetaminophen (TYLENOL) tablet 650 mg  650 mg Oral Q4H PRN    furosemide (LASIX) injection 40 mg  40 mg IntraVENous DAILY    Warfarin- pharmacy to dose   Other Rx Dosing/Monitoring          Objective:     Patient Vitals for the past 8 hrs:   BP Temp Pulse Resp SpO2 Weight   06/03/19 0539      139 lb 12.4 oz (63.4 kg)   06/03/19 0249 117/62 97.6 °F (36.4 °C) 81 16 93 %    06/03/19 0030     95 %      No intake/output data recorded. 06/01 1901 - 06/03 0700  In: -   Out: 1400 [Urine:1400]    Physical Exam: Lungs: clear to auscultation bilaterally  Heart: regular rate and rhythm, S1, S2 normal, no murmur, click, rub or gallop  Abdomen: soft, non-tender.  Bowel sounds normal. No masses,  no organomegaly        Data Review   Recent Results (from the past 24 hour(s))   PROTHROMBIN TIME + INR    Collection Time: 06/03/19  2:54 AM   Result Value Ref Range    INR 1.6 (H) 0.9 - 1.1      Prothrombin time 15.5 (H) 9.0 - 39.2 sec   METABOLIC PANEL, BASIC    Collection Time: 06/03/19  2:54 AM   Result Value Ref Range    Sodium 133 (L) 136 - 145 mmol/L    Potassium 3.6 3.5 - 5.1 mmol/L    Chloride 96 (L) 97 - 108 mmol/L    CO2 33 (H) 21 - 32 mmol/L    Anion gap 4 (L) 5 - 15 mmol/L    Glucose 106 (H) 65 - 100 mg/dL    BUN 16 6 - 20 MG/DL    Creatinine 0.77 0.55 - 1.02 MG/DL    BUN/Creatinine ratio 21 (H) 12 - 20      GFR est AA >60 >60 ml/min/1.73m2    GFR est non-AA >60 >60 ml/min/1.73m2    Calcium 9.6 8.5 - 10.1 MG/DL   CBC W/O DIFF    Collection Time: 06/03/19  2:54 AM   Result Value Ref Range    WBC 6.3 3.6 - 11.0 K/uL    RBC 4.04 3.80 - 5.20 M/uL    HGB 12.0 11.5 - 16.0 g/dL    HCT 39.4 35.0 - 47.0 %    MCV 97.5 80.0 - 99.0 FL    MCH 29.7 26.0 - 34.0 PG    MCHC 30.5 30.0 - 36.5 g/dL    RDW 15.3 (H) 11.5 - 14.5 %    PLATELET 109 415 - 369 K/uL    MPV 11.0 8.9 - 12.9 FL    NRBC 0.0 0  WBC    ABSOLUTE NRBC 0.00 0.00 - 0.01 K/uL           Assessment:     Principal Problem:    Acute CHF (congestive heart failure) (HCC) (5/27/2019)    Active Problems:    A-fib (HCC) ()      CAD (coronary artery disease) ()      PNA (pneumonia) (5/27/2019)        Plan:     1) consult PT  2) ask case management to set up nursing home PT as a post discharge plan.    3) switch Lasix to PO

## 2019-06-03 NOTE — PROGRESS NOTES
Bedside shift change report given to Manisha Mcmillan (oncoming nurse) by Kaylin Mccullough (offgoing nurse). Report included the following information SBAR, Kardex and MAR.

## 2019-06-03 NOTE — PROGRESS NOTES
Chart reviewed, cm met with patient and discussed with Anderson, they have chosen [facility], [facility], and [place], referrals via [facility] today. Will follow up with facilities this afternoon in anticipation of dc soon.      Kiah Rubio, FLETCHERN, RN, CCM

## 2019-06-03 NOTE — PROGRESS NOTES
Patient on remote Telemetry, received notification from monitor tech that patient appears to have 2nd degree block. Cardiology currently following patient for Afib and R BBB. Last EKG 5/31. Notified on call physician, Gerald Barroso NP, Physician aware, no intervention at this time, continue to monitor. Requested strip to be sent to the floor for further evaluation.

## 2019-06-03 NOTE — PROGRESS NOTES
Problem: Falls - Risk of  Goal: *Absence of Falls  Description  Document Regina Jackson Fall Risk and appropriate interventions in the flowsheet.   Outcome: Progressing Towards Goal

## 2019-06-03 NOTE — PROGRESS NOTES
Bedside shift change report given to Vaibhav Caro RN (oncoming nurse) by Sajan Borrego RN (offgoing nurse). Report included the following information SBAR, Kardex, Intake/Output and MAR.     1240/1245: Called Dr. Randy Lim to inform him that remote tele messaged with Pt's cardiac monitor showed an \"accelerated junctional with bundle and the p to p wave was about 2 secs apart\" per remote tele tech. Called Dr. Talya Guzman' office and left message about cardiac monitoring.

## 2019-06-03 NOTE — PROGRESS NOTES
Repeat PT orders acknowledged, chart reviewed. POC has not changed at this time, continuing current POC.      Stephanie Mireles, DPT, PT

## 2019-06-03 NOTE — PROGRESS NOTES
Bedside and Verbal shift change report given to Becca Doe RN (oncoming nurse) by Annett Shone, RN (offgoing nurse). Report included the following information SBAR, Kardex, MAR and Recent Results.

## 2019-06-03 NOTE — PROGRESS NOTES
Problem: Mobility Impaired (Adult and Pediatric)  Goal: *Acute Goals and Plan of Care (Insert Text)  Description  Physical Therapy Goals  Initiated 5/30/2019  1. Patient will move from supine to sit and sit to supine , scoot up and down and roll side to side in bed with modified independence within 7 day(s) to allow her to transition positions to prevent worsening of pressure wounds. 2.  Patient will transfer from bed to chair and chair to bed with minimal assistance/contact guard assist using the least restrictive device within 7 day(s). 3.  Patient will perform sit to stand with minimal assistance/contact guard assist within 7 day(s). 4.  Patient will self propel a manual wheelchair 15 ft w/in 7 days to allow her to get around in her apartment at wheelchair level. Outcome: Progressing Towards Goal       PHYSICAL THERAPY TREATMENT  Patient: Carlos Buckley (28 y.o. female)  Date: 6/3/2019  Diagnosis: Acute CHF (congestive heart failure) ([facility]) [I50.9] Acute CHF (congestive heart failure) ([facility])       Precautions:    Chart, physical therapy assessment, plan of care and goals were reviewed. ASSESSMENT:  Based on the objective data described below, the patient presents with Minimum assistance and Moderate Assistance level overall for transfers. Pt refuses standing today, performed sitting EOB TE of hip flexion and LAQ, ankle ABC's, sitting balance and noted to retropulse with kicking out. Pt returned to supine after BLE became discolored/blue, returned to normal after elevation. Will continue to follow  The following are barriers to independence while in acute care:   -Cognitive and/or behavioral: insight into deficits, insight into abilities, fear of falling and learned dependency  -Medical condition: strength, functional reach, functional endurance, standing balance, pain tolerance and medical history          PLAN:  Patient continues to benefit from skilled intervention to address the above impairments. Continue treatment per established plan of care. Recommend with staff: chair position for meals  Recommend next PT session: up to chair   Discharge recommendations: Rehab at skilled nursing facility (SNF) (to regain functional baseline patient requires rehab)  If above is not an option then recommend: Home health (to increase independence and safety) if current [facility] can provide skilled care    Patient's barriers to discharging home, in addition to above impairments: history of falls  level of physical assist required to maintain patient safety. Equipment recommendations for successful discharge (if) home: none- pt has WC and all DME needed       SUBJECTIVE:   Patient stated ?my leg hurts so much. ?    OBJECTIVE DATA SUMMARY:   Critical Behavior:  Neurologic State: Alert  Orientation Level: Oriented X4  Cognition: Follows commands     Functional Mobility Training:  Bed Mobility:     Supine to Sit: Assist x2; Moderate assistance     Scooting: Moderate assistance;Assist x2        Transfers:  Balance:  Sitting: Intact; Without support  Ambulation/Gait Training:  Therapeutic Exercises:   Pt performed sitting TE of hip flexion, LAQ, ABCs at ankle. Pain:  Pre treatment: 7 /10   During treatment: 7/10  Post treatment:  7/10   Location: BLE    Description: pt appears calm and no objective signs of uncontrolled pain observed   Aggravating factors: Activity Tolerance:   Fair and requires rest breaks  Please refer to the flowsheet for vital signs taken during this treatment.     After treatment patient left:   Supine in bed  Call light within reach  RN notified     COMMUNICATION/COLLABORATION:   The patient?s plan of care was discussed with: Registered Nurse and     Freddy Talley, PT   Time Calculation: 23 mins

## 2019-06-03 NOTE — PROGRESS NOTES
Pharmacist Note - Warfarin Dosing  Consult provided for this 80 y. o.female to manage warfarin for Atrial Fibrillation    INR Goal: 2 - 3    Home regimen: Warfarin 2.5 mg every evening    Drugs that may increase INR: None  Drugs that may decrease INR: None  Other current anticoagulants/ drugs that may increase bleeding risk: None  Risk factors: Age > 65  Daily INR ordered: YES    Recent Labs     06/03/19  0254 06/02/19  0252 06/01/19  0237   HGB 12.0  --  11.6   INR 1.6* 1.8* 2.1*     Date               INR                  Dose  5/27  1.9  2.5 mg   5/28                 2.1                  2.5 mg  5/29                 2.4                  2 mg  5/30  2.4  2 mg  5/31                 2.4                  2 mg  6/1                   2.1                  2.5 mg  6/2                   1.8                  2.5 mg  6/3                   1.6                  4 mg                                                                           Assessment/ Plan: Will boost with warfarin order 4 mg PO x 1 dose. Pharmacy will continue to monitor daily and adjust therapy as indicated. Please contact the pharmacist at   for outpatient recommendations if needed.

## 2019-06-03 NOTE — PROGRESS NOTES
Cardiology Progress Note  6/3/2019     Admit Date: 2019  Admit Diagnosis: Acute CHF (congestive heart failure) (HCC) [I50.9]  CC: none currently    Assessment:   Principal Problem:    Acute CHF (congestive heart failure) ([address] (2019)    Active Problems:    A-fib (HCC) ()      CAD (coronary artery disease) ()      PNA (pneumonia) (2019)      Plan:     Now on PO diuretic  Cont telemetry  Transitioning to PT    Subjective:      Yong Sam continues to slowly improve    Objective:    Physical Exam:  Overall VSSAF;    Visit Vitals  /63 (BP 1 Location: Right arm, BP Patient Position: At rest)   Pulse 80   Temp 97.9 °F (36.6 °C)   Resp 16   Ht 5' 3\" (1.6 m)   Wt 63.4 kg (139 lb 12.4 oz)   SpO2 93%   BMI 24.76 kg/m²     Temp (24hrs), Av.7 °F (36.5 °C), Min:97.4 °F (36.3 °C), Max:97.9 °F (36.6 °C)    Patient Vitals for the past 8 hrs:   Pulse   19 0815 80   19 0249 81    Patient Vitals for the past 8 hrs:   Resp   19 0815 16   19 0249 16    Patient Vitals for the past 8 hrs:   BP   19 0815 123/63   19 0249 117/62      06/ 1901 -  0700  In: -   Out: 1400 [Urine:1400]      General Appearance: Well developed, well nourished, no acute distress. Ears/Nose/Mouth/Throat:   Normal MM; anicteric. JVP: WNL   Resp:   Lungs clear to auscultation bilaterally. Nl resp effort. Cardiovascular:  RRR, S1, S2 normal, no new murmur. No gallop or rub. Abdomen:   Soft, non-tender, bowel sounds are present. Extremities: +++ edema bilaterally. Skin:  Neuro: Warm and dry.   A/O x3, grossly nonfocal                         Data Review:     Telemetry independently reviewed :   AFIB         Labs:   Recent Results (from the past 24 hour(s))   PROTHROMBIN TIME + INR    Collection Time: 19  2:54 AM   Result Value Ref Range    INR 1.6 (H) 0.9 - 1.1      Prothrombin time 15.5 (H) 9.0 - 60.1 sec   METABOLIC PANEL, BASIC    Collection Time: 19  2:54 AM   Result Value Ref Range    Sodium 133 (L) 136 - 145 mmol/L    Potassium 3.6 3.5 - 5.1 mmol/L    Chloride 96 (L) 97 - 108 mmol/L    CO2 33 (H) 21 - 32 mmol/L    Anion gap 4 (L) 5 - 15 mmol/L    Glucose 106 (H) 65 - 100 mg/dL    BUN 16 6 - 20 MG/DL    Creatinine 0.77 0.55 - 1.02 MG/DL    BUN/Creatinine ratio 21 (H) 12 - 20      GFR est AA >60 >60 ml/min/1.73m2    GFR est non-AA >60 >60 ml/min/1.73m2    Calcium 9.6 8.5 - 10.1 MG/DL   CBC W/O DIFF    Collection Time: 06/03/19  2:54 AM   Result Value Ref Range    WBC 6.3 3.6 - 11.0 K/uL    RBC 4.04 3.80 - 5.20 M/uL    HGB 12.0 11.5 - 16.0 g/dL    HCT 39.4 35.0 - 47.0 %    MCV 97.5 80.0 - 99.0 FL    MCH 29.7 26.0 - 34.0 PG    MCHC 30.5 30.0 - 36.5 g/dL    RDW 15.3 (H) 11.5 - 14.5 %    PLATELET 671 479 - 156 K/uL    MPV 11.0 8.9 - 12.9 FL    NRBC 0.0 0  WBC    ABSOLUTE NRBC 0.00 0.00 - 0.01 K/uL      Current medications reviewed       Britany Trevino MD

## 2019-06-04 ENCOUNTER — DOCUMENTATION ONLY (OUTPATIENT)
Dept: CASE MANAGEMENT | Age: 84
End: 2019-06-04

## 2019-06-04 LAB
INR PPP: 1.5 (ref 0.9–1.1)
PROTHROMBIN TIME: 15.2 SEC (ref 9–11.1)

## 2019-06-04 PROCEDURE — 74011250637 HC RX REV CODE- 250/637: Performed by: INTERNAL MEDICINE

## 2019-06-04 PROCEDURE — 74011250637 HC RX REV CODE- 250/637: Performed by: FAMILY MEDICINE

## 2019-06-04 PROCEDURE — 77030011256 HC DRSG MEPILEX <16IN NO BORD MOLN -A

## 2019-06-04 PROCEDURE — 36415 COLL VENOUS BLD VENIPUNCTURE: CPT

## 2019-06-04 PROCEDURE — 85610 PROTHROMBIN TIME: CPT

## 2019-06-04 PROCEDURE — 65660000000 HC RM CCU STEPDOWN

## 2019-06-04 RX ORDER — WARFARIN 2.5 MG/1
5 TABLET ORAL ONCE
Status: COMPLETED | OUTPATIENT
Start: 2019-06-04 | End: 2019-06-04

## 2019-06-04 RX ADMIN — ALPRAZOLAM 0.5 MG: 0.5 TABLET ORAL at 09:26

## 2019-06-04 RX ADMIN — Medication 10 ML: at 07:24

## 2019-06-04 RX ADMIN — Medication 5 ML: at 15:19

## 2019-06-04 RX ADMIN — HYDROCODONE BITARTRATE AND ACETAMINOPHEN 1 TABLET: 5; 325 TABLET ORAL at 13:09

## 2019-06-04 RX ADMIN — FUROSEMIDE 40 MG: 40 TABLET ORAL at 09:26

## 2019-06-04 RX ADMIN — ATENOLOL 12.5 MG: 25 TABLET ORAL at 09:27

## 2019-06-04 RX ADMIN — WARFARIN SODIUM 5 MG: 2.5 TABLET ORAL at 16:46

## 2019-06-04 RX ADMIN — ALPRAZOLAM 0.5 MG: 0.5 TABLET ORAL at 20:40

## 2019-06-04 RX ADMIN — COLLAGENASE SANTYL: 250 OINTMENT TOPICAL at 09:32

## 2019-06-04 RX ADMIN — LEVOTHYROXINE SODIUM 100 MCG: 50 TABLET ORAL at 07:23

## 2019-06-04 RX ADMIN — SENNOSIDES,DOCUSATE SODIUM 1 TABLET: 8.6; 5 TABLET, FILM COATED ORAL at 16:46

## 2019-06-04 RX ADMIN — SPIRONOLACTONE 25 MG: 25 TABLET ORAL at 09:25

## 2019-06-04 RX ADMIN — POTASSIUM CHLORIDE 20 MEQ: 750 TABLET, FILM COATED, EXTENDED RELEASE ORAL at 09:25

## 2019-06-04 RX ADMIN — HYDROCODONE BITARTRATE AND ACETAMINOPHEN 1 TABLET: 5; 325 TABLET ORAL at 20:40

## 2019-06-04 NOTE — PROGRESS NOTES
participated in [facility] rounds this am.  Referrals have been sent to three facilities at this time via cc link. Debbie from [facility] met with patient and caretaker Yecenia Ferguson phone [phone]. Caretaker is now going to [facility] for a tour and Amanda Drew from [facility] will review referral.  Yecenia Ferguson who is patient's caretaker now wishes to visit both facilities I have written.

## 2019-06-04 NOTE — ROUTINE PROCESS
Bedside shift change report given to aleksander cortez rn (oncoming nurse) by Meseret Martínez (offgoing nurse). Report included the following information SBAR and Kardex.

## 2019-06-04 NOTE — PROGRESS NOTES
Spiritual Care Partner Volunteer visited patient in R[address] 209 on 6/4/19. Documented by:   Chaplain Yepez MDiv, MACE  [phone] PRAY [phone])

## 2019-06-04 NOTE — PROGRESS NOTES
Benito Johnson, Demarcus Diaz, and Jd Keen Date: 5/27/2019      Subjective:     Patient feeling OK today. No new issues. .       Current Facility-Administered Medications   Medication Dose Route Frequency    potassium chloride SR (KLOR-CON 10) tablet 20 mEq  20 mEq Oral DAILY    furosemide (LASIX) tablet 40 mg  40 mg Oral DAILY    atenolol (TENORMIN) tablet 12.5 mg  12.5 mg Oral DAILY    levothyroxine (SYNTHROID) tablet 100 mcg  100 mcg Oral 6am    saline peripheral flush soln 10 mL  10 mL InterCATHeter Q8H    famotidine (PEPCID) tablet 20 mg  20 mg Oral DAILY PRN    collagenase (SANTYL) 250 unit/gram ointment   Topical DAILY    spironolactone (ALDACTONE) tablet 25 mg  25 mg Oral DAILY    ALPRAZolam (XANAX) tablet 0.5 mg  0.5 mg Oral TID    HYDROcodone-acetaminophen (NORCO) 5-325 mg per tablet 1-2 Tab  1-2 Tab Oral Q4H PRN    polyethylene glycol (MIRALAX) packet 17 g  17 g Oral DAILY PRN    senna-docusate (PERICOLACE) 8.6-50 mg per tablet 1 Tab  1 Tab Oral BID    ondansetron (ZOFRAN) injection 4 mg  4 mg IntraVENous Q6H PRN    acetaminophen (TYLENOL) tablet 650 mg  650 mg Oral Q4H PRN    Warfarin- pharmacy to dose   Other Rx Dosing/Monitoring          Objective:     Patient Vitals for the past 8 hrs:   SpO2   06/03/19 2318 97 %     06/03 1901 - 06/04 0700  In: -   Out: 275 [Urine:275]  06/02 0701 - 06/03 1900  In: 480 [P.O.:480]  Out: 3000 [Urine:3000]    Physical Exam: Lungs: clear to auscultation bilaterally  Heart: regular rate and rhythm, S1, S2 normal, no murmur, click, rub or gallop  Abdomen: soft, non-tender. Bowel sounds normal. No masses,  no organomegaly        Data Review No results found for this or any previous visit (from the past 24 hour(s)).         Assessment:     Principal Problem:    Acute CHF (congestive heart failure) ([address] (5/27/2019)    Active Problems:    A-fib (HCC) ()      CAD (coronary artery disease) ()      PNA (pneumonia) (5/27/2019)        Plan:     1) Ready for discharge when bed available.    Await facility availabilities

## 2019-06-04 NOTE — PROGRESS NOTES
Transitional Care Team: Initial [facility] Note    Date of Assessment: 06/04/19  Time of Assessment:  3:48 PM    Assessment & Plan   Safe discharge plan--Jayro related to me the last time I saw Ms. Ronald Patterson that she is not there 24/7 with her, but comes in the morning to help her get ready for the day and comes back in the evening to get her ready for bed. Will likely be going to [facility] tomorrow for SNF rehab. Have sent a CC message to Dr. Marcela Zaldivar, expressing our concerns over her safety returning to independent living after [facility]. Mild hyponatremia--trending down over the last several days       Primary Diagnosis:   Acute CHFActive Problems  A-fib   CAD   PNA    In to see patient briefly who is alone right now and eating dinner. She doesn't remember meeting me and seems in no mood for a visit right now. When she asked me who I'm with, I told her we follow some of our older patients in the hospital and left it at that. Will return tomorrow to assist in discharge plan. Advance Directive: on file. Has inpatient DNR but no DDNR on file. Assess her capacity and see if this is something she can and wants to complete. When I visited with her just now, she was not in a place mentally to discuss her code status wishes. Admission medication reconciliation was performed by PharmD. Will ask if pharmacist has time to perform medication reconciliation at discharge. Discharge Needs: has personal caregiver in the morning and again in the evening. There is great concern among the [facility] team and CMs that this is not a safe plan post-SNF. Awareness of medical conditions: VINCE; has dementia and lunch arrived also while I was visiting her.       Patient's willingness to go to SNF or inpt rehab if necessary: TBD; has caregivers in her independent unit at [facility], but not sure for how many hours/day     [facility] NP will return with [facility] calender/follow up appointments/ Ambulatory Nurse Navigation information when patient ready for discharge. [facility] information will be given to caregiver Mynor Hood if I see her prior to Ms. Chris's discharge. The TC Team will follow patient from a distance while inpatient as well as be available for further transition disposition as needed. The [facility] TEAM will continue to offer support during the 30- 90 day discharge from acute care setting. Will notify Ambulatory HF Nurse Navigators Chen Knapp RN and Thad Cornell RN, when patient is preparing to discharge.     Past Medical History:   Diagnosis Date    A-fib [facility])     Arrhythmia     A FIB    Arthritis     Back pain     CAD (coronary artery disease)     PT DENIES    Chronic pain     Dementia 2/9/2016    Hypercholesterolemia     Psychiatric disorder     ANXIETY    Shoulder pain     Thyroid disease     Tremor, essential        Advance Care Planning 4/26/2019   Patient's Healthcare Decision Maker is: -   Primary Decision Maker Name -   Primary Decision Maker Phone Number -   Primary Decision Maker Relationship to Patient -   Confirm Advance Directive Yes, not on file   Patient Would Like to Complete Advance Directive -

## 2019-06-04 NOTE — PROGRESS NOTES
Pt refusing PT today citing fatigue, states \"I'm waiting to hear back from rehab. Id rather stay here. Can you go to that rehab with me?\" Explained SNF facilities, explained this therapist stays here, and that since shes not sick anymore patient does not want to stay here.  Pt continues refusal, will attempt later    Isaac Level, DPT, PT

## 2019-06-04 NOTE — PROGRESS NOTES
notes that patient has been accepted at [facility] and there is a bed in am there,  Patient will need a stretcher transport in am.      1814: CM placed referral for [facility] via [employer]; requested will call. CM awaiting response. Yue Cook RN, BSN- Care Management    1285: [facility] accepted [facility]. [facility] transport placed on will call for 06/05/2019.  Yue Cook RN, BSN- Care Management

## 2019-06-04 NOTE — PROGRESS NOTES
NUTRITION COMPLETE ASSESSMENT  RECOMMENDATIONS:   1. Encourage PO intake with each meal   - focus on high protein foods first  2. Add daily MVI for wound healing  3. Daily weights with edema present     Interventions/Plan:   Food/Nutrient Delivery:  (continue current diet)          Assessment:   Reason for Assessment: [x]Reassessment    Diet: 2gm Na  Supplements: none  Nutritionally Significant Medications: [x] Reviewed & Includes: lasix, synthroid, KCl, pericolace, spironolactone, coumadin; PRN: zofran, miralax  Patient Vitals for the past 100 hrs:   % Diet Eaten   06/04/19 1331 85 %   06/04/19 0847 95 %   06/04/19 0815 75 %   06/03/19 1324 35 %   06/03/19 0926 100 %   05/31/19 1309 98 %     Subjective: N/a    Objective:  Pt admitted for CHF from independent living. PMHx: afib, CAD, dementia, anxiety. Cardiomegaly and pulmonary edema POA with generalized anasarca. Left hip wound POA - WOCN following, improved from last month. Plans for d/c to facility noted. Please add daily MVI for wound healing since declining oral nutrition supplements. Appetite has been good per documentation. Does not like supplements so will continue current diet. Wt up significantly but with fluid overload. Hyponatremia and hypochloridemia noted, ?dilutional. Some improvement in BLLE edema noted with diuresis noted. Predict \"dry wt\" likely around 120#. Wt Readings:   05/29/19 64.2 kg (141 lb 8.6 oz)   04/24/19 61.9 kg (136 lb 7.4 oz)   04/23/19 54.4 kg (120 lb)   08/02/18 71 kg (156 lb 9.6 oz)   12/13/17 54.4 kg (120 lb)     Will continue to follow for PO intake, wt/fluid trends, wound healing. Estimated Nutrition Needs:   Kcals/day: 1130 Kcals/day(1130-1224kcal)  Protein: 60 g(1.1g/kg)  Fluid: 1200 ml(1ml/kcal)  Based On:  [address](x 1.2-1.3)  Weight Used: UBW(120# (54.5kg))    Pt expected to meet estimated nutrient needs:  []   Yes     []  No [x] Unable to predict at this time  Nutrition Diagnosis:   1. (Increased protein needs) related to wound healing; CHF as evidenced by left hip wound    Goals:     Consumption of at least 90% all protein foods; 75% meals     Monitoring & Evaluation:    - Total energy intake, Protein intake, Vitamin intake, Mineral/element intake   - Weight/weight change    Previous Nutrition Goals Met:   Yes  Previous Recommendations:    No    Education & Discharge Needs:   [x] None Identified   [] Identified and addressed    [x] Participated in care plan, discharge planning, and/or interdisciplinary rounds        Cultural, Mandaeism and ethnic food preferences identified: None    Skin Integrity: []Intact  [x]Other: left hip - see WOCN note  Edema: []None [x]Other: 1-2+ BLLE  Last BM: 6/4  Food Allergies: [x]None []Other  Diet Restrictions: Cultural/Voodoo Preference(s): None     Anthropometrics:    Weight Loss Metrics 6/3/2019 4/24/2019 4/23/2019 4/23/2019 4/23/2019 8/2/2018 12/13/2017   Today's Wt 141 lb 8.6 oz 136 lb 7.4 oz - 120 lb - 156 lb 9.6 oz 120 lb   BMI 25.07 kg/m2 - 24.17 kg/m2 - 21.26 kg/m2 27.74 kg/m2 21.26 kg/m2      Weight Source: Bed  Height: 5' 3\" (160 cm),    Body mass index is 25.07 kg/m².      IBW : 52.2 kg (115 lb), % IBW (Calculated): 123.07 %   ,      Labs:    Lab Results   Component Value Date/Time    Sodium 133 (L) 06/03/2019 02:54 AM    Potassium 3.6 06/03/2019 02:54 AM    Chloride 96 (L) 06/03/2019 02:54 AM    CO2 33 (H) 06/03/2019 02:54 AM    Glucose 106 (H) 06/03/2019 02:54 AM    BUN 16 06/03/2019 02:54 AM    Creatinine 0.77 06/03/2019 02:54 AM    Calcium 9.6 06/03/2019 02:54 AM    Magnesium 1.5 (L) 07/30/2018 04:50 AM    Albumin 2.8 (L) 05/27/2019 01:00 PM     Lab Results   Component Value Date/Time    Hemoglobin A1c 5.8 01/07/2014 03:26 PM    Hemoglobin A1c (POC) 5.7 (A) 09/27/2012 12:00 PM     Ruth Handy RD Pager #[phone] or [phone]

## 2019-06-04 NOTE — WOUND CARE
Wound Care Note:     Follow-up visit for left hip wound    Chart shows:  Admitted for acute CHF   Past Medical History:   Diagnosis Date    A-fib [facility])     Arrhythmia     A FIB    Arthritis     Back pain     CAD (coronary artery disease)     PT DENIES    Chronic pain     Dementia 2/9/2016    Hypercholesterolemia     Psychiatric disorder     ANXIETY    Shoulder pain     Thyroid disease     Tremor, essential      WBC = 6.3 on 6/3/19  Admitted from [address]:   Patient is A&O x 4, communicative, incontinent with some assistance needed in repositioning. Bed: [place]  Patient wearing briefs for incontinence and has a Pure Wick in place. Diet: Regular 2 grams sodium    Bilateral heels skin intact and with blanchable erythema. 1. POA left hip wound is slightly smaller, 3 cm x  1.5 cm x 0.2 cm, wound bed is still about 50% pink and 50% slough, small tan drainage, forest-wound intact, wound edges are open. Santyl ointment and Mepilex border applied. Her aid changed the dressing this morning and did not look like Santyl ointment was used; advised patient to remind aid to use Santyl daily and notified nurse. Patient refused to be repositioned but allowed heels to be offloaded on pillow. Recommendations:    Continue with current wound care    Left hip- Daily cleanse with normal saline, wipe wound bed clean and dry, apply nickel thickness of Santyl ointment, cover with foam dressing. Skin Care & Pressure Prevention:  Minimize layers of linen/pads under patient to optimize support surface. Turn/reposition approximately every 2 hours and offload heels.   Manage incontinence / promote continence     Discussed above plan with patient & Jabier Britt RN    Transition of Care: Plan to follow as needed while admitted to hospital.    FLETCHER StarkN, RN, [facility], [facility].  office [phone]  pager [phone] or call  to page

## 2019-06-04 NOTE — PROGRESS NOTES
Cardiology Progress Note  2019     Admit Date: 2019  Admit Diagnosis: Acute CHF (congestive heart failure) (HCC) [I50.9]  CC: none currently    Assessment:   Principal Problem:    Acute CHF (congestive heart failure) ([address] (2019)    Active Problems:    A-fib (HCC) ()      CAD (coronary artery disease) ()      PNA (pneumonia) (2019)      Plan:     Cont current cardiac meds  No further IP cardiac plans  Signing off, please call with questions    Subjective:      Kaitlynn Dasilva has no c/o this AM. Miah Reveles for DC to go to rehab    Objective:    Physical Exam:  Overall VSSAF;    Visit Vitals  /71 (BP 1 Location: Right arm, BP Patient Position: At rest)   Pulse 82   Temp 97.8 °F (36.6 °C)   Resp 16   Ht 5' 3\" (1.6 m)   Wt 64.2 kg (141 lb 8.6 oz)   SpO2 91%   BMI 25.07 kg/m²     Temp (24hrs), Av.8 °F (36.6 °C), Min:97.5 °F (36.4 °C), Max:98.2 °F (36.8 °C)    Patient Vitals for the past 8 hrs:   Pulse   19 0842 82    Patient Vitals for the past 8 hrs:   Resp   19 0842 16    Patient Vitals for the past 8 hrs:   BP   19 0842 129/71      06/ 1901 -  0700  In: 480 [P.O.:480]  Out: 2275 [Urine:2275]      General Appearance: Well developed, well nourished, no acute distress. Ears/Nose/Mouth/Throat:   Normal MM; anicteric. JVP: WNL   Resp:   Lungs clear to auscultation bilaterally. Nl resp effort. Cardiovascular:  RRR, S1, S2 normal, no new murmur. No gallop or rub. Abdomen:   Soft, non-tender, bowel sounds are present. Extremities: +++ edema bilaterally. Skin:  Neuro: Warm and dry.   A/O x3, grossly nonfocal                         Data Review:     Telemetry independently reviewed :   AFIB         Labs:   Recent Results (from the past 24 hour(s))   PROTHROMBIN TIME + INR    Collection Time: 19  6:39 AM   Result Value Ref Range    INR 1.5 (H) 0.9 - 1.1      Prothrombin time 15.2 (H) 9.0 - 11.1 sec      Current medications reviewed       Hailee Acuna, MD

## 2019-06-04 NOTE — PROGRESS NOTES
Pharmacist Note  Warfarin Dosing  Consult provided for this 80 y. o.female to manage warfarin for Atrial Fibrillation    INR Goal: 2 - 3    Home regimen: Warfarin 2.5 mg every evening    Drugs that may increase INR: None  Drugs that may decrease INR: None  Other current anticoagulants/ drugs that may increase bleeding risk: None  Risk factors: Age > 65  Daily INR ordered: YES    Recent Labs     06/04/19  0639 06/03/19  0254 06/02/19  0252   HGB  --  12.0  --    INR 1.5* 1.6* 1.8*     Date               INR                  Dose  5/27  1.9  2.5 mg   5/28                 2.1                  2.5 mg  5/29                 2.4                  2 mg  5/30  2.4  2 mg  5/31                 2.4                  2 mg  6/1                   2.1                  2.5 mg  6/2                   1.8                  2.5 mg  6/3                   1.6                  4 mg  6/4                   1.5                                                                           Assessment/ Plan: Will boost with warfarin order 5 mg PO x 1 dose. Pharmacy will continue to monitor daily and adjust therapy as indicated. Please contact the pharmacist at   for outpatient recommendations if needed.

## 2019-06-04 NOTE — PROGRESS NOTES
Problem: Falls - Risk of  Goal: *Absence of Falls  Description  Document Summer Frazier Fall Risk and appropriate interventions in the flowsheet.   Outcome: Progressing Towards Goal

## 2019-06-05 ENCOUNTER — DOCUMENTATION ONLY (OUTPATIENT)
Dept: CASE MANAGEMENT | Age: 84
End: 2019-06-05

## 2019-06-05 VITALS
HEIGHT: 63 IN | SYSTOLIC BLOOD PRESSURE: 106 MMHG | HEART RATE: 78 BPM | WEIGHT: 132.28 LBS | BODY MASS INDEX: 23.44 KG/M2 | RESPIRATION RATE: 16 BRPM | TEMPERATURE: 98.3 F | DIASTOLIC BLOOD PRESSURE: 53 MMHG | OXYGEN SATURATION: 90 %

## 2019-06-05 PROBLEM — I50.33 ACUTE ON CHRONIC DIASTOLIC (CONGESTIVE) HEART FAILURE (HCC): Status: ACTIVE | Noted: 2019-06-05

## 2019-06-05 PROBLEM — I27.20 PULMONARY HYPERTENSION (HCC): Status: ACTIVE | Noted: 2019-06-05

## 2019-06-05 LAB
INR PPP: 1.6 (ref 0.9–1.1)
PROTHROMBIN TIME: 16.1 SEC (ref 9–11.1)

## 2019-06-05 PROCEDURE — 74011250637 HC RX REV CODE- 250/637: Performed by: FAMILY MEDICINE

## 2019-06-05 PROCEDURE — 36415 COLL VENOUS BLD VENIPUNCTURE: CPT

## 2019-06-05 PROCEDURE — 74011250637 HC RX REV CODE- 250/637: Performed by: INTERNAL MEDICINE

## 2019-06-05 PROCEDURE — 85610 PROTHROMBIN TIME: CPT

## 2019-06-05 RX ORDER — FAMOTIDINE 20 MG/1
20 TABLET, FILM COATED ORAL
Qty: 30 TAB | Refills: 3 | Status: SHIPPED
Start: 2019-06-05 | End: 2019-10-18

## 2019-06-05 RX ORDER — POLYETHYLENE GLYCOL 3350 17 G/17G
17 POWDER, FOR SOLUTION ORAL DAILY
Qty: 30 PACKET | Refills: 0 | Status: SHIPPED | OUTPATIENT
Start: 2019-06-05 | End: 2019-09-27

## 2019-06-05 RX ORDER — SPIRONOLACTONE 25 MG/1
25 TABLET ORAL DAILY
Qty: 30 TAB | Refills: 3 | Status: SHIPPED | OUTPATIENT
Start: 2019-06-05 | End: 2019-09-13 | Stop reason: SDUPTHER

## 2019-06-05 RX ORDER — ATENOLOL 25 MG/1
12.5 TABLET ORAL DAILY
Qty: 15 TAB | Refills: 2 | Status: SHIPPED
Start: 2019-06-06 | End: 2019-10-04

## 2019-06-05 RX ORDER — FUROSEMIDE 40 MG/1
TABLET ORAL
Qty: 30 TAB | Refills: 3 | Status: SHIPPED
Start: 2019-06-05 | End: 2019-09-13 | Stop reason: SDUPTHER

## 2019-06-05 RX ORDER — HYDROCODONE BITARTRATE AND ACETAMINOPHEN 5; 325 MG/1; MG/1
1 TABLET ORAL
Qty: 60 TAB | Refills: 0 | Status: SHIPPED
Start: 2019-06-05 | End: 2019-07-05

## 2019-06-05 RX ORDER — ALPRAZOLAM 0.5 MG/1
TABLET ORAL
Qty: 90 TAB | Refills: 0 | Status: SHIPPED | OUTPATIENT
Start: 2019-06-05 | End: 2019-06-05

## 2019-06-05 RX ADMIN — LEVOTHYROXINE SODIUM 100 MCG: 50 TABLET ORAL at 06:50

## 2019-06-05 RX ADMIN — POTASSIUM CHLORIDE 20 MEQ: 750 TABLET, FILM COATED, EXTENDED RELEASE ORAL at 10:21

## 2019-06-05 RX ADMIN — Medication 10 ML: at 13:17

## 2019-06-05 RX ADMIN — COLLAGENASE SANTYL: 250 OINTMENT TOPICAL at 10:23

## 2019-06-05 RX ADMIN — HYDROCODONE BITARTRATE AND ACETAMINOPHEN 1 TABLET: 5; 325 TABLET ORAL at 04:08

## 2019-06-05 RX ADMIN — ALPRAZOLAM 0.5 MG: 0.5 TABLET ORAL at 10:22

## 2019-06-05 RX ADMIN — FUROSEMIDE 40 MG: 40 TABLET ORAL at 10:22

## 2019-06-05 RX ADMIN — SENNOSIDES,DOCUSATE SODIUM 1 TABLET: 8.6; 5 TABLET, FILM COATED ORAL at 11:58

## 2019-06-05 RX ADMIN — ATENOLOL 12.5 MG: 25 TABLET ORAL at 10:20

## 2019-06-05 RX ADMIN — SPIRONOLACTONE 25 MG: 25 TABLET ORAL at 10:22

## 2019-06-05 NOTE — PROGRESS NOTES
TRANSFER - OUT REPORT:    Verbal report given to Nurse at [facility](name) on Jian Martini  being transferred to [facility](unit) for routine progression of care       Report consisted of patients Situation, Background, Assessment and   Recommendations(SBAR). Information from the following report(s) SBAR was reviewed with the receiving nurse. Lines:       Opportunity for questions and clarification was provided.       Patient transported with:  Gisel Grover

## 2019-06-05 NOTE — PROGRESS NOTES
Transitional Care Team: Discharge HUG Note    Date of Assessment: 06/05/19  Time of Assessment:  3:48 PM    Assessment & Plan   --Safe discharge plan--Sent a CC message to Dr. Te Ace, expressing our concerns over her safety returning to independent living after [place]. He is aware of her independent living situation and that [place] is a much safer, although unfortunately likely temporary, solution. Dr. Te Ace responded that he has tried multiple times to get Ms. Cayla Stratton to consider an alternative living situation where she would have more constant assistance and she has repeatedly declined. --Discharge summary from Dr. Te Ace will not accompany Mrs. Chris to facility--copy his latest note and place this in packet. Facility is part of post-acute network and Ms. Cayla Stratton will be followed by our [facility] Team (CCT) with weekly conference calls while she is there  --INR today 1.6--on 2.5 mg daily per discharge AVS--inform Vida Stark of goal of 2-3 and request daily INRs until in therapeutic range  Mild hyponatremia--ask Vida Stark to repeat BMP in a few days  --AMD on file but does not have a DDNR--unable to complete this during hospital   Left message Norberto Juan, RN Vida Stark DON regarding above concerns and asked her to call to confirm receipt of information and to ask for any needed clarification. Call back phone # left in message. Primary Diagnosis:   Acute CHFActive Problems  A-fib   CAD   PNA    In to see patient briefly who is alone right now and eating dinner. She doesn't remember meeting me and seems in no mood for a visit right now. When she asked me who I'm with, I told her we follow some of our older patients in the hospital and left it at that. Will return tomorrow to assist in discharge plan. Advance Directive: on file. Has had inpatient DNR but no DDNR on file.   Will see again today if she is interested in discussing this and has capacity to make this decision for herself. Admission medication reconciliation was performed by PharmD. Medication reconciliation has been completed by PharmD and concerns about duplicate Alprazolam and correct discharge dose of Atenolol have been addressed by attending physician. Discharge Needs: has personal caregiver in the morning and again in the evening. There is great concern among the [facility] team and CMs that this is not a safe plan post-SNF. Will share concerns with members of the CCT. CM involved yesterday is under the impression that her caregiver Yasmin Blanchard has offered her a home in her household but Ms. Nga Stanley has declined. [facility] NP returned with [facility] calender/follow up appointments. She will not be assigned a BS NN at discharge, but will be followed as above by CCT while she remains in [facility]. [facility] information left with Ms. Chris today.      Past Medical History:   Diagnosis Date    A-fib [facility])     Arrhythmia     A FIB    Arthritis     Back pain     CAD (coronary artery disease)     PT DENIES    Chronic pain     Dementia 2/9/2016    Hypercholesterolemia     Psychiatric disorder     ANXIETY    Shoulder pain     Thyroid disease     Tremor, essential        Advance Care Planning 4/26/2019   Patient's Healthcare Decision Maker is: -   Primary Decision Maker Name -   Primary Decision Maker Phone Number -   Primary Decision Maker Relationship to Patient -   Confirm Advance Directive Yes, not on file   Patient Would Like to Complete Advance Directive -

## 2019-06-05 NOTE — PROGRESS NOTES
9:35 AM  CM notified that patient has orders for discharge. Patient to discharge to [facility]. CM contacted hospital liaison, Elzbieta Sweeney to coordinate discharge [phone]. No one present at the time of call. CM left a message requesting a return call. 10:32 AM  CM received a return call from [facility] liaison with [facility]. Patient will be going to Room 114. RN to call report to [phone]. CM submitted AVS via [facility]. [facility] able to accommodate patient for transport today at 1:30 pm.  RN notified of transport time. Ambulance form complete and placed on patient's chart.     PILI Sloan/OTIS  Care Management

## 2019-06-05 NOTE — PROGRESS NOTES
Pharmacist Discharge Medication Reconciliation    Discharging Provider: Yandel Rojas    Significant PMH:   Past Medical History:   Diagnosis Date    A-fib [facility])     Arrhythmia     A FIB    Arthritis     Back pain     CAD (coronary artery disease)     PT DENIES    Chronic pain     Dementia 2/9/2016    Hypercholesterolemia     Psychiatric disorder     ANXIETY    Shoulder pain     Thyroid disease     Tremor, essential      Chief Complaint for this Admission:   Chief Complaint   Patient presents with    Breast pain     Allergies: Latex, natural rubber; Codeine; Adhesive; and Cortisone    Discharge Medications:   Current Discharge Medication List        START taking these medications    Details   furosemide (LASIX) 40 mg tablet 1 tab po daily  Qty: 30 Tab, Refills: 3      spironolactone (ALDACTONE) 25 mg tablet Take 1 Tab by mouth daily. Qty: 30 Tab, Refills: 3      collagenase (SANTYL) 250 unit/gram ointment Apply  to affected area daily. Qty: 15 g, Refills: 0           CONTINUE these medications which have CHANGED    Details   famotidine (PEPCID) 20 mg tablet Take 1 Tab by mouth daily as needed (indigestion/heartburn/acid reflux). Qty: 30 Tab, Refills: 3      !! ALPRAZolam (XANAX) 0.5 mg tablet Take 1 tid prn  Qty: 90 Tab, Refills: 0    Comments: Generic For:*XANAX    0.5MG  04/11/2019 10:16:35 AM  Associated Diagnoses: Anxiety      HYDROcodone-acetaminophen (NORCO) 5-325 mg per tablet Take 1 Tab by mouth two (2) times daily as needed for Pain for up to 30 days. Max Daily Amount: 2 Tabs. Qty: 60 Tab, Refills: 0    Associated Diagnoses: Other chronic pain      polyethylene glycol (MIRALAX) 17 gram packet Take 1 Packet by mouth daily. Qty: 30 Packet, Refills: 0       !! - Potential duplicate medications found. Please discuss with provider. CONTINUE these medications which have NOT CHANGED    Details   !! ALPRAZolam (XANAX) 0.5 mg tablet Take 0.5 mg by mouth three (3) times daily as needed for Anxiety. levothyroxine (SYNTHROID) 100 mcg tablet TAKE 1 TABLET BY MOUTH EVERY MORNING BEFORE BREAKFAST  Qty: 30 Tab, Refills: 3    Comments: Generic For:*SYNTHROID  100MCG  04/04/2019 10:39:40 AM      warfarin (JANTOVEN) 2.5 mg tablet TAKE 1 TABLET BY MOUTH EVERY EVENING FOR BLOOD THINNER  Qty: 30 Tab, Refills: 3    Comments: Generic For:*COUMADIN   2.5MG  03/13/2019 10:33:44 AM      atenolol (TENORMIN) 25 mg tablet TAKE 1 TABLET BY MOUTH ONCE DAILY  Qty: 30 Tab, Refills: 2    Comments: Generic For:*TENORMIN   25MG  04/17/2019 10:27:26 AM      potassium chloride (K-DUR, KLOR-CON) 20 mEq tablet TAKE 1 TABLET BY MOUTH EVERY DAY  Qty: 30 Tab, Refills: 3    Comments: Generic For:*K-DUR 20 MEQ TABLET SA  01/11/2019 10:29:13 AM      calcium-vitamin D (OYSTER SHELL) 500 mg(1,250mg) -200 unit per tablet Take 1 Tab by mouth three (3) times daily (with meals). Qty: 90 Tab, Refills: 0      acetaminophen (TYLENOL) 325 mg tablet Take 2 Tabs by mouth every six (6) hours. Qty: 100 Tab, Refills: 0       !! - Potential duplicate medications found. Please discuss with provider.         STOP taking these medications       HYDROcodone-acetaminophen (NORCO) 7.5-325 mg per tablet Comments:   Reason for Stopping:         senna-docusate (PERICOLACE) 8.6-50 mg per tablet Comments:   Reason for Stopping:               The patient's chart, MAR and AVS were reviewed by Shawn Park PHARMD.    Appropriate updates completed by the discharging MD.

## 2019-06-11 ENCOUNTER — PATIENT OUTREACH (OUTPATIENT)
Dept: CASE MANAGEMENT | Age: 84
End: 2019-06-11

## 2019-06-11 NOTE — PROGRESS NOTES
Community Care Team documentation for patient in [facility]  Initial Follow Up       Patient was discharged to [facility]. Information included in this progress note has been provided to SNF. Hospital Admission and Diagnosis: [facility] 5/27-6/5       RRAT Score: 25        Advance Care Planning:  DDNR , POA , Advance Directive on file      PCP : Yuniel Ulloa MD    SNF Attending:  Mena Dixon Md    Spoke with SNF team. Ensured patient arrived to SNF safely with admission packet in order. Provided needed hospital follow up appointments: Dr. Michael Gutierrez on 6/7 at 9:45 AM. Patient did not attend this appointment. SNF will discuss with patient/family and reschedule. PT/OT providing skilled therapy in SNF. Patient participating well. Currently min assist with transfers. Standing in parallel bars. Patient wants to improve to better than her prior level of functioning. SNF working to get patient back to baseline. Daily weights for HF management. Hospital DC weight 132 lbs 4.4 oz. Per SNF staff, weight has been stable since admission. Plan for discharge to home alone with private caregiver. Prior to admission, patient was open to [facility] and has used [facility] in 2018. Community Care Team will follow up weekly with [facility] until discharge. Medications were not reconciled and general patient assessment was not completed during this [facility] outreach.       Lavera Sacks, MSN, RN, ACNS-BC, [facility]  Nurse Navigator, [place] [phone]

## 2019-06-18 ENCOUNTER — PATIENT OUTREACH (OUTPATIENT)
Dept: CASE MANAGEMENT | Age: 84
End: 2019-06-18

## 2019-06-18 NOTE — PROGRESS NOTES
Community Care Team Documentation for Patient in [facility]  Subsequent Follow up     Patient remains at [facility] ([facility]). See previous [facility] Team notes. PCP : Rudy Rowe MD    Spoke with SNF team.       Provided needed hospital follow up appointments: Dr. Rosita Laurent on 6/7 at 9:45 AM. Patient did not attend this appointment. SNF will discuss with patient/family and reschedule. SNF Medical update: CHF Dx - Cardiologist acute on chronic diastolic CHF: Hosp DC wt 294KOY 4.4 0z on 6/5. snf WEIGHT ON 6/18 .2 LBS. Hospital DC order to weigh patient daily and report weight gain of 3# in one day or 5# in one week to Dr. Aubrey Sarmiento at [phone], Diet Cardiac Diet:low-salt (2gm/day) cardiac diet      PT/OT update: Currently mod to max assist with supine to sit. Min assist with transfers. Has not ambulated in 9 months. Not yet working on ADLS with patient. Patient has lot of learned helplessness. Barriers: [place] helplessness. Anticipated LOS/discharge date: 6/25/19      Disposition: Plan for discharge to [address] [place] [address] Patient has caregiver in place. Home health/DME: [facility]        Medications were not reconciled and general patient assessment was not completed during this skilled nursing facility outreach.      Eliu Pierce, MSN, RN, ACNS-BC, [facility]  Nurse Navigator, [employer] [phone]

## 2019-06-25 ENCOUNTER — PATIENT OUTREACH (OUTPATIENT)
Dept: CASE MANAGEMENT | Age: 84
End: 2019-06-25

## 2019-06-26 NOTE — PROGRESS NOTES
Community Care Team Documentation for Patient in [facility]  Discharge Note    Patient has been discharged from [facility] ([facility]). See previous [facility] Team notes. PCP : Trever Hensley MD    Spoke with SNF team.  Confirmed patient discharged 6/25 to [facility] with caregiver and [facility]. Community Care Team will sign off at this time. Medications were not reconciled and general patient assessment was not completed during this skilled nursing facility outreach.      Deja Orozco, MSN, RN, ACNS-BC, [facility]  Nurse Navigator, [employer] [phone]

## 2019-07-07 NOTE — DISCHARGE SUMMARY
Physician Discharge Summary     Patient ID:  Ambrose Bernal  [id number]  76 y.o.  6/12/1931    Admit date: 5/27/2019    Discharge date and time: 7/7/2019    Admission Diagnoses: Acute CHF (congestive heart failure) ([facility] [I50.9]    Discharge Diagnoses:  Principal Diagnosis Acute on chronic diastolic (congestive) heart failure (HCC)                                            Principal Problem:    Acute on chronic diastolic (congestive) heart failure ([facility] (6/5/2019)    Active Problems:    A-fib (HCC) ()      CAD (coronary artery disease) ()      PNA (pneumonia) (5/27/2019)      Pulmonary hypertension ([facility] (6/5/2019)           Hospital Course: Presented to hospital with some SOB and volume overload. Gentle diuresis was done over the next few days. Patient improved gradually each day. Discharged to rehab nursing facility for PT/OT. Discharged in much better condition. NYHA class 3 on admission, class 1 on discharge. PCP: JESSEE Preston    Consults: Cardiology        Discharge Exam:  Visit Vitals  /53 (BP 1 Location: Right arm, BP Patient Position: At rest)   Pulse 78   Temp 98.3 °F (36.8 °C)   Resp 16   Ht 5' 3\" (1.6 m)   Wt 132 lb 4.4 oz (60 kg)   SpO2 90%   BMI 23.43 kg/m²     Lungs: clear to auscultation bilaterally  Heart: regular rate and rhythm, S1, S2 normal, no murmur, click, rub or gallop  Abdomen: soft, non-tender. Bowel sounds normal. No masses,  no organomegaly    Disposition: SNF    Patient Instructions:   Cannot display discharge medications since this patient is not currently admitted. Activity: Activity as tolerated  Diet: Regular Diet  Wound Care: None needed    Follow-up Appointments   Procedures    FOLLOW UP VISIT Appointment in: Other (Specify) 1 week after discharge from facility     1 week after discharge from facility     Standing Status:   Standing     Number of Occurrences:   1     Order Specific Question:   Appointment in     Answer:    Other (Specify)          Signed:  Myesha Duffy MD  7/7/2019  2:53 PM

## 2019-07-07 NOTE — DISCHARGE INSTRUCTIONS
HEART FAILURE INSTRUCTIONS    Please weigh patient daily and report weight gain of 3# in one day or 5# in one week to Dr. Kofi Moreno at [phone]  Please give patient low-salt (2gm/day) cardiac diet. Thank you!   Jonathan KWAN (Healthy Understanding of Goals) Transitional Care Team at [facility]  [phone]

## 2019-09-14 PROBLEM — D69.2 SENILE PURPURA (HCC): Status: ACTIVE | Noted: 2019-09-14

## 2019-09-27 ENCOUNTER — APPOINTMENT (OUTPATIENT)
Dept: GENERAL RADIOLOGY | Age: 84
DRG: 292 | End: 2019-09-27
Attending: STUDENT IN AN ORGANIZED HEALTH CARE EDUCATION/TRAINING PROGRAM
Payer: MEDICARE

## 2019-09-27 ENCOUNTER — HOSPITAL ENCOUNTER (INPATIENT)
Age: 84
LOS: 7 days | Discharge: HOME | DRG: 292 | End: 2019-10-04
Attending: STUDENT IN AN ORGANIZED HEALTH CARE EDUCATION/TRAINING PROGRAM | Admitting: FAMILY MEDICINE
Payer: MEDICARE

## 2019-09-27 DIAGNOSIS — F03.90 DEMENTIA WITHOUT BEHAVIORAL DISTURBANCE, UNSPECIFIED DEMENTIA TYPE: ICD-10-CM

## 2019-09-27 DIAGNOSIS — I50.33 ACUTE ON CHRONIC DIASTOLIC (CONGESTIVE) HEART FAILURE (HCC): Primary | ICD-10-CM

## 2019-09-27 DIAGNOSIS — R79.1 SUPRATHERAPEUTIC INR: ICD-10-CM

## 2019-09-27 DIAGNOSIS — R54 AGE-RELATED PHYSICAL DEBILITY: ICD-10-CM

## 2019-09-27 DIAGNOSIS — R32 URINARY INCONTINENCE, UNSPECIFIED TYPE: ICD-10-CM

## 2019-09-27 DIAGNOSIS — Z71.89 ADVANCE CARE PLANNING: ICD-10-CM

## 2019-09-27 LAB
ALBUMIN SERPL-MCNC: 3.5 G/DL (ref 3.5–5)
ALBUMIN/GLOB SERPL: 0.9 {RATIO} (ref 1.1–2.2)
ALP SERPL-CCNC: 90 U/L (ref 45–117)
ALT SERPL-CCNC: 12 U/L (ref 12–78)
ANION GAP SERPL CALC-SCNC: 8 MMOL/L (ref 5–15)
APPEARANCE UR: ABNORMAL
AST SERPL-CCNC: 24 U/L (ref 15–37)
BACTERIA URNS QL MICRO: NEGATIVE /HPF
BASOPHILS # BLD: 0 K/UL (ref 0–0.1)
BASOPHILS NFR BLD: 1 % (ref 0–1)
BILIRUB SERPL-MCNC: 1.4 MG/DL (ref 0.2–1)
BILIRUB UR QL CFM: NEGATIVE
BNP SERPL-MCNC: 9199 PG/ML
BUN SERPL-MCNC: 20 MG/DL (ref 6–20)
BUN/CREAT SERPL: 20 (ref 12–20)
CALCIUM SERPL-MCNC: 10.2 MG/DL (ref 8.5–10.1)
CHLORIDE SERPL-SCNC: 96 MMOL/L (ref 97–108)
CO2 SERPL-SCNC: 29 MMOL/L (ref 21–32)
COLOR UR: ABNORMAL
COMMENT, HOLDF: NORMAL
CREAT SERPL-MCNC: 1 MG/DL (ref 0.55–1.02)
DIFFERENTIAL METHOD BLD: ABNORMAL
EOSINOPHIL # BLD: 0 K/UL (ref 0–0.4)
EOSINOPHIL NFR BLD: 0 % (ref 0–7)
EPITH CASTS URNS QL MICRO: ABNORMAL /LPF
ERYTHROCYTE [DISTWIDTH] IN BLOOD BY AUTOMATED COUNT: 14.2 % (ref 11.5–14.5)
GLOBULIN SER CALC-MCNC: 4.1 G/DL (ref 2–4)
GLUCOSE SERPL-MCNC: 109 MG/DL (ref 65–100)
GLUCOSE UR STRIP.AUTO-MCNC: NEGATIVE MG/DL
HCT VFR BLD AUTO: 42 % (ref 35–47)
HGB BLD-MCNC: 13 G/DL (ref 11.5–16)
HGB UR QL STRIP: ABNORMAL
HYALINE CASTS URNS QL MICRO: ABNORMAL /LPF (ref 0–5)
IMM GRANULOCYTES # BLD AUTO: 0 K/UL (ref 0–0.04)
IMM GRANULOCYTES NFR BLD AUTO: 0 % (ref 0–0.5)
INR PPP: 6.6 (ref 0.9–1.1)
KETONES UR QL STRIP.AUTO: NEGATIVE MG/DL
LEUKOCYTE ESTERASE UR QL STRIP.AUTO: NEGATIVE
LYMPHOCYTES # BLD: 0.4 K/UL (ref 0.8–3.5)
LYMPHOCYTES NFR BLD: 12 % (ref 12–49)
MCH RBC QN AUTO: 28.5 PG (ref 26–34)
MCHC RBC AUTO-ENTMCNC: 31 G/DL (ref 30–36.5)
MCV RBC AUTO: 92.1 FL (ref 80–99)
MONOCYTES # BLD: 0.5 K/UL (ref 0–1)
MONOCYTES NFR BLD: 14 % (ref 5–13)
MUCOUS THREADS URNS QL MICRO: ABNORMAL /LPF
NEUTS SEG # BLD: 2.8 K/UL (ref 1.8–8)
NEUTS SEG NFR BLD: 73 % (ref 32–75)
NITRITE UR QL STRIP.AUTO: NEGATIVE
NRBC # BLD: 0 K/UL (ref 0–0.01)
NRBC BLD-RTO: 0 PER 100 WBC
PH UR STRIP: 6 [PH] (ref 5–8)
PLATELET # BLD AUTO: 220 K/UL (ref 150–400)
PMV BLD AUTO: 11.5 FL (ref 8.9–12.9)
POTASSIUM SERPL-SCNC: 5.3 MMOL/L (ref 3.5–5.1)
PROT SERPL-MCNC: 7.6 G/DL (ref 6.4–8.2)
PROT UR STRIP-MCNC: 100 MG/DL
PROTHROMBIN TIME: 60.1 SEC (ref 9–11.1)
RBC # BLD AUTO: 4.56 M/UL (ref 3.8–5.2)
RBC #/AREA URNS HPF: ABNORMAL /HPF (ref 0–5)
RBC MORPH BLD: ABNORMAL
SAMPLES BEING HELD,HOLD: NORMAL
SODIUM SERPL-SCNC: 133 MMOL/L (ref 136–145)
SP GR UR REFRACTOMETRY: 1.02 (ref 1–1.03)
TROPONIN I SERPL-MCNC: 0.09 NG/ML
UR CULT HOLD, URHOLD: NORMAL
UROBILINOGEN UR QL STRIP.AUTO: 1 EU/DL (ref 0.2–1)
WBC # BLD AUTO: 3.7 K/UL (ref 3.6–11)
WBC URNS QL MICRO: ABNORMAL /HPF (ref 0–4)

## 2019-09-27 PROCEDURE — 74011250637 HC RX REV CODE- 250/637: Performed by: FAMILY MEDICINE

## 2019-09-27 PROCEDURE — 74011000250 HC RX REV CODE- 250: Performed by: STUDENT IN AN ORGANIZED HEALTH CARE EDUCATION/TRAINING PROGRAM

## 2019-09-27 PROCEDURE — 85025 COMPLETE CBC W/AUTO DIFF WBC: CPT

## 2019-09-27 PROCEDURE — 65660000000 HC RM CCU STEPDOWN

## 2019-09-27 PROCEDURE — 84484 ASSAY OF TROPONIN QUANT: CPT

## 2019-09-27 PROCEDURE — 71045 X-RAY EXAM CHEST 1 VIEW: CPT

## 2019-09-27 PROCEDURE — 99285 EMERGENCY DEPT VISIT HI MDM: CPT

## 2019-09-27 PROCEDURE — 85610 PROTHROMBIN TIME: CPT

## 2019-09-27 PROCEDURE — 74011250636 HC RX REV CODE- 250/636: Performed by: FAMILY MEDICINE

## 2019-09-27 PROCEDURE — 96374 THER/PROPH/DIAG INJ IV PUSH: CPT

## 2019-09-27 PROCEDURE — 80053 COMPREHEN METABOLIC PANEL: CPT

## 2019-09-27 PROCEDURE — 83880 ASSAY OF NATRIURETIC PEPTIDE: CPT

## 2019-09-27 PROCEDURE — 81001 URINALYSIS AUTO W/SCOPE: CPT

## 2019-09-27 PROCEDURE — 36415 COLL VENOUS BLD VENIPUNCTURE: CPT

## 2019-09-27 RX ORDER — ZOLPIDEM TARTRATE 5 MG/1
5 TABLET ORAL
Status: DISCONTINUED | OUTPATIENT
Start: 2019-09-27 | End: 2019-10-04 | Stop reason: HOSPADM

## 2019-09-27 RX ORDER — POTASSIUM CHLORIDE 750 MG/1
20 TABLET, FILM COATED, EXTENDED RELEASE ORAL DAILY
Status: DISCONTINUED | OUTPATIENT
Start: 2019-09-29 | End: 2019-10-04 | Stop reason: HOSPADM

## 2019-09-27 RX ORDER — ATENOLOL 25 MG/1
12.5 TABLET ORAL DAILY
Status: DISCONTINUED | OUTPATIENT
Start: 2019-09-28 | End: 2019-09-28

## 2019-09-27 RX ORDER — ACETAMINOPHEN 325 MG/1
650 TABLET ORAL EVERY 6 HOURS
Status: DISCONTINUED | OUTPATIENT
Start: 2019-09-27 | End: 2019-10-04 | Stop reason: HOSPADM

## 2019-09-27 RX ORDER — SODIUM CHLORIDE 0.9 % (FLUSH) 0.9 %
5-40 SYRINGE (ML) INJECTION AS NEEDED
Status: DISCONTINUED | OUTPATIENT
Start: 2019-09-27 | End: 2019-10-04 | Stop reason: HOSPADM

## 2019-09-27 RX ORDER — FERROUS SULFATE, DRIED 160(50) MG
1 TABLET, EXTENDED RELEASE ORAL
Status: DISCONTINUED | OUTPATIENT
Start: 2019-09-28 | End: 2019-10-04 | Stop reason: HOSPADM

## 2019-09-27 RX ORDER — FUROSEMIDE 10 MG/ML
40 INJECTION INTRAMUSCULAR; INTRAVENOUS EVERY 12 HOURS
Status: DISCONTINUED | OUTPATIENT
Start: 2019-09-27 | End: 2019-09-29

## 2019-09-27 RX ORDER — SODIUM CHLORIDE 0.9 % (FLUSH) 0.9 %
5-40 SYRINGE (ML) INJECTION EVERY 8 HOURS
Status: DISCONTINUED | OUTPATIENT
Start: 2019-09-27 | End: 2019-10-04 | Stop reason: HOSPADM

## 2019-09-27 RX ORDER — ALPRAZOLAM 0.5 MG/1
0.5 TABLET ORAL
Status: DISCONTINUED | OUTPATIENT
Start: 2019-09-27 | End: 2019-10-04 | Stop reason: HOSPADM

## 2019-09-27 RX ORDER — FAMOTIDINE 20 MG/1
20 TABLET, FILM COATED ORAL
Status: DISCONTINUED | OUTPATIENT
Start: 2019-09-27 | End: 2019-10-04 | Stop reason: HOSPADM

## 2019-09-27 RX ORDER — GLUCOSAMINE/CHONDR SU A SOD 750-600 MG
2 TABLET ORAL DAILY
COMMUNITY
End: 2019-10-04

## 2019-09-27 RX ORDER — LORATADINE 10 MG/1
10 TABLET ORAL DAILY
Status: DISCONTINUED | OUTPATIENT
Start: 2019-09-28 | End: 2019-10-04 | Stop reason: HOSPADM

## 2019-09-27 RX ORDER — BUMETANIDE 0.25 MG/ML
1 INJECTION INTRAMUSCULAR; INTRAVENOUS
Status: COMPLETED | OUTPATIENT
Start: 2019-09-27 | End: 2019-09-27

## 2019-09-27 RX ORDER — LEVOTHYROXINE SODIUM 100 UG/1
100 TABLET ORAL
Status: DISCONTINUED | OUTPATIENT
Start: 2019-09-28 | End: 2019-10-04 | Stop reason: HOSPADM

## 2019-09-27 RX ORDER — POLYETHYLENE GLYCOL 3350 17 G/17G
17 POWDER, FOR SOLUTION ORAL DAILY
Status: DISCONTINUED | OUTPATIENT
Start: 2019-09-28 | End: 2019-10-04 | Stop reason: HOSPADM

## 2019-09-27 RX ADMIN — BUMETANIDE 1 MG: 0.25 INJECTION INTRAMUSCULAR; INTRAVENOUS at 16:55

## 2019-09-27 RX ADMIN — ACETAMINOPHEN 650 MG: 325 TABLET, FILM COATED ORAL at 20:31

## 2019-09-27 RX ADMIN — FUROSEMIDE 40 MG: 10 INJECTION, SOLUTION INTRAMUSCULAR; INTRAVENOUS at 20:32

## 2019-09-27 NOTE — PROGRESS NOTES
TRANSFER - IN REPORT:    Verbal report received from Haylee(name) on Anamika Deutsch  being received from ED(unit) for routine progression of care      Report consisted of patients Situation, Background, Assessment and   Recommendations(SBAR). Information from the following report(s) SBAR, MAR and Recent Results was reviewed with the receiving nurse. Opportunity for questions and clarification was provided. Assessment completed upon patients arrival to unit and care assumed. Bedside and Verbal shift change report given to [address] [place] [address] (oncoming nurse) by Flaquita Mcfarland (offgoing nurse). Report included the following information SBAR, MAR and Recent Results.

## 2019-09-27 NOTE — H&P
History and Physical    Subjective:   HPI  Jaxon Garrison is a 80 y.o.  female who presents with acute on chronic d CHF with swelling B legs to waist. Has been noncompliant with taking her diuretics at home because she has very weak legs and unable to get up on her own- urinates all over the place. No SOB or CP. Marielle Mao Past Medical History:   Diagnosis Date    A-fib [facility])     Arrhythmia     A FIB    Arthritis     Back pain     CAD (coronary artery disease)     PT DENIES    Chronic pain     Dementia 2016    Hypercholesterolemia     Psychiatric disorder     ANXIETY    Shoulder pain     Thyroid disease     Tremor, essential       Past Surgical History:   Procedure Laterality Date    HX APPENDECTOMY      HX ORTHOPAEDIC      left knee replacement    HX KADIE AND BSO      AGE 39    HX TONSIL AND ADENOIDECTOMY      IR KYPHOPLASTY LUMBAR  2019     Family History   Problem Relation Age of Onset    Dementia Mother     Arthritis-osteo Father       Social History     Tobacco Use    Smoking status: Former Smoker     Packs/day: 0.50     Years: 10.00     Pack years: 5.00     Last attempt to quit: 1994     Years since quittin.7    Smokeless tobacco: Never Used   Substance Use Topics    Alcohol use: Yes     Comment: NIGHTLY 2 GLASSES WINE       Prior to Admission medications    Medication Sig Start Date End Date Taking? Authorizing Provider   furosemide (LASIX) 40 mg tablet 1 tab po daily 19   Dipika Bhandari MD   spironolactone (ALDACTONE) 25 mg tablet Take 1 Tab by mouth daily. 19   Dipika Bhandari MD   warfarin (COUMADIN) 4 mg tablet Take 1 Tab by mouth daily. 19   Dipika Bhandari MD   loratadine (CLARITIN) 10 mg tablet Take 1 Tab by mouth daily. 19   Dipika Bhandari MD   ALPRAZolam [place]) 0.5 mg tablet Take 1 Tab by mouth three (3) times daily as needed for Anxiety.  Max Daily Amount: 1.5 mg. 19   Dipika Bhandari MD   famotidine (PEPCID) 20 mg tablet Take 1 Tab by mouth daily as needed (indigestion/heartburn/acid reflux). 6/5/19   Nickie Patel MD   polyethylene glycol (MIRALAX) 17 gram packet Take 1 Packet by mouth daily. 6/5/19   Nickie Patel MD   collagenase (SANTYL) 250 unit/gram ointment Apply  to affected area daily. 6/5/19   Nickie Patel MD   atenolol (TENORMIN) 25 mg tablet Take 0.5 Tabs by mouth daily. 6/6/19   Nickie Patel MD   levothyroxine (SYNTHROID) 100 mcg tablet TAKE 1 TABLET BY MOUTH EVERY MORNING BEFORE BREAKFAST 4/8/19   Nickie Patel MD   warfarin (JANTOVEN) 2.5 mg tablet TAKE 1 TABLET BY MOUTH EVERY EVENING FOR BLOOD THINNER 3/13/19   Nickie Patel MD   potassium chloride (K-DUR, KLOR-CON) 20 mEq tablet TAKE 1 TABLET BY MOUTH EVERY DAY 1/14/19   Nickie Patel MD   calcium-vitamin D (OYSTER SHELL) 500 mg(1,250mg) -200 unit per tablet Take 1 Tab by mouth three (3) times daily (with meals). 10/10/18   Marcia Jernigan NP   acetaminophen (TYLENOL) 325 mg tablet Take 2 Tabs by mouth every six (6) hours. 8/3/18   Nickie Patel MD     Allergies   Allergen Reactions    Latex, Natural Rubber Other (comments)     \"I get black where ever it touches\"    Codeine Other (comments)     \"It just sends me up the wall\"    Adhesive Rash and Other (comments)     blisters    Cortisone Unknown (comments)      Health Maintenance   Topic Date Due    DTaP/Tdap/Td series (1 - Tdap) 06/12/1952    Shingrix Vaccine Age 50> (1 of 2) 06/12/1981    GLAUCOMA SCREENING Q2Y  06/12/1996    Bone Densitometry (Dexa) Screening  06/12/1996    Pneumococcal 65+ years (2 of 2 - PCV13) 01/16/2015    Influenza Age 9 to Adult  08/01/2019    MEDICARE YEARLY EXAM  07/03/2020    COLONOSCOPY  12/13/2020       Review of Systems:  A comprehensive review of systems was negative except for that written in the History of Present Illness. Objective:      Intake and Output:    09/27 0701 - 09/27 1900  In: -   Out: 150 [Urine:150]  No intake/output data recorded. Physical Exam:   Visit Vitals  /60   Pulse 62   Temp 97.6 °F (36.4 °C)   Resp 10   Ht 5' 3\" (1.6 m)   Wt 145 lb 4.5 oz (65.9 kg)   SpO2 96%   BMI 25.74 kg/m²     Lungs: clear to auscultation bilaterally  Heart: regular rate and rhythm, S1, S2 normal, no murmur, click, rub or gallop  Abdomen: soft, non-tender. Bowel sounds normal. No masses,  no organomegaly  Extremities: 4+ R>L LE edema. Neurologic: Grossly normal        Data Review:   Recent Results (from the past 24 hour(s))   CBC WITH AUTOMATED DIFF    Collection Time: 09/27/19  1:24 PM   Result Value Ref Range    WBC 3.7 3.6 - 11.0 K/uL    RBC 4.56 3.80 - 5.20 M/uL    HGB 13.0 11.5 - 16.0 g/dL    HCT 42.0 35.0 - 47.0 %    MCV 92.1 80.0 - 99.0 FL    MCH 28.5 26.0 - 34.0 PG    MCHC 31.0 30.0 - 36.5 g/dL    RDW 14.2 11.5 - 14.5 %    PLATELET 430 109 - 414 K/uL    MPV 11.5 8.9 - 12.9 FL    NRBC 0.0 0  WBC    ABSOLUTE NRBC 0.00 0.00 - 0.01 K/uL    NEUTROPHILS 73 32 - 75 %    LYMPHOCYTES 12 12 - 49 %    MONOCYTES 14 (H) 5 - 13 %    EOSINOPHILS 0 0 - 7 %    BASOPHILS 1 0 - 1 %    IMMATURE GRANULOCYTES 0 0.0 - 0.5 %    ABS. NEUTROPHILS 2.8 1.8 - 8.0 K/UL    ABS. LYMPHOCYTES 0.4 (L) 0.8 - 3.5 K/UL    ABS. MONOCYTES 0.5 0.0 - 1.0 K/UL    ABS. EOSINOPHILS 0.0 0.0 - 0.4 K/UL    ABS. BASOPHILS 0.0 0.0 - 0.1 K/UL    ABS. IMM.  GRANS. 0.0 0.00 - 0.04 K/UL    DF AUTOMATED      RBC COMMENTS ANISOCYTOSIS  1+       METABOLIC PANEL, COMPREHENSIVE    Collection Time: 09/27/19  1:24 PM   Result Value Ref Range    Sodium 133 (L) 136 - 145 mmol/L    Potassium 5.3 (H) 3.5 - 5.1 mmol/L    Chloride 96 (L) 97 - 108 mmol/L    CO2 29 21 - 32 mmol/L    Anion gap 8 5 - 15 mmol/L    Glucose 109 (H) 65 - 100 mg/dL    BUN 20 6 - 20 MG/DL    Creatinine 1.00 0.55 - 1.02 MG/DL    BUN/Creatinine ratio 20 12 - 20      GFR est AA >60 >60 ml/min/1.73m2    GFR est non-AA 52 (L) >60 ml/min/1.73m2    Calcium 10.2 (H) 8.5 - 10.1 MG/DL    Bilirubin, total 1.4 (H) 0.2 - 1.0 MG/DL ALT (SGPT) 12 12 - 78 U/L    AST (SGOT) 24 15 - 37 U/L    Alk. phosphatase 90 45 - 117 U/L    Protein, total 7.6 6.4 - 8.2 g/dL    Albumin 3.5 3.5 - 5.0 g/dL    Globulin 4.1 (H) 2.0 - 4.0 g/dL    A-G Ratio 0.9 (L) 1.1 - 2.2     NT-PRO BNP    Collection Time: 09/27/19  1:24 PM   Result Value Ref Range    NT pro-BNP 9,199 (H) <450 PG/ML   TROPONIN I    Collection Time: 09/27/19  1:24 PM   Result Value Ref Range    Troponin-I, Qt. 0.09 (H) <0.05 ng/mL   URINALYSIS W/MICROSCOPIC    Collection Time: 09/27/19  1:24 PM   Result Value Ref Range    Color DARK YELLOW      Appearance CLOUDY (A) CLEAR      Specific gravity 1.024 1.003 - 1.030      pH (UA) 6.0 5.0 - 8.0      Protein 100 (A) NEG mg/dL    Glucose NEGATIVE  NEG mg/dL    Ketone NEGATIVE  NEG mg/dL    Blood LARGE (A) NEG      Urobilinogen 1.0 0.2 - 1.0 EU/dL    Nitrites NEGATIVE  NEG      Leukocyte Esterase NEGATIVE  NEG      WBC 0-4 0 - 4 /hpf    RBC 10-20 0 - 5 /hpf    Epithelial cells MANY (A) FEW /lpf    Bacteria NEGATIVE  NEG /hpf    Mucus 3+ (A) NEG /lpf    Hyaline cast 10-20 0 - 5 /lpf   URINE CULTURE HOLD SAMPLE    Collection Time: 09/27/19  1:24 PM   Result Value Ref Range    Urine culture hold        URINE ON HOLD IN MICROBIOLOGY DEPT FOR 3 DAYS. IF UNPRESERVED URINE IS SUBMITTED, IT CANNOT BE USED FOR ADDITIONAL TESTING AFTER 24 HRS, RECOLLECTION WILL BE REQUIRED. PROTHROMBIN TIME + INR    Collection Time: 09/27/19  1:24 PM   Result Value Ref Range    INR 6.6 (HH) 0.9 - 1.1      Prothrombin time 60.1 (H) 9.0 - 11.1 sec   SAMPLES BEING HELD    Collection Time: 09/27/19  1:24 PM   Result Value Ref Range    SAMPLES BEING HELD  1 RED     COMMENT        Add-on orders for these samples will be processed based on acceptable specimen integrity and analyte stability, which may vary by analyte. BILIRUBIN, CONFIRM    Collection Time: 09/27/19  1:24 PM   Result Value Ref Range    Bilirubin UA, confirm NEGATIVE  NEG       CXR- 1. Right lung is clear.   2. There is chronic opacity at the left lung base consistent with left basilar  atelectasis/scarring/effusion      Assessment:     Active Problems:    Acute on chronic diastolic CHF (congestive heart failure) ([facility]) (9/27/2019)        Plan:     1) diuresis with IV Lasix  2) Mock cath   3) mildly elevated troponin- will have Cardiology see in AM  4) follow troponin  5) No Warfarin ordered given INR- >6.   Pharmacy to dose    Signed By: Fely Eric MD     September 27, 2019

## 2019-09-27 NOTE — ED TRIAGE NOTES
Patient to ED from [address] [place] [address] with her part time aid, c/o increased swelling to RIGHT leg, was seen earlier this week by Dr Nasima Vela.   Dr Nasima Vela called patient today and told her to come to ER, patient denies CP/SOB

## 2019-09-27 NOTE — ROUTINE PROCESS
TRANSFER - OUT REPORT: 
 
Verbal report given to Eden Brandon RN(name) on Basilio Potter  being transferred to [facility](unit) for routine progression of care Report consisted of patients Situation, Background, Assessment and  
Recommendations(SBAR). Information from the following report(s) SBAR was reviewed with the receiving nurse. Lines:  
Peripheral IV 09/27/19 Right Antecubital (Active) Site Assessment Clean, dry, & intact 9/27/2019  1:56 PM  
Phlebitis Assessment 0 9/27/2019  1:56 PM  
Infiltration Assessment 0 9/27/2019  1:56 PM  
Dressing Status Clean, dry, & intact 9/27/2019  1:56 PM  
Dressing Type Transparent 9/27/2019  1:56 PM  
  
 
Opportunity for questions and clarification was provided.

## 2019-09-27 NOTE — PROGRESS NOTES
Admission Medication Reconciliation:    Information obtained from:  RX query, chart, patient  RxQuery data available¹:  YES    Comments/Recommendations: Updated PTA meds/reviewed patient's allergies. Patient provided information. Medication changes (since last review): Added  Biotin    Removed  Miralax: \"I hate that stuff. \"  Claritin: \"I hate that stuff. \"  Calcium-Vit D    Thank you for allowing me to participate in the care of your patient. Alphonso Sanchez PharmD, RN #[id number] [facility] benefit data reflects medications filled and processed through the patient's insurance, however   this data does NOT capture whether the medication was picked up or is currently being taken by the patient. Allergies:  Latex, natural rubber; Codeine; Adhesive; and Cortisone    Significant PMH/Disease States:   Past Medical History:   Diagnosis Date    A-fib ([facility] [address]     Arrhythmia     A FIB    Arthritis     Back pain     CAD (coronary artery disease)     PT DENIES    Chronic pain     Dementia 2/9/2016    Hypercholesterolemia     Psychiatric disorder     ANXIETY    Shoulder pain     Thyroid disease     Tremor, essential      Chief Complaint for this Admission:    Chief Complaint   Patient presents with    Ankle swelling     Prior to Admission Medications:   Prior to Admission Medications   Prescriptions Last Dose Informant Patient Reported? Taking? ALPRAZolam (XANAX) 0.5 mg tablet 9/27/2019 at Unknown time  No Yes   Sig: Take 1 Tab by mouth three (3) times daily as needed for Anxiety. Max Daily Amount: 1.5 mg.   Biotin 2,500 mcg cap 9/27/2019 at Unknown time  Yes Yes   Sig: Take 2 Caps by mouth daily. acetaminophen (TYLENOL) 325 mg tablet   No No   Sig: Take 2 Tabs by mouth every six (6) hours. atenolol (TENORMIN) 25 mg tablet 9/27/2019 at Unknown time  No Yes   Sig: Take 0.5 Tabs by mouth daily. collagenase (SANTYL) 250 unit/gram ointment 9/27/2019 at Unknown time  No Yes   Sig: Apply  to affected area daily. famotidine (PEPCID) 20 mg tablet 2019 at Unknown time  No Yes   Sig: Take 1 Tab by mouth daily as needed (indigestion/heartburn/acid reflux). furosemide (LASIX) 40 mg tablet 2019 at Unknown time  No Yes   Si tab po daily   levothyroxine (SYNTHROID) 100 mcg tablet 2019 at Unknown time  No Yes   Sig: TAKE 1 TABLET BY MOUTH EVERY MORNING BEFORE BREAKFAST   potassium chloride (K-DUR, KLOR-CON) 20 mEq tablet 2019 at Unknown time  No Yes   Sig: TAKE 1 TABLET BY MOUTH EVERY DAY   spironolactone (ALDACTONE) 25 mg tablet 2019 at Unknown time  No Yes   Sig: Take 1 Tab by mouth daily. warfarin (COUMADIN) 4 mg tablet 2019 at Unknown time  No Yes   Sig: Take 1 Tab by mouth daily. Facility-Administered Medications: None       Please contact the main inpatient pharmacy with any questions or concerns at [phone] and we will direct you to the clinical pharmacist covering this patient's care while in-house.    NOE Reyes

## 2019-09-27 NOTE — ED PROVIDER NOTES
80 y.o. female with past medical history significant for back pain, tremors, a-fib, thyroid disease, hypercholesterolemia, arthritis, shoulder pain, CAD, chronic pain, anxiety, arrhythmia, and dementia who presents from home via personal vehicle with chief complaint of leg swelling. Patient was referred to the ED by Dr. Eveline Ag for bilateral leg swelling. Caregiver states the patient's swelling extended from her feet up to her hips. According to the caregiver, the patient has been non-compliant with her fluid pills and other medications. Caregiver also notes the patient has had onset of a foul smelling rash underneath her left breast, with her breast increasing in swelling. There are no other acute medical concerns at this time. Social hx: Former smoker, positive EtOH  PCP: Jacobo Gosselin, MD    Full history, physical exam, and ROS unable to be obtained due to:  Age, dementia. Note written by Radha Joseph, as dictated by Carie Beyer MD 1:05 PM       The history is provided by a caregiver. The history is limited by the condition of the patient. No  was used.         Past Medical History:   Diagnosis Date    A-fib [facility])     Arrhythmia     A FIB    Arthritis     Back pain     CAD (coronary artery disease)     PT DENIES    Chronic pain     Dementia 2/9/2016    Hypercholesterolemia     Psychiatric disorder     ANXIETY    Shoulder pain     Thyroid disease     Tremor, essential        Past Surgical History:   Procedure Laterality Date    HX APPENDECTOMY      HX ORTHOPAEDIC      left knee replacement    HX KADIE AND BSO      AGE 39    HX TONSIL AND ADENOIDECTOMY      IR KYPHOPLASTY LUMBAR  4/23/2019         Family History:   Problem Relation Age of Onset    Dementia Mother     Arthritis-osteo Father        Social History     Socioeconomic History    Marital status:      Spouse name: Not on file    Number of children: Not on file    Years of education: Not on file    Highest education level: Not on file   Occupational History    Not on file   Social Needs    Financial resource strain: Not on file    Food insecurity:     Worry: Not on file     Inability: Not on file    Transportation needs:     Medical: Not on file     Non-medical: Not on file   Tobacco Use    Smoking status: Former Smoker     Packs/day: 0.50     Years: 10.00     Pack years: 5.00     Last attempt to quit: 1994     Years since quittin.7    Smokeless tobacco: Never Used   Substance and Sexual Activity    Alcohol use: Yes     Comment: NIGHTLY 2 GLASSES WINE    Drug use: No    Sexual activity: Not on file   Lifestyle    Physical activity:     Days per week: Not on file     Minutes per session: Not on file    Stress: Not on file   Relationships    Social connections:     Talks on phone: Not on file     Gets together: Not on file     Attends Sabianist service: Not on file     Active member of club or organization: Not on file     Attends meetings of clubs or organizations: Not on file     Relationship status: Not on file    Intimate partner violence:     Fear of current or ex partner: Not on file     Emotionally abused: Not on file     Physically abused: Not on file     Forced sexual activity: Not on file   Other Topics Concern    Not on file   Social History Narrative    Not on file         ALLERGIES: Latex, natural rubber; Codeine; Adhesive; and Cortisone    Review of Systems   Unable to perform ROS: Dementia   Cardiovascular: Positive for leg swelling. Vitals:    19 1157   Pulse: 85   SpO2: 90%            Physical Exam   Constitutional: She appears well-developed. No distress. HENT:   Head: Normocephalic and atraumatic. Eyes: Conjunctivae and EOM are normal.   Neck: Normal range of motion. Neck supple. Cardiovascular: Normal rate, regular rhythm and normal heart sounds. Pulmonary/Chest: Effort normal and breath sounds normal. No respiratory distress. Abdominal: Soft. There is no tenderness. There is no guarding. Musculoskeletal: Normal range of motion. She exhibits no edema. Chronic lymph edema lower extremities right greater than left. Neurological: She is alert. She exhibits normal muscle tone. Oriented to person only   Skin: Skin is warm and dry. Note written by Radha Epperson, as dictated by Stephanie Romero MD 12:55 PM       MDM       Procedures        3:42 PM  I consulted Dr. Xavier Menjivar, on-call physician for Dr. Rip Sever, and we discussed the case and he agrees with and accepts patient for admission.

## 2019-09-28 LAB
ANION GAP SERPL CALC-SCNC: 10 MMOL/L (ref 5–15)
BUN SERPL-MCNC: 20 MG/DL (ref 6–20)
BUN/CREAT SERPL: 23 (ref 12–20)
CALCIUM SERPL-MCNC: 9.9 MG/DL (ref 8.5–10.1)
CHLORIDE SERPL-SCNC: 95 MMOL/L (ref 97–108)
CO2 SERPL-SCNC: 26 MMOL/L (ref 21–32)
CREAT SERPL-MCNC: 0.87 MG/DL (ref 0.55–1.02)
GLUCOSE SERPL-MCNC: 79 MG/DL (ref 65–100)
POTASSIUM SERPL-SCNC: 5.2 MMOL/L (ref 3.5–5.1)
SODIUM SERPL-SCNC: 131 MMOL/L (ref 136–145)
TROPONIN I SERPL-MCNC: 0.06 NG/ML

## 2019-09-28 PROCEDURE — 74011250637 HC RX REV CODE- 250/637: Performed by: FAMILY MEDICINE

## 2019-09-28 PROCEDURE — 80048 BASIC METABOLIC PNL TOTAL CA: CPT

## 2019-09-28 PROCEDURE — 74011250636 HC RX REV CODE- 250/636: Performed by: FAMILY MEDICINE

## 2019-09-28 PROCEDURE — 84484 ASSAY OF TROPONIN QUANT: CPT

## 2019-09-28 PROCEDURE — 36415 COLL VENOUS BLD VENIPUNCTURE: CPT

## 2019-09-28 PROCEDURE — 65660000000 HC RM CCU STEPDOWN

## 2019-09-28 RX ORDER — NYSTATIN 100000 [USP'U]/G
POWDER TOPICAL 2 TIMES DAILY
Status: DISCONTINUED | OUTPATIENT
Start: 2019-09-28 | End: 2019-10-04 | Stop reason: HOSPADM

## 2019-09-28 RX ORDER — ATENOLOL 25 MG/1
12.5 TABLET ORAL
Status: DISCONTINUED | OUTPATIENT
Start: 2019-09-30 | End: 2019-10-02

## 2019-09-28 RX ADMIN — CALCIUM CARBONATE 500 MG (1,250 MG)-VITAMIN D3 200 UNIT TABLET 1 TABLET: at 08:45

## 2019-09-28 RX ADMIN — ZOLPIDEM TARTRATE 5 MG: 5 TABLET ORAL at 01:04

## 2019-09-28 RX ADMIN — Medication 10 ML: at 06:07

## 2019-09-28 RX ADMIN — ATENOLOL 12.5 MG: 25 TABLET ORAL at 08:45

## 2019-09-28 RX ADMIN — ACETAMINOPHEN 650 MG: 325 TABLET, FILM COATED ORAL at 06:07

## 2019-09-28 RX ADMIN — Medication 10 ML: at 23:56

## 2019-09-28 RX ADMIN — ACETAMINOPHEN 650 MG: 325 TABLET, FILM COATED ORAL at 12:02

## 2019-09-28 RX ADMIN — ACETAMINOPHEN 650 MG: 325 TABLET, FILM COATED ORAL at 01:04

## 2019-09-28 RX ADMIN — SODIUM CHLORIDE 500 ML: 900 INJECTION, SOLUTION INTRAVENOUS at 23:57

## 2019-09-28 RX ADMIN — ALPRAZOLAM 0.5 MG: 0.5 TABLET ORAL at 12:02

## 2019-09-28 RX ADMIN — NYSTATIN: 100000 POWDER TOPICAL at 12:11

## 2019-09-28 RX ADMIN — ACETAMINOPHEN 650 MG: 325 TABLET, FILM COATED ORAL at 17:42

## 2019-09-28 RX ADMIN — CALCIUM CARBONATE 500 MG (1,250 MG)-VITAMIN D3 200 UNIT TABLET 1 TABLET: at 12:02

## 2019-09-28 RX ADMIN — CALCIUM CARBONATE 500 MG (1,250 MG)-VITAMIN D3 200 UNIT TABLET 1 TABLET: at 17:41

## 2019-09-28 RX ADMIN — NYSTATIN: 100000 POWDER TOPICAL at 17:43

## 2019-09-28 RX ADMIN — Medication 10 ML: at 01:05

## 2019-09-28 RX ADMIN — FUROSEMIDE 40 MG: 10 INJECTION, SOLUTION INTRAMUSCULAR; INTRAVENOUS at 08:44

## 2019-09-28 RX ADMIN — LEVOTHYROXINE SODIUM 100 MCG: 100 TABLET ORAL at 06:07

## 2019-09-28 RX ADMIN — LORATADINE 10 MG: 10 TABLET ORAL at 08:45

## 2019-09-28 NOTE — PROGRESS NOTES
Pharmacist Note  Warfarin Dosing  Consult provided for this 80 y. o.female to manage warfarin for Atrial Fibrillation    INR Goal: 2 - 3    Home regimen/ tablet size: 4 mg daily    Drugs that may increase INR: None  Drugs that may decrease INR: None  Other current anticoagulants/ drugs that may increase bleeding risk: None  Risk factors: Decompensated Heart Failure  Daily INR ordered: YES    Recent Labs     09/27/19  1324   HGB 13.0   INR 6.6*     Date               INR                  Dose  9/27  6.6  HOLD   9/28            -no INR-               HOLD                                                                               Assessment/ Plan: Will HOLD warfarin today. Daily INR lab draws set to start 9/29. INR today would be expected to remain significantly supra-therapeutic as no active intervention was completed. Pharmacy will continue to monitor daily and adjust therapy as indicated. Please contact the pharmacist at b [phone] or  for outpatient recommendations if needed.

## 2019-09-28 NOTE — PHYSICIAN ADVISORY
Letter of Status Determination: Current Status INPATIENT is Appropriate Pt Name:  Jaxon Garrison  
MR#  [id number] [facility]#   [id number] Room and Hospital  354/01  @ [facility] 58 hospital  
Hospitalization date  9/27/2019 12:41 PM  
Current Attending Physician  Dipika Bhandari MD  
Principal diagnosis  <principal problem not specified> Clinicals  80 y.o. y.o  female hospitalized with [facility]F and coagulopathy [facility] Does  NOT apply CHF with INR >6  
STATUS DETERMINATION  On the basis of clinical data, available documentaion, we believe that the current status of this patient as INPATIENT is Appropriate Additional comments Insurance  Payor: [place] MED[place] / Plan: [place] MEDICARE PART A & B / Product Type: Medicare / Insurance Information [facility] PART A & B Phone:   
 Subscriber: Irina Gómez Subscriber#: [id number] Group#:  Precert#:   
   
 [employer] [address] MEDICARE Phone:   
 Subscriber: Irina Gómez Subscriber#: [id number] Group#: [id number] Precert#:   
  
 
 
The information in this document is a recommendation to be used for utilization review and utilization management purposes only. This recommendation is not an order. The recommendation is made based on the information reviewed at the time of the referral, is pursuant to the [facility] Conditions of Participation (42 CFR Part 482), and is neither a judgment nor an assessment with regard to the appropriateness or quality of clinical care. For all Managed Care patients: The Criteria are intended solely for use as screening guidelines with respect to the medical appropriateness of healthcare services and not for final clinical or payment determinations concerning the type or level of medical care provided, or proposed to be provided, to a patient.  They help the reviewers determine whether a patient is appropriate for observation or inpatient admission at the time a decision to admit the patient is being made. All efforts are made to apply the pertinent payor criteria ([employer] or [employer]) as well as the clinical judgements based on the information reviewed at the time of the referral.\" Nothing in this document may be used to limit, alter, or affect clinical services provided to the patient named below. No current facility-administered medications on file prior to encounter. Current Outpatient Medications on File Prior to Encounter Medication Sig Dispense Refill  Biotin 2,500 mcg cap Take 2 Caps by mouth daily.  furosemide (LASIX) 40 mg tablet 1 tab po daily 30 Tab 3  
 spironolactone (ALDACTONE) 25 mg tablet Take 1 Tab by mouth daily. 30 Tab 3  warfarin (COUMADIN) 4 mg tablet Take 1 Tab by mouth daily. 30 Tab 3  ALPRAZolam (XANAX) 0.5 mg tablet Take 1 Tab by mouth three (3) times daily as needed for Anxiety. Max Daily Amount: 1.5 mg. 30 Tab 0  
 famotidine (PEPCID) 20 mg tablet Take 1 Tab by mouth daily as needed (indigestion/heartburn/acid reflux). 30 Tab 3  
 collagenase (SANTYL) 250 unit/gram ointment Apply  to affected area daily. 15 g 0  
 atenolol (TENORMIN) 25 mg tablet Take 0.5 Tabs by mouth daily. 15 Tab 2  
 levothyroxine (SYNTHROID) 100 mcg tablet TAKE 1 TABLET BY MOUTH EVERY MORNING BEFORE BREAKFAST 30 Tab 3  potassium chloride (K-DUR, KLOR-CON) 20 mEq tablet TAKE 1 TABLET BY MOUTH EVERY DAY 30 Tab 3  
 acetaminophen (TYLENOL) 325 mg tablet Take 2 Tabs by mouth every six (6) hours. 100 Tab 0  
  
1:02 PM 9/28/2019

## 2019-09-28 NOTE — CONSULTS
Cardiology Note dictated # [id number]  Imp:  Acute on chronic diastolic HF: better after a good diuresis  Permanent AF: on therapeutic AC  SSS: Excessive bradycardia: long periods of junctional escape rhythm in the 30's and 40's  Medication noncompliance d/t inability to get up easily to use the commode  Dementia  Recommendations:  Reduce atenolol   If excessive tachycardia ensues then consider a pacemaker  Continue present diuresis rx  Further recommendations to follow  Thank you for this referral.  Jenifer Miles MD  Interventional Cardiology  S

## 2019-09-28 NOTE — PROGRESS NOTES
Pharmacist Note - Warfarin Dosing  Consult provided for this 80 y. o.female to manage warfarin for Atrial Fibrillation    INR Goal: 2 - 3    Home regimen/ tablet size: 4 mg daily    Drugs that may increase INR: None  Drugs that may decrease INR: None  Other current anticoagulants/ drugs that may increase bleeding risk: None  Risk factors: Decompensated Heart Failure  Daily INR ordered: YES    Recent Labs     09/27/19  1324   HGB 13.0   INR 6.6*     Date               INR                  Dose  9/27  6.6  HOLD                                                                                Assessment/ Plan: Will HOLD warfarin today. Pharmacy will continue to monitor daily and adjust therapy as indicated. Please contact the pharmacist at g [phone] or  for outpatient recommendations if needed.

## 2019-09-28 NOTE — PROGRESS NOTES
Bedside and Verbal shift change report given to Nikko Degroot (oncoming nurse) by Avni Irizarry (offgoing nurse). Report included the following information SBAR, Kardex, Procedure Summary, Intake/Output, Accordion, Recent Results and Med Rec Status.

## 2019-09-28 NOTE — PROGRESS NOTES
ASHLEY Zepeda MD notified regarding swollen, reddened left breast.   Matthew - MD notified regarding HR 40s. Cardiology has been consulted.

## 2019-09-28 NOTE — PROGRESS NOTES
Benito Rosado, Phil Farrar, Jorge Ruelas, and Sarita Davenport Date: 9/27/2019      Subjective:     Patient doing well. 1.5 liters off so far. Red irritated rash under B breasts for the last week with swollen tender red R breast with swollen red tender R breast.       Current Facility-Administered Medications   Medication Dose Route Frequency    Warfarin:  Please HOLD dose today, 9/28/2019   Other ONCE    nystatin (MYCOSTATIN) 100,000 unit/gram powder   Topical BID    acetaminophen (TYLENOL) tablet 650 mg  650 mg Oral Q6H    ALPRAZolam (XANAX) tablet 0.5 mg  0.5 mg Oral TID PRN    atenolol (TENORMIN) tablet 12.5 mg  12.5 mg Oral DAILY    calcium-vitamin D (OS-LOW) 500 mg-200 unit tablet  1 Tab Oral TID WITH MEALS    famotidine (PEPCID) tablet 20 mg  20 mg Oral DAILY PRN    levothyroxine (SYNTHROID) tablet 100 mcg  100 mcg Oral 6am    loratadine (CLARITIN) tablet 10 mg  10 mg Oral DAILY    polyethylene glycol (MIRALAX) packet 17 g  17 g Oral DAILY    . PHARMACY TO SUBSTITUTE PER PROTOCOL (Reordered from: potassium chloride (K-DUR, KLOR-CON) 20 mEq tablet)    Per Protocol    furosemide (LASIX) injection 40 mg  40 mg IntraVENous Q12H    zolpidem (AMBIEN) tablet 5 mg  5 mg Oral QHS PRN    sodium chloride (NS) flush 5-40 mL  5-40 mL IntraVENous Q8H    sodium chloride (NS) flush 5-40 mL  5-40 mL IntraVENous PRN    warfarin dosing by pharmacy   Other Rx Dosing/Monitoring          Objective:     Patient Vitals for the past 8 hrs:   BP Temp Pulse Resp SpO2   09/28/19 0742 150/78 97.6 °F (36.4 °C) 67 16 94 %   09/28/19 0410 144/79 98 °F (36.7 °C) 60 16 91 %     No intake/output data recorded. 09/26 1901 - 09/28 0700  In: -   Out: 1450 [Urine:1450]    Physical Exam: Lungs: clear to auscultation bilaterally  Heart: regular rate and rhythm, S1, S2 normal, no murmur, click, rub or gallop  Abdomen: soft, non-tender.  Bowel sounds normal. No masses,  no organomegaly        Data Review   Recent Results (from the past 24 hour(s))   CBC WITH AUTOMATED DIFF    Collection Time: 09/27/19  1:24 PM   Result Value Ref Range    WBC 3.7 3.6 - 11.0 K/uL    RBC 4.56 3.80 - 5.20 M/uL    HGB 13.0 11.5 - 16.0 g/dL    HCT 42.0 35.0 - 47.0 %    MCV 92.1 80.0 - 99.0 FL    MCH 28.5 26.0 - 34.0 PG    MCHC 31.0 30.0 - 36.5 g/dL    RDW 14.2 11.5 - 14.5 %    PLATELET 794 432 - 429 K/uL    MPV 11.5 8.9 - 12.9 FL    NRBC 0.0 0  WBC    ABSOLUTE NRBC 0.00 0.00 - 0.01 K/uL    NEUTROPHILS 73 32 - 75 %    LYMPHOCYTES 12 12 - 49 %    MONOCYTES 14 (H) 5 - 13 %    EOSINOPHILS 0 0 - 7 %    BASOPHILS 1 0 - 1 %    IMMATURE GRANULOCYTES 0 0.0 - 0.5 %    ABS. NEUTROPHILS 2.8 1.8 - 8.0 K/UL    ABS. LYMPHOCYTES 0.4 (L) 0.8 - 3.5 K/UL    ABS. MONOCYTES 0.5 0.0 - 1.0 K/UL    ABS. EOSINOPHILS 0.0 0.0 - 0.4 K/UL    ABS. BASOPHILS 0.0 0.0 - 0.1 K/UL    ABS. IMM. GRANS. 0.0 0.00 - 0.04 K/UL    DF AUTOMATED      RBC COMMENTS ANISOCYTOSIS  1+       METABOLIC PANEL, COMPREHENSIVE    Collection Time: 09/27/19  1:24 PM   Result Value Ref Range    Sodium 133 (L) 136 - 145 mmol/L    Potassium 5.3 (H) 3.5 - 5.1 mmol/L    Chloride 96 (L) 97 - 108 mmol/L    CO2 29 21 - 32 mmol/L    Anion gap 8 5 - 15 mmol/L    Glucose 109 (H) 65 - 100 mg/dL    BUN 20 6 - 20 MG/DL    Creatinine 1.00 0.55 - 1.02 MG/DL    BUN/Creatinine ratio 20 12 - 20      GFR est AA >60 >60 ml/min/1.73m2    GFR est non-AA 52 (L) >60 ml/min/1.73m2    Calcium 10.2 (H) 8.5 - 10.1 MG/DL    Bilirubin, total 1.4 (H) 0.2 - 1.0 MG/DL    ALT (SGPT) 12 12 - 78 U/L    AST (SGOT) 24 15 - 37 U/L    Alk.  phosphatase 90 45 - 117 U/L    Protein, total 7.6 6.4 - 8.2 g/dL    Albumin 3.5 3.5 - 5.0 g/dL    Globulin 4.1 (H) 2.0 - 4.0 g/dL    A-G Ratio 0.9 (L) 1.1 - 2.2     NT-PRO BNP    Collection Time: 09/27/19  1:24 PM   Result Value Ref Range    NT pro-BNP 9,199 (H) <450 PG/ML   TROPONIN I    Collection Time: 09/27/19  1:24 PM   Result Value Ref Range    Troponin-I, Qt. 0.09 (H) <0.05 ng/mL   URINALYSIS W/MICROSCOPIC    Collection Time: 09/27/19  1:24 PM   Result Value Ref Range    Color DARK YELLOW      Appearance CLOUDY (A) CLEAR      Specific gravity 1.024 1.003 - 1.030      pH (UA) 6.0 5.0 - 8.0      Protein 100 (A) NEG mg/dL    Glucose NEGATIVE  NEG mg/dL    Ketone NEGATIVE  NEG mg/dL    Blood LARGE (A) NEG      Urobilinogen 1.0 0.2 - 1.0 EU/dL    Nitrites NEGATIVE  NEG      Leukocyte Esterase NEGATIVE  NEG      WBC 0-4 0 - 4 /hpf    RBC 10-20 0 - 5 /hpf    Epithelial cells MANY (A) FEW /lpf    Bacteria NEGATIVE  NEG /hpf    Mucus 3+ (A) NEG /lpf    Hyaline cast 10-20 0 - 5 /lpf   URINE CULTURE HOLD SAMPLE    Collection Time: 09/27/19  1:24 PM   Result Value Ref Range    Urine culture hold        URINE ON HOLD IN MICROBIOLOGY DEPT FOR 3 DAYS. IF UNPRESERVED URINE IS SUBMITTED, IT CANNOT BE USED FOR ADDITIONAL TESTING AFTER 24 HRS, RECOLLECTION WILL BE REQUIRED. PROTHROMBIN TIME + INR    Collection Time: 09/27/19  1:24 PM   Result Value Ref Range    INR 6.6 (HH) 0.9 - 1.1      Prothrombin time 60.1 (H) 9.0 - 11.1 sec   SAMPLES BEING HELD    Collection Time: 09/27/19  1:24 PM   Result Value Ref Range    SAMPLES BEING HELD  1 RED     COMMENT        Add-on orders for these samples will be processed based on acceptable specimen integrity and analyte stability, which may vary by analyte.    BILIRUBIN, CONFIRM    Collection Time: 09/27/19  1:24 PM   Result Value Ref Range    Bilirubin UA, confirm NEGATIVE  NEG     METABOLIC PANEL, BASIC    Collection Time: 09/28/19  5:59 AM   Result Value Ref Range    Sodium 131 (L) 136 - 145 mmol/L    Potassium 5.2 (H) 3.5 - 5.1 mmol/L    Chloride 95 (L) 97 - 108 mmol/L    CO2 26 21 - 32 mmol/L    Anion gap 10 5 - 15 mmol/L    Glucose 79 65 - 100 mg/dL    BUN 20 6 - 20 MG/DL    Creatinine 0.87 0.55 - 1.02 MG/DL    BUN/Creatinine ratio 23 (H) 12 - 20      GFR est AA >60 >60 ml/min/1.73m2    GFR est non-AA >60 >60 ml/min/1.73m2    Calcium 9.9 8.5 - 10.1 MG/DL   TROPONIN I    Collection Time: 09/28/19  5:59 AM Result Value Ref Range    Troponin-I, Qt. 0.06 (H) <0.05 ng/mL           Assessment:     Active Problems:    Acute on chronic diastolic CHF (congestive heart failure) ([facility] [address] (9/27/2019)        Plan:     Nystatin powder under B breasty BID  Continue diuresis  Cardiology to see- troponin  improved.    Set up MRI breasts as mammogram unlikely to be tolerated by patient

## 2019-09-29 ENCOUNTER — APPOINTMENT (OUTPATIENT)
Dept: ULTRASOUND IMAGING | Age: 84
DRG: 292 | End: 2019-09-29
Attending: SURGERY
Payer: MEDICARE

## 2019-09-29 PROBLEM — N64.4 BREAST PAIN, LEFT: Status: ACTIVE | Noted: 2019-09-29

## 2019-09-29 PROBLEM — B37.2 YEAST DERMATITIS: Status: ACTIVE | Noted: 2019-09-29

## 2019-09-29 LAB
ANION GAP SERPL CALC-SCNC: 6 MMOL/L (ref 5–15)
BUN SERPL-MCNC: 27 MG/DL (ref 6–20)
BUN/CREAT SERPL: 28 (ref 12–20)
CALCIUM SERPL-MCNC: 9.7 MG/DL (ref 8.5–10.1)
CHLORIDE SERPL-SCNC: 96 MMOL/L (ref 97–108)
CO2 SERPL-SCNC: 33 MMOL/L (ref 21–32)
CREAT SERPL-MCNC: 0.98 MG/DL (ref 0.55–1.02)
GLUCOSE SERPL-MCNC: 90 MG/DL (ref 65–100)
INR PPP: 4.2 (ref 0.9–1.1)
POTASSIUM SERPL-SCNC: 3.2 MMOL/L (ref 3.5–5.1)
PROTHROMBIN TIME: 39.2 SEC (ref 9–11.1)
SODIUM SERPL-SCNC: 135 MMOL/L (ref 136–145)

## 2019-09-29 PROCEDURE — 74011250637 HC RX REV CODE- 250/637: Performed by: FAMILY MEDICINE

## 2019-09-29 PROCEDURE — 74011250636 HC RX REV CODE- 250/636: Performed by: FAMILY MEDICINE

## 2019-09-29 PROCEDURE — 80048 BASIC METABOLIC PNL TOTAL CA: CPT

## 2019-09-29 PROCEDURE — 76642 ULTRASOUND BREAST LIMITED: CPT

## 2019-09-29 PROCEDURE — 36415 COLL VENOUS BLD VENIPUNCTURE: CPT

## 2019-09-29 PROCEDURE — 65660000000 HC RM CCU STEPDOWN

## 2019-09-29 PROCEDURE — 85610 PROTHROMBIN TIME: CPT

## 2019-09-29 PROCEDURE — 77030038269 HC DRN EXT URIN PURWCK BARD -A

## 2019-09-29 RX ORDER — AMOXICILLIN AND CLAVULANATE POTASSIUM 875; 125 MG/1; MG/1
1 TABLET, FILM COATED ORAL EVERY 12 HOURS
Status: DISCONTINUED | OUTPATIENT
Start: 2019-09-29 | End: 2019-10-04 | Stop reason: HOSPADM

## 2019-09-29 RX ORDER — FLUCONAZOLE 100 MG/1
100 TABLET ORAL EVERY 24 HOURS
Status: COMPLETED | OUTPATIENT
Start: 2019-09-29 | End: 2019-10-03

## 2019-09-29 RX ORDER — FUROSEMIDE 20 MG/1
20 TABLET ORAL 2 TIMES DAILY
Status: DISCONTINUED | OUTPATIENT
Start: 2019-09-29 | End: 2019-10-04 | Stop reason: HOSPADM

## 2019-09-29 RX ADMIN — ACETAMINOPHEN 650 MG: 325 TABLET, FILM COATED ORAL at 06:31

## 2019-09-29 RX ADMIN — Medication 10 ML: at 22:00

## 2019-09-29 RX ADMIN — ALPRAZOLAM 0.5 MG: 0.5 TABLET ORAL at 16:48

## 2019-09-29 RX ADMIN — LEVOTHYROXINE SODIUM 100 MCG: 100 TABLET ORAL at 06:31

## 2019-09-29 RX ADMIN — POTASSIUM CHLORIDE 20 MEQ: 750 TABLET, FILM COATED, EXTENDED RELEASE ORAL at 08:45

## 2019-09-29 RX ADMIN — CALCIUM CARBONATE 500 MG (1,250 MG)-VITAMIN D3 200 UNIT TABLET 1 TABLET: at 11:34

## 2019-09-29 RX ADMIN — ZOLPIDEM TARTRATE 5 MG: 5 TABLET ORAL at 21:12

## 2019-09-29 RX ADMIN — AMOXICILLIN AND CLAVULANATE POTASSIUM 1 TABLET: 875; 125 TABLET, FILM COATED ORAL at 13:40

## 2019-09-29 RX ADMIN — NYSTATIN: 100000 POWDER TOPICAL at 17:14

## 2019-09-29 RX ADMIN — NYSTATIN: 100000 POWDER TOPICAL at 08:46

## 2019-09-29 RX ADMIN — ACETAMINOPHEN 650 MG: 325 TABLET, FILM COATED ORAL at 11:34

## 2019-09-29 RX ADMIN — FLUCONAZOLE 100 MG: 100 TABLET ORAL at 11:34

## 2019-09-29 RX ADMIN — AMOXICILLIN AND CLAVULANATE POTASSIUM 1 TABLET: 875; 125 TABLET, FILM COATED ORAL at 21:55

## 2019-09-29 RX ADMIN — CALCIUM CARBONATE 500 MG (1,250 MG)-VITAMIN D3 200 UNIT TABLET 1 TABLET: at 16:48

## 2019-09-29 RX ADMIN — CALCIUM CARBONATE 500 MG (1,250 MG)-VITAMIN D3 200 UNIT TABLET 1 TABLET: at 08:46

## 2019-09-29 RX ADMIN — ACETAMINOPHEN 650 MG: 325 TABLET, FILM COATED ORAL at 17:13

## 2019-09-29 RX ADMIN — ZOLPIDEM TARTRATE 5 MG: 5 TABLET ORAL at 02:09

## 2019-09-29 RX ADMIN — ACETAMINOPHEN 650 MG: 325 TABLET, FILM COATED ORAL at 01:59

## 2019-09-29 RX ADMIN — FUROSEMIDE 40 MG: 10 INJECTION, SOLUTION INTRAMUSCULAR; INTRAVENOUS at 08:49

## 2019-09-29 RX ADMIN — ALPRAZOLAM 0.5 MG: 0.5 TABLET ORAL at 09:34

## 2019-09-29 RX ADMIN — Medication 10 ML: at 13:40

## 2019-09-29 RX ADMIN — LORATADINE 10 MG: 10 TABLET ORAL at 08:46

## 2019-09-29 NOTE — CONSULTS
[address]    Name:  Gerardo Díaz  MR#:  [id number]  :  1931  ACCOUNT #:  [de-identified]  DATE OF SERVICE:  2019    REFERRING PHYSICIAN:  Anastacia Sanchez MD    HISTORY OF PRESENT ILLNESS:  This is an 28-year-old woman admitted for an exacerbation of her chronic diastolic heart failure. The patient has significant dementia and cannot recall significant parts of her history. Review of notes on the chart indicate that the patient was admitted with massive bilateral leg swelling up to her waist, and a history of noncompliance with diuretics, due to her inability to move around and get to the commode. PAST MEDICAL HISTORY:  Her medical problems include:  1. Permanent atrial fibrillation. 2.  Arthritis. 3.  Coronary artery disease. 4.  Chronic pain. 5.  Dementia. 6.  Hyperlipidemia. 7.  Anxiety. 8.  Thyroid disease. 9.  Essential tremors. MEDICATIONS AT HOME PRIOR TO ADMISSION ARE AS FOLLOWS:  1. Biotin as directed. 2.  Furosemide 40 mg daily. 3.  Spironolactone 25 mg daily. 4.  Warfarin 4 mg daily. 5.  Alprazolam 0.5 three times a day as needed. 6.  Famotidine 20 mg as needed. 7.  Collagenase to affected areas. 8.  Atenolol 25 mg, takes one half, 0.5 tab, daily. 9.  Levothyroxine 100 mcg daily. 10.  Potassium 20 mEq daily. 11.  Tylenol. ALLERGIES:  LATEX, CODEINE, ADHESIVE, CORTISONE. REVIEW OF SYSTEMS:  The patient denies chest pain, orthopnea, diaphoresis, syncope. PHYSICAL EXAMINATION:  GENERAL:  This is a well-developed, alert, and pleasant elderly woman, quite confused. I asked her why she was here, her response was, \"you tell me,\" she did not remember. She is not in any distress, and is comfortable at this time. VITAL SIGNS:  As follows; blood pressure is 116/65, sats 94% on room air, respirations 16. HEENT:  Unremarkable. NECK:  Supple. No adenopathy. CHEST:  Chest wall is unremarkable to inspection.   LUNGS:  Anteriorly were clear. HEART:  Irregular rhythm, moderate bradycardia. No S3 gallop or friction rub. No thrills, lifts, or heaves. No significant murmurs. ABDOMEN:  Soft and nontender. No bruits. No ascites. No palpable masses. EXTREMITIES:  Minimal pretibial edema. NEUROLOGIC:  Nonfocal.  Mentation wise, however, the patient has clearly a significant dementia. LABORATORY DATA:  From admission, normal hemogram.  INR was 6.6. Troponin was insignificant at 0.09, yesterday it was 0.06. Today, her NT-proBNP was 9,199. Potassium level was 5.2. Chest x-ray was clear. A review of her telemetry strips demonstrate long periods of narrow complex ventricular escape rhythm in the 30s and 40s, what appears to be underlying atrial fibrillation. IMPRESSION:  1. This patient has acute-on-chronic diastolic heart failure exacerbated by her inability to comply with diuretics due to her debilitated weakened state. 2.  The patient has had excellent diuresis since admission to the hospital.    RECOMMENDATIONS:  I would significantly reduce the atenolol and if the patient has an excessively tachycardic response to the reduction of her BB dose she could have a PPM for backup. No need for an echo, she had one in May of 2019. Her systolic function is normal.  Further recommendations to follow.     Thank you for this referral.      Martinez Gaxiola MD      [id number]  D:  09/28/2019 18:06  T:  09/29/2019 0:10  JOB #:  [id number]

## 2019-09-29 NOTE — ROUTINE PROCESS
Bedside and Verbal shift change report given to Mkcenzie Alberto RN (oncoming nurse) by Ian Crawford RN (offgoing nurse). Report included the following information SBAR, Kardex, Intake/Output, MAR and Recent Results.

## 2019-09-29 NOTE — CONSULTS
Asked to see 81 yo admitted with heart failure exacerbation for red swollen left breast.  Not painful, present for about 2 weeks.       Consult Date: 2019    Consults    Subjective     Past Medical History:   Diagnosis Date    A-fib [facility])     Arrhythmia     A FIB    Arthritis     Back pain     CAD (coronary artery disease)     PT DENIES    Chronic pain     Dementia 2016    Hypercholesterolemia     Psychiatric disorder     ANXIETY    Shoulder pain     Thyroid disease     Tremor, essential       Past Surgical History:   Procedure Laterality Date    HX APPENDECTOMY      HX ORTHOPAEDIC      left knee replacement    HX KADIE AND BSO      AGE 39    HX TONSIL AND ADENOIDECTOMY      IR KYPHOPLASTY LUMBAR  2019     Family History   Problem Relation Age of Onset    Dementia Mother     Arthritis-osteo Father       Social History     Tobacco Use    Smoking status: Former Smoker     Packs/day: 0.50     Years: 10.00     Pack years: 5.00     Last attempt to quit: 1994     Years since quittin.7    Smokeless tobacco: Never Used   Substance Use Topics    Alcohol use: Yes     Comment: NIGHTLY 2 GLASSES WINE       Current Facility-Administered Medications   Medication Dose Route Frequency Provider Last Rate Last Dose    fluconazole (DIFLUCAN) tablet 100 mg  100 mg Oral Q24H Woody Garrido MD   100 mg at 19 1134    nystatin (MYCOSTATIN) 100,000 unit/gram powder   Topical BID Woody Garrido MD        [START ON 2019] atenolol (TENORMIN) tablet 12.5 mg  12.5 mg Oral Q MON, WED & SUN [facility] MD Deyanira        acetaminophen (TYLENOL) tablet 650 mg  650 mg Oral Q6H Woody Garrido MD   650 mg at 19 1134    ALPRAZolam (XANAX) tablet 0.5 mg  0.5 mg Oral TID PRN Woody Garrido MD   0.5 mg at 19 0934    calcium-vitamin D (OS-LOW) 500 mg-200 unit tablet  1 Tab Oral TID WITH MEALS Woody Garrido MD   1 Tab at 19 1134    famotidine (PEPCID) tablet 20 mg  20 mg Oral DAILY PRN Jacobo Gosselin, MD        levothyroxine (SYNTHROID) tablet 100 mcg  100 mcg Oral Megan Garcia MD   100 mcg at 09/29/19 0631    loratadine (CLARITIN) tablet 10 mg  10 mg Oral DAILY Jacobo Gosselin, MD   10 mg at 09/29/19 0846    polyethylene glycol (MIRALAX) packet 17 g  17 g Oral DAILY Jacobo Gosselin, MD        potassium chloride SR (KLOR-CON 10) tablet 20 mEq  20 mEq Oral DAILY Jacobo Gosselin, MD   20 mEq at 09/29/19 0845    furosemide (LASIX) injection 40 mg  40 mg IntraVENous Q12H Jacobo Gosselin, MD   40 mg at 09/29/19 0849    zolpidem (AMBIEN) tablet 5 mg  5 mg Oral QHS PRN Jacobo Gosselin, MD   5 mg at 09/29/19 0209    sodium chloride (NS) flush 5-40 mL  5-40 mL IntraVENous Q8H Jacobo Gosselin, MD   10 mL at 09/28/19 2356    sodium chloride (NS) flush 5-40 mL  5-40 mL IntraVENous PRN Jacobo Gosselin, MD        warfarin dosing by pharmacy   Other Rx Dosing/Monitoring Jacobo Gosselin, MD            Review of Systems    Objective     Vital signs for last 24 hours:  Visit Vitals  /84 (BP 1 Location: Right arm, BP Patient Position: At rest)   Pulse 69   Temp 98.5 °F (36.9 °C)   Resp 16   Ht 5' 3\" (1.6 m)   Wt 137 lb 5.6 oz (62.3 kg)   SpO2 94%   BMI 24.33 kg/m²       Intake/Output this shift:  Current Shift: 09/29 0701 - 09/29 1900  In: 250 [P.O.:250]  Out: -   Last 3 Shifts: 09/27 1901 - 09/29 0700  In: -   Out: 2100 [Urine:2100]    Data Review:   Recent Results (from the past 24 hour(s))   METABOLIC PANEL, BASIC    Collection Time: 09/29/19  1:57 AM   Result Value Ref Range    Sodium 135 (L) 136 - 145 mmol/L    Potassium 3.2 (L) 3.5 - 5.1 mmol/L    Chloride 96 (L) 97 - 108 mmol/L    CO2 33 (H) 21 - 32 mmol/L    Anion gap 6 5 - 15 mmol/L    Glucose 90 65 - 100 mg/dL    BUN 27 (H) 6 - 20 MG/DL    Creatinine 0.98 0.55 - 1.02 MG/DL    BUN/Creatinine ratio 28 (H) 12 - 20      GFR est AA >60 >60 ml/min/1.73m2    GFR est non-AA 54 (L) >60 ml/min/1.73m2    Calcium 9.7 8.5 - 10.1 MG/DL   PROTHROMBIN TIME + INR    Collection Time: 09/29/19  9:50 AM   Result Value Ref Range    INR 4.2 (H) 0.9 - 1.1      Prothrombin time 39.2 (H) 9.0 - 11.1 sec       Physical Exam   Pulmonary/Chest: Right breast exhibits skin change and tenderness. Right breast exhibits no inverted nipple, no mass and no nipple discharge. Left breast exhibits skin change. Left breast exhibits no inverted nipple, no mass, no nipple discharge and no tenderness. Lymphadenopathy:        Left axillary: No pectoral and no lateral adenopathy present. Imp    1. Left breast edema and erythema  2. Could be edema related to heart failure, unusual in that it is unilateral  3. Possible mild mastitis related to #2  4. Inflammatory Breast cancer is unlikely but on the list.    Rec. 1. Left breast ultrasound  2. Antibiotics. PO Augmentin Doxcycline, etc would be fine. Will leave choice up to you. 3. Does not need breast MRI or mammogram at this time, will wait on ultrasound results. 4.  Would follow pending ultrasound results, if negative would treat with abx and treat heart failure and see how she does. If positive will act accordingly. Thanks.

## 2019-09-29 NOTE — ROUTINE PROCESS
2120 patient's BP was 111/70, retake was 96/53. Held Lasix and called Dr. Ramiro Dejesus who said to hold Lasix for the night and he will look at it again in the morning. 2340 patient's BP 78/43 HR 54. Called Dr. Ramiro Dejesus who ordered 500 mL bolus. Add on lab was put in for PT/INR, changed it to routine, but patient refused lab draw at this time 0820. Informed day nurse. Bedside shift change report given to [facility] RN (oncoming nurse) by Yary Nieves (offgoing nurse).  Report included the following information SBAR, Kardex, Intake/Output, MAR, Recent Results and Cardiac Rhythm Afib and SR.

## 2019-09-29 NOTE — PROGRESS NOTES
HR better today on lower dose of her atenolol  Volume status is good  Continue observation and present course.

## 2019-09-29 NOTE — PROGRESS NOTES
Benito Garibay, Derek Perez, Kennedi and Joyce Hirsch Date: 9/27/2019      Subjective:     Patient had BP drop last night. Responded well to 500cc NS bolus. Lasix held. L breast with continued redness, swelling, and foul odor. ..       Current Facility-Administered Medications   Medication Dose Route Frequency    fluconazole (DIFLUCAN) tablet 200 mg  200 mg Oral DAILY    nystatin (MYCOSTATIN) 100,000 unit/gram powder   Topical BID    [START ON 9/30/2019] atenolol (TENORMIN) tablet 12.5 mg  12.5 mg Oral Q MON, WED & SUN    acetaminophen (TYLENOL) tablet 650 mg  650 mg Oral Q6H    ALPRAZolam (XANAX) tablet 0.5 mg  0.5 mg Oral TID PRN    calcium-vitamin D (OS-LOW) 500 mg-200 unit tablet  1 Tab Oral TID WITH MEALS    famotidine (PEPCID) tablet 20 mg  20 mg Oral DAILY PRN    levothyroxine (SYNTHROID) tablet 100 mcg  100 mcg Oral 6am    loratadine (CLARITIN) tablet 10 mg  10 mg Oral DAILY    polyethylene glycol (MIRALAX) packet 17 g  17 g Oral DAILY    potassium chloride SR (KLOR-CON 10) tablet 20 mEq  20 mEq Oral DAILY    furosemide (LASIX) injection 40 mg  40 mg IntraVENous Q12H    zolpidem (AMBIEN) tablet 5 mg  5 mg Oral QHS PRN    sodium chloride (NS) flush 5-40 mL  5-40 mL IntraVENous Q8H    sodium chloride (NS) flush 5-40 mL  5-40 mL IntraVENous PRN    warfarin dosing by pharmacy   Other Rx Dosing/Monitoring          Objective:     Patient Vitals for the past 8 hrs:   BP Temp Pulse Resp SpO2 Weight   09/29/19 0819 153/84 98.5 °F (36.9 °C) 69 16 94 %    09/29/19 0632      137 lb 5.6 oz (62.3 kg)   09/29/19 0406 104/60 98 °F (36.7 °C) 61 18 90 %    09/29/19 0400   61        09/29 0701 - 09/29 1900  In: 250 [P.O.:250]  Out: -   09/27 1901 - 09/29 0700  In: -   Out: 2100 [Urine:2100]    Physical Exam: Lungs: clear to auscultation bilaterally  Heart: regular rate and rhythm, S1, S2 normal, no murmur, click, rub or gallop  Abdomen: soft, non-tender.  Bowel sounds normal. No masses,  no organomegaly        Data Review   Recent Results (from the past 24 hour(s))   METABOLIC PANEL, BASIC    Collection Time: 09/29/19  1:57 AM   Result Value Ref Range    Sodium 135 (L) 136 - 145 mmol/L    Potassium 3.2 (L) 3.5 - 5.1 mmol/L    Chloride 96 (L) 97 - 108 mmol/L    CO2 33 (H) 21 - 32 mmol/L    Anion gap 6 5 - 15 mmol/L    Glucose 90 65 - 100 mg/dL    BUN 27 (H) 6 - 20 MG/DL    Creatinine 0.98 0.55 - 1.02 MG/DL    BUN/Creatinine ratio 28 (H) 12 - 20      GFR est AA >60 >60 ml/min/1.73m2    GFR est non-AA 54 (L) >60 ml/min/1.73m2    Calcium 9.7 8.5 - 10.1 MG/DL   PROTHROMBIN TIME + INR    Collection Time: 09/29/19  9:50 AM   Result Value Ref Range    INR 4.2 (H) 0.9 - 1.1      Prothrombin time 39.2 (H) 9.0 - 11.1 sec           Assessment:     Active Problems:    Acute on chronic diastolic CHF (congestive heart failure) ([facility]) (9/27/2019)        Plan:     1) Hold diuresis for now- await Cardiology assist  2) MRI breasts- patient has refused mammograms for > a decade because they hurt.    3) Start oral Fluconazole 016IS daily- certainly has severe yeast infection under breasts L>R  4) Asked Dr Mela Apley to see- to help with breast issue

## 2019-09-30 LAB
INR PPP: 3.2 (ref 0.9–1.1)
PROTHROMBIN TIME: 30.9 SEC (ref 9–11.1)

## 2019-09-30 PROCEDURE — 74011250637 HC RX REV CODE- 250/637: Performed by: FAMILY MEDICINE

## 2019-09-30 PROCEDURE — 97530 THERAPEUTIC ACTIVITIES: CPT | Performed by: PHYSICAL THERAPIST

## 2019-09-30 PROCEDURE — 36415 COLL VENOUS BLD VENIPUNCTURE: CPT

## 2019-09-30 PROCEDURE — 65660000000 HC RM CCU STEPDOWN

## 2019-09-30 PROCEDURE — 85610 PROTHROMBIN TIME: CPT

## 2019-09-30 PROCEDURE — 77030038269 HC DRN EXT URIN PURWCK BARD -A

## 2019-09-30 PROCEDURE — 97161 PT EVAL LOW COMPLEX 20 MIN: CPT | Performed by: PHYSICAL THERAPIST

## 2019-09-30 PROCEDURE — 74011250637 HC RX REV CODE- 250/637: Performed by: INTERNAL MEDICINE

## 2019-09-30 RX ORDER — WARFARIN 1 MG/1
1 TABLET ORAL ONCE
Status: COMPLETED | OUTPATIENT
Start: 2019-09-30 | End: 2019-09-30

## 2019-09-30 RX ADMIN — LORATADINE 10 MG: 10 TABLET ORAL at 09:40

## 2019-09-30 RX ADMIN — NYSTATIN: 100000 POWDER TOPICAL at 09:43

## 2019-09-30 RX ADMIN — CALCIUM CARBONATE 500 MG (1,250 MG)-VITAMIN D3 200 UNIT TABLET 1 TABLET: at 12:20

## 2019-09-30 RX ADMIN — WARFARIN SODIUM 1 MG: 1 TABLET ORAL at 18:54

## 2019-09-30 RX ADMIN — ALPRAZOLAM 0.5 MG: 0.5 TABLET ORAL at 09:48

## 2019-09-30 RX ADMIN — FUROSEMIDE 20 MG: 20 TABLET ORAL at 18:48

## 2019-09-30 RX ADMIN — LEVOTHYROXINE SODIUM 100 MCG: 100 TABLET ORAL at 06:13

## 2019-09-30 RX ADMIN — ZOLPIDEM TARTRATE 5 MG: 5 TABLET ORAL at 21:43

## 2019-09-30 RX ADMIN — AMOXICILLIN AND CLAVULANATE POTASSIUM 1 TABLET: 875; 125 TABLET, FILM COATED ORAL at 09:40

## 2019-09-30 RX ADMIN — CALCIUM CARBONATE 500 MG (1,250 MG)-VITAMIN D3 200 UNIT TABLET 1 TABLET: at 16:28

## 2019-09-30 RX ADMIN — Medication 10 ML: at 06:13

## 2019-09-30 RX ADMIN — ATENOLOL 12.5 MG: 25 TABLET ORAL at 16:28

## 2019-09-30 RX ADMIN — ACETAMINOPHEN 650 MG: 325 TABLET, FILM COATED ORAL at 12:20

## 2019-09-30 RX ADMIN — NYSTATIN: 100000 POWDER TOPICAL at 18:49

## 2019-09-30 RX ADMIN — POTASSIUM CHLORIDE 20 MEQ: 750 TABLET, FILM COATED, EXTENDED RELEASE ORAL at 09:40

## 2019-09-30 RX ADMIN — FUROSEMIDE 20 MG: 20 TABLET ORAL at 09:41

## 2019-09-30 RX ADMIN — ALPRAZOLAM 0.5 MG: 0.5 TABLET ORAL at 21:43

## 2019-09-30 RX ADMIN — FLUCONAZOLE 100 MG: 100 TABLET ORAL at 12:20

## 2019-09-30 RX ADMIN — CALCIUM CARBONATE 500 MG (1,250 MG)-VITAMIN D3 200 UNIT TABLET 1 TABLET: at 09:40

## 2019-09-30 RX ADMIN — ALPRAZOLAM 0.5 MG: 0.5 TABLET ORAL at 16:28

## 2019-09-30 RX ADMIN — ACETAMINOPHEN 650 MG: 325 TABLET, FILM COATED ORAL at 06:12

## 2019-09-30 RX ADMIN — AMOXICILLIN AND CLAVULANATE POTASSIUM 1 TABLET: 875; 125 TABLET, FILM COATED ORAL at 20:57

## 2019-09-30 RX ADMIN — Medication 10 ML: at 21:44

## 2019-09-30 RX ADMIN — Medication 10 ML: at 16:32

## 2019-09-30 RX ADMIN — ACETAMINOPHEN 650 MG: 325 TABLET, FILM COATED ORAL at 18:48

## 2019-09-30 NOTE — PROGRESS NOTES
Reviewed ultrasound, no focal lesions    Rec    1. Antibiotics  2. Support and elevation of breast  3. Lotrimin spray for yeast  4. I can see as OP if no improvement for skin punch bx.     Thanks

## 2019-09-30 NOTE — PROGRESS NOTES
Physical Therapy  Attempted PT evaluation. Patient currently eating and requests PT come back at another time. Attempted to get PLOF information and patient becoming agitated with PT's questions. Will check back.   Lalit Villa, PT, DPT

## 2019-09-30 NOTE — NURSE NAVIGATOR
Chart reviewed by Heart Failure Nurse Navigator. Heart Failure database completed. EF:  56/60 (5/2019)    ACEi/ARB/ARNi: not currently indicated    BB: not currently indicated    Aldosterone Antagonist: spironolactone 25 mg daily    Obstructive Sleep Apnea Screening: No   STOP-BANG score:   Referred to Sleep Medicine:     CRT not currently  indicated. NYHA Functional Class documentation requested. Heart Failure Teach Back in Patient Education. Heart Failure Avoiding Triggers on Discharge Instructions. Cardiologist: Nia Bustos- Dr. Mitali Christianson discharge follow up phone call to be made within 48-72 hours of discharge.       MEDICARE HF BUNDLE ACTIVE

## 2019-09-30 NOTE — PROGRESS NOTES
Cardiology Progress Note  2019     Admit Date: 2019  Admit Diagnosis: Acute on chronic diastolic CHF (congestive heart failure) ([facility]) [I50.33]  CC: none currently    Assessment:   Active Problems:    Acute on chronic diastolic CHF (congestive heart failure) ([address] (2019)      Breast pain, left (2019)      Yeast dermatitis (2019)      Plan:     Cont current meds  No events on telemetry, cont to monitor  Monitoring electrolytes  Therapy eval  Pharmacy dosing coumadin    Subjective:      Trish Hale has had some improvement    Objective:    Physical Exam:  Overall VSSAF;    Visit Vitals  /66 (BP 1 Location: Right arm, BP Patient Position: At rest;Lying left side)   Pulse 79   Temp 97.9 °F (36.6 °C)   Resp 18   Ht 5' 3\" (1.6 m)   Wt 66.1 kg (145 lb 11.6 oz)   SpO2 92%   BMI 25.81 kg/m²     Temp (24hrs), Av.7 °F (36.5 °C), Min:97.4 °F (36.3 °C), Max:98 °F (36.7 °C)    Patient Vitals for the past 8 hrs:   Pulse   19 0755 79   19 0401 75    Patient Vitals for the past 8 hrs:   Resp   19 0755 18   19 0401 16    Patient Vitals for the past 8 hrs:   BP   19 0755 115/66   19 0401 105/58       1901 -  0700  In: 500 [P.O.:500]  Out: 850 [Urine:850]      General Appearance: Well developed, well nourished, no acute distress. Ears/Nose/Mouth/Throat:   Normal MM; anicteric. JVP: WNL   Resp:   Lungs clear to auscultation bilaterally. Nl resp effort. Cardiovascular:  Irreg   Abdomen:   Soft, non-tender, bowel sounds are present. Extremities: No edema bilaterally. Skin:  Neuro: Warm and dry.   A/O x3, grossly nonfocal                         Data Review:     Telemetry independently reviewed :   AFIB       ECG independently reviewed: AF  Labs:   Recent Results (from the past 24 hour(s))   PROTHROMBIN TIME + INR    Collection Time: 19  6:21 AM   Result Value Ref Range    INR 3.2 (H) 0.9 - 1.1      Prothrombin time 30.9 (H) 9.0 - 11.1 sec      Current medications reviewed       Praveena Rodríguez MD

## 2019-09-30 NOTE — PROGRESS NOTES
Problem: Mobility Impaired (Adult and Pediatric)  Goal: *Acute Goals and Plan of Care (Insert Text)  Description  FUNCTIONAL STATUS PRIOR TO ADMISSION: The patient was functional at the wheelchair level and required assist for transfers to the chair. Patient unable to provide information regarding how she transfers to and from chair    [address]: The patient lived alone in [place] [address] at [facility]. States she has a caregiver during the day but unable to verbalize to PT what hours the caregiver works or what type of assist this person provides to her. Physical Therapy Goals  Initiated 9/30/2019  1. Patient will move from supine to sit and sit to supine  in bed with minimal assistance within 7 day(s). 2.  Patient will transfer from bed to chair and chair to bed with maximal assistance using the least restrictive device within 7 day(s). 3.  Patient will be independent with supine HEP within 7 days. 4.  Patient will sit EOB x 5 mins with supervision for dynamic task within 7 days. Outcome: Progressing Towards Goal  PHYSICAL THERAPY EVALUATION  Patient: Jas Carter (87 y.o. female)  Date: 9/30/2019  Primary Diagnosis: Acute on chronic diastolic CHF (congestive heart failure) ([facility] [I50.33]        Precautions:   Fall      ASSESSMENT  Based on the objective data described below, the patient presents with decreased functional mobility. Patient is a poor historian and unable to provide much information regarding how she gets in/out of wheelchair and what she does during a normal day. Patient wheelchair bound at baseline and is adamant that she does not ambulate. Unable to get patient to even sit EOB but does demo ability to turn in bed and to come to long sitting with supervision. Noted B LE edema and R>L swelling with decreased ROM and strength on the R.  Likely that patient is not far from her baseline and is overall disinterested in participating with PT.     Current Level of Function Impacting Discharge (mobility/balance): Needs encouragement to participate, self limiting and refuses any mobility      Other factors to consider for discharge: Self limiting, non-ambulatory at baseline, fall risk, disinterested in participation with PT     Patient will benefit from skilled therapy intervention to address the above noted impairments. PLAN :  Recommendations and Planned Interventions: bed mobility training, transfer training, gait training, therapeutic exercises, neuromuscular re-education, patient and family training/education and therapeutic activities      Frequency/Duration: Patient will be followed by physical therapy:  3 times a week to address goals. TRIAL of PT    Recommendation for discharge: (in order for the patient to meet his/her long term goals)  Physical therapy at least 2 days/week in the home AND ensure assist and/or supervision for safety with all mobility         Equipment recommendations for successful discharge (if) home: patient owns DME required for discharge         SUBJECTIVE:   Patient stated If you want to get in the wheelchair then don't even bother coming because I am not going to do that.     OBJECTIVE DATA SUMMARY:   HISTORY:    Past Medical History:   Diagnosis Date    A-fib ([address]     Arrhythmia     A FIB    Arthritis     Back pain     CAD (coronary artery disease)     PT DENIES    Chronic pain     Dementia 2/9/2016    Hypercholesterolemia     Psychiatric disorder     ANXIETY    Shoulder pain     Thyroid disease     Tremor, essential      Past Surgical History:   Procedure Laterality Date    HX APPENDECTOMY      HX ORTHOPAEDIC      left knee replacement    HX KADIE AND BSO      AGE 39    HX TONSIL AND ADENOIDECTOMY      IR KYPHOPLASTY LUMBAR  4/23/2019       Personal factors and/or comorbidities impacting plan of care:     Home Situation  Home Environment: Assisted living  [address] [facility] [address] [facility]  # Steps to Enter: 0  One/Two Story Residence: One story  Living Alone: No  Support Systems: Assisted living  Patient Expects to be Discharged to[de-identified] Unknown  Current DME Used/Available at Home: Wheelchair  Tub or Shower Type: Other (comment)(patient sponge bathes)    EXAMINATION/PRESENTATION/DECISION MAKING:   Critical Behavior:  Neurologic State: Alert, Confused  Orientation Level: Oriented to person, Oriented to place, Oriented to situation, Disoriented to time  Cognition: Follows commands, Impaired decision making     Hearing: Auditory  Auditory Impairment: None    Range Of Motion:  AROM: Generally decreased, functional                       Strength:    Strength: Generally decreased, functional                    Tone & Sensation:                                  Coordination:     Vision:      Functional Mobility:  Bed Mobility:  Rolling: Supervision  Supine to Sit: (not tested, does long sit with supervision)        Transfers:                             Balance:   Sitting: Intact  Standing: (not tested, patient refuses)  Ambulation/Gait Training:               Pain Rating:  No c/o pain    Activity Tolerance:   Fair and requires rest breaks  Please refer to the flowsheet for vital signs taken during this treatment. After treatment patient left in no apparent distress:   Supine in bed and Call bell within reach    COMMUNICATION/EDUCATION:   The patients plan of care was discussed with: Physical Therapist and Registered Nurse. Fall prevention education was provided and the patient/caregiver indicated understanding., Patient/family have participated as able in goal setting and plan of care. and Patient/family agree to work toward stated goals and plan of care.     Thank you for this referral.  Rigo Mcdaniel, PT, DPT   Time Calculation: 16 mins

## 2019-09-30 NOTE — PROGRESS NOTES
Bedside and Verbal shift change report given to Yang (oncoming nurse) by Jacques García (offgoing nurse).  Report included the following information SBAR, Kardex, Procedure Summary, Intake/Output, MAR, Recent Results and Med Rec Status.

## 2019-09-30 NOTE — PROGRESS NOTES
Reason for Admission:   Patient has had increased edema in lower extremities with right signifigantly more edematous than left                RRAT Score:    24              Resources/supports as identified by patient/family:Difficult situation. Patient is a patient of Dr Ellie Li and this  spoke with him and he states he will speak with her in am regarding the need to either consider going to a snf for short term care and then consider an assisted living facility. Patient has a private caretaker named Mandi Washburn who is with patient in the mornings, then patient is alone during late pm and nite. Lindsey's ph [phone]  Patient's sister is Tino Bain and her ph [phone]. Sister notes that at one time patient gave her financial and medical poa, but then dissolved this. Patient has been at a number of skilled nursing facilities and then signs herself out of facility per MD.  She most recently had been at [facility] and [facility] earlier this year. Per MD patient is non ambulatory and is incontinent of urine and depends on a wheelchair which she has. CM has discussed situation with MD and he will address this on his next visit with her. Top Challenges facing patient (as identified by patient/family and CM): Finances/Medication cost?   Patient is financially solvent per sister and MD.                  Transportation? She does depend on her private aide Mandi Washburn for rides when she is working. Lindsey's ph is [phone]  WHEN PATIENT has a problem when she is alone she calls 911. Support system or lack thereof? Very poor support system. Patient's sister is Antony Sullivan and her phone is [phone]. Private aide Mandi Washburn phone [phone]                     Living arrangements? Lives in [address] at [facility]           Self-care/ADLs/Cognition? Patient unable to recognize her inability to take care of her self at home and patient Has sporadic mood swings frequently and can be obsintate per MD and sister            Current Advanced Directive/Advance Care Plan No longer . Patient declined another AMD:                            Plan for utilizing home health:    Home health is not enough at the present time. Patient has had [facility] multiple times and patient then discontinues their care. Transition of Care Plan:     Phone call to discuss case completed with Dr Katty Hathaway. He will follow up in am when he makes rounds and discuss a safe option for patient.

## 2019-09-30 NOTE — PROGRESS NOTES
Pharmacist Note - Warfarin Dosing  Consult provided for this 80 y. o.female to manage warfarin for atrial fibrillation. INR Goal: 2 - 3    Home regimen/ tablet size: 4 mg PO daily     Drugs that may increase INR: amoxicillin-clavulanate, fluconazole    Drugs that may decrease INR: None  Other current anticoagulants/ drugs that may increase bleeding risk: None  Risk factors: Age > 65 and Decompensated Heart Failure  Daily INR ordered: YES    Recent Labs     09/30/19  0621 09/29/19  0950 09/27/19  1324   HGB  --   --  13.0   INR 3.2* 4.2* 6.6*     Date               INR                  Dose  9/27  6.6  Held    9/28  --  Held   9/29  4.2  Held   9/30  3.2  1 mg                                                                                 Assessment/ Plan: Will order warfarin 1 mg PO x 1 dose. Pharmacy will continue to monitor daily and adjust therapy as indicated. Please contact the pharmacist at [phone] or  for outpatient recommendations if needed.

## 2019-09-30 NOTE — PROGRESS NOTES
Benito Cochran, Janene Camacho, and Nikolay Carrington Date: 9/27/2019      Subjective:     Patient feeling OK. BP more tolerant of PO LAsix. Breast U/S showed no abscess or mass. .       Current Facility-Administered Medications   Medication Dose Route Frequency    fluconazole (DIFLUCAN) tablet 100 mg  100 mg Oral Q24H    amoxicillin-clavulanate (AUGMENTIN) 875-125 mg per tablet 1 Tab  1 Tab Oral Q12H    furosemide (LASIX) tablet 20 mg  20 mg Oral BID    nystatin (MYCOSTATIN) 100,000 unit/gram powder   Topical BID    atenolol (TENORMIN) tablet 12.5 mg  12.5 mg Oral Q MON, WED & SUN    acetaminophen (TYLENOL) tablet 650 mg  650 mg Oral Q6H    ALPRAZolam (XANAX) tablet 0.5 mg  0.5 mg Oral TID PRN    calcium-vitamin D (OS-LOW) 500 mg-200 unit tablet  1 Tab Oral TID WITH MEALS    famotidine (PEPCID) tablet 20 mg  20 mg Oral DAILY PRN    levothyroxine (SYNTHROID) tablet 100 mcg  100 mcg Oral 6am    loratadine (CLARITIN) tablet 10 mg  10 mg Oral DAILY    polyethylene glycol (MIRALAX) packet 17 g  17 g Oral DAILY    potassium chloride SR (KLOR-CON 10) tablet 20 mEq  20 mEq Oral DAILY    zolpidem (AMBIEN) tablet 5 mg  5 mg Oral QHS PRN    sodium chloride (NS) flush 5-40 mL  5-40 mL IntraVENous Q8H    sodium chloride (NS) flush 5-40 mL  5-40 mL IntraVENous PRN    warfarin dosing by pharmacy   Other Rx Dosing/Monitoring          Objective:     Patient Vitals for the past 8 hrs:   BP Temp Pulse Resp SpO2 Weight   09/30/19 0401 105/58 98 °F (36.7 °C) 75 16 92 %    09/30/19 0026      145 lb 11.6 oz (66.1 kg)     No intake/output data recorded. 09/28 1901 - 09/30 0700  In: 500 [P.O.:500]  Out: 850 [Urine:850]    Physical Exam: Lungs: clear to auscultation bilaterally  Heart: regular rate and rhythm, S1, S2 normal, no murmur, click, rub or gallop  Abdomen: soft, non-tender.  Bowel sounds normal. No masses,  no organomegaly        Data Review   Recent Results (from the past 24 hour(s))   PROTHROMBIN TIME + INR    Collection Time: 09/29/19  9:50 AM   Result Value Ref Range    INR 4.2 (H) 0.9 - 1.1      Prothrombin time 39.2 (H) 9.0 - 11.1 sec   PROTHROMBIN TIME + INR    Collection Time: 09/30/19  6:21 AM   Result Value Ref Range    INR 3.2 (H) 0.9 - 1.1      Prothrombin time 30.9 (H) 9.0 - 11.1 sec           Assessment:     Active Problems:    Acute on chronic diastolic CHF (congestive heart failure) ([facility] (9/27/2019)      Breast pain, left (9/29/2019)      Yeast dermatitis (9/29/2019)        Plan:     1) Cont PO Lasix  2) Follow lytes  3) Cont Augmentin and Fluconazole  4) PT

## 2019-10-01 LAB
ANION GAP SERPL CALC-SCNC: 5 MMOL/L (ref 5–15)
BUN SERPL-MCNC: 25 MG/DL (ref 6–20)
BUN/CREAT SERPL: 27 (ref 12–20)
CALCIUM SERPL-MCNC: 9.7 MG/DL (ref 8.5–10.1)
CHLORIDE SERPL-SCNC: 95 MMOL/L (ref 97–108)
CO2 SERPL-SCNC: 33 MMOL/L (ref 21–32)
CREAT SERPL-MCNC: 0.92 MG/DL (ref 0.55–1.02)
ERYTHROCYTE [DISTWIDTH] IN BLOOD BY AUTOMATED COUNT: 14.4 % (ref 11.5–14.5)
GLUCOSE SERPL-MCNC: 93 MG/DL (ref 65–100)
HCT VFR BLD AUTO: 40.7 % (ref 35–47)
HGB BLD-MCNC: 12.5 G/DL (ref 11.5–16)
INR PPP: 2 (ref 0.9–1.1)
MCH RBC QN AUTO: 28.5 PG (ref 26–34)
MCHC RBC AUTO-ENTMCNC: 30.7 G/DL (ref 30–36.5)
MCV RBC AUTO: 92.9 FL (ref 80–99)
NRBC # BLD: 0 K/UL (ref 0–0.01)
NRBC BLD-RTO: 0 PER 100 WBC
PLATELET # BLD AUTO: 215 K/UL (ref 150–400)
PMV BLD AUTO: 11.5 FL (ref 8.9–12.9)
POTASSIUM SERPL-SCNC: 3.7 MMOL/L (ref 3.5–5.1)
PROTHROMBIN TIME: 19.8 SEC (ref 9–11.1)
RBC # BLD AUTO: 4.38 M/UL (ref 3.8–5.2)
SODIUM SERPL-SCNC: 133 MMOL/L (ref 136–145)
WBC # BLD AUTO: 4.4 K/UL (ref 3.6–11)

## 2019-10-01 PROCEDURE — 85027 COMPLETE CBC AUTOMATED: CPT

## 2019-10-01 PROCEDURE — 74011250637 HC RX REV CODE- 250/637: Performed by: FAMILY MEDICINE

## 2019-10-01 PROCEDURE — 85610 PROTHROMBIN TIME: CPT

## 2019-10-01 PROCEDURE — 65660000000 HC RM CCU STEPDOWN

## 2019-10-01 PROCEDURE — 80048 BASIC METABOLIC PNL TOTAL CA: CPT

## 2019-10-01 PROCEDURE — 36415 COLL VENOUS BLD VENIPUNCTURE: CPT

## 2019-10-01 RX ORDER — WARFARIN 2.5 MG/1
2.5 TABLET ORAL ONCE
Status: COMPLETED | OUTPATIENT
Start: 2019-10-01 | End: 2019-10-01

## 2019-10-01 RX ADMIN — ACETAMINOPHEN 650 MG: 325 TABLET, FILM COATED ORAL at 08:34

## 2019-10-01 RX ADMIN — FLUCONAZOLE 100 MG: 100 TABLET ORAL at 12:58

## 2019-10-01 RX ADMIN — ALPRAZOLAM 0.5 MG: 0.5 TABLET ORAL at 10:32

## 2019-10-01 RX ADMIN — Medication 10 ML: at 15:41

## 2019-10-01 RX ADMIN — POTASSIUM CHLORIDE 20 MEQ: 750 TABLET, FILM COATED, EXTENDED RELEASE ORAL at 08:35

## 2019-10-01 RX ADMIN — CALCIUM CARBONATE 500 MG (1,250 MG)-VITAMIN D3 200 UNIT TABLET 1 TABLET: at 17:07

## 2019-10-01 RX ADMIN — Medication 10 ML: at 08:34

## 2019-10-01 RX ADMIN — ACETAMINOPHEN 650 MG: 325 TABLET, FILM COATED ORAL at 12:58

## 2019-10-01 RX ADMIN — POLYETHYLENE GLYCOL 3350 17 G: 17 POWDER, FOR SOLUTION ORAL at 08:34

## 2019-10-01 RX ADMIN — WARFARIN SODIUM 2.5 MG: 2.5 TABLET ORAL at 17:07

## 2019-10-01 RX ADMIN — NYSTATIN: 100000 POWDER TOPICAL at 08:35

## 2019-10-01 RX ADMIN — FUROSEMIDE 20 MG: 20 TABLET ORAL at 17:07

## 2019-10-01 RX ADMIN — CALCIUM CARBONATE 500 MG (1,250 MG)-VITAMIN D3 200 UNIT TABLET 1 TABLET: at 12:58

## 2019-10-01 RX ADMIN — AMOXICILLIN AND CLAVULANATE POTASSIUM 1 TABLET: 875; 125 TABLET, FILM COATED ORAL at 22:10

## 2019-10-01 RX ADMIN — ACETAMINOPHEN 650 MG: 325 TABLET, FILM COATED ORAL at 17:08

## 2019-10-01 RX ADMIN — Medication 10 ML: at 22:10

## 2019-10-01 RX ADMIN — AMOXICILLIN AND CLAVULANATE POTASSIUM 1 TABLET: 875; 125 TABLET, FILM COATED ORAL at 08:37

## 2019-10-01 RX ADMIN — NYSTATIN: 100000 POWDER TOPICAL at 17:08

## 2019-10-01 RX ADMIN — LORATADINE 10 MG: 10 TABLET ORAL at 08:35

## 2019-10-01 RX ADMIN — CALCIUM CARBONATE 500 MG (1,250 MG)-VITAMIN D3 200 UNIT TABLET 1 TABLET: at 08:35

## 2019-10-01 RX ADMIN — LEVOTHYROXINE SODIUM 100 MCG: 100 TABLET ORAL at 08:34

## 2019-10-01 RX ADMIN — FUROSEMIDE 20 MG: 20 TABLET ORAL at 08:35

## 2019-10-01 RX ADMIN — Medication 10 ML: at 22:15

## 2019-10-01 NOTE — PROGRESS NOTES
Cardiology Progress Note  10/1/2019     Admit Date: 2019  Admit Diagnosis: Acute on chronic diastolic CHF (congestive heart failure) ([facility]) [I50.33]  CC: none currently    Assessment:   Active Problems:    Acute on chronic diastolic CHF (congestive heart failure) ([address] (2019)      Breast pain, left (2019)      Yeast dermatitis (2019)      Plan:     Cont current meds  Daily BMP  Cont with diuresis  No plans for further cardiac testing    Subjective:      Annamary Patch resting comfortably this AM    Objective:    Physical Exam:  Overall VSSAF;    Visit Vitals  /61 (BP 1 Location: Right arm, BP Patient Position: At rest)   Pulse 68   Temp 97.5 °F (36.4 °C)   Resp 20   Ht 5' 3\" (1.6 m)   Wt 63.5 kg (139 lb 15.9 oz)   SpO2 94%   BMI 24.80 kg/m²     Temp (24hrs), Av.8 °F (36.6 °C), Min:97.5 °F (36.4 °C), Max:98 °F (36.7 °C)    Patient Vitals for the past 8 hrs:   Pulse   10/01/19 0718 68   10/01/19 0312 73    Patient Vitals for the past 8 hrs:   Resp   10/01/19 0718 20   10/01/19 031 18    Patient Vitals for the past 8 hrs:   BP   10/01/19 0718 111/61   10/01/19 0312 126/80       1901 - 10/01 0700  In: -   Out: 1000 [Urine:1000]      General Appearance: Well developed, well nourished, no acute distress. Ears/Nose/Mouth/Throat:   Normal MM; anicteric. JVP: WNL   Resp:   Lungs clear to auscultation bilaterally. Nl resp effort. Cardiovascular:  Irreg   Abdomen:   Soft, non-tender, bowel sounds are present. Extremities: No edema bilaterally. Skin:  Neuro: Warm and dry.   A/O x3, grossly nonfocal                         Data Review:     Telemetry independently reviewed :   AFIB       ECG independently reviewed: AF  Labs:   Recent Results (from the past 24 hour(s))   PROTHROMBIN TIME + INR    Collection Time: 10/01/19  3:47 AM   Result Value Ref Range    INR 2.0 (H) 0.9 - 1.1      Prothrombin time 19.8 (H) 9.0 - 11.1 sec   CBC W/O DIFF    Collection Time: 10/01/19  3:47 AM Result Value Ref Range    WBC 4.4 3.6 - 11.0 K/uL    RBC 4.38 3.80 - 5.20 M/uL    HGB 12.5 11.5 - 16.0 g/dL    HCT 40.7 35.0 - 47.0 %    MCV 92.9 80.0 - 99.0 FL    MCH 28.5 26.0 - 34.0 PG    MCHC 30.7 30.0 - 36.5 g/dL    RDW 14.4 11.5 - 14.5 %    PLATELET 028 129 - 519 K/uL    MPV 11.5 8.9 - 12.9 FL    NRBC 0.0 0  WBC    ABSOLUTE NRBC 0.00 0.00 - 3.01 K/uL   METABOLIC PANEL, BASIC    Collection Time: 10/01/19  3:47 AM   Result Value Ref Range    Sodium 133 (L) 136 - 145 mmol/L    Potassium 3.7 3.5 - 5.1 mmol/L    Chloride 95 (L) 97 - 108 mmol/L    CO2 33 (H) 21 - 32 mmol/L    Anion gap 5 5 - 15 mmol/L    Glucose 93 65 - 100 mg/dL    BUN 25 (H) 6 - 20 MG/DL    Creatinine 0.92 0.55 - 1.02 MG/DL    BUN/Creatinine ratio 27 (H) 12 - 20      GFR est AA >60 >60 ml/min/1.73m2    GFR est non-AA 58 (L) >60 ml/min/1.73m2    Calcium 9.7 8.5 - 10.1 MG/DL      Current medications reviewed       Ren Shabazz MD

## 2019-10-01 NOTE — PROGRESS NOTES
Problem: Falls - Risk of  Goal: *Absence of Falls  Description  Document [place] Fall Risk and appropriate interventions in the flowsheet. Outcome: Progressing Towards Goal  Note:   Fall Risk Interventions:  Mobility Interventions: Bed/chair exit alarm, Patient to call before getting OOB    Mentation Interventions: Adequate sleep, hydration, pain control, Bed/chair exit alarm    Medication Interventions: Assess postural VS orthostatic hypotension, Bed/chair exit alarm, Patient to call before getting OOB    Elimination Interventions: Call light in reach, Bed/chair exit alarm            Pt wheelchair bound. Falls precautions in place. Caregiver at bedside all morning. Pt non-compliant with turning and irritated by RN trying to assist pt in elevating heels and turning q2h and prn. Educated at length.

## 2019-10-01 NOTE — PROGRESS NOTES
Physical Therapy Note    Patient is sleeping on PT arrival. She reports she did not sleep well the night before and is too tired today.      Thank you,  Álvaro Torres DPT

## 2019-10-01 NOTE — PROGRESS NOTES
Transitional Care Team: Initial HUG Note    Date of Assessment: 10/01/19  Time of Assessment:  10:39 AM    Abilio King is a 80 y.o. female inpatient at [address] [place] [address]   Acute CHF- chronic, diastolic, history of smoking and med non-compliance. Last noted EF 56-60%, Afib on Warfarin    Breast Pain- pt denies at this time and reports it's been taken care of although aide Marina Ivan endorses concern; per notes oral antibiotics are in place. Safety concern for pt at discharge. Per notes she has a history of signing herself out AMA from SNF's. She is new to [place] [facility] has a care aide that is only there during day hours. She is wheelchair bound. Pt has not been open to the idea of high care. CM aware and has been in touch with PCP. Primary Diagnosis: Acute on Chronic Diastolic HF    Advance Directive:  reviewed and needs to be updated. See ACP note from 2/14/16 and CM note from 9/30/19. Current Code Status:  Full Code    Referral to Hospice/ Palliative Care Appropriate: yes. Discussed with Marcos Cobb MD    Awareness of Medical Conditions: (Trajectory of illness and pts expectations). This writer attempts to access pt's understanding of her health and expectations. Pt reports she would like to walk on her own without a walker. Her participation with PT was limited on yesterday- please see note. This writer is asked by the pt \"are we done talking you and me\" as the discussion of transitioning into LTC is introduced. Discharge Needs: (to include safety issues) per pt none at this time although her aide Marina Ivan questions if pt's insurance will cover another aide to come in once she leaves. This writer questions if pt has Medicaid when the pt raises her voice to indicate she does not have this benefit. This writer advises will pass along this question to CM for review (Man Vann has informed CM Esta Brazil).     Barriers Identified:   Pt is wheelchair bound and alone after her day care aide leaves. Pt is not open to transitioning to a higher level of care at [facility]    Patient is willing to go to SNF/Inpatient Rehab if recommended: TBD    Medication Review:  was not performed, awaiting final med list on AVS.    Can patient afford medications:  yes. Doris Santos notes the pt has started using [facility] who's aligned with [facility] and not sure of cost yet because the first medications haven't been received yet. Patient is Compliant with Medication regimen:    Who manages medications at home: no    Best Patient Contact Number: [phone]       HUG (Healthy Understanding of Goals) program introduced to patient/family. The Transitional Care Team bridges the gaps in care and education surrounding discharge from the acute care facility. The objective is to empower the patient and family in taking a proactive role in preventing readmission within the first thirty days after discharge. The team is also involved in the efforts to reduce readmission to the acute care setting after stabilization and discharge from the acute care environment either to skilled nursing facilities or community. [facility] RN/NP will return with [facility] Calendar/ follow up appointments/ Ambulatory Nurse Navigator name and contact information when the patient is ready for discharge. No future appointments. Non-[facility] follow up appointment(s): TBD    [facility] Team will attempt to follow the patient from a distance while inpatient as well as be available for further transition/disposition needs.      Past Medical History:   Diagnosis Date    A-fib [facility])     Arrhythmia     A FIB    Arthritis     Back pain     CAD (coronary artery disease)     PT DENIES    Chronic pain     Dementia 2/9/2016    Hypercholesterolemia     Psychiatric disorder     ANXIETY    Shoulder pain     Thyroid disease     Tremor, essential

## 2019-10-01 NOTE — PROGRESS NOTES
Pharmacist Note - Warfarin Dosing  Consult provided for this 80 y. o.female to manage warfarin for atrial fibrillation. INR Goal: 2 - 3    Home regimen/ tablet size: 4 mg PO daily     Drugs that may increase INR: amoxicillin-clavulanate, fluconazole    Drugs that may decrease INR: None  Other current anticoagulants/ drugs that may increase bleeding risk: None  Risk factors: Age > 65 and Decompensated Heart Failure  Daily INR ordered: YES    Recent Labs     10/01/19  0347 09/30/19  0621 09/29/19  0950   HGB 12.5  --   --    INR 2.0* 3.2* 4.2*     Date               INR                  Dose  9/27  6.6  Held    9/28  --  Held   9/29  4.2  Held   9/30  3.2  1 mg    10/1  2  2.5 mg                                                                                 Assessment/ Plan: Will order warfarin 2.5 mg PO x 1 dose. This is a ~35% dose reduction from the PTA dose of 4 mg PO daily. Pharmacy will continue to monitor daily and adjust therapy as indicated. Please contact the pharmacist at x [phone] or  for outpatient recommendations if needed.

## 2019-10-01 NOTE — PROGRESS NOTES
ADRI: Discharge planning    CM met with pt and her caregiver, Tian Rivero, in room to discuss safe disposition for pt when she is ready for discharge. Pt inquired about coverage on her insurance for a in home caregiver after the hours that [facility]. Pt has Medicare A & B and AARP/C supplemental . CM explained that these insurances do not cover payment for private care in the home. CM offered the suggestion of pt going to the snf at [facility]. Pt stated that she is not interested. Pt's caregiver inquired about the cost. CM provided information pulled off the Internet on IP snf including the general cost for area [place]. Pt again stated that she is not going to snf.       Dominga Coker, MARY JANE ACM CRM

## 2019-10-01 NOTE — PROGRESS NOTES
Bedside shift change report given to MARY JANE Salgado (oncoming nurse) by Ashok Medellin RN (offgoing nurse). Report included the following information SBAR, Kardex, Intake/Output, MAR, Recent Results and Cardiac Rhythm Afib.

## 2019-10-01 NOTE — PROGRESS NOTES
Benito Bailey, Gabby Thomas, Yeni Cordova, and Evy Market Date: 9/27/2019      Subjective:     Patient resting comfortably. Slowly improving selling with diuretic. Labs OK today      Current Facility-Administered Medications   Medication Dose Route Frequency    fluconazole (DIFLUCAN) tablet 100 mg  100 mg Oral Q24H    amoxicillin-clavulanate (AUGMENTIN) 875-125 mg per tablet 1 Tab  1 Tab Oral Q12H    furosemide (LASIX) tablet 20 mg  20 mg Oral BID    nystatin (MYCOSTATIN) 100,000 unit/gram powder   Topical BID    atenolol (TENORMIN) tablet 12.5 mg  12.5 mg Oral Q MON, WED & SUN    acetaminophen (TYLENOL) tablet 650 mg  650 mg Oral Q6H    ALPRAZolam (XANAX) tablet 0.5 mg  0.5 mg Oral TID PRN    calcium-vitamin D (OS-LOW) 500 mg-200 unit tablet  1 Tab Oral TID WITH MEALS    famotidine (PEPCID) tablet 20 mg  20 mg Oral DAILY PRN    levothyroxine (SYNTHROID) tablet 100 mcg  100 mcg Oral 6am    loratadine (CLARITIN) tablet 10 mg  10 mg Oral DAILY    polyethylene glycol (MIRALAX) packet 17 g  17 g Oral DAILY    potassium chloride SR (KLOR-CON 10) tablet 20 mEq  20 mEq Oral DAILY    zolpidem (AMBIEN) tablet 5 mg  5 mg Oral QHS PRN    sodium chloride (NS) flush 5-40 mL  5-40 mL IntraVENous Q8H    sodium chloride (NS) flush 5-40 mL  5-40 mL IntraVENous PRN    warfarin dosing by pharmacy   Other Rx Dosing/Monitoring          Objective:     Patient Vitals for the past 8 hrs:   BP Temp Pulse Resp SpO2 Weight   10/01/19 0312 126/80 97.8 °F (36.6 °C) 73 18 94 % 139 lb 15.9 oz (63.5 kg)   09/30/19 2358 113/64 97.9 °F (36.6 °C) 77 18 94 %      09/30 1901 - 10/01 0700  In: -   Out: 200 [Urine:200]  09/29 0701 - 09/30 1900  In: 500 [P.O.:500]  Out: 3529 [Urine:1650]    Physical Exam: Lungs: clear to auscultation bilaterally  Breasts: normal appearance, no masses or tenderness  Abdomen: soft, non-tender.  Bowel sounds normal. No masses,  no organomegaly        Data Review   Recent Results (from the past 24 hour(s)) PROTHROMBIN TIME + INR    Collection Time: 10/01/19  3:47 AM   Result Value Ref Range    INR 2.0 (H) 0.9 - 1.1      Prothrombin time 19.8 (H) 9.0 - 11.1 sec   CBC W/O DIFF    Collection Time: 10/01/19  3:47 AM   Result Value Ref Range    WBC 4.4 3.6 - 11.0 K/uL    RBC 4.38 3.80 - 5.20 M/uL    HGB 12.5 11.5 - 16.0 g/dL    HCT 40.7 35.0 - 47.0 %    MCV 92.9 80.0 - 99.0 FL    MCH 28.5 26.0 - 34.0 PG    MCHC 30.7 30.0 - 36.5 g/dL    RDW 14.4 11.5 - 14.5 %    PLATELET 207 744 - 557 K/uL    MPV 11.5 8.9 - 12.9 FL    NRBC 0.0 0  WBC    ABSOLUTE NRBC 0.00 0.00 - 8.17 K/uL   METABOLIC PANEL, BASIC    Collection Time: 10/01/19  3:47 AM   Result Value Ref Range    Sodium 133 (L) 136 - 145 mmol/L    Potassium 3.7 3.5 - 5.1 mmol/L    Chloride 95 (L) 97 - 108 mmol/L    CO2 33 (H) 21 - 32 mmol/L    Anion gap 5 5 - 15 mmol/L    Glucose 93 65 - 100 mg/dL    BUN 25 (H) 6 - 20 MG/DL    Creatinine 0.92 0.55 - 1.02 MG/DL    BUN/Creatinine ratio 27 (H) 12 - 20      GFR est AA >60 >60 ml/min/1.73m2    GFR est non-AA 58 (L) >60 ml/min/1.73m2    Calcium 9.7 8.5 - 10.1 MG/DL           Assessment:     Active Problems:    Acute on chronic diastolic CHF (congestive heart failure) ([facility]) (9/27/2019)      Breast pain, left (9/29/2019)      Yeast dermatitis (9/29/2019)        Plan:     1) Continue oral Abx, antifungal  2) Cont oral diuresis- BP not tolerant of IV   3) May need NH placement  4) PT

## 2019-10-02 LAB
ANION GAP SERPL CALC-SCNC: 6 MMOL/L (ref 5–15)
BUN SERPL-MCNC: 22 MG/DL (ref 6–20)
BUN/CREAT SERPL: 29 (ref 12–20)
CALCIUM SERPL-MCNC: 9.9 MG/DL (ref 8.5–10.1)
CHLORIDE SERPL-SCNC: 94 MMOL/L (ref 97–108)
CO2 SERPL-SCNC: 34 MMOL/L (ref 21–32)
CREAT SERPL-MCNC: 0.76 MG/DL (ref 0.55–1.02)
GLUCOSE SERPL-MCNC: 88 MG/DL (ref 65–100)
INR PPP: 1.6 (ref 0.9–1.1)
MAGNESIUM SERPL-MCNC: 1.6 MG/DL (ref 1.6–2.4)
POTASSIUM SERPL-SCNC: 3.7 MMOL/L (ref 3.5–5.1)
PROTHROMBIN TIME: 16 SEC (ref 9–11.1)
SODIUM SERPL-SCNC: 134 MMOL/L (ref 136–145)

## 2019-10-02 PROCEDURE — 36415 COLL VENOUS BLD VENIPUNCTURE: CPT

## 2019-10-02 PROCEDURE — 85610 PROTHROMBIN TIME: CPT

## 2019-10-02 PROCEDURE — 65210000002 HC RM PRIVATE GYN

## 2019-10-02 PROCEDURE — 74011250637 HC RX REV CODE- 250/637: Performed by: FAMILY MEDICINE

## 2019-10-02 PROCEDURE — 83735 ASSAY OF MAGNESIUM: CPT

## 2019-10-02 PROCEDURE — 74011250637 HC RX REV CODE- 250/637: Performed by: INTERNAL MEDICINE

## 2019-10-02 PROCEDURE — 97530 THERAPEUTIC ACTIVITIES: CPT

## 2019-10-02 PROCEDURE — 80048 BASIC METABOLIC PNL TOTAL CA: CPT

## 2019-10-02 RX ORDER — ATENOLOL 25 MG/1
12.5 TABLET ORAL
Status: DISCONTINUED | OUTPATIENT
Start: 2019-10-02 | End: 2019-10-04 | Stop reason: HOSPADM

## 2019-10-02 RX ADMIN — AMOXICILLIN AND CLAVULANATE POTASSIUM 1 TABLET: 875; 125 TABLET, FILM COATED ORAL at 09:04

## 2019-10-02 RX ADMIN — CALCIUM CARBONATE 500 MG (1,250 MG)-VITAMIN D3 200 UNIT TABLET 1 TABLET: at 18:04

## 2019-10-02 RX ADMIN — LEVOTHYROXINE SODIUM 100 MCG: 100 TABLET ORAL at 07:01

## 2019-10-02 RX ADMIN — POTASSIUM CHLORIDE 20 MEQ: 750 TABLET, FILM COATED, EXTENDED RELEASE ORAL at 09:04

## 2019-10-02 RX ADMIN — CALCIUM CARBONATE 500 MG (1,250 MG)-VITAMIN D3 200 UNIT TABLET 1 TABLET: at 09:04

## 2019-10-02 RX ADMIN — FLUCONAZOLE 100 MG: 100 TABLET ORAL at 11:56

## 2019-10-02 RX ADMIN — ALPRAZOLAM 0.5 MG: 0.5 TABLET ORAL at 22:46

## 2019-10-02 RX ADMIN — FUROSEMIDE 20 MG: 20 TABLET ORAL at 09:04

## 2019-10-02 RX ADMIN — FUROSEMIDE 20 MG: 20 TABLET ORAL at 18:05

## 2019-10-02 RX ADMIN — NYSTATIN: 100000 POWDER TOPICAL at 18:07

## 2019-10-02 RX ADMIN — CALCIUM CARBONATE 500 MG (1,250 MG)-VITAMIN D3 200 UNIT TABLET 1 TABLET: at 11:56

## 2019-10-02 RX ADMIN — ACETAMINOPHEN 650 MG: 325 TABLET, FILM COATED ORAL at 01:01

## 2019-10-02 RX ADMIN — AMOXICILLIN AND CLAVULANATE POTASSIUM 1 TABLET: 875; 125 TABLET, FILM COATED ORAL at 22:41

## 2019-10-02 RX ADMIN — ALPRAZOLAM 0.5 MG: 0.5 TABLET ORAL at 09:03

## 2019-10-02 RX ADMIN — ALPRAZOLAM 0.5 MG: 0.5 TABLET ORAL at 01:01

## 2019-10-02 RX ADMIN — ACETAMINOPHEN 650 MG: 325 TABLET, FILM COATED ORAL at 11:56

## 2019-10-02 RX ADMIN — LORATADINE 10 MG: 10 TABLET ORAL at 09:03

## 2019-10-02 RX ADMIN — ACETAMINOPHEN 650 MG: 325 TABLET, FILM COATED ORAL at 18:04

## 2019-10-02 RX ADMIN — NYSTATIN: 100000 POWDER TOPICAL at 09:05

## 2019-10-02 RX ADMIN — ATENOLOL 12.5 MG: 25 TABLET ORAL at 18:05

## 2019-10-02 RX ADMIN — WARFARIN SODIUM 3 MG: 2 TABLET ORAL at 18:04

## 2019-10-02 RX ADMIN — Medication 5 ML: at 22:00

## 2019-10-02 NOTE — PROGRESS NOTES
Assessment and documentation reviewed by Kem Junior RN for MARY JANE Jones I agree with assessment and documentation. 0730 Bedside and Verbal shift change report given to Collins Rosado RN  (oncoming nurse) by Sravanthi Duke RN  (offgoing nurse). Report included the following information SBAR, MAR, Recent Results and Med Rec Status.

## 2019-10-02 NOTE — PROGRESS NOTES
Cardiology Progress Note  10/2/2019     Admit Date: 2019  Admit Diagnosis: Acute on chronic diastolic CHF (congestive heart failure) ([facility]) [I50.33]  CC: none currently    Assessment:   Active Problems:    Acute on chronic diastolic CHF (congestive heart failure) ([address] (2019)      Breast pain, left (2019)      Yeast dermatitis (2019)      Plan:     Cont current plan, good I/O yesterday  Daily BMPs  No plans for further testing  Signing off, I will be out of the office the rest of the week but one of my partners can see her as needed; please call with questions    Subjective:      Marci Vasquez feels a little better this AM    Objective:    Physical Exam:  Overall VSSAF;    Visit Vitals  /73 (BP 1 Location: Left arm, BP Patient Position: At rest)   Pulse 74   Temp 97.5 °F (36.4 °C)   Resp 16   Ht 5' 3\" (1.6 m)   Wt 62.4 kg (137 lb 9.1 oz)   SpO2 96%   BMI 24.37 kg/m²     Temp (24hrs), Av.4 °F (36.3 °C), Min:96.1 °F (35.6 °C), Max:97.9 °F (36.6 °C)    Patient Vitals for the past 8 hrs:   Pulse   10/02/19 0744 74   10/02/19 0349 70    Patient Vitals for the past 8 hrs:   Resp   10/02/19 0744 16   10/02/19 0349 16    Patient Vitals for the past 8 hrs:   BP   10/02/19 0744 131/73   10/02/19 0349 129/74       1901 - 10/02 0700  In: -   Out: 3650 [Urine:3650]      General Appearance: Well developed, well nourished, no acute distress. Ears/Nose/Mouth/Throat:   Normal MM; anicteric. JVP: WNL   Resp:   Lungs clear to auscultation bilaterally. Nl resp effort. Cardiovascular:  Irreg   Abdomen:   Soft, non-tender, bowel sounds are present. Extremities: No edema bilaterally. Skin:  Neuro: Warm and dry.   A/O x3, grossly nonfocal                         Data Review:     Telemetry independently reviewed :   AFIB       ECG independently reviewed: AF  Labs:   Recent Results (from the past 24 hour(s))   PROTHROMBIN TIME + INR    Collection Time: 10/02/19  4:09 AM   Result Value Ref Range    INR 1.6 (H) 0.9 - 1.1      Prothrombin time 16.0 (H) 9.0 - 62.2 sec   METABOLIC PANEL, BASIC    Collection Time: 10/02/19  4:09 AM   Result Value Ref Range    Sodium 134 (L) 136 - 145 mmol/L    Potassium 3.7 3.5 - 5.1 mmol/L    Chloride 94 (L) 97 - 108 mmol/L    CO2 34 (H) 21 - 32 mmol/L    Anion gap 6 5 - 15 mmol/L    Glucose 88 65 - 100 mg/dL    BUN 22 (H) 6 - 20 MG/DL    Creatinine 0.76 0.55 - 1.02 MG/DL    BUN/Creatinine ratio 29 (H) 12 - 20      GFR est AA >60 >60 ml/min/1.73m2    GFR est non-AA >60 >60 ml/min/1.73m2    Calcium 9.9 8.5 - 10.1 MG/DL   MAGNESIUM    Collection Time: 10/02/19  4:09 AM   Result Value Ref Range    Magnesium 1.6 1.6 - 2.4 mg/dL      Current medications reviewed       Jet Perez MD

## 2019-10-02 NOTE — PROGRESS NOTES
Problem: Falls - Risk of  Goal: *Absence of Falls  Description  Document Gaurav Lopez Fall Risk and appropriate interventions in the flowsheet.   Outcome: Progressing Towards Goal  Note:   Fall Risk Interventions:  Mobility Interventions: Bed/chair exit alarm, Patient to call before getting OOB    Mentation Interventions: Adequate sleep, hydration, pain control, Bed/chair exit alarm    Medication Interventions: Assess postural VS orthostatic hypotension, Bed/chair exit alarm, Patient to call before getting OOB    Elimination Interventions: Call light in reach, Bed/chair exit alarm              Problem: Heart Failure: Day 4  Goal: Off Pathway (Use only if patient is Off Pathway)  Outcome: Progressing Towards Goal  Goal: Activity/Safety  Outcome: Progressing Towards Goal  Goal: Diagnostic Test/Procedures  Outcome: Progressing Towards Goal  Goal: Nutrition/Diet  Outcome: Progressing Towards Goal  Goal: Discharge Planning  Outcome: Progressing Towards Goal  Goal: Medications  Outcome: Progressing Towards Goal  Goal: Respiratory  Outcome: Progressing Towards Goal  Goal: Treatments/Interventions/Procedures  Outcome: Progressing Towards Goal  Goal: Psychosocial  Outcome: Progressing Towards Goal  Goal: *Oxygen saturation within defined limits  Outcome: Progressing Towards Goal  Goal: *Hemodynamically stable  Outcome: Progressing Towards Goal  Goal: *Optimal pain control at patient's stated goal  Outcome: Progressing Towards Goal  Goal: *Anxiety reduced or absent  Outcome: Progressing Towards Goal  Goal: *Demonstrates progressive activity  Outcome: Progressing Towards Goal

## 2019-10-02 NOTE — PROGRESS NOTES
Spiritual Care Assessment/Progress Note  [facility]      NAME: Anamika Deutsch      MRN: [id number]  AGE: 80 y.o. SEX: female  Uatsdin Affiliation: Yazidi   Language: English     10/2/2019     Total Time (in minutes): 5     Spiritual Assessment begun in [facility] TELEMETRY through conversation with:         []Patient        [] Family    [] Friend(s)        Reason for Consult: Palliative Care, Initial/Spiritual Assessment     Spiritual beliefs: (Please include comment if needed)     [] Identifies with a julissa tradition:         [] Supported by a julissa community:            [] Claims no spiritual orientation:           [] Seeking spiritual identity:                [] Adheres to an individual form of spirituality:           [x] Not able to assess:                           Identified resources for coping:      [] Prayer                               [] Music                  [] Guided Imagery     [] Family/friends                 [] Pet visits     [] Devotional reading                         [] Unknown     [] Other:                                              Interventions offered during this visit: (See comments for more details)    Patient Interventions: Initial visit           Plan of Care:     [] Support spiritual and/or cultural needs    [] Support AMD and/or advance care planning process      [] Support grieving process   [] Coordinate Rites and/or Rituals    [] Coordination with community clergy   [] No spiritual needs identified at this time   [] Detailed Plan of Care below (See Comments)  [] Make referral to Music Therapy  [] Make referral to Pet Therapy     [] Make referral to Addiction services  [] Make referral to [facility]  [] Make referral to Spiritual Care Partner  [] No future visits requested        [x] Follow up visits as needed     Pt is a new palliative consult. Attempted to visit without success. Pt was sleeping and did not alert. No family present.  Chaplains will continue to offer support as needed.   Chaplain Santana MDiv, MS, [address]  [address] [place] [address] [phone])

## 2019-10-02 NOTE — PROGRESS NOTES
Pharmacist Note - Warfarin Dosing  Consult provided for this 80 y. o.female to manage warfarin for atrial fibrillation. INR Goal: 2 - 3    Home regimen/ tablet size: 4 mg PO daily     Drugs that may increase INR: amoxicillin-clavulanate, fluconazole    Drugs that may decrease INR: None  Other current anticoagulants/ drugs that may increase bleeding risk: None  Risk factors: Age > 65 and Decompensated Heart Failure  Daily INR ordered: YES    Recent Labs     10/02/19  0409 10/01/19  0347 09/30/19  0621   HGB  --  12.5  --    INR 1.6* 2.0* 3.2*     Date               INR                  Dose  9/27  6.6  Held    9/28  --  Held   9/29  4.2  Held   9/30  3.2  1 mg    10/1  2  2.5 mg    10/2  1.6  3 mg                                                                                 Assessment/ Plan: Will order warfarin 3 mg PO x 1 dose. Pharmacy will continue to monitor daily and adjust therapy as indicated. Please contact the pharmacist at x [phone] or  for outpatient recommendations if needed.

## 2019-10-02 NOTE — PALLIATIVE CARE
Palliative Medicine Social Work      Ms. Agustín Hector is an 80year old woman with a history significant for CHF, CAD essential tremor, A-Fib on coumadin, CAD, thyroid disease, dementia, anxiety and L-2 compression fracture s/p kyphoplasty. She was admitted admitted from home on 9/27 with bilateral LATONYA and L breast yeast dermatitis. Recent admissions include 4/23-4/29 for dehydration/compression fracture; 5/27-6/5 for CHF exacerbation/PNA. Patient has an AMD on file that designated her sister, Cristy Graham ([phone]). She also has a Codicil scanned into record that revokes document naming her sister and replacing with Sneha Ferris. There is no contact information for this person and the relationship is unknown. Patient lives in independent apartment at [facility]. She has a private caregiver, Larry Carroll ([phone]) who checks on her during the morning hours. Patient is alone in afternoon and night. Per chart, she is mostly wheel-chair bound and incontinent. Her attending MD and PCP have recommended SNF and TAMIKO, but patient resistant. Per chart, patient has been in and out of skilled facilities and typically checks herself out before therapy course is complete. Palliative Medicine was consulted by the [facility] team to assist with further discussion regarding disease and aging trajectory, code status. Will follow up. Thank you for the opportunity to be involved in the care of Ms. Chris. Liberty Knowles, TJW, [facility]-  Palliative Medicine   Respecting Choices ® ACP Facilitator   [phone]

## 2019-10-02 NOTE — ROUTINE PROCESS
Bedside and Verbal shift change report given to [address] [place] [address] (oncoming nurse) by Cosmo Zarate RN (offgoing nurse). Report included the following information SBAR, Kardex, Recent Results and Cardiac Rhythm AFIB.

## 2019-10-02 NOTE — PROGRESS NOTES
Problem: Pressure Injury - Risk of  Goal: *Prevention of pressure injury  Description  Document Tre Scale and appropriate interventions in the flowsheet. Outcome: Progressing Towards Goal  Note:   Pressure Injury Interventions:  Sensory Interventions: Assess need for specialty bed, Minimize linen layers, Keep linens dry and wrinkle-free, Float heels    Moisture Interventions: Apply protective barrier, creams and emollients, Check for incontinence Q2 hours and as needed, Internal/External urinary devices    Activity Interventions: Pressure redistribution bed/mattress(bed type)    Mobility Interventions: Float heels, HOB 30 degrees or less, Pressure redistribution bed/mattress (bed type)    Nutrition Interventions: Discuss nutritional consult with provider    Friction and Shear Interventions: Feet elevated on foot rest, HOB 30 degrees or less    Pt educated at length about pressure injuries. Caregiver assisted in encouraging pt to turn q2h and elevate heels. Barrier cream placed over sacrum and periarea.

## 2019-10-02 NOTE — PROGRESS NOTES
Problem: Mobility Impaired (Adult and Pediatric)  Goal: *Acute Goals and Plan of Care (Insert Text)  Description  FUNCTIONAL STATUS PRIOR TO ADMISSION: The patient was functional at the wheelchair level and required assist for transfers to the chair. Patient unable to provide information regarding how she transfers to and from chair    [address]: The patient lived alone in [place] [address] at [facility]. States she has a caregiver during the day but unable to verbalize to PT what hours the caregiver works or what type of assist this person provides to her. Physical Therapy Goals  Initiated 9/30/2019  1. Patient will move from supine to sit and sit to supine  in bed with minimal assistance within 7 day(s). 2.  Patient will transfer from bed to chair and chair to bed with maximal assistance using the least restrictive device within 7 day(s). 3.  Patient will be independent with supine HEP within 7 days. 4.  Patient will sit EOB x 5 mins with supervision for dynamic task within 7 days. 10/2/2019 1303 by Lisette Johnson PT  Outcome: Progressing Towards Goal  10/2/2019 1302 by Lisette Johnson PT  Outcome: Progressing Towards Goal   PHYSICAL THERAPY TREATMENT/DISCHARGE  Patient: Isiah Cantor (76 y.o. female)  Date: 10/2/2019  Diagnosis: Acute on chronic diastolic CHF (congestive heart failure) ([facility] [I50.33] <principal problem not specified>       Precautions: Fall  Chart, physical therapy assessment, plan of care and goals were reviewed. ASSESSMENT  Patient continues with skilled PT services and has met goals. Patient's caregiver, Lovely Arceo, was present for session today. Patient initially declining to participate because she \"prefers to move only in the afternoons\" but was agreeable after much coaxing from PT and Lovely Arceo. She was able to come to sitting EOB with MIN A and maintain good sitting balance with several activities.   Transfer to chair (and then right back to bed as patient not agreeable to staying up in w/c) via stand pivot with MOD A x 1. Patient A&O x 4 but declines to answer PT questions, allows Isabela to do so for her. Caro Nelson relates the patient has not been ambulatory in over a year and that her current functional level is consistent with baseline. Notes attempts at trying rehab and HHPT but patient gets \"kicked out\" due to lack of participation. Caro Nelson reports she is there from early morning until 2 or 3 pm.  At that time she typically transfers the patient back to her bed and sets her up with food, needs, until she returns in the morning. The patient does not see any harm in this arrangement but Caro Nelson is concerned with her being alone for that long stretch of time. CM aware of this problem. At this time, patient has met her goals and is functioning at her baseline. She has very little interest in participating with therapy historically and here during this stay. Will complete order with goals met. Other factors to consider for discharge: has caregiver but home alone from late afternoon and all night. W/c dependent and dependent on others for transfers, dressing, bathing, self-care. PLAN :  Patient will be discharged from acute skilled physical therapy at this time. Rationale for discharge:  Goals achieved    Recommendation for discharge: (in order for the patient to meet his/her long term goals)  No skilled physical therapy/ follow up rehabilitation needs recommended at this time. This discharge recommendation:  Has been made in collaboration with the attending provider and/or case management    Equipment recommendations for successful discharge: patient owns DME required for discharge       SUBJECTIVE:   Patient stated Bhavani Holguin you happy now?   I did it for you, now I can rest.    OBJECTIVE DATA SUMMARY:   Critical Behavior:  Neurologic State: Alert  Orientation Level: Disoriented to situation, Oriented to person, Oriented to place, Oriented to time  Cognition: Appropriate decision making, Memory loss     Functional Mobility Training:  Bed Mobility:  Rolling: Supervision  Supine to Sit: Minimum assistance(bed rail)  Sit to Supine: Moderate assistance           Transfers:  Sit to Stand: Moderate assistance  Stand to Sit: Moderate assistance        Bed to Chair: Moderate assistance;Assist x1;Assist x2                    Balance:  Sitting: Intact; Without support  Standing: Impaired; With support  Standing - Static: Constant support;Poor  Standing - Dynamic : Poor  Ambulation/Gait Training:                                                 Activity Tolerance:   Poor (sitting EOB only and transfers)  Please refer to the flowsheet for vital signs taken during this treatment.     After treatment patient left in no apparent distress:   Supine in bed, Call bell within reach, Bed / chair alarm activated and Caregiver / family present    COMMUNICATION/COLLABORATION:   The patients plan of care was discussed with: Registered Nurse    Radha Raya PT   Time Calculation: 30 mins

## 2019-10-02 NOTE — PROGRESS NOTES
Benito Rodriguez, Kennedi Caldwell, and Maicol Eddy Date: 9/27/2019      Subjective:     Patient with improvement in her LE edema. Also with no further pain L breast. Continues to diurese . Marielle Mao Current Facility-Administered Medications   Medication Dose Route Frequency    warfarin (COUMADIN) tablet 3 mg  3 mg Oral ONCE    fluconazole (DIFLUCAN) tablet 100 mg  100 mg Oral Q24H    amoxicillin-clavulanate (AUGMENTIN) 875-125 mg per tablet 1 Tab  1 Tab Oral Q12H    furosemide (LASIX) tablet 20 mg  20 mg Oral BID    nystatin (MYCOSTATIN) 100,000 unit/gram powder   Topical BID    atenolol (TENORMIN) tablet 12.5 mg  12.5 mg Oral Q MON, WED & SUN    acetaminophen (TYLENOL) tablet 650 mg  650 mg Oral Q6H    ALPRAZolam (XANAX) tablet 0.5 mg  0.5 mg Oral TID PRN    calcium-vitamin D (OS-LOW) 500 mg-200 unit tablet  1 Tab Oral TID WITH MEALS    famotidine (PEPCID) tablet 20 mg  20 mg Oral DAILY PRN    levothyroxine (SYNTHROID) tablet 100 mcg  100 mcg Oral 6am    loratadine (CLARITIN) tablet 10 mg  10 mg Oral DAILY    polyethylene glycol (MIRALAX) packet 17 g  17 g Oral DAILY    potassium chloride SR (KLOR-CON 10) tablet 20 mEq  20 mEq Oral DAILY    zolpidem (AMBIEN) tablet 5 mg  5 mg Oral QHS PRN    sodium chloride (NS) flush 5-40 mL  5-40 mL IntraVENous Q8H    sodium chloride (NS) flush 5-40 mL  5-40 mL IntraVENous PRN    warfarin dosing by pharmacy   Other Rx Dosing/Monitoring          Objective:     Patient Vitals for the past 8 hrs:   BP Temp Pulse Resp SpO2 Weight   10/02/19 0744 131/73 97.5 °F (36.4 °C) 74 16 96 %    10/02/19 0349 129/74 96.1 °F (35.6 °C) 70 16 93 %    10/02/19 0332      137 lb 9.1 oz (62.4 kg)     No intake/output data recorded. 09/30 1901 - 10/02 0700  In: -   Out: 3650 [Urine:3650]    Physical Exam: Lungs: clear to auscultation bilaterally  Heart: regular rate and rhythm, S1, S2 normal, no murmur, click, rub or gallop  Abdomen: soft, non-tender.  Bowel sounds normal. No masses,  no organomegaly  Extremities: 2+ edema        Data Review   Recent Results (from the past 24 hour(s))   PROTHROMBIN TIME + INR    Collection Time: 10/02/19  4:09 AM   Result Value Ref Range    INR 1.6 (H) 0.9 - 1.1      Prothrombin time 16.0 (H) 9.0 - 98.8 sec   METABOLIC PANEL, BASIC    Collection Time: 10/02/19  4:09 AM   Result Value Ref Range    Sodium 134 (L) 136 - 145 mmol/L    Potassium 3.7 3.5 - 5.1 mmol/L    Chloride 94 (L) 97 - 108 mmol/L    CO2 34 (H) 21 - 32 mmol/L    Anion gap 6 5 - 15 mmol/L    Glucose 88 65 - 100 mg/dL    BUN 22 (H) 6 - 20 MG/DL    Creatinine 0.76 0.55 - 1.02 MG/DL    BUN/Creatinine ratio 29 (H) 12 - 20      GFR est AA >60 >60 ml/min/1.73m2    GFR est non-AA >60 >60 ml/min/1.73m2    Calcium 9.9 8.5 - 10.1 MG/DL   MAGNESIUM    Collection Time: 10/02/19  4:09 AM   Result Value Ref Range    Magnesium 1.6 1.6 - 2.4 mg/dL           Assessment:     Active Problems:    Acute on chronic diastolic CHF (congestive heart failure) (HCC) (9/27/2019)      Breast pain, left (9/29/2019)      Yeast dermatitis (9/29/2019)        Plan:     Continue oral diuretics  Oral Abx and antifungals  D/c tele

## 2019-10-03 LAB
INR PPP: 1.7 (ref 0.9–1.1)
PROTHROMBIN TIME: 16.6 SEC (ref 9–11.1)

## 2019-10-03 PROCEDURE — 65270000032 HC RM SEMIPRIVATE

## 2019-10-03 PROCEDURE — 85610 PROTHROMBIN TIME: CPT

## 2019-10-03 PROCEDURE — 36415 COLL VENOUS BLD VENIPUNCTURE: CPT

## 2019-10-03 PROCEDURE — 74011250637 HC RX REV CODE- 250/637: Performed by: FAMILY MEDICINE

## 2019-10-03 RX ORDER — WARFARIN 4 MG/1
4 TABLET ORAL ONCE
Status: COMPLETED | OUTPATIENT
Start: 2019-10-03 | End: 2019-10-03

## 2019-10-03 RX ORDER — DOCUSATE SODIUM 100 MG/1
100 CAPSULE, LIQUID FILLED ORAL DAILY
Status: DISCONTINUED | OUTPATIENT
Start: 2019-10-03 | End: 2019-10-04 | Stop reason: HOSPADM

## 2019-10-03 RX ADMIN — Medication 10 ML: at 21:21

## 2019-10-03 RX ADMIN — ACETAMINOPHEN 650 MG: 325 TABLET, FILM COATED ORAL at 01:10

## 2019-10-03 RX ADMIN — LORATADINE 10 MG: 10 TABLET ORAL at 10:08

## 2019-10-03 RX ADMIN — Medication 10 ML: at 17:04

## 2019-10-03 RX ADMIN — DOCUSATE SODIUM 100 MG: 100 CAPSULE, LIQUID FILLED ORAL at 17:01

## 2019-10-03 RX ADMIN — NYSTATIN: 100000 POWDER TOPICAL at 11:13

## 2019-10-03 RX ADMIN — ALPRAZOLAM 0.5 MG: 0.5 TABLET ORAL at 17:00

## 2019-10-03 RX ADMIN — POTASSIUM CHLORIDE 20 MEQ: 750 TABLET, FILM COATED, EXTENDED RELEASE ORAL at 10:08

## 2019-10-03 RX ADMIN — ALPRAZOLAM 0.5 MG: 0.5 TABLET ORAL at 10:11

## 2019-10-03 RX ADMIN — Medication 5 ML: at 06:00

## 2019-10-03 RX ADMIN — FLUCONAZOLE 100 MG: 100 TABLET ORAL at 11:14

## 2019-10-03 RX ADMIN — LEVOTHYROXINE SODIUM 100 MCG: 100 TABLET ORAL at 06:47

## 2019-10-03 RX ADMIN — NYSTATIN: 100000 POWDER TOPICAL at 17:57

## 2019-10-03 RX ADMIN — WARFARIN SODIUM 4 MG: 4 TABLET ORAL at 17:01

## 2019-10-03 RX ADMIN — CALCIUM CARBONATE 500 MG (1,250 MG)-VITAMIN D3 200 UNIT TABLET 1 TABLET: at 11:14

## 2019-10-03 RX ADMIN — ACETAMINOPHEN 650 MG: 325 TABLET, FILM COATED ORAL at 11:14

## 2019-10-03 RX ADMIN — FUROSEMIDE 20 MG: 20 TABLET ORAL at 10:08

## 2019-10-03 RX ADMIN — AMOXICILLIN AND CLAVULANATE POTASSIUM 1 TABLET: 875; 125 TABLET, FILM COATED ORAL at 21:21

## 2019-10-03 RX ADMIN — AMOXICILLIN AND CLAVULANATE POTASSIUM 1 TABLET: 875; 125 TABLET, FILM COATED ORAL at 10:08

## 2019-10-03 RX ADMIN — FUROSEMIDE 20 MG: 20 TABLET ORAL at 17:00

## 2019-10-03 RX ADMIN — ACETAMINOPHEN 650 MG: 325 TABLET, FILM COATED ORAL at 17:00

## 2019-10-03 RX ADMIN — CALCIUM CARBONATE 500 MG (1,250 MG)-VITAMIN D3 200 UNIT TABLET 1 TABLET: at 17:01

## 2019-10-03 NOTE — ACP (ADVANCE CARE PLANNING)
Primary Decision Maker: Suzi Monson [facility]) - Sister - [phone]    Patient does not have full capacity for decision-making. Patient had an AMD on file that designated her sister as mPOA. A codicil is scanned into record that revokes this power and designates ANNE Evans as POA (does not specify medical or General). This person's relationship and contact information is unknown. Patient tells us today that she does not want ANNE Evans in this position; that she only revoked her sister's authority because \"sisters get mad at each other\". She was not willing to reveal any information about ANNE Evans and was so confused that she did not understand purpose of document anyway. Per, Dr. Rain Lagunas, patient has alienated most family and people around her such that no one is willing to assume this role. Nonetheless, Lima Teetee, is considered NOK and would have legal authority for decisions.

## 2019-10-03 NOTE — PROGRESS NOTES
Benito Cochran, Janene Cmaacho, and Nikolay Carrington Date: 9/27/2019      Subjective:     Patient feeling OK today. Continues to gntly diurese. aT BASELINE WITH PHILIP Jonas Current Facility-Administered Medications   Medication Dose Route Frequency    atenolol (TENORMIN) tablet 12.5 mg  12.5 mg Oral Q MON, WED & FRI    fluconazole (DIFLUCAN) tablet 100 mg  100 mg Oral Q24H    amoxicillin-clavulanate (AUGMENTIN) 875-125 mg per tablet 1 Tab  1 Tab Oral Q12H    furosemide (LASIX) tablet 20 mg  20 mg Oral BID    nystatin (MYCOSTATIN) 100,000 unit/gram powder   Topical BID    acetaminophen (TYLENOL) tablet 650 mg  650 mg Oral Q6H    ALPRAZolam (XANAX) tablet 0.5 mg  0.5 mg Oral TID PRN    calcium-vitamin D (OS-LOW) 500 mg-200 unit tablet  1 Tab Oral TID WITH MEALS    famotidine (PEPCID) tablet 20 mg  20 mg Oral DAILY PRN    levothyroxine (SYNTHROID) tablet 100 mcg  100 mcg Oral 6am    loratadine (CLARITIN) tablet 10 mg  10 mg Oral DAILY    polyethylene glycol (MIRALAX) packet 17 g  17 g Oral DAILY    potassium chloride SR (KLOR-CON 10) tablet 20 mEq  20 mEq Oral DAILY    zolpidem (AMBIEN) tablet 5 mg  5 mg Oral QHS PRN    sodium chloride (NS) flush 5-40 mL  5-40 mL IntraVENous Q8H    sodium chloride (NS) flush 5-40 mL  5-40 mL IntraVENous PRN    warfarin dosing by pharmacy   Other Rx Dosing/Monitoring          Objective:     Patient Vitals for the past 8 hrs:   BP Temp Pulse Resp SpO2 Weight   10/03/19 0548      138 lb 7.2 oz (62.8 kg)   10/03/19 0441 127/76 98 °F (36.7 °C) 71 16 94 %    10/03/19 0035 124/74 98.2 °F (36.8 °C) 70 18 92 %      No intake/output data recorded. 10/01 1901 - 10/03 0700  In: -   Out: 3400 [Urine:3400]    Physical Exam: Lungs: clear to auscultation bilaterally  Heart: regular rate and rhythm, S1, S2 normal, no murmur, click, rub or gallop  Abdomen: soft, non-tender.  Bowel sounds normal. No masses,  no organomegaly        Data Review   Recent Results (from the past 24 hour(s))   PROTHROMBIN TIME + INR    Collection Time: 10/03/19  4:39 AM   Result Value Ref Range    INR 1.7 (H) 0.9 - 1.1      Prothrombin time 16.6 (H) 9.0 - 11.1 sec           Assessment:     Active Problems:    Acute on chronic diastolic CHF (congestive heart failure) ([facility] (9/27/2019)      Breast pain, left (9/29/2019)      Yeast dermatitis (9/29/2019)        Plan:     1) Plan D/C in AM  2) set up home PT, home health

## 2019-10-03 NOTE — PROGRESS NOTES
AAO person,place,not situation. Occasional forgetfulness. A fib HR 70's. //76. Resp even, unlabored on room air.

## 2019-10-03 NOTE — PALLIATIVE CARE DISCHARGE
Goals of Care/Treatment Preferences The Palliative Medicine team was consulted as part of your/your loved one's care in the hospital. Our team is a supportive service; we strive to relieve suffering and improve quality of life. We reviewed advance care planning information, which includes the following: 
 
  Primary Decision Maker: Leigh FORD [facility] - [phone] Confirm Advance Directive: Yes, on file Patient/Health Care Proxy Stated Goals: Other (comment)(unable to have good discussion today) We reviewed / discussed your code status as: Full Code Full Code means perform CPR in the event of cardiac arrest. 
    DNR means do NOT perform CPR in the event of cardiac arrest. 
    Partial Code means you have specific preferences, please discuss with your healthcare team. 
    Kailash Bear means this issue was not addressed / resolved during your stay Medical Interventions: Full interventions Because of the importance of this information, we are providing you with a printed copy to share with other healthcare providers after this hospitalization is complete.

## 2019-10-03 NOTE — PROGRESS NOTES
Pharmacist Note - Warfarin Dosing  Consult provided for this 80 y. o.female to manage warfarin for atrial fibrillation. INR Goal: 2 - 3    Home regimen/ tablet size: 4 mg PO daily     Drugs that may increase INR: amoxicillin-clavulanate, fluconazole    Drugs that may decrease INR: None  Other current anticoagulants/ drugs that may increase bleeding risk: None  Risk factors: Age > 65 and Decompensated Heart Failure  Daily INR ordered: YES    Recent Labs     10/03/19  0439 10/02/19  0409 10/01/19  0347   HGB  --   --  12.5   INR 1.7* 1.6* 2.0*     Date               INR                  Dose  9/27  6.6  Held    9/28  --  Held   9/29  4.2  Held   9/30  3.2  1 mg    10/1  2  2.5 mg    10/2  1.6  3 mg    10/3  1.7  4 mg                                                                                 Assessment/ Plan: Will order warfarin 4 mg PO x 1 dose. Pharmacy will continue to monitor daily and adjust therapy as indicated. Please contact the pharmacist at x [phone] or  for outpatient recommendations if needed.

## 2019-10-03 NOTE — PROGRESS NOTES
ADRI:    Pt is refusing consideration of SNF or intermediate per documentation by previous colleagues. Per MD, plan is to discharge home tomorrow and MD is ordering home health. In the past, pt would not accept home health care but CM will set up pending orders. (In June, pt discharged to [facility] and had been to [facility] and [facility] SNF prior to that (within this past year). Palliative care MD and MARIAMA are speaking with the pt currently at bedside, CM will return to speak with pt at bedside.     PILI Bush

## 2019-10-03 NOTE — CONSULTS
Palliative Medicine Consult  Darek: [phone] ([phone])    Patient Name: Isiah Cantor  YOB: 1931    Date of Initial Consult: 10/3/19  Reason for Consult: Care decisions  Requesting Provider: Magui Briones NP ([facility] team)  Primary Care Physician: Promise Washington MD     SUMMARY:   Isiah Cantor is a 80y.o. year old woman with a history significant for CHF, CAD essential tremor, A-Fib on coumadin, CAD, thyroid disease, dementia, anxiety and L-2 compression fracture s/p kyphoplasty. She was admitted admitted from home on 9/27 with bilateral LATONYA and L breast yeast dermatitis.      Recent admissions include 4/23-4/29 for dehydration/compression fracture; 5/27-6/5 for CHF exacerbation/PNA. Outpatient notes also indicate a hip fracture in July. Palliative Medicine was consulted by the [facility] team to assist with further discussion regarding disease and aging trajectory, code status. An AMD is on file from 2014 which designated her sister as secondary mPOA in the event her primary () was unavailable. He has since passed. In 2016 however she revoked this document and named another CL Cristina. Her AMD from 2014 supported no life prolonging measures at the end of life. Social Hx:  since 2015. lives in independent living at [facility]. Care Aide Rojelio Castellanos helps in mornings. She has no assistance in the afternoon or overnight. PALLIATIVE DIAGNOSES:   1. Dementia  2. Urinary incontinence  3. Debility - wheelchair bound  4. Medication non-adherence (diuretics)       PLAN:   1. Assessed pt alongside [employer], LCSW. Please refer to her note for full details. Caregiver Lindsey at bedside. 2. Introduced team and role of palliative care. Pt immediately resistant- \"I don't need anything. \"    3. Attempted to clarify her support system, she repeatedly referred to herself as POA, unable to grasp concepts about AMD despite simple language.     4. She is assessed to not have capacity for medical decision making. Unable to delve into goals of care discussions. 5. Per most recent documentation JL Evans is her mPOA but we do not have a contact number for this person and verbally patient states she trusts her sister and Evangelina Henley is no longer involved. Will make attempts to reach out to her sister to further clarify. 6. Per attending note plan is for discharge tomorrow. Note pt resistance for home health in the past.    7. Initial consult note routed to primary continuity provider and/or primary health care team members  8. Communicated plan of care with: Palliative IDT, [facility] Team     GOALS OF CARE / TREATMENT PREFERENCES:     GOALS OF CARE:  Patient/Health Care Proxy Stated Goals: Other (comment)(unable to have good discussion today)    TREATMENT PREFERENCES:   Code Status: Full Code    Advance Care Planning:  [x] The [facility] Interdisciplinary Team has updated the ACP Navigator with [place] and Patient Capacity     AMD from 2014 designated her sister as secondary mPOA in the event her primary () was unavailable. He has since passed. In 2016 however she revoked this document and named another JL Evans. Her AMD from 2014 supported no life prolonging measures at the end of life. Advance Care Planning 9/27/2019   Patient's Healthcare Decision Maker is: -   Primary Decision Maker Name -   Primary Decision Maker Phone Number -   Primary Decision Maker Relationship to Patient -   Confirm Advance Directive Yes, on file   Patient Would Like to Complete Advance Directive -       Medical Interventions: Full interventions         Other:    As far as possible, the palliative care team has discussed with patient / health care proxy about goals of care / treatment preferences for patient.      HISTORY:     History obtained from:  Chart review    CHIEF COMPLAINT:  P/w extensive bl LE edema    HPI/SUBJECTIVE:    The patient is:   [x] Verbal and participatory  [] Non-participatory due to:     Pt is a poor historian with obvious dementia. Also very guarded and irritable; limiting ability to assess her symptoms or gather information. Clinical Pain Assessment (nonverbal scale for severity on nonverbal patients):   Clinical Pain Assessment  Severity: 0          Duration: for how long has pt been experiencing pain (e.g., 2 days, 1 month, years)  Frequency: how often pain is an issue (e.g., several times per day, once every few days, constant)     FUNCTIONAL ASSESSMENT:     Palliative Performance Scale (PPS):  PPS: 50       PSYCHOSOCIAL/SPIRITUAL SCREENING:     Palliative IDT has assessed this patient for cultural preferences / practices and a referral made as appropriate to needs (Cultural Services, Patient Advocacy, Ethics, etc.)    Any spiritual / Islam concerns:  [] Yes /  [x] No    Caregiver Burnout:  [] Yes /  [x] No /  [] No Caregiver Present      Anticipatory grief assessment:   [x] Normal  / [] Maladaptive       ESAS Anxiety:      ESAS Depression:          REVIEW OF SYSTEMS:     Positive and pertinent negative findings in ROS are noted above in HPI. The following systems were [] reviewed / [x] unable to be reviewed as noted in HPI  Other findings are noted below. Systems: constitutional, ears/nose/mouth/throat, respiratory, gastrointestinal, genitourinary, musculoskeletal, integumentary, neurologic, psychiatric, endocrine. Positive findings noted below. Modified ESAS Completed by: provider           Pain: 0     Nausea: 0                          PHYSICAL EXAM:     From RN flowsheet:  Wt Readings from Last 3 Encounters:   10/03/19 62.8 kg (138 lb 7.2 oz)   07/03/19 59.9 kg (132 lb)   06/05/19 60 kg (132 lb 4.4 oz)     Blood pressure 150/83, pulse 84, temperature 97.6 °F (36.4 °C), resp. rate 16, height 5' 3\" (1.6 m), weight 62.8 kg (138 lb 7.2 oz), SpO2 94 %.     Pain Scale 1: Numeric (0 - 10)  Pain Intensity 1: 0  Pain Onset 1: chronic  Pain Location 1: Leg  Pain Orientation 1: Left, Right     Pain Intervention(s) 1: Medication (see MAR), Position  Last bowel movement, if known:     Gen:  Age appropriate / well appearing elderly female  HEENT:  Sclerae anicteric  Chest:  resp unlabored  Neuro:  Alert, oriented to self/hospital, unable to relate details of hospital stay  Psych:  Guarded, irritable       HISTORY:     Active Problems:    Acute on chronic diastolic CHF (congestive heart failure) ([facility] (2019)      Breast pain, left (2019)      Yeast dermatitis (2019)      Past Medical History:   Diagnosis Date    A-fib ([facility]     Arrhythmia     A FIB    Arthritis     Back pain     CAD (coronary artery disease)     PT DENIES    Chronic pain     Dementia 2016    Hypercholesterolemia     Psychiatric disorder     ANXIETY    Shoulder pain     Thyroid disease     Tremor, essential       Past Surgical History:   Procedure Laterality Date    HX APPENDECTOMY      HX ORTHOPAEDIC      left knee replacement    HX KADIE AND BSO      AGE 39    HX TONSIL AND ADENOIDECTOMY      IR KYPHOPLASTY LUMBAR  2019      Family History   Problem Relation Age of Onset    Dementia Mother     Arthritis-osteo Father       History reviewed, no pertinent family history.   Social History     Tobacco Use    Smoking status: Former Smoker     Packs/day: 0.50     Years: 10.00     Pack years: 5.00     Last attempt to quit: 1994     Years since quittin.7    Smokeless tobacco: Never Used   Substance Use Topics    Alcohol use: Yes     Comment: NIGHTLY 2 GLASSES WINE     Allergies   Allergen Reactions    Latex, Natural Rubber Other (comments)     \"I get black where ever it touches\"    Codeine Other (comments)     \"It just sends me up the wall\"    Adhesive Rash and Other (comments)     blisters    Cortisone Unknown (comments)      Current Facility-Administered Medications   Medication Dose Route Frequency    warfarin (COUMADIN) tablet 4 mg  4 mg Oral ONCE    atenolol (TENORMIN) tablet 12.5 mg  12.5 mg Oral Q MON, WED & FRI    amoxicillin-clavulanate (AUGMENTIN) 875-125 mg per tablet 1 Tab  1 Tab Oral Q12H    furosemide (LASIX) tablet 20 mg  20 mg Oral BID    nystatin (MYCOSTATIN) 100,000 unit/gram powder   Topical BID    acetaminophen (TYLENOL) tablet 650 mg  650 mg Oral Q6H    ALPRAZolam (XANAX) tablet 0.5 mg  0.5 mg Oral TID PRN    calcium-vitamin D (OS-LOW) 500 mg-200 unit tablet  1 Tab Oral TID WITH MEALS    famotidine (PEPCID) tablet 20 mg  20 mg Oral DAILY PRN    levothyroxine (SYNTHROID) tablet 100 mcg  100 mcg Oral 6am    loratadine (CLARITIN) tablet 10 mg  10 mg Oral DAILY    polyethylene glycol (MIRALAX) packet 17 g  17 g Oral DAILY    potassium chloride SR (KLOR-CON 10) tablet 20 mEq  20 mEq Oral DAILY    zolpidem (AMBIEN) tablet 5 mg  5 mg Oral QHS PRN    sodium chloride (NS) flush 5-40 mL  5-40 mL IntraVENous Q8H    sodium chloride (NS) flush 5-40 mL  5-40 mL IntraVENous PRN    warfarin dosing by pharmacy   Other Rx Dosing/Monitoring          LAB AND IMAGING FINDINGS:     Lab Results   Component Value Date/Time    WBC 4.4 10/01/2019 03:47 AM    HGB 12.5 10/01/2019 03:47 AM    PLATELET 172 98/23/9309 03:47 AM     Lab Results   Component Value Date/Time    Sodium 134 (L) 10/02/2019 04:09 AM    Potassium 3.7 10/02/2019 04:09 AM    Chloride 94 (L) 10/02/2019 04:09 AM    CO2 34 (H) 10/02/2019 04:09 AM    BUN 22 (H) 10/02/2019 04:09 AM    Creatinine 0.76 10/02/2019 04:09 AM    Calcium 9.9 10/02/2019 04:09 AM    Magnesium 1.6 10/02/2019 04:09 AM      Lab Results   Component Value Date/Time    AST (SGOT) 24 09/27/2019 01:24 PM    Alk.  phosphatase 90 09/27/2019 01:24 PM    Protein, total 7.6 09/27/2019 01:24 PM    Albumin 3.5 09/27/2019 01:24 PM    Globulin 4.1 (H) 09/27/2019 01:24 PM     Lab Results   Component Value Date/Time    INR 1.7 (H) 10/03/2019 04:39 AM    Prothrombin time 16.6 (H) 10/03/2019 04:39 AM    aPTT 33. 2 (H) 07/29/2018 09:20 PM      No results found for: IRON, FE, TIBC, IBCT, PSAT, FERR   No results found for: PH, PCO2, PO2  No components found for: [place]   Lab Results   Component Value Date/Time     (H) 07/29/2018 09:20 PM    CK - MB 7.8 (H) 02/09/2016 03:37 PM                Total time: 45m  Counseling / coordination time, spent as noted above: 30m  > 50% counseling / coordination?: y    Prolonged service was provided for  []30 min   []75 min in face to face time in the presence of the patient, spent as noted above. Time Start:   Time End:   Note: this can only be billed with 29702 (initial) or 11779 (follow up). If multiple start / stop times, list each separately.

## 2019-10-03 NOTE — PROGRESS NOTES
Attempted to call report to Henrietta Manriquez RN. He is in the middle of pt care and will call back for report in a few  mins.

## 2019-10-03 NOTE — PROGRESS NOTES
Called Dr Becky Ward office, spoke with Ova Cruise. Made aware pt is refusing miralax and her stools have been hard past few days. She stated she would get in touch with Dr Nelia Ash and see what else he would add for her and call me back.

## 2019-10-03 NOTE — PALLIATIVE CARE
Palliative Medicine Social Work    Ms. Gio Minaya is an 80year old woman with a history significant for CHF, CAD essential tremor, A-Fib on coumadin, CAD, thyroid disease, dementia, anxiety and L-2 compression fracture s/p kyphoplasty. She was admitted admitted from home on 9/27 with bilateral LATONYA and L breast yeast dermatitis. Recent admissions include 4/23-4/29 for dehydration/compression fracture; 5/27-6/5 for CHF exacerbation/PNA. Outpatient notes also indicate a hip fracture in July. Patient has an AMD on file that designated her sister, Lianet Huston. Laron Dandy ([phone]). She also has a Codicil scanned into record that revokes document naming her sister and replacing with Hernandez Ashley. There is no contact information for this person and the relationship is unknown. Patient lives in independent apartment at [facility]. She has a private caregiver, Marifer Castro ([phone]) who checks on her during the morning hours. Patient is alone in afternoon and night. Per chart, she is mostly wheel-chair bound and incontinent. Her attending MD and PCP have recommended SNF and [facility], but patient resistant. Per chart, patient has been in and out of skilled facilities and typically checks herself out before therapy course is complete. Outpatient notes also indicate patient is not compliant with diuretics. Palliative Medicine was consulted by the [facility] [place] team to assist with further discussion regarding disease and aging trajectory, code status. Dr. Yancy Byers and I met with patient and Marifer Castro. Patient was alert, irritable and dismissive; frustrated and confused about why we were there; told us she didn't need anything. Unable to relate anything about her illness. Tried to inquire more about her AMD which names ANNE Evans. She told us this person was no longer involved in her life or care; said she would still want her sister in this role, though clearly confused and kept saying, \"I'm my power of . \" and unable to understand purpose of document or this role. Patient was upset by our presence and assessed to be without capacity for any decision-making. Will follow up with sister. Thank you for the opportunity to be involved in the care of Ms. Chris. Liberty Knowles, TJW, [facility]-  Palliative Medicine   [employer] ® ACP Facilitator   [phone]

## 2019-10-04 ENCOUNTER — PATIENT OUTREACH (OUTPATIENT)
Dept: CASE MANAGEMENT | Age: 84
End: 2019-10-04

## 2019-10-04 VITALS
OXYGEN SATURATION: 92 % | TEMPERATURE: 98.4 F | DIASTOLIC BLOOD PRESSURE: 80 MMHG | BODY MASS INDEX: 26.72 KG/M2 | HEIGHT: 63 IN | SYSTOLIC BLOOD PRESSURE: 132 MMHG | WEIGHT: 150.8 LBS | RESPIRATION RATE: 17 BRPM | HEART RATE: 85 BPM

## 2019-10-04 PROBLEM — N61.0 ACUTE MASTITIS OF LEFT BREAST: Status: ACTIVE | Noted: 2019-10-04

## 2019-10-04 LAB
ANION GAP SERPL CALC-SCNC: 4 MMOL/L (ref 5–15)
BUN SERPL-MCNC: 20 MG/DL (ref 6–20)
BUN/CREAT SERPL: 23 (ref 12–20)
CALCIUM SERPL-MCNC: 9.7 MG/DL (ref 8.5–10.1)
CHLORIDE SERPL-SCNC: 96 MMOL/L (ref 97–108)
CO2 SERPL-SCNC: 35 MMOL/L (ref 21–32)
CREAT SERPL-MCNC: 0.88 MG/DL (ref 0.55–1.02)
ERYTHROCYTE [DISTWIDTH] IN BLOOD BY AUTOMATED COUNT: 14.6 % (ref 11.5–14.5)
GLUCOSE SERPL-MCNC: 93 MG/DL (ref 65–100)
HCT VFR BLD AUTO: 40 % (ref 35–47)
HGB BLD-MCNC: 12.5 G/DL (ref 11.5–16)
INR PPP: 1.7 (ref 0.9–1.1)
MCH RBC QN AUTO: 28.7 PG (ref 26–34)
MCHC RBC AUTO-ENTMCNC: 31.3 G/DL (ref 30–36.5)
MCV RBC AUTO: 91.7 FL (ref 80–99)
NRBC # BLD: 0 K/UL (ref 0–0.01)
NRBC BLD-RTO: 0 PER 100 WBC
PLATELET # BLD AUTO: 234 K/UL (ref 150–400)
PMV BLD AUTO: 11.1 FL (ref 8.9–12.9)
POTASSIUM SERPL-SCNC: 3.7 MMOL/L (ref 3.5–5.1)
PROTHROMBIN TIME: 17.1 SEC (ref 9–11.1)
RBC # BLD AUTO: 4.36 M/UL (ref 3.8–5.2)
SODIUM SERPL-SCNC: 135 MMOL/L (ref 136–145)
WBC # BLD AUTO: 4 K/UL (ref 3.6–11)

## 2019-10-04 PROCEDURE — 74011250637 HC RX REV CODE- 250/637: Performed by: FAMILY MEDICINE

## 2019-10-04 PROCEDURE — 85610 PROTHROMBIN TIME: CPT

## 2019-10-04 PROCEDURE — 36415 COLL VENOUS BLD VENIPUNCTURE: CPT

## 2019-10-04 PROCEDURE — 85027 COMPLETE CBC AUTOMATED: CPT

## 2019-10-04 PROCEDURE — 80048 BASIC METABOLIC PNL TOTAL CA: CPT

## 2019-10-04 RX ORDER — FUROSEMIDE 20 MG/1
20 TABLET ORAL DAILY
Qty: 30 TAB | Refills: 4 | Status: SHIPPED | OUTPATIENT
Start: 2019-10-04 | End: 2019-10-25

## 2019-10-04 RX ORDER — AMOXICILLIN AND CLAVULANATE POTASSIUM 875; 125 MG/1; MG/1
1 TABLET, FILM COATED ORAL EVERY 12 HOURS
Qty: 7 TAB | Refills: 0 | Status: SHIPPED | OUTPATIENT
Start: 2019-10-04 | End: 2019-10-18

## 2019-10-04 RX ORDER — NYSTATIN 100000 [USP'U]/G
POWDER TOPICAL 2 TIMES DAILY
Qty: 60 G | Refills: 4 | Status: SHIPPED | OUTPATIENT
Start: 2019-10-04 | End: 2019-10-18

## 2019-10-04 RX ORDER — ATENOLOL 25 MG/1
12.5 TABLET ORAL
Qty: 45 TAB | Refills: 5 | Status: SHIPPED | OUTPATIENT
Start: 2019-10-04 | End: 2019-10-25

## 2019-10-04 RX ORDER — POLYETHYLENE GLYCOL 3350 17 G/17G
17 POWDER, FOR SOLUTION ORAL DAILY
Qty: 30 PACKET | Refills: 0 | Status: SHIPPED | OUTPATIENT
Start: 2019-10-04 | End: 2019-10-18

## 2019-10-04 RX ADMIN — LEVOTHYROXINE SODIUM 100 MCG: 100 TABLET ORAL at 06:49

## 2019-10-04 RX ADMIN — LORATADINE 10 MG: 10 TABLET ORAL at 09:38

## 2019-10-04 RX ADMIN — DOCUSATE SODIUM 100 MG: 100 CAPSULE, LIQUID FILLED ORAL at 09:38

## 2019-10-04 RX ADMIN — ALPRAZOLAM 0.5 MG: 0.5 TABLET ORAL at 09:44

## 2019-10-04 RX ADMIN — ACETAMINOPHEN 650 MG: 325 TABLET, FILM COATED ORAL at 00:47

## 2019-10-04 RX ADMIN — ACETAMINOPHEN 650 MG: 325 TABLET, FILM COATED ORAL at 06:49

## 2019-10-04 RX ADMIN — POTASSIUM CHLORIDE 20 MEQ: 750 TABLET, FILM COATED, EXTENDED RELEASE ORAL at 09:37

## 2019-10-04 RX ADMIN — AMOXICILLIN AND CLAVULANATE POTASSIUM 1 TABLET: 875; 125 TABLET, FILM COATED ORAL at 09:38

## 2019-10-04 RX ADMIN — FUROSEMIDE 20 MG: 20 TABLET ORAL at 09:38

## 2019-10-04 RX ADMIN — Medication 10 ML: at 06:50

## 2019-10-04 RX ADMIN — CALCIUM CARBONATE 500 MG (1,250 MG)-VITAMIN D3 200 UNIT TABLET 1 TABLET: at 06:49

## 2019-10-04 NOTE — PROGRESS NOTES
Transitional Care Team: Discharge HUG Note    Date of Assessment: 10/04/19  Time of Assessment:  10:27 AM    Jas Carter is a 80 y.o. female inpatient at [address] [place] [address] discharging home w/ [facility] and care aide- Pt anxious to leave left before confirmation of [facility] agency acceptance. CM has notified care aide, unsure of next step/other pending agency     CHF- on admission there were c/o swelling to LE it is unclear if this has improved. Pt's weight on admission (145lb) and at discharge (150lb) do not reflect improvement and pt selected to have minimal time w/ PT and being out of the bed. Pending provider response about pt's discharge dose of Lasix and if she is to resume Aldactone. Care aide [facility] questions if pt can obtain additional care aide hours via her insurance (currently paying out of pocket) she is advised that pt's current policy does not have this benefit. Primary Discharge Diagnosis: Acute on Chronic Diastolic Heart Failure    Advance Directive:  reviewed and needs to be updated. See ACP note from 4/6/17. Please also see Palliative note from 10/3/19     Current Code Status:  Full Code    Discharge Needs: (to include safety issues)   *Pt is primarily wheelchair bound  *Pt is home alone once her care aide leaves mid day       Barriers Identified:   *Dementia  *Non compliant  *Incontinence  *Lack of support system     Medication Review:  was performed by PharmD question  *Lasix dose was 40mg/day PTA now 20mg/day @DC  *Aldactone held while IP - resume @ discharge  Provider response pending    Best Patient Contact Number:   Pt [facility] & [address] [phone]    HUG (Healthy Understanding of Goals) program introduced to patient/family. The Transitional Care Team bridges the gaps in care and education surrounding discharge from the acute care facility.  The objective is to empower the patient and family in taking a proactive role in preventing readmission within the first thirty days after discharge. The team is also involved in the efforts to reduce readmission to the acute care setting after stabilization and discharge from the acute care environment either to skilled nursing facilities or community. ANIYA RN provided pt's aide [address] [place] [address] with [facility] Calendar/ follow up appointments/ Ambulatory Nurse Navigator name and contact information when the patient is ready for discharge. No future appointments. Non-[facility] follow up appointment(s):   Pt's aide reports will schedule f/u before leaving the hospital          10/6/19 Cliff Contreras: call information given to pt's aide [address] [place] [address]     Patient education focused on readmission zones as described as: The Red Zone: High risk for readmission, days 1-21  The Yellow Zone: Moderate  risk for readmission, days 22-29   The Green Zone: Lower risk for readmission, days                30 and after    The [facility] team will continue to offer support during the 30- 90 day discharge from acute care setting. Will notify Ambulatory HF Nurse Navigator, once identified.     Past Medical History:   Diagnosis Date    A-fib [facility])     Arrhythmia     A FIB    Arthritis     Back pain     CAD (coronary artery disease)     PT DENIES    Chronic pain     Dementia 2/9/2016    Hypercholesterolemia     Psychiatric disorder     ANXIETY    Shoulder pain     Thyroid disease     Tremor, essential

## 2019-10-04 NOTE — DISCHARGE INSTRUCTIONS
Patient Discharge Instructions    Cayla Jaquez / [id number] : 1931    Admitted 2019 Discharged: 10/4/2019     Take Home Medications            · It is important that you take the medication exactly as they are prescribed. · Keep your medication in the bottles provided by the pharmacist and keep a list of the medication names, dosages, and times to be taken in your wallet. · Do not take other medications without consulting your doctor. What to do at Home    Recommended diet: Regular Diet,     Recommended activity: Activity as tolerated, Home PT    Follow up with Dr Troy Santoyo in 1 week    If you experience any of the following symptoms Increased swelling, SOB, please follow up with Dr Juan Zaldivar. Follow-up Appointments   Procedures    FOLLOW UP VISIT Appointment in: One Week     Standing Status:   Standing     Number of Occurrences:   1     Order Specific Question:   Appointment in     Answer: One Week            Information obtained by :  I understand that if any problems occur once I am at home I am to contact my physician. I understand and acknowledge receipt of the instructions indicated above.                                                                                                                                            Physician's or R.N.'s Signature                                                                  Date/Time                                                                                                                                              Patient or Representative Signature                                                          Date/Time

## 2019-10-04 NOTE — PROGRESS NOTES
Problem: Pain  Goal: *Control of Pain  Outcome: Not Progressing Towards Goal     Problem: Patient Education: Go to Patient Education Activity  Goal: Patient/Family Education  Outcome: Not Progressing Towards Goal

## 2019-10-04 NOTE — PROGRESS NOTES
Bedside shift change report given to Jose F Mccabe (oncoming nurse) by Radha Shen RN (offgoing nurse). Report included the following information SBAR, Kardex, MAR and Recent Results.

## 2019-10-04 NOTE — PROGRESS NOTES
I have reviewed discharge instructions with the patient and caregiver. The patient and caregiver verbalized understanding. Current Discharge Medication List      START taking these medications    Details   amoxicillin-clavulanate (AUGMENTIN) 875-125 mg per tablet Take 1 Tab by mouth every twelve (12) hours. Qty: 7 Tab, Refills: 0      nystatin (MYCOSTATIN) powder Apply  to affected area two (2) times a day. Qty: 60 g, Refills: 4         CONTINUE these medications which have CHANGED    Details   atenolol (TENORMIN) 25 mg tablet Take 0.5 Tabs by mouth every Monday, Wednesday, Friday. Qty: 45 Tab, Refills: 5      furosemide (LASIX) 20 mg tablet Take 1 Tab by mouth daily. Qty: 30 Tab, Refills: 4      polyethylene glycol (MIRALAX) 17 gram packet Take 1 Packet by mouth daily. Qty: 30 Packet, Refills: 0         CONTINUE these medications which have NOT CHANGED    Details   spironolactone (ALDACTONE) 25 mg tablet Take 1 Tab by mouth daily. Qty: 30 Tab, Refills: 3      warfarin (COUMADIN) 4 mg tablet Take 1 Tab by mouth daily. Qty: 30 Tab, Refills: 3      ALPRAZolam (XANAX) 0.5 mg tablet Take 1 Tab by mouth three (3) times daily as needed for Anxiety. Max Daily Amount: 1.5 mg.  Qty: 30 Tab, Refills: 0    Associated Diagnoses: Anxiety      famotidine (PEPCID) 20 mg tablet Take 1 Tab by mouth daily as needed (indigestion/heartburn/acid reflux).   Qty: 30 Tab, Refills: 3      levothyroxine (SYNTHROID) 100 mcg tablet TAKE 1 TABLET BY MOUTH EVERY MORNING BEFORE BREAKFAST  Qty: 30 Tab, Refills: 3    Comments: Generic For:*SYNTHROID  100MCG  04/04/2019 10:39:40 AM      potassium chloride (K-DUR, KLOR-CON) 20 mEq tablet TAKE 1 TABLET BY MOUTH EVERY DAY  Qty: 30 Tab, Refills: 3    Comments: Generic For:*K-DUR 20 MEQ TABLET SA  01/11/2019 10:29:13 AM         STOP taking these medications       Biotin 2,500 mcg cap Comments:   Reason for Stopping:         collagenase (SANTYL) 250 unit/gram ointment Comments:   Reason for Stopping:         loratadine (CLARITIN) 10 mg tablet Comments:   Reason for Stopping:         calcium-vitamin D (OYSTER SHELL) 500 mg(1,250mg) -200 unit per tablet Comments:   Reason for Stopping:         acetaminophen (TYLENOL) 325 mg tablet Comments:   Reason for Stopping:

## 2019-10-04 NOTE — PROGRESS NOTES
Palliative care: Addendum to previous note. Spoke to Dr. Jero Paulson. He agrees pt lacks capacity for medical decision making as has had a diagnosis of dementia for some time. Multiple past attempts at clarifying medical POA have been made. As she has alienated herself over the years, there is no one readily playing this role. We are therefore unable to have good care goals discussions at this time. In an emergent situation her sister's phone number is listed and she would serve as NOK. Will sign off for now. Please re-consult at any point if we can be of further assistance.

## 2019-10-04 NOTE — PROGRESS NOTES
Pt observed sleeping most of the night, detests interruptions, refused to be turned and repositioned for skin breakdown prevention. Oncoming nurse notified.

## 2019-10-18 ENCOUNTER — APPOINTMENT (OUTPATIENT)
Dept: GENERAL RADIOLOGY | Age: 84
DRG: 871 | End: 2019-10-18
Attending: EMERGENCY MEDICINE
Payer: MEDICARE

## 2019-10-18 ENCOUNTER — HOSPITAL ENCOUNTER (INPATIENT)
Age: 84
LOS: 7 days | Discharge: HOME | DRG: 871 | End: 2019-10-25
Attending: EMERGENCY MEDICINE | Admitting: INTERNAL MEDICINE
Payer: MEDICARE

## 2019-10-18 DIAGNOSIS — R79.1 SUPRATHERAPEUTIC INR: ICD-10-CM

## 2019-10-18 DIAGNOSIS — R65.20 SEVERE SEPSIS (HCC): Primary | ICD-10-CM

## 2019-10-18 DIAGNOSIS — J18.9 PNEUMONIA DUE TO INFECTIOUS ORGANISM, UNSPECIFIED LATERALITY, UNSPECIFIED PART OF LUNG: ICD-10-CM

## 2019-10-18 DIAGNOSIS — I50.9 ACUTE ON CHRONIC CONGESTIVE HEART FAILURE, UNSPECIFIED HEART FAILURE TYPE (HCC): ICD-10-CM

## 2019-10-18 DIAGNOSIS — I48.91 RAPID ATRIAL FIBRILLATION (HCC): ICD-10-CM

## 2019-10-18 DIAGNOSIS — A41.9 SEVERE SEPSIS (HCC): Primary | ICD-10-CM

## 2019-10-18 LAB
ALBUMIN SERPL-MCNC: 2.9 G/DL (ref 3.5–5)
ALBUMIN/GLOB SERPL: 0.7 {RATIO} (ref 1.1–2.2)
ALP SERPL-CCNC: 80 U/L (ref 45–117)
ALT SERPL-CCNC: 15 U/L (ref 12–78)
ANION GAP SERPL CALC-SCNC: 7 MMOL/L (ref 5–15)
AST SERPL-CCNC: 32 U/L (ref 15–37)
ATRIAL RATE: 300 BPM
BASOPHILS # BLD: 0 K/UL (ref 0–0.1)
BASOPHILS NFR BLD: 0 % (ref 0–1)
BILIRUB SERPL-MCNC: 1.2 MG/DL (ref 0.2–1)
BNP SERPL-MCNC: ABNORMAL PG/ML
BUN SERPL-MCNC: 16 MG/DL (ref 6–20)
BUN/CREAT SERPL: 17 (ref 12–20)
CALCIUM SERPL-MCNC: 9.8 MG/DL (ref 8.5–10.1)
CALCULATED R AXIS, ECG10: 109 DEGREES
CALCULATED T AXIS, ECG11: 110 DEGREES
CHLORIDE SERPL-SCNC: 102 MMOL/L (ref 97–108)
CO2 SERPL-SCNC: 27 MMOL/L (ref 21–32)
COMMENT, HOLDF: NORMAL
CREAT SERPL-MCNC: 0.93 MG/DL (ref 0.55–1.02)
DIAGNOSIS, 93000: NORMAL
DIFFERENTIAL METHOD BLD: ABNORMAL
EOSINOPHIL # BLD: 0 K/UL (ref 0–0.4)
EOSINOPHIL NFR BLD: 0 % (ref 0–7)
ERYTHROCYTE [DISTWIDTH] IN BLOOD BY AUTOMATED COUNT: 16 % (ref 11.5–14.5)
FLUAV AG NPH QL IA: NEGATIVE
FLUBV AG NOSE QL IA: NEGATIVE
GLOBULIN SER CALC-MCNC: 4 G/DL (ref 2–4)
GLUCOSE BLD STRIP.AUTO-MCNC: 118 MG/DL (ref 65–100)
GLUCOSE SERPL-MCNC: 105 MG/DL (ref 65–100)
HCT VFR BLD AUTO: 38.2 % (ref 35–47)
HGB BLD-MCNC: 11.8 G/DL (ref 11.5–16)
IMM GRANULOCYTES # BLD AUTO: 0.1 K/UL (ref 0–0.04)
IMM GRANULOCYTES NFR BLD AUTO: 1 % (ref 0–0.5)
LACTATE SERPL-SCNC: 2.4 MMOL/L (ref 0.4–2)
LACTATE SERPL-SCNC: 3.1 MMOL/L (ref 0.4–2)
LYMPHOCYTES # BLD: 0.2 K/UL (ref 0.8–3.5)
LYMPHOCYTES NFR BLD: 2 % (ref 12–49)
MCH RBC QN AUTO: 28.6 PG (ref 26–34)
MCHC RBC AUTO-ENTMCNC: 30.9 G/DL (ref 30–36.5)
MCV RBC AUTO: 92.5 FL (ref 80–99)
MONOCYTES # BLD: 1 K/UL (ref 0–1)
MONOCYTES NFR BLD: 9 % (ref 5–13)
NEUTS SEG # BLD: 10.3 K/UL (ref 1.8–8)
NEUTS SEG NFR BLD: 88 % (ref 32–75)
NRBC # BLD: 0 K/UL (ref 0–0.01)
NRBC BLD-RTO: 0 PER 100 WBC
PLATELET # BLD AUTO: 182 K/UL (ref 150–400)
PLATELET COMMENTS,PCOM: ABNORMAL
PMV BLD AUTO: 12.1 FL (ref 8.9–12.9)
POTASSIUM SERPL-SCNC: 4 MMOL/L (ref 3.5–5.1)
PROT SERPL-MCNC: 6.9 G/DL (ref 6.4–8.2)
Q-T INTERVAL, ECG07: 374 MS
QRS DURATION, ECG06: 124 MS
QTC CALCULATION (BEZET), ECG08: 457 MS
RBC # BLD AUTO: 4.13 M/UL (ref 3.8–5.2)
RBC MORPH BLD: ABNORMAL
RBC MORPH BLD: ABNORMAL
SAMPLES BEING HELD,HOLD: NORMAL
SERVICE CMNT-IMP: ABNORMAL
SODIUM SERPL-SCNC: 136 MMOL/L (ref 136–145)
T4 FREE SERPL-MCNC: 1.4 NG/DL (ref 0.8–1.5)
TSH SERPL DL<=0.05 MIU/L-ACNC: 2.23 UIU/ML (ref 0.36–3.74)
VENTRICULAR RATE, ECG03: 90 BPM
WBC # BLD AUTO: 11.6 K/UL (ref 3.6–11)

## 2019-10-18 PROCEDURE — 87040 BLOOD CULTURE FOR BACTERIA: CPT

## 2019-10-18 PROCEDURE — 87804 INFLUENZA ASSAY W/OPTIC: CPT

## 2019-10-18 PROCEDURE — 99284 EMERGENCY DEPT VISIT MOD MDM: CPT

## 2019-10-18 PROCEDURE — 84439 ASSAY OF FREE THYROXINE: CPT

## 2019-10-18 PROCEDURE — 87086 URINE CULTURE/COLONY COUNT: CPT

## 2019-10-18 PROCEDURE — 74011250637 HC RX REV CODE- 250/637: Performed by: EMERGENCY MEDICINE

## 2019-10-18 PROCEDURE — 65660000000 HC RM CCU STEPDOWN

## 2019-10-18 PROCEDURE — 87077 CULTURE AEROBIC IDENTIFY: CPT

## 2019-10-18 PROCEDURE — 80053 COMPREHEN METABOLIC PANEL: CPT

## 2019-10-18 PROCEDURE — 74011000258 HC RX REV CODE- 258: Performed by: EMERGENCY MEDICINE

## 2019-10-18 PROCEDURE — 36415 COLL VENOUS BLD VENIPUNCTURE: CPT

## 2019-10-18 PROCEDURE — 84443 ASSAY THYROID STIM HORMONE: CPT

## 2019-10-18 PROCEDURE — 71045 X-RAY EXAM CHEST 1 VIEW: CPT

## 2019-10-18 PROCEDURE — 93005 ELECTROCARDIOGRAM TRACING: CPT

## 2019-10-18 PROCEDURE — 83605 ASSAY OF LACTIC ACID: CPT

## 2019-10-18 PROCEDURE — 74011250636 HC RX REV CODE- 250/636: Performed by: EMERGENCY MEDICINE

## 2019-10-18 PROCEDURE — 83880 ASSAY OF NATRIURETIC PEPTIDE: CPT

## 2019-10-18 PROCEDURE — 94762 N-INVAS EAR/PLS OXIMTRY CONT: CPT

## 2019-10-18 PROCEDURE — 85610 PROTHROMBIN TIME: CPT

## 2019-10-18 PROCEDURE — 74011000258 HC RX REV CODE- 258: Performed by: INTERNAL MEDICINE

## 2019-10-18 PROCEDURE — 74011250636 HC RX REV CODE- 250/636: Performed by: INTERNAL MEDICINE

## 2019-10-18 PROCEDURE — 82962 GLUCOSE BLOOD TEST: CPT

## 2019-10-18 PROCEDURE — 87186 SC STD MICRODIL/AGAR DIL: CPT

## 2019-10-18 PROCEDURE — 96365 THER/PROPH/DIAG IV INF INIT: CPT

## 2019-10-18 PROCEDURE — 85025 COMPLETE CBC W/AUTO DIFF WBC: CPT

## 2019-10-18 PROCEDURE — 74011250637 HC RX REV CODE- 250/637: Performed by: INTERNAL MEDICINE

## 2019-10-18 RX ORDER — SODIUM CHLORIDE 0.9 % (FLUSH) 0.9 %
5-40 SYRINGE (ML) INJECTION EVERY 8 HOURS
Status: DISCONTINUED | OUTPATIENT
Start: 2019-10-18 | End: 2019-10-25 | Stop reason: HOSPADM

## 2019-10-18 RX ORDER — SODIUM CHLORIDE 0.9 % (FLUSH) 0.9 %
5-40 SYRINGE (ML) INJECTION AS NEEDED
Status: DISCONTINUED | OUTPATIENT
Start: 2019-10-18 | End: 2019-10-25 | Stop reason: HOSPADM

## 2019-10-18 RX ORDER — SODIUM CHLORIDE 0.9 % (FLUSH) 0.9 %
5-10 SYRINGE (ML) INJECTION AS NEEDED
Status: DISCONTINUED | OUTPATIENT
Start: 2019-10-18 | End: 2019-10-25 | Stop reason: HOSPADM

## 2019-10-18 RX ORDER — LEVOTHYROXINE SODIUM 100 UG/1
100 TABLET ORAL
Status: DISCONTINUED | OUTPATIENT
Start: 2019-10-19 | End: 2019-10-25 | Stop reason: HOSPADM

## 2019-10-18 RX ORDER — FUROSEMIDE 10 MG/ML
40 INJECTION INTRAMUSCULAR; INTRAVENOUS ONCE
Status: COMPLETED | OUTPATIENT
Start: 2019-10-18 | End: 2019-10-18

## 2019-10-18 RX ORDER — ATENOLOL 25 MG/1
25 TABLET ORAL DAILY
Status: DISCONTINUED | OUTPATIENT
Start: 2019-10-18 | End: 2019-10-25 | Stop reason: HOSPADM

## 2019-10-18 RX ORDER — ACETAMINOPHEN 500 MG
1000 TABLET ORAL ONCE
Status: COMPLETED | OUTPATIENT
Start: 2019-10-18 | End: 2019-10-18

## 2019-10-18 RX ORDER — FAMOTIDINE 10 MG/1
10 TABLET ORAL DAILY
COMMUNITY
End: 2020-02-22

## 2019-10-18 RX ORDER — ALPRAZOLAM 0.25 MG/1
0.25 TABLET ORAL
Status: DISCONTINUED | OUTPATIENT
Start: 2019-10-18 | End: 2019-10-25 | Stop reason: HOSPADM

## 2019-10-18 RX ADMIN — ALPRAZOLAM 0.25 MG: 0.25 TABLET ORAL at 23:05

## 2019-10-18 RX ADMIN — PIPERACILLIN AND TAZOBACTAM 3.38 G: 3; .375 INJECTION, POWDER, FOR SOLUTION INTRAVENOUS at 20:00

## 2019-10-18 RX ADMIN — Medication 10 ML: at 20:00

## 2019-10-18 RX ADMIN — PHYTONADIONE 5 MG: 10 INJECTION, EMULSION INTRAMUSCULAR; INTRAVENOUS; SUBCUTANEOUS at 14:42

## 2019-10-18 RX ADMIN — ATENOLOL 25 MG: 25 TABLET ORAL at 19:58

## 2019-10-18 RX ADMIN — PIPERACILLIN AND TAZOBACTAM 3.38 G: 3; .375 INJECTION, POWDER, FOR SOLUTION INTRAVENOUS at 12:10

## 2019-10-18 RX ADMIN — FUROSEMIDE 40 MG: 10 INJECTION, SOLUTION INTRAMUSCULAR; INTRAVENOUS at 17:28

## 2019-10-18 RX ADMIN — Medication 10 ML: at 21:58

## 2019-10-18 RX ADMIN — SODIUM CHLORIDE, SODIUM LACTATE, POTASSIUM CHLORIDE, AND CALCIUM CHLORIDE 1000 ML: 600; 310; 30; 20 INJECTION, SOLUTION INTRAVENOUS at 12:11

## 2019-10-18 RX ADMIN — ACETAMINOPHEN 1000 MG: 500 TABLET ORAL at 12:11

## 2019-10-18 NOTE — PROGRESS NOTES
Spiritual Care Assessment/Progress Note  [facility]      NAME: Anamika Deutsch      MRN: [id number]  AGE: 80 y.o. SEX: female  Mormon Affiliation: Congregation   Language: English     10/18/2019     Total Time (in minutes): 5     Spiritual Assessment begun in [address] through conversation with:         []Patient        [] Family    [] Friend(s)        Reason for Consult: Palliative Care, Initial/Spiritual Assessment     Spiritual beliefs: (Please include comment if needed)     [] Identifies with a julissa tradition:         [] Supported by a julissa community:            [] Claims no spiritual orientation:           [] Seeking spiritual identity:                [] Adheres to an individual form of spirituality:           [x] Not able to assess:                           Identified resources for coping:      [] Prayer                               [] Music                  [] Guided Imagery     [] Family/friends                 [] Pet visits     [] Devotional reading                         [] Unknown     [] Other:                                               Interventions offered during this visit: (See comments for more details)    Patient Interventions: Initial visit           Plan of Care:     [] Support spiritual and/or cultural needs    [] Support AMD and/or advance care planning process      [] Support grieving process   [] Coordinate Rites and/or Rituals    [] Coordination with community clergy   [] No spiritual needs identified at this time   [] Detailed Plan of Care below (See Comments)  [] Make referral to Music Therapy  [] Make referral to Pet Therapy     [] Make referral to Addiction services  [] Make referral to [facility]  [] Make referral to Spiritual Care Partner  [] No future visits requested        [x] Follow up visits as needed     Pt is a new palliative consult. Attempted to visit without success. Pt sleeping and did not alert. No family present.  Chaplains will continue to offer support as needed.   Chaplain Tate MDiv, MS, [facility]  [phone]

## 2019-10-18 NOTE — PROGRESS NOTES
Admission Medication Reconciliation:    Information obtained from:  patient and caregivers   RxQuery data available¹:  YES    Comments/Recommendations: Updated PTA meds/reviewed patient's allergies. 1)  History was obtained from the patient (limited historian) and her caregivers (able to provide some clarifications). 2)  Medication changes (since last review): Added  - none    Adjusted  - famotidine 10 mg daily (versus 20 mg daily PRN)    Removed  - amoxicillin-clavulanate, nystatin powder, Miralax     ¹RxQuery pharmacy benefit data reflects medications filled and processed through the patient's insurance, however   this data does NOT capture whether the medication was picked up or is currently being taken by the patient. Allergies:  Latex, natural rubber; Codeine; Adhesive; and Cortisone    Significant PMH/Disease States:   Past Medical History:   Diagnosis Date    A-fib ([facility] [address]     Arrhythmia     A FIB    Arthritis     Back pain     CAD (coronary artery disease)     PT DENIES    Chronic pain     Dementia ([facility] [address] 2/9/2016    Hypercholesterolemia     Psychiatric disorder     ANXIETY    Shoulder pain     Thyroid disease     Tremor, essential      Chief Complaint for this Admission:    Chief Complaint   Patient presents with    Fever     Prior to Admission Medications:   Prior to Admission Medications   Prescriptions Last Dose Informant Patient Reported? Taking? ALPRAZolam (XANAX) 0.5 mg tablet 10/17/2019  No Yes   Sig: Take 1 Tab by mouth three (3) times daily as needed for Anxiety. Max Daily Amount: 1.5 mg.   atenolol (TENORMIN) 25 mg tablet 10/16/2019  No Yes   Sig: Take 0.5 Tabs by mouth every Monday, Wednesday, Friday. famotidine (PEPCID) 10 mg tablet 10/17/2019  Yes Yes   Sig: Take 10 mg by mouth daily. furosemide (LASIX) 20 mg tablet 10/17/2019  No Yes   Sig: Take 1 Tab by mouth daily.    levothyroxine (SYNTHROID) 100 mcg tablet 10/17/2019  No Yes   Sig: TAKE 1 TABLET BY MOUTH EVERY MORNING BEFORE BREAKFAST   potassium chloride (K-DUR, KLOR-CON) 20 mEq tablet 10/17/2019  No Yes   Sig: TAKE 1 TABLET BY MOUTH EVERY DAY   spironolactone (ALDACTONE) 25 mg tablet 10/17/2019  No Yes   Sig: Take 1 Tab by mouth daily. warfarin (COUMADIN) 4 mg tablet 10/17/2019  No Yes   Si tab alternate with 1/2 tab daily      Facility-Administered Medications: None       Please contact the main inpatient pharmacy with any questions or concerns at [phone] and we will direct you to the clinical pharmacist covering this patient's care while in-house.    Tra Soliman, PHARMD

## 2019-10-18 NOTE — ROUTINE PROCESS
1800:  Received pt from ED incontinent of stool and urine. Sacrum, buttocks, groin area redden and blanchable. R buttocks with stage II wound. About 1940; Pt shivering making HR very Tachycardic (200s); SBP 200s/100s. Pt alert lips was bluish and she reported being cold. 2L NC at 100%. Warmed towels placed against her shaking body. Attempting to take VS but shivering may play a roll in hypertension. Noted pt in rapid A-Fib once shivering has less. Provider paged. Joselo Cole from CCU came to assist.  BP better in LLE better as less tremorous Dr. Dilan Riley ordered Atenolol 25mg to be given. No EKG order but will get one. 2034:  Pt finally stopped shivering, she voiced the extra blanket helps. 2114:  bloodwork sent to lab, gray, purple, yellow, and green top; and urine sent to lab.

## 2019-10-18 NOTE — PROGRESS NOTES
Palliative care:    Consult received shortly after pt arrived to the ED. Mrs. Agustín eHctor is known to the palliative medicine team from previous admission. She does not have capacity for medical decision making. Patient has an AMD on file that designates her sister as mPOA. A codicil is scanned into record that revokes this power and designates ANNE Evans as POA (does not specify medical or General). This person's relationship and contact information is unknown. Last admission she alerted us that she did not want ANNE Evans in this position; that she only revoked her sister's authority because \"sisters get mad at each other\". She was not willing to reveal any information about ANNE Evans and was so confused that she did not understand purpose of document anyway. She did not see the purpose of completing a new document. In her confused state she was quite clear that she did not want to engage at all with our team.      I had talked with her primary care physician Dr. Cindy Cobb who shared that there have been multiple conversations over the years about her need to designate a clear mPOA. He shared she has isolated herself from anyone who cared for her and that there is no one very much involved with her care aside from an aide who comes during the day. As I do not think that our team can be of any value at this time; will defer consult. Dr. Cindy Cobb knows that we are available if any specific end of life care needs arise.

## 2019-10-18 NOTE — ROUTINE PROCESS
TRANSFER - OUT REPORT: 
 
Verbal report given to MARY JANE Raza (name) on Baldemar Jara  being [facility]unit[facility] for routine progression of care Report consisted of patients Situation, Background, Assessment and  
Recommendations(SBAR). Information from the following report(s) SBAR, ED Summary and MAR was reviewed with the receiving nurse. Lines:  
Peripheral IV 10/18/19 Right Antecubital (Active) Site Assessment Clean, dry, & intact 10/18/2019 12:03 PM  
Phlebitis Assessment 0 10/18/2019 12:03 PM  
Infiltration Assessment 0 10/18/2019 12:03 PM  
Dressing Status Clean, dry, & intact 10/18/2019 12:03 PM  
  
 
Opportunity for questions and clarification was provided. Patient transported with: 
 [employer]

## 2019-10-18 NOTE — ED TRIAGE NOTES
Pt arrives via squad from [facility] with c/o fever yesterday of 101.2, congestion and \"blue lips\"

## 2019-10-18 NOTE — CONSULTS
CARDIOLOGY CONSULT                  Subjective:    Date of  Admission: 10/18/2019 11:03 AM     Admission type:Emergency    Sofia Harding is a 80 y.o. female admitted for Pneumonia [J18.9]. She has chronic disastoilc CHF and has had chronic issues with compliance due to dementia and also due to difficulties with diuretics combined with being fairly immobile. She was sent to ED for fever and chills. Workjup showed PNA with pulmonary edema.     Patient Active Problem List    Diagnosis Date Noted    Pneumonia 10/18/2019    Acute mastitis of left breast 10/04/2019    Breast pain, left 09/29/2019    Yeast dermatitis 09/29/2019    Acute on chronic diastolic CHF (congestive heart failure) ([address] 09/27/2019    Senile purpura ([address] 09/14/2019    Acute on chronic diastolic (congestive) heart failure ([address] 06/05/2019    Pulmonary hypertension ([address] 06/05/2019    PNA (pneumonia) 05/27/2019    Dehydration 04/23/2019    Wound, open, hip or thigh, left, initial encounter 04/23/2019    Non-healing wound of right heel 04/23/2019    Closed fracture of neck of right femur ([address] 09/11/2018    Acquired hypothyroidism 08/17/2018    Fall 07/29/2018    Fracture, intertrochanteric, right femur, closed, initial encounter ([address] 07/29/2018    Hip hematoma, right, initial encounter 07/29/2018    UTI (urinary tract infection) 02/02/2017    Closed compression fracture of lumbar vertebra ([address] 02/01/2017    Sepsis ([address] 02/09/2016    Altered mental status 02/09/2016    Dementia ([address] 02/09/2016    Arthritis of knee, left 01/13/2014    A-fib ([address]     Thyroid disease     Hypercholesterolemia     Arthritis     CAD (coronary artery disease)       Woody Garrido MD  Past Medical History:   Diagnosis Date    A-fib [facility])     Arrhythmia     A FIB    Arthritis     Back pain     CAD (coronary artery disease)     PT DENIES    Chronic pain     Dementia ([address] 2/9/2016    Hypercholesterolemia     Psychiatric disorder ANXIETY    Shoulder pain     Thyroid disease     Tremor, essential       Past Surgical History:   Procedure Laterality Date    HX APPENDECTOMY      HX ORTHOPAEDIC      left knee replacement    HX KADIE AND BSO      AGE 39    HX TONSIL AND ADENOIDECTOMY      IR KYPHOPLASTY LUMBAR  4/23/2019     Allergies   Allergen Reactions    Latex, Natural Rubber Other (comments)     \"I get black where ever it touches\"    Codeine Other (comments)     \"It just sends me up the wall\"    Adhesive Rash and Other (comments)     blisters    Cortisone Unknown (comments)      Family History   Problem Relation Age of Onset    Dementia Mother     Arthritis-osteo Father       Current Facility-Administered Medications   Medication Dose Route Frequency    sodium chloride (NS) flush 5-10 mL  5-10 mL IntraVENous PRN    lactated ringers bolus infusion 40 mL  40 mL IntraVENous ONCE    furosemide (LASIX) injection 40 mg  40 mg IntraVENous ONCE     Current Outpatient Medications   Medication Sig    famotidine (PEPCID) 10 mg tablet Take 10 mg by mouth daily.  warfarin (COUMADIN) 4 mg tablet 1 tab alternate with 1/2 tab daily    atenolol (TENORMIN) 25 mg tablet Take 0.5 Tabs by mouth every Monday, Wednesday, Friday.  furosemide (LASIX) 20 mg tablet Take 1 Tab by mouth daily.  spironolactone (ALDACTONE) 25 mg tablet Take 1 Tab by mouth daily.  ALPRAZolam (XANAX) 0.5 mg tablet Take 1 Tab by mouth three (3) times daily as needed for Anxiety.  Max Daily Amount: 1.5 mg.    levothyroxine (SYNTHROID) 100 mcg tablet TAKE 1 TABLET BY MOUTH EVERY MORNING BEFORE BREAKFAST    potassium chloride (K-DUR, KLOR-CON) 20 mEq tablet TAKE 1 TABLET BY MOUTH EVERY DAY         Review of Symptoms:  Gen - + F/C/S  Eyes - no vision changes  ENT - no sore throat, rhinorrhea, otalgia  CV - no CP, no palpitations, no orthopnea, no PND, no LATONYA  Resp no cough, no SOB/BRUCE  GI - no AP, no n/v/d/c   - no dysuria, no hematuria  MSK - no abnormal joint pains  Skin - no rashes  Neuro - no HA, no numbness, no weakness, no slurred speech  Psych - no change in mood         Physical Exam    Visit Vitals  /62   Pulse 88   Temp 98.4 °F (36.9 °C)   Resp 22   SpO2 93%     NAD  Skin warm and dry  Nl conjunctiva  Oropharynx without exudate. Neck supple  Lungs decreased at the bases  I/I rhythm  Abdomen soft and non tender  Pulses 2+ radials  Alert      Cardiographics    Telemetry: AFIB  ECG: atrial fibrillation,    Labs:   Recent Results (from the past 24 hour(s))   LACTIC ACID    Collection Time: 10/18/19 11:53 AM   Result Value Ref Range    Lactic acid 2.4 (HH) 0.4 - 2.0 MMOL/L   NT-PRO BNP    Collection Time: 10/18/19 11:53 AM   Result Value Ref Range    NT pro-BNP 23,832 (H) <450 PG/ML   TSH 3RD GENERATION    Collection Time: 10/18/19 11:53 AM   Result Value Ref Range    TSH 2.23 0.36 - 3.74 uIU/mL   INFLUENZA A & B AG (RAPID TEST)    Collection Time: 10/18/19 11:53 AM   Result Value Ref Range    Influenza A Antigen NEGATIVE  NEG      Influenza B Antigen NEGATIVE  NEG     METABOLIC PANEL, COMPREHENSIVE    Collection Time: 10/18/19 11:54 AM   Result Value Ref Range    Sodium 136 136 - 145 mmol/L    Potassium 4.0 3.5 - 5.1 mmol/L    Chloride 102 97 - 108 mmol/L    CO2 27 21 - 32 mmol/L    Anion gap 7 5 - 15 mmol/L    Glucose 105 (H) 65 - 100 mg/dL    BUN 16 6 - 20 MG/DL    Creatinine 0.93 0.55 - 1.02 MG/DL    BUN/Creatinine ratio 17 12 - 20      GFR est AA >60 >60 ml/min/1.73m2    GFR est non-AA 57 (L) >60 ml/min/1.73m2    Calcium 9.8 8.5 - 10.1 MG/DL    Bilirubin, total 1.2 (H) 0.2 - 1.0 MG/DL    ALT (SGPT) 15 12 - 78 U/L    AST (SGOT) 32 15 - 37 U/L    Alk.  phosphatase 80 45 - 117 U/L    Protein, total 6.9 6.4 - 8.2 g/dL    Albumin 2.9 (L) 3.5 - 5.0 g/dL    Globulin 4.0 2.0 - 4.0 g/dL    A-G Ratio 0.7 (L) 1.1 - 2.2     CBC WITH AUTOMATED DIFF    Collection Time: 10/18/19 11:54 AM   Result Value Ref Range    WBC 11.6 (H) 3.6 - 11.0 K/uL    RBC 4.13 3.80 - 5.20 M/uL    HGB 11.8 11.5 - 16.0 g/dL    HCT 38.2 35.0 - 47.0 %    MCV 92.5 80.0 - 99.0 FL    MCH 28.6 26.0 - 34.0 PG    MCHC 30.9 30.0 - 36.5 g/dL    RDW 16.0 (H) 11.5 - 14.5 %    PLATELET 458 640 - 028 K/uL    MPV 12.1 8.9 - 12.9 FL    NRBC 0.0 0  WBC    ABSOLUTE NRBC 0.00 0.00 - 0.01 K/uL    NEUTROPHILS 88 (H) 32 - 75 %    LYMPHOCYTES 2 (L) 12 - 49 %    MONOCYTES 9 5 - 13 %    EOSINOPHILS 0 0 - 7 %    BASOPHILS 0 0 - 1 %    IMMATURE GRANULOCYTES 1 (H) 0.0 - 0.5 %    ABS. NEUTROPHILS 10.3 (H) 1.8 - 8.0 K/UL    ABS. LYMPHOCYTES 0.2 (L) 0.8 - 3.5 K/UL    ABS. MONOCYTES 1.0 0.0 - 1.0 K/UL    ABS. EOSINOPHILS 0.0 0.0 - 0.4 K/UL    ABS. BASOPHILS 0.0 0.0 - 0.1 K/UL    ABS. IMM. GRANS. 0.1 (H) 0.00 - 0.04 K/UL    DF SMEAR SCANNED      PLATELET COMMENTS Large Platelets      RBC COMMENTS ANISOCYTOSIS  1+        RBC COMMENTS HYPOCHROMIA  1+       EKG, 12 LEAD, INITIAL    Collection Time: 10/18/19 12:06 PM   Result Value Ref Range    Ventricular Rate 90 BPM    Atrial Rate 300 BPM    QRS Duration 124 ms    Q-T Interval 374 ms    QTC Calculation (Bezet) 457 ms    Calculated R Axis 109 degrees    Calculated T Axis 110 degrees    Diagnosis       Atrial fibrillation  ST & T wave abnormality, consider anterior ischemia  When compared with ECG of 31-MAY-2019 08:38,  Criteria for Septal infarct are no longer present  T wave inversion no longer evident in Inferior leads  Nonspecific T wave abnormality has replaced inverted T waves in Lateral leads  Confirmed by Graciela Hager M.D., Alphonse Torres ([id number]) on 10/18/2019 12:21:16 PM     PROTHROMBIN TIME + INR    Collection Time: 10/18/19  1:12 PM   Result Value Ref Range    INR 11.1 (HH) 0.9 - 1.1      Prothrombin time 98.7 (H) 9.0 - 11.1 sec        Assessment and Plan:     This is an 81 yo with chronic diastolic CHF here with PNA    Agree with lasix given IVF  Would hold cardiac meds for now until BP improves  It appears that she is not safe to be no Coumadin any longer for her AF given her supratherapeutic INR despite being at a SNF, she may need PO Vit K but would defer to pharmacy  She has had a recent echo so no need to recheck  Would also not check troponins    Thank you for this consult, please call with questions           Assessment:

## 2019-10-18 NOTE — ED PROVIDER NOTES
80 y.o. female with past medical history significant for back pain, tremors, a-fib, thyroid disease, arthritis, hypercholesterolemia, shoulder pain, CAD, chronic pain, anxiety, arrhythmia, and dementia who presents from [facility] via EMS with chief complaint of fever. Patient was sent in to ED by Dr. Sanjeev Barber due to high fever (101). Patient states her fever and accompanied chills began last night. Patient also complains of right leg pain, seemingly because of procedure she was unable to recall. Patient affirms vomiting, cough, fever, chills, leg pain, and leg swelling. Patient denies SOB, diarrhea, abdominal pain, chest pain, and nausea. There are no other acute medical concerns at this time. Social hx: Former smoker, EtOH positive  PCP: Lauren Hernandez MD    Full history, physical exam, and ROS unable to be obtained due to:  dementia. Note written by Radha Cooley, as dictated by Randal Rueda DO 11:19 AM       The history is provided by the patient. The history is limited by the condition of the patient. No  was used.         Past Medical History:   Diagnosis Date    A-fib [facility])     Arrhythmia     A FIB    Arthritis     Back pain     CAD (coronary artery disease)     PT DENIES    Chronic pain     Dementia ([facility] [address] 2/9/2016    Hypercholesterolemia     Psychiatric disorder     ANXIETY    Shoulder pain     Thyroid disease     Tremor, essential        Past Surgical History:   Procedure Laterality Date    HX APPENDECTOMY      HX ORTHOPAEDIC      left knee replacement    HX KADIE AND BSO      AGE 39    HX TONSIL AND ADENOIDECTOMY      IR KYPHOPLASTY LUMBAR  4/23/2019         Family History:   Problem Relation Age of Onset    Dementia Mother     Arthritis-osteo Father        Social History     Socioeconomic History    Marital status:      Spouse name: Not on file    Number of children: Not on file    Years of education: Not on file    Highest education level: Not on file   Occupational History    Not on file   Social Needs    Financial resource strain: Not on file    Food insecurity:     Worry: Not on file     Inability: Not on file    Transportation needs:     Medical: Not on file     Non-medical: Not on file   Tobacco Use    Smoking status: Former Smoker     Packs/day: 0.50     Years: 10.00     Pack years: 5.00     Last attempt to quit: 1994     Years since quittin.7    Smokeless tobacco: Never Used   Substance and Sexual Activity    Alcohol use: Yes     Comment: NIGHTLY 2 GLASSES WINE    Drug use: No    Sexual activity: Not on file   Lifestyle    Physical activity:     Days per week: Not on file     Minutes per session: Not on file    Stress: Not on file   Relationships    Social connections:     Talks on phone: Not on file     Gets together: Not on file     Attends Rastafari service: Not on file     Active member of club or organization: Not on file     Attends meetings of clubs or organizations: Not on file     Relationship status: Not on file    Intimate partner violence:     Fear of current or ex partner: Not on file     Emotionally abused: Not on file     Physically abused: Not on file     Forced sexual activity: Not on file   Other Topics Concern    Not on file   Social History Narrative    Not on file         ALLERGIES: Latex, natural rubber; Codeine; Adhesive; and Cortisone    Review of Systems   Unable to perform ROS: Dementia   Constitutional: Positive for chills and fever. Respiratory: Positive for cough. Negative for shortness of breath. Cardiovascular: Positive for leg swelling. Negative for chest pain. Gastrointestinal: Positive for vomiting. Negative for abdominal pain, diarrhea and nausea. Musculoskeletal: Positive for myalgias.        Vitals:    10/18/19 1122 10/18/19 1124   BP: 100/51    Pulse: (!) 148    Resp: 20    Temp: 99.4 °F (37.4 °C)    SpO2: (!) 86% 96%            Physical Exam   Constitutional: She is oriented to person, place, and time. She appears well-developed and well-nourished. Emesis on shirt   HENT:   Head: Normocephalic and atraumatic. Eyes: Conjunctivae are normal.   Neck: Normal range of motion. Cardiovascular: Intact distal pulses. An irregularly irregular rhythm present. Tachycardia present. Pulmonary/Chest: Effort normal and breath sounds normal. No respiratory distress. Abdominal: Soft. Bowel sounds are normal. There is no tenderness. There is no rebound and no guarding. Musculoskeletal: Normal range of motion. 2+ pitting edema all the way legs bilaterally, R>L. Neurological: She is alert and oriented to person, place, and time. Skin: Skin is warm and dry. Psychiatric: Her behavior is normal.   Nursing note and vitals reviewed. Note written by Radha Spain, as dictated by Daniel Galloway DO 11:19 AM       MDM       Procedures    ED EKG interpretation: 12:06  Rhythm: atrial flutter with variable AV block (RBBB); and irregular.  Rate (approx.): 90; Axis: right axis deviation; ST/T wave: ST and T wave abnormality, consider anterior ischemia; QRS of 124 ms; Normal QT/QTc; PRT axes abnormal (R-109;T-110)  Note written by Radha Spain, as dictated by Daniel Galloway DO 12:52 PM

## 2019-10-18 NOTE — ROUTINE PROCESS
TRANSFER - IN REPORT: 
 
Verbal report received from [facility] (name) on Praveen Cook  being received from ED (unit) for routine progression of care Report consisted of patients Situation, Background, Assessment and  
Recommendations(SBAR). Information from the following report(s) SBAR, ED Summary, Intake/Output, MAR and Recent Results was reviewed with the receiving nurse. Opportunity for questions and clarification was provided. Assessment completed upon patients arrival to unit and care assumed. ED gave one liter NS bolus, cancelled the second liter as \"BNP high, did not want to drown pateint. \"

## 2019-10-18 NOTE — PROGRESS NOTES
Date of previous inpatient admission/ ED visit? 9/27/19-10/4/19 Inpatient Admission Acute On Chronic Diastolic CHF  (hospitalization < 30 days)     What brought the patient back to ED? Patient  Presents to the ED w/ chief compliant of fever    Did patient decline recommended services during last admission/ ED visit (if yes, what)? SNF and long-term recommended and patient declined. [facility] services set up w/ [facility] last hospitalization    Has patient seen a provider since their last inpatient admission/ED visit (if yes, when)? Yes . PCP is Dr. Keyonna Pressley (seen in office 10/16/19). CM Interventions:  From previous inpatient admission/ED visit: Assessment  From current inpatient admission/ED visit: EMR reviewed. History significant for back pain, tremors, a-fib, thyroid disease, arthritis, hypercholesterolemia, shoulder pain, CAD, chronic pain, anxiety, arrhythmia, and dementia . Patient lives at [address] [place] [address] and has a caregiver. Noted revocation of POA in medical record. NOK listed is sister Devorah Colón [phone]. Previous providers include Leena Mata and [employer]. [facility] Medicare A/B and [employer] are insurance providers. Case Management will follow for transitions of care needs. Care Management Interventions  PCP Verified by CM: Yes  Last Visit to PCP: 10/16/19  Palliative Care Criteria Met (RRAT>21 & CHF Dx)?: Yes  Palliative Consult Recommended?: (FYI in MR)  Transition of Care Consult (CM Consult):  Other(recent hospitalization <30 days)  MyChart Signup: No  Health Maintenance Reviewed: Yes  Physical Therapy Consult: No  Occupational Therapy Consult: No  Speech Therapy Consult: No  Current Support Network: Lives Alone, Has Personal Caregivers  Confirm Follow Up Transport: (TBD)  Plan discussed with Pt/Family/Caregiver: No   Resource Information Provided?: No  Discharge Location  Discharge Placement: (TBD)

## 2019-10-19 ENCOUNTER — APPOINTMENT (OUTPATIENT)
Dept: VASCULAR SURGERY | Age: 84
DRG: 871 | End: 2019-10-19
Attending: INTERNAL MEDICINE
Payer: MEDICARE

## 2019-10-19 LAB
ALBUMIN SERPL-MCNC: 2.5 G/DL (ref 3.5–5)
ALBUMIN/GLOB SERPL: 0.7 {RATIO} (ref 1.1–2.2)
ALP SERPL-CCNC: 66 U/L (ref 45–117)
ALT SERPL-CCNC: 11 U/L (ref 12–78)
ANION GAP SERPL CALC-SCNC: 8 MMOL/L (ref 5–15)
AST SERPL-CCNC: 22 U/L (ref 15–37)
BASOPHILS # BLD: 0 K/UL (ref 0–0.1)
BASOPHILS NFR BLD: 0 % (ref 0–1)
BILIRUB SERPL-MCNC: 1.2 MG/DL (ref 0.2–1)
BNP SERPL-MCNC: ABNORMAL PG/ML
BUN SERPL-MCNC: 19 MG/DL (ref 6–20)
BUN/CREAT SERPL: 22 (ref 12–20)
CALCIUM SERPL-MCNC: 8.8 MG/DL (ref 8.5–10.1)
CHLORIDE SERPL-SCNC: 99 MMOL/L (ref 97–108)
CO2 SERPL-SCNC: 25 MMOL/L (ref 21–32)
CREAT SERPL-MCNC: 0.88 MG/DL (ref 0.55–1.02)
DIFFERENTIAL METHOD BLD: ABNORMAL
EOSINOPHIL # BLD: 0 K/UL (ref 0–0.4)
EOSINOPHIL NFR BLD: 0 % (ref 0–7)
ERYTHROCYTE [DISTWIDTH] IN BLOOD BY AUTOMATED COUNT: 16 % (ref 11.5–14.5)
GLOBULIN SER CALC-MCNC: 3.4 G/DL (ref 2–4)
GLUCOSE SERPL-MCNC: 105 MG/DL (ref 65–100)
HCT VFR BLD AUTO: 37.7 % (ref 35–47)
HGB BLD-MCNC: 10.9 G/DL (ref 11.5–16)
IMM GRANULOCYTES # BLD AUTO: 0 K/UL
IMM GRANULOCYTES NFR BLD AUTO: 0 %
INR PPP: 3.3 (ref 0.9–1.1)
LACTATE SERPL-SCNC: 1.1 MMOL/L (ref 0.4–2)
LYMPHOCYTES # BLD: 0.4 K/UL (ref 0.8–3.5)
LYMPHOCYTES NFR BLD: 5 % (ref 12–49)
MCH RBC QN AUTO: 28.3 PG (ref 26–34)
MCHC RBC AUTO-ENTMCNC: 28.9 G/DL (ref 30–36.5)
MCV RBC AUTO: 97.9 FL (ref 80–99)
MONOCYTES # BLD: 0.6 K/UL (ref 0–1)
MONOCYTES NFR BLD: 8 % (ref 5–13)
NEUTS BAND NFR BLD MANUAL: 4 % (ref 0–6)
NEUTS SEG # BLD: 6.4 K/UL (ref 1.8–8)
NEUTS SEG NFR BLD: 83 % (ref 32–75)
NRBC # BLD: 0 K/UL (ref 0–0.01)
NRBC BLD-RTO: 0 PER 100 WBC
PLATELET # BLD AUTO: 161 K/UL (ref 150–400)
PLATELET COMMENTS,PCOM: ABNORMAL
PMV BLD AUTO: 11.9 FL (ref 8.9–12.9)
POTASSIUM SERPL-SCNC: 3.3 MMOL/L (ref 3.5–5.1)
PROT SERPL-MCNC: 5.9 G/DL (ref 6.4–8.2)
PROTHROMBIN TIME: 31.5 SEC (ref 9–11.1)
RBC # BLD AUTO: 3.85 M/UL (ref 3.8–5.2)
RBC MORPH BLD: ABNORMAL
RBC MORPH BLD: ABNORMAL
SODIUM SERPL-SCNC: 132 MMOL/L (ref 136–145)
WBC # BLD AUTO: 7.4 K/UL (ref 3.6–11)

## 2019-10-19 PROCEDURE — 85610 PROTHROMBIN TIME: CPT

## 2019-10-19 PROCEDURE — 77030038269 HC DRN EXT URIN PURWCK BARD -A

## 2019-10-19 PROCEDURE — 74011250636 HC RX REV CODE- 250/636: Performed by: INTERNAL MEDICINE

## 2019-10-19 PROCEDURE — 83605 ASSAY OF LACTIC ACID: CPT

## 2019-10-19 PROCEDURE — 83880 ASSAY OF NATRIURETIC PEPTIDE: CPT

## 2019-10-19 PROCEDURE — 93970 EXTREMITY STUDY: CPT

## 2019-10-19 PROCEDURE — 85025 COMPLETE CBC W/AUTO DIFF WBC: CPT

## 2019-10-19 PROCEDURE — 80053 COMPREHEN METABOLIC PANEL: CPT

## 2019-10-19 PROCEDURE — 36415 COLL VENOUS BLD VENIPUNCTURE: CPT

## 2019-10-19 PROCEDURE — 65660000000 HC RM CCU STEPDOWN

## 2019-10-19 PROCEDURE — 74011250637 HC RX REV CODE- 250/637: Performed by: INTERNAL MEDICINE

## 2019-10-19 PROCEDURE — 74011000258 HC RX REV CODE- 258: Performed by: INTERNAL MEDICINE

## 2019-10-19 RX ORDER — FUROSEMIDE 10 MG/ML
40 INJECTION INTRAMUSCULAR; INTRAVENOUS DAILY
Status: DISCONTINUED | OUTPATIENT
Start: 2019-10-19 | End: 2019-10-23

## 2019-10-19 RX ADMIN — Medication 10 ML: at 22:00

## 2019-10-19 RX ADMIN — LEVOTHYROXINE SODIUM 100 MCG: 100 TABLET ORAL at 06:43

## 2019-10-19 RX ADMIN — PIPERACILLIN AND TAZOBACTAM 3.38 G: 3; .375 INJECTION, POWDER, FOR SOLUTION INTRAVENOUS at 03:51

## 2019-10-19 RX ADMIN — FUROSEMIDE 40 MG: 10 INJECTION, SOLUTION INTRAMUSCULAR; INTRAVENOUS at 10:55

## 2019-10-19 RX ADMIN — PIPERACILLIN AND TAZOBACTAM 3.38 G: 3; .375 INJECTION, POWDER, FOR SOLUTION INTRAVENOUS at 15:04

## 2019-10-19 RX ADMIN — Medication 10 ML: at 15:04

## 2019-10-19 RX ADMIN — PIPERACILLIN AND TAZOBACTAM 3.38 G: 3; .375 INJECTION, POWDER, FOR SOLUTION INTRAVENOUS at 22:50

## 2019-10-19 RX ADMIN — Medication 10 ML: at 03:52

## 2019-10-19 NOTE — ROUTINE PROCESS
Bedside and Verbal shift change report given to Juarez (oncoming nurse) by Vidhya Branch (offgoing nurse). Report included the following information SBAR, Kardex, ED Summary, Intake/Output, MAR, Recent Results and Cardiac Rhythm Rapid A-Fib recently. on adm hr 80-90s.

## 2019-10-19 NOTE — PROGRESS NOTES
Medical Progress Note      NAME: Tino Fernandez   :  1931  MRM:  [id number]    Date/Time: 10/19/2019  9:07 AM         Problem List:     Active Problems:    Sepsis ([facility] (2016)      Dementia ([facility] (2016)      Acquired hypothyroidism (2018)      Acute on chronic diastolic (congestive) heart failure ([address] (2019)      Pneumonia (10/18/2019)             Subjective:     Patient c/o leg pain. Denies chest pain or sob    Past Medical History:   Diagnosis Date    A-fib ([facility]     Arrhythmia     A FIB    Arthritis     Back pain     CAD (coronary artery disease)     PT DENIES    Chronic pain     D[facility] 2016    Hypercholesterolemia     Psychiatric disorder     ANXIETY    Shoulder pain     Thyroid disease     Tremor, essential        ROS:  General: postive for chills  Respiratory:  negative for cough, sputum production, SOB, wheezing, BRUCE, pleuritic pain  Cardiology:  negative for chest pain, palpitations, orthopnea, PND, edema, syncope   Gastrointestinal: negative for abdominal pain, N/V, dysphagia, change in bowel habits, bleeding  Muskuloskeletal : positive for leg pain         Objective:       Vitals:          Last 24hrs VS reviewed since prior progress note.  Most recent are:    Visit Vitals  /44   Pulse 73   Temp 97.7 °F (36.5 °C)   Resp 18   Ht 5' 2\" (1.575 m)   Wt 151 lb 6.4 oz (68.7 kg)   SpO2 97%   BMI 27.69 kg/m²     SpO2 Readings from Last 6 Encounters:   10/19/19 97%   10/04/19 92%   19 90%   19 95%   19 98%   18 97%    O2 Flow Rate (L/min): 2 l/min       Intake/Output Summary (Last 24 hours) at 10/19/2019 8813  Last data filed at 10/19/2019 9122  Gross per 24 hour   Intake 290 ml   Output 1200 ml   Net -910 ml          Exam:     General   well developed, well nourished, appears stated age, in no acute distress  Respiratory   Clear To Auscultation bilaterally - no wheezes, rales, rhonchi, or crackles  Cardiology  Irreg,   Breast edematous  Abdominal  Soft, non-tender, non-distended, positive bowel sounds, no hepatosplenomegaly  Extremities  +4 edema    Lab Data Reviewed: (see below)    Medications Reviewed: (see below)    ______________________________________________________________________    Medications:     Current Facility-Administered Medications   Medication Dose Route Frequency    furosemide (LASIX) injection 40 mg  40 mg IntraVENous DAILY    sodium chloride (NS) flush 5-10 mL  5-10 mL IntraVENous PRN    levothyroxine (SYNTHROID) tablet 100 mcg  100 mcg Oral 6am    sodium chloride (NS) flush 5-40 mL  5-40 mL IntraVENous Q8H    sodium chloride (NS) flush 5-40 mL  5-40 mL IntraVENous PRN    piperacillin-tazobactam (ZOSYN) 3.375 g in 0.9% sodium chloride (MBP/ADV) 100 mL  3.375 g IntraVENous Q8H    atenolol (TENORMIN) tablet 25 mg  25 mg Oral DAILY    ALPRAZolam (XANAX) tablet 0.25 mg  0.25 mg Oral TID PRN            Lab Review:     Recent Labs     10/19/19  0409 10/18/19  1154   WBC 7.4 11.6*   HGB 10.9* 11.8   HCT 37.7 38.2    182     Recent Labs     10/19/19  0409 10/18/19  1312 10/18/19  1154 10/16/19  1353 10/16/19  1344   *  --  136  --  137   K 3.3*  --  4.0  --  4.4   CL 99  --  102  --  97   CO2 25  --  27  --  25   *  --  105*  --  87   BUN 19  --  16  --  12   CREA 0.88  --  0.93  --  0.63   CA 8.8  --  9.8  --  10.1   ALB 2.5*  --  2.9*  --   --    TBILI 1.2*  --  1.2*  --   --    SGOT 22  --  32  --   --    ALT 11*  --  15  --   --    INR  --  11.1*  --  7.8*  --      Lab Results   Component Value Date/Time    Glucose (POC) 118 (H) 10/18/2019 08:09 PM     No results for input(s): PH, PCO2, PO2, HCO3, FIO2 in the last 72 hours.   Recent Labs     10/18/19  1312 10/16/19  1353   INR 11.1* 7.8*       Other pertinent lab: NA         Assessment:     Patient Active Problem List   Diagnosis Code    A-fib ([facility] I48.91    Thyroid disease E07.9    Hypercholesterolemia E78.00    Arthritis M19.90    CAD (coronary artery disease) I25.10    Arthritis of knee, left M17.12    Sepsis ([facility]) A41.9    Altered mental status R41.82    Dementia ([facility] F03.90    Closed compression fracture of lumbar vertebra ([facility]) S32.000A    UTI (urinary tract infection) N39.0    Fall W19. XXXA    Fracture, intertrochanteric, right femur, closed, initial encounter ([facility] S72.141A    Hip hematoma, right, initial encounter S70.01XA    Acquired hypothyroidism E03.9    Closed fracture of neck of right femur ([facility] S72.001A    Dehydration E86.0    Wound, open, hip or thigh, left, initial encounter S71.002A, S71.102A    Non-healing wound of right heel S91.301A    PNA (pneumonia) J18.9    Acute on chronic diastolic (congestive) heart failure ([facility]) I50.33    Pulmonary hypertension ([facility]) I27.20    Senile purpura ([facility]) D69.2    Acute on chronic diastolic CHF (congestive heart failure) ([facility]) I50.33    Breast pain, left N64.4    Yeast dermatitis B37.2    Acute mastitis of left breast N61.0    Pneumonia J18.9          Plan:                 1. Gm neg bacteremia- continue zosyn  2. Acute on chronic chf- diurese gently  3. A fib- on atenolol. Coumadin stopped due to   4. Coagulopathy- no evidence of bleeding  5.  Perineal breakdown/sacral erythema- wound care                ___________________________________________________    Attending Physician: Thea Drake MD

## 2019-10-19 NOTE — ROUTINE PROCESS
1044:  Pt had finished breakfast.  Alert and oriented, now reports she does have latex allergy. I will have to remove purewick and use bed pan or absorbant pads to catch urine. I remind pt to call for me when she needs to urinate so I can assist her to bedpan. She voiced she does not want bedpan but wanted the purewick. I informed her I had to remove the purewick as she told me she Latex allergy even though she informed Ariadna Phillips she does not. Pt voiced \"my bottom is not red. \"  I informed her she is right but since she says she has Latex allergy one day and not, I had to err on the side of caution and not use it. 1420:  PCT place purewick back on patient. Remind pt she has a Latex allergy as she informed me (pt was alert and orient when she informed me.)  Pt voiced she did not care; she wants the [id number] [address]. Caregiver Lauro Myles voiced to give to her as pt is not allergic to Latex. 1435:  Iwona insistant wanting me to administer antianxiety medication to patient. She also wanted me to administer patient's coumadin. I informed her patient is calmed at the moment that I do not need to do that. Also, her BP is low: I need to adelaide careful with sedation medication. Iwona nodded. I did reinforce that if pt needed the medication she will get it. I also added that I patient will not received Coumadin today. Iwona voiced, \"not right now but later at dinner time. \"  I stress that I will not be administering Coumadin today at all because her lab level is too high to received it. She said \"oh\" and mention something under her breath about the other place not know what they are doing. I informed her that might not the the case.

## 2019-10-19 NOTE — PROGRESS NOTES
"-- Message is from the [facility]--      Patient is requesting a medication refill - medication is on active list    Was Medication Pended? Yes. Rx Name and Dose:  amphetamine-dextroamphetamine (ADDERALL XR) 25 MG 24 hr capsule    Duration: 30 days    Pharmacy  [facility] [address], [address] [facility]) & [address]    Patient confirmed the above pharmacy as correct? Yes    Caller Information       Type Contact Phone    05/23/2019 11:59 AM Phone (Incoming) Reese Mckeon (Mother) [phone] (M)          Alternative phone number:     Turnaround time given to caller: ""This message will be sent to [facility] Provider's name]. The clinical team will fulfill your request as soon as they review your message. \""  " Cardiology Progress Note                                        Admit Date: 10/18/2019    Assessment/Plan:     Sepsis; improving  CHF; acute on chronic diastolic; improving on therapy  Persistent AF; reason for her chronic AC  Coagulopathy; off AC; no active bleeding    Jas Carter is a 80 y.o. female with     PROBLEM LIST:  Patient Active Problem List    Diagnosis Date Noted    Pneumonia 10/18/2019    Acute mastitis of left breast 10/04/2019    Breast pain, left 09/29/2019    Yeast dermatitis 09/29/2019    Acute on chronic diastolic CHF (congestive heart failure) ([address] 09/27/2019    Senile purpura ([address] 09/14/2019    Acute on chronic diastolic (congestive) heart failure ([address] 06/05/2019    Pulmonary hypertension ([address] 06/05/2019    PNA (pneumonia) 05/27/2019    Dehydration 04/23/2019    Wound, open, hip or thigh, left, initial encounter 04/23/2019    Non-healing wound of right heel 04/23/2019    Closed fracture of neck of right femur ([address] 09/11/2018    Acquired hypothyroidism 08/17/2018    Fall 07/29/2018    Fracture, intertrochanteric, right femur, closed, initial encounter ([address] 07/29/2018    Hip hematoma, right, initial encounter 07/29/2018    UTI (urinary tract infection) 02/02/2017    Closed compression fracture of lumbar vertebra ([address] 02/01/2017    Sepsis ([address] 02/09/2016    Altered mental status 02/09/2016    Dementia ([address] 02/09/2016    Arthritis of knee, left 01/13/2014    A-fib ([address]     Thyroid disease     Hypercholesterolemia     Arthritis     CAD (coronary artery disease)          Subjective:     Jas Carter reports none.     Visit Vitals  /44   Pulse 73   Temp 97.7 °F (36.5 °C)   Resp 18   Ht 5' 2\" (1.575 m)   Wt 151 lb 6.4 oz (68.7 kg)   SpO2 97%   BMI 27.69 kg/m²       Intake/Output Summary (Last 24 hours) at 10/19/2019 0959  Last data filed at 10/19/2019 0717  Gross per 24 hour   Intake 290 ml   Output 1200 ml   Net -910 ml       Objective:      Physical Exam:  HEENT: P[place], EOMI  Neck: No JVD,  No thyroidmegaly  Resp: CTA bilaterally; No wheezes or rales  CV: irregular,  s1s2 No murmur no s3  Abd:Soft, Nontender  Ext: No edema  Neuro: Alert and oriented; Nonfocal  Skin: Warm, Dry, Intact  Pulses: 2+ DP/PT/Rad      Telemetry: normal sinus rhythm    Current Facility-Administered Medications   Medication Dose Route Frequency    furosemide (LASIX) injection 40 mg  40 mg IntraVENous DAILY    sodium chloride (NS) flush 5-10 mL  5-10 mL IntraVENous PRN    levothyroxine (SYNTHROID) tablet 100 mcg  100 mcg Oral 6am    sodium chloride (NS) flush 5-40 mL  5-40 mL IntraVENous Q8H    sodium chloride (NS) flush 5-40 mL  5-40 mL IntraVENous PRN    piperacillin-tazobactam (ZOSYN) 3.375 g in 0.9% sodium chloride (MBP/ADV) 100 mL  3.375 g IntraVENous Q8H    atenolol (TENORMIN) tablet 25 mg  25 mg Oral DAILY    ALPRAZolam (XANAX) tablet 0.25 mg  0.25 mg Oral TID PRN         Data Review:   Labs:    Recent Results (from the past 24 hour(s))   CULTURE, BLOOD    Collection Time: 10/18/19 11:53 AM   Result Value Ref Range    Special Requests: NO SPECIAL REQUESTS      Culture result: (A)       GRAM NEGATIVE RODS GROWING IN BOTH BOTTLES DRAWN (SITE=R AC)    Culture result: (A)       PRELIMINARY REPORT OF GRAM NEGATIVE RODS GROWING IN BOTH BOTTLES DRAWN CALLED TO AND READ BACK BY Terrell Vogel RN AT 0745 10/19/19. LWY   LACTIC ACID    Collection Time: 10/18/19 11:53 AM   Result Value Ref Range    Lactic acid 2.4 (HH) 0.4 - 2.0 MMOL/L   CULTURE, BLOOD    Collection Time: 10/18/19 11:53 AM   Result Value Ref Range    Special Requests: NO SPECIAL REQUESTS      Culture result: (A)       GRAM NEGATIVE RODS GROWING IN BOTH BOTTLES DRAWN (SITE= L HAND, EXISTING LINE)    Culture result: (A)       PRELIMINARY REPORT OF GRAM NEGATIVE RODS GROWING IN BOTH BOTTLES DRAWN CALLED TO AND READ BACK BY Terrell Vogel RN AT 9694 10/19/19.  LWY   NT-PRO BNP    Collection Time: 10/18/19 11:53 AM   Result Value Ref Range    NT pro-BNP 23,832 (H) <450 PG/ML   TSH 3RD GENERATION    Collection Time: 10/18/19 11:53 AM   Result Value Ref Range    TSH 2.23 0.36 - 3.74 uIU/mL   INFLUENZA A & B AG (RAPID TEST)    Collection Time: 10/18/19 11:53 AM   Result Value Ref Range    Influenza A Antigen NEGATIVE  NEG      Influenza B Antigen NEGATIVE  NEG     METABOLIC PANEL, COMPREHENSIVE    Collection Time: 10/18/19 11:54 AM   Result Value Ref Range    Sodium 136 136 - 145 mmol/L    Potassium 4.0 3.5 - 5.1 mmol/L    Chloride 102 97 - 108 mmol/L    CO2 27 21 - 32 mmol/L    Anion gap 7 5 - 15 mmol/L    Glucose 105 (H) 65 - 100 mg/dL    BUN 16 6 - 20 MG/DL    Creatinine 0.93 0.55 - 1.02 MG/DL    BUN/Creatinine ratio 17 12 - 20      GFR est AA >60 >60 ml/min/1.73m2    GFR est non-AA 57 (L) >60 ml/min/1.73m2    Calcium 9.8 8.5 - 10.1 MG/DL    Bilirubin, total 1.2 (H) 0.2 - 1.0 MG/DL    ALT (SGPT) 15 12 - 78 U/L    AST (SGOT) 32 15 - 37 U/L    Alk. phosphatase 80 45 - 117 U/L    Protein, total 6.9 6.4 - 8.2 g/dL    Albumin 2.9 (L) 3.5 - 5.0 g/dL    Globulin 4.0 2.0 - 4.0 g/dL    A-G Ratio 0.7 (L) 1.1 - 2.2     CBC WITH AUTOMATED DIFF    Collection Time: 10/18/19 11:54 AM   Result Value Ref Range    WBC 11.6 (H) 3.6 - 11.0 K/uL    RBC 4.13 3.80 - 5.20 M/uL    HGB 11.8 11.5 - 16.0 g/dL    HCT 38.2 35.0 - 47.0 %    MCV 92.5 80.0 - 99.0 FL    MCH 28.6 26.0 - 34.0 PG    MCHC 30.9 30.0 - 36.5 g/dL    RDW 16.0 (H) 11.5 - 14.5 %    PLATELET 657 298 - 641 K/uL    MPV 12.1 8.9 - 12.9 FL    NRBC 0.0 0  WBC    ABSOLUTE NRBC 0.00 0.00 - 0.01 K/uL    NEUTROPHILS 88 (H) 32 - 75 %    LYMPHOCYTES 2 (L) 12 - 49 %    MONOCYTES 9 5 - 13 %    EOSINOPHILS 0 0 - 7 %    BASOPHILS 0 0 - 1 %    IMMATURE GRANULOCYTES 1 (H) 0.0 - 0.5 %    ABS. NEUTROPHILS 10.3 (H) 1.8 - 8.0 K/UL    ABS. LYMPHOCYTES 0.2 (L) 0.8 - 3.5 K/UL    ABS. MONOCYTES 1.0 0.0 - 1.0 K/UL    ABS. EOSINOPHILS 0.0 0.0 - 0.4 K/UL    ABS. BASOPHILS 0.0 0.0 - 0.1 K/UL    ABS. IMM.  GRANS. 0.1 (H) 0.00 - 0.04 K/UL    DF SMEAR SCANNED      PLATELET COMMENTS Large Platelets      RBC COMMENTS ANISOCYTOSIS  1+        RBC COMMENTS HYPOCHROMIA  1+       EKG, 12 LEAD, INITIAL    Collection Time: 10/18/19 12:06 PM   Result Value Ref Range    Ventricular Rate 90 BPM    Atrial Rate 300 BPM    QRS Duration 124 ms    Q-T Interval 374 ms    QTC Calculation (Bezet) 457 ms    Calculated R Axis 109 degrees    Calculated T Axis 110 degrees    Diagnosis       Atrial fibrillation  ST & T wave abnormality, consider anterior ischemia  When compared with ECG of 31-MAY-2019 08:38,  Criteria for Septal infarct are no longer present  T wave inversion no longer evident in Inferior leads  Nonspecific T wave abnormality has replaced inverted T waves in Lateral leads  Confirmed by Abdoulaye Kimball M.D., Airam Leonard ([id number]) on 10/18/2019 12:21:16 PM     PROTHROMBIN TIME + INR    Collection Time: 10/18/19  1:12 PM   Result Value Ref Range    INR 11.1 (HH) 0.9 - 1.1      Prothrombin time 98.7 (H) 9.0 - 11.1 sec   GLUCOSE, POC    Collection Time: 10/18/19  8:09 PM   Result Value Ref Range    Glucose (POC) 118 (H) 65 - 100 mg/dL    Performed by Mark LOMBARDI    T4, FREE    Collection Time: 10/18/19  8:58 PM   Result Value Ref Range    T4, Free 1.4 0.8 - 1.5 NG/DL   SAMPLES BEING HELD    Collection Time: 10/18/19  8:58 PM   Result Value Ref Range    SAMPLES BEING HELD 1LAV,1 PST      COMMENT        Add-on orders for these samples will be processed based on acceptable specimen integrity and analyte stability, which may vary by analyte.    LACTIC ACID    Collection Time: 10/18/19  9:15 PM   Result Value Ref Range    Lactic acid 3.1 (HH) 0.4 - 2.0 MMOL/L   METABOLIC PANEL, COMPREHENSIVE    Collection Time: 10/19/19  4:09 AM   Result Value Ref Range    Sodium 132 (L) 136 - 145 mmol/L    Potassium 3.3 (L) 3.5 - 5.1 mmol/L    Chloride 99 97 - 108 mmol/L    CO2 25 21 - 32 mmol/L    Anion gap 8 5 - 15 mmol/L    Glucose 105 (H) 65 - 100 mg/dL    BUN 19 6 - 20 MG/DL    Creatinine 0.88 0.55 - 1.02 MG/DL    BUN/Creatinine ratio 22 (H) 12 - 20      GFR est AA >60 >60 ml/min/1.73m2    GFR est non-AA >60 >60 ml/min/1.73m2    Calcium 8.8 8.5 - 10.1 MG/DL    Bilirubin, total 1.2 (H) 0.2 - 1.0 MG/DL    ALT (SGPT) 11 (L) 12 - 78 U/L    AST (SGOT) 22 15 - 37 U/L    Alk. phosphatase 66 45 - 117 U/L    Protein, total 5.9 (L) 6.4 - 8.2 g/dL    Albumin 2.5 (L) 3.5 - 5.0 g/dL    Globulin 3.4 2.0 - 4.0 g/dL    A-G Ratio 0.7 (L) 1.1 - 2.2     CBC WITH AUTOMATED DIFF    Collection Time: 10/19/19  4:09 AM   Result Value Ref Range    WBC 7.4 3.6 - 11.0 K/uL    RBC 3.85 3.80 - 5.20 M/uL    HGB 10.9 (L) 11.5 - 16.0 g/dL    HCT 37.7 35.0 - 47.0 %    MCV 97.9 80.0 - 99.0 FL    MCH 28.3 26.0 - 34.0 PG    MCHC 28.9 (L) 30.0 - 36.5 g/dL    RDW 16.0 (H) 11.5 - 14.5 %    PLATELET 728 978 - 340 K/uL    MPV 11.9 8.9 - 12.9 FL    NRBC 0.0 0  WBC    ABSOLUTE NRBC 0.00 0.00 - 0.01 K/uL    NEUTROPHILS 83 (H) 32 - 75 %    BAND NEUTROPHILS 4 0 - 6 %    LYMPHOCYTES 5 (L) 12 - 49 %    MONOCYTES 8 5 - 13 %    EOSINOPHILS 0 0 - 7 %    BASOPHILS 0 0 - 1 %    IMMATURE GRANULOCYTES 0 %    ABS. NEUTROPHILS 6.4 1.8 - 8.0 K/UL    ABS. LYMPHOCYTES 0.4 (L) 0.8 - 3.5 K/UL    ABS. MONOCYTES 0.6 0.0 - 1.0 K/UL    ABS. EOSINOPHILS 0.0 0.0 - 0.4 K/UL    ABS. BASOPHILS 0.0 0.0 - 0.1 K/UL    ABS. IMM.  GRANS. 0.0 K/UL    DF AUTOMATED      PLATELET COMMENTS Large Platelets      RBC COMMENTS ANISOCYTOSIS  1+        RBC COMMENTS HYPOCHROMIA  1+       NT-PRO BNP    Collection Time: 10/19/19  4:09 AM   Result Value Ref Range    NT pro-BNP 20,340 (H) <450 PG/ML   LACTIC ACID    Collection Time: 10/19/19  4:09 AM   Result Value Ref Range    Lactic acid 1.1 0.4 - 2.0 MMOL/L

## 2019-10-19 NOTE — H&P
History and Physical    Subjective:     Shae Horta is a 80 y.o.  female  who presents with fever , cough and vomiting. Just admitted a few weeks ago with heart failure. Evidently non compliant with meds. When I attempt to get history she responds \"stop asking me all those questions. \"  She denies chest pain but has chronic leg pain? .   Past Medical History:   Diagnosis Date    A-fib [facility])     Arrhythmia     A FIB    Arthritis     Back pain     CAD (coronary artery disease)     PT DENIES    Chronic pain     Dementia ([facility] [address] 2/9/2016    Hypercholesterolemia     Psychiatric disorder     ANXIETY    Shoulder pain     Thyroid disease     Tremor, essential      Allergies   Allergen Reactions    Latex, Natural Rubber Other (comments)     \"I get black where ever it touches\"    Codeine Other (comments)     \"It just sends me up the wall\"    Adhesive Rash and Other (comments)     blisters    Cortisone Unknown (comments)     Prior to Admission medications    Medication Sig Start Date End Date Taking? Authorizing Provider   famotidine (PEPCID) 10 mg tablet Take 10 mg by mouth daily. Yes Provider, Historical   warfarin (COUMADIN) 4 mg tablet 1 tab alternate with 1/2 tab daily 10/16/19  Yes Margaret Braden MD   atenolol (TENORMIN) 25 mg tablet Take 0.5 Tabs by mouth every Monday, Wednesday, Friday. 10/4/19  Yes Margaret Braden MD   furosemide (LASIX) 20 mg tablet Take 1 Tab by mouth daily. 10/4/19  Yes Margaret Braden MD   spironolactone (ALDACTONE) 25 mg tablet Take 1 Tab by mouth daily. 9/13/19  Yes Margaret Braden MD   ALPRAZolam Virgia Latus) 0.5 mg tablet Take 1 Tab by mouth three (3) times daily as needed for Anxiety.  Max Daily Amount: 1.5 mg. 8/19/19  Yes Margaret Braden MD   levothyroxine (SYNTHROID) 100 mcg tablet TAKE 1 TABLET BY MOUTH EVERY MORNING BEFORE BREAKFAST 4/8/19  Yes Margaret Braden MD   potassium chloride (K-DUR, KLOR-CON) 20 mEq tablet TAKE 1 TABLET BY MOUTH EVERY DAY 1/14/19 Yes Marcos Cobb MD     Social History     Tobacco Use    Smoking status: Former Smoker     Packs/day: 0.50     Years: 10.00     Pack years: 5.00     Last attempt to quit: 1994     Years since quittin.7    Smokeless tobacco: Never Used   Substance Use Topics    Alcohol use: Yes     Comment: NIGHTLY 2 GLASSES WINE     Family History   Problem Relation Age of Onset    Dementia Mother     Arthritis-osteo Father                Review of Systems:  As above. Objective:       Physical Exam: heavy 81 yo wf  In NAD. HEENT -- Pupils round. O/P Clear. Neck -- Supple. Mild jvd. No acn  Heart -- irreg, tachy  Lungs -- dec bs in bases  Abdomen -- Soft. Non-tender. Non-distended. No masses. Bowel sounds present. Extremities -- +4 edema. Data Review:   Recent Results (from the past 24 hour(s))   LACTIC ACID    Collection Time: 10/18/19 11:53 AM   Result Value Ref Range    Lactic acid 2.4 (HH) 0.4 - 2.0 MMOL/L   NT-PRO BNP    Collection Time: 10/18/19 11:53 AM   Result Value Ref Range    NT pro-BNP 23,832 (H) <450 PG/ML   TSH 3RD GENERATION    Collection Time: 10/18/19 11:53 AM   Result Value Ref Range    TSH 2.23 0.36 - 3.74 uIU/mL   INFLUENZA A & B AG (RAPID TEST)    Collection Time: 10/18/19 11:53 AM   Result Value Ref Range    Influenza A Antigen NEGATIVE  NEG      Influenza B Antigen NEGATIVE  NEG     METABOLIC PANEL, COMPREHENSIVE    Collection Time: 10/18/19 11:54 AM   Result Value Ref Range    Sodium 136 136 - 145 mmol/L    Potassium 4.0 3.5 - 5.1 mmol/L    Chloride 102 97 - 108 mmol/L    CO2 27 21 - 32 mmol/L    Anion gap 7 5 - 15 mmol/L    Glucose 105 (H) 65 - 100 mg/dL    BUN 16 6 - 20 MG/DL    Creatinine 0.93 0.55 - 1.02 MG/DL    BUN/Creatinine ratio 17 12 - 20      GFR est AA >60 >60 ml/min/1.73m2    GFR est non-AA 57 (L) >60 ml/min/1.73m2    Calcium 9.8 8.5 - 10.1 MG/DL    Bilirubin, total 1.2 (H) 0.2 - 1.0 MG/DL    ALT (SGPT) 15 12 - 78 U/L    AST (SGOT) 32 15 - 37 U/L    Alk. phosphatase 80 45 - 117 U/L    Protein, total 6.9 6.4 - 8.2 g/dL    Albumin 2.9 (L) 3.5 - 5.0 g/dL    Globulin 4.0 2.0 - 4.0 g/dL    A-G Ratio 0.7 (L) 1.1 - 2.2     CBC WITH AUTOMATED DIFF    Collection Time: 10/18/19 11:54 AM   Result Value Ref Range    WBC 11.6 (H) 3.6 - 11.0 K/uL    RBC 4.13 3.80 - 5.20 M/uL    HGB 11.8 11.5 - 16.0 g/dL    HCT 38.2 35.0 - 47.0 %    MCV 92.5 80.0 - 99.0 FL    MCH 28.6 26.0 - 34.0 PG    MCHC 30.9 30.0 - 36.5 g/dL    RDW 16.0 (H) 11.5 - 14.5 %    PLATELET 128 275 - 285 K/uL    MPV 12.1 8.9 - 12.9 FL    NRBC 0.0 0  WBC    ABSOLUTE NRBC 0.00 0.00 - 0.01 K/uL    NEUTROPHILS 88 (H) 32 - 75 %    LYMPHOCYTES 2 (L) 12 - 49 %    MONOCYTES 9 5 - 13 %    EOSINOPHILS 0 0 - 7 %    BASOPHILS 0 0 - 1 %    IMMATURE GRANULOCYTES 1 (H) 0.0 - 0.5 %    ABS. NEUTROPHILS 10.3 (H) 1.8 - 8.0 K/UL    ABS. LYMPHOCYTES 0.2 (L) 0.8 - 3.5 K/UL    ABS. MONOCYTES 1.0 0.0 - 1.0 K/UL    ABS. EOSINOPHILS 0.0 0.0 - 0.4 K/UL    ABS. BASOPHILS 0.0 0.0 - 0.1 K/UL    ABS. IMM.  GRANS. 0.1 (H) 0.00 - 0.04 K/UL    DF SMEAR SCANNED      PLATELET COMMENTS Large Platelets      RBC COMMENTS ANISOCYTOSIS  1+        RBC COMMENTS HYPOCHROMIA  1+       EKG, 12 LEAD, INITIAL    Collection Time: 10/18/19 12:06 PM   Result Value Ref Range    Ventricular Rate 90 BPM    Atrial Rate 300 BPM    QRS Duration 124 ms    Q-T Interval 374 ms    QTC Calculation (Bezet) 457 ms    Calculated R Axis 109 degrees    Calculated T Axis 110 degrees    Diagnosis       Atrial fibrillation  ST & T wave abnormality, consider anterior ischemia  When compared with ECG of 31-MAY-2019 08:38,  Criteria for Septal infarct are no longer present  T wave inversion no longer evident in Inferior leads  Nonspecific T wave abnormality has replaced inverted T waves in Lateral leads  Confirmed by Abdoulaye Kimball M.D., Airam Leonard ([id number]) on 10/18/2019 12:21:16 PM     PROTHROMBIN TIME + INR    Collection Time: 10/18/19  1:12 PM   Result Value Ref Range    INR 11.1 () 0.9 - 1.1      Prothrombin time 98.7 (H) 9.0 - 11.1 sec   GLUCOSE, POC    Collection Time: 10/18/19  8:09 PM   Result Value Ref Range    Glucose (POC) 118 (H) 65 - 100 mg/dL    Performed by [facility] HARJIT            Assessment:     Active Problems:    Sepsis ([facility] [address] (2/9/2016)      Dementia ([facility] [address] (2/9/2016)      Acquired hypothyroidism (8/17/2018)      Acute on chronic diastolic (congestive) heart failure ([facility] [address] (6/5/2019)      Pneumonia (10/18/2019)        Plan:     Cardiology consult for heart failure, diuresis  A fib- restart atenolol  Coagulopathy- vit k, monitor inr  Sepsis/ probable pneumonia- zosyn  Euthyroid on replacement  Wound care for perineal breakdown    Signed By: Yoseph Puckett MD     October 18, 2019

## 2019-10-19 NOTE — ROUTINE PROCESS
Primary Nurse Ashley Elder RN and Gila Obregon RN performed a dual skin assessment on this patient Impairment noted- see wound doc flow sheet Tre score is 16 
 
R buttocks/sacrum- stage II area surroundings pink and blanchable. Wound care consult placed. Unable to fully view left hip as pt \"hurt\" to turn right.

## 2019-10-19 NOTE — ROUTINE PROCESS
Bedside shift change report given to [address] (oncoming nurse) by Chantal Blunt RN (offgoing nurse). Report included the following information SBAR, Procedure Summary, Intake/Output, MAR, Recent Results and Cardiac Rhythm Afib - Ref call Dr Ramy Wesley x2 through night - ref critical lactic and 4/4 culture bottles gram negative rods. Pt stated was not allergic to latex or rubber.   Noted on allergy list.

## 2019-10-19 NOTE — ROUTINE PROCESS
Critical lab called -  Light -- lactic acid 3.1 (previous 2.4) Called Dr Antonio Del Cid group - Dr Shady Graves returned call. Reported results. No new orders given.

## 2019-10-19 NOTE — ROUTINE PROCESS
Bedside and Verbal shift change report given to Juarez (oncoming nurse) by Vidhya Branch (offgoing nurse). Report included the following information SBAR, Kardex, Procedure Summary, Intake/Output, MAR, Recent Results and Cardiac Rhythm AFib BBB.

## 2019-10-19 NOTE — PROGRESS NOTES
Problem: Pressure Injury - Risk of  Goal: *Prevention of pressure injury  Description  Document Tre Scale and appropriate interventions in the flowsheet. Outcome: Progressing Towards Goal  Note:   Pressure Injury Interventions:  Sensory Interventions: Turn and reposition approx. every two hours (pillows and wedges if needed)    Moisture Interventions: Absorbent underpads, Apply protective barrier, creams and emollients    Activity Interventions: Pressure redistribution bed/mattress(bed type)    Mobility Interventions: HOB 30 degrees or less         Friction and Shear Interventions: Apply protective barrier, creams and emollients, HOB 30 degrees or less, Lift team/patient mobility team                Problem: Patient Education: Go to Patient Education Activity  Goal: Patient/Family Education  Outcome: Progressing Towards Goal     Problem: Falls - Risk of  Goal: *Absence of Falls  Description  Document Jer Fall Risk and appropriate interventions in the flowsheet.   Outcome: Progressing Towards Goal  Note:   Fall Risk Interventions:     Bed at low level, supplemental lighting, bedrails x3, call light in reach       Medication Interventions: Patient to call before getting OOB, Utilize gait belt for transfers/ambulation    Elimination Interventions: Call light in reach              Problem: Heart Failure: Day 1  Goal: Diagnostic Test/Procedures  Outcome: Progressing Towards Goal  Goal: Nutrition/Diet  Outcome: Progressing Towards Goal  Goal: *Oxygen saturation within defined limits  Outcome: Progressing Towards Goal  Goal: *Optimal pain control at patient's stated goal  Outcome: Progressing Towards Goal  Goal: *Anxiety reduced or absent  Outcome: Progressing Towards Goal     Problem: Heart Failure: Day 2  Goal: *Anxiety reduced or absent  Outcome: Progressing Towards Goal

## 2019-10-20 LAB
ANION GAP SERPL CALC-SCNC: 7 MMOL/L (ref 5–15)
ATRIAL RATE: 288 BPM
BACTERIA SPEC CULT: ABNORMAL
BUN SERPL-MCNC: 16 MG/DL (ref 6–20)
BUN/CREAT SERPL: 19 (ref 12–20)
CALCIUM SERPL-MCNC: 8.7 MG/DL (ref 8.5–10.1)
CALCULATED R AXIS, ECG10: 107 DEGREES
CALCULATED T AXIS, ECG11: 105 DEGREES
CC UR VC: ABNORMAL
CHLORIDE SERPL-SCNC: 94 MMOL/L (ref 97–108)
CO2 SERPL-SCNC: 30 MMOL/L (ref 21–32)
CREAT SERPL-MCNC: 0.83 MG/DL (ref 0.55–1.02)
DIAGNOSIS, 93000: NORMAL
GLUCOSE SERPL-MCNC: 90 MG/DL (ref 65–100)
INR PPP: 2.2 (ref 0.9–1.1)
POTASSIUM SERPL-SCNC: 2.6 MMOL/L (ref 3.5–5.1)
PROTHROMBIN TIME: 21.4 SEC (ref 9–11.1)
Q-T INTERVAL, ECG07: 324 MS
QRS DURATION, ECG06: 110 MS
QTC CALCULATION (BEZET), ECG08: 461 MS
SERVICE CMNT-IMP: ABNORMAL
SODIUM SERPL-SCNC: 131 MMOL/L (ref 136–145)
VENTRICULAR RATE, ECG03: 122 BPM

## 2019-10-20 PROCEDURE — 74011000258 HC RX REV CODE- 258: Performed by: INTERNAL MEDICINE

## 2019-10-20 PROCEDURE — 74011250637 HC RX REV CODE- 250/637: Performed by: INTERNAL MEDICINE

## 2019-10-20 PROCEDURE — 80048 BASIC METABOLIC PNL TOTAL CA: CPT

## 2019-10-20 PROCEDURE — 74011250636 HC RX REV CODE- 250/636: Performed by: FAMILY MEDICINE

## 2019-10-20 PROCEDURE — 74011250636 HC RX REV CODE- 250/636: Performed by: INTERNAL MEDICINE

## 2019-10-20 PROCEDURE — 36415 COLL VENOUS BLD VENIPUNCTURE: CPT

## 2019-10-20 PROCEDURE — 65270000029 HC RM PRIVATE

## 2019-10-20 PROCEDURE — 77030038269 HC DRN EXT URIN PURWCK BARD -A

## 2019-10-20 PROCEDURE — 85610 PROTHROMBIN TIME: CPT

## 2019-10-20 RX ORDER — POTASSIUM CHLORIDE 750 MG/1
20 TABLET, FILM COATED, EXTENDED RELEASE ORAL DAILY
Status: DISCONTINUED | OUTPATIENT
Start: 2019-10-21 | End: 2019-10-22

## 2019-10-20 RX ORDER — ACETAMINOPHEN 325 MG/1
650 TABLET ORAL
Status: DISCONTINUED | OUTPATIENT
Start: 2019-10-20 | End: 2019-10-25 | Stop reason: HOSPADM

## 2019-10-20 RX ORDER — FLUCONAZOLE 100 MG/1
150 TABLET ORAL DAILY
Status: COMPLETED | OUTPATIENT
Start: 2019-10-20 | End: 2019-10-24

## 2019-10-20 RX ORDER — POTASSIUM CHLORIDE 750 MG/1
40 TABLET, FILM COATED, EXTENDED RELEASE ORAL
Status: COMPLETED | OUTPATIENT
Start: 2019-10-20 | End: 2019-10-20

## 2019-10-20 RX ORDER — ONDANSETRON 2 MG/ML
4 INJECTION INTRAMUSCULAR; INTRAVENOUS
Status: DISCONTINUED | OUTPATIENT
Start: 2019-10-20 | End: 2019-10-25 | Stop reason: HOSPADM

## 2019-10-20 RX ORDER — POTASSIUM CHLORIDE 750 MG/1
10 TABLET, FILM COATED, EXTENDED RELEASE ORAL DAILY
Status: DISCONTINUED | OUTPATIENT
Start: 2019-10-20 | End: 2019-10-20

## 2019-10-20 RX ADMIN — ATENOLOL 25 MG: 25 TABLET ORAL at 09:19

## 2019-10-20 RX ADMIN — Medication 10 ML: at 06:58

## 2019-10-20 RX ADMIN — FLUCONAZOLE 150 MG: 100 TABLET ORAL at 09:18

## 2019-10-20 RX ADMIN — PIPERACILLIN AND TAZOBACTAM 3.38 G: 3; .375 INJECTION, POWDER, FOR SOLUTION INTRAVENOUS at 06:03

## 2019-10-20 RX ADMIN — ACETAMINOPHEN 650 MG: 325 TABLET, FILM COATED ORAL at 15:35

## 2019-10-20 RX ADMIN — Medication 10 ML: at 15:08

## 2019-10-20 RX ADMIN — PIPERACILLIN AND TAZOBACTAM 3.38 G: 3; .375 INJECTION, POWDER, FOR SOLUTION INTRAVENOUS at 15:08

## 2019-10-20 RX ADMIN — LEVOTHYROXINE SODIUM 100 MCG: 100 TABLET ORAL at 06:03

## 2019-10-20 RX ADMIN — ONDANSETRON 4 MG: 2 INJECTION INTRAMUSCULAR; INTRAVENOUS at 23:55

## 2019-10-20 RX ADMIN — Medication 10 ML: at 09:18

## 2019-10-20 RX ADMIN — PIPERACILLIN AND TAZOBACTAM 3.38 G: 3; .375 INJECTION, POWDER, FOR SOLUTION INTRAVENOUS at 22:37

## 2019-10-20 RX ADMIN — FUROSEMIDE 40 MG: 10 INJECTION, SOLUTION INTRAMUSCULAR; INTRAVENOUS at 09:16

## 2019-10-20 RX ADMIN — POTASSIUM CHLORIDE 40 MEQ: 750 TABLET, FILM COATED, EXTENDED RELEASE ORAL at 14:25

## 2019-10-20 RX ADMIN — POTASSIUM CHLORIDE 10 MEQ: 750 TABLET, FILM COATED, EXTENDED RELEASE ORAL at 09:19

## 2019-10-20 NOTE — ROUTINE PROCESS
1330:  Pt voiced she is her own POA. Her sister [facility] voiced it used to be her. Pt fusses all the time when she is reposition to be left alone all the time. I informed her it is easier to leave her alone but because she is in the hospital, we have to care for her and that includes repositioning and turning. Otherwise there is not a need to come into the hospital if she does not want the care we provide. 1340:  Dr. Angelia Valdez paged if she wanted more Potassium for patient and if pt still needs cardiac monitoring. INR is 2.2 today. Patient, sister, and caretaker aware of transfer. 1438:   Notified 5W CN to call me when nurse able to take report for [phone]. She acknowledge understanding. About 1500: Notified patient and caretaker that room has been changed to [facility].

## 2019-10-20 NOTE — PROGRESS NOTES
Cardiology Progress Note                                        Admit Date: 10/18/2019    Assessment/Plan:     CHF; acute on chronic diastolic; improving  Persistent Afib; rate is fine  Chronic aC; adequate now with INR 2.2 this am    Jas Carter is a 80 y.o. female with     PROBLEM LIST:  Patient Active Problem List    Diagnosis Date Noted    Pneumonia 10/18/2019    Acute mastitis of left breast 10/04/2019    Breast pain, left 09/29/2019    Yeast dermatitis 09/29/2019    Acute on chronic diastolic CHF (congestive heart failure) ([address] 09/27/2019    Senile purpura ([address] 09/14/2019    Acute on chronic diastolic (congestive) heart failure ([address] 06/05/2019    Pulmonary hypertension ([address] 06/05/2019    PNA (pneumonia) 05/27/2019    Dehydration 04/23/2019    Wound, open, hip or thigh, left, initial encounter 04/23/2019    Non-healing wound of right heel 04/23/2019    Closed fracture of neck of right femur ([address] 09/11/2018    Acquired hypothyroidism 08/17/2018    Fall 07/29/2018    Fracture, intertrochanteric, right femur, closed, initial encounter ([address] 07/29/2018    Hip hematoma, right, initial encounter 07/29/2018    UTI (urinary tract infection) 02/02/2017    Closed compression fracture of lumbar vertebra ([address] 02/01/2017    Sepsis ([address] 02/09/2016    Altered mental status 02/09/2016    Dementia ([address] 02/09/2016    Arthritis of knee, left 01/13/2014    A-fib ([address]     Thyroid disease     Hypercholesterolemia     Arthritis     CAD (coronary artery disease)          Subjective:     Jas Carter reports none.     Visit Vitals  /47 (BP 1 Location: Right arm, BP Patient Position: At rest)   Pulse 83   Temp 98.8 °F (37.1 °C)   Resp 20   Ht 5' 2\" (1.575 m)   Wt 152 lb (68.9 kg)   SpO2 93%   BMI 27.80 kg/m²       Intake/Output Summary (Last 24 hours) at 10/20/2019 0839  Last data filed at 10/20/2019 0441  Gross per 24 hour   Intake 560 ml   Output 1000 ml   Net -440 ml       Objective: Physical Exam:  HEENT: Perr[place], EOMI  Neck: No JVD,  No thyroidmegaly  Resp: CTA bilaterally;  No wheezes or rales  CV: irregular, s1s2 No murmur no s3  Abd:Soft, Nontender  Ext: No edema  Neuro: Alert and oriented; Nonfocal  Skin: Warm, Dry, Intact  Pulses: 2+ DP/PT/Rad      Telemetry: AFIB    Current Facility-Administered Medications   Medication Dose Route Frequency    potassium chloride SR (KLOR-CON 10) tablet 10 mEq  10 mEq Oral DAILY    fluconazole (DIFLUCAN) tablet 150 mg  150 mg Oral DAILY    furosemide (LASIX) injection 40 mg  40 mg IntraVENous DAILY    sodium chloride (NS) flush 5-10 mL  5-10 mL IntraVENous PRN    levothyroxine (SYNTHROID) tablet 100 mcg  100 mcg Oral 6am    sodium chloride (NS) flush 5-40 mL  5-40 mL IntraVENous Q8H    sodium chloride (NS) flush 5-40 mL  5-40 mL IntraVENous PRN    piperacillin-tazobactam (ZOSYN) 3.375 g in 0.9% sodium chloride (MBP/ADV) 100 mL  3.375 g IntraVENous Q8H    atenolol (TENORMIN) tablet 25 mg  25 mg Oral DAILY    ALPRAZolam (XANAX) tablet 0.25 mg  0.25 mg Oral TID PRN         Data Review:   Labs:    Recent Results (from the past 24 hour(s))   PROTHROMBIN TIME + INR    Collection Time: 10/19/19 10:51 AM   Result Value Ref Range    INR 3.3 (H) 0.9 - 1.1      Prothrombin time 31.5 (H) 9.0 - 11.1 sec   PROTHROMBIN TIME + INR    Collection Time: 10/20/19  4:53 AM   Result Value Ref Range    INR 2.2 (H) 0.9 - 1.1      Prothrombin time 21.4 (H) 9.0 - 11.1 sec

## 2019-10-20 NOTE — ROUTINE PROCESS
TRANSFER - OUT REPORT: 
 
Verbal report given to Rojelio Perkins (name) on Naz Larger  being transferred to [facility] room 625 (unit) for routine progression of care Report consisted of patients Situation, Background, Assessment and  
Recommendations(SBAR). Information from the following report(s) SBAR, Kardex, ED Summary, Intake/Output, MAR, Recent Results and Cardiac Rhythm A-Fib  was reviewed with the receiving nurse. Lines:  
Peripheral IV 10/18/19 Right Antecubital (Active) Site Assessment Drainage (comment) 10/20/2019 11:10 AM  
Phlebitis Assessment 0 10/20/2019 11:10 AM  
Infiltration Assessment 0 10/20/2019 11:10 AM  
Dressing Status Old drainage 10/20/2019 11:10 AM  
Dressing Type Transparent 10/20/2019 11:10 AM  
Hub Color/Line Status Pink; Infusing 10/20/2019 11:10 AM  
Action Taken Open ports on tubing capped 10/20/2019  4:41 AM  
Alcohol Cap Used Yes 10/20/2019 11:10 AM  
   
Peripheral IV 10/18/19 Left (Active) Site Assessment Clean, dry, & intact 10/20/2019 11:10 AM  
Phlebitis Assessment 0 10/20/2019 11:10 AM  
Infiltration Assessment 0 10/20/2019 11:10 AM  
Dressing Status Clean, dry, & intact 10/20/2019 11:10 AM  
Dressing Type Transparent 10/20/2019 11:10 AM  
Hub Color/Line Status Yellow;Capped 10/20/2019 11:10 AM  
Action Taken Open ports on tubing capped 10/19/2019  8:10 PM  
Alcohol Cap Used Yes 10/20/2019 11:10 AM  
  
 
Opportunity for questions and clarification was provided. Patient transported with: 
 [employer]

## 2019-10-20 NOTE — PROGRESS NOTES
Problem: Pressure Injury - Risk of  Goal: *Prevention of pressure injury  Description  Document Tre Scale and appropriate interventions in the flowsheet. Outcome: Progressing Towards Goal  Note:   Pressure Injury Interventions:  Sensory Interventions: Turn and reposition approx. every two hours (pillows and wedges if needed), Assess changes in LOC    Moisture Interventions: Apply protective barrier, creams and emollients    Activity Interventions: Pressure redistribution bed/mattress(bed type)    Mobility Interventions: Float heels, HOB 30 degrees or less    Nutrition Interventions: Document food/fluid/supplement intake    Friction and Shear Interventions: Apply protective barrier, creams and emollients                Problem: Falls - Risk of  Goal: *Absence of Falls  Description  Document Jer Fall Risk and appropriate interventions in the flowsheet.   Outcome: Progressing Towards Goal  Note:   Fall Risk Interventions:            Medication Interventions: Assess postural VS orthostatic hypotension    Elimination Interventions: Call light in reach              Problem: Heart Failure: Day 1  Goal: *Oxygen saturation within defined limits  Outcome: Progressing Towards Goal  Goal: *Optimal pain control at patient's stated goal  Outcome: Progressing Towards Goal  Goal: *Anxiety reduced or absent  Outcome: Progressing Towards Goal

## 2019-10-20 NOTE — ROUTINE PROCESS
Bedside shift change report given to [address] (oncoming nurse) by Mis Aguirre RN (offgoing nurse). Report included the following information SBAR, Procedure Summary, Intake/Output, MAR and Recent Results, HR afib

## 2019-10-20 NOTE — PROGRESS NOTES
Medical Progress Note      NAME: Preston Strickland   :  1931  MRM:  [id number]    Date/Time: 10/20/2019  6:58 AM         Problem List:     Active Problems:    Sepsis ([facility] [address] (2016)      Dementia ([facility] (2016)      Acquired hypothyroidism (2018)      Acute on chronic diastolic (congestive) heart failure ([facility] [address] (2019)      Pneumonia (10/18/2019)             Subjective:     Patient resting    Past Medical History:   Diagnosis Date    A-fib ([facility]     Arrhythmia     A FIB    Arthritis     Back pain     CAD (coronary artery disease)     PT DENIES    Chronic pain     Dementia ([facility] 2016    Hypercholesterolemia     Psychiatric disorder     ANXIETY    Shoulder pain     Thyroid disease     Tremor, essential        ROS:  Unable to obtain         Objective:       Vitals:          Last 24hrs VS reviewed since prior progress note.  Most recent are:    Visit Vitals  /44 (BP 1 Location: Right arm, BP Patient Position: At rest)   Pulse 74   Temp 99 °F (37.2 °C)   Resp 16   Ht 5' 2\" (1.575 m)   Wt 152 lb (68.9 kg)   SpO2 94%   BMI 27.80 kg/m²     SpO2 Readings from Last 6 Encounters:   10/20/19 94%   10/04/19 92%   19 90%   19 95%   19 98%   18 97%    O2 Flow Rate (L/min): 2 l/min(wean to 1L NC)       Intake/Output Summary (Last 24 hours) at 10/20/2019 0658  Last data filed at 10/20/2019 0441  Gross per 24 hour   Intake 560 ml   Output 1350 ml   Net -790 ml          Exam:     General   well developed, well nourished, appears stated age, in no acute distress  Respiratory   Clear To Auscultation bilaterally - no wheezes, rales, rhonchi, or crackles  Cardiology  irreg  Abdominal  Soft, non-tender, non-distended, positive bowel sounds, no hepatosplenomegaly  Extremities  +4 edema    Lab Data Reviewed: (see below)    Medications Reviewed: (see below)    ______________________________________________________________________    Medications:     Current Facility-Administered Medications   Medication Dose Route Frequency    potassium chloride SR (KLOR-CON 10) tablet 10 mEq  10 mEq Oral DAILY    furosemide (LASIX) injection 40 mg  40 mg IntraVENous DAILY    sodium chloride (NS) flush 5-10 mL  5-10 mL IntraVENous PRN    levothyroxine (SYNTHROID) tablet 100 mcg  100 mcg Oral 6am    sodium chloride (NS) flush 5-40 mL  5-40 mL IntraVENous Q8H    sodium chloride (NS) flush 5-40 mL  5-40 mL IntraVENous PRN    piperacillin-tazobactam (ZOSYN) 3.375 g in 0.9% sodium chloride (MBP/ADV) 100 mL  3.375 g IntraVENous Q8H    atenolol (TENORMIN) tablet 25 mg  25 mg Oral DAILY    ALPRAZolam (XANAX) tablet 0.25 mg  0.25 mg Oral TID PRN            Lab Review:     Recent Labs     10/19/19  0409 10/18/19  1154   WBC 7.4 11.6*   HGB 10.9* 11.8   HCT 37.7 38.2    182     Recent Labs     10/20/19  0453 10/19/19  1051 10/19/19  0409 10/18/19  1312 10/18/19  1154   NA  --   --  132*  --  136   K  --   --  3.3*  --  4.0   CL  --   --  99  --  102   CO2  --   --  25  --  27   GLU  --   --  105*  --  105*   BUN  --   --  19  --  16   CREA  --   --  0.88  --  0.93   CA  --   --  8.8  --  9.8   ALB  --   --  2.5*  --  2.9*   TBILI  --   --  1.2*  --  1.2*   SGOT  --   --  22  --  32   ALT  --   --  11*  --  15   INR 2.2* 3.3*  --  11.1*  --      Lab Results   Component Value Date/Time    Glucose (POC) 118 (H) 10/18/2019 08:09 PM     No results for input(s): PH, PCO2, PO2, HCO3, FIO2 in the last 72 hours.   Recent Labs     10/20/19  0453 10/19/19  1051 10/18/19  1312   INR 2.2* 3.3* 11.1*       Other pertinent lab: NA         Assessment:     Patient Active Problem List   Diagnosis Code    A-fib ([facility] I48.91    Thyroid disease E07.9    Hypercholesterolemia E78.00    Arthritis M19.90    CAD (coronary artery disease) I25.10    Arthritis of knee, left M17.12    Sepsis ([facility] A41.9    Altered mental status R41.82    Dementia ([facility] F03.90    Closed compression fracture of lumbar vertebra ([facility] S32.000A  UTI (urinary tract infection) N39.0    Fall W19. XXXA    Fracture, intertrochanteric, right femur, closed, initial encounter ([facility] S72.141A    Hip hematoma, right, initial encounter S70.01XA    Acquired hypothyroidism E03.9    Closed fracture of neck of right femur ([facility] S72.001A    Dehydration E86.0    Wound, open, hip or thigh, left, initial encounter S71.002A, S71.102A    Non-healing wound of right heel S91.301A    PNA (pneumonia) J18.9    Acute on chronic diastolic (congestive) heart failure (HCC) I50.33    Pulmonary hypertension (HCC) I27.20    Senile purpura (HCC) D69.2    Acute on chronic diastolic CHF (congestive heart failure) (HCC) I50.33    Breast pain, left N64.4    Yeast dermatitis B37.2    Acute mastitis of left breast N61.0    Pneumonia J18.9          Plan:                 1. Sepsis-  Due to gm neg bacteremia- on zosyn  2. A fib- rate controlled  3. Coagulopathy- improved. Hold coumadin  4. PT consult  5.  Wound care- add fluconazole              ___________________________________________________    Attending Physician: Shelly Shen MD

## 2019-10-21 LAB
INR PPP: 11.1 (ref 0.9–1.1)
INR PPP: 2.4 (ref 0.9–1.1)
PROTHROMBIN TIME: 23.5 SEC (ref 9–11.1)
PROTHROMBIN TIME: 98.7 SEC (ref 9–11.1)

## 2019-10-21 PROCEDURE — 74011250636 HC RX REV CODE- 250/636: Performed by: INTERNAL MEDICINE

## 2019-10-21 PROCEDURE — 85610 PROTHROMBIN TIME: CPT

## 2019-10-21 PROCEDURE — 65270000029 HC RM PRIVATE

## 2019-10-21 PROCEDURE — 74011000258 HC RX REV CODE- 258: Performed by: INTERNAL MEDICINE

## 2019-10-21 PROCEDURE — 36415 COLL VENOUS BLD VENIPUNCTURE: CPT

## 2019-10-21 PROCEDURE — 74011250636 HC RX REV CODE- 250/636: Performed by: FAMILY MEDICINE

## 2019-10-21 PROCEDURE — 74011250637 HC RX REV CODE- 250/637: Performed by: INTERNAL MEDICINE

## 2019-10-21 RX ADMIN — PIPERACILLIN AND TAZOBACTAM 3.38 G: 3; .375 INJECTION, POWDER, FOR SOLUTION INTRAVENOUS at 16:59

## 2019-10-21 RX ADMIN — ONDANSETRON 4 MG: 2 INJECTION INTRAMUSCULAR; INTRAVENOUS at 11:44

## 2019-10-21 RX ADMIN — PIPERACILLIN AND TAZOBACTAM 3.38 G: 3; .375 INJECTION, POWDER, FOR SOLUTION INTRAVENOUS at 22:30

## 2019-10-21 RX ADMIN — PIPERACILLIN AND TAZOBACTAM 3.38 G: 3; .375 INJECTION, POWDER, FOR SOLUTION INTRAVENOUS at 06:53

## 2019-10-21 RX ADMIN — LEVOTHYROXINE SODIUM 100 MCG: 100 TABLET ORAL at 06:53

## 2019-10-21 RX ADMIN — Medication 5 ML: at 14:00

## 2019-10-21 RX ADMIN — FLUCONAZOLE 150 MG: 100 TABLET ORAL at 11:40

## 2019-10-21 RX ADMIN — FUROSEMIDE 40 MG: 10 INJECTION, SOLUTION INTRAMUSCULAR; INTRAVENOUS at 11:43

## 2019-10-21 RX ADMIN — Medication 10 ML: at 20:28

## 2019-10-21 RX ADMIN — POTASSIUM CHLORIDE 20 MEQ: 750 TABLET, FILM COATED, EXTENDED RELEASE ORAL at 11:41

## 2019-10-21 RX ADMIN — ALPRAZOLAM 0.25 MG: 0.25 TABLET ORAL at 20:14

## 2019-10-21 RX ADMIN — ATENOLOL 25 MG: 25 TABLET ORAL at 09:00

## 2019-10-21 RX ADMIN — ALPRAZOLAM 0.25 MG: 0.25 TABLET ORAL at 12:24

## 2019-10-21 NOTE — PROGRESS NOTES
NUTRITION COMPLETE ASSESSMENT    RECOMMENDATIONS:   1. RD to liberalize diet to promote PO intake. Allow double protein. Preferences updated. 2. Add MVI with minerals, Vit C, Zinc for wound healing purposes       Interventions/Plan:   Food/Nutrient Delivery:  Modify diet/texture/consistency/nutrients         allow double protein; preferences updated  Nutrition Education:Purpose of nutrition education   emphasized needs for protein and wound healing  Coordination of Care:      Assessment:   Reason for Assessment: BPA - Pressure Injury      Diet: Cardiac  Supplements: DECLINES  Meal Intake:   Patient Vitals for the past 100 hrs:   % Diet Eaten   10/20/19 1840 75 %   10/20/19 1245 50 %   10/20/19 1110 25 %   10/20/19 0953 100 %   10/19/19 1345 50 %   10/19/19 1002 30 %   10/19/19 0819 0 %          Subjective:  I don't eat a whole lot, but I eat. Don't like scrambled eggs. Don't like red meat. Don't like supplements. I'll eat chicken. I'll eat yogurt. I'll eat hard boiled eggs. -130#    Objective:  80 y.o. female admitted with Pneumonia [J18.9]    Pt  has a past medical history of A-fib ([facility] [place] [address] Arrhythmia, Arthritis, Back pain, CAD (coronary artery disease), Chronic pain, Dementia ([address] (2/9/2016), Hypercholesterolemia, Psychiatric disorder, Shoulder pain, Thyroid disease, and Tremor, essential. She also has no past medical history of Arsenic suspected exposure, Family history of skin cancer, Radiation exposure, Skin cancer, Sun-damaged skin, Sunburn, blistering, or Tanning bed exposure. Pt followed by this service during previous admissions this year. Noted to not be accepting to supplements, but does like yogurt. PMHx: afib, CAD, dementia, anxiety. During admission in May 2019, pt noted to have cardiomegaly and pulmonary edema POA with generalized anasarca. Weight significantly elevated d/t fluid on that admission and was 141# - estimated dry weight closer to 120# at that time.       I am unable to accurately predict dry weight at this at this time. Current admission weight was 150# and I agree this is likely skewed d/t fluid. Nursing notes 2+ edema of viviane MIRELES. MD notes 4+ edema. Pt herself tells me -130#    Wt Readings from Last 20 Encounters:   10/21/19 70.2 kg (154 lb 12.2 oz)   10/16/19 68 kg (150 lb)   10/04/19 68.4 kg (150 lb 12.8 oz)   07/03/19 59.9 kg (132 lb)   06/05/19 60 kg (132 lb 4.4 oz)   04/24/19 61.9 kg (136 lb 7.4 oz)   04/23/19 54.4 kg (120 lb)   08/02/18 71 kg (156 lb 9.6 oz)   12/13/17 54.4 kg (120 lb)   08/30/17 54.4 kg (120 lb)   02/01/17 68 kg (150 lb)   08/08/16 75.8 kg (167 lb)   02/13/16 75.9 kg (167 lb 4.8 oz)   07/13/15 74.4 kg (164 lb)   03/24/15 74.4 kg (164 lb)   09/22/14 74.4 kg (164 lb)   07/31/14 74.4 kg (164 lb)   05/08/14 74.4 kg (164 lb)   01/13/14 74.4 kg (164 lb)   01/07/14 74.4 kg (164 lb)           Patient Active Problem List   Diagnosis Code    A-fib ([facility] I48.91    Thyroid disease E07.9    Hypercholesterolemia E78.00    Arthritis M19.90    CAD (coronary artery disease) I25.10    Arthritis of knee, left M17.12    Sepsis (HCC) A41.9    Altered mental status R41.82    Dementia ([address] F03.90    Closed compression fracture of lumbar vertebra ([address] S32.000A    UTI (urinary tract infection) N39.0    Fall W19. XXXA    Fracture, intertrochanteric, right femur, closed, initial encounter ([facility] [address] [facility] S72.141A    Hip hematoma, right, initial encounter S70.01XA    Acquired hypothyroidism E03.9    Closed fracture of neck of right femur ([facility] [address] S72.001A    Dehydration E86.0    Wound, open, hip or thigh, left, initial encounter S71.002A, S71.102A    Non-healing wound of right heel S91.301A    PNA (pneumonia) J18.9    Acute on chronic diastolic (congestive) heart failure (HCC) I50.33    Pulmonary hypertension (HCC) I27.20    Senile purpura (HCC) D69.2    Acute on chronic diastolic CHF (congestive heart failure) (HCC) I50.33    Breast pain, left N64.4    Yeast dermatitis B37.2    Acute mastitis of left breast N61.0    Pneumonia J18.9         Nutritionally Significant Medications:   Diflucan, Lasix, Synthroid, Zofran, Zosyn, Klor      Intake/Output:    Intake/Output Summary (Last 24 hours) at 10/21/2019 1112  Last data filed at 10/20/2019 1840  Gross per 24 hour   Intake 540 ml   Output 150 ml   Net 390 ml     Last BM: 10/19 - soft, formed    Physical Findings:    Nutrition focused physical exam: deferred at this time  Edema: 2+ bilat LE  Abdomen: soft; active bowel sounds    Skin Integrity: per nursing:  Buttocks - stage 2, partial thickness; mid sacrum - partial thickness        Anthropometrics:  Weight Loss Metrics 10/21/2019 10/16/2019 10/4/2019 9/27/2019 9/13/2019 7/3/2019 6/5/2019   Today's Wt 154 lb 12.2 oz 150 lb 150 lb 12.8 oz - - 132 lb 132 lb 4.4 oz   BMI 28.31 kg/m2 26.57 kg/m2 - 26.71 kg/m2 23.38 kg/m2 23.38 kg/m2 23.43 kg/m2      Weight Source: Bed  Height: 5' 2\" (157.5 cm),    Body mass index is 28.31 kg/m². IBW : 49.9 kg (110 lb), % IBW (Calculated): 140.69 %  Usual Body Weight: 59 kg (130 lb),      Labs:      Recent Labs     10/20/19  0805 10/19/19  0409 10/18/19  1154   GLU 90 105* 105*   BUN 16 19 16   CREA 0.83 0.88 0.93   * 132* 136   K 2.6* 3.3* 4.0   CL 94* 99 102   CO2 30 25 27   CA 8.7 8.8 9.8       Recent Labs     10/19/19  0409 10/18/19  1154   SGOT 22 32   ALT 11* 15   AP 66 80   TBILI 1.2* 1.2*   TP 5.9* 6.9   ALB 2.5* 2.9*   GLOB 3.4 4.0       Recent Labs     10/19/19  0409 10/18/19  2115 10/18/19  1153   LAC 1.1 3.1* 2.4*       Recent Labs     10/19/19  0409 10/18/19  1154   WBC 7.4 11.6*   HGB 10.9* 11.8   HCT 37.7 38.2    182       No results for input(s): PREALB in the last 72 hours. No results for input(s): TRIGL in the last 72 hours.     Recent Labs     10/18/19  2009   GLUCPOC 118*       Lab Results   Component Value Date/Time    Hemoglobin A1c 5.8 01/07/2014 03:26 PM    Hemoglobin A1c (POC) 5.7 (A) 09/27/2012 12:00 PM       Cultural, Orthodoxy and ethnic food preferences identified: None  Food Allergies: NKFA  Diet Restrictions: Food Dislikes: Other (comment)(red meat; supplements; scrambled eggs; greek yogurt)  Cultural/Denominational Preference(s): None           Pre-Hospitalization:  Usual Appetite: Good  Diet at Home: regular  Vitamins/Supplements: No    Current Hospitalization:   Fluid Restriction:    Appetite: Good  PO Ability: With assist Average po intake:50-75%  Average supplements intake:         Estimated Nutrition Needs:   Kcals/day: 1500 Kcals/day  Protein: 75 g(1.25 gm/kg (wound healing))  Fluid: 1500 ml(older adult)  Based On: Kcal/kg - specify (Comment)(25 kcal/kg)  Weight Used: UBW(59 kg)    Pt expected to meet estimated nutrient needs:  Unable to predict at this time  Nutrition Diagnosis:   1.  Increased nutrient needs related to wound healing as evidenced by wounds    2.   related to   as evidenced by      3.   related to   as evidenced by      Goals:     pt will consume >50% of all meals in next 5-7 days; will consume 90% of 1-2 high protein sources at each meal     Monitoring & Evaluation:   Total energy intake, Oral fluids amount, Protein intake  Weight/weight change, Glucose profile, Electrolyte and renal profile  Behavior    Previous Nutrition Goals Met:   N/A  Previous Recommendations:    N/A    Education & Discharge Needs:   [] None Identified   [x] Identified and addressed    [x] Participated in care plan, discharge planning, and/or interdisciplinary rounds            Candace Arellano RD

## 2019-10-21 NOTE — DISCHARGE SUMMARY
Physician Discharge Summary     Patient ID:  Shae Horta  [id number]  7 y.o.  6/12/1931    Admit date: 9/27/2019    Discharge date and time: 10/21/2019    Admission Diagnoses: Acute on chronic diastolic CHF (congestive heart failure) ([facility] [I50.33]    Discharge Diagnoses:  Principal Diagnosis Acute on chronic diastolic CHF (congestive heart failure) ([facility]                                            Principal Problem:    Acute on chronic diastolic CHF (congestive heart failure) ([facility] (9/27/2019)    Active Problems:    Breast pain, left (9/29/2019)      Yeast dermatitis (9/29/2019)      Acute mastitis of left breast (10/4/2019)           Hospital Course: Admitted with significant 4+ edema up to mid thighs B. She has been noncompliant with her diuretics she is on for her chronic CHF. She denied SOB or CP. Also had a red swollen area involving lower L breast and chest wall. Started of IV diuresis and was seen by Dr Baljit Hernandez who did U/S of breast. No drainable fluid collections seen and no mass. Started on PO Augmentin and PO Fluconazole. Her BP got a little low during her diuresis and had to slow down to PO Lasix which she tolerated better. Seen by Cardiology. Her L breast infection was improving by discharge. Her leg swelling was much imprved down to 2+ to knees. She agreed to take a lower dose diuretic when at home. So discharged on Lasix 20mg daily along with Spironolactone. She refused a higher level of care but does have a daily caretaker who is able to manage her . Discharged to [address] [place] [address] at [facility].      PCP: JESSEE Preston    Consults: Cardiology and Breast surgery        Discharge Exam:  Visit Vitals  /80 (BP 1 Location: Left arm, BP Patient Position: At rest)   Pulse 85   Temp 98.4 °F (36.9 °C)   Resp 17   Ht 5' 3\" (1.6 m)   Wt 150 lb 12.8 oz (68.4 kg)   SpO2 92%   BMI 26.71 kg/m²     Lungs: clear to auscultation bilaterally  Heart: regular rate and rhythm, S1, S2 normal, no murmur, click, rub or gallop  Abdomen: soft, non-tender. Bowel sounds normal. No masses,  no organomegaly  Extremities: edema 2+ edema to knees B  Neurologic: Grossly normal    Disposition: home    Patient Instructions: This patient is currently admitted, however, discharge medications can only be displayed within the current admission encounter. Activity: Activity as tolerated  Diet: low salt  Wound Care: Keep wound clean and dry    Follow-up Appointments   Procedures    FOLLOW UP VISIT Appointment in: One Week     Standing Status:   Standing     Number of Occurrences:   1     Order Specific Question:   Appointment in     Answer:    One Week      Follow-up tests/labs - INR in 1 month    Signed:  Eliud Cheema MD  10/21/2019  8:46 AM

## 2019-10-21 NOTE — PROGRESS NOTES
Cardiology Progress Note                                        Admit Date: 10/18/2019    Assessment/Plan:     CHF; acute on chronic diastolic; slow improvement, continue diuretics, BMP in AM, restart dio when able  Persistent Afib; rate is fine  Chronic AC; uncertain if safe to continue    Baldemar Jara is a 80 y.o. female with     PROBLEM LIST:  Patient Active Problem List    Diagnosis Date Noted    Pneumonia 10/18/2019    Acute mastitis of left breast 10/04/2019    Breast pain, left 09/29/2019    Yeast dermatitis 09/29/2019    Acute on chronic diastolic CHF (congestive heart failure) ([address] 09/27/2019    Senile purpura ([address] 09/14/2019    Acute on chronic diastolic (congestive) heart failure ([address] 06/05/2019    Pulmonary hypertension ([address] 06/05/2019    PNA (pneumonia) 05/27/2019    Dehydration 04/23/2019    Wound, open, hip or thigh, left, initial encounter 04/23/2019    Non-healing wound of right heel 04/23/2019    Closed fracture of neck of right femur ([address] 09/11/2018    Acquired hypothyroidism 08/17/2018    Fall 07/29/2018    Fracture, intertrochanteric, right femur, closed, initial encounter ([address] 07/29/2018    Hip hematoma, right, initial encounter 07/29/2018    UTI (urinary tract infection) 02/02/2017    Closed compression fracture of lumbar vertebra ([address] 02/01/2017    Sepsis ([address] 02/09/2016    Altered mental status 02/09/2016    Dementia ([address] 02/09/2016    Arthritis of knee, left 01/13/2014    A-fib ([address]     Thyroid disease     Hypercholesterolemia     Arthritis     CAD (coronary artery disease)          Subjective:     Baldemar Jara reports none.     Visit Vitals  /68 (BP 1 Location: Right arm, BP Patient Position: At rest)   Pulse 61   Temp 97.4 °F (36.3 °C)   Resp 17   Ht 5' 2\" (1.575 m)   Wt 70.2 kg (154 lb 12.2 oz)   SpO2 97%   BMI 28.31 kg/m²       Intake/Output Summary (Last 24 hours) at 10/21/2019 1233  Last data filed at 10/20/2019 1840  Gross per 24 hour Intake 540 ml   Output 150 ml   Net 390 ml       Objective:      Physical Exam:  HEENT: Perrla, EOMI  Neck: No JVD,  No thyroidmegaly  Resp: CTA bilaterally;  No wheezes or rales  CV: irregular, s1s2 No murmur no s3  Abd:Soft, Nontender  Ext: No edema  Neuro: Alert and oriented; Nonfocal  Skin: Warm, Dry, Intact  Pulses: 2+ DP/PT/Rad      Telemetry: AFIB    Current Facility-Administered Medications   Medication Dose Route Frequency    fluconazole (DIFLUCAN) tablet 150 mg  150 mg Oral DAILY    potassium chloride SR (KLOR-CON 10) tablet 20 mEq  20 mEq Oral DAILY    acetaminophen (TYLENOL) tablet 650 mg  650 mg Oral Q4H PRN    ondansetron (ZOFRAN) injection 4 mg  4 mg IntraVENous Q8H PRN    furosemide (LASIX) injection 40 mg  40 mg IntraVENous DAILY    sodium chloride (NS) flush 5-10 mL  5-10 mL IntraVENous PRN    levothyroxine (SYNTHROID) tablet 100 mcg  100 mcg Oral 6am    sodium chloride (NS) flush 5-40 mL  5-40 mL IntraVENous Q8H    sodium chloride (NS) flush 5-40 mL  5-40 mL IntraVENous PRN    piperacillin-tazobactam (ZOSYN) 3.375 g in 0.9% sodium chloride (MBP/ADV) 100 mL  3.375 g IntraVENous Q8H    atenolol (TENORMIN) tablet 25 mg  25 mg Oral DAILY    ALPRAZolam (XANAX) tablet 0.25 mg  0.25 mg Oral TID PRN         Data Review:   Labs:    Recent Results (from the past 24 hour(s))   PROTHROMBIN TIME + INR    Collection Time: 10/21/19  4:04 AM   Result Value Ref Range    INR 2.4 (H) 0.9 - 1.1      Prothrombin time 23.5 (H) 9.0 - 11.1 sec

## 2019-10-21 NOTE — PROGRESS NOTES
ADRI  -Patient lives at [facility] giver is sister Devorah Cline [phone].  -Not medically ready for discharge at this time. CM will continue to monitor the patient for needs.      Edmundo Grandchild, MHA/CRM

## 2019-10-21 NOTE — WOUND CARE
WOCN Note:  
 
New consult placed for assessment of sacrum. Sitter at the bedside. Chart reviewed. Admitted DX:  Pneumonia Past Medical History: dementia, afib, anxiety, latex allergy Assessment:  
Patient is A&O x 3, communicative and requires assist with repositioning. Bed: total care Patient wearing briefs for incontinence. Patient reports tenderness to right leg. Patient repositioned on left side with pillow. Heels intact without erythema and offloaded on pillows. 1. POA Sacral, partial thickness wound from suspected mixed etiology Pressure Injury Stage 2 and moisture, friction and moisture : 2 x 0.5 x 0.1 cm  100% pink. no exudate. Periwound blanching pink erythema. Margins are open. 2.  POA Left lateral upper leg, linear non blanching red area. 3.  Tender denudation to perineum from moisture;  Cleansed and applied zinc cream. 
 
Recommendations:   
1. Sacrum:  Every 3 days and as needed cleanse and apply Marathon. 2.  Upgrade bed 3. Float heels 4. Mobility team 
5. Perineum:  Cleanse and apply zinc cream with incontinence care. Skin Care & Pressure Prevention: 
Minimize layers of linen/pads under patient to optimize support surface. Turn/reposition approximately every 2 hours and offload heels. Manage incontinence / promote continence Specialty bed: Francisco Viveros on [employer] ordered via [address] Use only flat sheet and one incontinence pad. Please call [employer] ([phone]) if not delivered. Ref #[id number] .  [id number] Discussed above plan with patient and Shaheed Cook RN. Transition of Care: Plan to follow as needed while admitted to hospital. 
 
Georgi Laureano, FLETCHERN RN [facility] Wound Care Office [phone] Pager [phone]

## 2019-10-21 NOTE — PROGRESS NOTES
Benito Persaud, Kennedi Foley, and Jc Moffett Date: 10/18/2019      Subjective:     Patient with no complaints today. AF, VSS. Oniel Shabazz Current Facility-Administered Medications   Medication Dose Route Frequency    fluconazole (DIFLUCAN) tablet 150 mg  150 mg Oral DAILY    potassium chloride SR (KLOR-CON 10) tablet 20 mEq  20 mEq Oral DAILY    acetaminophen (TYLENOL) tablet 650 mg  650 mg Oral Q4H PRN    ondansetron (ZOFRAN) injection 4 mg  4 mg IntraVENous Q8H PRN    furosemide (LASIX) injection 40 mg  40 mg IntraVENous DAILY    sodium chloride (NS) flush 5-10 mL  5-10 mL IntraVENous PRN    levothyroxine (SYNTHROID) tablet 100 mcg  100 mcg Oral 6am    sodium chloride (NS) flush 5-40 mL  5-40 mL IntraVENous Q8H    sodium chloride (NS) flush 5-40 mL  5-40 mL IntraVENous PRN    piperacillin-tazobactam (ZOSYN) 3.375 g in 0.9% sodium chloride (MBP/ADV) 100 mL  3.375 g IntraVENous Q8H    atenolol (TENORMIN) tablet 25 mg  25 mg Oral DAILY    ALPRAZolam (XANAX) tablet 0.25 mg  0.25 mg Oral TID PRN          Objective:     Patient Vitals for the past 8 hrs:   BP Temp Pulse Resp SpO2 Weight   10/21/19 0555      154 lb 12.8 oz (70.2 kg)   10/21/19 0355 126/63 97.8 °F (36.6 °C) 92 16 94 %      No intake/output data recorded. 10/19 1901 - 10/21 0700  In: 1230 [P.O.:930; I.V.:300]  Out: 1150 [Urine:1150]    Physical Exam: Lungs: clear to auscultation bilaterally  Heart: regular rate and rhythm, S1, S2 normal, no murmur, click, rub or gallop  Abdomen: soft, non-tender.  Bowel sounds normal. No masses,  no organomegaly        Data Review   Recent Results (from the past 24 hour(s))   METABOLIC PANEL, BASIC    Collection Time: 10/20/19  8:05 AM   Result Value Ref Range    Sodium 131 (L) 136 - 145 mmol/L    Potassium 2.6 (LL) 3.5 - 5.1 mmol/L    Chloride 94 (L) 97 - 108 mmol/L    CO2 30 21 - 32 mmol/L    Anion gap 7 5 - 15 mmol/L    Glucose 90 65 - 100 mg/dL    BUN 16 6 - 20 MG/DL    Creatinine 0.83 0.55 - 1.02 MG/DL    BUN/Creatinine ratio 19 12 - 20      GFR est AA >60 >60 ml/min/1.73m2    GFR est non-AA >60 >60 ml/min/1.73m2    Calcium 8.7 8.5 - 10.1 MG/DL   PROTHROMBIN TIME + INR    Collection Time: 10/21/19  4:04 AM   Result Value Ref Range    INR 2.4 (H) 0.9 - 1.1      Prothrombin time 23.5 (H) 9.0 - 11.1 sec           Assessment:     Active Problems:    Sepsis ([address] [facility] (2/9/2016)      Dementia ([facility] (2/9/2016)      Acquired hypothyroidism (8/17/2018)      Acute on chronic diastolic (congestive) heart failure ([facility] (6/5/2019)      Pneumonia (10/18/2019)        Plan:     1) Continue Zosyn  2) Diuresis  3) Wound care  4) Off AC

## 2019-10-21 NOTE — PROGRESS NOTES
Patient denies latex allergy; states she always uses the [facility] external catheter and does not want it removed.

## 2019-10-22 ENCOUNTER — APPOINTMENT (OUTPATIENT)
Dept: GENERAL RADIOLOGY | Age: 84
DRG: 871 | End: 2019-10-22
Attending: FAMILY MEDICINE
Payer: MEDICARE

## 2019-10-22 LAB
ANION GAP SERPL CALC-SCNC: 5 MMOL/L (ref 5–15)
BACTERIA SPEC CULT: ABNORMAL
BUN SERPL-MCNC: 13 MG/DL (ref 6–20)
BUN/CREAT SERPL: 14 (ref 12–20)
CALCIUM SERPL-MCNC: 8.9 MG/DL (ref 8.5–10.1)
CHLORIDE SERPL-SCNC: 95 MMOL/L (ref 97–108)
CO2 SERPL-SCNC: 32 MMOL/L (ref 21–32)
CREAT SERPL-MCNC: 0.92 MG/DL (ref 0.55–1.02)
GLUCOSE SERPL-MCNC: 83 MG/DL (ref 65–100)
INR PPP: 3.8 (ref 0.9–1.1)
POTASSIUM SERPL-SCNC: 2.7 MMOL/L (ref 3.5–5.1)
PROTHROMBIN TIME: 35.9 SEC (ref 9–11.1)
SERVICE CMNT-IMP: ABNORMAL
SERVICE CMNT-IMP: ABNORMAL
SODIUM SERPL-SCNC: 132 MMOL/L (ref 136–145)

## 2019-10-22 PROCEDURE — 71045 X-RAY EXAM CHEST 1 VIEW: CPT

## 2019-10-22 PROCEDURE — 80048 BASIC METABOLIC PNL TOTAL CA: CPT

## 2019-10-22 PROCEDURE — 74011250636 HC RX REV CODE- 250/636: Performed by: INTERNAL MEDICINE

## 2019-10-22 PROCEDURE — 74011000258 HC RX REV CODE- 258: Performed by: INTERNAL MEDICINE

## 2019-10-22 PROCEDURE — 65270000029 HC RM PRIVATE

## 2019-10-22 PROCEDURE — 74011250637 HC RX REV CODE- 250/637: Performed by: FAMILY MEDICINE

## 2019-10-22 PROCEDURE — 36415 COLL VENOUS BLD VENIPUNCTURE: CPT

## 2019-10-22 PROCEDURE — 74011250637 HC RX REV CODE- 250/637: Performed by: INTERNAL MEDICINE

## 2019-10-22 PROCEDURE — 85610 PROTHROMBIN TIME: CPT

## 2019-10-22 RX ORDER — POTASSIUM CHLORIDE 750 MG/1
40 TABLET, FILM COATED, EXTENDED RELEASE ORAL
Status: COMPLETED | OUTPATIENT
Start: 2019-10-22 | End: 2019-10-22

## 2019-10-22 RX ORDER — POTASSIUM CHLORIDE 750 MG/1
40 TABLET, FILM COATED, EXTENDED RELEASE ORAL DAILY
Status: DISCONTINUED | OUTPATIENT
Start: 2019-10-22 | End: 2019-10-22

## 2019-10-22 RX ADMIN — ALPRAZOLAM 0.25 MG: 0.25 TABLET ORAL at 23:06

## 2019-10-22 RX ADMIN — Medication 10 ML: at 23:07

## 2019-10-22 RX ADMIN — FUROSEMIDE 40 MG: 10 INJECTION, SOLUTION INTRAMUSCULAR; INTRAVENOUS at 09:22

## 2019-10-22 RX ADMIN — Medication 10 ML: at 14:44

## 2019-10-22 RX ADMIN — POTASSIUM CHLORIDE 40 MEQ: 750 TABLET, EXTENDED RELEASE ORAL at 14:41

## 2019-10-22 RX ADMIN — ACETAMINOPHEN 650 MG: 325 TABLET, FILM COATED ORAL at 02:02

## 2019-10-22 RX ADMIN — LEVOTHYROXINE SODIUM 100 MCG: 100 TABLET ORAL at 06:10

## 2019-10-22 RX ADMIN — PIPERACILLIN AND TAZOBACTAM 3.38 G: 3; .375 INJECTION, POWDER, FOR SOLUTION INTRAVENOUS at 07:21

## 2019-10-22 RX ADMIN — POTASSIUM CHLORIDE 40 MEQ: 750 TABLET, FILM COATED, EXTENDED RELEASE ORAL at 06:10

## 2019-10-22 RX ADMIN — FLUCONAZOLE 150 MG: 100 TABLET ORAL at 09:24

## 2019-10-22 RX ADMIN — PIPERACILLIN AND TAZOBACTAM 3.38 G: 3; .375 INJECTION, POWDER, FOR SOLUTION INTRAVENOUS at 23:06

## 2019-10-22 RX ADMIN — ALPRAZOLAM 0.25 MG: 0.25 TABLET ORAL at 16:30

## 2019-10-22 RX ADMIN — Medication 10 ML: at 06:10

## 2019-10-22 RX ADMIN — ACETAMINOPHEN 650 MG: 325 TABLET, FILM COATED ORAL at 07:45

## 2019-10-22 RX ADMIN — PIPERACILLIN AND TAZOBACTAM 3.38 G: 3; .375 INJECTION, POWDER, FOR SOLUTION INTRAVENOUS at 14:45

## 2019-10-22 RX ADMIN — ATENOLOL 25 MG: 25 TABLET ORAL at 09:24

## 2019-10-22 NOTE — PROGRESS NOTES
Benito Tafoya, Josh Cabrera, and Ivett Adams Date: 10/18/2019      Subjective:     Patient feeling OK. AF, VSS. K- 2.7. Current Facility-Administered Medications   Medication Dose Route Frequency    potassium chloride SR (KLOR-CON 10) tablet 40 mEq  40 mEq Oral NOW    fluconazole (DIFLUCAN) tablet 150 mg  150 mg Oral DAILY    acetaminophen (TYLENOL) tablet 650 mg  650 mg Oral Q4H PRN    ondansetron (ZOFRAN) injection 4 mg  4 mg IntraVENous Q8H PRN    furosemide (LASIX) injection 40 mg  40 mg IntraVENous DAILY    sodium chloride (NS) flush 5-10 mL  5-10 mL IntraVENous PRN    levothyroxine (SYNTHROID) tablet 100 mcg  100 mcg Oral 6am    sodium chloride (NS) flush 5-40 mL  5-40 mL IntraVENous Q8H    sodium chloride (NS) flush 5-40 mL  5-40 mL IntraVENous PRN    piperacillin-tazobactam (ZOSYN) 3.375 g in 0.9% sodium chloride (MBP/ADV) 100 mL  3.375 g IntraVENous Q8H    atenolol (TENORMIN) tablet 25 mg  25 mg Oral DAILY    ALPRAZolam (XANAX) tablet 0.25 mg  0.25 mg Oral TID PRN          Objective:     Patient Vitals for the past 8 hrs:   BP Temp Pulse Resp SpO2   10/22/19 0415 112/69 97.5 °F (36.4 °C) 62 18 96 %     10/21 1901 - 10/22 0700  In: -   Out: 1000 [Urine:1000]  10/20 0701 - 10/21 1900  In: 1140 [P.O.:1140]  Out: 1250 [Urine:1250]    Physical Exam: Lungs: clear to auscultation bilaterally  Heart: regular rate and rhythm, S1, S2 normal, no murmur, click, rub or gallop  Abdomen: soft, non-tender.  Bowel sounds normal. No masses,  no organomegaly        Data Review   Recent Results (from the past 24 hour(s))   PROTHROMBIN TIME + INR    Collection Time: 10/22/19  4:19 AM   Result Value Ref Range    INR 3.8 (H) 0.9 - 1.1      Prothrombin time 35.9 (H) 9.0 - 50.2 sec   METABOLIC PANEL, BASIC    Collection Time: 10/22/19  4:19 AM   Result Value Ref Range    Sodium 132 (L) 136 - 145 mmol/L    Potassium 2.7 (LL) 3.5 - 5.1 mmol/L    Chloride 95 (L) 97 - 108 mmol/L    CO2 32 21 - 32 mmol/L Anion gap 5 5 - 15 mmol/L    Glucose 83 65 - 100 mg/dL    BUN 13 6 - 20 MG/DL    Creatinine 0.92 0.55 - 1.02 MG/DL    BUN/Creatinine ratio 14 12 - 20      GFR est AA >60 >60 ml/min/1.73m2    GFR est non-AA 58 (L) >60 ml/min/1.73m2    Calcium 8.9 8.5 - 10.1 MG/DL           Assessment:     Active Problems:    Sepsis ([facility] (2/9/2016)      Dementia ([facility] (2/9/2016)      Acquired hypothyroidism (8/17/2018)      Acute on chronic diastolic (congestive) heart failure ([facility] (6/5/2019)      Pneumonia (10/18/2019)        Plan:     1) replete K  2) cont diuresis  3) pCXR  4) PT

## 2019-10-22 NOTE — PROGRESS NOTES
Physical Therapy  Orders received and chart reviewed. Met with patient and caregiver was present. Patient declined therapy today stating her knees hurt too much and she just wasn't going to do it today. Explained the purpose of therapy and the benefits of working with therapy. She was reluctantly agreeable to evaluation tomorrow at 11:00 to be up in Wheelchair for lunch. Wrote this on the board and informed nursing. Of note , patient has history of  Being noncompliant with therapy. Also per caregiver, home health never started after last admission. Will follow up tomorrow.   Gab Jernigan, PT

## 2019-10-22 NOTE — PROGRESS NOTES
Nurse and PCT present in pt's room to obtain vitals, labs, change pure wick and place the pt on a specialty bed. Pt became angry and refused to be moved at this time. Was able to obtain labs, vitals and change the wick. Was unable to move the pt to specialty bed. She stated that she \"doesn't wake up until 9 am and will not be moving anywhere until then\". Pt is resting quietly with call bell in reach.

## 2019-10-22 NOTE — PROGRESS NOTES
Cardiology Progress Note                                        Admit Date: 10/18/2019    Assessment/Plan:     CHF; cont diuretics, start dio when able. Cr OK today   Persistent Afib; rate acceptable  Chronic AC; uncertain if safe to continue    Silke Crain is a 80 y.o. female with     PROBLEM LIST:  Patient Active Problem List    Diagnosis Date Noted    Pneumonia 10/18/2019    Acute mastitis of left breast 10/04/2019    Breast pain, left 09/29/2019    Yeast dermatitis 09/29/2019    Acute on chronic diastolic CHF (congestive heart failure) ([address] 09/27/2019    Senile purpura ([address] 09/14/2019    Acute on chronic diastolic (congestive) heart failure ([address] 06/05/2019    Pulmonary hypertension ([address] 06/05/2019    PNA (pneumonia) 05/27/2019    Dehydration 04/23/2019    Wound, open, hip or thigh, left, initial encounter 04/23/2019    Non-healing wound of right heel 04/23/2019    Closed fracture of neck of right femur ([address] 09/11/2018    Acquired hypothyroidism 08/17/2018    Fall 07/29/2018    Fracture, intertrochanteric, right femur, closed, initial encounter ([address] 07/29/2018    Hip hematoma, right, initial encounter 07/29/2018    UTI (urinary tract infection) 02/02/2017    Closed compression fracture of lumbar vertebra ([address] 02/01/2017    Sepsis ([address] 02/09/2016    Altered mental status 02/09/2016    Dementia ([address] 02/09/2016    Arthritis of knee, left 01/13/2014    A-fib ([address]     Thyroid disease     Hypercholesterolemia     Arthritis     CAD (coronary artery disease)          Subjective:     Silke Crain reports none.     Visit Vitals  BP 98/63 (BP 1 Location: Right arm, BP Patient Position: At rest)   Pulse 60   Temp 97.3 °F (36.3 °C)   Resp 19   Ht 5' 2\" (1.575 m)   Wt 70.2 kg (154 lb 12.2 oz)   SpO2 96%   BMI 28.31 kg/m²       Intake/Output Summary (Last 24 hours) at 10/22/2019 0958  Last data filed at 10/21/2019 2232  Gross per 24 hour   Intake 240 ml   Output 1800 ml   Net -1560 ml Objective:      Physical Exam:  HEENT: Perrla, EOMI  Neck: No JVD,  No thyroidmegaly  Resp: CTA bilaterally;  No wheezes or rales  CV: irregular, s1s2 No murmur no s3  Abd:Soft, Nontender  Ext: No edema  Neuro: Alert and oriented; Nonfocal  Skin: Warm, Dry, Intact  Pulses: 2+ DP/PT/Rad      Telemetry: AFIB    Current Facility-Administered Medications   Medication Dose Route Frequency    potassium chloride SR (KLOR-CON 10) tablet 40 mEq  40 mEq Oral NOW    fluconazole (DIFLUCAN) tablet 150 mg  150 mg Oral DAILY    acetaminophen (TYLENOL) tablet 650 mg  650 mg Oral Q4H PRN    ondansetron (ZOFRAN) injection 4 mg  4 mg IntraVENous Q8H PRN    furosemide (LASIX) injection 40 mg  40 mg IntraVENous DAILY    sodium chloride (NS) flush 5-10 mL  5-10 mL IntraVENous PRN    levothyroxine (SYNTHROID) tablet 100 mcg  100 mcg Oral 6am    sodium chloride (NS) flush 5-40 mL  5-40 mL IntraVENous Q8H    sodium chloride (NS) flush 5-40 mL  5-40 mL IntraVENous PRN    piperacillin-tazobactam (ZOSYN) 3.375 g in 0.9% sodium chloride (MBP/ADV) 100 mL  3.375 g IntraVENous Q8H    atenolol (TENORMIN) tablet 25 mg  25 mg Oral DAILY    ALPRAZolam (XANAX) tablet 0.25 mg  0.25 mg Oral TID PRN         Data Review:   Labs:    Recent Results (from the past 24 hour(s))   PROTHROMBIN TIME + INR    Collection Time: 10/22/19  4:19 AM   Result Value Ref Range    INR 3.8 (H) 0.9 - 1.1      Prothrombin time 35.9 (H) 9.0 - 51.2 sec   METABOLIC PANEL, BASIC    Collection Time: 10/22/19  4:19 AM   Result Value Ref Range    Sodium 132 (L) 136 - 145 mmol/L    Potassium 2.7 (LL) 3.5 - 5.1 mmol/L    Chloride 95 (L) 97 - 108 mmol/L    CO2 32 21 - 32 mmol/L    Anion gap 5 5 - 15 mmol/L    Glucose 83 65 - 100 mg/dL    BUN 13 6 - 20 MG/DL    Creatinine 0.92 0.55 - 1.02 MG/DL    BUN/Creatinine ratio 14 12 - 20      GFR est AA >60 >60 ml/min/1.73m2    GFR est non-AA 58 (L) >60 ml/min/1.73m2    Calcium 8.9 8.5 - 10.1 MG/DL

## 2019-10-22 NOTE — PROGRESS NOTES
A Spiritual Care Partner Volunteer visited patient in Rm 625 on 10/22/2019.   Documented by:  Chaplain Caceres MDiv, MS, [address] ([id number])

## 2019-10-23 LAB
ANION GAP SERPL CALC-SCNC: 6 MMOL/L (ref 5–15)
BUN SERPL-MCNC: 14 MG/DL (ref 6–20)
BUN/CREAT SERPL: 16 (ref 12–20)
CALCIUM SERPL-MCNC: 9 MG/DL (ref 8.5–10.1)
CHLORIDE SERPL-SCNC: 97 MMOL/L (ref 97–108)
CO2 SERPL-SCNC: 29 MMOL/L (ref 21–32)
CREAT SERPL-MCNC: 0.85 MG/DL (ref 0.55–1.02)
GLUCOSE SERPL-MCNC: 81 MG/DL (ref 65–100)
INR PPP: 4.6 (ref 0.9–1.1)
POTASSIUM SERPL-SCNC: 3.7 MMOL/L (ref 3.5–5.1)
PROTHROMBIN TIME: 42.7 SEC (ref 9–11.1)
SODIUM SERPL-SCNC: 132 MMOL/L (ref 136–145)

## 2019-10-23 PROCEDURE — 74011250636 HC RX REV CODE- 250/636: Performed by: INTERNAL MEDICINE

## 2019-10-23 PROCEDURE — 74011250637 HC RX REV CODE- 250/637: Performed by: FAMILY MEDICINE

## 2019-10-23 PROCEDURE — 80048 BASIC METABOLIC PNL TOTAL CA: CPT

## 2019-10-23 PROCEDURE — 65270000029 HC RM PRIVATE

## 2019-10-23 PROCEDURE — 74011000258 HC RX REV CODE- 258: Performed by: INTERNAL MEDICINE

## 2019-10-23 PROCEDURE — 36415 COLL VENOUS BLD VENIPUNCTURE: CPT

## 2019-10-23 PROCEDURE — 77030038269 HC DRN EXT URIN PURWCK BARD -A

## 2019-10-23 PROCEDURE — 74011250637 HC RX REV CODE- 250/637: Performed by: INTERNAL MEDICINE

## 2019-10-23 PROCEDURE — 85610 PROTHROMBIN TIME: CPT

## 2019-10-23 RX ORDER — FUROSEMIDE 40 MG/1
40 TABLET ORAL DAILY
Status: DISCONTINUED | OUTPATIENT
Start: 2019-10-23 | End: 2019-10-25 | Stop reason: HOSPADM

## 2019-10-23 RX ADMIN — ALPRAZOLAM 0.25 MG: 0.25 TABLET ORAL at 16:04

## 2019-10-23 RX ADMIN — LEVOTHYROXINE SODIUM 100 MCG: 100 TABLET ORAL at 06:30

## 2019-10-23 RX ADMIN — Medication 10 ML: at 06:31

## 2019-10-23 RX ADMIN — Medication 10 ML: at 14:03

## 2019-10-23 RX ADMIN — ALPRAZOLAM 0.25 MG: 0.25 TABLET ORAL at 10:32

## 2019-10-23 RX ADMIN — ALPRAZOLAM 0.25 MG: 0.25 TABLET ORAL at 21:00

## 2019-10-23 RX ADMIN — FUROSEMIDE 40 MG: 40 TABLET ORAL at 10:32

## 2019-10-23 RX ADMIN — PIPERACILLIN AND TAZOBACTAM 3.38 G: 3; .375 INJECTION, POWDER, FOR SOLUTION INTRAVENOUS at 16:20

## 2019-10-23 RX ADMIN — FLUCONAZOLE 150 MG: 100 TABLET ORAL at 10:32

## 2019-10-23 RX ADMIN — PIPERACILLIN AND TAZOBACTAM 3.38 G: 3; .375 INJECTION, POWDER, FOR SOLUTION INTRAVENOUS at 06:30

## 2019-10-23 RX ADMIN — ATENOLOL 25 MG: 25 TABLET ORAL at 10:32

## 2019-10-23 NOTE — PROGRESS NOTES
Benito Persaud, Jj De Guzman, and Jc Moffett Date: 10/18/2019      Subjective:     Patient doing OK. AF, VSS. Oniel Shabazz Current Facility-Administered Medications   Medication Dose Route Frequency    furosemide (LASIX) tablet 40 mg  40 mg Oral DAILY    fluconazole (DIFLUCAN) tablet 150 mg  150 mg Oral DAILY    acetaminophen (TYLENOL) tablet 650 mg  650 mg Oral Q4H PRN    ondansetron (ZOFRAN) injection 4 mg  4 mg IntraVENous Q8H PRN    sodium chloride (NS) flush 5-10 mL  5-10 mL IntraVENous PRN    levothyroxine (SYNTHROID) tablet 100 mcg  100 mcg Oral 6am    sodium chloride (NS) flush 5-40 mL  5-40 mL IntraVENous Q8H    sodium chloride (NS) flush 5-40 mL  5-40 mL IntraVENous PRN    piperacillin-tazobactam (ZOSYN) 3.375 g in 0.9% sodium chloride (MBP/ADV) 100 mL  3.375 g IntraVENous Q8H    atenolol (TENORMIN) tablet 25 mg  25 mg Oral DAILY    ALPRAZolam (XANAX) tablet 0.25 mg  0.25 mg Oral TID PRN          Objective:     Patient Vitals for the past 8 hrs:   BP Temp Pulse Resp SpO2   10/23/19 0306 98/63 97.8 °F (36.6 °C) 68 18 92 %     10/22 1901 - 10/23 0700  In: -   Out: 800 [Urine:800]  10/21 0701 - 10/22 1900  In: 360 [P.O.:360]  Out: 1800 [Urine:1800]    Physical Exam: Lungs: clear to auscultation bilaterally  Heart: regular rate and rhythm, S1, S2 normal, no murmur, click, rub or gallop  Abdomen: soft, non-tender.  Bowel sounds normal. No masses,  no organomegaly        Data Review   Recent Results (from the past 24 hour(s))   PROTHROMBIN TIME + INR    Collection Time: 10/23/19  5:20 AM   Result Value Ref Range    INR 4.6 (HH) 0.9 - 1.1      Prothrombin time 42.7 (H) 9.0 - 98.7 sec   METABOLIC PANEL, BASIC    Collection Time: 10/23/19  5:20 AM   Result Value Ref Range    Sodium 132 (L) 136 - 145 mmol/L    Potassium 3.7 3.5 - 5.1 mmol/L    Chloride 97 97 - 108 mmol/L    CO2 29 21 - 32 mmol/L    Anion gap 6 5 - 15 mmol/L    Glucose 81 65 - 100 mg/dL    BUN 14 6 - 20 MG/DL    Creatinine 0.85 0.55 - 1.02 MG/DL    BUN/Creatinine ratio 16 12 - 20      GFR est AA >60 >60 ml/min/1.73m2    GFR est non-AA >60 >60 ml/min/1.73m2    Calcium 9.0 8.5 - 10.1 MG/DL           Assessment:     Active Problems:    Sepsis ([facility] (2/9/2016)      Dementia ([facility] (2/9/2016)      Acquired hypothyroidism (8/17/2018)      Acute on chronic diastolic (congestive) heart failure ([facility] (6/5/2019)      Pneumonia (10/18/2019)        Plan:     1) switch to PO diuresis as BP on low side  2) Cont IV Zosyn  3) Plan discharge Friday  4) set up home pt

## 2019-10-23 NOTE — PROGRESS NOTES
ADRI  -Noted discharge orders  -[address]  is sister Devorah Parker [phone]. -CM noted consult for home health/PT-Patient has been accepted with [facility] for Bridge to home Program.   -[facility] transport time requested 2pm, awaiting a response. CM met with patient and caregivers to advise has discharge orders and acceptance to [facility] for Bridge to [facility]. Patient in agreement. CM informed [facility] that patient will discharge today.  CM will arrange transportation via [employer] with [facility] for 2pm, awaiting a response.        Jose Alfredo Pitt, MHA/CRM

## 2019-10-23 NOTE — PROGRESS NOTES
Cardiology Progress Note                                        Admit Date: 10/18/2019    Assessment/Plan:     CHF; agree with PO change  Persistent Afib; rate acceptable  Chronic AC; uncertain if safe to continue, coagulopathy noted     Clare Antoine is a 80 y.o. female with     PROBLEM LIST:  Patient Active Problem List    Diagnosis Date Noted    Pneumonia 10/18/2019    Acute mastitis of left breast 10/04/2019    Breast pain, left 09/29/2019    Yeast dermatitis 09/29/2019    Acute on chronic diastolic CHF (congestive heart failure) ([address] 09/27/2019    Senile purpura ([address] 09/14/2019    Acute on chronic diastolic (congestive) heart failure ([address] 06/05/2019    Pulmonary hypertension ([address] 06/05/2019    PNA (pneumonia) 05/27/2019    Dehydration 04/23/2019    Wound, open, hip or thigh, left, initial encounter 04/23/2019    Non-healing wound of right heel 04/23/2019    Closed fracture of neck of right femur ([address] 09/11/2018    Acquired hypothyroidism 08/17/2018    Fall 07/29/2018    Fracture, intertrochanteric, right femur, closed, initial encounter ([address] 07/29/2018    Hip hematoma, right, initial encounter 07/29/2018    UTI (urinary tract infection) 02/02/2017    Closed compression fracture of lumbar vertebra ([address] 02/01/2017    Sepsis ([address] 02/09/2016    Altered mental status 02/09/2016    Dementia ([address] 02/09/2016    Arthritis of knee, left 01/13/2014    A-fib ([address]     Thyroid disease     Hypercholesterolemia     Arthritis     CAD (coronary artery disease)          Subjective:     Clare Antoine reports none.     Visit Vitals  BP 98/49 (BP 1 Location: Left arm, BP Patient Position: At rest)   Pulse 64   Temp 96.7 °F (35.9 °C)   Resp 19   Ht 5' 2\" (1.575 m)   Wt 70.2 kg (154 lb 12.2 oz)   SpO2 97%   BMI 28.31 kg/m²       Intake/Output Summary (Last 24 hours) at 10/23/2019 1628  Last data filed at 10/22/2019 2321  Gross per 24 hour   Intake    Output 800 ml   Net -800 ml       Objective: Physical Exam:  HEENT: Perrla, EOMI  Neck: No JVD,  No thyroidmegaly  Resp: CTA bilaterally;  No wheezes or rales  CV: irregular, s1s2 No murmur no s3  Abd:Soft, Nontender  Ext: No edema  Neuro: Alert and oriented; Nonfocal  Skin: Warm, Dry, Intact  Pulses: 2+ DP/PT/Rad      Telemetry: AFIB    Current Facility-Administered Medications   Medication Dose Route Frequency    furosemide (LASIX) tablet 40 mg  40 mg Oral DAILY    fluconazole (DIFLUCAN) tablet 150 mg  150 mg Oral DAILY    acetaminophen (TYLENOL) tablet 650 mg  650 mg Oral Q4H PRN    ondansetron (ZOFRAN) injection 4 mg  4 mg IntraVENous Q8H PRN    sodium chloride (NS) flush 5-10 mL  5-10 mL IntraVENous PRN    levothyroxine (SYNTHROID) tablet 100 mcg  100 mcg Oral 6am    sodium chloride (NS) flush 5-40 mL  5-40 mL IntraVENous Q8H    sodium chloride (NS) flush 5-40 mL  5-40 mL IntraVENous PRN    piperacillin-tazobactam (ZOSYN) 3.375 g in 0.9% sodium chloride (MBP/ADV) 100 mL  3.375 g IntraVENous Q8H    atenolol (TENORMIN) tablet 25 mg  25 mg Oral DAILY    ALPRAZolam (XANAX) tablet 0.25 mg  0.25 mg Oral TID PRN         Data Review:   Labs:    Recent Results (from the past 24 hour(s))   PROTHROMBIN TIME + INR    Collection Time: 10/23/19  5:20 AM   Result Value Ref Range    INR 4.6 (HH) 0.9 - 1.1      Prothrombin time 42.7 (H) 9.0 - 97.2 sec   METABOLIC PANEL, BASIC    Collection Time: 10/23/19  5:20 AM   Result Value Ref Range    Sodium 132 (L) 136 - 145 mmol/L    Potassium 3.7 3.5 - 5.1 mmol/L    Chloride 97 97 - 108 mmol/L    CO2 29 21 - 32 mmol/L    Anion gap 6 5 - 15 mmol/L    Glucose 81 65 - 100 mg/dL    BUN 14 6 - 20 MG/DL    Creatinine 0.85 0.55 - 1.02 MG/DL    BUN/Creatinine ratio 16 12 - 20      GFR est AA >60 >60 ml/min/1.73m2    GFR est non-AA >60 >60 ml/min/1.73m2    Calcium 9.0 8.5 - 10.1 MG/DL

## 2019-10-23 NOTE — PROGRESS NOTES
Physical Therapy  Attempted again today at designated time to evaluate patient. Patient is adamantly declining stating she didn't sleep well at all last night due to issues with the bed. The bed is not functioning correctly and company has been contacted to assess the bed. Will follow up again tomorrow. Of note, per caregiver, she has strong history of refusing therapy in hospital as well as at home and only gets out of bed when this particular caregiver is present .   Jeremiah Artist, PT

## 2019-10-24 LAB
INR PPP: 4.4 (ref 0.9–1.1)
PROTHROMBIN TIME: 40.9 SEC (ref 9–11.1)

## 2019-10-24 PROCEDURE — 74011250637 HC RX REV CODE- 250/637: Performed by: FAMILY MEDICINE

## 2019-10-24 PROCEDURE — 97530 THERAPEUTIC ACTIVITIES: CPT

## 2019-10-24 PROCEDURE — 97161 PT EVAL LOW COMPLEX 20 MIN: CPT

## 2019-10-24 PROCEDURE — 85610 PROTHROMBIN TIME: CPT

## 2019-10-24 PROCEDURE — 36415 COLL VENOUS BLD VENIPUNCTURE: CPT

## 2019-10-24 PROCEDURE — 65270000029 HC RM PRIVATE

## 2019-10-24 PROCEDURE — 74011250637 HC RX REV CODE- 250/637: Performed by: INTERNAL MEDICINE

## 2019-10-24 RX ADMIN — ACETAMINOPHEN 650 MG: 325 TABLET, FILM COATED ORAL at 03:41

## 2019-10-24 RX ADMIN — ALPRAZOLAM 0.25 MG: 0.25 TABLET ORAL at 22:13

## 2019-10-24 RX ADMIN — Medication 5 ML: at 22:00

## 2019-10-24 RX ADMIN — FUROSEMIDE 40 MG: 40 TABLET ORAL at 10:36

## 2019-10-24 RX ADMIN — Medication 10 ML: at 06:25

## 2019-10-24 RX ADMIN — ATENOLOL 25 MG: 25 TABLET ORAL at 10:36

## 2019-10-24 RX ADMIN — FLUCONAZOLE 150 MG: 100 TABLET ORAL at 10:36

## 2019-10-24 RX ADMIN — ALPRAZOLAM 0.25 MG: 0.25 TABLET ORAL at 10:36

## 2019-10-24 RX ADMIN — Medication 10 ML: at 13:43

## 2019-10-24 RX ADMIN — LEVOTHYROXINE SODIUM 100 MCG: 100 TABLET ORAL at 06:25

## 2019-10-24 NOTE — PROGRESS NOTES
Cardiology Progress Note                                        Admit Date: 10/18/2019    Assessment/Plan:     CHF; cont current meds  Persistent Afib; rate acceptable  Chronic AC; no plan to restart    Nena Reich is a 80 y.o. female with     PROBLEM LIST:  Patient Active Problem List    Diagnosis Date Noted    Pneumonia 10/18/2019    Acute mastitis of left breast 10/04/2019    Breast pain, left 09/29/2019    Yeast dermatitis 09/29/2019    Acute on chronic diastolic CHF (congestive heart failure) ([address] 09/27/2019    Senile purpura ([address] 09/14/2019    Acute on chronic diastolic (congestive) heart failure ([address] 06/05/2019    Pulmonary hypertension ([address] 06/05/2019    PNA (pneumonia) 05/27/2019    Dehydration 04/23/2019    Wound, open, hip or thigh, left, initial encounter 04/23/2019    Non-healing wound of right heel 04/23/2019    Closed fracture of neck of right femur ([address] 09/11/2018    Acquired hypothyroidism 08/17/2018    Fall 07/29/2018    Fracture, intertrochanteric, right femur, closed, initial encounter ([address] 07/29/2018    Hip hematoma, right, initial encounter 07/29/2018    UTI (urinary tract infection) 02/02/2017    Closed compression fracture of lumbar vertebra ([address] 02/01/2017    Sepsis ([address] 02/09/2016    Altered mental status 02/09/2016    Dementia ([address] 02/09/2016    Arthritis of knee, left 01/13/2014    A-fib ([address]     Thyroid disease     Hypercholesterolemia     Arthritis     CAD (coronary artery disease)          Subjective:     Nena Reich reports none.     Visit Vitals  BP 97/52   Pulse 65   Temp 97.5 °F (36.4 °C)   Resp 18   Ht 5' 2\" (1.575 m)   Wt 57.4 kg (126 lb 10.4 oz)   SpO2 96%   BMI 23.16 kg/m²       Intake/Output Summary (Last 24 hours) at 10/24/2019 1454  Last data filed at 10/24/2019 1143  Gross per 24 hour   Intake    Output 250 ml   Net -250 ml       Objective:      Physical Exam:  HEENT: Perrla, EOMI  Neck: No JVD,  No thyroidmegaly  Resp: CTA bilaterally;  No wheezes or rales  CV: irregular, s1s2 No murmur no s3  Abd:Soft, Nontender  Ext: No edema  Neuro: Alert and oriented; Nonfocal  Skin: Warm, Dry, Intact  Pulses: 2+ DP/PT/Rad      Telemetry: AFIB    Current Facility-Administered Medications   Medication Dose Route Frequency    furosemide (LASIX) tablet 40 mg  40 mg Oral DAILY    acetaminophen (TYLENOL) tablet 650 mg  650 mg Oral Q4H PRN    ondansetron (ZOFRAN) injection 4 mg  4 mg IntraVENous Q8H PRN    sodium chloride (NS) flush 5-10 mL  5-10 mL IntraVENous PRN    levothyroxine (SYNTHROID) tablet 100 mcg  100 mcg Oral 6am    sodium chloride (NS) flush 5-40 mL  5-40 mL IntraVENous Q8H    sodium chloride (NS) flush 5-40 mL  5-40 mL IntraVENous PRN    atenolol (TENORMIN) tablet 25 mg  25 mg Oral DAILY    ALPRAZolam (XANAX) tablet 0.25 mg  0.25 mg Oral TID PRN         Data Review:   Labs:    Recent Results (from the past 24 hour(s))   PROTHROMBIN TIME + INR    Collection Time: 10/24/19  2:54 AM   Result Value Ref Range    INR 4.4 (H) 0.9 - 1.1      Prothrombin time 40.9 (H) 9.0 - 11.1 sec

## 2019-10-24 NOTE — PROGRESS NOTES
Benito Tafoya, Josh Cabrera, and Ivett Adams Date: 10/18/2019      Subjective:     Patient doing OK. AF. VSS. Current Facility-Administered Medications   Medication Dose Route Frequency    furosemide (LASIX) tablet 40 mg  40 mg Oral DAILY    fluconazole (DIFLUCAN) tablet 150 mg  150 mg Oral DAILY    acetaminophen (TYLENOL) tablet 650 mg  650 mg Oral Q4H PRN    ondansetron (ZOFRAN) injection 4 mg  4 mg IntraVENous Q8H PRN    sodium chloride (NS) flush 5-10 mL  5-10 mL IntraVENous PRN    levothyroxine (SYNTHROID) tablet 100 mcg  100 mcg Oral 6am    sodium chloride (NS) flush 5-40 mL  5-40 mL IntraVENous Q8H    sodium chloride (NS) flush 5-40 mL  5-40 mL IntraVENous PRN    atenolol (TENORMIN) tablet 25 mg  25 mg Oral DAILY    ALPRAZolam (XANAX) tablet 0.25 mg  0.25 mg Oral TID PRN          Objective:     Patient Vitals for the past 8 hrs:   BP Temp Pulse Resp SpO2 Weight   10/24/19 0330      126 lb 10.4 oz (57.4 kg)   10/24/19 0315 93/59 97.7 °F (36.5 °C) 68 16 90 %      No intake/output data recorded. 10/22 0701 - 10/23 1900  In: -   Out: 800 [Urine:800]    Physical Exam: Lungs: clear to auscultation bilaterally  Heart: regular rate and rhythm, S1, S2 normal, no murmur, click, rub or gallop  Abdomen: soft, non-tender.  Bowel sounds normal. No masses,  no organomegaly        Data Review   Recent Results (from the past 24 hour(s))   PROTHROMBIN TIME + INR    Collection Time: 10/24/19  2:54 AM   Result Value Ref Range    INR 4.4 (H) 0.9 - 1.1      Prothrombin time 40.9 (H) 9.0 - 11.1 sec           Assessment:     Active Problems:    Sepsis ([facility] (2/9/2016)      Dementia ([facility] (2/9/2016)      Acquired hypothyroidism (8/17/2018)      Acute on chronic diastolic (congestive) heart failure ([facility] (6/5/2019)      Pneumonia (10/18/2019)        Plan:     1) Cont IV ABx  2) Cont PO Lasix  3) Agree is poor candidate for any further anticoagulation due to fall risk, poor decision making and noncompliance with warfarin monitoring.

## 2019-10-24 NOTE — PROGRESS NOTES
Transitional Care Team: Re-Admit [facility] Note    Date of Assessment: 10/24/19  Time of Assessment:  1:50 PM      This writer learns Silke Crain was readmitted on 10/18/19 w/ a diagnosis of Pneumonia. Silke Crain  has arrived from home c/o fever, cough, vomiting. Admission results include:  Vitals:   *Blood Culture- ESCHERICHIA COLI ISOLATED FROM BOTH BOTTLES DRAWN. .. RAC SITE  *Chest X-Ray 10/18- Pulmonary edema with left lower lobe airspace disease and effusion. *Influenza A & B-  negative  *WBC- 11.6   *Pro-BNP 23,832  *NR- 11.1    Results to date:  Sodium 132  Potassium 2.7  INR- 2.4  Chest X-Ray 10/22 - There is mild pulmonary edema which has decreased. Moderate left pleural effusion and left lower lobe atelectasis is unchanged    Note:  Pt unaware of previous admission when talking with this writer. When pt's caregiver enters room she informs that writer there has not been a new caregiver added to pt's care team but there is a young lady that lives down stairs for the pt who she can call on when needed. Teofilo Swanson is now with the pt Mon-Fri. She goes on to explain that since pt's last discharge she had a fall over a weekend, when Teofilo Swanson wasn't there but someone else was, that resulted in EMS being called to assist getting pt off the floor. Pt refused ED care so Teofilo Swanson took the pt to her home where she could get close observation. Pt improved and went back home then the pneumonia developed thus this admission. The [facility] team will continue to follow pt's progress and assist with transition at discharge.

## 2019-10-24 NOTE — PROGRESS NOTES
Problem: Mobility Impaired (Adult and Pediatric)  Goal: *Acute Goals and Plan of Care (Insert Text)  Description  FUNCTIONAL STATUS PRIOR TO ADMISSION: has caregiver 9-2 to assist with ADLs, transfer to wheelchair, meals and put back to bed; max assist of one for basic mobility. HOME SUPPORT PRIOR TO ADMISSION: The patient lived alone with home care aide to provide assistance 9-2 M-F ; no consistent assist on weekends. Physical Therapy Goals  Initiated 10/24/2019  1. Patient will move from supine to sit and sit to supine  and roll side to side in bed with moderate assistance  within 7 day(s). 2.  Patient will transfer from bed to chair and chair to bed with maximal assistance using the least restrictive device within 7 day(s). 3.  Patient will perform sit to stand with moderate assistance  within 7 day(s). Outcome: Progressing Towards Goal   PHYSICAL THERAPY EVALUATION  Patient: Basilio Potter (65 y.o. female)  Date: 10/24/2019  Primary Diagnosis: Pneumonia [J18.9]        Precautions: fall         ASSESSMENT  Based on the objective data described below, the patient presents with overall decreased mobility but likely close to her baseline. She was not agreeable to transfer out of bed but was agreeable to sitting EOB. Caregiver present providing baseline and concerns for home situation. Patient with significant pain in RLE which is longstanding but per caregiver the edema is decreased today. Patient tolerated sitting EOB well and discussed issues at home but patient resistant to acknowledge difficulties when without caregiver in apartment. Per caregiver she is alone 2pm to 9 am next day, remaining in bed and eating dinner (sandwich) while supine in bed and no change in briefs until the morning. Patient is resistant to considering assisted living or additional caregivers. Would benefit from home health PT to work with caregiver and patient to ensure safe mobility for both.       Current Level of Function Impacting Discharge (mobility/balance): max assist for bed mobility    Functional Outcome Measure: The patient scored 10/100 on the Barthel outcome measure which is indicative of significantly impaired mobility and high risk for falls. Other factors to consider for discharge: limited care at home     Patient will benefit from skilled therapy intervention to address the above noted impairments. PLAN :  Recommendations and Planned Interventions: bed mobility training, transfer training, therapeutic exercises, patient and family training/education and therapeutic activities      Frequency/Duration: Patient will be followed by physical therapy:  3 times a week to address goals. Recommendation for discharge: (in order for the patient to meet his/her long term goals)  Physical therapy at least 2 days/week in the home AND ensure assist and/or supervision for safety with all mobility     This discharge recommendation:  Has been made in collaboration with the attending provider and/or case management    IF patient discharges home will need the following DME: none         SUBJECTIVE:   Patient stated I don't feel like getting in the chair today.     OBJECTIVE DATA SUMMARY:   HISTORY:    Past Medical History:   Diagnosis Date    A-fib ([facility] [address]     Arrhythmia     A FIB    Arthritis     Back pain     CAD (coronary artery disease)     PT DENIES    Chronic pain     Dementia ([facility] [address] 2/9/2016    Hypercholesterolemia     Psychiatric disorder     ANXIETY    Shoulder pain     Thyroid disease     Tremor, essential      Past Surgical History:   Procedure Laterality Date    HX APPENDECTOMY      HX ORTHOPAEDIC      left knee replacement    HX KADIE AND BSO      AGE 45    HX TONSIL AND ADENOIDECTOMY      IR KYPHOPLASTY LUMBAR  4/23/2019       Personal factors and/or comorbidities impacting plan of care:     Home Situation  Home Environment: Apartment  One/Two Story Residence: One story  Living Alone: Yes  Support Systems: Home care staff, Family member(s)  Patient Expects to be Discharged to[de-identified] Apartment  Current DME Used/Available at Home: Wheelchair    EXAMINATION/PRESENTATION/DECISION MAKING:   Critical Behavior:  Neurologic State: Irritable  Orientation Level: Appropriate for age  Cognition: Follows commands     Hearing: Auditory  Auditory Impairment: None    Range Of Motion:  AROM: Generally decreased, functional                       Strength:    Strength: Generally decreased, functional                            Coordination:  Coordination: Generally decreased, functional  Functional Mobility:  Bed Mobility:     Supine to Sit: Maximum assistance  Sit to Supine: Maximum assistance  Scooting: Total assistance        Balance:   Sitting: Impaired; Without support  Sitting - Static: Good (unsupported)  Sitting - Dynamic: Fair (occasional)  Functional Measure:  Barthel Index:    Bathin  Bladder: 0  Bowels: 0  Groomin  Dressin  Feeding: 10  Mobility: 0  Stairs: 0  Toilet Use: 0  Transfer (Bed to Chair and Back): 0  Total: 10/100       The Barthel ADL Index: Guidelines  1. The index should be used as a record of what a patient does, not as a record of what a patient could do. 2. The main aim is to establish degree of independence from any help, physical or verbal, however minor and for whatever reason. 3. The need for supervision renders the patient not independent. 4. A patient's performance should be established using the best available evidence. Asking the patient, friends/relatives and nurses are the usual sources, but direct observation and common sense are also important. However direct testing is not needed. 5. Usually the patient's performance over the preceding 24-48 hours is important, but occasionally longer periods will be relevant. 6. Middle categories imply that the patient supplies over 50 per cent of the effort. 7. Use of aids to be independent is allowed. Jocy Jones, Barthel, D.W. (0625). Functional evaluation: the Barthel Index. [facility] (14)2. FRANK Garcias, Katherine Ferreira., Isela Vitale., [place], [address] (1999). Measuring the change indisability after inpatient rehabilitation; comparison of the responsiveness of the Barthel Index and Functional [place] Measure. Journal of Neurology, Neurosurgery, and Psychiatry, 66(4), 965-115. ADELAIDE Zelaya, JOANIE Wolfe, & Nneka Nath M.A. (2004.) Assessment of post-stroke quality of life in cost-effectiveness studies: The usefulness of the Barthel Index and the EuroQoL-5D. Quality of Life Research, 15, 973-11            Physical Therapy Evaluation Charge Determination   History Examination Presentation Decision-Making   HIGH Complexity :3+ comorbidities / personal factors will impact the outcome/ POC  LOW Complexity : 1-2 Standardized tests and measures addressing body structure, function, activity limitation and / or participation in recreation  LOW Complexity : Stable, uncomplicated        Based on the above components, the patient evaluation is determined to be of the following complexity level: LOW     After treatment patient left in no apparent distress:   Supine in bed, Call bell within reach, Caregiver / family present and Side rails x 3    COMMUNICATION/EDUCATION:   The patients plan of care was discussed with: Registered Nurse and . Patient/family agree to work toward stated goals and plan of care.     Thank you for this referral.  Shayy Leon, PT

## 2019-10-25 VITALS
BODY MASS INDEX: 23.89 KG/M2 | HEART RATE: 58 BPM | RESPIRATION RATE: 17 BRPM | SYSTOLIC BLOOD PRESSURE: 122 MMHG | WEIGHT: 129.8 LBS | OXYGEN SATURATION: 96 % | DIASTOLIC BLOOD PRESSURE: 74 MMHG | TEMPERATURE: 98.2 F | HEIGHT: 62 IN

## 2019-10-25 LAB
ANION GAP SERPL CALC-SCNC: 5 MMOL/L (ref 5–15)
BUN SERPL-MCNC: 13 MG/DL (ref 6–20)
BUN/CREAT SERPL: 16 (ref 12–20)
CALCIUM SERPL-MCNC: 9.4 MG/DL (ref 8.5–10.1)
CHLORIDE SERPL-SCNC: 95 MMOL/L (ref 97–108)
CO2 SERPL-SCNC: 34 MMOL/L (ref 21–32)
CREAT SERPL-MCNC: 0.8 MG/DL (ref 0.55–1.02)
GLUCOSE SERPL-MCNC: 98 MG/DL (ref 65–100)
INR PPP: 3.9 (ref 0.9–1.1)
POTASSIUM SERPL-SCNC: 3.5 MMOL/L (ref 3.5–5.1)
PROTHROMBIN TIME: 36.4 SEC (ref 9–11.1)
SODIUM SERPL-SCNC: 134 MMOL/L (ref 136–145)

## 2019-10-25 PROCEDURE — 94760 N-INVAS EAR/PLS OXIMETRY 1: CPT

## 2019-10-25 PROCEDURE — 85610 PROTHROMBIN TIME: CPT

## 2019-10-25 PROCEDURE — 80048 BASIC METABOLIC PNL TOTAL CA: CPT

## 2019-10-25 PROCEDURE — 77010033678 HC OXYGEN DAILY

## 2019-10-25 PROCEDURE — 74011250637 HC RX REV CODE- 250/637: Performed by: FAMILY MEDICINE

## 2019-10-25 PROCEDURE — 74011250637 HC RX REV CODE- 250/637: Performed by: INTERNAL MEDICINE

## 2019-10-25 PROCEDURE — 36415 COLL VENOUS BLD VENIPUNCTURE: CPT

## 2019-10-25 RX ORDER — FUROSEMIDE 40 MG/1
40 TABLET ORAL DAILY
Qty: 30 TAB | Refills: 3 | Status: SHIPPED | OUTPATIENT
Start: 2019-10-25 | End: 2020-02-22 | Stop reason: DRUGHIGH

## 2019-10-25 RX ORDER — AMOXICILLIN AND CLAVULANATE POTASSIUM 875; 125 MG/1; MG/1
1 TABLET, FILM COATED ORAL 2 TIMES DAILY
Qty: 12 TAB | Refills: 0 | Status: SHIPPED | OUTPATIENT
Start: 2019-10-25 | End: 2020-01-23 | Stop reason: SDUPTHER

## 2019-10-25 RX ORDER — ATENOLOL 25 MG/1
25 TABLET ORAL DAILY
Qty: 30 TAB | Refills: 5 | Status: SHIPPED | OUTPATIENT
Start: 2019-10-26 | End: 2020-03-06

## 2019-10-25 RX ADMIN — ALPRAZOLAM 0.25 MG: 0.25 TABLET ORAL at 09:35

## 2019-10-25 RX ADMIN — ATENOLOL 25 MG: 25 TABLET ORAL at 09:31

## 2019-10-25 RX ADMIN — Medication 10 ML: at 05:06

## 2019-10-25 RX ADMIN — FUROSEMIDE 40 MG: 40 TABLET ORAL at 09:31

## 2019-10-25 RX ADMIN — LEVOTHYROXINE SODIUM 100 MCG: 100 TABLET ORAL at 05:05

## 2019-10-25 NOTE — PROGRESS NOTES
Pharmacist Discharge Medication Reconciliation    Discharging Provider: Dr Carola Denver PMH:   Past Medical History:   Diagnosis Date    A-fib [facility])     Arrhythmia     A FIB    Arthritis     Back pain     CAD (coronary artery disease)     PT DENIES    Chronic pain     Dementia ([facility] [address] 2/9/2016    Hypercholesterolemia     Psychiatric disorder     ANXIETY    Shoulder pain     Thyroid disease     Tremor, essential      Chief Complaint for this Admission:   Chief Complaint   Patient presents with    Fever     Allergies: Latex, natural rubber; Codeine; Adhesive; and Cortisone    Discharge Medications:   Current Discharge Medication List        START taking these medications    Details   amoxicillin-clavulanate (AUGMENTIN) 875-125 mg per tablet Take 1 Tab by mouth two (2) times a day for 6 days. Qty: 12 Tab, Refills: 0           CONTINUE these medications which have CHANGED    Details   furosemide (LASIX) 40 mg tablet Take 1 Tab by mouth daily. Qty: 30 Tab, Refills: 3      atenolol (TENORMIN) 25 mg tablet Take 1 Tab by mouth daily. Qty: 30 Tab, Refills: 5           CONTINUE these medications which have NOT CHANGED    Details   famotidine (PEPCID) 10 mg tablet Take 10 mg by mouth daily. spironolactone (ALDACTONE) 25 mg tablet Take 1 Tab by mouth daily. Qty: 30 Tab, Refills: 3      ALPRAZolam (XANAX) 0.5 mg tablet Take 1 Tab by mouth three (3) times daily as needed for Anxiety.  Max Daily Amount: 1.5 mg.  Qty: 30 Tab, Refills: 0    Associated Diagnoses: Anxiety      levothyroxine (SYNTHROID) 100 mcg tablet TAKE 1 TABLET BY MOUTH EVERY MORNING BEFORE BREAKFAST  Qty: 30 Tab, Refills: 3    Comments: Generic For:*SYNTHROID  100MCG  04/04/2019 10:39:40 AM      potassium chloride (K-DUR, KLOR-CON) 20 mEq tablet TAKE 1 TABLET BY MOUTH EVERY DAY  Qty: 30 Tab, Refills: 3    Comments: Generic For:*K-DUR 20 MEQ TABLET SA  01/11/2019 10:29:13 AM           STOP taking these medications       warfarin (COUMADIN) 4 mg tablet Comments:   Reason for Stopping:               The patient's chart, MAR and AVS were reviewed by Aviva Bradford.

## 2019-10-25 NOTE — CDMP QUERY
Pt admitted with sepsis, pna, and acute on chronic CHF, noted to have low potassium and \"replete K. \" If possible, please document in the progress notes and discharge summary if you are evaluating and / or treating any of the following:  Hypokalemia  Clinically insignificant low potassium  Other, please specify  Clinically unable to determine The medical record reflects the following: 
  Risk Factors: diuretic use (Lasix IV and then po 40mg daily) Clinical Indicators: 10/22 PN- replete K  Potassium to 2.6 Treatment: supplemental potassium (KCl 40mEq), monitoring Thank you, Nakul Jones RN 
[facility] 
[phone]

## 2019-10-25 NOTE — DISCHARGE INSTRUCTIONS
Patient Discharge Instructions    Tino Fernandez / [id number] : 1931    Admitted 10/18/2019 Discharged: 10/25/2019     Take Home Medications            · It is important that you take the medication exactly as they are prescribed. · Keep your medication in the bottles provided by the pharmacist and keep a list of the medication names, dosages, and times to be taken in your wallet. · Do not take other medications without consulting your doctor. What to do at Home    Recommended diet: low salt,     Recommended activity: Activity as tolerated, home PT ordered    If you experience any of the following symptoms increased dyspnea, fevers, please follow up with Dr Ameya Vazquez    Must be compliant with diuretics. Follow-up Appointments   Procedures    FOLLOW UP VISIT Appointment in: One Week     Standing Status:   Standing     Number of Occurrences:   1     Order Specific Question:   Appointment in     Answer: One Week            Information obtained by :  I understand that if any problems occur once I am at home I am to contact my physician. I understand and acknowledge receipt of the instructions indicated above.                                                                                                                                            Physician's or R.N.'s Signature                                                                  Date/Time                                                                                                                                              Patient or Representative Signature                                                          Date/Time

## 2019-10-25 NOTE — PROGRESS NOTES
ADRI  -Noted discharge orders  -Patient lives at [facility] [phone]. -CM noted consult for home health/PT-Patient has been accepted with [facility] for Bridge to home Program.   -[facility] transport at 2 pm.     Ambulance form and envelope with Kardex, MARS, AVS placed on chart to provide to [facility]. CM will fax AVS, Discharge Summary, and Scripts if applicable to [facility].      CM will follow    MARIAA Mike/OTIS

## 2019-10-25 NOTE — PROGRESS NOTES
Transitional Care Team: Discharge HUG Note    Date of Assessment: 10/25/19  Time of Assessment:  11:27 AM    Isiah Cantor is a 80 y.o. female inpatient at [address] [place] [address] Drive is discharging back to [address] [facility] with the support of her care giver Sanjuana Dior, new caregiver Wilbur Chapin and [facility] via [facility]. Pneumonia: symptoms resolved (fever, chills) gram neg bacteremia; WBC on adm 11.6 and on discharge 7.4; Augmentin BID x 6days at discharge. Note:  Coumadin has been discontinued per PCP note d/t pt's non-compliance with regular INR checks, fall risk and poor decision making. 10/18/2019 13:12 10/19/2019 10:51 10/20/2019 04:53 10/21/2019 04:04 10/22/2019 04:19 10/23/2019 05:20 10/24/2019 02:54 10/25/2019 03:50   INR  11.1 (HH) 3.3 (H) 2.2 (H) 2.4 (H) 3.8 (H) 4.6 (HH) 4.4 (H) 3.9 (H)     Fluconazole therapy complete on 10/24 for perineal breakdown/sacral erythema  POA Sacral, partial thickness wound from suspected mixed etiology Pressure Injury Stage 2 and moisture, friction and moisture : 2 x 0.5 x 0.1 cm  100% pink. no exudate. Periwound blanching pink erythema. Margins are open. Tender denudation to perineum from moisture;  Cleansed and applied zinc cream     Primary Discharge Diagnosis: Pneumonia    Advance Directive:  reviewed and needs to be updated. Pt not at capacity to make changes. See ACP note from 1/13/2014. Current Code Status:  DDNR    Discharge Needs: (to include safety issues)   Pt lives alone, has a caregiver that's now M-F days and recently added another who lives near pt that's more for as needed purposes when other care giver isn't there.      Barriers Identified:   Pt's mental capacity- Demrntia  Pt's non-compliance  Pt's lack of mobility    Medication Review:  was performed by PharmD    Best Patient Contact Number:   Pt [phone]  [facility] [phone]  Tatum Miles [phone]       [facility] (Healthy Understanding of Goals) program introduced to patient/family. The Transitional Care Team bridges the gaps in care and education surrounding discharge from the acute care facility. The objective is to empower the patient and family in taking a proactive role in preventing readmission within the first thirty days after discharge. The team is also involved in the efforts to reduce readmission to the acute care setting after stabilization and discharge from the acute care environment either to skilled nursing facilities or community. [facility] RN/NP will return with [facility] Calendar/ follow up appointments/ Ambulatory Nurse Navigator name and contact information when the patient is ready for discharge. No future appointments. [facility] follow up appointment(s):   PCP in one week    [facility]: call information previously given to caregiver Elton    Patient education focused on readmission zones as described as: The Red Zone: High risk for readmission, days 1-21  The Yellow Zone:  Moderate  risk for readmission, days 22-29   The Green Zone: Lower risk for readmission, days                30 and after      Past Medical History:   Diagnosis Date    A-fib [facility])     Arrhythmia     A FIB    Arthritis     Back pain     CAD (coronary artery disease)     PT DENIES    Chronic pain     Dementia ([facility] [address] 2/9/2016    Hypercholesterolemia     Psychiatric disorder     ANXIETY    Shoulder pain     Thyroid disease     Tremor, essential

## 2019-10-25 NOTE — CDMP QUERY
Patient admitted with sepsis and pna, noted to have abnormal urine cx. If possible, please document in progress notes and discharge summary if you are evaluating and/or treating any of the following: 
 
 UTI  Bacteriuria  Abnormal urine cx- not clinically significant  Clinically unable to determine The medical record reflects the following: 
  Risk Factors: advanced age, sepsis Clinical Indicators:  urine cx with E coli >100,000 --- susceptible to zosyn Treatment: urine cx, zosyn Thank you, Nicki FridayMARY JANE 
[facility] 
[phone]

## 2019-12-02 NOTE — DISCHARGE SUMMARY
Physician Discharge Summary     Patient ID:  Narda Fitzgerald  [id number]  87 y.o.  6/12/1931    Admit date: 10/18/2019    Discharge date and time: 12/2/2019    Admission Diagnoses: Pneumonia [J18.9]    Discharge Diagnoses:  Principal Diagnosis Pneumonia                                            Principal Problem:    Pneumonia (10/18/2019)    Active Problems:    Sepsis ([facility] (2/9/2016)      Dementia ([facility] (2/9/2016)      Acquired hypothyroidism (8/17/2018)      Acute on chronic diastolic (congestive) heart failure ([facility] (6/5/2019)           Hospital Course: Admitted with cough , SOB, and fever. Diagnosed with PNA and placed on IV Zosyn. Also was fluid overloaded. Diuresis ensued. Symptoms improved significantly over the next few days. Became AF and no longer SOB. Unable to do any physical activity. Was felt to be too high risk to continue anticoagulation. Warfarin was stopped. Transitioned to oral Abx and oral diuretics. Discharged to [employer] with home aid assistance. Admitted with NYHA class 3, discharged with NYHA class 2. PCP: Ellie Li    Consults: Cardiology  Discharge Exam:  Visit Vitals  /74 (BP 1 Location: Right arm, BP Patient Position: At rest)   Pulse (!) 58   Temp 98.2 °F (36.8 °C)   Resp 17   Ht 5' 2\" (1.575 m)   Wt 129 lb 12.8 oz (58.9 kg)   SpO2 96%   BMI 23.74 kg/m²     Neck: supple, symmetrical, trachea midline, no adenopathy, thyroid: not enlarged, symmetric, no tenderness/mass/nodules, no carotid bruit and no JVD  Lungs: clear to auscultation bilaterally  Heart: regular rate and rhythm, S1, S2 normal, no murmur, click, rub or gallop  Abdomen: soft, non-tender. Bowel sounds normal. No masses,  no organomegaly  Extremities: 1+ distal edema    Disposition: home    Patient Instructions:   Cannot display discharge medications since this patient is not currently admitted.     Activity: Activity as tolerated  Diet: Cardiac Diet      Follow-up Appointments   Procedures    FOLLOW UP VISIT Appointment in: One Week     Standing Status:   Standing     Number of Occurrences:   1     Order Specific Question:   Appointment in     Answer:    One Week          Signed:  Rob Landau, MD  12/2/2019  9:31 AM

## 2020-02-22 ENCOUNTER — APPOINTMENT (OUTPATIENT)
Dept: GENERAL RADIOLOGY | Age: 85
DRG: 292 | End: 2020-02-22
Attending: EMERGENCY MEDICINE
Payer: MEDICARE

## 2020-02-22 ENCOUNTER — HOSPITAL ENCOUNTER (INPATIENT)
Age: 85
LOS: 13 days | Discharge: HOME | DRG: 292 | End: 2020-03-06
Attending: EMERGENCY MEDICINE | Admitting: INTERNAL MEDICINE
Payer: MEDICARE

## 2020-02-22 DIAGNOSIS — J96.01 ACUTE HYPOXEMIC RESPIRATORY FAILURE (HCC): ICD-10-CM

## 2020-02-22 DIAGNOSIS — I50.9 ACUTE DECOMPENSATED HEART FAILURE (HCC): Primary | ICD-10-CM

## 2020-02-22 LAB
ALBUMIN SERPL-MCNC: 3.4 G/DL (ref 3.5–5)
ALBUMIN/GLOB SERPL: 0.9 {RATIO} (ref 1.1–2.2)
ALP SERPL-CCNC: 96 U/L (ref 45–117)
ALT SERPL-CCNC: 13 U/L (ref 12–78)
ANION GAP SERPL CALC-SCNC: 4 MMOL/L (ref 5–15)
AST SERPL-CCNC: 20 U/L (ref 15–37)
BASOPHILS # BLD: 0.1 K/UL (ref 0–0.1)
BASOPHILS NFR BLD: 1 % (ref 0–1)
BILIRUB SERPL-MCNC: 0.9 MG/DL (ref 0.2–1)
BNP SERPL-MCNC: 2138 PG/ML
BUN SERPL-MCNC: 19 MG/DL (ref 6–20)
BUN/CREAT SERPL: 26 (ref 12–20)
CALCIUM SERPL-MCNC: 9.7 MG/DL (ref 8.5–10.1)
CHLORIDE SERPL-SCNC: 104 MMOL/L (ref 97–108)
CO2 SERPL-SCNC: 30 MMOL/L (ref 21–32)
COMMENT, HOLDF: NORMAL
CREAT SERPL-MCNC: 0.73 MG/DL (ref 0.55–1.02)
DIFFERENTIAL METHOD BLD: ABNORMAL
EOSINOPHIL # BLD: 0.1 K/UL (ref 0–0.4)
EOSINOPHIL NFR BLD: 2 % (ref 0–7)
ERYTHROCYTE [DISTWIDTH] IN BLOOD BY AUTOMATED COUNT: 14.8 % (ref 11.5–14.5)
GLOBULIN SER CALC-MCNC: 3.8 G/DL (ref 2–4)
GLUCOSE SERPL-MCNC: 97 MG/DL (ref 65–100)
HCT VFR BLD AUTO: 41.7 % (ref 35–47)
HGB BLD-MCNC: 12.6 G/DL (ref 11.5–16)
IMM GRANULOCYTES # BLD AUTO: 0 K/UL (ref 0–0.04)
IMM GRANULOCYTES NFR BLD AUTO: 0 % (ref 0–0.5)
LYMPHOCYTES # BLD: 0.9 K/UL (ref 0.8–3.5)
LYMPHOCYTES NFR BLD: 21 % (ref 12–49)
MCH RBC QN AUTO: 29.3 PG (ref 26–34)
MCHC RBC AUTO-ENTMCNC: 30.2 G/DL (ref 30–36.5)
MCV RBC AUTO: 97 FL (ref 80–99)
MONOCYTES # BLD: 0.5 K/UL (ref 0–1)
MONOCYTES NFR BLD: 11 % (ref 5–13)
NEUTS SEG # BLD: 2.6 K/UL (ref 1.8–8)
NEUTS SEG NFR BLD: 65 % (ref 32–75)
NRBC # BLD: 0 K/UL (ref 0–0.01)
NRBC BLD-RTO: 0 PER 100 WBC
PLATELET # BLD AUTO: 181 K/UL (ref 150–400)
PMV BLD AUTO: 11.7 FL (ref 8.9–12.9)
POTASSIUM SERPL-SCNC: 4.1 MMOL/L (ref 3.5–5.1)
PROT SERPL-MCNC: 7.2 G/DL (ref 6.4–8.2)
RBC # BLD AUTO: 4.3 M/UL (ref 3.8–5.2)
SAMPLES BEING HELD,HOLD: NORMAL
SODIUM SERPL-SCNC: 138 MMOL/L (ref 136–145)
TROPONIN I SERPL-MCNC: <0.05 NG/ML
WBC # BLD AUTO: 4.1 K/UL (ref 3.6–11)

## 2020-02-22 PROCEDURE — 99285 EMERGENCY DEPT VISIT HI MDM: CPT

## 2020-02-22 PROCEDURE — 71046 X-RAY EXAM CHEST 2 VIEWS: CPT

## 2020-02-22 PROCEDURE — 77010033678 HC OXYGEN DAILY

## 2020-02-22 PROCEDURE — 77030019905 HC CATH URETH INTMIT MDII -A

## 2020-02-22 PROCEDURE — 83880 ASSAY OF NATRIURETIC PEPTIDE: CPT

## 2020-02-22 PROCEDURE — 65660000000 HC RM CCU STEPDOWN

## 2020-02-22 PROCEDURE — 36415 COLL VENOUS BLD VENIPUNCTURE: CPT

## 2020-02-22 PROCEDURE — 74011250636 HC RX REV CODE- 250/636: Performed by: EMERGENCY MEDICINE

## 2020-02-22 PROCEDURE — 84484 ASSAY OF TROPONIN QUANT: CPT

## 2020-02-22 PROCEDURE — 94762 N-INVAS EAR/PLS OXIMTRY CONT: CPT

## 2020-02-22 PROCEDURE — 85025 COMPLETE CBC W/AUTO DIFF WBC: CPT

## 2020-02-22 PROCEDURE — 74011250637 HC RX REV CODE- 250/637: Performed by: INTERNAL MEDICINE

## 2020-02-22 PROCEDURE — 96374 THER/PROPH/DIAG INJ IV PUSH: CPT

## 2020-02-22 PROCEDURE — 80053 COMPREHEN METABOLIC PANEL: CPT

## 2020-02-22 PROCEDURE — 93005 ELECTROCARDIOGRAM TRACING: CPT

## 2020-02-22 RX ORDER — LORATADINE 10 MG/1
10 TABLET ORAL DAILY
Status: DISCONTINUED | OUTPATIENT
Start: 2020-02-23 | End: 2020-03-06 | Stop reason: HOSPADM

## 2020-02-22 RX ORDER — LORATADINE 10 MG/1
10 TABLET ORAL DAILY
COMMUNITY

## 2020-02-22 RX ORDER — FUROSEMIDE 20 MG/1
20 TABLET ORAL DAILY
COMMUNITY
End: 2020-03-06

## 2020-02-22 RX ORDER — LEVOTHYROXINE SODIUM 100 UG/1
100 TABLET ORAL
Status: DISCONTINUED | OUTPATIENT
Start: 2020-02-23 | End: 2020-03-06 | Stop reason: HOSPADM

## 2020-02-22 RX ORDER — SODIUM CHLORIDE 0.9 % (FLUSH) 0.9 %
5-40 SYRINGE (ML) INJECTION AS NEEDED
Status: DISCONTINUED | OUTPATIENT
Start: 2020-02-22 | End: 2020-03-06 | Stop reason: HOSPADM

## 2020-02-22 RX ORDER — FUROSEMIDE 10 MG/ML
60 INJECTION INTRAMUSCULAR; INTRAVENOUS
Status: COMPLETED | OUTPATIENT
Start: 2020-02-22 | End: 2020-02-22

## 2020-02-22 RX ORDER — ACETAMINOPHEN 325 MG/1
650 TABLET ORAL
Status: DISCONTINUED | OUTPATIENT
Start: 2020-02-22 | End: 2020-03-06 | Stop reason: HOSPADM

## 2020-02-22 RX ORDER — ALPRAZOLAM 0.5 MG/1
0.5 TABLET ORAL
Status: DISCONTINUED | OUTPATIENT
Start: 2020-02-22 | End: 2020-03-06 | Stop reason: HOSPADM

## 2020-02-22 RX ORDER — POTASSIUM CHLORIDE 750 MG/1
40 TABLET, FILM COATED, EXTENDED RELEASE ORAL 2 TIMES DAILY
Status: DISCONTINUED | OUTPATIENT
Start: 2020-02-22 | End: 2020-02-23

## 2020-02-22 RX ORDER — SODIUM CHLORIDE 0.9 % (FLUSH) 0.9 %
5-40 SYRINGE (ML) INJECTION EVERY 8 HOURS
Status: DISCONTINUED | OUTPATIENT
Start: 2020-02-22 | End: 2020-03-06 | Stop reason: HOSPADM

## 2020-02-22 RX ADMIN — FUROSEMIDE 60 MG: 10 INJECTION, SOLUTION INTRAMUSCULAR; INTRAVENOUS at 13:50

## 2020-02-22 RX ADMIN — ALPRAZOLAM 0.5 MG: 0.5 TABLET ORAL at 23:38

## 2020-02-22 RX ADMIN — Medication 10 ML: at 23:38

## 2020-02-22 NOTE — ED NOTES
Attempted to straight cath with MARY JANE Cho- pt unable to be straight cathed. Vulva extremely red and swollen. Pt continuously yelling, \"You're already hurting me! That is my bad leg! I cannot be laid flat! Why can't you do this! \" No urine obtained, patient refused second straight cath- MD informed.

## 2020-02-22 NOTE — ED NOTES
Pt's bed completely soaked, has gone through two bed changes in the last 2 hours; instructed patient to continue calling when she was wet as her skin was reddened and inflamed. Protective barrier cream applied.

## 2020-02-22 NOTE — ED TRIAGE NOTES
Patient presents from [facility] with complaints of skin tear to her left lower that occurred yesterday afternoon when she hit her leg on her wheel chair

## 2020-02-22 NOTE — PROGRESS NOTES
Admission Medication Reconciliation:    Information obtained from:  Patient, profile review, RxQuery  RxQuery data available¹:  YES    Comments/Recommendations: Spoke with patient regarding use of PTA medications. Updated PTA meds/reviewed patient's allergies. 1)  Of note, patient was a fair historian. She did not know the names of several of her medications. Had discharge paperwork from hospitalization in October and stated not much should have changed. Utilized what patient was able to verify and fill history in RxQuery/chart review to update PTA medications. Patient was unsure if she still took spironolactone- per RxQuery it was last filled in September 2019. Patient also thought she still took atenolol, however per RxQuery last fill was in October 2019. 2)  Medication changes (since last review): Added  - Loratadine    Adjusted  - Furosemide (from 40 mg to 20 mg daily)    Removed  - Famotidine     ¹RxQuery pharmacy benefit data reflects medications filled and processed through the patient's insurance, however   this data does NOT capture whether the medication was picked up or is currently being taken by the patient. Allergies:  Latex, natural rubber; Codeine; Adhesive; and Cortisone    Significant PMH/Disease States:   Past Medical History:   Diagnosis Date    A-fib ([facility] [address]     Arrhythmia     A FIB    Arthritis     Back pain     CAD (coronary artery disease)     PT DENIES    Chronic pain     Dementia ([facility] [address] 2/9/2016    Hypercholesterolemia     Psychiatric disorder     ANXIETY    Shoulder pain     Thyroid disease     Tremor, essential      Chief Complaint for this Admission:    Chief Complaint   Patient presents with    Skin Problem     Prior to Admission Medications:   Prior to Admission Medications   Prescriptions Last Dose Informant Taking?    ALPRAZolam (XANAX) 0.5 mg tablet   Yes   Sig: TAKE ONE TABLET BY MOUTH 3 TIMES A DAY AS NEEDED ANXIETY   atenolol (TENORMIN) 25 mg tablet Unknown at Unknown time No   Sig: Take 1 Tab by mouth daily. furosemide (LASIX) 20 mg tablet   Yes   Sig: Take 20 mg by mouth daily. levothyroxine (SYNTHROID) 100 mcg tablet   Yes   Sig: TAKE ONE TABLET BY MOUTH EVERY MORNING BEFORE BREAKFAST   loratadine (CLARITIN) 10 mg tablet   Yes   Sig: Take 10 mg by mouth daily. potassium chloride (K-DUR, KLOR-CON) 20 mEq tablet   Yes   Sig: TAKE 1 TABLET BY MOUTH EVERY DAY   spironolactone (ALDACTONE) 25 mg tablet Unknown at Unknown time  No   Sig: Take 1 Tab by mouth daily. Facility-Administered Medications: None       Please contact the main inpatient pharmacy with any questions or concerns at [phone] and we will direct you to the clinical pharmacist covering this patient's care while in-house.    NOE Ly

## 2020-02-22 NOTE — ED PROVIDER NOTES
80 y.o. female with past medical history significant for CAD, arthritis, thyroid disease, hypercholesterolemia, and a-fib who presents from home via EMS with chief complaint of SOB. Pt was brought into the ED for a skin tear to her left lower extremity that occurred yesterday. Pt denies being SOB at baseline, but relative in the room disagrees. Per EMS's, pt is currently taking lasix every other day. EMS states pt's O2 sats to be 89% on room air. EMS put pt on 4L of O2 which brought her sats up to 98%. Pt denies any pain at this time. Pt denies currently being anticoagulated. Pt denies abdominal pain. There are no other acute medical concerns at this time. Social hx: Former Smoker (1/7/1994)  PCP: Ramses Pelletier MD    Old Chart Review:  Pt was previously admitted to [facility] for pneumonia, sepsis, and fluid overload from 10/18/2019-12/2/2019. Note written by ling Magallanes, as dictated by [facility] DO 12:38 PM        The history is provided by the EMS personnel and the patient. No  was used.         Past Medical History:   Diagnosis Date    A-fib [facility])     Arrhythmia     A FIB    Arthritis     Back pain     CAD (coronary artery disease)     PT DENIES    Chronic pain     Dementia ([facility] 2/9/2016    Hypercholesterolemia     Psychiatric disorder     ANXIETY    Shoulder pain     Thyroid disease     Tremor, essential        Past Surgical History:   Procedure Laterality Date    HX APPENDECTOMY      HX ORTHOPAEDIC      left knee replacement    HX KADIE AND BSO      AGE 39    HX TONSIL AND ADENOIDECTOMY      IR KYPHOPLASTY LUMBAR  4/23/2019         Family History:   Problem Relation Age of Onset    Dementia Mother     Arthritis-osteo Father        Social History     Socioeconomic History    Marital status:      Spouse name: Not on file    Number of children: Not on file    Years of education: Not on file    Highest education level: Not on file Occupational History    Not on file   Social Needs    Financial resource strain: Not on file    Food insecurity:     Worry: Not on file     Inability: Not on file    Transportation needs:     Medical: Not on file     Non-medical: Not on file   Tobacco Use    Smoking status: Former Smoker     Packs/day: 0.50     Years: 10.00     Pack years: 5.00     Last attempt to quit: 1994     Years since quittin.1    Smokeless tobacco: Never Used   Substance and Sexual Activity    Alcohol use: Yes     Comment: NIGHTLY 2 GLASSES WINE    Drug use: No    Sexual activity: Not on file   Lifestyle    Physical activity:     Days per week: Not on file     Minutes per session: Not on file    Stress: Not on file   Relationships    Social connections:     Talks on phone: Not on file     Gets together: Not on file     Attends Voodoo service: Not on file     Active member of club or organization: Not on file     Attends meetings of clubs or organizations: Not on file     Relationship status: Not on file    Intimate partner violence:     Fear of current or ex partner: Not on file     Emotionally abused: Not on file     Physically abused: Not on file     Forced sexual activity: Not on file   Other Topics Concern    Not on file   Social History Narrative    Not on file         ALLERGIES: Latex, natural rubber; Codeine; Adhesive; and Cortisone    Review of Systems   Respiratory: Positive for shortness of breath. Gastrointestinal: Negative for abdominal pain. Skin: Positive for wound (skin tear). All other systems reviewed and are negative. Vitals:    20 1238   BP: 140/76   Pulse: 90   Resp: 18   Temp: 98.4 °F (36.9 °C)   SpO2: 97%            Physical Exam      Constitutional: Pt is awake and alert. Pt appears well-developed and well-nourished. NAD. HENT:   Head: Normocephalic and atraumatic. Nose: Nose normal.   Mouth/Throat: Oropharynx is clear and moist. No oropharyngeal exudate.    Eyes: Conjunctivae and extraocular motions are normal. Pupils are equal, round, and reactive to light. Right eye exhibits no discharge. Left eye exhibits no discharge. No scleral icterus. Neck: No tracheal deviation present. Supple neck. Cardiovascular: Normal rate, irregular rhythm, normal heart sounds and intact distal pulses. Exam reveals no gallop and no friction rub. No murmur heard. Pulmonary/Chest: Effort normal.   Pt  has no wheezes. Crackles L>R. Left breast edema. Abdominal: Soft. Pt  exhibits no distension and no mass. No tenderness. Pt  has no rebound and no guarding. Left sided abdominal edema that is asymmetric to the right side (prefers to sleep on her left side). Musculoskeletal:  Pt  exhibits no tenderness. Ext: Normal ROM in all four extremities; not tender to palpation; distal pulses are normal, 1-2+ bilateral lower extremity edema. Neurological:  Pt is alert. nonfocal neuro exam.  Skin: Skin is warm and dry. Pt  is not diaphoretic. \"L\" shaped skin tear to the left lateral lower extremity. No active bleeding. Each leg of the \"L\" is ~ 3 cm in length. Psychiatric:  Pt  has a normal mood and affect. Behavior is normal.     Note written by ling Garcia, as dictated by Shamar Tolentino DO 12:38 PM    MDM       Procedures      ED EKG interpretation:  Rhythm: atrial fib; and irregular. Rate (approx.): 85; ST/T wave: no changes; when compared to EKG on 10/18/29.   Note written by ling Garcia, as dictated by Shamar Tolentino DO 1:33 PM      Recent Results (from the past 12 hour(s))   EKG, 12 LEAD, INITIAL    Collection Time: 02/22/20  1:31 PM   Result Value Ref Range    Ventricular Rate 85 BPM    Atrial Rate 227 BPM    QRS Duration 120 ms    Q-T Interval 390 ms    QTC Calculation (Bezet) 464 ms    Calculated R Axis 92 degrees    Calculated T Axis -67 degrees    Diagnosis       Atrial flutter with variable AV block  Low voltage QRS  Right bundle branch block  When compared with ECG of 18-OCT-2019 19:56,  Atrial flutter has replaced Atrial fibrillation  Criteria for Septal infarct are no longer present  Inverted T waves have replaced nonspecific T wave abnormality in Inferior   leads     SAMPLES BEING HELD    Collection Time: 02/22/20  1:38 PM   Result Value Ref Range    SAMPLES BEING HELD 1red,1blu     COMMENT        Add-on orders for these samples will be processed based on acceptable specimen integrity and analyte stability, which may vary by analyte. NT-PRO BNP    Collection Time: 02/22/20  1:38 PM   Result Value Ref Range    NT pro-BNP 2,138 (H) <450 PG/ML   TROPONIN I    Collection Time: 02/22/20  1:38 PM   Result Value Ref Range    Troponin-I, Qt. <0.05 <0.05 ng/mL   CBC WITH AUTOMATED DIFF    Collection Time: 02/22/20  1:38 PM   Result Value Ref Range    WBC 4.1 3.6 - 11.0 K/uL    RBC 4.30 3.80 - 5.20 M/uL    HGB 12.6 11.5 - 16.0 g/dL    HCT 41.7 35.0 - 47.0 %    MCV 97.0 80.0 - 99.0 FL    MCH 29.3 26.0 - 34.0 PG    MCHC 30.2 30.0 - 36.5 g/dL    RDW 14.8 (H) 11.5 - 14.5 %    PLATELET 763 522 - 000 K/uL    MPV 11.7 8.9 - 12.9 FL    NRBC 0.0 0  WBC    ABSOLUTE NRBC 0.00 0.00 - 0.01 K/uL    NEUTROPHILS 65 32 - 75 %    LYMPHOCYTES 21 12 - 49 %    MONOCYTES 11 5 - 13 %    EOSINOPHILS 2 0 - 7 %    BASOPHILS 1 0 - 1 %    IMMATURE GRANULOCYTES 0 0.0 - 0.5 %    ABS. NEUTROPHILS 2.6 1.8 - 8.0 K/UL    ABS. LYMPHOCYTES 0.9 0.8 - 3.5 K/UL    ABS. MONOCYTES 0.5 0.0 - 1.0 K/UL    ABS. EOSINOPHILS 0.1 0.0 - 0.4 K/UL    ABS. BASOPHILS 0.1 0.0 - 0.1 K/UL    ABS. IMM.  GRANS. 0.0 0.00 - 0.04 K/UL    DF AUTOMATED     METABOLIC PANEL, COMPREHENSIVE    Collection Time: 02/22/20  1:38 PM   Result Value Ref Range    Sodium 138 136 - 145 mmol/L    Potassium 4.1 3.5 - 5.1 mmol/L    Chloride 104 97 - 108 mmol/L    CO2 30 21 - 32 mmol/L    Anion gap 4 (L) 5 - 15 mmol/L    Glucose 97 65 - 100 mg/dL    BUN 19 6 - 20 MG/DL    Creatinine 0.73 0.55 - 1.02 MG/DL    BUN/Creatinine ratio 26 (H) 12 - 20      GFR est AA >60 >60 ml/min/1.73m2    GFR est non-AA >60 >60 ml/min/1.73m2    Calcium 9.7 8.5 - 10.1 MG/DL    Bilirubin, total 0.9 0.2 - 1.0 MG/DL    ALT (SGPT) 13 12 - 78 U/L    AST (SGOT) 20 15 - 37 U/L    Alk. phosphatase 96 45 - 117 U/L    Protein, total 7.2 6.4 - 8.2 g/dL    Albumin 3.4 (L) 3.5 - 5.0 g/dL    Globulin 3.8 2.0 - 4.0 g/dL    A-G Ratio 0.9 (L) 1.1 - 2.2       Lasix given empirically. Consulted primary care for admission. proBNP is not elevated but she does have edema on chest x-ray. Her lung findings on exam match this.

## 2020-02-23 ENCOUNTER — APPOINTMENT (OUTPATIENT)
Dept: VASCULAR SURGERY | Age: 85
DRG: 292 | End: 2020-02-23
Attending: INTERNAL MEDICINE
Payer: MEDICARE

## 2020-02-23 LAB
ANION GAP SERPL CALC-SCNC: 4 MMOL/L (ref 5–15)
ATRIAL RATE: 227 BPM
BUN SERPL-MCNC: 15 MG/DL (ref 6–20)
BUN/CREAT SERPL: 22 (ref 12–20)
CALCIUM SERPL-MCNC: 9.3 MG/DL (ref 8.5–10.1)
CALCULATED R AXIS, ECG10: 92 DEGREES
CALCULATED T AXIS, ECG11: -67 DEGREES
CHLORIDE SERPL-SCNC: 101 MMOL/L (ref 97–108)
CO2 SERPL-SCNC: 31 MMOL/L (ref 21–32)
CREAT SERPL-MCNC: 0.69 MG/DL (ref 0.55–1.02)
DIAGNOSIS, 93000: NORMAL
ERYTHROCYTE [DISTWIDTH] IN BLOOD BY AUTOMATED COUNT: 14.6 % (ref 11.5–14.5)
GLUCOSE SERPL-MCNC: 81 MG/DL (ref 65–100)
HCT VFR BLD AUTO: 39.6 % (ref 35–47)
HGB BLD-MCNC: 11.8 G/DL (ref 11.5–16)
MCH RBC QN AUTO: 29 PG (ref 26–34)
MCHC RBC AUTO-ENTMCNC: 29.8 G/DL (ref 30–36.5)
MCV RBC AUTO: 97.3 FL (ref 80–99)
NRBC # BLD: 0 K/UL (ref 0–0.01)
NRBC BLD-RTO: 0 PER 100 WBC
PLATELET # BLD AUTO: 159 K/UL (ref 150–400)
PMV BLD AUTO: 11.7 FL (ref 8.9–12.9)
POTASSIUM SERPL-SCNC: 3.5 MMOL/L (ref 3.5–5.1)
Q-T INTERVAL, ECG07: 390 MS
QRS DURATION, ECG06: 120 MS
QTC CALCULATION (BEZET), ECG08: 464 MS
RBC # BLD AUTO: 4.07 M/UL (ref 3.8–5.2)
SODIUM SERPL-SCNC: 136 MMOL/L (ref 136–145)
T4 FREE SERPL-MCNC: 1.4 NG/DL (ref 0.8–1.5)
TROPONIN I SERPL-MCNC: 0.05 NG/ML
TROPONIN I SERPL-MCNC: 0.05 NG/ML
TROPONIN I SERPL-MCNC: 0.06 NG/ML
TSH SERPL DL<=0.05 MIU/L-ACNC: 2.1 UIU/ML (ref 0.36–3.74)
VENTRICULAR RATE, ECG03: 85 BPM
WBC # BLD AUTO: 4.3 K/UL (ref 3.6–11)

## 2020-02-23 PROCEDURE — 74011250637 HC RX REV CODE- 250/637: Performed by: INTERNAL MEDICINE

## 2020-02-23 PROCEDURE — 80048 BASIC METABOLIC PNL TOTAL CA: CPT

## 2020-02-23 PROCEDURE — 84484 ASSAY OF TROPONIN QUANT: CPT

## 2020-02-23 PROCEDURE — 65660000000 HC RM CCU STEPDOWN

## 2020-02-23 PROCEDURE — 36415 COLL VENOUS BLD VENIPUNCTURE: CPT

## 2020-02-23 PROCEDURE — 84439 ASSAY OF FREE THYROXINE: CPT

## 2020-02-23 PROCEDURE — 84443 ASSAY THYROID STIM HORMONE: CPT

## 2020-02-23 PROCEDURE — 93970 EXTREMITY STUDY: CPT

## 2020-02-23 PROCEDURE — 85027 COMPLETE CBC AUTOMATED: CPT

## 2020-02-23 PROCEDURE — 74011250636 HC RX REV CODE- 250/636: Performed by: INTERNAL MEDICINE

## 2020-02-23 RX ORDER — POTASSIUM CHLORIDE 750 MG/1
40 TABLET, FILM COATED, EXTENDED RELEASE ORAL 3 TIMES DAILY
Status: DISCONTINUED | OUTPATIENT
Start: 2020-02-23 | End: 2020-02-24

## 2020-02-23 RX ORDER — FUROSEMIDE 10 MG/ML
40 INJECTION INTRAMUSCULAR; INTRAVENOUS 2 TIMES DAILY
Status: DISCONTINUED | OUTPATIENT
Start: 2020-02-23 | End: 2020-02-25

## 2020-02-23 RX ADMIN — POTASSIUM CHLORIDE 40 MEQ: 750 TABLET, FILM COATED, EXTENDED RELEASE ORAL at 10:58

## 2020-02-23 RX ADMIN — FUROSEMIDE 40 MG: 10 INJECTION, SOLUTION INTRAMUSCULAR; INTRAVENOUS at 10:58

## 2020-02-23 RX ADMIN — LORATADINE 10 MG: 10 TABLET ORAL at 08:55

## 2020-02-23 RX ADMIN — Medication 10 ML: at 14:20

## 2020-02-23 RX ADMIN — LEVOTHYROXINE SODIUM 100 MCG: 0.1 TABLET ORAL at 06:21

## 2020-02-23 RX ADMIN — ALPRAZOLAM 0.5 MG: 0.5 TABLET ORAL at 18:33

## 2020-02-23 RX ADMIN — Medication 10 ML: at 22:25

## 2020-02-23 RX ADMIN — POTASSIUM CHLORIDE 40 MEQ: 750 TABLET, FILM COATED, EXTENDED RELEASE ORAL at 06:21

## 2020-02-23 RX ADMIN — POTASSIUM CHLORIDE 40 MEQ: 750 TABLET, FILM COATED, EXTENDED RELEASE ORAL at 22:24

## 2020-02-23 RX ADMIN — Medication 10 ML: at 06:22

## 2020-02-23 RX ADMIN — POTASSIUM CHLORIDE 40 MEQ: 750 TABLET, FILM COATED, EXTENDED RELEASE ORAL at 15:11

## 2020-02-23 NOTE — PROGRESS NOTES
Primary Nurse Pedro Campa RN and Nina Washington performed a dual skin assessment on this patient Impairment noted- see wound doc flow sheet  Tre score is 17  Pt has redness to left breast and abdomen, small abd. Hernia left upper quad, excoriation under right breast and forest area. Skin tear to left outer shin with clear bandage.

## 2020-02-23 NOTE — ED NOTES
2000 Verbal shift change report given to [facility] Melissa RN and Barbara Gracia RN (oncoming nurse) by Felicita Kelley RN (offgoing nurse). Report included the following information SBAR, ED Summary, Intake/Output, MAR and Recent Results. 2148 Pt calling out for assistance. All linens and bed pads were entirely soaked and changed.

## 2020-02-23 NOTE — PROGRESS NOTES
Bedside shift change report given to [facility] [place]note 2[facility] [address] (oncoming nurse) by Kristy Rankin (offgoing nurse). Report included the following information SBAR, Kardex, Intake/Output, MAR and Recent Results.

## 2020-02-23 NOTE — PROGRESS NOTES
Medical Progress Note      NAME: Tiny Laurent   :  1931  MRM:  [id number]    Date/Time: 2020           Problem List:     Active Problems:    A-fib ([facility] [address] ()      Dementia ([facility] [address] (2016)      Acute on chronic diastolic (congestive) heart failure ([address] (2019)      Acute on chronic diastolic CHF (congestive heart failure) ([address] (2019)             Subjective:     Breathing ok ; denies chest pain. Has been taking PT outpatient for left UE discomfort. C/o mild LUE discomfort. Tends to sleep on left side , had left abdominal wall induration on admission. Minor discomfort left lower leg skin tear     Past Medical History:   Diagnosis Date    A-fib ([facility] [address]     Arrhythmia     A FIB    Arthritis     Back pain     CAD (coronary artery disease)     PT DENIES    Chronic pain     Dementia ([facility] [address] 2016    Hypercholesterolemia     Psychiatric disorder     ANXIETY    Shoulder pain     Thyroid disease     Tremor, essential             Objective:         Vitals:      Last 24hrs VS reviewed since prior progress note. Most recent are:    Visit Vitals  /72 (BP 1 Location: Right arm, BP Patient Position: At rest)   Pulse 70   Temp 97.3 °F (36.3 °C)   Resp 16   Ht 5' 3\" (1.6 m)   Wt 154 lb 5.2 oz (70 kg)   SpO2 97%   BMI 27.34 kg/m²     SpO2 Readings from Last 6 Encounters:   20 97%   10/25/19 96%   10/04/19 92%   19 90%   19 95%   19 98%    O2 Flow Rate (L/min): 2 l/min   No intake or output data in the 24 hours ending 20 0850               Exam:      General:  Alert, cooperative, no distress, appears stated age. Lungs:    no change in left base mild decreased bs   O/w clear to auscultation bilaterally. Rt breast : no mass palpable  Left breat + induration in lateral half of breast   No mass felt   No ax LN felt b/l   Heart:  Irregular rate and rhythm, S1, S2 normal, no murmur, click, rub or gallop. Abdomen:   Soft, non-tender.  Left UQ unchanged mild induration , nt   Bowel sounds normal. No masses,  No organomegaly. Extremities:  Left shoulder non tender  No acute deformity noted   Mild decr left shoulder ROM  Rt 1 + pretib edema; left trace pretibial edema   , unchanged vs admission    Left lower lat leg: \"L\" shaped skin tear, appears clean, Tegaderm in place. Arm strength intact     Lab Data Reviewed: (see below)  Recent Results (from the past 24 hour(s))   EKG, 12 LEAD, INITIAL    Collection Time: 02/22/20  1:31 PM   Result Value Ref Range    Ventricular Rate 85 BPM    Atrial Rate 227 BPM    QRS Duration 120 ms    Q-T Interval 390 ms    QTC Calculation (Bezet) 464 ms    Calculated R Axis 92 degrees    Calculated T Axis -67 degrees    Diagnosis       Atrial flutter with variable AV block  Low voltage QRS  Right bundle branch block  When compared with ECG of 18-OCT-2019 19:56,  Atrial flutter has replaced Atrial fibrillation  Criteria for Septal infarct are no longer present  Inverted T waves have replaced nonspecific T wave abnormality in Inferior   leads     SAMPLES BEING HELD    Collection Time: 02/22/20  1:38 PM   Result Value Ref Range    SAMPLES BEING HELD 1red,1blu     COMMENT        Add-on orders for these samples will be processed based on acceptable specimen integrity and analyte stability, which may vary by analyte.    NT-PRO BNP    Collection Time: 02/22/20  1:38 PM   Result Value Ref Range    NT pro-BNP 2,138 (H) <450 PG/ML   TROPONIN I    Collection Time: 02/22/20  1:38 PM   Result Value Ref Range    Troponin-I, Qt. <0.05 <0.05 ng/mL   CBC WITH AUTOMATED DIFF    Collection Time: 02/22/20  1:38 PM   Result Value Ref Range    WBC 4.1 3.6 - 11.0 K/uL    RBC 4.30 3.80 - 5.20 M/uL    HGB 12.6 11.5 - 16.0 g/dL    HCT 41.7 35.0 - 47.0 %    MCV 97.0 80.0 - 99.0 FL    MCH 29.3 26.0 - 34.0 PG    MCHC 30.2 30.0 - 36.5 g/dL    RDW 14.8 (H) 11.5 - 14.5 %    PLATELET 310 098 - 776 K/uL    MPV 11.7 8.9 - 12.9 FL    NRBC 0.0 0  WBC    ABSOLUTE NRBC 0. 00 0.00 - 0.01 K/uL    NEUTROPHILS 65 32 - 75 %    LYMPHOCYTES 21 12 - 49 %    MONOCYTES 11 5 - 13 %    EOSINOPHILS 2 0 - 7 %    BASOPHILS 1 0 - 1 %    IMMATURE GRANULOCYTES 0 0.0 - 0.5 %    ABS. NEUTROPHILS 2.6 1.8 - 8.0 K/UL    ABS. LYMPHOCYTES 0.9 0.8 - 3.5 K/UL    ABS. MONOCYTES 0.5 0.0 - 1.0 K/UL    ABS. EOSINOPHILS 0.1 0.0 - 0.4 K/UL    ABS. BASOPHILS 0.1 0.0 - 0.1 K/UL    ABS. IMM. GRANS. 0.0 0.00 - 0.04 K/UL    DF AUTOMATED     METABOLIC PANEL, COMPREHENSIVE    Collection Time: 02/22/20  1:38 PM   Result Value Ref Range    Sodium 138 136 - 145 mmol/L    Potassium 4.1 3.5 - 5.1 mmol/L    Chloride 104 97 - 108 mmol/L    CO2 30 21 - 32 mmol/L    Anion gap 4 (L) 5 - 15 mmol/L    Glucose 97 65 - 100 mg/dL    BUN 19 6 - 20 MG/DL    Creatinine 0.73 0.55 - 1.02 MG/DL    BUN/Creatinine ratio 26 (H) 12 - 20      GFR est AA >60 >60 ml/min/1.73m2    GFR est non-AA >60 >60 ml/min/1.73m2    Calcium 9.7 8.5 - 10.1 MG/DL    Bilirubin, total 0.9 0.2 - 1.0 MG/DL    ALT (SGPT) 13 12 - 78 U/L    AST (SGOT) 20 15 - 37 U/L    Alk.  phosphatase 96 45 - 117 U/L    Protein, total 7.2 6.4 - 8.2 g/dL    Albumin 3.4 (L) 3.5 - 5.0 g/dL    Globulin 3.8 2.0 - 4.0 g/dL    A-G Ratio 0.9 (L) 1.1 - 2.2     METABOLIC PANEL, BASIC    Collection Time: 02/23/20  4:29 AM   Result Value Ref Range    Sodium 136 136 - 145 mmol/L    Potassium 3.5 3.5 - 5.1 mmol/L    Chloride 101 97 - 108 mmol/L    CO2 31 21 - 32 mmol/L    Anion gap 4 (L) 5 - 15 mmol/L    Glucose 81 65 - 100 mg/dL    BUN 15 6 - 20 MG/DL    Creatinine 0.69 0.55 - 1.02 MG/DL    BUN/Creatinine ratio 22 (H) 12 - 20      GFR est AA >60 >60 ml/min/1.73m2    GFR est non-AA >60 >60 ml/min/1.73m2    Calcium 9.3 8.5 - 10.1 MG/DL   CBC W/O DIFF    Collection Time: 02/23/20  4:29 AM   Result Value Ref Range    WBC 4.3 3.6 - 11.0 K/uL    RBC 4.07 3.80 - 5.20 M/uL    HGB 11.8 11.5 - 16.0 g/dL    HCT 39.6 35.0 - 47.0 %    MCV 97.3 80.0 - 99.0 FL    MCH 29.0 26.0 - 34.0 PG    MCHC 29.8 (L) 30.0 - 36.5 g/dL    RDW 14.6 (H) 11.5 - 14.5 %    PLATELET 097 487 - 301 K/uL    MPV 11.7 8.9 - 12.9 FL    NRBC 0.0 0  WBC    ABSOLUTE NRBC 0.00 0.00 - 0.01 K/uL   TROPONIN I    Collection Time: 02/23/20  4:29 AM   Result Value Ref Range    Troponin-I, Qt. 0.06 (H) <0.05 ng/mL   T4, FREE    Collection Time: 02/23/20  4:29 AM   Result Value Ref Range    T4, Free 1.4 0.8 - 1.5 NG/DL   TSH 3RD GENERATION    Collection Time: 02/23/20  4:29 AM   Result Value Ref Range    TSH 2.10 0.36 - 3.74 uIU/mL       Medications Reviewed: (see below)    ______________________________________________________________________    Medications:     Current Facility-Administered Medications   Medication Dose Route Frequency    potassium chloride SR (KLOR-CON 10) tablet 40 mEq  40 mEq Oral TID    furosemide (LASIX) injection 40 mg  40 mg IntraVENous BID    ALPRAZolam (XANAX) tablet 0.5 mg  0.5 mg Oral TID PRN    levothyroxine (SYNTHROID) tablet 100 mcg  100 mcg Oral ACB    loratadine (CLARITIN) tablet 10 mg  10 mg Oral DAILY    sodium chloride (NS) flush 5-40 mL  5-40 mL IntraVENous Q8H    sodium chloride (NS) flush 5-40 mL  5-40 mL IntraVENous PRN    acetaminophen (TYLENOL) tablet 650 mg  650 mg Oral Q4H PRN                   Assessment:   Acute on Chronic Diastolic CHF, improved. She is needing less supplemental 02. P: Lasix 40 mg IV BID. Cardiology to see     Slight Elevation Trop I. Cardiology to see about this also    Left shoulder mechanical discomfort. P: Tylenol prn     Suspect left breast and left abd wall induration likely positional , related to her preference for lying on left side down. Also CHF may be contributing     LE edema may be Venous Stasis + CHF . Also Venous Doppler LE's    Mild Dementia.      Patient Active Problem List   Diagnosis Code    A-fib ([address] I48.91    Thyroid disease E07.9    Hypercholesterolemia E78.00    Arthritis M19.90    CAD (coronary artery disease) I25.10    Arthritis of knee, left M17.12  Sepsis ([facility] [address] A41.9    Altered mental status R41.82    Dementia ([facility] [address] F03.90    Closed compression fracture of lumbar vertebra ([facility] [address] S32.000A    UTI (urinary tract infection) N39.0    Fall W19. XXXA    Fracture, intertrochanteric, right femur, closed, initial encounter ([facility] [address] S72.141A    Hip hematoma, right, initial encounter S70.01XA    Acquired hypothyroidism E03.9    Closed fracture of neck of right femur (HCC) S72.001A    Dehydration E86.0    Wound, open, hip or thigh, left, initial encounter S71.002A, S71.102A    Non-healing wound of right heel S91.301A    PNA (pneumonia) J18.9    Acute on chronic diastolic (congestive) heart failure ([facility]) I50.33    Pulmonary hypertension ([facility]) I27.20    Senile purpura ([facility]) D69.2    Acute on chronic diastolic CHF (congestive heart failure) ([facility]) I50.33    Breast pain, left N64.4    Yeast dermatitis B37.2    Acute mastitis of left breast N61.0    Pneumonia J18.9          Plan:     I spoke w/ sister Daniella Rand on the phone.                                                        ___________________________________________________      Attending Physician: José Luis Adhikari MD

## 2020-02-23 NOTE — ROUTINE PROCESS
TRANSFER - OUT REPORT: 
 
Verbal report given to Renea Ortiz RN(name) on Melva Senior  being transferred to [address] [place] [address](unit) for routine progression of care Report consisted of patients Situation, Background, Assessment and  
Recommendations(SBAR). Information from the following report(s) SBAR, ED Summary, Intake/Output, MAR and Recent Results was reviewed with the receiving nurse. Lines:  
Peripheral IV 02/22/20 Right Antecubital (Active) Site Assessment Clean, dry, & intact 2/22/2020  1:37 PM  
Phlebitis Assessment 0 2/22/2020  1:37 PM  
Infiltration Assessment 0 2/22/2020  1:37 PM  
Dressing Status Clean, dry, & intact 2/22/2020  1:37 PM  
Dressing Type Transparent 2/22/2020  1:37 PM  
Alcohol Cap Used No 2/22/2020  1:37 PM  
  
 
Opportunity for questions and clarification was provided. Patient transported with: 
 Registered Nurse

## 2020-02-23 NOTE — H&P
[address]  HISTORY AND PHYSICAL    Name:  Eden Aguirre  MR#:  [id number]  :  1931  ACCOUNT #:  [de-identified]  ADMIT DATE:  2020      CHIEF COMPLAINT:  An 55-year-old white female admitted with acute on chronic CHF. HISTORY OF PRESENT ILLNESS:  She has a history of diastolic CHF, chronic AFib, and fall risk and is not anticoagulated therefore. She has mild dementia and her history is somewhat vague. She denies dyspnea recently, but her sister who is present notes that she has had some dyspnea recently. The patient accidentally superficially lacerated her left lower leg today, came to the ER where Tegaderm was applied. Her  room air 02 saturation was 89%, responding to application of 4 liters a minute nasal cannula O2. The patient herself denies dyspnea, chest pain, palpitations, or dizziness. Chest x-ray revealed left pleural effusion and evidence of mild pulmonary edema. Lasix 60 mg IV was given. The patient at baseline takes Lasix 20 mg p.o. every other day. She is admitted now for further evaluation and treatment. PAST MEDICAL HISTORY/ILLNESSES:  1. Chronic diastolic heart failure. 2.  Essential tremor. 3.  Hypothyroidism. 4.  Anxiety. 5.  Hypocholesterolemia. 6.  Dementia. 7.  Back pain. 8.  Arthritis. PAST SURGICAL HISTORY:  1.  Lumbar kyphoplasty in 2019.  2.  Tonsillectomy and adenoidectomy. 3.  KADIE-BSO. 4.  Left knee replacement. 5.  Appendectomy. CURRENT MEDICATIONS:  1. Alprazolam 0.5 mg t.i.d. p.r.n. anxiety. 2.  Furosemide 20 mg every other day. 3.  Levothyroxine 100 mcg daily. 4.  Loratadine 10 mg daily. 5.  KCl 20 mEq daily. 6.  The patient was on atenolol 25 mg daily in the past, apparently last filled it in 10/2019.  7. She last filled spironolactone 25 mg daily in 2019. ADVERSE DRUG REACTIONS:  UNCLEAR IF SHE IS ALLERGIC TO LATEX.   CODEINE (AGITATION), ADHESIVE TAPE LEADS TO BLISTERS, CORTISONE UNCLEAR HER REACTION. FAMILY HISTORY:  Noncontributory. SOCIAL HISTORY:  Quit smoking long ago. EtOH 2 glass of wine nightly. REVIEW OF SYSTEMS:  She denies fever, chills, headaches, cough, abdominal pain, hematochezia, urinary difficulties. She uses a brief. She has chronic right leg discomfort, chronically her right leg is larger than left. She had negative venous Doppler 4 months ago here. Her lower extremity edema varies and sounds baseline now. She denies acute focal neurologic symptoms. PHYSICAL EXAMINATION:  VITAL SIGNS:  Temperature 98.4, pulse 90, /76, respirations 18, O2 saturations 97%. HEENT:  EOMI. PERRLA. Throat is clear. There is nonspecific mobile soft left anterior cervical lymph node palpable, which is small. NECK:  No carotid bruits heard. LUNGS:  Reveals decreased breath sounds at left base otherwise clear. HEART:  Irregular S1 and S2 without murmur, gallop, or rub. ABDOMEN:  BS positive. Soft. No mass felt. No HSM. Nontender. EXTREMITIES:  Right knee discomfort on flexion and extension. Rt calf larger than left. Neg rt Leonel's. There is trace bilateral pretibial edema. Intact DP pulses. NEUROLOGIC:  Awake, alert, and oriented x3. Mildly forgetful. Remainder of neurological exam is nonfocal.    LABORATORY DATA:  NT-proBNP 2138, troponin I negative, BUN 19, creatinine 0.73, potassium 4.1, glucose 97, albumin 3.4. Remainder of LFTs normal.  WBC 4.1, hemoglobin 12.6, hematocrit 41.7, and platelet count 061. Normal differential.    EKG, atrial fibrillation, old right bundle branch block, low voltage QRS, ventricular response 85, inverted T-waves on previous noted EKG of 10/18/2019, replaced nonspecific T-wave abnormality in inferior leads. Left lower leg L-shaped superficial laceration with recent blood but no active bleeding seen. Each leg laceration is approximately 3 cm in length. She has a Tegaderm in place.   Right leg pretibial trace edema, left ankle trace edema, negative right Leonel's. IMPRESSION:  1. Acute on chronic diastolic congestive heart failure. Plan, diurese, keep the patient with calcium supplementation. 2.  Chronic atrial fibrillation, not a anticoagulation candidate due to fall risk. 3.  Superficial Left lower leg laceration. Continue local care. 4.  Hypothyroidism appears compensated. 5.  Check thyroid function test.  6.  Nonspecific mobile small left mobile neck node. PLAN:  We will admit the patient to full inpatient admission, obtain serial cardiac enzymes, have Cardiology see from Dr. Elaine Vang' group. I spoke with the patient's sister who was here. The patient is admitted for Dr. Kenzie Scott, for whom I am on-call.         MD ERIC Breaux/[email or web address]  D:  02/22/2020 17:52  T:  02/22/2020 23:18  JOB #:  [id number]

## 2020-02-23 NOTE — PROGRESS NOTES
Problem: Pain  Goal: *Control of Pain  Outcome: Progressing Towards Goal     Problem: Patient Education: Go to Patient Education Activity  Goal: Patient/Family Education  Outcome: Progressing Towards Goal     Problem: Falls - Risk of  Goal: *Absence of Falls  Description  Document Nishi Getting Fall Risk and appropriate interventions in the flowsheet.   Outcome: Progressing Towards Goal  Note: Fall Risk Interventions:  Mobility Interventions: Utilize walker, cane, or other assistive device    Mentation Interventions: Adequate sleep, hydration, pain control, Bed/chair exit alarm    Medication Interventions: Bed/chair exit alarm    Elimination Interventions: Bed/chair exit alarm, Toileting schedule/hourly rounds              Problem: Patient Education: Go to Patient Education Activity  Goal: Patient/Family Education  Outcome: Progressing Towards Goal

## 2020-02-23 NOTE — ROUTINE PROCESS
Bedside shift change report given to Owen (oncoming nurse) by Taisha Munguia (offgoing nurse). Report included the following information SBAR, Kardex, Intake/Output, MAR and Recent Results.

## 2020-02-24 ENCOUNTER — APPOINTMENT (OUTPATIENT)
Dept: GENERAL RADIOLOGY | Age: 85
DRG: 292 | End: 2020-02-24
Attending: INTERNAL MEDICINE
Payer: MEDICARE

## 2020-02-24 LAB
ANION GAP SERPL CALC-SCNC: 1 MMOL/L (ref 5–15)
BUN SERPL-MCNC: 15 MG/DL (ref 6–20)
BUN/CREAT SERPL: 18 (ref 12–20)
CALCIUM SERPL-MCNC: 9.3 MG/DL (ref 8.5–10.1)
CHLORIDE SERPL-SCNC: 101 MMOL/L (ref 97–108)
CO2 SERPL-SCNC: 34 MMOL/L (ref 21–32)
CREAT SERPL-MCNC: 0.84 MG/DL (ref 0.55–1.02)
GLUCOSE SERPL-MCNC: 107 MG/DL (ref 65–100)
POTASSIUM SERPL-SCNC: 4.5 MMOL/L (ref 3.5–5.1)
PROCALCITONIN SERPL-MCNC: <0.05 NG/ML
SODIUM SERPL-SCNC: 136 MMOL/L (ref 136–145)
TROPONIN I SERPL-MCNC: 0.05 NG/ML

## 2020-02-24 PROCEDURE — 77030029684 HC NEB SM VOL KT MONA -A

## 2020-02-24 PROCEDURE — 74011000258 HC RX REV CODE- 258: Performed by: INTERNAL MEDICINE

## 2020-02-24 PROCEDURE — 71045 X-RAY EXAM CHEST 1 VIEW: CPT

## 2020-02-24 PROCEDURE — 94760 N-INVAS EAR/PLS OXIMETRY 1: CPT

## 2020-02-24 PROCEDURE — 94640 AIRWAY INHALATION TREATMENT: CPT

## 2020-02-24 PROCEDURE — 74011250636 HC RX REV CODE- 250/636: Performed by: INTERNAL MEDICINE

## 2020-02-24 PROCEDURE — 74011250637 HC RX REV CODE- 250/637: Performed by: INTERNAL MEDICINE

## 2020-02-24 PROCEDURE — 84484 ASSAY OF TROPONIN QUANT: CPT

## 2020-02-24 PROCEDURE — 65660000000 HC RM CCU STEPDOWN

## 2020-02-24 PROCEDURE — 84145 PROCALCITONIN (PCT): CPT

## 2020-02-24 PROCEDURE — 36415 COLL VENOUS BLD VENIPUNCTURE: CPT

## 2020-02-24 PROCEDURE — 94664 DEMO&/EVAL PT USE INHALER: CPT

## 2020-02-24 PROCEDURE — 77010033678 HC OXYGEN DAILY

## 2020-02-24 PROCEDURE — 74011000250 HC RX REV CODE- 250: Performed by: INTERNAL MEDICINE

## 2020-02-24 PROCEDURE — 80048 BASIC METABOLIC PNL TOTAL CA: CPT

## 2020-02-24 RX ORDER — POTASSIUM CHLORIDE 750 MG/1
20 TABLET, FILM COATED, EXTENDED RELEASE ORAL 2 TIMES DAILY
Status: DISCONTINUED | OUTPATIENT
Start: 2020-02-24 | End: 2020-02-27

## 2020-02-24 RX ORDER — BALSAM PERU/CASTOR OIL
OINTMENT (GRAM) TOPICAL EVERY 8 HOURS
Status: DISCONTINUED | OUTPATIENT
Start: 2020-02-24 | End: 2020-03-06 | Stop reason: HOSPADM

## 2020-02-24 RX ORDER — ALBUTEROL SULFATE 0.83 MG/ML
2.5 SOLUTION RESPIRATORY (INHALATION)
Status: DISCONTINUED | OUTPATIENT
Start: 2020-02-24 | End: 2020-02-26

## 2020-02-24 RX ADMIN — Medication 10 ML: at 06:57

## 2020-02-24 RX ADMIN — Medication 10 ML: at 13:36

## 2020-02-24 RX ADMIN — POTASSIUM CHLORIDE 20 MEQ: 750 TABLET, FILM COATED, EXTENDED RELEASE ORAL at 13:35

## 2020-02-24 RX ADMIN — CASTOR OIL AND BALSAM, PERU: 788; 87 OINTMENT TOPICAL at 13:35

## 2020-02-24 RX ADMIN — Medication 10 ML: at 22:35

## 2020-02-24 RX ADMIN — ACETAMINOPHEN 650 MG: 325 TABLET ORAL at 09:26

## 2020-02-24 RX ADMIN — CASTOR OIL AND BALSAM, PERU: 788; 87 OINTMENT TOPICAL at 22:32

## 2020-02-24 RX ADMIN — PIPERACILLIN AND TAZOBACTAM 3.38 G: 3; .375 INJECTION, POWDER, LYOPHILIZED, FOR SOLUTION INTRAVENOUS at 22:32

## 2020-02-24 RX ADMIN — ALPRAZOLAM 0.5 MG: 0.5 TABLET ORAL at 18:44

## 2020-02-24 RX ADMIN — PIPERACILLIN AND TAZOBACTAM 3.38 G: 3; .375 INJECTION, POWDER, LYOPHILIZED, FOR SOLUTION INTRAVENOUS at 14:00

## 2020-02-24 RX ADMIN — FUROSEMIDE 40 MG: 10 INJECTION, SOLUTION INTRAMUSCULAR; INTRAVENOUS at 09:26

## 2020-02-24 RX ADMIN — ACETAMINOPHEN 650 MG: 325 TABLET ORAL at 18:45

## 2020-02-24 RX ADMIN — FUROSEMIDE 40 MG: 10 INJECTION, SOLUTION INTRAMUSCULAR; INTRAVENOUS at 18:44

## 2020-02-24 RX ADMIN — ALPRAZOLAM 0.5 MG: 0.5 TABLET ORAL at 09:26

## 2020-02-24 RX ADMIN — LEVOTHYROXINE SODIUM 100 MCG: 0.1 TABLET ORAL at 06:56

## 2020-02-24 RX ADMIN — LORATADINE 10 MG: 10 TABLET ORAL at 09:26

## 2020-02-24 RX ADMIN — ALBUTEROL SULFATE 2.5 MG: 2.5 SOLUTION RESPIRATORY (INHALATION) at 19:05

## 2020-02-24 RX ADMIN — ALPRAZOLAM 0.5 MG: 0.5 TABLET ORAL at 13:35

## 2020-02-24 RX ADMIN — POTASSIUM CHLORIDE 20 MEQ: 750 TABLET, FILM COATED, EXTENDED RELEASE ORAL at 18:45

## 2020-02-24 NOTE — NURSE NAVIGATOR
Chart reviewed by Heart Failure Nurse Navigator. Heart Failure database completed. EF:  56/60    ACEi/ARB/ARNi: Not indicated    BB: Not indicated    Aldosterone Antagonist: On Aldactone prior to admission    Obstructive Sleep Apnea Screening:No   STOP-BANG score:   Referred to Sleep Medicine:     CRT not indicated    NYHA Functional Class requested via provider message on [employer]    Heart Failure Teach Back in Patient Education. Heart Failure Avoiding Triggers on Discharge Instructions. Cardiologist:       Post discharge follow up phone call to be made within 48-72 hours of discharge.

## 2020-02-24 NOTE — WOUND CARE
Wound Care Note:  
 
New consult placed by physician request for left leg laceration Chart shows: 
Admitted for afib, dementia, and acute on chronic diastolic heart failure Past Medical History:  
Diagnosis Date  A-fib ([facility] [address]  Arrhythmia A FIB  Arthritis  Back pain  CAD (coronary artery disease) PT DENIES  Chronic pain  Dementia ([facility] [address] 2/9/2016  Hypercholesterolemia  Psychiatric disorder ANXIETY  Shoulder pain  Thyroid disease  Tremor, essential   
 
WBC = 4.3 on 2/23/20 Admitted from [facility] Assessment:  
Patient is A&O x 4, communicative, continent with moderate assistance needed in repositioning. Bed: [place] Patient wearing briefs for incontinence. Diet: Cardiac regular Patient reports pain in lower legs \"about half way\"; 5/10. Bilateral heels, buttocks, and sacral skin intact and with light erythema that is blanchable. Venelex ointment to be ordered. Palpable DP pulses bilaterally. Edema to lower legs and feet. 1. POA left lower leg skin tear measures 4 cm x 4 cm x 0.1 cm, it is in an \"L\" shape, wound bed is pink, forest-wound intact, wound edges are open, small sero/sang drainage. Petroleum gauze, 4 x 4 and roll gauze applied. Spoke with Dr. Brigitte Rai, wound care orders obtained. Patient repositioned on left side. Heels offloaded on pillows. Recommendations:   
Left lower leg- Every other day cleanse with normal saline, wipe wound bed clean and dry, apply a piece of Xeroform gauze that has been folded in half, cover with 4 x 4's, secure with roll gauze and tape. Sacrum and bilateral heels- Every 8 hours liberally apply Venelex ointment Skin Care & Pressure Prevention: 
Minimize layers of linen/pads under patient to optimize support surface. Turn/reposition approximately every 2 hours and offload heels. Manage incontinence / promote continence Nourishing Skin Cream to dry skin, minimize use of briefs when able Discussed above plan with patient & Brenton Rico RN Transition of Care: Plan to follow as needed while admitted to hospital. 
 
FLETCHER MedinaN, RN, [facility], [facility]. 
office [phone] 
pager [phone] or call  to page

## 2020-02-24 NOTE — PROGRESS NOTES
Medical Progress Note      NAME: Kaity Sandhu   :  1931  MRM:  [id number]    Date/Time: 2020           Problem List:     Breathing ok. Denies chest pain   No cough problem     Past Medical History:   Diagnosis Date    A-fib ([facility] [address]     Arrhythmia     A FIB    Arthritis     Back pain     CAD (coronary artery disease)     PT DENIES    Chronic pain     Dementia ([facility] [address] 2016    Hypercholesterolemia     Psychiatric disorder     ANXIETY    Shoulder pain     Thyroid disease     Tremor, essential             Objective:         Vitals:      Last 24hrs VS reviewed since prior progress note. Most recent are:    Visit Vitals  /72 (BP 1 Location: Left arm, BP Patient Position: At rest)   Pulse 84   Temp 98.5 °F (36.9 °C)   Resp 16   Ht 5' 3\" (1.6 m)   Wt 166 lb 0.1 oz (75.3 kg)   SpO2 95%   BMI 29.41 kg/m²     SpO2 Readings from Last 6 Encounters:   20 95%   10/25/19 96%   10/04/19 92%   19 90%   19 95%   19 98%    O2 Flow Rate (L/min): 2 l/min   No intake or output data in the 24 hours ending 20 6441               Exam:      General:  Alert, cooperative, no distress, appears stated age. Lungs:   Clear to auscultation bilaterally. Heart:  Regular rate and rhythm, S1, S2 normal, no murmur, click, rub or gallop. Abdomen:   Soft, non-tender. Bowel sounds normal. No masses,  No organomegaly. Extremities: Rt 1+ pretib edema.   Left trace pretib edema  Left lower   Leg : clean appearing skin tear       Lab Data Reviewed: (see below)  Recent Results (from the past 24 hour(s))   TROPONIN I    Collection Time: 20  1:09 PM   Result Value Ref Range    Troponin-I, Qt. 0.05 (H) <0.05 ng/mL   TROPONIN I    Collection Time: 20 10:37 PM   Result Value Ref Range    Troponin-I, Qt. 0.05 (H) <1.60 ng/mL   METABOLIC PANEL, BASIC    Collection Time: 20  4:29 AM   Result Value Ref Range    Sodium 136 136 - 145 mmol/L    Potassium 4.5 3.5 - 5.1 mmol/L    Chloride 101 97 - 108 mmol/L    CO2 34 (H) 21 - 32 mmol/L    Anion gap 1 (L) 5 - 15 mmol/L    Glucose 107 (H) 65 - 100 mg/dL    BUN 15 6 - 20 MG/DL    Creatinine 0.84 0.55 - 1.02 MG/DL    BUN/Creatinine ratio 18 12 - 20      GFR est AA >60 >60 ml/min/1.73m2    GFR est non-AA >60 >60 ml/min/1.73m2    Calcium 9.3 8.5 - 10.1 MG/DL   TROPONIN I    Collection Time: 02/24/20  4:29 AM   Result Value Ref Range    Troponin-I, Qt. 0.05 (H) <0.05 ng/mL     CXR: unchanged Cardiomegaly   Unchanged mild pulm edema ,   Left basilar consolidation  Left pleural effusion   Medications Reviewed: (see below)    ______________________________________________________________________    Medications:     Current Facility-Administered Medications   Medication Dose Route Frequency    potassium chloride SR (KLOR-CON 10) tablet 40 mEq  40 mEq Oral TID    furosemide (LASIX) injection 40 mg  40 mg IntraVENous BID    ALPRAZolam (XANAX) tablet 0.5 mg  0.5 mg Oral TID PRN    levothyroxine (SYNTHROID) tablet 100 mcg  100 mcg Oral ACB    loratadine (CLARITIN) tablet 10 mg  10 mg Oral DAILY    sodium chloride (NS) flush 5-40 mL  5-40 mL IntraVENous Q8H    sodium chloride (NS) flush 5-40 mL  5-40 mL IntraVENous PRN    acetaminophen (TYLENOL) tablet 650 mg  650 mg Oral Q4H PRN                   Assessment:   Suspect CHF, but need to r/o PNA  P: continue diuresis. Cardiology to see  Add Albuterol and Zosyn . Pulm to see     Trop I curve flat, not suggestive of myocardial ischemia    Left leg Wound care nsg c/s appreciated   Patient Active Problem List   Diagnosis Code    A-fib ([address] I48.91    Thyroid disease E07.9    Hypercholesterolemia E78.00    Arthritis M19.90    CAD (coronary artery disease) I25.10    Arthritis of knee, left M17.12    Sepsis ([address] A41.9    Altered mental status R41.82    Dementia ([address] F03.90    Closed compression fracture of lumbar vertebra ([address] S32.000A    UTI (urinary tract infection) N39.0    Fall W19. Luther Barros Fracture, intertrochanteric, right femur, closed, initial encounter ([facility] [address] S72.141A    Hip hematoma, right, initial encounter S70.01XA    Acquired hypothyroidism E03.9    Closed fracture of neck of right femur (HCC) S72.001A    Dehydration E86.0    Wound, open, hip or thigh, left, initial encounter S71.002A, S71.102A    Non-healing wound of right heel S91.301A    PNA (pneumonia) J18.9    Acute on chronic diastolic (congestive) heart failure ([facility]) I50.33    Pulmonary hypertension ([facility]) I27.20    Senile purpura ([facility]) D69.2    Acute on chronic diastolic CHF (congestive heart failure) ([facility]) I50.33    Breast pain, left N64.4    Yeast dermatitis B37.2    Acute mastitis of left breast N61.0    Pneumonia J18.9          Plan:     I left  for pt's sister                                                     ___________________________________________________      Attending Physician: Ahsan Dasilva MD

## 2020-02-24 NOTE — ROUTINE PROCESS
Bedside shift change report given to Owen (oncoming nurse) by Luisito Nassar (offgoing nurse). Report included the following information SBAR, Kardex, Intake/Output, MAR and Recent Results.

## 2020-02-24 NOTE — CONSULTS
Name: Tahira Pellet: Salina Logan 55   : 1931 Admit Date: 2020   Phone: [phone]  Room: 208/01   PCP: Darline Washington MD  MRN: [de-identified]   Date: 2020  Code: Full Code        HPI:    4:56 PM       History was obtained from patient. I was asked by Shaunna Silva MD to see Matthias Walton in consultation for a chief complaint of shortness of breath. History of Present Illness: 80year old female with past medical hx of diastolic heart failure who presented to [facility] with increased shortness of breath and ankle swelling. Her  room air 02 saturation was 89% and she was placed on 4 liters a minute nasal cannula O2. She also received 60 mg iv lasix. She denies fever, chills, night sweats or weight loss. She denied chest pain. She does have a hx of pulmonary hypertension and CHF for which she takes lasix. Hx of afib - was anticoagulated before - not anymore. CXR left lower love volume loss - atelectasis vs effusion vs infiltrate.     Past Medical History:   Diagnosis Date    A-fib [facility])     Arrhythmia     A FIB    Arthritis     Back pain     CAD (coronary artery disease)     PT DENIES    Chronic pain     Dementia ([facility] [address] 2016    Hypercholesterolemia     Psychiatric disorder     ANXIETY    Shoulder pain     Thyroid disease     Tremor, essential        Past Surgical History:   Procedure Laterality Date    HX APPENDECTOMY      HX ORTHOPAEDIC      left knee replacement    HX KADIE AND BSO      AGE 39    HX TONSIL AND ADENOIDECTOMY      IR KYPHOPLASTY LUMBAR  2019       Family History   Problem Relation Age of Onset    Dementia Mother     Arthritis-osteo Father        Social History     Tobacco Use    Smoking status: Former Smoker     Packs/day: 0.50     Years: 10.00     Pack years: 5.00     Last attempt to quit: 1994     Years since quittin.1    Smokeless tobacco: Never Used   Substance Use Topics    Alcohol use: Yes     Comment: NIGHTLY 2 GLASSES WINE       Allergies   Allergen Reactions    Latex, Natural Rubber Other (comments)     Pt denies allergy to latex    Codeine Other (comments)     \"It just sends me up the wall\"    Adhesive Rash and Other (comments)     blisters    Cortisone Unknown (comments)       Current Facility-Administered Medications   Medication Dose Route Frequency    balsam peru-castor oil (VENELEX) ointment   Topical Q8H    potassium chloride SR (KLOR-CON 10) tablet 20 mEq  20 mEq Oral BID    albuterol (PROVENTIL VENTOLIN) nebulizer solution 2.5 mg  2.5 mg Nebulization QID RT    piperacillin-tazobactam (ZOSYN) 3.375 g in 0.9% sodium chloride (MBP/ADV) 100 mL  3.375 g IntraVENous Q8H    [START ON 2/25/2020] lactobac ac& pc-s.therm-b.anim (DAVE Q/RISAQUAD)  1 Cap Oral DAILY    furosemide (LASIX) injection 40 mg  40 mg IntraVENous BID    ALPRAZolam (XANAX) tablet 0.5 mg  0.5 mg Oral TID PRN    levothyroxine (SYNTHROID) tablet 100 mcg  100 mcg Oral ACB    loratadine (CLARITIN) tablet 10 mg  10 mg Oral DAILY    sodium chloride (NS) flush 5-40 mL  5-40 mL IntraVENous Q8H    sodium chloride (NS) flush 5-40 mL  5-40 mL IntraVENous PRN    acetaminophen (TYLENOL) tablet 650 mg  650 mg Oral Q4H PRN         REVIEW OF SYSTEMS   Negative except as stated in the HPI. Physical Exam:   Visit Vitals  /65 (BP 1 Location: Left arm, BP Patient Position: At rest)   Pulse 100   Temp 97.7 °F (36.5 °C)   Resp 16   Ht 5' 3\" (1.6 m)   Wt 75.3 kg (166 lb 0.1 oz)   SpO2 94%   BMI 29.41 kg/m²       General:  Alert, cooperative, no distress, appears stated age. Head:  Normocephalic, without obvious abnormality, atraumatic. Eyes:  Conjunctivae/corneas clear. PERRL, EOMs intact. Nose: Nares normal. Septum midline. Mucosa normal. No drainage or sinus tenderness. Neck: Supple, symmetrical, trachea midline, no adenopathy, thyroid: no enlargment/tenderness/nodules, no carotid bruit and no JVD.        Lungs:   Decreased air entry at the left base. Heart:  Regular rate and rhythm, S1, S2 normal, no murmur, click, rub or gallop. Abdomen:   Soft, non-tender. Bowel sounds normal. No masses,  No organomegaly. Extremities: Ankle swelling. Neurologic: Grossly nonfocal       Lab Results   Component Value Date/Time    Sodium 136 02/24/2020 04:29 AM    Potassium 4.5 02/24/2020 04:29 AM    Chloride 101 02/24/2020 04:29 AM    CO2 34 (H) 02/24/2020 04:29 AM    BUN 15 02/24/2020 04:29 AM    Creatinine 0.84 02/24/2020 04:29 AM    Glucose 107 (H) 02/24/2020 04:29 AM    Calcium 9.3 02/24/2020 04:29 AM    Magnesium 1.6 10/02/2019 04:09 AM    Lactic acid 1.1 10/19/2019 04:09 AM       Lab Results   Component Value Date/Time    WBC 4.3 02/23/2020 04:29 AM    HGB 11.8 02/23/2020 04:29 AM    PLATELET 296 69/69/0311 04:29 AM    MCV 97.3 02/23/2020 04:29 AM       Lab Results   Component Value Date/Time    INR 3.9 (H) 10/25/2019 03:50 AM    aPTT 33.2 (H) 07/29/2018 09:20 PM    AST (SGOT) 20 02/22/2020 01:38 PM    Alk. phosphatase 96 02/22/2020 01:38 PM    Protein, total 7.2 02/22/2020 01:38 PM    Albumin 3.4 (L) 02/22/2020 01:38 PM    Globulin 3.8 02/22/2020 01:38 PM       Lab Results   Component Value Date/Time    TSH 2.10 02/23/2020 04:29 AM        Lab Results   Component Value Date/Time     (H) 07/29/2018 09:20 PM    CK-MB Index 1.8 02/09/2016 03:37 PM    Troponin-I, Qt. 0.05 (H) 02/24/2020 04:29 AM        Lab Results   Component Value Date/Time     (H) 07/29/2018 09:20 PM    CK 36 01/12/2010 04:50 PM       IMPRESSION:  ===========  -acute on chronic diastolic heart failure. -Pulmonary hypertension.  -atrial fibrillation - currently not anticoagulated.  -abnormal CXR with left base volume loss. -left side pleural effusion noted to be present on 5/2019 echo. PLAN:  =====  -diuretics. CXR in 2 days.  -My suspicion of pneumonia is low. Check pro -calcitonin. If < 0.5. d/c abx. Thank you for the consult.     Blair Whitaker, MD

## 2020-02-24 NOTE — PROGRESS NOTES
Chart reviewed, nsg cleared for activity, attempted to see patient for therapy evaluation. Patient refused to participate. States she is tired, did not get much sleep, does not want to attempt activity. Of note, patient voiced she is wheelchair bound.   States she performs her own transfers at times and at other times relies on her private duty caregiver, Jesse Zeng,  to assist.   Evaluation deferred per patient request.

## 2020-02-24 NOTE — PHYSICIAN ADVISORY
This patient is at high risk of adverse events and deterioration based on documented clinical data, comorbid conditions and current acute care course. Ms. Jaya Womack is expected to meet Inpatient Admission status criteria in accordance with CMS regulation Section 43 .3. Specifically, due to medical necessity the patient's stay is expected to exceed Two Midnights. It is our recommendation that this patient's hospitalization status should be  INPATIENT status. The final decision of the patient's hospitalization status depends on the attending physician's judgment.

## 2020-02-24 NOTE — PROGRESS NOTES
ADRI:  Expected return to [facility] independent living facility with private caregiving in place with [facility] for transport. Reason for Admission:   Acute on chronic CHF                   RUR Score:     17%                Plan for utilizing home health:      TBD, based on MD and therapy recommendations                    Current Advanced Directive/Advance Care Plan:        Pt has AMD on file indicating MPOA and secondary. Per brief bedside assessment pt presented as alert and oriented. Transition of Care Plan:        Attempted to meet with pt at the bedside and explained role of CM. Discussed that in her previous hospitalization CM assisted with arranging transport via BLS for discharge. Pt confirmed that would be necessary for this admission but deferred any more conversation until a future time as she was watching [employer]DIS[employer] now. \"  Cm asked if it was ok to return tomorrow if needed and pt agreed. Would expect pt to return to prior level of care, independent living at [facility] with private caregivers via BLS. Per therapy note, pt is wheelchair bound and can sometimes transfer independently but sometimes relies on aides. PILI Turner    Care Management Interventions  PCP Verified by CM:  Yes  Mode of Transport at Discharge: BLS(pt confirmed she will need stretcher)  Physical Therapy Consult: Yes  Occupational Therapy Consult: Yes  Speech Therapy Consult: No  Current Support Network: Lives Alone, Has Personal Caregivers  Confirm Follow Up Transport: Other (see comment)(pt confirmed she will use stretcher)  The Patient and/or Patient Representative was Provided with a Choice of Provider and Agrees with the Discharge Plan?: Yes  Freedom of Choice List was Provided with Basic Dialogue that Supports the Patient's Individualized Plan of Care/Goals, Treatment Preferences and Shares the Quality Data Associated with the Providers?: Yes  [employer] Provided?: No

## 2020-02-25 PROBLEM — S81.812A LACERATION OF LEG, LEFT: Status: ACTIVE | Noted: 2020-02-25

## 2020-02-25 LAB
ATRIAL RATE: 197 BPM
CALCULATED R AXIS, ECG10: 92 DEGREES
CALCULATED T AXIS, ECG11: -87 DEGREES
DIAGNOSIS, 93000: NORMAL
Q-T INTERVAL, ECG07: 380 MS
QRS DURATION, ECG06: 120 MS
QTC CALCULATION (BEZET), ECG08: 449 MS
TROPONIN I SERPL-MCNC: <0.05 NG/ML
VENTRICULAR RATE, ECG03: 84 BPM

## 2020-02-25 PROCEDURE — 74011250637 HC RX REV CODE- 250/637: Performed by: INTERNAL MEDICINE

## 2020-02-25 PROCEDURE — 74011250636 HC RX REV CODE- 250/636: Performed by: INTERNAL MEDICINE

## 2020-02-25 PROCEDURE — 74011000258 HC RX REV CODE- 258: Performed by: INTERNAL MEDICINE

## 2020-02-25 PROCEDURE — 36415 COLL VENOUS BLD VENIPUNCTURE: CPT

## 2020-02-25 PROCEDURE — 93005 ELECTROCARDIOGRAM TRACING: CPT

## 2020-02-25 PROCEDURE — 77010033678 HC OXYGEN DAILY

## 2020-02-25 PROCEDURE — 84484 ASSAY OF TROPONIN QUANT: CPT

## 2020-02-25 PROCEDURE — 74011000250 HC RX REV CODE- 250: Performed by: INTERNAL MEDICINE

## 2020-02-25 PROCEDURE — 97530 THERAPEUTIC ACTIVITIES: CPT

## 2020-02-25 PROCEDURE — 94640 AIRWAY INHALATION TREATMENT: CPT

## 2020-02-25 PROCEDURE — 97161 PT EVAL LOW COMPLEX 20 MIN: CPT

## 2020-02-25 PROCEDURE — 65660000000 HC RM CCU STEPDOWN

## 2020-02-25 RX ORDER — FUROSEMIDE 10 MG/ML
80 INJECTION INTRAMUSCULAR; INTRAVENOUS 2 TIMES DAILY
Status: DISCONTINUED | OUTPATIENT
Start: 2020-02-26 | End: 2020-03-02

## 2020-02-25 RX ORDER — TRAMADOL HYDROCHLORIDE 50 MG/1
50 TABLET ORAL ONCE
Status: COMPLETED | OUTPATIENT
Start: 2020-02-25 | End: 2020-02-25

## 2020-02-25 RX ADMIN — PIPERACILLIN AND TAZOBACTAM 3.38 G: 3; .375 INJECTION, POWDER, LYOPHILIZED, FOR SOLUTION INTRAVENOUS at 06:20

## 2020-02-25 RX ADMIN — ALBUTEROL SULFATE 2.5 MG: 2.5 SOLUTION RESPIRATORY (INHALATION) at 08:23

## 2020-02-25 RX ADMIN — CASTOR OIL AND BALSAM, PERU: 788; 87 OINTMENT TOPICAL at 13:32

## 2020-02-25 RX ADMIN — CASTOR OIL AND BALSAM, PERU: 788; 87 OINTMENT TOPICAL at 21:27

## 2020-02-25 RX ADMIN — ALPRAZOLAM 0.5 MG: 0.5 TABLET ORAL at 13:32

## 2020-02-25 RX ADMIN — ACETAMINOPHEN 650 MG: 325 TABLET ORAL at 13:32

## 2020-02-25 RX ADMIN — CASTOR OIL AND BALSAM, PERU: 788; 87 OINTMENT TOPICAL at 06:21

## 2020-02-25 RX ADMIN — LEVOTHYROXINE SODIUM 100 MCG: 0.1 TABLET ORAL at 06:30

## 2020-02-25 RX ADMIN — POTASSIUM CHLORIDE 20 MEQ: 750 TABLET, FILM COATED, EXTENDED RELEASE ORAL at 10:00

## 2020-02-25 RX ADMIN — ACETAMINOPHEN 650 MG: 325 TABLET ORAL at 07:40

## 2020-02-25 RX ADMIN — FUROSEMIDE 40 MG: 10 INJECTION, SOLUTION INTRAMUSCULAR; INTRAVENOUS at 10:01

## 2020-02-25 RX ADMIN — ALPRAZOLAM 0.5 MG: 0.5 TABLET ORAL at 10:01

## 2020-02-25 RX ADMIN — ALBUTEROL SULFATE 2.5 MG: 2.5 SOLUTION RESPIRATORY (INHALATION) at 11:55

## 2020-02-25 RX ADMIN — LORATADINE 10 MG: 10 TABLET ORAL at 10:01

## 2020-02-25 RX ADMIN — TRAMADOL HYDROCHLORIDE 50 MG: 50 TABLET, FILM COATED ORAL at 22:17

## 2020-02-25 RX ADMIN — ALPRAZOLAM 0.5 MG: 0.5 TABLET ORAL at 18:32

## 2020-02-25 RX ADMIN — Medication 10 ML: at 18:33

## 2020-02-25 RX ADMIN — Medication 10 ML: at 21:26

## 2020-02-25 RX ADMIN — Medication 10 ML: at 06:28

## 2020-02-25 RX ADMIN — Medication 1 CAPSULE: at 10:01

## 2020-02-25 RX ADMIN — POTASSIUM CHLORIDE 20 MEQ: 750 TABLET, FILM COATED, EXTENDED RELEASE ORAL at 18:32

## 2020-02-25 NOTE — CONSULTS
[address]    Name:  Chastity Iniguez  MR#:  [id number]  :  1931  ACCOUNT #:  [de-identified]  DATE OF SERVICE:  2020    HISTORY OF PRESENT ILLNESS:  The patient is an 19-year-old female here with an acute exacerbation of her chronic diastolic heart failure. She indicates that she has been taking medications as directed. The edema in her legs, which can be significant, comes and goes. She has periodic episodes of flare ups. She denies any hemoptysis, any fever or chills, any syncope, chest pain, diaphoresis, or claudication. MEDICATIONS:  Her home medicines include furosemide 20 mg daily, levothyroxine, potassium, atenolol, and spironolactone. ALLERGIES:  LATEX, CODEINE, ADHESIVE, AND CORTISONE. REVIEW OF SYSTEMS:  Negative for chest pain, palpitations, syncope, orthopnea, or PND. PHYSICAL EXAMINATION:  GENERAL:  This is a well-developed, alert, elderly lady in no distress. VITAL SIGNS:  Temperature is normal.  Heart rate is 100, respirations 16, saturation 96% on 2 liters. HEENT:  Unremarkable. NECK:  Supple. No adenopathy. CHEST:  Chest wall nontender. LUNGS:  Markedly decreased breath sounds bilaterally, particularly unknown posteriorly. HEART:  Irregular rhythm, moderate rate. No S3 gallop. No friction rub. No thrills, lifts, or heaves. ABDOMEN:  Soft, nontender. No bruits, no ascites, no palpable masses. NEUROLOGIC:  The patient is awake, alert, and appropriate. No focal or motor signs. Normal speech. EXTREMITIES:  Large extremities, the right leg greater than left. Pedal pulses are palpable. EKG demonstrates atrial fibrillation, controlled ventricular response. Chest x-ray demonstrates pulmonary edema and some atelectasis or pneumonia. Cardiac enzymes are unremarkable. She did have 0.06 even though subsequent numbers have been lower. IMPRESSION:  This is an acute exacerbation of chronic diastolic heart failure.   I know the patient very well. RECOMMENDATIONS:  Continue the current 40 mg b.i.d. dosing on her furosemide and metolazone for a few doses, and otherwise, continue current supportive care.     Thank you for this referral.      Maria Matamoros MD SA/COLIN_KAREN/BC_CRIS  D:  02/24/2020 20:20  T:  02/24/2020 22:49  JOB #:  [id number]

## 2020-02-25 NOTE — PROGRESS NOTES
Bedside shift change report given to [address] (oncoming nurse) by Tony Saenz (offgoing nurse). Report included the following information SBAR, Kardex, MAR and Recent Results.

## 2020-02-25 NOTE — CONSULTS
Cardiology Note dictated # [id number]  Imp:  Acute on chronic diastolic HF.  Diuresing adequately with bid IV furosemide  AF with controlled ventricular response  Continue present BID loops diuretics  Add metolazone for a few doses  Otherwise continue present supportive care  Thank you for this referral  Dhara Fitch MD  Interventional Cardiology  [facility]

## 2020-02-25 NOTE — PROGRESS NOTES
Problem: Mobility Impaired (Adult and Pediatric)  Goal: *Acute Goals and Plan of Care (Insert Text)  Description  FUNCTIONAL STATUS PRIOR TO ADMISSION: The patient was functional at the wheelchair level and required mod assist?  for transfers to the chair. (Pt irritated so does not provide detailed hx and frequently says \"well I don't remember\")    HOME SUPPORT PRIOR TO ADMISSION: The patient [facility]. Pt required assist but not a good historian on how much assist she needed or with what. Physical Therapy Goals  Initiated 2/25/2020  1. Patient will move from supine to sit and sit to supine  in bed with minimal assistance within 7 day(s). 2.  Patient will transfer from bed to chair and chair to bed with minimal assistance using the least restrictive device within 7 day(s). 3.  Patient will perform sit to stand with minimal assistancewithin 7 day(s). 4.  Patient will demonstrate ability to tolerate 1 minute of standing in order to progress towards independent stand pivot transfers to w/c within 7 days. Outcome: Not Met  PHYSICAL THERAPY EVALUATION  Patient: Bryan Lacy (39 y.o. female)  Date: 2/25/2020  Primary Diagnosis: Acute on chronic diastolic CHF (congestive heart failure) ([facility]) [I50.33]        Precautions:   Fall      ASSESSMENT  Based on the objective data described below, the patient presents with decreased functional mobility, memory loss, LE edema, and generalized weakness. Pt agreeable to transfer training today at second attempt after initial refusal earlier in day. Pt is mod assist x 1 for transfer from bed to w/c and back. Pt demonstrates decreased safety awareness by attempting to sit before in position during both transfers and requires mod assist to correct. Secondary to pt's irritation from being awoken from nap, unsure of baseline function (will pursue further and adjust POC as needed).  Pt will benefit from skilled PT in acute care in order to address above impairments and progress towards PLOF. Current Level of Function Impacting Discharge (mobility/balance): mod assist x 1 - w/c to bed sampson    Functional Outcome Measure: The patient scored 20/100 on the Barthel outcome measure which is indicative of 80% impairment in independence with ADLs. Other factors to consider for discharge: from [facility]     Patient will benefit from skilled therapy intervention to address the above noted impairments. PLAN :  Recommendations and Planned Interventions: bed mobility training, transfer training, gait training, therapeutic exercises, patient and family training/education, and therapeutic activities      Frequency/Duration: Patient will be followed by physical therapy:  5 times a week to address goals. Recommendation for discharge: (in order for the patient to meet his/her long term goals)  To be determined: PT services at [facility] vs SNF - pending hospital course and true understanding of PLOF     This discharge recommendation:  Has not yet been discussed the attending provider and/or case management    IF patient discharges home will need the following DME: patient owns DME required for discharge         SUBJECTIVE:   Patient stated if I do this will you leave and not come back? Sridevi Young    OBJECTIVE DATA SUMMARY:   HISTORY:    Past Medical History:   Diagnosis Date    A[facility]fib ([facility]     Arrhythmia     A FIB    Arthritis     Back pain     CAD (coronary artery disease)     PT DENIES    Chronic pain     Dementia ([facility] 2/9/2016    Hypercholesterolemia     Psychiatric disorder     ANXIETY    Shoulder pain     Thyroid disease     Tremor, essential      Past Surgical History:   Procedure Laterality Date    HX APPENDECTOMY      HX ORTHOPAEDIC      left knee replacement    HX KADIE AND BSO      AGE 39    HX TONSIL AND ADENOIDECTOMY      IR KYPHOPLASTY LUMBAR  4/23/2019       Personal factors and/or comorbidities impacting plan of care: Dementia    Home Situation  Home Environment: Apartment  One/Two Story Residence: One story  Living Alone: Yes  Support Systems: Family member(s)  Patient Expects to be Discharged to[de-identified] Apartment  Current DME Used/Available at Home: None    EXAMINATION/PRESENTATION/DECISION MAKING:   Critical Behavior:  Neurologic State: Alert  Orientation Level: Oriented X4  Cognition: Follows commands, Memory loss     Hearing: Auditory  Auditory Impairment: None    Range Of Motion:  AROM: Generally decreased, functional                       Strength:    Strength: Generally decreased, functional                    Tone & Sensation:                  Sensation: Intact - for bilateral LE with light touch          Functional Mobility:  Bed Mobility:  Rolling: Minimum assistance;Assist x1(requires more assist rolling to L )  Supine to Sit: Minimum assistance;Assist x1;Bed Modified        Transfers:  Sit to Stand: Minimum assistance;Assist x1;Adaptive equipment(bed elevated)  Stand to Sit: Minimum assistance;Assist x1(to prevent prematuer sit)  Stand Pivot Transfers: Moderate assistance;Assist x1(HHA and gait belt )     Bed to Chair: Moderate assistance;Assist x1  Pt takes short shuffling steps w/o major LOB during transfer, does attempt to sit before in position to               Balance:   Sitting: Intact; With support  Standing: Impaired; With support  Standing - Static: Poor;Constant support  Standing - Dynamic : Poor;Constant support      Functional Measure:  Barthel Index:    Bathin  Bladder: 0  Bowels: 0  Groomin  Dressin  Feeding: 10  Mobility: 0  Stairs: 0  Toilet Use: 0  Transfer (Bed to Chair and Back): 5  Total: 20/100       The Barthel ADL Index: Guidelines  1. The index should be used as a record of what a patient does, not as a record of what a patient could do. 2. The main aim is to establish degree of independence from any help, physical or verbal, however minor and for whatever reason.   3. The need for supervision renders the patient not independent. 4. A patient's performance should be established using the best available evidence. Asking the patient, friends/relatives and nurses are the usual sources, but direct observation and common sense are also important. However direct testing is not needed. 5. Usually the patient's performance over the preceding 24-48 hours is important, but occasionally longer periods will be relevant. 6. Middle categories imply that the patient supplies over 50 per cent of the effort. 7. Use of aids to be independent is allowed. Jona Soares., Barthel, CALEBW. (8165). Functional evaluation: the Barthel Index. [address] [facility] (14)2. Alisha Blue shannan FRANK Alex, Sharad Siddiqi., Ana Dailey, [place], [address] [place] [address] Ave (1999). Measuring the change indisability after inpatient rehabilitation; comparison of the responsiveness of the Barthel Index and Functional [place] Measure. Journal of Neurology, Neurosurgery, and Psychiatry, 66(4), 796-543. Andrew Iniguez, N.J.A, JOANIE Wolfe, & Kandis Acevedo M.A. (2004.) Assessment of post-stroke quality of life in cost-effectiveness studies: The usefulness of the Barthel Index and the EuroQoL-5D.  Quality of Life Research, 15, 437-15            Physical Therapy Evaluation Charge Determination   History Examination Presentation Decision-Making   MEDIUM  Complexity : 1-2 comorbidities / personal factors will impact the outcome/ POC  LOW Complexity : 1-2 Standardized tests and measures addressing body structure, function, activity limitation and / or participation in recreation  LOW Complexity : Stable, uncomplicated  LOW Complexity : FOTO score of       Based on the above components, the patient evaluation is determined to be of the following complexity level: LOW     Pain Rating:  Pt does not report pain during or after session today    Activity Tolerance:   Fair - tolerates transfer to bed and back with stable vitals and no reported fatigue Please refer to the flowsheet for vital signs taken during this treatment. After treatment patient left in no apparent distress:   Patient positioned in R sidelying for pressure relief, Call bell within reach, Bed / chair alarm activated, and Side rails x 3    COMMUNICATION/EDUCATION:   The patients plan of care was discussed with: Registered Nurse. Fall prevention education was provided and the patient/caregiver indicated understanding., Patient/family have participated as able in goal setting and plan of care. , and Patient/family agree to work toward stated goals and plan of care. Regarding student involvement in patient care:  A student participated in this treatment session. Per CMS Medicare statements and APTA guidelines I certify that the following was true:  1. I was present and directly observed the entire session. 2. I made all skilled judgments and clinical decisions regarding care. 3. I am the practitioner responsible for assessment, treatment, and documentation.       Thank you for this referral.  Scar Ferreira

## 2020-02-25 NOTE — PROGRESS NOTES
Medical Progress Note      NAME: Carroll Sweet   :  1931  MRM:  [id number]    Date/Time: 2020           Problem List:     Active Problems:    A-fib ([address] ()      Dementia ([address] (2016)      Acute on chronic diastolic (congestive) heart failure ([address] (2019)      Acute on chronic diastolic CHF (congestive heart failure) ([address] (2019)             Subjective:     Breathing ok . No cough problem. Rt chest discomfort with palpable tenderness . Rt knee mild discomfort     Past Medical History:   Diagnosis Date    A-fib ([address]     Arrhythmia     A FIB    Arthritis     Back pain     CAD (coronary artery disease)     PT DENIES    Chronic pain     Dementia ([address] 2016    Hypercholesterolemia     Psychiatric disorder     ANXIETY    Shoulder pain     Thyroid disease     Tremor, essential             Objective:         Vitals:      Last 24hrs VS reviewed since prior progress note. Most recent are:    Visit Vitals  /64 (BP 1 Location: Left arm, BP Patient Position: At rest)   Pulse 81   Temp 97.5 °F (36.4 °C)   Resp 16   Ht 5' 3\" (1.6 m)   Wt 161 lb 6 oz (73.2 kg)   SpO2 91%   BMI 28.59 kg/m²     SpO2 Readings from Last 6 Encounters:   20 91%   10/25/19 96%   10/04/19 92%   19 90%   19 95%   19 98%    O2 Flow Rate (L/min): 2 l/min       Intake/Output Summary (Last 24 hours) at 2020 6995  Last data filed at 2020 1001  Gross per 24 hour   Intake 240 ml   Output    Net 240 ml                  Exam:      General:  Alert, cooperative, no distress, appears stated age. Lungs:    decr bs left base . Few scattered crackles   O/wclear to auscultation bilaterally. Heart:  Regular rate and rhythm, S1, S2 normal, no murmur, click, rub or gallop. Abdomen:   Soft, non-tender. Bowel sounds normal. No masses,  No organomegaly. Extremities: Rt knee nt . + pain on flexion . No acute deformity   Rt 1+ pretib and left ankle trace  edema.      Lab Data Reviewed: (see below)  Recent Results (from the past 24 hour(s))   PROCALCITONIN    Collection Time: 02/24/20  2:38 PM   Result Value Ref Range    Procalcitonin <0.05 ng/mL       Medications Reviewed: (see below)    ______________________________________________________________________    Medications:     Current Facility-Administered Medications   Medication Dose Route Frequency    balsam peru-castor oil (VENELEX) ointment   Topical Q8H    potassium chloride SR (KLOR-CON 10) tablet 20 mEq  20 mEq Oral BID    albuterol (PROVENTIL VENTOLIN) nebulizer solution 2.5 mg  2.5 mg Nebulization QID RT    piperacillin-tazobactam (ZOSYN) 3.375 g in 0.9% sodium chloride (MBP/ADV) 100 mL  3.375 g IntraVENous Q8H    lactobac ac& pc-s.therm-b.anim (DAVE Q/RISAQUAD)  1 Cap Oral DAILY    furosemide (LASIX) injection 40 mg  40 mg IntraVENous BID    ALPRAZolam (XANAX) tablet 0.5 mg  0.5 mg Oral TID PRN    levothyroxine (SYNTHROID) tablet 100 mcg  100 mcg Oral ACB    loratadine (CLARITIN) tablet 10 mg  10 mg Oral DAILY    sodium chloride (NS) flush 5-40 mL  5-40 mL IntraVENous Q8H    sodium chloride (NS) flush 5-40 mL  5-40 mL IntraVENous PRN    acetaminophen (TYLENOL) tablet 650 mg  650 mg Oral Q4H PRN                   Assessment:   Suspect chest wall pain . For completeness, check EKG and Trop I   Acute on chronic diastolic chf. Responding to diuresis  Pro Calcitonin neg. D/c abx   Rt knee probable djd  Left leg wound dressing in place   Afib   Patient Active Problem List   Diagnosis Code    A-fib ([facility] I48.91    Thyroid disease E07.9    Hypercholesterolemia E78.00    Arthritis M19.90    CAD (coronary artery disease) I25.10    Arthritis of knee, left M17.12    Sepsis ([facility] A41.9    Altered mental status R41.82    Dementia ([address] F03.90    Closed compression fracture of lumbar vertebra ([facility] S32.000A    UTI (urinary tract infection) N39.0    Fall W19. XXXA    Fracture, intertrochanteric, right femur, closed, initial encounter [facility]) S72.141A    Hip hematoma, right, initial encounter S70.01XA    Acquired hypothyroidism E03.9    Closed fracture of neck of right femur (HCC) S72.001A    Dehydration E86.0    Wound, open, hip or thigh, left, initial encounter S71.002A, S71.102A    Non-healing wound of right heel S91.301A    PNA (pneumonia) J18.9    Acute on chronic diastolic (congestive) heart failure ([facility]) I50.33    Pulmonary hypertension ([facility]) I27.20    Senile purpura ([facility]) D69.2    Acute on chronic diastolic CHF (congestive heart failure) ([facility]) I50.33    Breast pain, left N64.4    Yeast dermatitis B37.2    Acute mastitis of left breast N61.0    Pneumonia J18.9          Plan:     Continue care                                                        ___________________________________________________      Attending Physician: José Luis Adhikari MD

## 2020-02-25 NOTE — PROGRESS NOTES
Name: Doris Sheila: [address] 55   : 1931 Admit Date: 2020   Phone: [phone]  Room: 208/01   PCP: Janae Nevarez MD  MRN: [de-identified]   Date: 2020  Code: Full Code        HPI:      Resting in bed. Less O2 requirement       4:56 PM       History was obtained from patient. I was asked by Jett Villegas MD to see Tiny Laurent in consultation for a chief complaint of shortness of breath. History of Present Illness: 80year old female with past medical hx of diastolic heart failure who presented to [address] [place] [address] with increased shortness of breath and ankle swelling. Her  room air 02 saturation was 89% and she was placed on 4 liters a minute nasal cannula O2. She also received 60 mg iv lasix. She denies fever, chills, night sweats or weight loss. She denied chest pain. She does have a hx of pulmonary hypertension and CHF for which she takes lasix. Hx of afib - was anticoagulated before - not anymore. CXR left lower love volume loss - atelectasis vs effusion vs infiltrate.     Past Medical History:   Diagnosis Date    A-fib [facility])     Arrhythmia     A FIB    Arthritis     Back pain     CAD (coronary artery disease)     PT DENIES    Chronic pain     Dementia ([facility] 2016    Hypercholesterolemia     Psychiatric disorder     ANXIETY    Shoulder pain     Thyroid disease     Tremor, essential        Past Surgical History:   Procedure Laterality Date    HX APPENDECTOMY      HX ORTHOPAEDIC      left knee replacement    HX KADIE AND BSO      AGE 39    HX TONSIL AND ADENOIDECTOMY      IR KYPHOPLASTY LUMBAR  2019       Family History   Problem Relation Age of Onset    Dementia Mother     Arthritis-osteo Father        Social History     Tobacco Use    Smoking status: Former Smoker     Packs/day: 0.50     Years: 10.00     Pack years: 5.00     Last attempt to quit: 1994     Years since quittin.1    Smokeless tobacco: Never Used   Substance Use Topics    Alcohol use: Yes     Comment: NIGHTLY 2 GLASSES WINE       Allergies   Allergen Reactions    Latex, Natural Rubber Other (comments)     Pt denies allergy to latex    Codeine Other (comments)     \"It just sends me up the wall\"    Adhesive Rash and Other (comments)     blisters    Cortisone Unknown (comments)       Current Facility-Administered Medications   Medication Dose Route Frequency    balsam peru-castor oil (VENELEX) ointment   Topical Q8H    potassium chloride SR (KLOR-CON 10) tablet 20 mEq  20 mEq Oral BID    albuterol (PROVENTIL VENTOLIN) nebulizer solution 2.5 mg  2.5 mg Nebulization QID RT    lactobac ac& pc-s.therm-b.anim (DAVE Q/RISAQUAD)  1 Cap Oral DAILY    furosemide (LASIX) injection 40 mg  40 mg IntraVENous BID    ALPRAZolam (XANAX) tablet 0.5 mg  0.5 mg Oral TID PRN    levothyroxine (SYNTHROID) tablet 100 mcg  100 mcg Oral ACB    loratadine (CLARITIN) tablet 10 mg  10 mg Oral DAILY    sodium chloride (NS) flush 5-40 mL  5-40 mL IntraVENous Q8H    sodium chloride (NS) flush 5-40 mL  5-40 mL IntraVENous PRN    acetaminophen (TYLENOL) tablet 650 mg  650 mg Oral Q4H PRN         REVIEW OF SYSTEMS   Negative except as stated in the HPI. Physical Exam:   Visit Vitals  /64 (BP 1 Location: Right arm, BP Patient Position: At rest)   Pulse 97   Temp 97.9 °F (36.6 °C)   Resp 16   Ht 5' 3\" (1.6 m)   Wt 73.2 kg (161 lb 6 oz)   SpO2 90%   BMI 28.59 kg/m²       General:  Alert, cooperative, no distress, appears stated age. Head:  Normocephalic, without obvious abnormality, atraumatic. Eyes:  Conjunctivae/corneas clear. PERRL, EOMs intact. Nose: Nares normal. Septum midline. Mucosa normal. No drainage or sinus tenderness. Neck: Supple, symmetrical, trachea midline, no adenopathy, thyroid: no enlargment/tenderness/nodules, no carotid bruit and no JVD. Lungs:   Mostly clear, but diminished bs.        Heart:  Regular rate and rhythm, S1, S2 normal, no murmur, click, rub or gallop. Abdomen:   Soft, non-tender. Bowel sounds normal. No masses,  No organomegaly. Extremities: Trace pedal edema . Neurologic: Grossly nonfocal       Lab Results   Component Value Date/Time    Sodium 136 02/24/2020 04:29 AM    Potassium 4.5 02/24/2020 04:29 AM    Chloride 101 02/24/2020 04:29 AM    CO2 34 (H) 02/24/2020 04:29 AM    BUN 15 02/24/2020 04:29 AM    Creatinine 0.84 02/24/2020 04:29 AM    Glucose 107 (H) 02/24/2020 04:29 AM    Calcium 9.3 02/24/2020 04:29 AM    Magnesium 1.6 10/02/2019 04:09 AM    Lactic acid 1.1 10/19/2019 04:09 AM       Lab Results   Component Value Date/Time    WBC 4.3 02/23/2020 04:29 AM    HGB 11.8 02/23/2020 04:29 AM    PLATELET 441 94/39/9995 04:29 AM    MCV 97.3 02/23/2020 04:29 AM       Lab Results   Component Value Date/Time    INR 3.9 (H) 10/25/2019 03:50 AM    aPTT 33.2 (H) 07/29/2018 09:20 PM    AST (SGOT) 20 02/22/2020 01:38 PM    Alk. phosphatase 96 02/22/2020 01:38 PM    Protein, total 7.2 02/22/2020 01:38 PM    Albumin 3.4 (L) 02/22/2020 01:38 PM    Globulin 3.8 02/22/2020 01:38 PM       Lab Results   Component Value Date/Time    TSH 2.10 02/23/2020 04:29 AM        Lab Results   Component Value Date/Time     (H) 07/29/2018 09:20 PM    CK-MB Index 1.8 02/09/2016 03:37 PM    Troponin-I, Qt. 0.05 (H) 02/24/2020 04:29 AM        Lab Results   Component Value Date/Time     (H) 07/29/2018 09:20 PM    CK 36 01/12/2010 04:50 PM       IMPRESSION:  ===========  -acute on chronic diastolic heart failure. -Pulmonary hypertension.  -atrial fibrillation - currently not anticoagulated.  -abnormal CXR with left base volume loss. -left side pleural effusion noted to be present on 5/2019 echo. PLAN:  =====  -diuretics.    -repeat chest x-ray tomorrow  -hold on abx right now  -O2 titration above 90%     Panfilo Wayne PA-C

## 2020-02-25 NOTE — ROUTINE PROCESS
Bedside shift change report given to Owen (oncoming nurse) by Thomas Neely (offgoing nurse). Report included the following information SBAR, Kardex, Intake/Output, MAR and Recent Results.

## 2020-02-26 ENCOUNTER — APPOINTMENT (OUTPATIENT)
Dept: GENERAL RADIOLOGY | Age: 85
DRG: 292 | End: 2020-02-26
Attending: PHYSICIAN ASSISTANT
Payer: MEDICARE

## 2020-02-26 LAB
ANION GAP SERPL CALC-SCNC: 6 MMOL/L (ref 5–15)
BUN SERPL-MCNC: 13 MG/DL (ref 6–20)
BUN/CREAT SERPL: 17 (ref 12–20)
CALCIUM SERPL-MCNC: 9.6 MG/DL (ref 8.5–10.1)
CHLORIDE SERPL-SCNC: 96 MMOL/L (ref 97–108)
CO2 SERPL-SCNC: 30 MMOL/L (ref 21–32)
CREAT SERPL-MCNC: 0.76 MG/DL (ref 0.55–1.02)
GLUCOSE SERPL-MCNC: 92 MG/DL (ref 65–100)
POTASSIUM SERPL-SCNC: 4.1 MMOL/L (ref 3.5–5.1)
SODIUM SERPL-SCNC: 132 MMOL/L (ref 136–145)

## 2020-02-26 PROCEDURE — 97530 THERAPEUTIC ACTIVITIES: CPT

## 2020-02-26 PROCEDURE — 74011250636 HC RX REV CODE- 250/636: Performed by: INTERNAL MEDICINE

## 2020-02-26 PROCEDURE — 36415 COLL VENOUS BLD VENIPUNCTURE: CPT

## 2020-02-26 PROCEDURE — 65660000000 HC RM CCU STEPDOWN

## 2020-02-26 PROCEDURE — 73562 X-RAY EXAM OF KNEE 3: CPT

## 2020-02-26 PROCEDURE — 71045 X-RAY EXAM CHEST 1 VIEW: CPT

## 2020-02-26 PROCEDURE — 74011250637 HC RX REV CODE- 250/637: Performed by: INTERNAL MEDICINE

## 2020-02-26 PROCEDURE — 80048 BASIC METABOLIC PNL TOTAL CA: CPT

## 2020-02-26 RX ORDER — ALBUTEROL SULFATE 0.83 MG/ML
2.5 SOLUTION RESPIRATORY (INHALATION)
Status: DISCONTINUED | OUTPATIENT
Start: 2020-02-26 | End: 2020-03-06 | Stop reason: HOSPADM

## 2020-02-26 RX ORDER — TRAMADOL HYDROCHLORIDE 50 MG/1
50 TABLET ORAL
Status: DISCONTINUED | OUTPATIENT
Start: 2020-02-26 | End: 2020-03-06 | Stop reason: HOSPADM

## 2020-02-26 RX ADMIN — CASTOR OIL AND BALSAM, PERU: 788; 87 OINTMENT TOPICAL at 21:38

## 2020-02-26 RX ADMIN — LEVOTHYROXINE SODIUM 100 MCG: 0.1 TABLET ORAL at 06:37

## 2020-02-26 RX ADMIN — Medication 10 ML: at 06:37

## 2020-02-26 RX ADMIN — POTASSIUM CHLORIDE 20 MEQ: 750 TABLET, FILM COATED, EXTENDED RELEASE ORAL at 10:04

## 2020-02-26 RX ADMIN — Medication 1 CAPSULE: at 10:04

## 2020-02-26 RX ADMIN — FUROSEMIDE 80 MG: 10 INJECTION, SOLUTION INTRAMUSCULAR; INTRAVENOUS at 10:04

## 2020-02-26 RX ADMIN — CASTOR OIL AND BALSAM, PERU: 788; 87 OINTMENT TOPICAL at 16:03

## 2020-02-26 RX ADMIN — LORATADINE 10 MG: 10 TABLET ORAL at 10:04

## 2020-02-26 RX ADMIN — CASTOR OIL AND BALSAM, PERU: 788; 87 OINTMENT TOPICAL at 06:37

## 2020-02-26 RX ADMIN — ALPRAZOLAM 0.5 MG: 0.5 TABLET ORAL at 16:02

## 2020-02-26 RX ADMIN — ALPRAZOLAM 0.5 MG: 0.5 TABLET ORAL at 21:36

## 2020-02-26 RX ADMIN — POTASSIUM CHLORIDE 20 MEQ: 750 TABLET, FILM COATED, EXTENDED RELEASE ORAL at 17:50

## 2020-02-26 RX ADMIN — Medication 10 ML: at 16:02

## 2020-02-26 NOTE — PROGRESS NOTES
Medical Progress Note      NAME: Valeria Owens   :  1931  MRM:  [id number]    Date/Time: 2020           Problem List:     Active Problems:    A-fib ([facility] ()      Dementia ([facility] [address] (2016)      Acute on chronic diastolic (congestive) heart failure ([facility] [address] (2019)      Acute on chronic diastolic CHF (congestive heart failure) ([facility] [address] (2019)      Laceration of leg, left (2020)             Subjective:     Breathing better. She refused pm Lasix 80 mg IV last night. Rt knee discomfort incomplete response to Tylenol and Tramadol. Denies recent injury . Left knee generally comfortable     Past Medical History:   Diagnosis Date    A-fib [facility])     Arrhythmia     A FIB    Arthritis     Back pain     CAD (coronary artery disease)     PT DENIES    Chronic pain     Dementia ([facility] [address] 2016    Hypercholesterolemia     Psychiatric disorder     ANXIETY    Shoulder pain     Thyroid disease     Tremor, essential             Objective:         Vitals:      Last 24hrs VS reviewed since prior progress note. Most recent are:    Visit Vitals  /67 (BP 1 Location: Right arm, BP Patient Position: At rest)   Pulse 81   Temp 97.2 °F (36.2 °C)   Resp 16   Ht 5' 3\" (1.6 m)   Wt 162 lb 0.6 oz (73.5 kg)   SpO2 90%   BMI 28.70 kg/m²     SpO2 Readings from Last 6 Encounters:   20 90%   10/25/19 96%   10/04/19 92%   19 90%   19 95%   19 98%    O2 Flow Rate (L/min): 2 l/min       Intake/Output Summary (Last 24 hours) at 2020 0735  Last data filed at 2020 1010  Gross per 24 hour   Intake 240 ml   Output    Net 240 ml                  Exam:      General:  Alert, cooperative, no distress, appears stated age. Lungs:   Left base few rales; overall clearer to auscultation bilaterally. Heart:  Irregular rate and rhythm, S1, S2 normal, no murmur, click, rub or gallop. Abdomen:   Soft, non-tender. Bowel sounds normal. No masses,  No organomegaly.    Extremities: Rt knee arthritic change , nt; rt knee pain on mild flexion   Left knee nt. Intact f/e s/p TKR   Improved LE edema.  Now trace on rt pretib and no left pedal edema     A and O x 3   LE strength intact      Lab Data Reviewed: (see below)  Recent Results (from the past 24 hour(s))   EKG, 12 LEAD, INITIAL    Collection Time: 02/25/20  8:21 AM   Result Value Ref Range    Ventricular Rate 84 BPM    Atrial Rate 197 BPM    QRS Duration 120 ms    Q-T Interval 380 ms    QTC Calculation (Bezet) 449 ms    Calculated R Axis 92 degrees    Calculated T Axis -87 degrees    Diagnosis       Atrial fibrillation  Right bundle branch block  Septal infarct , age undetermined  T wave abnormality, consider inferolateral ischemia or digitalis effect  Abnormal ECG  When compared with ECG of 22-FEB-2020 13:31,  Atrial fibrillation has replaced Atrial flutter  Septal infarct is now present  Confirmed by RENE He, Jf Form ([id number]) on 2/25/2020 2:33:34 PM     TROPONIN I    Collection Time: 02/25/20  8:56 AM   Result Value Ref Range    Troponin-I, Qt. <0.05 <6.98 ng/mL   METABOLIC PANEL, BASIC    Collection Time: 02/26/20 12:22 AM   Result Value Ref Range    Sodium 132 (L) 136 - 145 mmol/L    Potassium 4.1 3.5 - 5.1 mmol/L    Chloride 96 (L) 97 - 108 mmol/L    CO2 30 21 - 32 mmol/L    Anion gap 6 5 - 15 mmol/L    Glucose 92 65 - 100 mg/dL    BUN 13 6 - 20 MG/DL    Creatinine 0.76 0.55 - 1.02 MG/DL    BUN/Creatinine ratio 17 12 - 20      GFR est AA >60 >60 ml/min/1.73m2    GFR est non-AA >60 >60 ml/min/1.73m2    Calcium 9.6 8.5 - 10.1 MG/DL       Medications Reviewed: (see below)    ______________________________________________________________________    Medications:     Current Facility-Administered Medications   Medication Dose Route Frequency    traMADol (ULTRAM) tablet 50 mg  50 mg Oral Q6H PRN    furosemide (LASIX) injection 80 mg  80 mg IntraVENous BID    balsam peru-castor oil (VENELEX) ointment   Topical Q8H    potassium chloride SR (KLOR-CON 10) tablet 20 mEq  20 mEq Oral BID    albuterol (PROVENTIL VENTOLIN) nebulizer solution 2.5 mg  2.5 mg Nebulization QID RT    lactobac ac& pc-s.therm-b.anim (DAVE Q/RISAQUAD)  1 Cap Oral DAILY    ALPRAZolam (XANAX) tablet 0.5 mg  0.5 mg Oral TID PRN    levothyroxine (SYNTHROID) tablet 100 mcg  100 mcg Oral ACB    loratadine (CLARITIN) tablet 10 mg  10 mg Oral DAILY    sodium chloride (NS) flush 5-40 mL  5-40 mL IntraVENous Q8H    sodium chloride (NS) flush 5-40 mL  5-40 mL IntraVENous PRN    acetaminophen (TYLENOL) tablet 650 mg  650 mg Oral Q4H PRN                   Assessment:   Acute on Chronic Diastolic CHF. Improved. Specialists to see in f/u     Rt knee probable OA. P: xray and Ortho c/s ? Steroid inj     Left leg laceration POA. Dressing in place     Chronic Afib     Dementia     Patient Active Problem List   Diagnosis Code    A-fib ([facility] [address] I48.91    Thyroid disease E07.9    Hypercholesterolemia E78.00    Arthritis M19.90    CAD (coronary artery disease) I25.10    Arthritis of knee, left M17.12    Sepsis ([facility] [address] A41.9    Altered mental status R41.82    Dementia ([address] F03.90    Closed compression fracture of lumbar vertebra ([address] S32.000A    UTI (urinary tract infection) N39.0    Fall W19. XXXA    Fracture, intertrochanteric, right femur, closed, initial encounter ([address] S72.141A    Hip hematoma, right, initial encounter S70.01XA    Acquired hypothyroidism E03.9    Closed fracture of neck of right femur ([address] S72.001A    Dehydration E86.0    Wound, open, hip or thigh, left, initial encounter S71.002A, S71.102A    Non-healing wound of right heel S91.301A    PNA (pneumonia) J18.9    Acute on chronic diastolic (congestive) heart failure (HCC) I50.33    Pulmonary hypertension (HCC) I27.20    Senile purpura (HCC) D69.2    Acute on chronic diastolic CHF (congestive heart failure) (HCC) I50.33    Breast pain, left N64.4    Yeast dermatitis B37.2    Acute mastitis of left breast N61.0    Pneumonia J18.9    Laceration of leg, left G95.547N          Plan:     Spoke w/ sister yesterday and today                                                        ___________________________________________________      Attending Physician: Nelia Villarreal MD

## 2020-02-26 NOTE — PROGRESS NOTES
Name: Osmin Torre: [address] 55   : 1931 Admit Date: 2020   Phone: [phone]  Room: 208/01   PCP: Coty Luong MD  MRN: [de-identified]   Date: 2020  Code: Full Code        HPI:    Up in bed on room air. Doesn't feel that she's made much progress. Still feels SOB, some difficulty taking a deep breath. Denies cough. So far, looks like fluid balance in positive.   Resting in bed. Less O2 requirement       4:56 PM       History was obtained from patient. I was asked by Toma English MD to see Leixi Jradriana in consultation for a chief complaint of shortness of breath. History of Present Illness: 80year old female with past medical hx of diastolic heart failure who presented to [facility] with increased shortness of breath and ankle swelling. Her  room air 02 saturation was 89% and she was placed on 4 liters a minute nasal cannula O2. She also received 60 mg iv lasix. She denies fever, chills, night sweats or weight loss. She denied chest pain. She does have a hx of pulmonary hypertension and CHF for which she takes lasix. Hx of afib - was anticoagulated before - not anymore. CXR left lower love volume loss - atelectasis vs effusion vs infiltrate.     Past Medical History:   Diagnosis Date    A-fib [facility])     Arrhythmia     A FIB    Arthritis     Back pain     CAD (coronary artery disease)     PT DENIES    Chronic pain     Dementia ([facility] [address] 2016    Hypercholesterolemia     Psychiatric disorder     ANXIETY    Shoulder pain     Thyroid disease     Tremor, essential        Past Surgical History:   Procedure Laterality Date    HX APPENDECTOMY      HX ORTHOPAEDIC      left knee replacement    HX KADIE AND BSO      AGE 39    HX TONSIL AND ADENOIDECTOMY      IR KYPHOPLASTY LUMBAR  2019       Family History   Problem Relation Age of Onset    Dementia Mother     Arthritis-osteo Father        Social History     Tobacco Use    Smoking status: Former Smoker     Packs/day: 0.50     Years: 10.00     Pack years: 5.00     Last attempt to quit: 1994     Years since quittin.1    Smokeless tobacco: Never Used   Substance Use Topics    Alcohol use: Yes     Comment: NIGHTLY 2 GLASSES WINE       Allergies   Allergen Reactions    Latex, Natural Rubber Other (comments)     Pt denies allergy to latex    Codeine Other (comments)     \"It just sends me up the wall\"    Adhesive Rash and Other (comments)     blisters    Cortisone Unknown (comments)       Current Facility-Administered Medications   Medication Dose Route Frequency    traMADol (ULTRAM) tablet 50 mg  50 mg Oral Q6H PRN    albuterol (PROVENTIL VENTOLIN) nebulizer solution 2.5 mg  2.5 mg Nebulization Q4H PRN    furosemide (LASIX) injection 80 mg  80 mg IntraVENous BID    balsam peru-castor oil (VENELEX) ointment   Topical Q8H    potassium chloride SR (KLOR-CON 10) tablet 20 mEq  20 mEq Oral BID    lactobac ac& pc-s.therm-b.anim (DAVE Q/RISAQUAD)  1 Cap Oral DAILY    ALPRAZolam (XANAX) tablet 0.5 mg  0.5 mg Oral TID PRN    levothyroxine (SYNTHROID) tablet 100 mcg  100 mcg Oral ACB    loratadine (CLARITIN) tablet 10 mg  10 mg Oral DAILY    sodium chloride (NS) flush 5-40 mL  5-40 mL IntraVENous Q8H    sodium chloride (NS) flush 5-40 mL  5-40 mL IntraVENous PRN    acetaminophen (TYLENOL) tablet 650 mg  650 mg Oral Q4H PRN         REVIEW OF SYSTEMS   Negative except as stated in the HPI. Physical Exam:   Visit Vitals  /87 (BP 1 Location: Right arm, BP Patient Position: At rest;Head of bed elevated (Comment degrees))   Pulse 91   Temp 97.6 °F (36.4 °C)   Resp 17   Ht 5' 3\" (1.6 m)   Wt 73.5 kg (162 lb 0.6 oz)   SpO2 91%   BMI 28.70 kg/m²       General:  Alert, cooperative, no distress, appears stated age. Head:  Normocephalic, without obvious abnormality, atraumatic. Eyes:  Conjunctivae/corneas clear. PERRL, EOMs intact. Nose: Nares normal. Septum midline.  Mucosa normal. No drainage or sinus tenderness. Neck: Supple, symmetrical, trachea midline, no adenopathy, thyroid: no enlargment/tenderness/nodules, no carotid bruit and no JVD. Lungs:   Mostly clear, but diminished bs. Heart:  Regular rate and rhythm, S1, S2 normal, no murmur, click, rub or gallop. Abdomen:   Soft, non-tender. Bowel sounds normal. No masses,  No organomegaly. Extremities: Trace pedal edema . Neurologic: Grossly nonfocal       Lab Results   Component Value Date/Time    Sodium 132 (L) 02/26/2020 12:22 AM    Potassium 4.1 02/26/2020 12:22 AM    Chloride 96 (L) 02/26/2020 12:22 AM    CO2 30 02/26/2020 12:22 AM    BUN 13 02/26/2020 12:22 AM    Creatinine 0.76 02/26/2020 12:22 AM    Glucose 92 02/26/2020 12:22 AM    Calcium 9.6 02/26/2020 12:22 AM    Magnesium 1.6 10/02/2019 04:09 AM    Lactic acid 1.1 10/19/2019 04:09 AM       Lab Results   Component Value Date/Time    WBC 4.3 02/23/2020 04:29 AM    HGB 11.8 02/23/2020 04:29 AM    PLATELET 284 67/02/7511 04:29 AM    MCV 97.3 02/23/2020 04:29 AM       Lab Results   Component Value Date/Time    INR 3.9 (H) 10/25/2019 03:50 AM    aPTT 33.2 (H) 07/29/2018 09:20 PM    AST (SGOT) 20 02/22/2020 01:38 PM    Alk. phosphatase 96 02/22/2020 01:38 PM    Protein, total 7.2 02/22/2020 01:38 PM    Albumin 3.4 (L) 02/22/2020 01:38 PM    Globulin 3.8 02/22/2020 01:38 PM       Lab Results   Component Value Date/Time    TSH 2.10 02/23/2020 04:29 AM        Lab Results   Component Value Date/Time     (H) 07/29/2018 09:20 PM    CK-MB Index 1.8 02/09/2016 03:37 PM    Troponin-I, Qt. <0.05 02/25/2020 08:56 AM        Lab Results   Component Value Date/Time     (H) 07/29/2018 09:20 PM    CK 36 01/12/2010 04:50 PM       IMPRESSION:  ===========  -acute on chronic diastolic heart failure. -Pulmonary hypertension.  -atrial fibrillation - currently not anticoagulated.  -abnormal CXR with left base volume loss.   -left side pleural effusion noted to be present on 5/2019 echo. PLAN:  =====  -diuretics.    -repeat chest x-ray: pending  -hold on abx right now  -O2 titration above 90% if needed     Kellie Bulmaro PA

## 2020-02-26 NOTE — PROGRESS NOTES
Bedside shift change report given to Manisha Mcmillan (oncoming nurse) by Marcelo Lara RN (offgoing nurse). Report included the following information SBAR, Kardex and MAR.

## 2020-02-26 NOTE — PROGRESS NOTES
Bedside shift change report given to zion (oncoming nurse) by Rasheed Moralez (offgoing nurse). Report included the following information SBAR, Kardex and Intake/Output.

## 2020-02-26 NOTE — PROGRESS NOTES
Cardiology Progress Note  2020     Admit Date: 2020  Admit Diagnosis: Acute on chronic diastolic CHF (congestive heart failure) ([facility]) [I50.33]  CC: none currently    Assessment/Plan:   Offers that she is a little better today  Indicates an underwhelming response to her loop diuretics    For other plans, see orders. Subjective: Lázaro Osborne reports   Chest Pain:  [x]  none;  consistent with []  non-cardiac  []  atypical  []  angina             [x]  none now    []  on-going  Dyspnea: [x]  none    []  at rest    []  with exertion   []  improved    []  unchanged    []  worsening  PND:       [x]  none      []  overnight      Orthopnea:   [x]  none        []  improved         []  unchanged        []  worsening  Presyncope: [x]  none        []  improved         []  unchanged        []  worsening  Ambulated in hallway without symptoms  []  Yes  Ambulated in room without symptoms  []  Yes    Objective:    Physical Exam:  Overall VSSAF;    Visit Vitals  /72   Pulse 97   Temp 97.9 °F (36.6 °C)   Resp 16   Ht 160 cm (63\")   Wt 73.2 kg (161 lb 6 oz)   SpO2 90%   BMI 28.59 kg/m²     Temp (24hrs), Av.8 °F (36.6 °C), Min:97.5 °F (36.4 °C), Max:97.9 °F (36.6 °C)    No data found. No data found. Patient Vitals for the past 8 hrs:   BP   20 1357 116/72          Intake/Output Summary (Last 24 hours) at 2020 1923  Last data filed at 2020 1010  Gross per 24 hour   Intake 240 ml   Output    Net 240 ml       General Appearance: Well developed, well nourished, no acute distress. Ears/Nose/Mouth/Throat:   Normal MM; anicteric. JVP: WNL   Resp:   Lungs clear to auscultation bilaterally. Nl resp effort. Cardiovascular:  RRR, S1, S2 normal, no new murmur. No gallop or rub. Abdomen:   Soft, non-tender, bowel sounds are present. Extremities: No edema bilaterally. Skin:  Neuro: Warm and dry.   A/O x3, grossly nonfocal    []  cath site intact w/o hematoma or bruit; distal pulse unchanged. Data Review:     Telemetry independently reviewed : []  sinus      []  chronic afib     []  par afib    []  NSVT    ECG independently reviewed:  []  NSR         []  no significant changes  [] no new ECG provided for review  Lab results reviewed as noted below. Current medications reviewed as noted below. No results for input(s): PH, PCO2, PO2 in the last 72 hours. Recent Labs     02/25/20  0856 02/24/20 0429 02/23/20  2237   TROIQ <0.05 0.05* 0.05*     Recent Labs     02/24/20 0429 02/23/20 0429    136   K 4.5 3.5    101   CO2 34* 31   BUN 15 15   CREA 0.84 0.69   * 81   CA 9.3 9.3   WBC  --  4.3   HGB  --  11.8   HCT  --  39.6   PLT  --  159     No results for input(s): SGOT, GPT, ALT, AP, TBIL, TBILI, TP, ALB, GLOB, GGT, AML, LPSE in the last 72 hours. No lab exists for component: AMYP, HLPSE  No results for input(s): INR, PTP, APTT, INREXT in the last 72 hours. No results for input(s): FE, TIBC, PSAT, FERR in the last 72 hours.    Lab Results   Component Value Date/Time    Glucose (POC) 118 (H) 10/18/2019 08:09 PM       Current Facility-Administered Medications   Medication Dose Route Frequency    balsam peru-castor oil (VENELEX) ointment   Topical Q8H    potassium chloride SR (KLOR-CON 10) tablet 20 mEq  20 mEq Oral BID    albuterol (PROVENTIL VENTOLIN) nebulizer solution 2.5 mg  2.5 mg Nebulization QID RT    lactobac ac& pc-s.therm-b.anim (DAVE Q/RISAQUAD)  1 Cap Oral DAILY    furosemide (LASIX) injection 40 mg  40 mg IntraVENous BID    ALPRAZolam (XANAX) tablet 0.5 mg  0.5 mg Oral TID PRN    levothyroxine (SYNTHROID) tablet 100 mcg  100 mcg Oral ACB    loratadine (CLARITIN) tablet 10 mg  10 mg Oral DAILY    sodium chloride (NS) flush 5-40 mL  5-40 mL IntraVENous Q8H    sodium chloride (NS) flush 5-40 mL  5-40 mL IntraVENous PRN    acetaminophen (TYLENOL) tablet 650 mg  650 mg Oral Q4H PRN        Steve Weiner MD

## 2020-02-26 NOTE — PROGRESS NOTES
Problem: Pain  Goal: *Control of Pain  Outcome: Progressing Towards Goal     Problem: Patient Education: Go to Patient Education Activity  Goal: Patient/Family Education  Outcome: Progressing Towards Goal     Problem: Falls - Risk of  Goal: *Absence of Falls  Description  Document Davi Toni Fall Risk and appropriate interventions in the flowsheet.   Outcome: Progressing Towards Goal  Note: Fall Risk Interventions:  Mobility Interventions: Communicate number of staff needed for ambulation/transfer, Utilize walker, cane, or other assistive device    Mentation Interventions: Adequate sleep, hydration, pain control, Bed/chair exit alarm    Medication Interventions: Bed/chair exit alarm, Patient to call before getting OOB    Elimination Interventions: Bed/chair exit alarm, Call light in reach              Problem: Patient Education: Go to Patient Education Activity  Goal: Patient/Family Education  Outcome: Progressing Towards Goal

## 2020-02-26 NOTE — PROGRESS NOTES
Problem: Mobility Impaired (Adult and Pediatric)  Goal: *Acute Goals and Plan of Care (Insert Text)  Description  FUNCTIONAL STATUS PRIOR TO ADMISSION: The patient was functional at the wheelchair level and required mod assist?  for transfers to the chair. (Pt irritated so does not provide detailed hx and frequently says \"well I don't remember\")    HOME SUPPORT PRIOR TO ADMISSION: The patient lived at [facility]. Pt required assist but not a good historian on how much assist she needed or with what. Physical Therapy Goals  Initiated 2/25/2020  1. Patient will move from supine to sit and sit to supine  in bed with minimal assistance within 7 day(s). 2.  Patient will transfer from bed to chair and chair to bed with minimal assistance using the least restrictive device within 7 day(s). 3.  Patient will perform sit to stand with minimal assistancewithin 7 day(s). 4.  Patient will demonstrate ability to tolerate 1 minute of standing in order to progress towards independent stand pivot transfers to w/c within 7 days. Outcome: Progressing Towards Goal    PHYSICAL THERAPY TREATMENT  Patient: Edwardo Betancourt (22 y.o. female)  Date: 2/26/2020  Diagnosis: Acute on chronic diastolic CHF (congestive heart failure) ([facility]) [I50.33]   <principal problem not specified>       Precautions: Fall  Chart, physical therapy assessment, plan of care and goals were reviewed. ASSESSMENT  Patient continues with skilled PT services and is progressing towards goals. Pt initially agitated due to being woken from nap but the agreeable  to transfer training with therapist and pleasant the remainder of session. Pt displays increased standing activity tolerance as evident by performing sit to stands x 2 in addition to transfer to bedside w/c. Pt still complains of R leg pain - x-ray showed degenerative tricompartmental changes at knee.  Pt progressing towards functional baseline (stand pivot transfer with HHA) and would continue to benefit from skilled PT in order to address transfer training, standing tolerance, and general deconditioning. Current Level of Function Impacting Discharge (mobility/balance): mod assist x 1 for bed to chair transfer    Other factors to consider for discharge: previous in intermediate, functional mobility - w/c bound performed stand pivot to w/c          PLAN :  Patient continues to benefit from skilled intervention to address the above impairments. Continue treatment per established plan of care. to address goals. Recommendation for discharge: (in order for the patient to meet his/her long term goals)  To be determined: [facility] vs SNF      This discharge recommendation:  Has been made in collaboration with the attending provider and/or case management    IF patient discharges home will need the following DME: patient owns DME required for discharge       SUBJECTIVE:   Patient stated you just wake people up? That's rude! Estefany Green    OBJECTIVE DATA SUMMARY:   Critical Behavior:  Neurologic State: Alert  Orientation Level: Oriented X4  Cognition: Follows commands     Functional Mobility Training:  Bed Mobility:  Rolling: Minimum assistance;Assist x1(to the R, supervision to the L)  Supine to Sit: Minimum assistance;Assist x1;Bed Modified(HOB elevated)              Transfers:  Sit to Stand: Minimum assistance;Assist x1  Stand to Sit: Minimum assistance;Assist x1(to prevent early sitting for safety)  Stand Pivot Transfers: Assist x1;Minimum assistance     Bed to Chair: Moderate assistance;Assist x1                    Balance:  Sitting: Intact; With support  Standing: Impaired; With support  Standing - Static: Fair;Constant support  Standing - Dynamic : Poor;Constant support        Therapeutic Exercises:   Sit to stand x 2   Able to stand for 15 seconds after each sit to stand - demonstrates LE shaking and fear of falling  Pain Rating:  Reports R knee pain of 6/10    Activity Tolerance:   Fair - continues to tolerate more standing activity   Please refer to the flowsheet for vital signs taken during this treatment. After treatment patient left in no apparent distress:   Supine in bed, Call bell within reach, Bed / chair alarm activated, and Side rails x 3    COMMUNICATION/COLLABORATION:   The patients plan of care was discussed with: Registered Nurse    Regarding student involvement in patient care:  A student participated in this treatment session. Per CMS Medicare statements and APTA guidelines I certify that the following was true:  1. I was present and directly observed the entire session. 2. I made all skilled judgments and clinical decisions regarding care. 3. I am the practitioner responsible for assessment, treatment, and documentation.       Catehrine Graham

## 2020-02-26 NOTE — CONSULTS
ORTHO CONSULT NOTE    Date of Consultation:  2020  Referring Physician:  Clayton Gusman  CC: right knee pain    HPI:  Vanessa Masters is a 80 y.o. female who is admitted for CHF exacerbation who c/o right knee pain. Knee pain is present at rest and worse with activity. She denies obvious traumatic event. She tells me she's been wheelchair confined for more than 1 year but can transfer independently (this statement is c/w subjective in PT notes 20). She has h/o left TKR Dr. Elizabeth Hearn  and denies left knee pain. Her chart lists cortisone as allergy but patient can't elaborate. ER note 2017 discusses her claimed allergy without clarification (I don't have access to ortho notes from that timeframe). I spoke with patient who is poor historian but also performed extensive chart review.       Past Medical History:   Diagnosis Date    A-fib [facility])     Arrhythmia     A FIB    Arthritis     Back pain     CAD (coronary artery disease)     PT DENIES    Chronic pain     Dementia ([facility] [address] [address] 2016    Hypercholesterolemia     Psychiatric disorder     ANXIETY    Shoulder pain     Thyroid disease     Tremor, essential       Past Surgical History:   Procedure Laterality Date    HX APPENDECTOMY      HX ORTHOPAEDIC      left knee replacement    HX KADIE AND BSO      AGE 39    HX TONSIL AND ADENOIDECTOMY      IR KYPHOPLASTY LUMBAR  2019         Social History     Tobacco Use    Smoking status: Former Smoker     Packs/day: 0.50     Years: 10.00     Pack years: 5.00     Last attempt to quit: 1994     Years since quittin.1    Smokeless tobacco: Never Used   Substance Use Topics    Alcohol use: Yes     Comment: NIGHTLY 2 GLASSES WINE        Allergies   Allergen Reactions    Latex, Natural Rubber Other (comments)     Pt denies allergy to latex    Codeine Other (comments)     \"It just sends me up the wall\"    Adhesive Rash and Other (comments)     blisters    Cortisone Unknown (comments) Review of Systems:  Per HPI. Objective:     Patient Vitals for the past 8 hrs:   BP Temp Pulse Resp SpO2 Weight   20 0820 134/87 97.6 °F (36.4 °C) 91 17 91 %    20 0646      73.5 kg (162 lb 0.6 oz)   20 0641 119/67 97.2 °F (36.2 °C) 81 16 90 %      Temp (24hrs), Av.5 °F (36.4 °C), Min:97.2 °F (36.2 °C), Max:97.7 °F (36.5 °C)      EXAM:   NAD. Awake and lying in bed. She struggles to answer my questions. Left knee well healed surgical scar. AROM . No TTP, no effusion. Shin wound bandaged. Right knee skin intact without erythema/ecchymosis. Mild TTP. Small effusion. AROM 0-45. Right shin pitting edema. Moves feet well, SILT bilat foot, toes CR < 2 secs. Imaging Review:   Results from Hospital Encounter encounter on 20   XR KNEE RT 3 V    Narrative EXAM: XR KNEE RT 3 V    INDICATION: right knee pain. COMPARISON: 10/1/2013. FINDINGS: Three views of the right knee demonstrate tricompartmental  osteoarthritis, most pronounced in the lateral compartment. A moderate joint  effusion is present. Vascular calcification is present. Chondrocalcinosis is  present within the medial compartment. Impression IMPRESSION: Tricompartmental right knee arthritis with moderate joint effusion,  progressed as compared to . Labs:   WBC 4.3  BUN 13, Cr 0.76. eGFR > 60.     Impression:     Patient Active Problem List    Diagnosis Date Noted    Laceration of leg, left 2020    Pneumonia 10/18/2019    Acute mastitis of left breast 10/04/2019    Breast pain, left 2019    Yeast dermatitis 2019    Acute on chronic diastolic CHF (congestive heart failure) ([address] 2019    Senile purpura ([address] 2019    Acute on chronic diastolic (congestive) heart failure ([address] 2019    Pulmonary hypertension ([address] 2019    PNA (pneumonia) 2019    Dehydration 2019    Wound, open, hip or thigh, left, initial encounter 2019  Non-healing wound of right heel 04/23/2019    Closed fracture of neck of right femur ([address] 09/11/2018    Acquired hypothyroidism 08/17/2018    Fall 07/29/2018    Fracture, intertrochanteric, right femur, closed, initial encounter ([address] 07/29/2018    Hip hematoma, right, initial encounter 07/29/2018    UTI (urinary tract infection) 02/02/2017    Closed compression fracture of lumbar vertebra ([address] 02/01/2017    Sepsis ([address] 02/09/2016    Altered mental status 02/09/2016    Dementia ([address] 02/09/2016    Arthritis of knee, left 01/13/2014    A-fib ([facility] [address]     Thyroid disease     Hypercholesterolemia     Arthritis     CAD (coronary artery disease)      Active Problems:    A-fib (HCC) ()      Dementia ([address] (2/9/2016)      Acute on chronic diastolic (congestive) heart failure ([address] (6/5/2019)      Acute on chronic diastolic CHF (congestive heart failure) ([address] (9/27/2019)      Laceration of leg, left (2/25/2020)    Right knee DJD. No suspicion of septic joint. Plan:   The patient requires Tylenol but no narcotics for her knee pain. Given lack of fever/leukocytosis/erythema, there is no concern for septic joint. Since she can't elaborate on cortisone allergy, will treat conservatively while inpatient. She can F/U in office Dr. César Mcnamara to discuss treatment options. D/W Dr. Ashleigh Ward who agrees with this plan.     STACI Rain  Orthopedic Trauma Service  [facility]

## 2020-02-26 NOTE — PROGRESS NOTES
ADULT PROTOCOL: JET AEROSOL  REASSESSMENT    Patient  Merlinda Aldo     80 y.o.   female     2/26/2020  8:09 AM    Breath Sounds Pre Procedure: Right Breath Sounds: Diminished                               Left Breath Sounds: Diminished, Coarse      Breathing pattern: Pre procedure Breathing Pattern: Regular          Heart Rate: Pre procedure Pulse: 100     Resp Rate: Pre procedure Respirations: 20             Cough: Pre procedure Cough: Non-productive                  Oxygen: O2 Device: Room air        Changed: NO    SpO2: Pre procedure SpO2: 94 %   without oxygen                 Nebulizer Therapy: Current medications Aerosolized Medications: Albuterol      Changed: YES  Treatment changed to prn    Problem List:   Patient Active Problem List   Diagnosis Code    A-fib ([facility] I48.91    Thyroid disease E07.9    Hypercholesterolemia E78.00    Arthritis M19.90    CAD (coronary artery disease) I25.10    Arthritis of knee, left M17.12    Sepsis ([facility]) A41.9    Altered mental status R41.82    Dementia ([facility] [address] F03.90    Closed compression fracture of lumbar vertebra ([facility] [address] S32.000A    UTI (urinary tract infection) N39.0    Fall W19. XXXA    Fracture, intertrochanteric, right femur, closed, initial encounter ([facility] [address] S72.141A    Hip hematoma, right, initial encounter S70.01XA    Acquired hypothyroidism E03.9    Closed fracture of neck of right femur ([facility] [address] S72.001A    Dehydration E86.0    Wound, open, hip or thigh, left, initial encounter S71.002A, S71.102A    Non-healing wound of right heel S91.301A    PNA (pneumonia) J18.9    Acute on chronic diastolic (congestive) heart failure ([facility]) I50.33    Pulmonary hypertension (HCC) I27.20    Senile purpura ([facility]) D69.2    Acute on chronic diastolic CHF (congestive heart failure) ([facility]) I50.33    Breast pain, left N64.4    Yeast dermatitis B37.2    Acute mastitis of left breast N61.0    Pneumonia J18.9    Laceration of leg, left X77.087U       Respiratory Therapist: Sondra Villa, RT

## 2020-02-27 LAB
ANION GAP SERPL CALC-SCNC: 5 MMOL/L (ref 5–15)
BUN SERPL-MCNC: 13 MG/DL (ref 6–20)
BUN/CREAT SERPL: 18 (ref 12–20)
CALCIUM SERPL-MCNC: 9.9 MG/DL (ref 8.5–10.1)
CHLORIDE SERPL-SCNC: 96 MMOL/L (ref 97–108)
CO2 SERPL-SCNC: 32 MMOL/L (ref 21–32)
CREAT SERPL-MCNC: 0.73 MG/DL (ref 0.55–1.02)
GLUCOSE SERPL-MCNC: 88 MG/DL (ref 65–100)
POTASSIUM SERPL-SCNC: 3.5 MMOL/L (ref 3.5–5.1)
SODIUM SERPL-SCNC: 133 MMOL/L (ref 136–145)

## 2020-02-27 PROCEDURE — 97542 WHEELCHAIR MNGMENT TRAINING: CPT

## 2020-02-27 PROCEDURE — 74011250637 HC RX REV CODE- 250/637: Performed by: INTERNAL MEDICINE

## 2020-02-27 PROCEDURE — 77010033678 HC OXYGEN DAILY

## 2020-02-27 PROCEDURE — 65660000000 HC RM CCU STEPDOWN

## 2020-02-27 PROCEDURE — 80048 BASIC METABOLIC PNL TOTAL CA: CPT

## 2020-02-27 PROCEDURE — 74011250636 HC RX REV CODE- 250/636: Performed by: INTERNAL MEDICINE

## 2020-02-27 PROCEDURE — 94760 N-INVAS EAR/PLS OXIMETRY 1: CPT

## 2020-02-27 PROCEDURE — 74011250637 HC RX REV CODE- 250/637: Performed by: FAMILY MEDICINE

## 2020-02-27 PROCEDURE — 36415 COLL VENOUS BLD VENIPUNCTURE: CPT

## 2020-02-27 PROCEDURE — 97530 THERAPEUTIC ACTIVITIES: CPT

## 2020-02-27 RX ORDER — METOPROLOL TARTRATE 25 MG/1
12.5 TABLET, FILM COATED ORAL 2 TIMES DAILY
Status: DISCONTINUED | OUTPATIENT
Start: 2020-02-27 | End: 2020-03-06 | Stop reason: HOSPADM

## 2020-02-27 RX ORDER — METOPROLOL TARTRATE 25 MG/1
25 TABLET, FILM COATED ORAL 2 TIMES DAILY
Status: DISCONTINUED | OUTPATIENT
Start: 2020-02-27 | End: 2020-02-27

## 2020-02-27 RX ORDER — POTASSIUM CHLORIDE 750 MG/1
20 TABLET, FILM COATED, EXTENDED RELEASE ORAL 3 TIMES DAILY
Status: DISCONTINUED | OUTPATIENT
Start: 2020-02-27 | End: 2020-03-02

## 2020-02-27 RX ORDER — MAGNESIUM SULFATE HEPTAHYDRATE 40 MG/ML
2 INJECTION, SOLUTION INTRAVENOUS ONCE
Status: COMPLETED | OUTPATIENT
Start: 2020-02-27 | End: 2020-02-28

## 2020-02-27 RX ADMIN — CASTOR OIL AND BALSAM, PERU: 788; 87 OINTMENT TOPICAL at 07:29

## 2020-02-27 RX ADMIN — LEVOTHYROXINE SODIUM 100 MCG: 0.1 TABLET ORAL at 06:10

## 2020-02-27 RX ADMIN — CASTOR OIL AND BALSAM, PERU: 788; 87 OINTMENT TOPICAL at 14:19

## 2020-02-27 RX ADMIN — METOPROLOL TARTRATE 12.5 MG: 25 TABLET ORAL at 19:18

## 2020-02-27 RX ADMIN — POTASSIUM CHLORIDE 20 MEQ: 750 TABLET, FILM COATED, EXTENDED RELEASE ORAL at 08:57

## 2020-02-27 RX ADMIN — TRAMADOL HYDROCHLORIDE 50 MG: 50 TABLET, FILM COATED ORAL at 17:31

## 2020-02-27 RX ADMIN — FUROSEMIDE 80 MG: 10 INJECTION, SOLUTION INTRAMUSCULAR; INTRAVENOUS at 08:57

## 2020-02-27 RX ADMIN — POTASSIUM CHLORIDE 20 MEQ: 750 TABLET, FILM COATED, EXTENDED RELEASE ORAL at 21:40

## 2020-02-27 RX ADMIN — LORATADINE 10 MG: 10 TABLET ORAL at 08:57

## 2020-02-27 RX ADMIN — ALPRAZOLAM 0.5 MG: 0.5 TABLET ORAL at 17:25

## 2020-02-27 RX ADMIN — MAGNESIUM SULFATE HEPTAHYDRATE 2 G: 40 INJECTION, SOLUTION INTRAVENOUS at 19:18

## 2020-02-27 RX ADMIN — ALPRAZOLAM 0.5 MG: 0.5 TABLET ORAL at 08:57

## 2020-02-27 RX ADMIN — Medication 1 CAPSULE: at 08:57

## 2020-02-27 RX ADMIN — Medication 10 ML: at 14:20

## 2020-02-27 RX ADMIN — Medication 10 ML: at 06:11

## 2020-02-27 RX ADMIN — TRAMADOL HYDROCHLORIDE 50 MG: 50 TABLET, FILM COATED ORAL at 08:57

## 2020-02-27 RX ADMIN — CASTOR OIL AND BALSAM, PERU: 788; 87 OINTMENT TOPICAL at 21:41

## 2020-02-27 RX ADMIN — POTASSIUM CHLORIDE 20 MEQ: 750 TABLET, FILM COATED, EXTENDED RELEASE ORAL at 17:25

## 2020-02-27 RX ADMIN — ALPRAZOLAM 0.5 MG: 0.5 TABLET ORAL at 21:40

## 2020-02-27 NOTE — PROGRESS NOTES
Problem: Mobility Impaired (Adult and Pediatric)  Goal: *Acute Goals and Plan of Care (Insert Text)  Description  FUNCTIONAL STATUS PRIOR TO ADMISSION: The patient was functional at the wheelchair level and required mod assist?  for transfers to the chair. (Pt irritated so does not provide detailed hx and frequently says \"well I don't remember\")    HOME SUPPORT PRIOR TO ADMISSION: The patient lived at [facility] at [address] [place] [address] Dr Patton. Pt required assist but not a good historian on how much assist she needed or with what. Physical Therapy Goals  Initiated 2/25/2020  1. Patient will move from supine to sit and sit to supine  in bed with minimal assistance within 7 day(s). 2.  Patient will transfer from bed to chair and chair to bed with minimal assistance using the least restrictive device within 7 day(s). 3.  Patient will perform sit to stand with minimal assistancewithin 7 day(s). 4.  Patient will demonstrate ability to tolerate 1 minute of standing in order to progress towards independent stand pivot transfers to w/c within 7 days. Outcome: Progressing Towards Goal    PHYSICAL THERAPY TREATMENT  Patient: aVnessa Masters (58 y.o. female)  Date: 2/27/2020  Diagnosis: Acute on chronic diastolic CHF (congestive heart failure) ([facility]) [I50.33]   <principal problem not specified>       Precautions: Fall  Chart, physical therapy assessment, plan of care and goals were reviewed. ASSESSMENT  Patient continues with skilled PT services and is progressing towards goals. Pt continues to display decreased functional mobility, decreased activity tolerance, LE edema, and R LE weakness/pain. Pt still has difficulty initiating movement w/ LE (R>L) from supine position affecting bed mobility. Pt displays decreased assist needed for bed to w/c transfer today (min assist x 1). Pt able to self propel w/c using UE 60 feet before she fatigues.  Patient continues to progress towards PLOF and will continue to benefit from skilled PT in order to address above deficits and return to PLOF. Recommend out of bed for meals with assist x 1 and gait belt for stand pivot to chair    Current Level of Function Impacting Discharge (mobility/balance): min assist for bed to w/c transfer, min assist for w/c propulsion. Other factors to consider for discharge: previously independent at w/c level, from [facility]         PLAN :  Patient continues to benefit from skilled intervention to address the above impairments. Continue treatment per established plan of care. to address goals. Recommendation for discharge: (in order for the patient to meet his/her long term goals)  HHPT at [facility]     This discharge recommendation:  Has not yet been discussed the attending provider and/or case management    IF patient discharges home will need the following DME: patient owns DME required for discharge       SUBJECTIVE:   Patient stated if I had my wheelchair I'd be able to go much further.     OBJECTIVE DATA SUMMARY:   Critical Behavior:  Neurologic State: Alert  Orientation Level: Oriented X4  Cognition: Follows commands     Functional Mobility Training:  Bed Mobility:  Rolling: Minimum assistance;Bed Modified(to manage R LE)  Supine to Sit: Minimum assistance;Bed Modified(requires hand to pull to sit)              Transfers:  Sit to Stand: Minimum assistance;Assist x1  Stand to Sit: Minimum assistance;Assist x1(tries to sit prematurely )  Stand Pivot Transfers: Minimum assistance;Assist x1;Additional time     Bed to Chair: Minimum assistance;Assist x1                    Balance:  Sitting: Intact; With support  Standing: Impaired; With support  Standing - Static: Fair;Constant support  Standing - Dynamic : Fair;Constant support  Wheel chair management:   Able to self propel w/c for 60 feet avoiding obstacles with min assist for turns before fatiguing   Width of w/c hinders patients ability to propel w/ UE - would benefit from narrower w/c next session  Pt able to independently apply L sided w/c break but not R sided brake secondary to UE weakness     Pain Rating:  Reports R leg pain throughout session - especially to touch, but does not rate    Activity Tolerance:   Fair and fatigues quickly with functional mobility    Please refer to the flowsheet for vital signs taken during this treatment. After treatment patient left in no apparent distress:   Sitting in w/c w/ call bell and lunch tray     COMMUNICATION/COLLABORATION:   The patients plan of care was discussed with: Occupational Therapist and Registered Nurse    Regarding student involvement in patient care:  A student participated in this treatment session. Per CMS Medicare statements and APTA guidelines I certify that the following was true:  1. I was present and directly observed the entire session. 2. I made all skilled judgments and clinical decisions regarding care. 3. I am the practitioner responsible for assessment, treatment, and documentation.       Scar Fererira

## 2020-02-27 NOTE — PROGRESS NOTES
At 1602, Pt had 6 runs of v-tach and quickly reverted back to her baseline rhythm, cardiologist office called and spoke to [facility] to page the on call physician, will wait for the call back   Pt is asymptomatic and doing well

## 2020-02-27 NOTE — PROGRESS NOTES
Benito Mattson, Mariangel Juan, and Tian Ortiz Date: 2/22/2020      Subjective:     Patient breathing comfortably. K- 3.5 today. Current Facility-Administered Medications   Medication Dose Route Frequency    potassium chloride SR (KLOR-CON 10) tablet 20 mEq  20 mEq Oral TID    traMADol (ULTRAM) tablet 50 mg  50 mg Oral Q6H PRN    albuterol (PROVENTIL VENTOLIN) nebulizer solution 2.5 mg  2.5 mg Nebulization Q4H PRN    furosemide (LASIX) injection 80 mg  80 mg IntraVENous BID    balsam peru-castor oil (VENELEX) ointment   Topical Q8H    lactobac ac& pc-s.therm-b.anim (DAVE Q/RISAQUAD)  1 Cap Oral DAILY    ALPRAZolam (XANAX) tablet 0.5 mg  0.5 mg Oral TID PRN    levothyroxine (SYNTHROID) tablet 100 mcg  100 mcg Oral ACB    loratadine (CLARITIN) tablet 10 mg  10 mg Oral DAILY    sodium chloride (NS) flush 5-40 mL  5-40 mL IntraVENous Q8H    sodium chloride (NS) flush 5-40 mL  5-40 mL IntraVENous PRN    acetaminophen (TYLENOL) tablet 650 mg  650 mg Oral Q4H PRN          Objective:     Patient Vitals for the past 8 hrs:   BP Temp Pulse Resp SpO2 Weight   02/27/20 0535      154 lb 15.7 oz (70.3 kg)   02/27/20 0304 129/71 98.1 °F (36.7 °C) 87 16 91 %      No intake/output data recorded. 02/25 1901 - 02/27 0700  In: 480 [P.O.:480]  Out: -     Physical Exam: Lungs: clear to auscultation bilaterally  Heart: regular rate and rhythm, S1, S2 normal, no murmur, click, rub or gallop  Abdomen: soft, non-tender.  Bowel sounds normal. No masses,  no organomegaly        Data Review   Recent Results (from the past 24 hour(s))   METABOLIC PANEL, BASIC    Collection Time: 02/27/20  3:37 AM   Result Value Ref Range    Sodium 133 (L) 136 - 145 mmol/L    Potassium 3.5 3.5 - 5.1 mmol/L    Chloride 96 (L) 97 - 108 mmol/L    CO2 32 21 - 32 mmol/L    Anion gap 5 5 - 15 mmol/L    Glucose 88 65 - 100 mg/dL    BUN 13 6 - 20 MG/DL    Creatinine 0.73 0.55 - 1.02 MG/DL    BUN/Creatinine ratio 18 12 - 20      GFR est AA >60 >60 ml/min/1.73m2    GFR est non-AA >60 >60 ml/min/1.73m2    Calcium 9.9 8.5 - 10.1 MG/DL           Assessment:     Active Problems:    A-fib ([facility]) ()      Dementia ([facility]) (2/9/2016)      Acute on chronic diastolic (congestive) heart failure ([facility]) (6/5/2019)      Acute on chronic diastolic CHF (congestive heart failure) ([facility] [address] (9/27/2019)      Laceration of leg, left (2/25/2020)        Plan:     1) Continue diuresis  2) wound care L leg  3) Increase KCL to 60mEq daily

## 2020-02-27 NOTE — PROGRESS NOTES
Cardiology Progress Note  2020     Admit Date: 2020  Admit Diagnosis: Acute on chronic diastolic CHF (congestive heart failure) ([facility]) [I50.33]  CC: none currently    Assessment/Plan:   Has had some NSVT  Mrs Doe Lin has no complaints  K+ is low and is being replaced  Suspect Mg++ is low as  Well  Will start low dose cardioselective BB  See orders      For other plans, see orders. Subjective: Catalina Quiñonez reports   Chest Pain:  [x]  none;  consistent with []  non-cardiac  []  atypical  []  angina             [x]  none now    []  on-going  Dyspnea: [x]  none    []  at rest    []  with exertion   []  improved    []  unchanged    []  worsening  PND:       [x]  none      []  overnight      Orthopnea:   [x]  none        []  improved         []  unchanged        []  worsening  Presyncope: [x]  none        []  improved         []  unchanged        []  worsening  Ambulated in hallway without symptoms  []  Yes  Ambulated in room without symptoms  []  Yes    Objective:    Physical Exam:  Overall VSSAF;    Visit Vitals  /71 (BP 1 Location: Right arm, BP Patient Position: At rest)   Pulse 100   Temp 97.5 °F (36.4 °C)   Resp 17   Ht 160 cm (63\")   Wt 70.3 kg (154 lb 15.7 oz)   SpO2 92%   BMI 27.45 kg/m²     Temp (24hrs), Av.6 °F (36.4 °C), Min:97 °F (36.1 °C), Max:98.1 °F (36.7 °C)    Patient Vitals for the past 8 hrs:   Pulse   20 1440 100    Patient Vitals for the past 8 hrs:   Resp   20 1440 17    Patient Vitals for the past 8 hrs:   BP   20 1440 123/71      No intake or output data in the 24 hours ending 20 1745    General Appearance: Well developed, well nourished, no acute distress. Ears/Nose/Mouth/Throat:   Normal MM; anicteric. JVP: WNL   Resp:   Lungs clear to auscultation bilaterally. Nl resp effort. Cardiovascular:  RRR, S1, S2 normal, no new murmur. No gallop or rub. Abdomen:   Soft, non-tender, bowel sounds are present. Extremities: No edema bilaterally. Skin:  Neuro: Warm and dry. A/O x3, grossly nonfocal    []  cath site intact w/o hematoma or bruit; distal pulse unchanged. Data Review:     Telemetry independently reviewed : []  sinus      []  chronic afib     []  par afib    []  NSVT    ECG independently reviewed:  []  NSR         []  no significant changes  [] no new ECG provided for review  Lab results reviewed as noted below. Current medications reviewed as noted below. No results for input(s): PH, PCO2, PO2 in the last 72 hours. Recent Labs     02/25/20  0856   TROIQ <0.05     Recent Labs     02/27/20  0337 02/26/20  0022   * 132*   K 3.5 4.1   CL 96* 96*   CO2 32 30   BUN 13 13   CREA 0.73 0.76   GLU 88 92   CA 9.9 9.6     No results for input(s): SGOT, GPT, ALT, AP, TBIL, TBILI, TP, ALB, GLOB, GGT, AML, LPSE in the last 72 hours. No lab exists for component: AMYP, HLPSE  No results for input(s): INR, PTP, APTT, INREXT in the last 72 hours. No results for input(s): FE, TIBC, PSAT, FERR in the last 72 hours.    Lab Results   Component Value Date/Time    Glucose (POC) 118 (H) 10/18/2019 08:09 PM       Current Facility-Administered Medications   Medication Dose Route Frequency    potassium chloride SR (KLOR-CON 10) tablet 20 mEq  20 mEq Oral TID    magnesium sulfate 2 g/50 ml IVPB (premix or compounded)  2 g IntraVENous ONCE    metoprolol tartrate (LOPRESSOR) tablet 12.5 mg  12.5 mg Oral BID    traMADol (ULTRAM) tablet 50 mg  50 mg Oral Q6H PRN    albuterol (PROVENTIL VENTOLIN) nebulizer solution 2.5 mg  2.5 mg Nebulization Q4H PRN    furosemide (LASIX) injection 80 mg  80 mg IntraVENous BID    balsam peru-castor oil (VENELEX) ointment   Topical Q8H    lactobac ac& pc-s.therm-b.anim (DAVE Q/RISAQUAD)  1 Cap Oral DAILY    ALPRAZolam (XANAX) tablet 0.5 mg  0.5 mg Oral TID PRN    levothyroxine (SYNTHROID) tablet 100 mcg  100 mcg Oral ACB    loratadine (CLARITIN) tablet 10 mg  10 mg Oral DAILY    sodium chloride (NS) flush 5-40 mL 5-40 mL IntraVENous Q8H    sodium chloride (NS) flush 5-40 mL  5-40 mL IntraVENous PRN    acetaminophen (TYLENOL) tablet 650 mg  650 mg Oral Q4H PRN        Gerhardt Hard, MD

## 2020-02-27 NOTE — PROGRESS NOTES
Name: Clayton Ervin: Salina Logan 55   : 1931 Admit Date: 2020   Phone: [phone]  Room:    PCP: Hailee Hewitt MD  MRN: [de-identified]   Date: 2020  Code: Full Code        HPI:      Went into a fib this am. Had a few bouts of hypoxemia this morning. Doesn't appear to be more dyspneic though. Diuretics were increased yesterday       Up in bed on room air. Doesn't feel that she's made much progress. Still feels SOB, some difficulty taking a deep breath. Denies cough. So far, looks like fluid balance in positive.   Resting in bed. Less O2 requirement       4:56 PM       History was obtained from patient. I was asked by Amy Mendoza MD to see Wolf Chaves in consultation for a chief complaint of shortness of breath. History of Present Illness: 80year old female with past medical hx of diastolic heart failure who presented to [facility] with increased shortness of breath and ankle swelling. Her  room air 02 saturation was 89% and she was placed on 4 liters a minute nasal cannula O2. She also received 60 mg iv lasix. She denies fever, chills, night sweats or weight loss. She denied chest pain. She does have a hx of pulmonary hypertension and CHF for which she takes lasix. Hx of afib - was anticoagulated before - not anymore. CXR left lower love volume loss - atelectasis vs effusion vs infiltrate.     Past Medical History:   Diagnosis Date    A-fib [facility])     Arrhythmia     A FIB    Arthritis     Back pain     CAD (coronary artery disease)     PT DENIES    Chronic pain     Dementia ([facility] [address] 2016    Hypercholesterolemia     Psychiatric disorder     ANXIETY    Shoulder pain     Thyroid disease     Tremor, essential        Past Surgical History:   Procedure Laterality Date    HX APPENDECTOMY      HX ORTHOPAEDIC      left knee replacement    HX KADIE AND BSO      AGE 45    HX TONSIL AND ADENOIDECTOMY      IR KYPHOPLASTY LUMBAR 2019       Family History   Problem Relation Age of Onset    Dementia Mother     Arthritis-osteo Father        Social History     Tobacco Use    Smoking status: Former Smoker     Packs/day: 0.50     Years: 10.00     Pack years: 5.00     Last attempt to quit: 1994     Years since quittin.1    Smokeless tobacco: Never Used   Substance Use Topics    Alcohol use: Yes     Comment: NIGHTLY 2 GLASSES WINE       Allergies   Allergen Reactions    Latex, Natural Rubber Other (comments)     Pt denies allergy to latex    Codeine Other (comments)     \"It just sends me up the wall\"    Adhesive Rash and Other (comments)     blisters    Cortisone Unknown (comments)       Current Facility-Administered Medications   Medication Dose Route Frequency    potassium chloride SR (KLOR-CON 10) tablet 20 mEq  20 mEq Oral TID    traMADol (ULTRAM) tablet 50 mg  50 mg Oral Q6H PRN    albuterol (PROVENTIL VENTOLIN) nebulizer solution 2.5 mg  2.5 mg Nebulization Q4H PRN    furosemide (LASIX) injection 80 mg  80 mg IntraVENous BID    balsam peru-castor oil (VENELEX) ointment   Topical Q8H    lactobac ac& pc-s.therm-b.anim (DAVE Q/RISAQUAD)  1 Cap Oral DAILY    ALPRAZolam (XANAX) tablet 0.5 mg  0.5 mg Oral TID PRN    levothyroxine (SYNTHROID) tablet 100 mcg  100 mcg Oral ACB    loratadine (CLARITIN) tablet 10 mg  10 mg Oral DAILY    sodium chloride (NS) flush 5-40 mL  5-40 mL IntraVENous Q8H    sodium chloride (NS) flush 5-40 mL  5-40 mL IntraVENous PRN    acetaminophen (TYLENOL) tablet 650 mg  650 mg Oral Q4H PRN         REVIEW OF SYSTEMS   Negative except as stated in the HPI. Physical Exam:   Visit Vitals  /68 (BP 1 Location: Right arm, BP Patient Position: At rest)   Pulse (!) 119   Temp 97 °F (36.1 °C)   Resp 18   Ht 5' 3\" (1.6 m)   Wt 70.3 kg (154 lb 15.7 oz)   SpO2 (!) 89%   BMI 27.45 kg/m²       General:  Alert, cooperative, no distress, appears stated age.    Head:  Normocephalic, without obvious abnormality, atraumatic. Eyes:  Conjunctivae/corneas clear. PERRL, EOMs intact. Nose: Nares normal. Septum midline. Mucosa normal. No drainage or sinus tenderness. Neck: Supple, symmetrical, trachea midline, no adenopathy, thyroid: no enlargment/tenderness/nodules, no carotid bruit and no JVD. Lungs:   Mostly clear, but diminished bs. Heart:  IRR, S1, S2 normal, no murmur, click, rub or gallop. Abdomen:   Soft, non-tender. Bowel sounds normal. No masses,  No organomegaly. Extremities: Trace pedal edema . Neurologic: Grossly nonfocal       Lab Results   Component Value Date/Time    Sodium 133 (L) 02/27/2020 03:37 AM    Potassium 3.5 02/27/2020 03:37 AM    Chloride 96 (L) 02/27/2020 03:37 AM    CO2 32 02/27/2020 03:37 AM    BUN 13 02/27/2020 03:37 AM    Creatinine 0.73 02/27/2020 03:37 AM    Glucose 88 02/27/2020 03:37 AM    Calcium 9.9 02/27/2020 03:37 AM    Magnesium 1.6 10/02/2019 04:09 AM    Lactic acid 1.1 10/19/2019 04:09 AM       Lab Results   Component Value Date/Time    WBC 4.3 02/23/2020 04:29 AM    HGB 11.8 02/23/2020 04:29 AM    PLATELET 561 23/97/9206 04:29 AM    MCV 97.3 02/23/2020 04:29 AM       Lab Results   Component Value Date/Time    INR 3.9 (H) 10/25/2019 03:50 AM    aPTT 33.2 (H) 07/29/2018 09:20 PM    AST (SGOT) 20 02/22/2020 01:38 PM    Alk. phosphatase 96 02/22/2020 01:38 PM    Protein, total 7.2 02/22/2020 01:38 PM    Albumin 3.4 (L) 02/22/2020 01:38 PM    Globulin 3.8 02/22/2020 01:38 PM       Lab Results   Component Value Date/Time    TSH 2.10 02/23/2020 04:29 AM        Lab Results   Component Value Date/Time     (H) 07/29/2018 09:20 PM    CK-MB Index 1.8 02/09/2016 03:37 PM    Troponin-I, Qt. <0.05 02/25/2020 08:56 AM        Lab Results   Component Value Date/Time     (H) 07/29/2018 09:20 PM    CK 36 01/12/2010 04:50 PM       IMPRESSION:  ===========  -acute on chronic diastolic heart failure.   -Pulmonary hypertension.  -atrial fibrillation - currently not anticoagulated.  -abnormal CXR with left base volume loss. -left side pleural effusion noted to be present on 5/2019 echo. PLAN:  =====  -diuretics.    -keep k around 4  -repeat chest x-ray in am   -hold on abx right now  -O2 titration above 90% if needed     Panfilo Leomn PA-C

## 2020-02-27 NOTE — PROGRESS NOTES
Spiritual Care Partner Volunteer visited patient in room 208/01 on 2.27.2020. Documented by: : Rev. Jhonatan Rivero.  Yesenia Boyd; [facility], to contact [phone] [address] call: [phone]

## 2020-02-28 ENCOUNTER — APPOINTMENT (OUTPATIENT)
Dept: GENERAL RADIOLOGY | Age: 85
DRG: 292 | End: 2020-02-28
Attending: PHYSICIAN ASSISTANT
Payer: MEDICARE

## 2020-02-28 ENCOUNTER — APPOINTMENT (OUTPATIENT)
Dept: CT IMAGING | Age: 85
DRG: 292 | End: 2020-02-28
Attending: INTERNAL MEDICINE
Payer: MEDICARE

## 2020-02-28 LAB
ANION GAP SERPL CALC-SCNC: 4 MMOL/L (ref 5–15)
BNP SERPL-MCNC: 1943 PG/ML
BUN SERPL-MCNC: 17 MG/DL (ref 6–20)
BUN/CREAT SERPL: 20 (ref 12–20)
CALCIUM SERPL-MCNC: 10 MG/DL (ref 8.5–10.1)
CHLORIDE SERPL-SCNC: 97 MMOL/L (ref 97–108)
CO2 SERPL-SCNC: 31 MMOL/L (ref 21–32)
CREAT SERPL-MCNC: 0.84 MG/DL (ref 0.55–1.02)
GLUCOSE SERPL-MCNC: 100 MG/DL (ref 65–100)
MAGNESIUM SERPL-MCNC: 2.1 MG/DL (ref 1.6–2.4)
POTASSIUM SERPL-SCNC: 4 MMOL/L (ref 3.5–5.1)
SODIUM SERPL-SCNC: 132 MMOL/L (ref 136–145)

## 2020-02-28 PROCEDURE — 83880 ASSAY OF NATRIURETIC PEPTIDE: CPT

## 2020-02-28 PROCEDURE — 71045 X-RAY EXAM CHEST 1 VIEW: CPT

## 2020-02-28 PROCEDURE — 71250 CT THORAX DX C-: CPT

## 2020-02-28 PROCEDURE — 83735 ASSAY OF MAGNESIUM: CPT

## 2020-02-28 PROCEDURE — 36415 COLL VENOUS BLD VENIPUNCTURE: CPT

## 2020-02-28 PROCEDURE — 74011250637 HC RX REV CODE- 250/637: Performed by: INTERNAL MEDICINE

## 2020-02-28 PROCEDURE — 65660000000 HC RM CCU STEPDOWN

## 2020-02-28 PROCEDURE — 74011250637 HC RX REV CODE- 250/637: Performed by: FAMILY MEDICINE

## 2020-02-28 PROCEDURE — 94760 N-INVAS EAR/PLS OXIMETRY 1: CPT

## 2020-02-28 PROCEDURE — 80048 BASIC METABOLIC PNL TOTAL CA: CPT

## 2020-02-28 PROCEDURE — 74011250636 HC RX REV CODE- 250/636: Performed by: INTERNAL MEDICINE

## 2020-02-28 RX ADMIN — METOPROLOL TARTRATE 12.5 MG: 25 TABLET ORAL at 19:05

## 2020-02-28 RX ADMIN — FUROSEMIDE 80 MG: 10 INJECTION, SOLUTION INTRAMUSCULAR; INTRAVENOUS at 10:31

## 2020-02-28 RX ADMIN — TRAMADOL HYDROCHLORIDE 50 MG: 50 TABLET, FILM COATED ORAL at 10:31

## 2020-02-28 RX ADMIN — LORATADINE 10 MG: 10 TABLET ORAL at 10:31

## 2020-02-28 RX ADMIN — CASTOR OIL AND BALSAM, PERU: 788; 87 OINTMENT TOPICAL at 14:35

## 2020-02-28 RX ADMIN — ALPRAZOLAM 0.5 MG: 0.5 TABLET ORAL at 15:31

## 2020-02-28 RX ADMIN — ALPRAZOLAM 0.5 MG: 0.5 TABLET ORAL at 21:02

## 2020-02-28 RX ADMIN — TRAMADOL HYDROCHLORIDE 50 MG: 50 TABLET, FILM COATED ORAL at 19:09

## 2020-02-28 RX ADMIN — LEVOTHYROXINE SODIUM 100 MCG: 0.1 TABLET ORAL at 06:43

## 2020-02-28 RX ADMIN — CASTOR OIL AND BALSAM, PERU: 788; 87 OINTMENT TOPICAL at 23:47

## 2020-02-28 RX ADMIN — Medication 10 ML: at 14:35

## 2020-02-28 RX ADMIN — Medication 10 ML: at 23:48

## 2020-02-28 RX ADMIN — Medication 1 CAPSULE: at 10:31

## 2020-02-28 RX ADMIN — POTASSIUM CHLORIDE 20 MEQ: 750 TABLET, FILM COATED, EXTENDED RELEASE ORAL at 10:31

## 2020-02-28 RX ADMIN — METOPROLOL TARTRATE 12.5 MG: 25 TABLET ORAL at 10:31

## 2020-02-28 RX ADMIN — CASTOR OIL AND BALSAM, PERU: 788; 87 OINTMENT TOPICAL at 06:43

## 2020-02-28 RX ADMIN — POTASSIUM CHLORIDE 20 MEQ: 750 TABLET, FILM COATED, EXTENDED RELEASE ORAL at 15:29

## 2020-02-28 RX ADMIN — ACETAMINOPHEN 650 MG: 325 TABLET ORAL at 21:02

## 2020-02-28 NOTE — PROGRESS NOTES
Name: Rock Quinn: Salina Logan 55   : 1931 Admit Date: 2020   Phone: [phone]  Room:    PCP: Angelica Ndiaye MD  MRN: [de-identified]   Date: 2020  Code: Full Code        HPI:      Does not want to work with PT  Feels a little more dyspneic today      Went into NSVT this am. Had a few bouts of hypoxemia this morning. Doesn't appear to be more dyspneic though. Diuretics were increased yesterday       Up in bed on room air. Doesn't feel that she's made much progress. Still feels SOB, some difficulty taking a deep breath. Denies cough. So far, looks like fluid balance in positive.   Resting in bed. Less O2 requirement       4:56 PM       History was obtained from patient. I was asked by Nelia Villarreal MD to see Jaya Innocent in consultation for a chief complaint of shortness of breath. History of Present Illness: 80year old female with past medical hx of diastolic heart failure who presented to [facility] [place] with increased shortness of breath and ankle swelling. Her  room air 02 saturation was 89% and she was placed on 4 liters a minute nasal cannula O2. She also received 60 mg iv lasix. She denies fever, chills, night sweats or weight loss. She denied chest pain. She does have a hx of pulmonary hypertension and CHF for which she takes lasix. Hx of afib - was anticoagulated before - not anymore. CXR left lower love volume loss - atelectasis vs effusion vs infiltrate.     Past Medical History:   Diagnosis Date    A-fib [facility])     Arrhythmia     A FIB    Arthritis     Back pain     CAD (coronary artery disease)     PT DENIES    Chronic pain     Dementia ([facility] [address] 2016    Hypercholesterolemia     Psychiatric disorder     ANXIETY    Shoulder pain     Thyroid disease     Tremor, essential        Past Surgical History:   Procedure Laterality Date    HX APPENDECTOMY      HX ORTHOPAEDIC      left knee replacement    HX KADIE AND BSO AGE 39    HX TONSIL AND ADENOIDECTOMY      IR KYPHOPLASTY LUMBAR  2019       Family History   Problem Relation Age of Onset    Dementia Mother     Arthritis-osteo Father        Social History     Tobacco Use    Smoking status: Former Smoker     Packs/day: 0.50     Years: 10.00     Pack years: 5.00     Last attempt to quit: 1994     Years since quittin.1    Smokeless tobacco: Never Used   Substance Use Topics    Alcohol use: Yes     Comment: NIGHTLY 2 GLASSES WINE       Allergies   Allergen Reactions    Latex, Natural Rubber Other (comments)     Pt denies allergy to latex    Codeine Other (comments)     \"It just sends me up the wall\"    Adhesive Rash and Other (comments)     blisters    Cortisone Unknown (comments)       Current Facility-Administered Medications   Medication Dose Route Frequency    potassium chloride SR (KLOR-CON 10) tablet 20 mEq  20 mEq Oral TID    metoprolol tartrate (LOPRESSOR) tablet 12.5 mg  12.5 mg Oral BID    traMADol (ULTRAM) tablet 50 mg  50 mg Oral Q6H PRN    albuterol (PROVENTIL VENTOLIN) nebulizer solution 2.5 mg  2.5 mg Nebulization Q4H PRN    furosemide (LASIX) injection 80 mg  80 mg IntraVENous BID    balsam peru-castor oil (VENELEX) ointment   Topical Q8H    lactobac ac& pc-s.therm-b.anim (DAVE Q/RISAQUAD)  1 Cap Oral DAILY    ALPRAZolam (XANAX) tablet 0.5 mg  0.5 mg Oral TID PRN    levothyroxine (SYNTHROID) tablet 100 mcg  100 mcg Oral ACB    loratadine (CLARITIN) tablet 10 mg  10 mg Oral DAILY    sodium chloride (NS) flush 5-40 mL  5-40 mL IntraVENous Q8H    sodium chloride (NS) flush 5-40 mL  5-40 mL IntraVENous PRN    acetaminophen (TYLENOL) tablet 650 mg  650 mg Oral Q4H PRN         REVIEW OF SYSTEMS   Negative except as stated in the HPI.       Physical Exam:   Visit Vitals  /65 (BP 1 Location: Right arm, BP Patient Position: At rest)   Pulse 76   Temp 98.1 °F (36.7 °C)   Resp 18   Ht 5' 3\" (1.6 m)   Wt 68.8 kg (151 lb 10.8 oz)   SpO2 92%   BMI 26.87 kg/m²       General:  Alert, cooperative, no distress, appears stated age. Head:  Normocephalic, without obvious abnormality, atraumatic. Eyes:  Conjunctivae/corneas clear. PERRL, EOMs intact. Nose: Nares normal. Septum midline. Mucosa normal. No drainage or sinus tenderness. Neck: Supple, symmetrical, trachea midline, no adenopathy, thyroid: no enlargment/tenderness/nodules, no carotid bruit and no JVD. Lungs:   Mostly clear, but diminished bs. Heart:  IRR, S1, S2 normal, no murmur, click, rub or gallop. Abdomen:   Soft, non-tender. Bowel sounds normal. No masses,  No organomegaly. Extremities: Trace pedal edema . Neurologic: Grossly nonfocal       Lab Results   Component Value Date/Time    Sodium 132 (L) 02/28/2020 03:52 AM    Potassium 4.0 02/28/2020 03:52 AM    Chloride 97 02/28/2020 03:52 AM    CO2 31 02/28/2020 03:52 AM    BUN 17 02/28/2020 03:52 AM    Creatinine 0.84 02/28/2020 03:52 AM    Glucose 100 02/28/2020 03:52 AM    Calcium 10.0 02/28/2020 03:52 AM    Magnesium 2.1 02/28/2020 03:52 AM    Lactic acid 1.1 10/19/2019 04:09 AM       Lab Results   Component Value Date/Time    WBC 4.3 02/23/2020 04:29 AM    HGB 11.8 02/23/2020 04:29 AM    PLATELET 444 99/19/5019 04:29 AM    MCV 97.3 02/23/2020 04:29 AM       Lab Results   Component Value Date/Time    INR 3.9 (H) 10/25/2019 03:50 AM    aPTT 33.2 (H) 07/29/2018 09:20 PM    AST (SGOT) 20 02/22/2020 01:38 PM    Alk.  phosphatase 96 02/22/2020 01:38 PM    Protein, total 7.2 02/22/2020 01:38 PM    Albumin 3.4 (L) 02/22/2020 01:38 PM    Globulin 3.8 02/22/2020 01:38 PM       Lab Results   Component Value Date/Time    TSH 2.10 02/23/2020 04:29 AM        Lab Results   Component Value Date/Time     (H) 07/29/2018 09:20 PM    CK-MB Index 1.8 02/09/2016 03:37 PM    Troponin-I, Qt. <0.05 02/25/2020 08:56 AM        Lab Results   Component Value Date/Time     (H) 07/29/2018 09:20 PM    CK 36 01/12/2010 04:50 PM IMPRESSION:  ===========  -acute on chronic diastolic heart failure. -Pulmonary hypertension.  -atrial fibrillation - currently not anticoagulated.  -abnormal CXR with left base volume loss. -left side pleural effusion noted to be present on 5/2019 echo. PLAN:  =====  -diuretics.    -chest film today still showing left pleural effusion, but not much changed    -hold on abx right now  -O2 titration above 90% if needed  -prn over the weekend      Av Moses PA-C

## 2020-02-28 NOTE — PROGRESS NOTES
Bedside shift change report given to Dayanna Du (oncoming nurse) by Neftaly Arrieta (offgoing nurse).  Report included the following information SBAR, Kardex, [facility] and Recent Results. '

## 2020-02-28 NOTE — PROGRESS NOTES
Benito Houston, Saba Sanders, and Ngozi Mahajan Date: 2/22/2020      Subjective:     Patient feeling OK this AM> . .       Current Facility-Administered Medications   Medication Dose Route Frequency    potassium chloride SR (KLOR-CON 10) tablet 20 mEq  20 mEq Oral TID    metoprolol tartrate (LOPRESSOR) tablet 12.5 mg  12.5 mg Oral BID    traMADol (ULTRAM) tablet 50 mg  50 mg Oral Q6H PRN    albuterol (PROVENTIL VENTOLIN) nebulizer solution 2.5 mg  2.5 mg Nebulization Q4H PRN    furosemide (LASIX) injection 80 mg  80 mg IntraVENous BID    balsam peru-castor oil (VENELEX) ointment   Topical Q8H    lactobac ac& pc-s.therm-b.anim (DAVE Q/RISAQUAD)  1 Cap Oral DAILY    ALPRAZolam (XANAX) tablet 0.5 mg  0.5 mg Oral TID PRN    levothyroxine (SYNTHROID) tablet 100 mcg  100 mcg Oral ACB    loratadine (CLARITIN) tablet 10 mg  10 mg Oral DAILY    sodium chloride (NS) flush 5-40 mL  5-40 mL IntraVENous Q8H    sodium chloride (NS) flush 5-40 mL  5-40 mL IntraVENous PRN    acetaminophen (TYLENOL) tablet 650 mg  650 mg Oral Q4H PRN          Objective:     Patient Vitals for the past 8 hrs:   BP Temp Pulse Resp SpO2 Weight   02/28/20 0659      151 lb 10.8 oz (68.8 kg)   02/28/20 0348 130/68 97.7 °F (36.5 °C) 84 16 90 %      No intake/output data recorded. No intake/output data recorded. Physical Exam: Lungs: clear to auscultation bilaterally  Heart: regular rate and rhythm, S1, S2 normal, no murmur, click, rub or gallop  Abdomen: soft, non-tender.  Bowel sounds normal. No masses,  no organomegaly        Data Review   Recent Results (from the past 24 hour(s))   METABOLIC PANEL, BASIC    Collection Time: 02/28/20  3:52 AM   Result Value Ref Range    Sodium 132 (L) 136 - 145 mmol/L    Potassium 4.0 3.5 - 5.1 mmol/L    Chloride 97 97 - 108 mmol/L    CO2 31 21 - 32 mmol/L    Anion gap 4 (L) 5 - 15 mmol/L    Glucose 100 65 - 100 mg/dL    BUN 17 6 - 20 MG/DL    Creatinine 0.84 0.55 - 1.02 MG/DL BUN/Creatinine ratio 20 12 - 20      GFR est AA >60 >60 ml/min/1.73m2    GFR est non-AA >60 >60 ml/min/1.73m2    Calcium 10.0 8.5 - 10.1 MG/DL   MAGNESIUM    Collection Time: 02/28/20  3:52 AM   Result Value Ref Range    Magnesium 2.1 1.6 - 2.4 mg/dL           Assessment:     Active Problems:    A-fib (HCC) ()      Dementia ([facility] [address] (2/9/2016)      Acute on chronic diastolic (congestive) heart failure ([facility] [address] (6/5/2019)      Acute on chronic diastolic CHF (congestive heart failure) ([facility] [address] (9/27/2019)      Laceration of leg, left (2/25/2020)        Plan:     1) Cont diuresis- watch renal numbers  2) BB on board for rhythm control  Please call Redd Alvarado- pts sister with update over weekend- [phone]

## 2020-02-28 NOTE — PROGRESS NOTES
visited pt in room 208. Pt is out of the room at time of visit. Consulted with nurse.  will follow up as needed.     Chaplain Heather Intern, MDiv  [phone]

## 2020-02-28 NOTE — PROGRESS NOTES
Cardiology Progress Note  2020     Admit Date: 2020  Admit Diagnosis: Acute on chronic diastolic CHF (congestive heart failure) (HCC) [I50.33]  CC: none currently    Assessment/Plan:   Overall the patient feels better  No more NSVT  AF with CVR  Chest xray shows persistent pulmonary edema  Agree with increased diuretics  Continue other treatment    For other plans, see orders. Subjective: Boy Chow reports   Chest Pain:  [x]  none;  consistent with []  non-cardiac  []  atypical  []  angina             [x]  none now    []  on-going  Dyspnea: [x]  none    []  at rest    []  with exertion   []  improved    []  unchanged    []  worsening  PND:       [x]  none      []  overnight      Orthopnea:   [x]  none        []  improved         []  unchanged        []  worsening  Presyncope: [x]  none        []  improved         []  unchanged        []  worsening  Ambulated in hallway without symptoms  []  Yes  Ambulated in room without symptoms  []  Yes    Objective:    Physical Exam:  Overall VSSAF;    Visit Vitals  /65 (BP 1 Location: Right arm, BP Patient Position: At rest)   Pulse 76   Temp 98.1 °F (36.7 °C)   Resp 18   Ht 160 cm (63\")   Wt 68.8 kg (151 lb 10.8 oz)   SpO2 92%   BMI 26.87 kg/m²     Temp (24hrs), Av.9 °F (36.6 °C), Min:97.5 °F (36.4 °C), Max:98.2 °F (36.8 °C)    Patient Vitals for the past 8 hrs:   Pulse   20 0929 76   20 0348 84    Patient Vitals for the past 8 hrs:   Resp   20 0929 18   20 0348 16    Patient Vitals for the past 8 hrs:   BP   20 0929 113/65   20 0348 130/68      No intake or output data in the 24 hours ending 20 1045    General Appearance: Well developed, well nourished, no acute distress. Ears/Nose/Mouth/Throat:   Normal MM; anicteric. JVP: WNL   Resp:   Lungs clear to auscultation bilaterally. Nl resp effort. Cardiovascular:  RRR, S1, S2 normal, no new murmur. No gallop or rub.    Abdomen:   Soft, non-tender, bowel sounds are present. Extremities: No edema bilaterally. Skin:  Neuro: Warm and dry. A/O x3, grossly nonfocal    []  cath site intact w/o hematoma or bruit; distal pulse unchanged. Data Review:     Telemetry independently reviewed : []  sinus      [x]  chronic afib     []  par afib    []  NSVT    ECG independently reviewed:  []  NSR         []  no significant changes  [] no new ECG provided for review  Lab results reviewed as noted below. Current medications reviewed as noted below. No results for input(s): PH, PCO2, PO2 in the last 72 hours. No results for input(s): CPK, CKMB, TROIQ in the last 72 hours. Recent Labs     02/28/20  0352 02/27/20  0337 02/26/20  0022   * 133* 132*   K 4.0 3.5 4.1   CL 97 96* 96*   CO2 31 32 30   BUN 17 13 13   CREA 0.84 0.73 0.76    88 92   CA 10.0 9.9 9.6     No results for input(s): SGOT, GPT, ALT, AP, TBIL, TBILI, TP, ALB, GLOB, GGT, AML, LPSE in the last 72 hours. No lab exists for component: AMYP, HLPSE  No results for input(s): INR, PTP, APTT, INREXT in the last 72 hours. No results for input(s): FE, TIBC, PSAT, FERR in the last 72 hours.    Lab Results   Component Value Date/Time    Glucose (POC) 118 (H) 10/18/2019 08:09 PM       Current Facility-Administered Medications   Medication Dose Route Frequency    potassium chloride SR (KLOR-CON 10) tablet 20 mEq  20 mEq Oral TID    metoprolol tartrate (LOPRESSOR) tablet 12.5 mg  12.5 mg Oral BID    traMADol (ULTRAM) tablet 50 mg  50 mg Oral Q6H PRN    albuterol (PROVENTIL VENTOLIN) nebulizer solution 2.5 mg  2.5 mg Nebulization Q4H PRN    furosemide (LASIX) injection 80 mg  80 mg IntraVENous BID    balsam peru-castor oil (VENELEX) ointment   Topical Q8H    lactobac ac& pc-s.therm-b.anim (DAVE Q/RISAQUAD)  1 Cap Oral DAILY    ALPRAZolam (XANAX) tablet 0.5 mg  0.5 mg Oral TID PRN    levothyroxine (SYNTHROID) tablet 100 mcg  100 mcg Oral ACB    loratadine (CLARITIN) tablet 10 mg  10 mg Oral DAILY    sodium chloride (NS) flush 5-40 mL  5-40 mL IntraVENous Q8H    sodium chloride (NS) flush 5-40 mL  5-40 mL IntraVENous PRN    acetaminophen (TYLENOL) tablet 650 mg  650 mg Oral Q4H PRN        Blanca Leyva MD

## 2020-02-28 NOTE — PROGRESS NOTES
ADRI:    Plan remains to return to [address] [place] [address] Dr [place] independent apartment with previously established aide services. Pt confirmed earlier this admission that pt expects to return via BLS. Trip is on will call.     PILI Godinez

## 2020-02-28 NOTE — PROGRESS NOTES
Chart reviewed, pt cleared by nursing - pt adamantly refusing PT this date - refused OOB, exercise, up to chair for lunch - will continue to follow -   Louann Valera, PT

## 2020-02-29 LAB
ANION GAP SERPL CALC-SCNC: 6 MMOL/L (ref 5–15)
BUN SERPL-MCNC: 25 MG/DL (ref 6–20)
BUN/CREAT SERPL: 26 (ref 12–20)
CALCIUM SERPL-MCNC: 10.1 MG/DL (ref 8.5–10.1)
CHLORIDE SERPL-SCNC: 94 MMOL/L (ref 97–108)
CO2 SERPL-SCNC: 31 MMOL/L (ref 21–32)
CREAT SERPL-MCNC: 0.95 MG/DL (ref 0.55–1.02)
GLUCOSE SERPL-MCNC: 91 MG/DL (ref 65–100)
MAGNESIUM SERPL-MCNC: 1.9 MG/DL (ref 1.6–2.4)
POTASSIUM SERPL-SCNC: 4.1 MMOL/L (ref 3.5–5.1)
SODIUM SERPL-SCNC: 131 MMOL/L (ref 136–145)

## 2020-02-29 PROCEDURE — 74011250637 HC RX REV CODE- 250/637: Performed by: INTERNAL MEDICINE

## 2020-02-29 PROCEDURE — 74011250636 HC RX REV CODE- 250/636: Performed by: INTERNAL MEDICINE

## 2020-02-29 PROCEDURE — 83735 ASSAY OF MAGNESIUM: CPT

## 2020-02-29 PROCEDURE — 36415 COLL VENOUS BLD VENIPUNCTURE: CPT

## 2020-02-29 PROCEDURE — 80048 BASIC METABOLIC PNL TOTAL CA: CPT

## 2020-02-29 PROCEDURE — 65660000000 HC RM CCU STEPDOWN

## 2020-02-29 PROCEDURE — 74011250637 HC RX REV CODE- 250/637: Performed by: FAMILY MEDICINE

## 2020-02-29 RX ADMIN — METOPROLOL TARTRATE 12.5 MG: 25 TABLET ORAL at 17:32

## 2020-02-29 RX ADMIN — CASTOR OIL AND BALSAM, PERU: 788; 87 OINTMENT TOPICAL at 22:16

## 2020-02-29 RX ADMIN — Medication 10 ML: at 06:00

## 2020-02-29 RX ADMIN — LORATADINE 10 MG: 10 TABLET ORAL at 09:07

## 2020-02-29 RX ADMIN — Medication 10 ML: at 22:16

## 2020-02-29 RX ADMIN — METOPROLOL TARTRATE 12.5 MG: 25 TABLET ORAL at 09:07

## 2020-02-29 RX ADMIN — Medication 1 CAPSULE: at 09:08

## 2020-02-29 RX ADMIN — POTASSIUM CHLORIDE 20 MEQ: 750 TABLET, FILM COATED, EXTENDED RELEASE ORAL at 09:07

## 2020-02-29 RX ADMIN — ALPRAZOLAM 0.5 MG: 0.5 TABLET ORAL at 11:12

## 2020-02-29 RX ADMIN — POTASSIUM CHLORIDE 20 MEQ: 750 TABLET, FILM COATED, EXTENDED RELEASE ORAL at 22:16

## 2020-02-29 RX ADMIN — LEVOTHYROXINE SODIUM 100 MCG: 0.1 TABLET ORAL at 07:25

## 2020-02-29 RX ADMIN — TRAMADOL HYDROCHLORIDE 50 MG: 50 TABLET, FILM COATED ORAL at 17:36

## 2020-02-29 RX ADMIN — Medication 10 ML: at 17:33

## 2020-02-29 RX ADMIN — CASTOR OIL AND BALSAM, PERU: 788; 87 OINTMENT TOPICAL at 07:26

## 2020-02-29 RX ADMIN — POTASSIUM CHLORIDE 20 MEQ: 750 TABLET, FILM COATED, EXTENDED RELEASE ORAL at 17:32

## 2020-02-29 RX ADMIN — FUROSEMIDE 80 MG: 10 INJECTION, SOLUTION INTRAMUSCULAR; INTRAVENOUS at 09:07

## 2020-02-29 NOTE — PROGRESS NOTES
[facility] Cardiology Progress Note    Date of service: 2/29/2020    Subjective:  Ms Toma George says she feels about the same  No specific new complaints  Eating a bag of buttered popcorn currently. Objective:    Visit Vitals  /70 (BP 1 Location: Right arm, BP Patient Position: At rest)   Pulse 73   Temp 98.2 °F (36.8 °C)   Resp 16   Ht 5' 3\" (1.6 m)   Wt 62.3 kg (137 lb 5.6 oz)   SpO2 90%   BMI 24.33 kg/m²       Physical Exam   Constitutional: She is oriented to person, place, and time. She appears well-developed and well-nourished. HENT:   Head: Normocephalic and atraumatic. Eyes: Conjunctivae are normal. No scleral icterus. Neck: No JVD present. Cardiovascular: Normal rate and normal heart sounds. An irregularly irregular rhythm present. Exam reveals no gallop. No murmur heard. Pulmonary/Chest: Effort normal and breath sounds normal. No stridor. No respiratory distress. She has no wheezes. She has no rales. Abdominal: Soft. She exhibits no distension. Musculoskeletal:         General: Edema present. No deformity. Neurological: She is alert and oriented to person, place, and time. Skin: Skin is warm and dry. Psychiatric: She has a normal mood and affect.  Her behavior is normal.       Data reviewed:  Recent Results (from the past 12 hour(s))   METABOLIC PANEL, BASIC    Collection Time: 02/29/20  4:40 AM   Result Value Ref Range    Sodium 131 (L) 136 - 145 mmol/L    Potassium 4.1 3.5 - 5.1 mmol/L    Chloride 94 (L) 97 - 108 mmol/L    CO2 31 21 - 32 mmol/L    Anion gap 6 5 - 15 mmol/L    Glucose 91 65 - 100 mg/dL    BUN 25 (H) 6 - 20 MG/DL    Creatinine 0.95 0.55 - 1.02 MG/DL    BUN/Creatinine ratio 26 (H) 12 - 20      GFR est AA >60 >60 ml/min/1.73m2    GFR est non-AA 56 (L) >60 ml/min/1.73m2    Calcium 10.1 8.5 - 10.1 MG/DL   MAGNESIUM    Collection Time: 02/29/20  4:40 AM   Result Value Ref Range    Magnesium 1.9 1.6 - 2.4 mg/dL     05/27/19   ECHO ADULT COMPLETE 05/28/2019 5/28/2019 Narrative · Pericardium: There is a moderate left pleural effusion. · Tricuspid Valve: Mild to moderate tricuspid valve regurgitation is   present. · Right Ventricle: Moderately dilated right ventricle. Mildly reduced   systolic function. · Left Atrium: Moderately dilated left atrium. · Left Ventricle: Mildly to moderately increased wall thickness. Estimated   left ventricular ejection fraction is 56 - 60%. Abnormal left ventricular   septal motion. Systolic flattening of the interventricular septum   consistent with right ventricle pressure overload. Interventricular septal   \"bounce\". Inconclusive left ventricular diastolic function. · Pulmonary Artery: Moderate to severe pulmonary hypertension. · Aortic Valve: Mild aortic valve regurgitation is present. Signed by: Joseph Siddiqui MD         Assessment:  1. Acute on chronic diastolic heart failure, NYHA class III  Stable  2. Other persistent atrial fibrillation  Rate controlled    Plan:    Continue IV lasix over weekend  Metoprolol for rate control    Signed:  Edgar Merchant MD  Interventional Cardiology  2/29/2020

## 2020-02-29 NOTE — PROGRESS NOTES
Reviewed code status with patient,she is confused and unable to make a decision. Spoke with Dr. Shayne Vela who is on call this weekend but does not know history of patient. I reached out to sister who is [facility] and medical decision maker, she would prefer that patient be a DNR.

## 2020-02-29 NOTE — PROGRESS NOTES
Bedside shift change report given to [address] (oncoming nurse) by Brooke Guido (offgoing nurse). Report included the following information SBAR, Kardex, MAR and Recent Results. medication change

## 2020-02-29 NOTE — PROGRESS NOTES
Patient resting in bed, no complaints of pain, appetite is good, no swallowing issues. Problem: Pain  Goal: *Control of Pain  Outcome: Progressing Towards Goal     Problem: Patient Education: Go to Patient Education Activity  Goal: Patient/Family Education  Outcome: Progressing Towards Goal     Problem: Patient Education: Go to Patient Education Activity  Goal: Patient/Family Education  Outcome: Progressing Towards Goal     Problem: Pressure Injury - Risk of  Goal: *Prevention of pressure injury  Description  Document Tre Scale and appropriate interventions in the flowsheet.   Outcome: Progressing Towards Goal  Note: Pressure Injury Interventions:  Sensory Interventions: Pressure redistribution bed/mattress (bed type)    Moisture Interventions: Absorbent underpads, Apply protective barrier, creams and emollients    Activity Interventions: Increase time out of bed, Pressure redistribution bed/mattress(bed type)    Mobility Interventions: Float heels, HOB 30 degrees or less, Pressure redistribution bed/mattress (bed type)    Nutrition Interventions: Document food/fluid/supplement intake                     Problem: Patient Education: Go to Patient Education Activity  Goal: Patient/Family Education  Outcome: Progressing Towards Goal     Problem: Discharge Planning  Goal: *Discharge to safe environment  Outcome: Progressing Towards Goal     Problem: Patient Education: Go to Patient Education Activity  Goal: Patient/Family Education  Outcome: Progressing Towards Goal

## 2020-02-29 NOTE — PROGRESS NOTES
[facility]    Admit Date: 2/22/2020    Subjective:     No new complaints. Current Facility-Administered Medications   Medication Dose Route Frequency    potassium chloride SR (KLOR-CON 10) tablet 20 mEq  20 mEq Oral TID    metoprolol tartrate (LOPRESSOR) tablet 12.5 mg  12.5 mg Oral BID    traMADol (ULTRAM) tablet 50 mg  50 mg Oral Q6H PRN    albuterol (PROVENTIL VENTOLIN) nebulizer solution 2.5 mg  2.5 mg Nebulization Q4H PRN    furosemide (LASIX) injection 80 mg  80 mg IntraVENous BID    balsam peru-castor oil (VENELEX) ointment   Topical Q8H    lactobac ac& pc-s.therm-b.anim (DAVE Q/RISAQUAD)  1 Cap Oral DAILY    ALPRAZolam (XANAX) tablet 0.5 mg  0.5 mg Oral TID PRN    levothyroxine (SYNTHROID) tablet 100 mcg  100 mcg Oral ACB    loratadine (CLARITIN) tablet 10 mg  10 mg Oral DAILY    sodium chloride (NS) flush 5-40 mL  5-40 mL IntraVENous Q8H    sodium chloride (NS) flush 5-40 mL  5-40 mL IntraVENous PRN    acetaminophen (TYLENOL) tablet 650 mg  650 mg Oral Q4H PRN          Objective:     Patient Vitals for the past 8 hrs:   BP Temp Pulse Resp SpO2 Weight   02/29/20 0900 118/70 98.2 °F (36.8 °C) 73 16 90 %    02/29/20 0435      137 lb 5.6 oz (62.3 kg)     02/29 0701 - 02/29 1900  In: 240 [P.O.:240]  Out: -   02/27 1901 - 02/29 0700  In: 300 [P.O.:300]  Out: 750 [Urine:750]    Physical Exam: NAD. Alert. Neck -- Supple. No JVD. Heart -- Irr Irr (Rate Controlled). Lungs -- Grossly CTA. Abd -- Benign. Ext -- Trace LE edema, b/l.       Data Review   Recent Results (from the past 24 hour(s))   METABOLIC PANEL, BASIC    Collection Time: 02/29/20  4:40 AM   Result Value Ref Range    Sodium 131 (L) 136 - 145 mmol/L    Potassium 4.1 3.5 - 5.1 mmol/L    Chloride 94 (L) 97 - 108 mmol/L    CO2 31 21 - 32 mmol/L    Anion gap 6 5 - 15 mmol/L    Glucose 91 65 - 100 mg/dL    BUN 25 (H) 6 - 20 MG/DL    Creatinine 0.95 0.55 - 1.02 MG/DL    BUN/Creatinine ratio 26 (H) 12 - 20      GFR est AA >60 >60 ml/min/1.73m2    GFR est non-AA 56 (L) >60 ml/min/1.73m2    Calcium 10.1 8.5 - 10.1 MG/DL   MAGNESIUM    Collection Time: 02/29/20  4:40 AM   Result Value Ref Range    Magnesium 1.9 1.6 - 2.4 mg/dL           Assessment:     Principal Problem:    Acute on chronic diastolic (congestive) heart failure (HCC) (6/5/2019)        Active Problems:    A-fib ([facility] [address]      Dementia ([facility] [address] (2/9/2016)        Plan:     1. Cont IV diuresis. 2. Cont rate control. 3. I discussed code status with pt, and she confirms DNR wishes (so does sister).       D/w sisterGeovanni Rank -- [phone]          Efraín Mcnulty MD

## 2020-03-01 LAB
ANION GAP SERPL CALC-SCNC: 6 MMOL/L (ref 5–15)
BUN SERPL-MCNC: 25 MG/DL (ref 6–20)
BUN/CREAT SERPL: 29 (ref 12–20)
CALCIUM SERPL-MCNC: 10.1 MG/DL (ref 8.5–10.1)
CHLORIDE SERPL-SCNC: 92 MMOL/L (ref 97–108)
CO2 SERPL-SCNC: 31 MMOL/L (ref 21–32)
CREAT SERPL-MCNC: 0.85 MG/DL (ref 0.55–1.02)
GLUCOSE SERPL-MCNC: 93 MG/DL (ref 65–100)
POTASSIUM SERPL-SCNC: 4.2 MMOL/L (ref 3.5–5.1)
SODIUM SERPL-SCNC: 129 MMOL/L (ref 136–145)

## 2020-03-01 PROCEDURE — 74011250637 HC RX REV CODE- 250/637: Performed by: FAMILY MEDICINE

## 2020-03-01 PROCEDURE — 65660000000 HC RM CCU STEPDOWN

## 2020-03-01 PROCEDURE — 80048 BASIC METABOLIC PNL TOTAL CA: CPT

## 2020-03-01 PROCEDURE — 74011250637 HC RX REV CODE- 250/637: Performed by: INTERNAL MEDICINE

## 2020-03-01 PROCEDURE — 74011250636 HC RX REV CODE- 250/636: Performed by: INTERNAL MEDICINE

## 2020-03-01 PROCEDURE — 36415 COLL VENOUS BLD VENIPUNCTURE: CPT

## 2020-03-01 RX ADMIN — LORATADINE 10 MG: 10 TABLET ORAL at 10:03

## 2020-03-01 RX ADMIN — Medication 1 CAPSULE: at 10:03

## 2020-03-01 RX ADMIN — CASTOR OIL AND BALSAM, PERU: 788; 87 OINTMENT TOPICAL at 13:23

## 2020-03-01 RX ADMIN — LEVOTHYROXINE SODIUM 100 MCG: 0.1 TABLET ORAL at 06:30

## 2020-03-01 RX ADMIN — Medication 10 ML: at 13:21

## 2020-03-01 RX ADMIN — Medication 10 ML: at 06:09

## 2020-03-01 RX ADMIN — CASTOR OIL AND BALSAM, PERU: 788; 87 OINTMENT TOPICAL at 21:03

## 2020-03-01 RX ADMIN — POTASSIUM CHLORIDE 20 MEQ: 750 TABLET, FILM COATED, EXTENDED RELEASE ORAL at 10:03

## 2020-03-01 RX ADMIN — ALPRAZOLAM 0.5 MG: 0.5 TABLET ORAL at 13:21

## 2020-03-01 RX ADMIN — METOPROLOL TARTRATE 12.5 MG: 25 TABLET ORAL at 10:03

## 2020-03-01 RX ADMIN — ALPRAZOLAM 0.5 MG: 0.5 TABLET ORAL at 19:02

## 2020-03-01 RX ADMIN — POTASSIUM CHLORIDE 20 MEQ: 750 TABLET, FILM COATED, EXTENDED RELEASE ORAL at 21:03

## 2020-03-01 RX ADMIN — ACETAMINOPHEN 650 MG: 325 TABLET ORAL at 19:02

## 2020-03-01 RX ADMIN — Medication 10 ML: at 21:03

## 2020-03-01 RX ADMIN — FUROSEMIDE 80 MG: 10 INJECTION, SOLUTION INTRAMUSCULAR; INTRAVENOUS at 10:03

## 2020-03-01 RX ADMIN — TRAMADOL HYDROCHLORIDE 50 MG: 50 TABLET, FILM COATED ORAL at 00:52

## 2020-03-01 RX ADMIN — CASTOR OIL AND BALSAM, PERU: 788; 87 OINTMENT TOPICAL at 06:09

## 2020-03-01 RX ADMIN — TRAMADOL HYDROCHLORIDE 50 MG: 50 TABLET, FILM COATED ORAL at 15:51

## 2020-03-01 RX ADMIN — METOPROLOL TARTRATE 12.5 MG: 25 TABLET ORAL at 18:58

## 2020-03-01 RX ADMIN — POTASSIUM CHLORIDE 20 MEQ: 750 TABLET, FILM COATED, EXTENDED RELEASE ORAL at 15:51

## 2020-03-01 NOTE — PROGRESS NOTES
Bedside and Verbal shift change report given to Papa (oncoming nurse) by Jesse Lynch (offgoing nurse). Report included the following information SBAR, Kardex and MAR.

## 2020-03-01 NOTE — PROGRESS NOTES
Patient resting in bed, appetite is good. Problem: Pain  Goal: *Control of Pain  Outcome: Progressing Towards Goal     Problem: Patient Education: Go to Patient Education Activity  Goal: Patient/Family Education  Outcome: Progressing Towards Goal     Problem: Patient Education: Go to Patient Education Activity  Goal: Patient/Family Education  Outcome: Progressing Towards Goal     Problem: Pressure Injury - Risk of  Goal: *Prevention of pressure injury  Description  Document Tre Scale and appropriate interventions in the flowsheet.   Outcome: Progressing Towards Goal  Note: Pressure Injury Interventions:  Sensory Interventions: Pressure redistribution bed/mattress (bed type)    Moisture Interventions: Apply protective barrier, creams and emollients, Absorbent underpads    Activity Interventions: Increase time out of bed, Pressure redistribution bed/mattress(bed type)    Mobility Interventions: HOB 30 degrees or less, Pressure redistribution bed/mattress (bed type)    Nutrition Interventions: Document food/fluid/supplement intake, Offer support with meals,snacks and hydration                     Problem: Patient Education: Go to Patient Education Activity  Goal: Patient/Family Education  Outcome: Progressing Towards Goal     Problem: Discharge Planning  Goal: *Discharge to safe environment  Outcome: Progressing Towards Goal     Problem: Patient Education: Go to Patient Education Activity  Goal: Patient/Family Education  Outcome: Progressing Towards Goal

## 2020-03-01 NOTE — PROGRESS NOTES
[facility]    Admit Date: 2/22/2020    Subjective:     No new complaints. She can't really tell me if her breathing is improved or not. Current Facility-Administered Medications   Medication Dose Route Frequency    potassium chloride SR (KLOR-CON 10) tablet 20 mEq  20 mEq Oral TID    metoprolol tartrate (LOPRESSOR) tablet 12.5 mg  12.5 mg Oral BID    traMADol (ULTRAM) tablet 50 mg  50 mg Oral Q6H PRN    albuterol (PROVENTIL VENTOLIN) nebulizer solution 2.5 mg  2.5 mg Nebulization Q4H PRN    furosemide (LASIX) injection 80 mg  80 mg IntraVENous BID    balsam peru-castor oil (VENELEX) ointment   Topical Q8H    lactobac ac& pc-s.therm-b.anim (DAVE Q/RISAQUAD)  1 Cap Oral DAILY    ALPRAZolam (XANAX) tablet 0.5 mg  0.5 mg Oral TID PRN    levothyroxine (SYNTHROID) tablet 100 mcg  100 mcg Oral ACB    loratadine (CLARITIN) tablet 10 mg  10 mg Oral DAILY    sodium chloride (NS) flush 5-40 mL  5-40 mL IntraVENous Q8H    sodium chloride (NS) flush 5-40 mL  5-40 mL IntraVENous PRN    acetaminophen (TYLENOL) tablet 650 mg  650 mg Oral Q4H PRN          Objective:     Patient Vitals for the past 8 hrs:   BP Temp Pulse Resp SpO2   03/01/20 0900 109/53 98.3 °F (36.8 °C) (!) 101 16 92 %     No intake/output data recorded. 02/28 1901 - 03/01 0700  In: 240 [P.O.:240]  Out: 1450 [Urine:1450]    Physical Exam: NAD. Alert. Neck -- Supple. No JVD. Heart -- Irr Irr (Rate Controlled). Lungs -- Grossly CTA. Abd -- Benign. Ext -- Trace LE edema, b/l.       Data Review   Recent Results (from the past 24 hour(s))   METABOLIC PANEL, BASIC    Collection Time: 03/01/20  6:04 AM   Result Value Ref Range    Sodium 129 (L) 136 - 145 mmol/L    Potassium 4.2 3.5 - 5.1 mmol/L    Chloride 92 (L) 97 - 108 mmol/L    CO2 31 21 - 32 mmol/L    Anion gap 6 5 - 15 mmol/L    Glucose 93 65 - 100 mg/dL    BUN 25 (H) 6 - 20 MG/DL    Creatinine 0.85 0.55 - 1.02 MG/DL    BUN/Creatinine ratio 29 (H) 12 - 20      GFR est AA >60 >60 ml/min/1.73m2    GFR est non-AA >60 >60 ml/min/1.73m2    Calcium 10.1 8.5 - 10.1 MG/DL           Assessment:     Principal Problem:    Acute on chronic diastolic (congestive) heart failure (HCC) (6/5/2019)        Active Problems:    A-fib ([facility] [address]      [facility] (2/9/2016)      Hyponatremia -- probably multifactorial (CHF, diuresis, etc.)        Plan:     1. Cont IV diuresis. Cards following. 2. Cont rate control.   3. Recheck lytes in am.      D/w sister, Ariel Purcell -- [phone]          Marques Velazquez MD

## 2020-03-01 NOTE — PROGRESS NOTES
[facility] Cardiology Progress Note    Date of service: 3/1/2020    Subjective:  Ms Jose Miguel Tejada says she feels about the same  No specific new complaints    Objective:    Visit Vitals  /53 (BP 1 Location: Right arm, BP Patient Position: At rest)   Pulse (!) 101   Temp 98.3 °F (36.8 °C)   Resp 16   Ht 5' 3\" (1.6 m)   Wt 63.1 kg (139 lb 1.8 oz)   SpO2 92%   BMI 24.64 kg/m²       Physical Exam   Constitutional: She is oriented to person, place, and time. She appears well-developed and well-nourished. HENT:   Head: Normocephalic and atraumatic. Eyes: Conjunctivae are normal. No scleral icterus. Neck: No JVD present. Cardiovascular: Normal rate and normal heart sounds. An irregularly irregular rhythm present. Exam reveals no gallop. No murmur heard. Pulmonary/Chest: Effort normal and breath sounds normal. No stridor. No respiratory distress. She has no wheezes. She has no rales. Abdominal: Soft. She exhibits no distension. Musculoskeletal:         General: Edema present. No deformity. Neurological: She is alert and oriented to person, place, and time. Skin: Skin is warm and dry. Psychiatric: She has a normal mood and affect. Her behavior is normal.       Data reviewed:  Recent Results (from the past 12 hour(s))   METABOLIC PANEL, BASIC    Collection Time: 03/01/20  6:04 AM   Result Value Ref Range    Sodium 129 (L) 136 - 145 mmol/L    Potassium 4.2 3.5 - 5.1 mmol/L    Chloride 92 (L) 97 - 108 mmol/L    CO2 31 21 - 32 mmol/L    Anion gap 6 5 - 15 mmol/L    Glucose 93 65 - 100 mg/dL    BUN 25 (H) 6 - 20 MG/DL    Creatinine 0.85 0.55 - 1.02 MG/DL    BUN/Creatinine ratio 29 (H) 12 - 20      GFR est AA >60 >60 ml/min/1.73m2    GFR est non-AA >60 >60 ml/min/1.73m2    Calcium 10.1 8.5 - 10.1 MG/DL     05/27/19   ECHO ADULT COMPLETE 05/28/2019 5/28/2019    Narrative · Pericardium: There is a moderate left pleural effusion. · Tricuspid Valve: Mild to moderate tricuspid valve regurgitation is   present.   · Right Ventricle: Moderately dilated right ventricle. Mildly reduced   systolic function. · Left Atrium: Moderately dilated left atrium. · Left Ventricle: Mildly to moderately increased wall thickness. Estimated   left ventricular ejection fraction is 56 - 60%. Abnormal left ventricular   septal motion. Systolic flattening of the interventricular septum   consistent with right ventricle pressure overload. Interventricular septal   \"bounce\". Inconclusive left ventricular diastolic function. · Pulmonary Artery: Moderate to severe pulmonary hypertension. · Aortic Valve: Mild aortic valve regurgitation is present. Signed by: Nakia Toscano MD         Assessment:  1. Acute on chronic diastolic heart failure, NYHA class III  Stable  2. Other persistent atrial fibrillation  Rate controlled    Plan:    Continue IV lasix over weekend  Metoprolol for rate control  Hopefully switch back to PO diuretics tomorrow    Signed:  Edgar Price MD  Interventional Cardiology  3/1/2020

## 2020-03-02 LAB
ANION GAP SERPL CALC-SCNC: 5 MMOL/L (ref 5–15)
BUN SERPL-MCNC: 28 MG/DL (ref 6–20)
BUN/CREAT SERPL: 28 (ref 12–20)
CALCIUM SERPL-MCNC: 10.2 MG/DL (ref 8.5–10.1)
CHLORIDE SERPL-SCNC: 95 MMOL/L (ref 97–108)
CO2 SERPL-SCNC: 33 MMOL/L (ref 21–32)
CREAT SERPL-MCNC: 0.99 MG/DL (ref 0.55–1.02)
GLUCOSE SERPL-MCNC: 88 MG/DL (ref 65–100)
POTASSIUM SERPL-SCNC: 3.9 MMOL/L (ref 3.5–5.1)
SODIUM SERPL-SCNC: 133 MMOL/L (ref 136–145)

## 2020-03-02 PROCEDURE — 74011250637 HC RX REV CODE- 250/637: Performed by: INTERNAL MEDICINE

## 2020-03-02 PROCEDURE — 77010033678 HC OXYGEN DAILY

## 2020-03-02 PROCEDURE — 65660000000 HC RM CCU STEPDOWN

## 2020-03-02 PROCEDURE — 74011250637 HC RX REV CODE- 250/637: Performed by: FAMILY MEDICINE

## 2020-03-02 PROCEDURE — 94760 N-INVAS EAR/PLS OXIMETRY 1: CPT

## 2020-03-02 PROCEDURE — 36415 COLL VENOUS BLD VENIPUNCTURE: CPT

## 2020-03-02 PROCEDURE — 80048 BASIC METABOLIC PNL TOTAL CA: CPT

## 2020-03-02 RX ORDER — FUROSEMIDE 40 MG/1
40 TABLET ORAL 2 TIMES DAILY
Status: DISCONTINUED | OUTPATIENT
Start: 2020-03-02 | End: 2020-03-04

## 2020-03-02 RX ORDER — POTASSIUM CHLORIDE 750 MG/1
20 TABLET, FILM COATED, EXTENDED RELEASE ORAL 2 TIMES DAILY
Status: DISCONTINUED | OUTPATIENT
Start: 2020-03-02 | End: 2020-03-06 | Stop reason: HOSPADM

## 2020-03-02 RX ADMIN — CASTOR OIL AND BALSAM, PERU: 788; 87 OINTMENT TOPICAL at 06:25

## 2020-03-02 RX ADMIN — ALPRAZOLAM 0.5 MG: 0.5 TABLET ORAL at 10:28

## 2020-03-02 RX ADMIN — METOPROLOL TARTRATE 12.5 MG: 25 TABLET ORAL at 17:45

## 2020-03-02 RX ADMIN — POTASSIUM CHLORIDE 20 MEQ: 750 TABLET, FILM COATED, EXTENDED RELEASE ORAL at 10:18

## 2020-03-02 RX ADMIN — POTASSIUM CHLORIDE 20 MEQ: 750 TABLET, FILM COATED, EXTENDED RELEASE ORAL at 17:44

## 2020-03-02 RX ADMIN — Medication 10 ML: at 22:46

## 2020-03-02 RX ADMIN — LORATADINE 10 MG: 10 TABLET ORAL at 10:18

## 2020-03-02 RX ADMIN — METOPROLOL TARTRATE 12.5 MG: 25 TABLET ORAL at 10:18

## 2020-03-02 RX ADMIN — Medication 10 ML: at 14:40

## 2020-03-02 RX ADMIN — Medication 10 ML: at 06:25

## 2020-03-02 RX ADMIN — CASTOR OIL AND BALSAM, PERU: 788; 87 OINTMENT TOPICAL at 14:40

## 2020-03-02 RX ADMIN — TRAMADOL HYDROCHLORIDE 50 MG: 50 TABLET, FILM COATED ORAL at 16:33

## 2020-03-02 RX ADMIN — FUROSEMIDE 40 MG: 40 TABLET ORAL at 10:18

## 2020-03-02 RX ADMIN — LEVOTHYROXINE SODIUM 100 MCG: 0.1 TABLET ORAL at 06:25

## 2020-03-02 RX ADMIN — Medication 1 CAPSULE: at 10:18

## 2020-03-02 RX ADMIN — ACETAMINOPHEN 650 MG: 325 TABLET ORAL at 17:50

## 2020-03-02 RX ADMIN — CASTOR OIL AND BALSAM, PERU: 788; 87 OINTMENT TOPICAL at 22:44

## 2020-03-02 RX ADMIN — ALPRAZOLAM 0.5 MG: 0.5 TABLET ORAL at 19:36

## 2020-03-02 NOTE — PROGRESS NOTES
Name: Rivas Hun: [address] 55   : 1931 Admit Date: 2020   Phone: [phone]  Room:    PCP: Julissa Medina MD  MRN: [[id number]]   Date: 3/2/2020  Code: DNR        HPI:    3/2  Feeling better overall       Does not want to work with PT  Feels a little more dyspneic today      Went into NSVT this am. Had a few bouts of hypoxemia this morning. Doesn't appear to be more dyspneic though. Diuretics were increased yesterday       Up in bed on room air. Doesn't feel that she's made much progress. Still feels SOB, some difficulty taking a deep breath. Denies cough. So far, looks like fluid balance in positive.   Resting in bed. Less O2 requirement       4:56 PM       History was obtained from patient. I was asked by Clari Alvarez MD to see Carroll Sweet in consultation for a chief complaint of shortness of breath. History of Present Illness: 80year old female with past medical hx of diastolic heart failure who presented to [facility] with increased shortness of breath and ankle swelling. Her  room air 02 saturation was 89% and she was placed on 4 liters a minute nasal cannula O2. She also received 60 mg iv lasix. She denies fever, chills, night sweats or weight loss. She denied chest pain. She does have a hx of pulmonary hypertension and CHF for which she takes lasix. Hx of afib - was anticoagulated before - not anymore. CXR left lower love volume loss - atelectasis vs effusion vs infiltrate.     Past Medical History:   Diagnosis Date    [facility]-fib [facility])     Arrhythmia     A FIB    Arthritis     Back pain     CAD (coronary artery disease)     PT DENIES    Chronic pain     Dementia ([facility] [address] 2016    Hypercholesterolemia     Psychiatric disorder     ANXIETY    Shoulder pain     Thyroid disease     Tremor, essential        Past Surgical History:   Procedure Laterality Date    HX APPENDECTOMY      HX ORTHOPAEDIC      left knee replacement  HX KADIE AND BSO      AGE 39    HX TONSIL AND ADENOIDECTOMY      IR KYPHOPLASTY LUMBAR  2019       Family History   Problem Relation Age of Onset    Dementia Mother     Arthritis-osteo Father        Social History     Tobacco Use    Smoking status: Former Smoker     Packs/day: 0.50     Years: 10.00     Pack years: 5.00     Last attempt to quit: 1994     Years since quittin.1    Smokeless tobacco: Never Used   Substance Use Topics    Alcohol use: Yes     Comment: NIGHTLY 2 GLASSES WINE       Allergies   Allergen Reactions    Latex, Natural Rubber Other (comments)     Pt denies allergy to latex    Codeine Other (comments)     \"It just sends me up the wall\"    Adhesive Rash and Other (comments)     blisters    Cortisone Unknown (comments)       Current Facility-Administered Medications   Medication Dose Route Frequency    furosemide (LASIX) tablet 40 mg  40 mg Oral BID    potassium chloride SR (KLOR-CON 10) tablet 20 mEq  20 mEq Oral BID    metoprolol tartrate (LOPRESSOR) tablet 12.5 mg  12.5 mg Oral BID    traMADol (ULTRAM) tablet 50 mg  50 mg Oral Q6H PRN    albuterol (PROVENTIL VENTOLIN) nebulizer solution 2.5 mg  2.5 mg Nebulization Q4H PRN    balsam peru-castor oil (VENELEX) ointment   Topical Q8H    lactobac ac& pc-s.therm-b.anim (DAVE Q/RISAQUAD)  1 Cap Oral DAILY    ALPRAZolam (XANAX) tablet 0.5 mg  0.5 mg Oral TID PRN    levothyroxine (SYNTHROID) tablet 100 mcg  100 mcg Oral ACB    loratadine (CLARITIN) tablet 10 mg  10 mg Oral DAILY    sodium chloride (NS) flush 5-40 mL  5-40 mL IntraVENous Q8H    sodium chloride (NS) flush 5-40 mL  5-40 mL IntraVENous PRN    acetaminophen (TYLENOL) tablet 650 mg  650 mg Oral Q4H PRN         REVIEW OF SYSTEMS   Negative except as stated in the HPI.       Physical Exam:   Visit Vitals  /68 (BP 1 Location: Right arm, BP Patient Position: Supine)   Pulse 82   Temp 98 °F (36.7 °C)   Resp 15   Ht 5' 3\" (1.6 m)   Wt 68.4 kg (150 lb 12.7 oz)   SpO2 90%   BMI 26.71 kg/m²       General:  Alert, cooperative, no distress, appears stated age. Head:  Normocephalic, without obvious abnormality, atraumatic. Eyes:  Conjunctivae/corneas clear. PERRL, EOMs intact. Nose: Nares normal. Septum midline. Mucosa normal. No drainage or sinus tenderness. Neck: Supple, symmetrical, trachea midline, no adenopathy, thyroid: no enlargment/tenderness/nodules, no carotid bruit and no JVD. Lungs:   Mostly clear, but diminished bs. Heart:  IRR, S1, S2 normal, no murmur, click, rub or gallop. Abdomen:   Soft, non-tender. Bowel sounds normal. No masses,  No organomegaly. Extremities: Trace pedal edema . Neurologic: Grossly nonfocal       Lab Results   Component Value Date/Time    Sodium 133 (L) 03/02/2020 04:41 AM    Potassium 3.9 03/02/2020 04:41 AM    Chloride 95 (L) 03/02/2020 04:41 AM    CO2 33 (H) 03/02/2020 04:41 AM    BUN 28 (H) 03/02/2020 04:41 AM    Creatinine 0.99 03/02/2020 04:41 AM    Glucose 88 03/02/2020 04:41 AM    Calcium 10.2 (H) 03/02/2020 04:41 AM    Magnesium 1.9 02/29/2020 04:40 AM    Lactic acid 1.1 10/19/2019 04:09 AM       Lab Results   Component Value Date/Time    WBC 4.3 02/23/2020 04:29 AM    HGB 11.8 02/23/2020 04:29 AM    PLATELET 731 71/63/8529 04:29 AM    MCV 97.3 02/23/2020 04:29 AM       Lab Results   Component Value Date/Time    INR 3.9 (H) 10/25/2019 03:50 AM    aPTT 33.2 (H) 07/29/2018 09:20 PM    AST (SGOT) 20 02/22/2020 01:38 PM    Alk.  phosphatase 96 02/22/2020 01:38 PM    Protein, total 7.2 02/22/2020 01:38 PM    Albumin 3.4 (L) 02/22/2020 01:38 PM    Globulin 3.8 02/22/2020 01:38 PM       Lab Results   Component Value Date/Time    TSH 2.10 02/23/2020 04:29 AM        Lab Results   Component Value Date/Time     (H) 07/29/2018 09:20 PM    CK-MB Index 1.8 02/09/2016 03:37 PM    Troponin-I, Qt. <0.05 02/25/2020 08:56 AM        Lab Results   Component Value Date/Time     (H) 07/29/2018 09:20 PM    CK 36 01/12/2010 04:50 PM       IMPRESSION:  ===========  -acute on chronic diastolic heart failure. -Pulmonary hypertension.  -atrial fibrillation - currently not anticoagulated.  -abnormal CXR with left base volume loss. -left side pleural effusion noted to be present on 5/2019 echo. PLAN:  =====  -diuretics.    -hold on abx right now  -O2 titration above 90% if needed  -chest x-ray in 2-3 wks with Dr Michael Steve   -we will be available again to see if needed       Panfilo Mcmanus PA-C

## 2020-03-02 NOTE — WOUND CARE
Wound Care Note: Follow-up visit for left lower leg wound Chart shows: 
Admitted for acute on chronic diastolic heart failure Past Medical History:  
Diagnosis Date  A-fib ([facility] [address]  Arrhythmia A FIB  Arthritis  Back pain  CAD (coronary artery disease) PT DENIES  Chronic pain  Dementia ([facility] [address] 2/9/2016  Hypercholesterolemia  Psychiatric disorder ANXIETY  Shoulder pain  Thyroid disease  Tremor, essential   
 
WBC - no new lab Admitted from [facility] Assessment:  
Patient is alert and talking, incontinent with moderate assistance needed in repositioning. Bed: [place] Patient wearing briefs for incontinence. Diet: Cardiac regular Patient reports no pain; moans when left leg lifted. Bilateral heels skin intact with blanchable erythema. Patient refused for sacrum and buttocks to be assessed (also, refused pillow under left lower leg). Palpable DP pulses bilaterally. 1. POA left lateral lower leg skin tear is improving, upper end of \"L\" shape has resolved, open area on lower \"L\" shape measures 1.5 cm x 2.5 cm, wound bed is pink, scant sero/sang drainage, wound edges are open, forest-wound intact. Xeroform gauze and Optifoam Gentle applied. Patient stayed turned to left. Right heel offloaded on pillow. Recommendations:   
Left lower leg- Every other day cleanse with normal saline, wipe wound bed clean and dry, apply a piece of Xeroform gauze that has been folded in half, cover with 4 x 4's, secure with roll gauze and tape. 
  
Sacrum and bilateral heels- Every 8 hours liberally apply Venelex ointment  
 
Skin Care & Pressure Prevention: 
Minimize layers of linen/pads under patient to optimize support surface. Turn/reposition approximately every 2 hours and offload heels. Manage incontinence / promote continence Nourishing Skin Cream to dry skin, minimize use of briefs when able Discussed above plan with patient & Kaela Stanton RN Transition of Care: Plan to follow as needed while admitted to hospital. 
 
FLETCHER Del CidN, RN, [facility] 
office [phone] 
pager [phone] or call  to page

## 2020-03-02 NOTE — PROGRESS NOTES
Bedside shift change report given to Edilson Calles (oncoming nurse) by Francisco aDo (offgoing nurse). Report included the following information SBAR, Kardex, MAR and Recent Results.

## 2020-03-02 NOTE — PROGRESS NOTES
Benito Malave, Adelia Degroot Fitting Date: 2/22/2020      Subjective:     Patient feeling OK. .       Current Facility-Administered Medications   Medication Dose Route Frequency    furosemide (LASIX) tablet 40 mg  40 mg Oral BID    potassium chloride SR (KLOR-CON 10) tablet 20 mEq  20 mEq Oral BID    metoprolol tartrate (LOPRESSOR) tablet 12.5 mg  12.5 mg Oral BID    traMADol (ULTRAM) tablet 50 mg  50 mg Oral Q6H PRN    albuterol (PROVENTIL VENTOLIN) nebulizer solution 2.5 mg  2.5 mg Nebulization Q4H PRN    balsam peru-castor oil (VENELEX) ointment   Topical Q8H    lactobac ac& pc-s.therm-b.anim (DAVE Q/RISAQUAD)  1 Cap Oral DAILY    ALPRAZolam (XANAX) tablet 0.5 mg  0.5 mg Oral TID PRN    levothyroxine (SYNTHROID) tablet 100 mcg  100 mcg Oral ACB    loratadine (CLARITIN) tablet 10 mg  10 mg Oral DAILY    sodium chloride (NS) flush 5-40 mL  5-40 mL IntraVENous Q8H    sodium chloride (NS) flush 5-40 mL  5-40 mL IntraVENous PRN    acetaminophen (TYLENOL) tablet 650 mg  650 mg Oral Q4H PRN          Objective:     Patient Vitals for the past 8 hrs:   BP Temp Pulse Resp SpO2 Weight   03/02/20 0222 98/55 96.7 °F (35.9 °C) 67 16 90 % 150 lb 12.7 oz (68.4 kg)     No intake/output data recorded. 02/29 1901 - 03/02 0700  In: 480 [P.O.:480]  Out: 1650 [Urine:1650]    Physical Exam: Lungs: clear to auscultation bilaterally  Heart: regular rate and rhythm, S1, S2 normal, no murmur, click, rub or gallop  Abdomen: soft, non-tender.  Bowel sounds normal. No masses,  no organomegaly        Data Review   Recent Results (from the past 24 hour(s))   METABOLIC PANEL, BASIC    Collection Time: 03/02/20  4:41 AM   Result Value Ref Range    Sodium 133 (L) 136 - 145 mmol/L    Potassium 3.9 3.5 - 5.1 mmol/L    Chloride 95 (L) 97 - 108 mmol/L    CO2 33 (H) 21 - 32 mmol/L    Anion gap 5 5 - 15 mmol/L    Glucose 88 65 - 100 mg/dL    BUN 28 (H) 6 - 20 MG/DL    Creatinine 0.99 0.55 - 1.02 MG/DL BUN/Creatinine ratio 28 (H) 12 - 20      GFR est AA >60 >60 ml/min/1.73m2    GFR est non-AA 53 (L) >60 ml/min/1.73m2    Calcium 10.2 (H) 8.5 - 10.1 MG/DL           Assessment:     Principal Problem:    Acute on chronic diastolic (congestive) heart failure (HCC) (6/5/2019)    Active Problems:    A-fib ([facility] ()      Dementia ([facility] (2/9/2016)        Plan:     1) Lasix to PO today  2) Adjust K  3) Home health consult- need to have [address] at home  4) Aim for discharge Wednesday

## 2020-03-02 NOTE — PROGRESS NOTES
Bedside shift change report given to Cierra Wakefield (oncoming nurse) by Cait Tran (offgoing nurse). Report included the following information SBAR, Kardex and MAR.

## 2020-03-02 NOTE — PROGRESS NOTES
Problem: Pain  Goal: *Control of Pain  Outcome: Progressing Towards Goal     Problem: Patient Education: Go to Patient Education Activity  Goal: Patient/Family Education  Outcome: Progressing Towards Goal     Problem: Pain  Goal: *Control of Pain  Outcome: Progressing Towards Goal     Problem: Patient Education: Go to Patient Education Activity  Goal: Patient/Family Education  Outcome: Progressing Towards Goal

## 2020-03-02 NOTE — PROGRESS NOTES
[facility] Cardiology Progress Note    Date of service: 3/2/2020    Subjective:  Ms Ronald Patterson has had no changes. JURGEN    Objective:    Visit Vitals  /68 (BP 1 Location: Right arm, BP Patient Position: Supine)   Pulse 82   Temp 98 °F (36.7 °C)   Resp 15   Ht 5' 3\" (1.6 m)   Wt 68.4 kg (150 lb 12.7 oz)   SpO2 90%   BMI 26.71 kg/m²       Physical Exam   Constitutional: She is oriented to person, place, and time. She appears well-developed and well-nourished. HENT:   Head: Normocephalic and atraumatic. Eyes: Conjunctivae are normal. No scleral icterus. Neck: No JVD present. Cardiovascular: Normal rate and normal heart sounds. An irregularly irregular rhythm present. Exam reveals no gallop. No murmur heard. Pulmonary/Chest: Effort normal and breath sounds normal. No stridor. No respiratory distress. She has no wheezes. She has no rales. Abdominal: Soft. She exhibits no distension. Musculoskeletal:         General: Edema present. No deformity. Neurological: She is alert and oriented to person, place, and time. Skin: Skin is warm and dry. Psychiatric: She has a normal mood and affect. Her behavior is normal.       Data reviewed:  Recent Results (from the past 12 hour(s))   METABOLIC PANEL, BASIC    Collection Time: 03/02/20  4:41 AM   Result Value Ref Range    Sodium 133 (L) 136 - 145 mmol/L    Potassium 3.9 3.5 - 5.1 mmol/L    Chloride 95 (L) 97 - 108 mmol/L    CO2 33 (H) 21 - 32 mmol/L    Anion gap 5 5 - 15 mmol/L    Glucose 88 65 - 100 mg/dL    BUN 28 (H) 6 - 20 MG/DL    Creatinine 0.99 0.55 - 1.02 MG/DL    BUN/Creatinine ratio 28 (H) 12 - 20      GFR est AA >60 >60 ml/min/1.73m2    GFR est non-AA 53 (L) >60 ml/min/1.73m2    Calcium 10.2 (H) 8.5 - 10.1 MG/DL     05/27/19   ECHO ADULT COMPLETE 05/28/2019 5/28/2019    Narrative · Pericardium: There is a moderate left pleural effusion. · Tricuspid Valve: Mild to moderate tricuspid valve regurgitation is   present. · Right Ventricle:  Moderately dilated right ventricle. Mildly reduced   systolic function. · Left Atrium: Moderately dilated left atrium. · Left Ventricle: Mildly to moderately increased wall thickness. Estimated   left ventricular ejection fraction is 56 - 60%. Abnormal left ventricular   septal motion. Systolic flattening of the interventricular septum   consistent with right ventricle pressure overload. Interventricular septal   \"bounce\". Inconclusive left ventricular diastolic function. · Pulmonary Artery: Moderate to severe pulmonary hypertension. · Aortic Valve: Mild aortic valve regurgitation is present. Signed by: Arturo Chen MD         Assessment:  1. Acute on chronic diastolic heart failure, NYHA class III  Stable  2.  Other persistent atrial fibrillation  Rate controlled    Plan:    Cont with current meds  Agree with PO transition  Hopeful to restart spironolactone upon DC

## 2020-03-02 NOTE — PROGRESS NOTES
Bedside shift change report given to Enmanuel Camarena (oncoming nurse) by Richerd Goldmann, RN (offgoing nurse). Report included the following information SBAR, Kardex, MAR and Recent Results.

## 2020-03-02 NOTE — PROGRESS NOTES
NUTRITION  Reason for Rescreen: LOS        RECOMMENDATIONS:   None at this time  Interventions   - diet order adjusted to: 2gm Na  - preferences noted       Information obtained from:   patient      Past Medical History:   Diagnosis Date    [facility] [address]     Arrhythmia     A FIB    Arthritis     Back pain     CAD (coronary artery disease)     PT DENIES    Chronic pain     Dementia ([facility] [address] 2/9/2016    Hypercholesterolemia     Psychiatric disorder     ANXIETY    Shoulder pain     Thyroid disease     Tremor, essential      Pt admitted for CHF. Lasix rx. Plans for d/c home on Wednesday noted. Pt visited today. She reports good appetite but does not like some of the food options. Preferences updated and diet liberalized some to allow for more options. No other concerns at this time. Diet:  cardiac  Supplements: None  Intake: Good  Patient Vitals for the past 100 hrs:   % Diet Eaten   03/01/20 1750 60 %   03/01/20 1418 75 %   03/01/20 0900 75 %   02/29/20 1025 75 %   02/28/20 1246 25 %     Weight Changes:   Unsure - fluctuating between 130-150# likely d/t scale. Pt reports no noticeable wt loss. Wt Readings from Last 10 Encounters:   03/02/20 68.4 kg (150 lb 12.7 oz)   10/25/19 58.9 kg (129 lb 12.8 oz)   10/16/19 68 kg (150 lb)   10/04/19 68.4 kg (150 lb 12.8 oz)   07/03/19 59.9 kg (132 lb)   06/05/19 60 kg (132 lb 4.4 oz)   04/24/19 61.9 kg (136 lb 7.4 oz)   04/23/19 54.4 kg (120 lb)   08/02/18 71 kg (156 lb 9.6 oz)   12/13/17 54.4 kg (120 lb)     Nutrition-Related Concerns Identified:  Specified food preferences    Estimated Nutrition Needs:   Kcals/day: 1187 Kcals/day(1095-1187kcal)  Protein: 57 g(1.1g/kg)  Fluid: 1500 ml  Based On:  [place] Jeor(x 1.2-1.3)  Weight Used: FBY(57.9XV. 115#)    PLAN:   - Rescreen per protocol    Rescreen:  []            At Nutrition Risk - will follow          [x]            Rescreen per screening protocol    Joshua Medeiros RD [address] , Pager #[phone] or via PerfectServe

## 2020-03-02 NOTE — PROGRESS NOTES
ADRI:  1. [address]. 2. BLS transport. Reviewed chart for transitions of care,and discussed in rounds. Patient is from [facility] independent living apartment. CM noted of consult, however patient would have to pay privately for [address]. CM completed consult.     Justin Hendricks, [facility]

## 2020-03-02 NOTE — PROGRESS NOTES
Problem: Pain  Goal: *Control of Pain  Outcome: Progressing Towards Goal     Problem: Patient Education: Go to Patient Education Activity  Goal: Patient/Family Education  Outcome: Progressing Towards Goal     Problem: Patient Education: Go to Patient Education Activity  Goal: Patient/Family Education  Outcome: Progressing Towards Goal     Problem: Pressure Injury - Risk of  Goal: *Prevention of pressure injury  Description  Document Tre Scale and appropriate interventions in the flowsheet.     Offload heels  Turn approximately every 2 hours   Outcome: Progressing Towards Goal  Note: Pressure Injury Interventions:  Sensory Interventions: Pressure redistribution bed/mattress (bed type)    Moisture Interventions: Absorbent underpads, Apply protective barrier, creams and emollients, Check for incontinence Q2 hours and as needed, Internal/External urinary devices    Activity Interventions: Increase time out of bed, PT/OT evaluation, Pressure redistribution bed/mattress(bed type)    Mobility Interventions: Assess need for specialty bed, Float heels, PT/OT evaluation    Nutrition Interventions: Document food/fluid/supplement intake                     Problem: Patient Education: Go to Patient Education Activity  Goal: Patient/Family Education  Outcome: Progressing Towards Goal     Problem: Discharge Planning  Goal: *Discharge to safe environment  Outcome: Progressing Towards Goal     Problem: Patient Education: Go to Patient Education Activity  Goal: Patient/Family Education  Outcome: Progressing Towards Goal     Problem: Pain  Goal: *Control of Pain  Outcome: Progressing Towards Goal     Problem: Patient Education: Go to Patient Education Activity  Goal: Patient/Family Education  Outcome: Progressing Towards Goal

## 2020-03-02 NOTE — PROGRESS NOTES
Spiritual Care Partner Volunteer visited patient in room 208 on 3.02.2020. Documented by: : Rev. Emily Brandon; [facility], to contact [phone] call: [phone]

## 2020-03-02 NOTE — PROGRESS NOTES
Chart reviewed and pt cleared by nursing. Pt adamantly refusing physical therapy services at this time due to \"needing to get ready for discharge\". PT made her aware she was not discharging until Wednesday but pt still refusing OOB or supine exercises. Will continue to follow.     Brooklyn Nelson, SPT

## 2020-03-03 LAB
APPEARANCE UR: CLEAR
BACTERIA URNS QL MICRO: NEGATIVE /HPF
BILIRUB UR QL: NEGATIVE
COLOR UR: ABNORMAL
EPITH CASTS URNS QL MICRO: ABNORMAL /LPF
GLUCOSE UR STRIP.AUTO-MCNC: NEGATIVE MG/DL
HGB UR QL STRIP: ABNORMAL
HYALINE CASTS URNS QL MICRO: ABNORMAL /LPF (ref 0–5)
KETONES UR QL STRIP.AUTO: NEGATIVE MG/DL
LEUKOCYTE ESTERASE UR QL STRIP.AUTO: ABNORMAL
NITRITE UR QL STRIP.AUTO: NEGATIVE
PH UR STRIP: 6 [PH] (ref 5–8)
PROT UR STRIP-MCNC: NEGATIVE MG/DL
RBC #/AREA URNS HPF: ABNORMAL /HPF (ref 0–5)
SP GR UR REFRACTOMETRY: 1.01 (ref 1–1.03)
UA: UC IF INDICATED,UAUC: ABNORMAL
UR CULT HOLD, URHOLD: NORMAL
UROBILINOGEN UR QL STRIP.AUTO: 1 EU/DL (ref 0.2–1)
WBC URNS QL MICRO: ABNORMAL /HPF (ref 0–4)

## 2020-03-03 PROCEDURE — 74011250637 HC RX REV CODE- 250/637: Performed by: FAMILY MEDICINE

## 2020-03-03 PROCEDURE — 74011250637 HC RX REV CODE- 250/637: Performed by: INTERNAL MEDICINE

## 2020-03-03 PROCEDURE — 81001 URINALYSIS AUTO W/SCOPE: CPT

## 2020-03-03 PROCEDURE — 87086 URINE CULTURE/COLONY COUNT: CPT

## 2020-03-03 PROCEDURE — 74011250636 HC RX REV CODE- 250/636: Performed by: FAMILY MEDICINE

## 2020-03-03 PROCEDURE — 65660000000 HC RM CCU STEPDOWN

## 2020-03-03 PROCEDURE — 87186 SC STD MICRODIL/AGAR DIL: CPT

## 2020-03-03 PROCEDURE — 87077 CULTURE AEROBIC IDENTIFY: CPT

## 2020-03-03 RX ORDER — PANTOPRAZOLE SODIUM 40 MG/1
40 TABLET, DELAYED RELEASE ORAL
Status: DISCONTINUED | OUTPATIENT
Start: 2020-03-03 | End: 2020-03-06 | Stop reason: HOSPADM

## 2020-03-03 RX ORDER — LEVOFLOXACIN 5 MG/ML
250 INJECTION, SOLUTION INTRAVENOUS EVERY 24 HOURS
Status: DISCONTINUED | OUTPATIENT
Start: 2020-03-04 | End: 2020-03-06 | Stop reason: HOSPADM

## 2020-03-03 RX ORDER — ONDANSETRON 4 MG/1
4 TABLET, ORALLY DISINTEGRATING ORAL
Status: DISCONTINUED | OUTPATIENT
Start: 2020-03-03 | End: 2020-03-06 | Stop reason: HOSPADM

## 2020-03-03 RX ORDER — LEVOFLOXACIN 5 MG/ML
500 INJECTION, SOLUTION INTRAVENOUS ONCE
Status: COMPLETED | OUTPATIENT
Start: 2020-03-03 | End: 2020-03-03

## 2020-03-03 RX ADMIN — METOPROLOL TARTRATE 12.5 MG: 25 TABLET ORAL at 18:06

## 2020-03-03 RX ADMIN — Medication 10 ML: at 21:48

## 2020-03-03 RX ADMIN — LEVOFLOXACIN 500 MG: 5 INJECTION, SOLUTION INTRAVENOUS at 18:05

## 2020-03-03 RX ADMIN — TRAMADOL HYDROCHLORIDE 50 MG: 50 TABLET, FILM COATED ORAL at 03:00

## 2020-03-03 RX ADMIN — METOPROLOL TARTRATE 12.5 MG: 25 TABLET ORAL at 09:10

## 2020-03-03 RX ADMIN — Medication 10 ML: at 14:04

## 2020-03-03 RX ADMIN — CASTOR OIL AND BALSAM, PERU: 788; 87 OINTMENT TOPICAL at 21:47

## 2020-03-03 RX ADMIN — PANTOPRAZOLE SODIUM 40 MG: 40 TABLET, DELAYED RELEASE ORAL at 07:31

## 2020-03-03 RX ADMIN — CASTOR OIL AND BALSAM, PERU: 788; 87 OINTMENT TOPICAL at 14:04

## 2020-03-03 RX ADMIN — TRAMADOL HYDROCHLORIDE 50 MG: 50 TABLET, FILM COATED ORAL at 15:13

## 2020-03-03 RX ADMIN — CASTOR OIL AND BALSAM, PERU: 788; 87 OINTMENT TOPICAL at 07:29

## 2020-03-03 RX ADMIN — ACETAMINOPHEN 650 MG: 325 TABLET ORAL at 21:47

## 2020-03-03 RX ADMIN — Medication 1 CAPSULE: at 09:10

## 2020-03-03 RX ADMIN — LEVOTHYROXINE SODIUM 100 MCG: 0.1 TABLET ORAL at 07:28

## 2020-03-03 RX ADMIN — LORATADINE 10 MG: 10 TABLET ORAL at 09:11

## 2020-03-03 RX ADMIN — ONDANSETRON 4 MG: 4 TABLET, ORALLY DISINTEGRATING ORAL at 10:52

## 2020-03-03 RX ADMIN — Medication 10 ML: at 07:28

## 2020-03-03 RX ADMIN — ALPRAZOLAM 0.5 MG: 0.5 TABLET ORAL at 12:46

## 2020-03-03 RX ADMIN — FUROSEMIDE 40 MG: 40 TABLET ORAL at 09:11

## 2020-03-03 RX ADMIN — POTASSIUM CHLORIDE 20 MEQ: 750 TABLET, FILM COATED, EXTENDED RELEASE ORAL at 09:10

## 2020-03-03 RX ADMIN — ALPRAZOLAM 0.5 MG: 0.5 TABLET ORAL at 21:46

## 2020-03-03 RX ADMIN — TRAMADOL HYDROCHLORIDE 50 MG: 50 TABLET, FILM COATED ORAL at 09:10

## 2020-03-03 NOTE — PROGRESS NOTES
Problem: Patient Education: Go to Patient Education Activity  Goal: Patient/Family Education  3/3/2020 0433 by Barbee Barthel, RN  Outcome: Progressing Towards Goal  3  Problem: Pain  Goal: *Control of Pain  Outcome: Progressing Towards Goal

## 2020-03-03 NOTE — PROGRESS NOTES
Bedside shift change report given to Daniel Godfrey RN (oncoming nurse) by Rui Cadena RN (offgoing nurse). Report included the following information SBAR, Kardex, Intake/Output and MAR.

## 2020-03-03 NOTE — PROGRESS NOTES
[facility] Cardiology Progress Note    Date of service: 3/3/2020    Subjective:  Ms Toma George nauseated this AM but still able to take some PO. JURGEN    Objective:    Visit Vitals  /61 (BP 1 Location: Right arm, BP Patient Position: At rest)   Pulse 63   Temp 97.4 °F (36.3 °C)   Resp 16   Ht 5' 3\" (1.6 m)   Wt 62.4 kg (137 lb 9.1 oz)   SpO2 93%   BMI 24.37 kg/m²       Physical Exam   Constitutional: She is oriented to person, place, and time. She appears well-developed and well-nourished. HENT:   Head: Normocephalic and atraumatic. Eyes: Conjunctivae are normal. No scleral icterus. Neck: No JVD present. Cardiovascular: Normal rate and normal heart sounds. An irregularly irregular rhythm present. Exam reveals no gallop. No murmur heard. Pulmonary/Chest: Effort normal and breath sounds normal. No stridor. No respiratory distress. She has no wheezes. She has no rales. Abdominal: Soft. She exhibits no distension. Musculoskeletal:         General: Edema present. No deformity. Neurological: She is alert and oriented to person, place, and time. Skin: Skin is warm and dry. Psychiatric: She has a normal mood and affect. Her behavior is normal.       Data reviewed:  No results found for this or any previous visit (from the past 12 hour(s)). 05/27/19   ECHO ADULT COMPLETE 05/28/2019 5/28/2019    Narrative · Pericardium: There is a moderate left pleural effusion. · Tricuspid Valve: Mild to moderate tricuspid valve regurgitation is   present. · Right Ventricle: Moderately dilated right ventricle. Mildly reduced   systolic function. · Left Atrium: Moderately dilated left atrium. · Left Ventricle: Mildly to moderately increased wall thickness. Estimated   left ventricular ejection fraction is 56 - 60%. Abnormal left ventricular   septal motion. Systolic flattening of the interventricular septum   consistent with right ventricle pressure overload. Interventricular septal   \"bounce\".  Inconclusive left ventricular diastolic function. · Pulmonary Artery: Moderate to severe pulmonary hypertension. · Aortic Valve: Mild aortic valve regurgitation is present. Signed by: Blanche Bauer MD         Assessment:  1. Acute on chronic diastolic heart failure, NYHA class III  Stable  2.  Other persistent atrial fibrillation  Rate controlled    Plan:    Cont with current meds  Not able to restart spironolactone as of yet

## 2020-03-03 NOTE — PROGRESS NOTES
Day #1 of levaquin  Indication:  UTI  Current regimen:  500 mg Q24hr  Abx regimen: -  Recent Labs     20  0441 20  0604   CREA 0.99 0.85   BUN 28* 25*     Est CrCl: 32 ml/min;    Temp (24hrs), Av.7 °F (36.5 °C), Min:97.4 °F (36.3 °C), Max:97.9 °F (36.6 °C)    Cultures: none    Plan: Change to 500 mg day 1, then 250 mg Q 24hr for CrC<50 ml/min

## 2020-03-03 NOTE — PROGRESS NOTES
Benito Abbasi, Kennedi Ramirez and Aaron Leigh Date: 2/22/2020      Subjective:     Patient has had nausea over the last 12 hours. Drinking fluids OK. Has been refusing her PM Lasix. Denies pain or burning. .       Current Facility-Administered Medications   Medication Dose Route Frequency    pantoprazole (PROTONIX) tablet 40 mg  40 mg Oral ACB    ondansetron (ZOFRAN ODT) tablet 4 mg  4 mg Oral Q8H PRN    levoFLOXacin (LEVAQUIN) 500 mg in D5W IVPB  500 mg IntraVENous ONCE    [START ON 3/4/2020] levoFLOXacin (LEVAQUIN) 250 mg in D5W IVPB  250 mg IntraVENous Q24H    furosemide (LASIX) tablet 40 mg  40 mg Oral BID    potassium chloride SR (KLOR-CON 10) tablet 20 mEq  20 mEq Oral BID    metoprolol tartrate (LOPRESSOR) tablet 12.5 mg  12.5 mg Oral BID    traMADol (ULTRAM) tablet 50 mg  50 mg Oral Q6H PRN    albuterol (PROVENTIL VENTOLIN) nebulizer solution 2.5 mg  2.5 mg Nebulization Q4H PRN    balsam peru-castor oil (VENELEX) ointment   Topical Q8H    lactobac ac& pc-s.therm-b.anim (DAVE Q/RISAQUAD)  1 Cap Oral DAILY    ALPRAZolam (XANAX) tablet 0.5 mg  0.5 mg Oral TID PRN    levothyroxine (SYNTHROID) tablet 100 mcg  100 mcg Oral ACB    loratadine (CLARITIN) tablet 10 mg  10 mg Oral DAILY    sodium chloride (NS) flush 5-40 mL  5-40 mL IntraVENous Q8H    sodium chloride (NS) flush 5-40 mL  5-40 mL IntraVENous PRN    acetaminophen (TYLENOL) tablet 650 mg  650 mg Oral Q4H PRN          Objective:     Patient Vitals for the past 8 hrs:   BP Temp Pulse Resp SpO2   03/03/20 1356 119/71 97.7 °F (36.5 °C) 68 17 97 %     No intake/output data recorded. 03/01 1901 - 03/03 0700  In: 120 [P.O.:120]  Out: 400 [Urine:400]    Physical Exam: Lungs: clear to auscultation bilaterally  Heart: regular rate and rhythm, S1, S2 normal, no murmur, click, rub or gallop  Abdomen: soft, non-tender.  Bowel sounds normal. No masses,  no organomegaly        Data Review No results found for this or any previous visit (from the past 24 hour(s)).         Assessment:     Principal Problem:    Acute on chronic diastolic (congestive) heart failure ([facility]) (6/5/2019)    Active Problems:    A-fib ([facility]) ()      Dementia ([facility]) (2/9/2016)        Plan:     1) Empiric Protonix PO daily  2) Zofran prn  3) U/A/ C&S, start Levaquin for dark cloudy urine

## 2020-03-03 NOTE — PROGRESS NOTES
Bedside shift change report given to Denis Mcgarry RN (oncoming nurse) by Sulma Buckley (offgoing nurse). Report included the following information SBAR, Kardex and MAR.

## 2020-03-03 NOTE — PROGRESS NOTES
[facility] Transitional Care Team: Initial G Note    Date of Assessment: 03/03/20  Time of Assessment:  1:59 PM    Assessment & Plan   ADRI Diagnoses:  Acute on chronic diastolic (congestive) heart failure   - hyponatremia    Results for Melida Daniels (MRN [id number]) as of 3/3/2020 19:00   Ref. Range 2/26/2020 00:22 2/27/2020 03:37 2/28/2020 03:52 2/29/2020 04:40 3/1/2020 06:04 3/2/2020 04:41   Sodium Latest Ref Range: 136 - 145 mmol/L 132 (L) 133 (L) 132 (L) 131 (L) 129 (L) 133 (L)     - proBNP 2/28--1943  - last echo 5/2019--EF of 56-60%, mild to mod increased LV wall thickness with abnl LV septal motion, mild AR, mild to mod TR, mod to severe pulm HTN  - currently on Lasix 40 mg BID, has been refusing PM dose    A-fib with controlled rate    - currently on metoprolol tartrate 12.5 mg BID      Dementia       Readmission Risks:   high    1. Frail elder of advanced age who lives alone in independent apt.  2. DNR status in hospital but no DDNR on file  3. Documentation of dementia        Nurse Navigator: no [facility] assigned yet  ADRI appointments: TBD    Code status: DNR--there is no DDNR on file  Recommended Disposition: TBD         Number of Admissions this year:  only current one so far in 2020; 4 hospital admissions in 2019 and 1 ED visit   Heart Failure Bundle:  Yes    In to see Ms. Teresa Rich who is well-known to the [facility] team through previous hospital stays. She is willing to talk to me for a few minutes but states she wants to take a nap. States she has had issues with nausea today and doesn't think she will be discharged tomorrow. States she can have nausea medicine again at 3P and declines offer of [facility] NP to ask nurse if there is anything she can have sooner. [facility] NP asked if she and her sister were in touch--in the past, there was occasional estrangement. She states they are and that her sister came to visit yesterday.   Asked if I could contact her sister, and she declines. Advance Care Plan: on file; most current states her wish that ANNE Evans replace her sister Kyle Elizondo as agent, but there was no contact information available for Mr. Rama Canas    Medication reconciliation was performed on admission by PharmD. Discharge Needs: states she is willing to have [place] through [facility]. States she no longer has her former paid caregiver Yenni Huynh but now has a neighbor at [facility] who lives 2 floors below who comes to help her when she calls. Ms. Toma George states the neighbor prepares breakfast for her. [facility] team is always concerned about her home safety as she is frail and determined to live independently without 24/7 care and supervision. The question is whether she maintains capacity to make these decisions for herself. Awareness of medical conditions: able to tell me she came to ED due to a leg wound from a wheelchair scrape, but also reports she has had swelling in her legs. Did not delve into her understanding of her health problems today as she was ready to take a nap. Patient's willingness to go to SNF or inpt rehab if necessary: she will likely decline this, even if recommended, as she has done in the past; appears she wants to return to her apartment. [facility] NP will leave with [facility] calender/follow up appointments at discharge. [facility] information will be given to patient with reminder of their services. Continue to follow CM documentation. Valeria Owens is a 80 y.o. female inpatient at [facility] admitted with shortness of breath on 2/22/20. Chart reviewed by Kartik Edward, JESUSITA and [facility] Understanding of Goals) program re-introduced to patient. The Transitional Care Team bridges the gaps in care and education surrounding discharge from the acute care facility.  The objective is to empower the patient and family in taking a proactive role in the task of preventing readmission within the first thirty days after discharge from the acute care setting. The team is also involved in the efforts to reduce readmission to the acute care setting after stabilization and discharge from the acute care environment either to the skilled nursing facilities or community.        Past Medical History:   Diagnosis Date    A-fib [facility])     Arrhythmia     A FIB    Arthritis     Back pain     CAD (coronary artery disease)     PT DENIES    Chronic pain     Dementia ([facility] [address] 2/9/2016    Hypercholesterolemia     Psychiatric disorder     ANXIETY    Shoulder pain     Thyroid disease     Tremor, essential        Advance Care Planning 2/22/2020   Patient's Healthcare Decision Maker is: -   Primary Decision Maker Name -   Primary Decision Maker Phone Number -   Primary Decision Maker Relationship to Patient -   Confirm Advance Directive None   Patient Would Like to Complete Advance Directive -

## 2020-03-04 LAB
ANION GAP SERPL CALC-SCNC: 6 MMOL/L (ref 5–15)
BASOPHILS # BLD: 0 K/UL (ref 0–0.1)
BASOPHILS NFR BLD: 1 % (ref 0–1)
BUN SERPL-MCNC: 23 MG/DL (ref 6–20)
BUN/CREAT SERPL: 24 (ref 12–20)
CALCIUM SERPL-MCNC: 10.3 MG/DL (ref 8.5–10.1)
CHLORIDE SERPL-SCNC: 94 MMOL/L (ref 97–108)
CO2 SERPL-SCNC: 30 MMOL/L (ref 21–32)
CREAT SERPL-MCNC: 0.95 MG/DL (ref 0.55–1.02)
DIFFERENTIAL METHOD BLD: ABNORMAL
EOSINOPHIL # BLD: 0.2 K/UL (ref 0–0.4)
EOSINOPHIL NFR BLD: 4 % (ref 0–7)
ERYTHROCYTE [DISTWIDTH] IN BLOOD BY AUTOMATED COUNT: 14.2 % (ref 11.5–14.5)
GLUCOSE SERPL-MCNC: 93 MG/DL (ref 65–100)
HCT VFR BLD AUTO: 38.9 % (ref 35–47)
HGB BLD-MCNC: 12 G/DL (ref 11.5–16)
IMM GRANULOCYTES # BLD AUTO: 0 K/UL (ref 0–0.04)
IMM GRANULOCYTES NFR BLD AUTO: 0 % (ref 0–0.5)
LYMPHOCYTES # BLD: 1.1 K/UL (ref 0.8–3.5)
LYMPHOCYTES NFR BLD: 27 % (ref 12–49)
MCH RBC QN AUTO: 29.4 PG (ref 26–34)
MCHC RBC AUTO-ENTMCNC: 30.8 G/DL (ref 30–36.5)
MCV RBC AUTO: 95.3 FL (ref 80–99)
MONOCYTES # BLD: 0.7 K/UL (ref 0–1)
MONOCYTES NFR BLD: 16 % (ref 5–13)
NEUTS SEG # BLD: 2.2 K/UL (ref 1.8–8)
NEUTS SEG NFR BLD: 52 % (ref 32–75)
NRBC # BLD: 0 K/UL (ref 0–0.01)
NRBC BLD-RTO: 0 PER 100 WBC
PLATELET # BLD AUTO: 153 K/UL (ref 150–400)
PMV BLD AUTO: 11.8 FL (ref 8.9–12.9)
POTASSIUM SERPL-SCNC: 4.1 MMOL/L (ref 3.5–5.1)
RBC # BLD AUTO: 4.08 M/UL (ref 3.8–5.2)
SODIUM SERPL-SCNC: 130 MMOL/L (ref 136–145)
WBC # BLD AUTO: 4.1 K/UL (ref 3.6–11)

## 2020-03-04 PROCEDURE — 74011250637 HC RX REV CODE- 250/637: Performed by: FAMILY MEDICINE

## 2020-03-04 PROCEDURE — 74011250636 HC RX REV CODE- 250/636: Performed by: FAMILY MEDICINE

## 2020-03-04 PROCEDURE — 65660000000 HC RM CCU STEPDOWN

## 2020-03-04 PROCEDURE — 85025 COMPLETE CBC W/AUTO DIFF WBC: CPT

## 2020-03-04 PROCEDURE — 74011250637 HC RX REV CODE- 250/637: Performed by: INTERNAL MEDICINE

## 2020-03-04 PROCEDURE — 36415 COLL VENOUS BLD VENIPUNCTURE: CPT

## 2020-03-04 PROCEDURE — 80048 BASIC METABOLIC PNL TOTAL CA: CPT

## 2020-03-04 PROCEDURE — 77010033678 HC OXYGEN DAILY

## 2020-03-04 PROCEDURE — 94760 N-INVAS EAR/PLS OXIMETRY 1: CPT

## 2020-03-04 RX ORDER — FUROSEMIDE 40 MG/1
80 TABLET ORAL DAILY
Status: DISCONTINUED | OUTPATIENT
Start: 2020-03-04 | End: 2020-03-06

## 2020-03-04 RX ADMIN — CASTOR OIL AND BALSAM, PERU: 788; 87 OINTMENT TOPICAL at 21:36

## 2020-03-04 RX ADMIN — FUROSEMIDE 80 MG: 40 TABLET ORAL at 09:34

## 2020-03-04 RX ADMIN — ACETAMINOPHEN 650 MG: 325 TABLET ORAL at 23:30

## 2020-03-04 RX ADMIN — Medication 10 ML: at 06:06

## 2020-03-04 RX ADMIN — TRAMADOL HYDROCHLORIDE 50 MG: 50 TABLET, FILM COATED ORAL at 15:18

## 2020-03-04 RX ADMIN — LEVOFLOXACIN 250 MG: 5 INJECTION, SOLUTION INTRAVENOUS at 17:29

## 2020-03-04 RX ADMIN — POTASSIUM CHLORIDE 20 MEQ: 750 TABLET, FILM COATED, EXTENDED RELEASE ORAL at 09:34

## 2020-03-04 RX ADMIN — ALPRAZOLAM 0.5 MG: 0.5 TABLET ORAL at 09:42

## 2020-03-04 RX ADMIN — METOPROLOL TARTRATE 12.5 MG: 25 TABLET ORAL at 17:28

## 2020-03-04 RX ADMIN — CASTOR OIL AND BALSAM, PERU: 788; 87 OINTMENT TOPICAL at 06:06

## 2020-03-04 RX ADMIN — CASTOR OIL AND BALSAM, PERU: 788; 87 OINTMENT TOPICAL at 15:16

## 2020-03-04 RX ADMIN — ACETAMINOPHEN 650 MG: 325 TABLET ORAL at 19:00

## 2020-03-04 RX ADMIN — Medication 1 CAPSULE: at 09:34

## 2020-03-04 RX ADMIN — LORATADINE 10 MG: 10 TABLET ORAL at 09:34

## 2020-03-04 RX ADMIN — POTASSIUM CHLORIDE 20 MEQ: 750 TABLET, FILM COATED, EXTENDED RELEASE ORAL at 17:28

## 2020-03-04 RX ADMIN — LEVOTHYROXINE SODIUM 100 MCG: 0.1 TABLET ORAL at 06:32

## 2020-03-04 RX ADMIN — ALPRAZOLAM 0.5 MG: 0.5 TABLET ORAL at 19:00

## 2020-03-04 RX ADMIN — Medication 10 ML: at 21:34

## 2020-03-04 RX ADMIN — METOPROLOL TARTRATE 12.5 MG: 25 TABLET ORAL at 09:34

## 2020-03-04 RX ADMIN — TRAMADOL HYDROCHLORIDE 50 MG: 50 TABLET, FILM COATED ORAL at 21:34

## 2020-03-04 RX ADMIN — PANTOPRAZOLE SODIUM 40 MG: 40 TABLET, DELAYED RELEASE ORAL at 06:32

## 2020-03-04 NOTE — PROGRESS NOTES
Pt has refused PT last 4 attempts - pt appears at baseline - functions at wheelchair level in independent living with intermittent assist of caregivers - will complete PT order. Nursing to continue to encourage pt OOB to wheelchair. \  Bryan Hearn, PT

## 2020-03-04 NOTE — PROGRESS NOTES
Patient resting in bed, appetite is moderate, some periodic confusion, no complaints of pain. Problem: Pain  Goal: *Control of Pain  Outcome: Progressing Towards Goal     Problem: Patient Education: Go to Patient Education Activity  Goal: Patient/Family Education  Outcome: Progressing Towards Goal     Problem: Patient Education: Go to Patient Education Activity  Goal: Patient/Family Education  Outcome: Progressing Towards Goal     Problem: Pressure Injury - Risk of  Goal: *Prevention of pressure injury  Description  Document Tre Scale and appropriate interventions in the flowsheet.     Offload heels  Turn approximately every 2 hours   Outcome: Progressing Towards Goal  Note: Pressure Injury Interventions:  Sensory Interventions: Pressure redistribution bed/mattress (bed type)    Moisture Interventions: Apply protective barrier, creams and emollients, Absorbent underpads    Activity Interventions: Increase time out of bed, Pressure redistribution bed/mattress(bed type)    Mobility Interventions: HOB 30 degrees or less, Pressure redistribution bed/mattress (bed type)    Nutrition Interventions: Document food/fluid/supplement intake    Friction and Shear Interventions: Apply protective barrier, creams and emollients, Lift sheet, Minimize layers                Problem: Patient Education: Go to Patient Education Activity  Goal: Patient/Family Education  Outcome: Progressing Towards Goal     Problem: Discharge Planning  Goal: *Discharge to safe environment  Outcome: Progressing Towards Goal     Problem: Patient Education: Go to Patient Education Activity  Goal: Patient/Family Education  Outcome: Progressing Towards Goal     Problem: Pain  Goal: *Control of Pain  Outcome: Progressing Towards Goal     Problem: Patient Education: Go to Patient Education Activity  Goal: Patient/Family Education  Outcome: Progressing Towards Goal     Problem: Nausea/Vomiting (Adult)  Goal: *Absence of nausea/vomiting  Outcome: Progressing Towards Goal

## 2020-03-04 NOTE — PROGRESS NOTES
Bedside shift change report given to Altagracia Spine (oncoming nurse) by Vivi (offgoing nurse). Report included the following information SBAR, Kardex, MAR and Recent Results.

## 2020-03-04 NOTE — PROGRESS NOTES
ADRI:  1. [address] at discharge. 2.  BLS transport. 3. Caregivers at [address] [place] [address] met with patient to discuss purewick at home, and explained to her that it would be private pay costs. She said  \"Don't worry about it, are you here for my leg? \" She explained that is non ambulatory and has caregivers to take care of her. CM will arrange transportation at discharge.      [facility]

## 2020-03-04 NOTE — PROGRESS NOTES
Bedside shift change report given to [address] (oncoming nurse) by Jinny Desouza RN (offgoing nurse). Report included the following information SBAR, Kardex, MAR and Recent Results.

## 2020-03-04 NOTE — PROGRESS NOTES
Benito Tang, Tracy Menjivar, Kennedi, and Chin Salcedo Date: 2/22/2020      Subjective:     Patient found to have UTI yesterday. Started IV Abx. Also had good BM last night. Nausea some better today. ..       Current Facility-Administered Medications   Medication Dose Route Frequency    pantoprazole (PROTONIX) tablet 40 mg  40 mg Oral ACB    ondansetron (ZOFRAN ODT) tablet 4 mg  4 mg Oral Q8H PRN    levoFLOXacin (LEVAQUIN) 250 mg in D5W IVPB  250 mg IntraVENous Q24H    furosemide (LASIX) tablet 40 mg  40 mg Oral BID    potassium chloride SR (KLOR-CON 10) tablet 20 mEq  20 mEq Oral BID    metoprolol tartrate (LOPRESSOR) tablet 12.5 mg  12.5 mg Oral BID    traMADol (ULTRAM) tablet 50 mg  50 mg Oral Q6H PRN    albuterol (PROVENTIL VENTOLIN) nebulizer solution 2.5 mg  2.5 mg Nebulization Q4H PRN    balsam peru-castor oil (VENELEX) ointment   Topical Q8H    lactobac ac& pc-s.therm-b.anim (DAVE Q/RISAQUAD)  1 Cap Oral DAILY    ALPRAZolam (XANAX) tablet 0.5 mg  0.5 mg Oral TID PRN    levothyroxine (SYNTHROID) tablet 100 mcg  100 mcg Oral ACB    loratadine (CLARITIN) tablet 10 mg  10 mg Oral DAILY    sodium chloride (NS) flush 5-40 mL  5-40 mL IntraVENous Q8H    sodium chloride (NS) flush 5-40 mL  5-40 mL IntraVENous PRN    acetaminophen (TYLENOL) tablet 650 mg  650 mg Oral Q4H PRN          Objective:     Patient Vitals for the past 8 hrs:   BP Temp Pulse Resp SpO2   03/04/20 0022 143/83 97.7 °F (36.5 °C) 87 18 97 %     No intake/output data recorded. 03/02 1901 - 03/04 0700  In: -   Out: 400 [Urine:400]    Physical Exam: Lungs: clear to auscultation bilaterally  Heart: regular rate and rhythm, S1, S2 normal, no murmur, click, rub or gallop  Abdomen: soft, non-tender.  Bowel sounds normal. No masses,  no organomegaly        Data Review   Recent Results (from the past 24 hour(s))   URINALYSIS W/ REFLEX CULTURE    Collection Time: 03/03/20  6:46 PM   Result Value Ref Range    Color YELLOW/STRAW Appearance CLEAR CLEAR      Specific gravity 1.014 1.003 - 1.030      pH (UA) 6.0 5.0 - 8.0      Protein NEGATIVE  NEG mg/dL    Glucose NEGATIVE  NEG mg/dL    Ketone NEGATIVE  NEG mg/dL    Bilirubin NEGATIVE  NEG      Blood SMALL (A) NEG      Urobilinogen 1.0 0.2 - 1.0 EU/dL    Nitrites NEGATIVE  NEG      Leukocyte Esterase MODERATE (A) NEG      UA:UC IF INDICATED URINE CULTURE ORDERED (A) CNI      WBC 10-20 0 - 4 /hpf    RBC 5-10 0 - 5 /hpf    Epithelial cells MODERATE (A) FEW /lpf    Bacteria NEGATIVE  NEG /hpf    Hyaline cast 2-5 0 - 5 /lpf   URINE CULTURE HOLD SAMPLE    Collection Time: 03/03/20  6:46 PM   Result Value Ref Range    Urine culture hold        Urine on hold in Microbiology dept for 2 days. If unpreserved urine is submitted, it cannot be used for addtional testing after 24 hours, recollection will be required. METABOLIC PANEL, BASIC    Collection Time: 03/04/20  6:03 AM   Result Value Ref Range    Sodium 130 (L) 136 - 145 mmol/L    Potassium 4.1 3.5 - 5.1 mmol/L    Chloride 94 (L) 97 - 108 mmol/L    CO2 30 21 - 32 mmol/L    Anion gap 6 5 - 15 mmol/L    Glucose 93 65 - 100 mg/dL    BUN 23 (H) 6 - 20 MG/DL    Creatinine 0.95 0.55 - 1.02 MG/DL    BUN/Creatinine ratio 24 (H) 12 - 20      GFR est AA >60 >60 ml/min/1.73m2    GFR est non-AA 56 (L) >60 ml/min/1.73m2    Calcium 10.3 (H) 8.5 - 10.1 MG/DL   CBC WITH AUTOMATED DIFF    Collection Time: 03/04/20  6:03 AM   Result Value Ref Range    WBC 4.1 3.6 - 11.0 K/uL    RBC 4.08 3.80 - 5.20 M/uL    HGB 12.0 11.5 - 16.0 g/dL    HCT 38.9 35.0 - 47.0 %    MCV 95.3 80.0 - 99.0 FL    MCH 29.4 26.0 - 34.0 PG    MCHC 30.8 30.0 - 36.5 g/dL    RDW 14.2 11.5 - 14.5 %    PLATELET 693 901 - 060 K/uL    MPV 11.8 8.9 - 12.9 FL    NRBC 0.0 0  WBC    ABSOLUTE NRBC 0.00 0.00 - 0.01 K/uL    NEUTROPHILS 52 32 - 75 %    LYMPHOCYTES 27 12 - 49 %    MONOCYTES 16 (H) 5 - 13 %    EOSINOPHILS 4 0 - 7 %    BASOPHILS 1 0 - 1 %    IMMATURE GRANULOCYTES 0 0.0 - 0.5 %    ABS. NEUTROPHILS 2.2 1.8 - 8.0 K/UL    ABS. LYMPHOCYTES 1.1 0.8 - 3.5 K/UL    ABS. MONOCYTES 0.7 0.0 - 1.0 K/UL    ABS. EOSINOPHILS 0.2 0.0 - 0.4 K/UL    ABS. BASOPHILS 0.0 0.0 - 0.1 K/UL    ABS. IMM. GRANS. 0.0 0.00 - 0.04 K/UL    DF AUTOMATED             Assessment:     Principal Problem:    Acute on chronic diastolic (congestive) heart failure (HCC) (6/5/2019)    Active Problems:    A-fib ([facility] ()      [facility] (2/9/2016)        Plan:     1) Continue Abx- await urine cx  2) Work on getting Jose Luis Pi.    3) Cont PRotonix  4) Hope for discharge by Friday

## 2020-03-04 NOTE — PROGRESS NOTES
Problem: Pain  Goal: *Control of Pain  Outcome: Progressing Towards Goal     Problem: Patient Education: Go to Patient Education Activity  Goal: Patient/Family Education  Outcome: Progressing Towards Goal     Problem: Patient Education: Go to Patient Education Activity  Goal: Patient/Family Education  Outcome: Progressing Towards Goal

## 2020-03-04 NOTE — PROGRESS NOTES
Bedside and Verbal shift change report given to [address] [place] [address], [address], RN (oncoming nurse) by Neel Foster RN (offgoing nurse). Report included the following information SBAR, Kardex, Intake/Output, MAR and Recent Results.

## 2020-03-05 PROCEDURE — 74011250637 HC RX REV CODE- 250/637: Performed by: INTERNAL MEDICINE

## 2020-03-05 PROCEDURE — 74011250636 HC RX REV CODE- 250/636: Performed by: FAMILY MEDICINE

## 2020-03-05 PROCEDURE — 65660000000 HC RM CCU STEPDOWN

## 2020-03-05 PROCEDURE — 74011250637 HC RX REV CODE- 250/637: Performed by: FAMILY MEDICINE

## 2020-03-05 RX ADMIN — LEVOTHYROXINE SODIUM 100 MCG: 0.1 TABLET ORAL at 07:27

## 2020-03-05 RX ADMIN — ALPRAZOLAM 0.5 MG: 0.5 TABLET ORAL at 17:42

## 2020-03-05 RX ADMIN — LEVOFLOXACIN 250 MG: 5 INJECTION, SOLUTION INTRAVENOUS at 17:29

## 2020-03-05 RX ADMIN — Medication 1 CAPSULE: at 09:43

## 2020-03-05 RX ADMIN — FUROSEMIDE 80 MG: 40 TABLET ORAL at 09:42

## 2020-03-05 RX ADMIN — CASTOR OIL AND BALSAM, PERU: 788; 87 OINTMENT TOPICAL at 07:28

## 2020-03-05 RX ADMIN — TRAMADOL HYDROCHLORIDE 50 MG: 50 TABLET, FILM COATED ORAL at 09:42

## 2020-03-05 RX ADMIN — Medication 10 ML: at 07:27

## 2020-03-05 RX ADMIN — METOPROLOL TARTRATE 12.5 MG: 25 TABLET ORAL at 17:28

## 2020-03-05 RX ADMIN — METOPROLOL TARTRATE 12.5 MG: 25 TABLET ORAL at 09:43

## 2020-03-05 RX ADMIN — Medication 10 ML: at 23:34

## 2020-03-05 RX ADMIN — POTASSIUM CHLORIDE 20 MEQ: 750 TABLET, FILM COATED, EXTENDED RELEASE ORAL at 17:28

## 2020-03-05 RX ADMIN — ALPRAZOLAM 0.5 MG: 0.5 TABLET ORAL at 09:43

## 2020-03-05 RX ADMIN — POTASSIUM CHLORIDE 20 MEQ: 750 TABLET, FILM COATED, EXTENDED RELEASE ORAL at 09:42

## 2020-03-05 RX ADMIN — TRAMADOL HYDROCHLORIDE 50 MG: 50 TABLET, FILM COATED ORAL at 23:34

## 2020-03-05 RX ADMIN — TRAMADOL HYDROCHLORIDE 50 MG: 50 TABLET, FILM COATED ORAL at 17:42

## 2020-03-05 RX ADMIN — PANTOPRAZOLE SODIUM 40 MG: 40 TABLET, DELAYED RELEASE ORAL at 07:27

## 2020-03-05 RX ADMIN — TRAMADOL HYDROCHLORIDE 50 MG: 50 TABLET, FILM COATED ORAL at 03:35

## 2020-03-05 RX ADMIN — CASTOR OIL AND BALSAM, PERU: 788; 87 OINTMENT TOPICAL at 14:53

## 2020-03-05 RX ADMIN — LORATADINE 10 MG: 10 TABLET ORAL at 09:42

## 2020-03-05 RX ADMIN — Medication 10 ML: at 14:50

## 2020-03-05 RX ADMIN — CASTOR OIL AND BALSAM, PERU: 788; 87 OINTMENT TOPICAL at 23:34

## 2020-03-05 NOTE — PROGRESS NOTES
Bedside shift change report given to Manisha Mcmillan (oncoming nurse) by Joycelyn Corley (offgoing nurse). Report included the following information SBAR, Kardex and MAR.

## 2020-03-05 NOTE — PROGRESS NOTES
Problem: Pain  Goal: *Control of Pain  Outcome: Progressing Towards Goal     Problem: Pressure Injury - Risk of  Goal: *Prevention of pressure injury  Description  Document Tre Scale and appropriate interventions in the flowsheet.     Offload heels  Turn approximately every 2 hours   Outcome: Progressing Towards Goal  Note: Pressure Injury Interventions:  Sensory Interventions: Pressure redistribution bed/mattress (bed type)    Moisture Interventions: Internal/External urinary devices    Activity Interventions: Increase time out of bed    Mobility Interventions: PT/OT evaluation    Nutrition Interventions: Document food/fluid/supplement intake    Friction and Shear Interventions: HOB 30 degrees or less

## 2020-03-05 NOTE — PROGRESS NOTES
Bedside and Verbal shift change report given to Manuelito Villareal RN and Dorina Russ RN (oncoming nurse) by Edna Townsend RN (offgoing nurse). Report included the following information SBAR, Kardex, Intake/Output, MAR and Recent Results.

## 2020-03-05 NOTE — PROGRESS NOTES
Benito Gregory, Maria Isabel Watters, Marilyn Matamoros, and Zachary Francisco Date: 2/22/2020      Subjective:     Patient has possible enterococcal UTI. Await C&S. .. Current Facility-Administered Medications   Medication Dose Route Frequency    furosemide (LASIX) tablet 80 mg  80 mg Oral DAILY    pantoprazole (PROTONIX) tablet 40 mg  40 mg Oral ACB    ondansetron (ZOFRAN ODT) tablet 4 mg  4 mg Oral Q8H PRN    levoFLOXacin (LEVAQUIN) 250 mg in D5W IVPB  250 mg IntraVENous Q24H    potassium chloride SR (KLOR-CON 10) tablet 20 mEq  20 mEq Oral BID    metoprolol tartrate (LOPRESSOR) tablet 12.5 mg  12.5 mg Oral BID    traMADol (ULTRAM) tablet 50 mg  50 mg Oral Q6H PRN    albuterol (PROVENTIL VENTOLIN) nebulizer solution 2.5 mg  2.5 mg Nebulization Q4H PRN    balsam peru-castor oil (VENELEX) ointment   Topical Q8H    lactobac ac& pc-s.therm-b.anim (DAVE Q/RISAQUAD)  1 Cap Oral DAILY    ALPRAZolam (XANAX) tablet 0.5 mg  0.5 mg Oral TID PRN    levothyroxine (SYNTHROID) tablet 100 mcg  100 mcg Oral ACB    loratadine (CLARITIN) tablet 10 mg  10 mg Oral DAILY    sodium chloride (NS) flush 5-40 mL  5-40 mL IntraVENous Q8H    sodium chloride (NS) flush 5-40 mL  5-40 mL IntraVENous PRN    acetaminophen (TYLENOL) tablet 650 mg  650 mg Oral Q4H PRN          Objective:     Patient Vitals for the past 8 hrs:   BP Temp Pulse Resp SpO2 Weight   03/05/20 0353 110/70 97.3 °F (36.3 °C) (!) 59 14 (!) 89 % 140 lb 14 oz (63.9 kg)     No intake/output data recorded. 03/03 0701 - 03/04 1900  In: 480 [P.O.:480]  Out: 900 [Urine:900]    Physical Exam: Lungs: clear to auscultation bilaterally  Heart: regular rate and rhythm, S1, S2 normal, no murmur, click, rub or gallop  Abdomen: soft, non-tender. Bowel sounds normal. No masses,  no organomegaly        Data Review No results found for this or any previous visit (from the past 24 hour(s)).         Assessment:     Principal Problem:    Acute on chronic diastolic (congestive) heart failure [facility]) (6/5/2019)    Active Problems:    A-fib ([facility] ()      Dementia ([facility] (2/9/2016)        Plan:     1) Await cx for discharge Abx  2) Need to get caretaker in here today to train to use [employer] which her family is getting for her.    3) Plan for discharge in AM

## 2020-03-06 ENCOUNTER — PATIENT OUTREACH (OUTPATIENT)
Dept: CASE MANAGEMENT | Age: 85
End: 2020-03-06

## 2020-03-06 VITALS
DIASTOLIC BLOOD PRESSURE: 67 MMHG | HEART RATE: 94 BPM | BODY MASS INDEX: 24.53 KG/M2 | RESPIRATION RATE: 16 BRPM | HEIGHT: 63 IN | OXYGEN SATURATION: 93 % | WEIGHT: 138.45 LBS | TEMPERATURE: 98.8 F | SYSTOLIC BLOOD PRESSURE: 118 MMHG

## 2020-03-06 LAB
BACTERIA SPEC CULT: ABNORMAL
BACTERIA SPEC CULT: ABNORMAL
CC UR VC: ABNORMAL
SERVICE CMNT-IMP: ABNORMAL

## 2020-03-06 PROCEDURE — 74011250637 HC RX REV CODE- 250/637: Performed by: FAMILY MEDICINE

## 2020-03-06 PROCEDURE — 74011250637 HC RX REV CODE- 250/637: Performed by: INTERNAL MEDICINE

## 2020-03-06 RX ORDER — METOPROLOL TARTRATE 25 MG/1
12.5 TABLET, FILM COATED ORAL 2 TIMES DAILY
Qty: 60 TAB | Refills: 5 | Status: SHIPPED | OUTPATIENT
Start: 2020-03-06

## 2020-03-06 RX ORDER — SPIRONOLACTONE 25 MG/1
12.5 TABLET ORAL DAILY
Status: DISCONTINUED | OUTPATIENT
Start: 2020-03-06 | End: 2020-03-06 | Stop reason: HOSPADM

## 2020-03-06 RX ORDER — FUROSEMIDE 40 MG/1
40 TABLET ORAL DAILY
Status: DISCONTINUED | OUTPATIENT
Start: 2020-03-06 | End: 2020-03-06 | Stop reason: HOSPADM

## 2020-03-06 RX ORDER — LEVOFLOXACIN 250 MG/1
250 TABLET ORAL DAILY
Qty: 5 TAB | Refills: 0 | Status: SHIPPED | OUTPATIENT
Start: 2020-03-06 | End: 2020-04-17

## 2020-03-06 RX ORDER — FUROSEMIDE 40 MG/1
40 TABLET ORAL DAILY
Qty: 30 TAB | Refills: 5 | Status: SHIPPED | OUTPATIENT
Start: 2020-03-06 | End: 2020-04-22

## 2020-03-06 RX ORDER — BALSAM PERU/CASTOR OIL
OINTMENT (GRAM) TOPICAL
Qty: 30 G | Refills: 1 | Status: SHIPPED | OUTPATIENT
Start: 2020-03-06 | End: 2020-04-22

## 2020-03-06 RX ORDER — SPIRONOLACTONE 25 MG/1
12.5 TABLET ORAL DAILY
Qty: 30 TAB | Refills: 5 | Status: SHIPPED | OUTPATIENT
Start: 2020-03-06

## 2020-03-06 RX ORDER — PANTOPRAZOLE SODIUM 40 MG/1
40 TABLET, DELAYED RELEASE ORAL
Qty: 30 TAB | Refills: 5 | Status: SHIPPED | OUTPATIENT
Start: 2020-03-07

## 2020-03-06 RX ADMIN — Medication 10 ML: at 06:38

## 2020-03-06 RX ADMIN — TRAMADOL HYDROCHLORIDE 50 MG: 50 TABLET, FILM COATED ORAL at 12:39

## 2020-03-06 RX ADMIN — ALPRAZOLAM 0.5 MG: 0.5 TABLET ORAL at 12:39

## 2020-03-06 RX ADMIN — SPIRONOLACTONE 12.5 MG: 25 TABLET ORAL at 09:39

## 2020-03-06 RX ADMIN — PANTOPRAZOLE SODIUM 40 MG: 40 TABLET, DELAYED RELEASE ORAL at 06:37

## 2020-03-06 RX ADMIN — CASTOR OIL AND BALSAM, PERU: 788; 87 OINTMENT TOPICAL at 06:38

## 2020-03-06 RX ADMIN — LEVOTHYROXINE SODIUM 100 MCG: 0.1 TABLET ORAL at 06:37

## 2020-03-06 RX ADMIN — LORATADINE 10 MG: 10 TABLET ORAL at 09:39

## 2020-03-06 RX ADMIN — FUROSEMIDE 40 MG: 40 TABLET ORAL at 09:39

## 2020-03-06 RX ADMIN — TRAMADOL HYDROCHLORIDE 50 MG: 50 TABLET, FILM COATED ORAL at 06:37

## 2020-03-06 RX ADMIN — POTASSIUM CHLORIDE 20 MEQ: 750 TABLET, FILM COATED, EXTENDED RELEASE ORAL at 09:39

## 2020-03-06 RX ADMIN — Medication 1 CAPSULE: at 09:39

## 2020-03-06 NOTE — PROGRESS NOTES
Reviewed chart for transitions of care,and discussed in rounds. Patient will discharge back to her apartment at [facility]. Transportation arranged for 1:00 p.m. via [facility]. Folder placed on chart. CM sent referral to [address] [place] [address]. Due to patient diagnosis,she is unable to signed IM letter. Copy placed on chart.     Jett Carranza, [facility]

## 2020-03-06 NOTE — DISCHARGE SUMMARY
Patient Discharge Instructions    Jian Martini / [id number] : 1931    Admitted 2020 Discharged: 3/6/2020     Take Home Medications     Current Discharge Medication List      START taking these medications    Details   L. acidoph & paracasei- S therm- Bifido (DAVE-Q/RISAQUAD) 8 billion cell cap cap Take 1 Cap by mouth daily. Qty: 5 Cap, Refills: 0      levoFLOXacin (LEVAQUIN) 250 mg tablet Take 1 Tab by mouth daily. Qty: 5 Tab, Refills: 0      metoprolol tartrate (LOPRESSOR) 25 mg tablet Take 0.5 Tabs by mouth two (2) times a day. Qty: 60 Tab, Refills: 5      pantoprazole (PROTONIX) 40 mg tablet Take 1 Tab by mouth Daily (before breakfast). Qty: 30 Tab, Refills: 5      balsam peru-castor oiL (VENELEX) ointment As dir  Qty: 30 g, Refills: 1         CONTINUE these medications which have CHANGED    Details   furosemide (LASIX) 40 mg tablet Take 1 Tab by mouth daily. Qty: 30 Tab, Refills: 5      spironolactone (ALDACTONE) 25 mg tablet Take 0.5 Tabs by mouth daily. Qty: 30 Tab, Refills: 5         CONTINUE these medications which have NOT CHANGED    Details   loratadine (CLARITIN) 10 mg tablet Take 10 mg by mouth daily. ALPRAZolam (XANAX) 0.5 mg tablet TAKE ONE TABLET BY MOUTH 3 TIMES A DAY AS NEEDED ANXIETY  Qty: 90 Tab, Refills: 0    Associated Diagnoses: Anxiety      levothyroxine (SYNTHROID) 100 mcg tablet TAKE ONE TABLET BY MOUTH EVERY MORNING BEFORE BREAKFAST  Qty: 30 Tab, Refills: 5      potassium chloride (K-DUR, KLOR-CON) 20 mEq tablet TAKE 1 TABLET BY MOUTH EVERY DAY  Qty: 30 Tab, Refills: 3    Comments: Generic For:*K-DUR 20 MEQ TABLET   2019 10:29:13 AM         STOP taking these medications       atenolol (TENORMIN) 25 mg tablet Comments:   Reason for Stopping:                · It is important that you take the medication exactly as they are prescribed.    · Keep your medication in the bottles provided by the pharmacist and keep a list of the medication names, dosages, and times to be taken in your wallet. · Do not take other medications without consulting your doctor. What to do at Home    Recommended diet: Regular Diet,     Recommended activity: Activity as tolerated,   Home health consult for wound care L leg      If you experience any of the following symptoms increased shortness of breath, please follow up with Dr Lexii Woo. Follow-up Appointments   Procedures    FOLLOW UP VISIT Appointment in: One Week     Standing Status:   Standing     Number of Occurrences:   1     Order Specific Question:   Appointment in     Answer: One Week            Information obtained by :  I understand that if any problems occur once I am at home I am to contact my physician. I understand and acknowledge receipt of the instructions indicated above.                                                                                                                                            Physician's or R.N.'s Signature                                                                  Date/Time                                                                                                                                              Patient or Representative Signature                                                          Date/Time

## 2020-03-06 NOTE — PROGRESS NOTES
Bedside shift change report given to Manisha Mcmillan (oncoming nurse) by Tim Abad (offgoing nurse). Report included the following information SBAR, Kardex and MAR.

## 2020-03-06 NOTE — PROGRESS NOTES
Transitional Care Team: Discharge HUG Note    Date of Assessment: 03/06/20  Time of Assessment:  11:35 AM    Catalina Quiñonez is a 80 y.o. female inpatient at [address] [place] [address] Drive is resting in bed with her sister at the bedside. Writer introduces self and the Wilma Morgan, writer allowed to proceed. Pt is discharging back to [address] [place] [address] w/ caregiver Mor. Pt refused PT sessions  3 admissions in past 6 months    CHF-  Acute on chronic diastolic heart failure, NYHA class III  Stable  Decreased lasix to 40 mg & Started low dose dio    UTI-   3/3 culture results = ENTEROCOCCUS FAECALIS   Levaquin completed inpatient    Recent Results:    2/28 Chest CT-  1. Significant cardiomegaly with mild edema  2. Mild left pleural effusion with left basilar atelectasis. 3. Small focus of groundglass opacity in the right apex is most likely  incidental.  4. 3.5 mm pulmonary nodule in the right upper lobe is likely incidental.      3/1/2020 06:04 3/2/2020 04:41 3/4/2020 06:03   Sodium  129 (L) 133 (L) 130 (L)   Potassium  4.2 3.9 4.1   Chloride  92 (L) 95 (L) 94 (L)   CO2  31 33 (H) 30   Anion gap  6 5 6   Glucose  93 88 93   BUN  25 (H) 28 (H) 23 (H)   Creatinine  0.85 0.99 0.95   BUN/Creatinine ratio  29 (H) 28 (H) 24 (H)   Calcium  10.1 10.2 (H) 10.3 (H)          Primary Discharge Diagnosis: CHF    Advance Directive:  reviewed and needs to be updated. See ACP note from 4/6/17. Current Code Status:  DNR during Admission    Discharge Needs: (to include safety issues) None    Barriers Identified:   Dementia  Hx of non-compliance    Medication Review:  was performed. Best Patient Contact Number:   [phone]       HUG (Healthy Understanding of Goals) program introduced to patient/family. The Transitional Care Team bridges the gaps in care and education surrounding discharge from the acute care facility.  The objective is to empower the patient and family in taking a proactive role in preventing readmission within the first thirty days after discharge. The team is also involved in the efforts to reduce readmission to the acute care setting after stabilization and discharge from the acute care environment either to skilled nursing facilities or community. ANIYA RN provided G Calendar/ follow up appointments/ Ambulatory Nurse Navigator name and contact information when the patient is ready for discharge. Future Appointments   Date Time Provider Frankie Shepard   3/12/2020 11:00 AM Jen Dash MD [facility] SCHED     Non-[facility] follow up appointment(s):   3/12 @ 11 am Dr. Swanson Cancer (Cardiology)    The [facility] team will continue to offer support during the 30- 90 day discharge from acute care setting. Will notify Ambulatory CTN, RN.     Past Medical History:   Diagnosis Date    A-fib [facility])     Arrhythmia     A FIB    Arthritis     Back pain     CAD (coronary artery disease)     PT DENIES    Chronic pain     Dementia ([facility] 2/9/2016    Hypercholesterolemia     Psychiatric disorder     ANXIETY    Shoulder pain     Thyroid disease     Tremor, essential

## 2020-03-06 NOTE — NURSE NAVIGATOR
Follow up scheduled with [facility] on 3/13/20 at 12 pm with Dr. Ana Fnik. Information on After Visit Summary.

## 2020-03-06 NOTE — PROGRESS NOTES
[facility] Cardiology Progress Note    Date of service: 3/6/2020    Subjective:  Ms Corina Schulte has no cardiac c/o.; JURGEN    Objective:    Visit Vitals  /67 (BP 1 Location: Right arm, BP Patient Position: At rest)   Pulse 94   Temp 98.8 °F (37.1 °C)   Resp 16   Ht 5' 3\" (1.6 m)   Wt 62.8 kg (138 lb 7.2 oz)   SpO2 93%   BMI 24.53 kg/m²       Physical Exam   Constitutional: She is oriented to person, place, and time. She appears well-developed and well-nourished. HENT:   Head: Normocephalic and atraumatic. Eyes: Conjunctivae are normal. No scleral icterus. Neck: No JVD present. Cardiovascular: Normal rate and normal heart sounds. An irregularly irregular rhythm present. Exam reveals no gallop. No murmur heard. Pulmonary/Chest: Effort normal and breath sounds normal. No stridor. No respiratory distress. She has no wheezes. She has no rales. Abdominal: Soft. She exhibits no distension. Musculoskeletal:         General: Edema present. No deformity. Neurological: She is alert and oriented to person, place, and time. Skin: Skin is warm and dry. Psychiatric: She has a normal mood and affect. Her behavior is normal.       Data reviewed:  No results found for this or any previous visit (from the past 12 hour(s)). 05/27/19   ECHO ADULT COMPLETE 05/28/2019 5/28/2019    Narrative · Pericardium: There is a moderate left pleural effusion. · Tricuspid Valve: Mild to moderate tricuspid valve regurgitation is   present. · Right Ventricle: Moderately dilated right ventricle. Mildly reduced   systolic function. · Left Atrium: Moderately dilated left atrium. · Left Ventricle: Mildly to moderately increased wall thickness. Estimated   left ventricular ejection fraction is 56 - 60%. Abnormal left ventricular   septal motion. Systolic flattening of the interventricular septum   consistent with right ventricle pressure overload. Interventricular septal   \"bounce\". Inconclusive left ventricular diastolic function.   · Pulmonary Artery: Moderate to severe pulmonary hypertension. · Aortic Valve: Mild aortic valve regurgitation is present. Signed by: Derwin Schwab, MD         Assessment:  1. Acute on chronic diastolic heart failure, NYHA class III  Stable  2.  Other persistent atrial fibrillation  Rate controlled    Plan:    Decreased lasix to 40 mg  Started low dose dio

## 2020-03-06 NOTE — PROGRESS NOTES
Bedside and Verbal shift change report given to Xochilt Lynne RN and Meagan Blackwood RN (oncoming nurse) by Doris Esposito RN (offgoing nurse). Report included the following information SBAR, Kardex, Intake/Output, MAR and Recent Results.

## 2020-03-09 ENCOUNTER — PATIENT OUTREACH (OUTPATIENT)
Dept: FAMILY MEDICINE CLINIC | Age: 85
End: 2020-03-09

## 2020-03-09 NOTE — Clinical Note
Daniel Mckeon,Very concerned about your patient, Scott Le. She is refusing Home Health. Stays in bed per her sister, has a caregiver that is in and out of her apartment. Fall risk. Needs higher level of care- currently in [facility]. Encouraged sister to call [facility] to see her today. Please advise. Ko Haynes

## 2020-03-09 NOTE — PROGRESS NOTES
Hospital Discharge Follow-Up      Date/Time:  3/9/2020 12:46 PM  [address] Ambulatory Care Coordination Team  Phone [phone]    Patient was admitted to [facility] on  and discharged on 3/6 for acute on chronic CHF. The physician discharge summary was available at the time of outreach. Patient was contacted within 1 business days of discharge. Top Challenges reviewed with the provider   [facility] -3/6 CHF/Afib  3 admissions in past 6 months.  -acute on chronic diastolic heart failure. -Pulmonary hypertension.  -atrial fibrillation - currently not anticoagulated.  -abnormal CXR with left base volume loss. -left side pleural effusion noted to be present on 2019 echo. UTI- Enterococcus faecalis. Levaquin. Refused PT services in hospital  Advance Care Planning:   Does patient have an Advance Directive:  reviewed and current        Method of communication with provider :chart routing    Was this a readmission? no   Patient stated reason for the readmission:     Care Transition Nurse (CTN) contacted the family by telephone to perform post hospital discharge assessment. Verified name and  with family as identifiers. Provided introduction to self, and explanation of the CTN role. Called patient, unable to LM, VM was full. Called sister, on HIPPA. She was just going in to see her sister. Reports she is doing fairly well but did vomit x 2 yesterday. Nausea today, but no vomiting. Denies any sob. Did not check weight this am. Stays in bed, per sister. ( In hospital, refused PT but did sit up in wheelchair during day.) Wears Depends, but did get the Mylo Galeazzi system- not using yet, [facility] nurse has not been there yet to see her and demo how to use. Per sister,drinking fluids, ate yogurt and coffee for lunch. [address] to appointments.  Asked sister if she should have higher level of care, sister said no, that she has someone that lives at [facility] that takes care of her, says this lady(Iwona) is \"in and out \" of her apartment helping her off and on throughout the day. Suggested having [facility] see her at her home due to her nausea and vomiting- sister was receptive. Gave her the contact info, said she would call and request a visit. Advised to call me back if any difficulties arranging a visit for today or tomorrow at [address] called [facility] to inquire about starting care- spoke briefly to Anila Killian, clinical manager. She had called patient multiple times on Saturday, finally reached her. Patient said she would call them when and if she wanted them to come see her and per ALMAZ, hung up the phone. Toby Vázquez that I had just spoken to her sister and she was with Marixa Milligan and suggested she try calling sister, Redd Alvarado, to arrange. ALMAZ reports that they have cared for patient in past, was often non-compliant and frequently refused to let them visit her. Sister told ALMAZ that Marixa Milligan makes her own decisions. ALMAZ agreed with CTN that patient needs much higher level of care at this time. Toby Vázquez, will notify pcp, James Galicia, that patient is refusing Home Health and needs higher level of care. Family received hospital discharge instructions. CTN reviewed discharge instructions and red flags with family who verbalized understanding. Family given an opportunity to ask questions and does not have any further questions or concerns at this time. The family agrees to contact the PCP office for questions related to their healthcare. CTN provided contact information for future reference. Disease Specific:   CHF    Patients top risk factors for readmission:  Utilization of services-refused PT in hospital; declined [facility] services Saturday when they called; Functional physical and cognitive ability- Lives in [facility] [address] apt at [employer] sister, stays in bed.  Lives alone, has caretaker that lives in same apt complex, is in and out to help her. High risk for falls, not on anti-coagulant. Did not check weight this am, \"can't get out of bed per sister. \"     Home Health orders at discharge: SN/PT  [address]: [facility]  Date of initial visit: called Saturday, patient declined services. CTN advised HH to contact sister and discuss with her. Durable Medical Equipment ordered at discharge: none  [address] [facility] - [id number] received: na    Medication(s):   New Medications at Discharge: Theresa Q one qd, Levaquin 250mg one qd, Metoprolol tartrate 25mg 1/2 tab qd, Protonix 40mg qd, Venelex ointment as directed. Changed Medications at Discharge: Lasix 40mg qd, Spironolactone 25 mg 1/2 tab qd. Discontinued Medications at Discharge: Atenolol    Medication reconciliation was performed with family, who verbalizes understanding of administration of home medications. There were no barriers to obtaining medications identified at this time. Referral to Pharm D needed: no     Current Outpatient Medications   Medication Sig    furosemide (LASIX) 40 mg tablet Take 1 Tab by mouth daily.  spironolactone (ALDACTONE) 25 mg tablet Take 0.5 Tabs by mouth daily.  L. acidoph & paracasei- S therm- Bifido (THERESA-Q/RISAQUAD) 8 billion cell cap cap Take 1 Cap by mouth daily.  levoFLOXacin (LEVAQUIN) 250 mg tablet Take 1 Tab by mouth daily.  metoprolol tartrate (LOPRESSOR) 25 mg tablet Take 0.5 Tabs by mouth two (2) times a day.  pantoprazole (PROTONIX) 40 mg tablet Take 1 Tab by mouth Daily (before breakfast).  balsam peru-castor oiL (VENELEX) ointment As dir    loratadine (CLARITIN) 10 mg tablet Take 10 mg by mouth daily.     ALPRAZolam (XANAX) 0.5 mg tablet TAKE ONE TABLET BY MOUTH 3 TIMES A DAY AS NEEDED ANXIETY    levothyroxine (SYNTHROID) 100 mcg tablet TAKE ONE TABLET BY MOUTH EVERY MORNING BEFORE BREAKFAST    potassium chloride (K-DUR, KLOR-CON) 20 mEq tablet TAKE 1 TABLET BY MOUTH EVERY DAY     No current facility-administered medications for this visit. [facility] follow up appointment(s):   Future Appointments   Date Time Provider Frankie Arti   3/12/2020 11:00 AM Ezio Gutiérrez MD [facility] MIRI GUTIERREZ      Non-[facility] follow up appointment(s): Dr.Christopher Truong(cardio)- 3/13 12:00pm  [facility]:  information provided as a resource -encouraged sister to call today and request visit. Goals      Patient verbalizes understanding of self-management goals of living with Congestive Heart Failure      3/9/20 [facility] 2/22-3/6 CHF  · Reviewed discharge instructions with sister  · Reviewed meds with sister- education on new meds, but sister had great deal of trouble keeping meds straight. Caregiver puts in pillbox. · Reviewed red flags: sob,chest pain, weight gain of > 3 lb in 2 days or 5 lb in one week, fever,nausea,vomiting,diarrhea, swelling in extremities. · ADRI with pcp, Dr.Banks Preston, 3/12 at 11:00am  · Cardio, Dr.Christopher Ana Fink 3/13 at 12:00pm.  · Gave sister info on [facility]-sister to call to request visit. · Gave sister CTN contact info if any questions or concerns.   · Advised CTN to check back in 1 week for update,sooner prn.pina

## 2020-03-20 ENCOUNTER — APPOINTMENT (OUTPATIENT)
Dept: GENERAL RADIOLOGY | Age: 85
End: 2020-03-20
Attending: STUDENT IN AN ORGANIZED HEALTH CARE EDUCATION/TRAINING PROGRAM
Payer: MEDICARE

## 2020-03-20 ENCOUNTER — HOSPITAL ENCOUNTER (EMERGENCY)
Age: 85
Discharge: HOME | End: 2020-03-20
Attending: STUDENT IN AN ORGANIZED HEALTH CARE EDUCATION/TRAINING PROGRAM | Admitting: STUDENT IN AN ORGANIZED HEALTH CARE EDUCATION/TRAINING PROGRAM
Payer: MEDICARE

## 2020-03-20 ENCOUNTER — APPOINTMENT (OUTPATIENT)
Dept: CT IMAGING | Age: 85
End: 2020-03-20
Attending: STUDENT IN AN ORGANIZED HEALTH CARE EDUCATION/TRAINING PROGRAM
Payer: MEDICARE

## 2020-03-20 VITALS
WEIGHT: 138 LBS | OXYGEN SATURATION: 93 % | DIASTOLIC BLOOD PRESSURE: 50 MMHG | BODY MASS INDEX: 24.45 KG/M2 | RESPIRATION RATE: 16 BRPM | TEMPERATURE: 97.1 F | SYSTOLIC BLOOD PRESSURE: 108 MMHG | HEART RATE: 75 BPM

## 2020-03-20 DIAGNOSIS — R31.9 URINARY TRACT INFECTION WITH HEMATURIA, SITE UNSPECIFIED: ICD-10-CM

## 2020-03-20 DIAGNOSIS — R50.9 FEVER, UNSPECIFIED FEVER CAUSE: Primary | ICD-10-CM

## 2020-03-20 DIAGNOSIS — N39.0 URINARY TRACT INFECTION WITH HEMATURIA, SITE UNSPECIFIED: ICD-10-CM

## 2020-03-20 DIAGNOSIS — E87.6 HYPOKALEMIA: ICD-10-CM

## 2020-03-20 LAB
ALBUMIN SERPL-MCNC: 2.7 G/DL (ref 3.5–5)
ALBUMIN/GLOB SERPL: 0.6 {RATIO} (ref 1.1–2.2)
ALP SERPL-CCNC: 111 U/L (ref 45–117)
ALT SERPL-CCNC: 9 U/L (ref 12–78)
ANION GAP SERPL CALC-SCNC: 7 MMOL/L (ref 5–15)
APPEARANCE UR: ABNORMAL
AST SERPL-CCNC: 24 U/L (ref 15–37)
B PERT DNA SPEC QL NAA+PROBE: NOT DETECTED
BACTERIA URNS QL MICRO: NEGATIVE /HPF
BASOPHILS # BLD: 0.1 K/UL (ref 0–0.1)
BASOPHILS NFR BLD: 1 % (ref 0–1)
BILIRUB SERPL-MCNC: 2.9 MG/DL (ref 0.2–1)
BILIRUB UR QL CFM: POSITIVE
BORDETELLA PARAPERTUSSIS PCR, BORPAR: NOT DETECTED
BUN SERPL-MCNC: 24 MG/DL (ref 6–20)
BUN/CREAT SERPL: 36 (ref 12–20)
C PNEUM DNA SPEC QL NAA+PROBE: NOT DETECTED
CALCIUM SERPL-MCNC: 10.6 MG/DL (ref 8.5–10.1)
CAOX CRY URNS QL MICRO: ABNORMAL
CHLORIDE SERPL-SCNC: 90 MMOL/L (ref 97–108)
CO2 SERPL-SCNC: 37 MMOL/L (ref 21–32)
COLOR UR: ABNORMAL
CREAT SERPL-MCNC: 0.67 MG/DL (ref 0.55–1.02)
DIFFERENTIAL METHOD BLD: ABNORMAL
EOSINOPHIL # BLD: 0.2 K/UL (ref 0–0.4)
EOSINOPHIL NFR BLD: 2 % (ref 0–7)
EPITH CASTS URNS QL MICRO: ABNORMAL /LPF
ERYTHROCYTE [DISTWIDTH] IN BLOOD BY AUTOMATED COUNT: 14.1 % (ref 11.5–14.5)
FLUAV AG NPH QL IA: NEGATIVE
FLUAV H1 2009 PAND RNA SPEC QL NAA+PROBE: NOT DETECTED
FLUAV H1 RNA SPEC QL NAA+PROBE: NOT DETECTED
FLUAV H3 RNA SPEC QL NAA+PROBE: NOT DETECTED
FLUAV SUBTYP SPEC NAA+PROBE: NOT DETECTED
FLUBV AG NOSE QL IA: NEGATIVE
FLUBV RNA SPEC QL NAA+PROBE: NOT DETECTED
GLOBULIN SER CALC-MCNC: 4.9 G/DL (ref 2–4)
GLUCOSE SERPL-MCNC: 94 MG/DL (ref 65–100)
GLUCOSE UR STRIP.AUTO-MCNC: NEGATIVE MG/DL
HADV DNA SPEC QL NAA+PROBE: NOT DETECTED
HCOV 229E RNA SPEC QL NAA+PROBE: NOT DETECTED
HCOV HKU1 RNA SPEC QL NAA+PROBE: NOT DETECTED
HCOV NL63 RNA SPEC QL NAA+PROBE: NOT DETECTED
HCOV OC43 RNA SPEC QL NAA+PROBE: NOT DETECTED
HCT VFR BLD AUTO: 45.8 % (ref 35–47)
HGB BLD-MCNC: 14.3 G/DL (ref 11.5–16)
HGB UR QL STRIP: ABNORMAL
HMPV RNA SPEC QL NAA+PROBE: NOT DETECTED
HPIV1 RNA SPEC QL NAA+PROBE: NOT DETECTED
HPIV2 RNA SPEC QL NAA+PROBE: NOT DETECTED
HPIV3 RNA SPEC QL NAA+PROBE: NOT DETECTED
HPIV4 RNA SPEC QL NAA+PROBE: NOT DETECTED
IMM GRANULOCYTES # BLD AUTO: 0.1 K/UL (ref 0–0.04)
IMM GRANULOCYTES NFR BLD AUTO: 1 % (ref 0–0.5)
KETONES UR QL STRIP.AUTO: NEGATIVE MG/DL
LACTATE SERPL-SCNC: 1.6 MMOL/L (ref 0.4–2)
LEUKOCYTE ESTERASE UR QL STRIP.AUTO: ABNORMAL
LYMPHOCYTES # BLD: 0.8 K/UL (ref 0.8–3.5)
LYMPHOCYTES NFR BLD: 8 % (ref 12–49)
M PNEUMO DNA SPEC QL NAA+PROBE: NOT DETECTED
MCH RBC QN AUTO: 29.5 PG (ref 26–34)
MCHC RBC AUTO-ENTMCNC: 31.2 G/DL (ref 30–36.5)
MCV RBC AUTO: 94.4 FL (ref 80–99)
MONOCYTES # BLD: 1.1 K/UL (ref 0–1)
MONOCYTES NFR BLD: 11 % (ref 5–13)
NEUTS SEG # BLD: 7.6 K/UL (ref 1.8–8)
NEUTS SEG NFR BLD: 77 % (ref 32–75)
NITRITE UR QL STRIP.AUTO: POSITIVE
NRBC # BLD: 0 K/UL (ref 0–0.01)
NRBC BLD-RTO: 0 PER 100 WBC
PH UR STRIP: 5.5 [PH] (ref 5–8)
PLATELET # BLD AUTO: 283 K/UL (ref 150–400)
PLATELET COMMENTS,PCOM: ABNORMAL
POTASSIUM SERPL-SCNC: 2.8 MMOL/L (ref 3.5–5.1)
PROT SERPL-MCNC: 7.6 G/DL (ref 6.4–8.2)
PROT UR STRIP-MCNC: 100 MG/DL
RBC # BLD AUTO: 4.85 M/UL (ref 3.8–5.2)
RBC #/AREA URNS HPF: ABNORMAL /HPF (ref 0–5)
RBC MORPH BLD: ABNORMAL
RSV RNA SPEC QL NAA+PROBE: NOT DETECTED
RV+EV RNA SPEC QL NAA+PROBE: NOT DETECTED
SODIUM SERPL-SCNC: 134 MMOL/L (ref 136–145)
SP GR UR REFRACTOMETRY: 1.02 (ref 1–1.03)
UR CULT HOLD, URHOLD: NORMAL
UROBILINOGEN UR QL STRIP.AUTO: 2 EU/DL (ref 0.2–1)
WBC # BLD AUTO: 9.9 K/UL (ref 3.6–11)
WBC URNS QL MICRO: ABNORMAL /HPF (ref 0–4)

## 2020-03-20 PROCEDURE — 81001 URINALYSIS AUTO W/SCOPE: CPT

## 2020-03-20 PROCEDURE — 74011000258 HC RX REV CODE- 258: Performed by: STUDENT IN AN ORGANIZED HEALTH CARE EDUCATION/TRAINING PROGRAM

## 2020-03-20 PROCEDURE — 74177 CT ABD & PELVIS W/CONTRAST: CPT

## 2020-03-20 PROCEDURE — 71045 X-RAY EXAM CHEST 1 VIEW: CPT

## 2020-03-20 PROCEDURE — 96365 THER/PROPH/DIAG IV INF INIT: CPT

## 2020-03-20 PROCEDURE — 83605 ASSAY OF LACTIC ACID: CPT

## 2020-03-20 PROCEDURE — 74011250637 HC RX REV CODE- 250/637: Performed by: STUDENT IN AN ORGANIZED HEALTH CARE EDUCATION/TRAINING PROGRAM

## 2020-03-20 PROCEDURE — 80053 COMPREHEN METABOLIC PANEL: CPT

## 2020-03-20 PROCEDURE — 85025 COMPLETE CBC W/AUTO DIFF WBC: CPT

## 2020-03-20 PROCEDURE — 96361 HYDRATE IV INFUSION ADD-ON: CPT

## 2020-03-20 PROCEDURE — 0100U RESPIRATORY PANEL,PCR,NASOPHARYNGEAL: CPT

## 2020-03-20 PROCEDURE — 36415 COLL VENOUS BLD VENIPUNCTURE: CPT

## 2020-03-20 PROCEDURE — 87086 URINE CULTURE/COLONY COUNT: CPT

## 2020-03-20 PROCEDURE — 74011636320 HC RX REV CODE- 636/320: Performed by: RADIOLOGY

## 2020-03-20 PROCEDURE — 74011000258 HC RX REV CODE- 258: Performed by: RADIOLOGY

## 2020-03-20 PROCEDURE — 87040 BLOOD CULTURE FOR BACTERIA: CPT

## 2020-03-20 PROCEDURE — 93005 ELECTROCARDIOGRAM TRACING: CPT

## 2020-03-20 PROCEDURE — 99285 EMERGENCY DEPT VISIT HI MDM: CPT

## 2020-03-20 PROCEDURE — 74011250636 HC RX REV CODE- 250/636: Performed by: STUDENT IN AN ORGANIZED HEALTH CARE EDUCATION/TRAINING PROGRAM

## 2020-03-20 PROCEDURE — 87804 INFLUENZA ASSAY W/OPTIC: CPT

## 2020-03-20 PROCEDURE — P9612 CATHETERIZE FOR URINE SPEC: HCPCS

## 2020-03-20 RX ORDER — CEPHALEXIN 500 MG/1
500 CAPSULE ORAL 3 TIMES DAILY
Qty: 30 CAP | Refills: 0 | Status: SHIPPED | OUTPATIENT
Start: 2020-03-20 | End: 2020-03-30

## 2020-03-20 RX ORDER — SODIUM CHLORIDE 0.9 % (FLUSH) 0.9 %
10 SYRINGE (ML) INJECTION
Status: COMPLETED | OUTPATIENT
Start: 2020-03-20 | End: 2020-03-20

## 2020-03-20 RX ORDER — ACETAMINOPHEN 325 MG/1
650 TABLET ORAL
Status: COMPLETED | OUTPATIENT
Start: 2020-03-20 | End: 2020-03-20

## 2020-03-20 RX ORDER — SODIUM CHLORIDE 0.9 % (FLUSH) 0.9 %
5-10 SYRINGE (ML) INJECTION AS NEEDED
Status: DISCONTINUED | OUTPATIENT
Start: 2020-03-20 | End: 2020-03-21 | Stop reason: HOSPADM

## 2020-03-20 RX ADMIN — IOPAMIDOL 100 ML: 755 INJECTION, SOLUTION INTRAVENOUS at 18:50

## 2020-03-20 RX ADMIN — POTASSIUM BICARBONATE 40 MEQ: 782 TABLET, EFFERVESCENT ORAL at 18:25

## 2020-03-20 RX ADMIN — SODIUM CHLORIDE 100 ML: 900 INJECTION, SOLUTION INTRAVENOUS at 18:50

## 2020-03-20 RX ADMIN — SODIUM CHLORIDE 1000 ML: 900 INJECTION, SOLUTION INTRAVENOUS at 14:43

## 2020-03-20 RX ADMIN — SODIUM CHLORIDE 572 ML: 900 INJECTION, SOLUTION INTRAVENOUS at 18:25

## 2020-03-20 RX ADMIN — CEFTRIAXONE 1 G: 1 INJECTION, POWDER, FOR SOLUTION INTRAMUSCULAR; INTRAVENOUS at 20:05

## 2020-03-20 RX ADMIN — Medication 10 ML: at 18:50

## 2020-03-20 RX ADMIN — ACETAMINOPHEN 650 MG: 325 TABLET ORAL at 15:19

## 2020-03-20 NOTE — ED TRIAGE NOTES
Pt arrives by EMS from [facility] with reports of bilateral knee pain, fever of 101 per staff, upon arrival pt is alert and oriented to person and place, per EMS pt lives in the independent living however had home health with her today

## 2020-03-20 NOTE — PROGRESS NOTES
Oral contrast was given to patient to begin drinking for CT scan. The patient will be scanned 1.5 hours after the start of drinking of oral contrast.  RN was unavailable to be updated on oral contrast.  Patient was given instructions to drink oral contrast without ice or straw.

## 2020-03-20 NOTE — ED PROVIDER NOTES
49-year-old woman with history of back pain, dementia, anxiety, A. fib not on anticoagulation presenting with fever, T-max 101. Patient denies any new symptoms such as cough, abdominal pain, although she does state that she has some dysuria recently, onset unclear. She also planes of bilateral knee pain which is chronic complaint for several years. Denies GI bleeding. No nausea or vomiting.            Past Medical History:   Diagnosis Date    A-fib [facility])     Arrhythmia     A FIB    Arthritis     Back pain     CAD (coronary artery disease)     PT DENIES    Chronic pain     Dementia ([facility] [address] 2016    Hypercholesterolemia     Psychiatric disorder     ANXIETY    Shoulder pain     Thyroid disease     Tremor, essential        Past Surgical History:   Procedure Laterality Date    HX APPENDECTOMY      HX ORTHOPAEDIC      left knee replacement    HX KADIE AND BSO      AGE 39    HX TONSIL AND ADENOIDECTOMY      IR KYPHOPLASTY LUMBAR  2019         Family History:   Problem Relation Age of Onset    Dementia Mother     Arthritis-osteo Father        Social History     Socioeconomic History    Marital status:      Spouse name: Not on file    Number of children: Not on file    Years of education: Not on file    Highest education level: Not on file   Occupational History    Not on file   Social Needs    Financial resource strain: Not on file    Food insecurity     Worry: Not on file     Inability: Not on file    Transportation needs     Medical: Not on file     Non-medical: Not on file   Tobacco Use    Smoking status: Former Smoker     Packs/day: 0.50     Years: 10.00     Pack years: 5.00     Last attempt to quit: 1994     Years since quittin.2    Smokeless tobacco: Never Used   Substance and Sexual Activity    Alcohol use: Yes     Comment: NIGHTLY 2 GLASSES WINE    Drug use: No    Sexual activity: Not on file   Lifestyle    Physical activity     Days per week: Not on file Minutes per session: Not on file    Stress: Not on file   Relationships    Social connections     Talks on phone: Not on file     Gets together: Not on file     Attends Rastafarian service: Not on file     Active member of club or organization: Not on file     Attends meetings of clubs or organizations: Not on file     Relationship status: Not on file    Intimate partner violence     Fear of current or ex partner: Not on file     Emotionally abused: Not on file     Physically abused: Not on file     Forced sexual activity: Not on file   Other Topics Concern    Not on file   Social History Narrative    Not on file         ALLERGIES: Latex, natural rubber; Codeine; Adhesive; and Cortisone    Review of Systems   Constitutional: Negative for chills and fever. Eyes: Negative for photophobia. Respiratory: Negative for shortness of breath. Cardiovascular: Negative for chest pain and palpitations. Gastrointestinal: Negative for abdominal pain. Genitourinary: Positive for dysuria and frequency. Negative for flank pain, pelvic pain and urgency. Musculoskeletal: Negative for back pain. Neurological: Negative for headaches. Psychiatric/Behavioral: Negative for confusion. All other systems reviewed and are negative. Vitals:    03/20/20 1358   BP: 122/66   Pulse: 93   Resp: 18   Temp: 98.3 °F (36.8 °C)   SpO2: 91%   Weight: 62.6 kg (138 lb)            Physical Exam  Vitals signs reviewed. Constitutional:       General: She is not in acute distress. HENT:      Head: Normocephalic and atraumatic. Mouth/Throat:      Mouth: Mucous membranes are moist.      Pharynx: Oropharynx is clear. Cardiovascular:      Rate and Rhythm: Normal rate and regular rhythm. Heart sounds: Normal heart sounds. Pulmonary:      Effort: Pulmonary effort is normal.      Breath sounds: Normal breath sounds. Abdominal:      Tenderness: There is no abdominal tenderness. There is no guarding or rebound. Musculoskeletal: Normal range of motion. Skin:     General: Skin is warm and dry. Capillary Refill: Capillary refill takes less than 2 seconds. Neurological:      General: No focal deficit present. Mental Status: She is alert and oriented to person, place, and time. Psychiatric:         Mood and Affect: Mood normal.          MDM  Number of Diagnoses or Management Options  Fever, unspecified fever cause:   Hypokalemia:   Urinary tract infection with hematuria, site unspecified:   Diagnosis management comments: Patient presenting with fever, no localizing symptoms. Has had urinary tract infections in the past.  Discussed with Dr. Jacki Cloud, her primary care physician. Only abnormal lab is her potassium which was repleted. She has not had any emesis or focal symptoms while in the ED during observation. Was rehydrated with IV. He recommends starting an antibiotic given recurrent UTI, likely Keflex in this case. Likely for discharge. No signs or symptoms of sepsis. 6:35 PM   03/20/20  Juanita Crabtree MD   _____________________________          Procedures            5/28/2019  Echocardiogram  Result status: Final result   · Pericardium: There is a moderate left pleural effusion. · Tricuspid Valve: Mild to moderate tricuspid valve regurgitation is present. · Right Ventricle: Moderately dilated right ventricle. Mildly reduced systolic function. · Left Atrium: Moderately dilated left atrium. · Left Ventricle: Mildly to moderately increased wall thickness. Estimated left ventricular ejection fraction is 56 - 60%. Abnormal left ventricular septal motion. Systolic flattening of the interventricular septum consistent with right ventricle pressure overload. Interventricular septal \"bounce\". Inconclusive left ventricular diastolic function. · Pulmonary Artery: Moderate to severe pulmonary hypertension. · Aortic Valve: Mild aortic valve regurgitation is present.            EKG: March 20 2020  Atrial fibrillation, ventricular rate 92, PVCs noted, right axis deviation, lateral T wave inversions similar to previous, right bundle branch, unchanged from prior.   Compared to EKG from February 25, 2020 unchanged

## 2020-03-20 NOTE — DISCHARGE INSTRUCTIONS
Ms Ronald Patterson should have her potassium rechecked within the next 2 to 3 days. Also it is important to continue her antibiotic until she sees her primary care physician. If she has low blood pressure, change in mental status, weakness or any new concerning symptoms please return to the emergency department.

## 2020-03-21 LAB
ATRIAL RATE: 111 BPM
BACTERIA SPEC CULT: NORMAL
CALCULATED R AXIS, ECG10: 98 DEGREES
CALCULATED T AXIS, ECG11: -81 DEGREES
DIAGNOSIS, 93000: NORMAL
Q-T INTERVAL, ECG07: 410 MS
QRS DURATION, ECG06: 132 MS
QTC CALCULATION (BEZET), ECG08: 507 MS
SERVICE CMNT-IMP: NORMAL
VENTRICULAR RATE, ECG03: 92 BPM

## 2020-03-21 NOTE — ED NOTES
11:38 AM  Attempted to call patient 4 times regarding 1/4 GPC in blood culture result. No name on voicemail and it is full. I discussed with Dr Shaniqua Vazquez who happened to be in the ED. Patient is known not to answer phone. She was sent home on keflex. Likely contaminant but would like to come back for re-culture and dose of IV antibiotics. Will follow culture.

## 2020-03-21 NOTE — ED NOTES
MD reviewed discharge instructions and options with patient and patient verbalized understanding. RN reviewed discharge instructions using teachback method. Pt ambulated to exit without difficulty and in no signs of acute distress escorted by [facility], and the ambulance  will drive home. No complaints or needs expressed at this time. Patient was counseled on medications prescribed at discharge. VSS, verbalized relief from most intense pain. Patient to call her PCP in the morning for appointment.

## 2020-03-21 NOTE — PROGRESS NOTES
See Dr. Taisha Nicolas note from this morning    Attempted to reach patient.  Mailbox full, unable to leave message

## 2020-03-21 NOTE — ED NOTES
Verbal shift change report given to Caitlyn Augustine, [facility] (oncoming nurse) by [address] RN (offgoing nurse). Report included the following information SBAR, ED Summary, Intake/Output, MAR and Recent Results.

## 2020-03-22 LAB
BACTERIA SPEC CULT: ABNORMAL
BACTERIA SPEC CULT: ABNORMAL
SERVICE CMNT-IMP: ABNORMAL

## 2020-03-23 ENCOUNTER — PATIENT OUTREACH (OUTPATIENT)
Dept: FAMILY MEDICINE CLINIC | Age: 85
End: 2020-03-23

## 2020-03-23 NOTE — PROGRESS NOTES
COVID-19 Screening Initial Follow-up Note    Patient contacted regarding COVID-19  risk. Care Transition Nurse/ Ambulatory Care Manager contacted the pt's sister Risa Oconnell & pt's CG Priscilla Claudia by telephone to perform post discharge assessment. Verified name and  with pt's sister Risa Oconnell & pt's CG Priscilla Taylor as identifiers. Provided introduction to self, and explanation of the CTN/ACM role, and reason for call due to risk factors for infection and/or exposure to COVID-19. Symptoms reviewed with pt's sister Risa Oconnell & pt's CG Priscilla Javid who verbalized the following symptoms: no new/worsening symptoms       Due to no new or worsening symptoms encounter was not routed to provider for escalation. Patient has following risk factors of: Dementia, A-Fib, heart failure. CTN/ACM reviewed discharge instructions, medical action plan and red flags such as increased shortness of breath, increasing fever and signs of decompensation with pt's HOUSTON Bustillo who verbalized understanding. Discussed exposure protocols and quarantine with CDC Guidelines What to do if you are sick with coronavirus disease 2019 CG Iwona who was given an opportunity for questions and concerns. The CG Iwona agrees to contact the [facility] exposure line [phone], local [facility] department [facility]  [phone] and PCP office for questions related to their healthcare. CTN/ACM provided contact information for future reference. Reviewed and educated HOUSTON Bustillo on any new and changed medications related to discharge diagnosis. CG reported Rx for ABX brought to [facility] today. Will be ready for p/u 3/24/20 morning. During conversation w/ CG her main concern was pt's \"3-4 bedsores on her butt that I can't take care of\". CG reported had spoken w/ pt's PCP who is trying to get pt admitted to [facility] complex. CTN kept having to redirect CG to purpose of call re COVID19.  CTN noted PCP's Documentation Only Encounter today re need for NH.     Plan for follow-up call in 14 days based on severity of symptoms and risk factors

## 2020-03-25 LAB
BACTERIA SPEC CULT: NORMAL
SERVICE CMNT-IMP: NORMAL

## 2020-04-07 ENCOUNTER — PATIENT OUTREACH (OUTPATIENT)
Dept: FAMILY MEDICINE CLINIC | Age: 85
End: 2020-04-07

## 2020-04-07 NOTE — PROGRESS NOTES
Patient resolved from Transition of Care episode on 4/7/20    Patient/family has been provided the following resources and education related to COVID-19:                         Signs, symptoms and red flags related to COVID-19            CDC exposure and quarantine guidelines            [facility] exposure contact - [phone]            Contact for their local Department of Health                 Patient currently reports that the following symptoms have improved:  no new/worsening symptoms     No further outreach scheduled with this CTN/ACM. Episode of Care resolved. Patient has this CTN/ACM contact information if future needs arise. NANCY Parks reported pt remains @ [facility]. Wasn't able to transition to [facility] d/t COVID-19 pandemic. Pt's CG also resides @ [facility] has been caring for pt's \"sores\". Pharmacy provided a\"a salve which has helped. They're healing\". NANCY reported spoke w/ pt on phone a couple of days ago Melisa Olivas was doing ok\". Chart routed to [address] to inform.

## 2020-04-16 ENCOUNTER — PATIENT OUTREACH (OUTPATIENT)
Dept: FAMILY MEDICINE CLINIC | Age: 85
End: 2020-04-16

## 2020-04-16 NOTE — Clinical Note
Caregiver will call EMS in am and send [facility]. Needs SNF upon discharge. Please discuss with her sister, Franci Fields.  Thanks, Silvina Galan

## 2020-04-16 NOTE — PROGRESS NOTES
Called patient,  for her to call me back. Called sister, Jada Finnegan, ANA ROSA and Agent for her ACP. Mrs. Rick Zapata said she has been so worried about her sister, has a caretaker that lives at [facility] and is with her off and on. Says caretaker, Terence Courtney, reports that she has a \"hole\" on her backside and has been using cream, but not helping. Patient currently in [place] [address] at [facility]. Called caretaker, Dmitry Gutierrez [phone]) and spoke to her. Reports she has a very large pressure ulcer on her buttocks, has been packing it, but continues to drain and is very concerned. Said Ms. Lito Sandoval had agreed to go to the hospital for treatment. Terence Rosalestavia will call the ambulance in the morning to come and get her, take [facility] (,pcp, sees patients there). Terence Connietavia will notify me once patient has left for the hospital.   CTN notified Dr.Banks Preston's office, ph [phone], of the plan. He will follow her there. Notified sister as well, advised her to discuss with  at hospital, need for her to go to SNF ([place] [facility] once discharged since unable to care for herself. Sister agrees with cora Herrera and Ms. Rick Zapata my number to call if any questions/concerns.

## 2020-04-17 ENCOUNTER — APPOINTMENT (OUTPATIENT)
Dept: GENERAL RADIOLOGY | Age: 85
DRG: 308 | End: 2020-04-17
Attending: EMERGENCY MEDICINE
Payer: MEDICARE

## 2020-04-17 ENCOUNTER — HOSPITAL ENCOUNTER (INPATIENT)
Age: 85
LOS: 5 days | Discharge: NURSING, REHAB OR LONG-TERM CARE | DRG: 308 | End: 2020-04-22
Attending: EMERGENCY MEDICINE | Admitting: FAMILY MEDICINE
Payer: MEDICARE

## 2020-04-17 ENCOUNTER — APPOINTMENT (OUTPATIENT)
Dept: VASCULAR SURGERY | Age: 85
DRG: 308 | End: 2020-04-17
Attending: EMERGENCY MEDICINE
Payer: MEDICARE

## 2020-04-17 DIAGNOSIS — L89.139 PRESSURE INJURY OF SKIN OF RIGHT LOWER BACK, UNSPECIFIED INJURY STAGE: ICD-10-CM

## 2020-04-17 DIAGNOSIS — L89.129: ICD-10-CM

## 2020-04-17 DIAGNOSIS — R60.0 LOWER EXTREMITY EDEMA: ICD-10-CM

## 2020-04-17 DIAGNOSIS — I48.91 ATRIAL FIBRILLATION WITH RAPID VENTRICULAR RESPONSE (HCC): Primary | ICD-10-CM

## 2020-04-17 PROBLEM — L89.109: Status: ACTIVE | Noted: 2020-04-17

## 2020-04-17 PROBLEM — L89.40 PRESSURE INJURY OF SKIN OF CONTIGUOUS REGION INVOLVING BACK AND BUTTOCK: Status: ACTIVE | Noted: 2020-04-17

## 2020-04-17 LAB
ALBUMIN SERPL-MCNC: 2.4 G/DL (ref 3.5–5)
ALBUMIN/GLOB SERPL: 0.5 {RATIO} (ref 1.1–2.2)
ALP SERPL-CCNC: 139 U/L (ref 45–117)
ALT SERPL-CCNC: 7 U/L (ref 12–78)
ANION GAP SERPL CALC-SCNC: 6 MMOL/L (ref 5–15)
APTT PPP: 27.9 SEC (ref 22.1–32)
AST SERPL-CCNC: 16 U/L (ref 15–37)
ATRIAL RATE: 111 BPM
BASOPHILS # BLD: 0.1 K/UL (ref 0–0.1)
BASOPHILS NFR BLD: 1 % (ref 0–1)
BILIRUB SERPL-MCNC: 1.2 MG/DL (ref 0.2–1)
BNP SERPL-MCNC: 2943 PG/ML
BUN SERPL-MCNC: 12 MG/DL (ref 6–20)
BUN/CREAT SERPL: 21 (ref 12–20)
CALCIUM SERPL-MCNC: 10.2 MG/DL (ref 8.5–10.1)
CALCULATED R AXIS, ECG10: 82 DEGREES
CALCULATED T AXIS, ECG11: -82 DEGREES
CHLORIDE SERPL-SCNC: 98 MMOL/L (ref 97–108)
CO2 SERPL-SCNC: 30 MMOL/L (ref 21–32)
COMMENT, HOLDF: NORMAL
CREAT SERPL-MCNC: 0.57 MG/DL (ref 0.55–1.02)
DIAGNOSIS, 93000: NORMAL
DIFFERENTIAL METHOD BLD: ABNORMAL
EOSINOPHIL # BLD: 0.2 K/UL (ref 0–0.4)
EOSINOPHIL NFR BLD: 2 % (ref 0–7)
ERYTHROCYTE [DISTWIDTH] IN BLOOD BY AUTOMATED COUNT: 14.8 % (ref 11.5–14.5)
GLOBULIN SER CALC-MCNC: 4.8 G/DL (ref 2–4)
GLUCOSE SERPL-MCNC: 101 MG/DL (ref 65–100)
HCT VFR BLD AUTO: 41.9 % (ref 35–47)
HGB BLD-MCNC: 13.2 G/DL (ref 11.5–16)
IMM GRANULOCYTES # BLD AUTO: 0 K/UL (ref 0–0.04)
IMM GRANULOCYTES NFR BLD AUTO: 0 % (ref 0–0.5)
INR PPP: 1.1 (ref 0.9–1.1)
LYMPHOCYTES # BLD: 0.9 K/UL (ref 0.8–3.5)
LYMPHOCYTES NFR BLD: 13 % (ref 12–49)
MCH RBC QN AUTO: 29.4 PG (ref 26–34)
MCHC RBC AUTO-ENTMCNC: 31.5 G/DL (ref 30–36.5)
MCV RBC AUTO: 93.3 FL (ref 80–99)
MONOCYTES # BLD: 0.6 K/UL (ref 0–1)
MONOCYTES NFR BLD: 9 % (ref 5–13)
NEUTS SEG # BLD: 5.3 K/UL (ref 1.8–8)
NEUTS SEG NFR BLD: 75 % (ref 32–75)
NRBC # BLD: 0 K/UL (ref 0–0.01)
NRBC BLD-RTO: 0 PER 100 WBC
PLATELET # BLD AUTO: 252 K/UL (ref 150–400)
PMV BLD AUTO: 11.5 FL (ref 8.9–12.9)
POTASSIUM SERPL-SCNC: 3.1 MMOL/L (ref 3.5–5.1)
PROT SERPL-MCNC: 7.2 G/DL (ref 6.4–8.2)
PROTHROMBIN TIME: 11.7 SEC (ref 9–11.1)
Q-T INTERVAL, ECG07: 302 MS
QRS DURATION, ECG06: 128 MS
QTC CALCULATION (BEZET), ECG08: 412 MS
RBC # BLD AUTO: 4.49 M/UL (ref 3.8–5.2)
SAMPLES BEING HELD,HOLD: NORMAL
SODIUM SERPL-SCNC: 134 MMOL/L (ref 136–145)
THERAPEUTIC RANGE,PTTT: NORMAL SECS (ref 58–77)
TROPONIN I SERPL-MCNC: <0.05 NG/ML
VENTRICULAR RATE, ECG03: 112 BPM
WBC # BLD AUTO: 7.1 K/UL (ref 3.6–11)

## 2020-04-17 PROCEDURE — 85025 COMPLETE CBC W/AUTO DIFF WBC: CPT

## 2020-04-17 PROCEDURE — 74011250637 HC RX REV CODE- 250/637: Performed by: INTERNAL MEDICINE

## 2020-04-17 PROCEDURE — 74011250636 HC RX REV CODE- 250/636: Performed by: INTERNAL MEDICINE

## 2020-04-17 PROCEDURE — 93005 ELECTROCARDIOGRAM TRACING: CPT

## 2020-04-17 PROCEDURE — 85730 THROMBOPLASTIN TIME PARTIAL: CPT

## 2020-04-17 PROCEDURE — 80053 COMPREHEN METABOLIC PANEL: CPT

## 2020-04-17 PROCEDURE — 71045 X-RAY EXAM CHEST 1 VIEW: CPT

## 2020-04-17 PROCEDURE — 93971 EXTREMITY STUDY: CPT

## 2020-04-17 PROCEDURE — 84484 ASSAY OF TROPONIN QUANT: CPT

## 2020-04-17 PROCEDURE — 83880 ASSAY OF NATRIURETIC PEPTIDE: CPT

## 2020-04-17 PROCEDURE — 99285 EMERGENCY DEPT VISIT HI MDM: CPT

## 2020-04-17 PROCEDURE — 85610 PROTHROMBIN TIME: CPT

## 2020-04-17 PROCEDURE — 74011250637 HC RX REV CODE- 250/637: Performed by: FAMILY MEDICINE

## 2020-04-17 PROCEDURE — 65660000000 HC RM CCU STEPDOWN

## 2020-04-17 RX ORDER — ACETAMINOPHEN 325 MG/1
650 TABLET ORAL
Status: DISCONTINUED | OUTPATIENT
Start: 2020-04-17 | End: 2020-04-22 | Stop reason: HOSPADM

## 2020-04-17 RX ORDER — SPIRONOLACTONE 25 MG/1
12.5 TABLET ORAL DAILY
Status: DISCONTINUED | OUTPATIENT
Start: 2020-04-18 | End: 2020-04-22 | Stop reason: HOSPADM

## 2020-04-17 RX ORDER — ONDANSETRON 2 MG/ML
4 INJECTION INTRAMUSCULAR; INTRAVENOUS
Status: DISCONTINUED | OUTPATIENT
Start: 2020-04-17 | End: 2020-04-22 | Stop reason: HOSPADM

## 2020-04-17 RX ORDER — ENOXAPARIN SODIUM 100 MG/ML
40 INJECTION SUBCUTANEOUS EVERY 24 HOURS
Status: DISCONTINUED | OUTPATIENT
Start: 2020-04-17 | End: 2020-04-22 | Stop reason: HOSPADM

## 2020-04-17 RX ORDER — ALPRAZOLAM 0.5 MG/1
0.5 TABLET ORAL
Status: DISCONTINUED | OUTPATIENT
Start: 2020-04-17 | End: 2020-04-22 | Stop reason: HOSPADM

## 2020-04-17 RX ORDER — POTASSIUM CHLORIDE 750 MG/1
40 TABLET, FILM COATED, EXTENDED RELEASE ORAL
Status: COMPLETED | OUTPATIENT
Start: 2020-04-17 | End: 2020-04-17

## 2020-04-17 RX ORDER — FUROSEMIDE 40 MG/1
40 TABLET ORAL 2 TIMES DAILY
Status: DISCONTINUED | OUTPATIENT
Start: 2020-04-17 | End: 2020-04-22 | Stop reason: HOSPADM

## 2020-04-17 RX ORDER — METOPROLOL TARTRATE 25 MG/1
25 TABLET, FILM COATED ORAL 2 TIMES DAILY
Status: DISCONTINUED | OUTPATIENT
Start: 2020-04-17 | End: 2020-04-22 | Stop reason: HOSPADM

## 2020-04-17 RX ORDER — LORATADINE 10 MG/1
10 TABLET ORAL DAILY
Status: DISCONTINUED | OUTPATIENT
Start: 2020-04-18 | End: 2020-04-22 | Stop reason: HOSPADM

## 2020-04-17 RX ORDER — PANTOPRAZOLE SODIUM 40 MG/1
40 TABLET, DELAYED RELEASE ORAL
Status: DISCONTINUED | OUTPATIENT
Start: 2020-04-18 | End: 2020-04-22 | Stop reason: HOSPADM

## 2020-04-17 RX ORDER — POTASSIUM CHLORIDE 750 MG/1
20 TABLET, FILM COATED, EXTENDED RELEASE ORAL DAILY
Status: DISCONTINUED | OUTPATIENT
Start: 2020-04-18 | End: 2020-04-22 | Stop reason: HOSPADM

## 2020-04-17 RX ORDER — TRAMADOL HYDROCHLORIDE 50 MG/1
50 TABLET ORAL
Status: DISCONTINUED | OUTPATIENT
Start: 2020-04-17 | End: 2020-04-22 | Stop reason: HOSPADM

## 2020-04-17 RX ORDER — LEVOTHYROXINE SODIUM 100 UG/1
100 TABLET ORAL DAILY
Status: DISCONTINUED | OUTPATIENT
Start: 2020-04-18 | End: 2020-04-22 | Stop reason: HOSPADM

## 2020-04-17 RX ADMIN — TRAMADOL HYDROCHLORIDE 50 MG: 50 TABLET, FILM COATED ORAL at 17:45

## 2020-04-17 RX ADMIN — POTASSIUM CHLORIDE 40 MEQ: 750 TABLET, FILM COATED, EXTENDED RELEASE ORAL at 17:43

## 2020-04-17 RX ADMIN — METOPROLOL TARTRATE 25 MG: 25 TABLET, FILM COATED ORAL at 17:46

## 2020-04-17 RX ADMIN — ENOXAPARIN SODIUM 40 MG: 40 INJECTION SUBCUTANEOUS at 14:58

## 2020-04-17 NOTE — ED PROVIDER NOTES
51-year-old female arrives from [facility] via EMS with a chief complaint of leg pain and swelling. Patient also with wounds on her back. Patient is nonambulatory. She complains of increased swelling to her lower extremities with pain in her right leg. He denies any falls or injuries. She denies any cough, shortness of breath, chest pain, fevers. Past medical history significant for atrial fibrillation, arthritis, CAD, chronic pain, dementia.            Past Medical History:   Diagnosis Date    A-fib [facility])     Arrhythmia     A FIB    Arthritis     Back pain     CAD (coronary artery disease)     PT DENIES    Chronic pain     Dementia ([facility] [address] 2016    Hypercholesterolemia     Psychiatric disorder     ANXIETY    Shoulder pain     Thyroid disease     Tremor, essential        Past Surgical History:   Procedure Laterality Date    HX APPENDECTOMY      HX ORTHOPAEDIC      left knee replacement    HX KADIE AND BSO      AGE 39    HX TONSIL AND ADENOIDECTOMY      IR KYPHOPLASTY LUMBAR  2019         Family History:   Problem Relation Age of Onset    Dementia Mother     Arthritis-osteo Father        Social History     Socioeconomic History    Marital status:      Spouse name: Not on file    Number of children: Not on file    Years of education: Not on file    Highest education level: Not on file   Occupational History    Not on file   Social Needs    Financial resource strain: Not on file    Food insecurity     Worry: Not on file     Inability: Not on file    Transportation needs     Medical: Not on file     Non-medical: Not on file   Tobacco Use    Smoking status: Former Smoker     Packs/day: 0.50     Years: 10.00     Pack years: 5.00     Last attempt to quit: 1994     Years since quittin.2    Smokeless tobacco: Never Used   Substance and Sexual Activity    Alcohol use: Yes     Comment: NIGHTLY 2 GLASSES WINE    Drug use: No    Sexual activity: Not on file   Lifestyle  Physical activity     Days per week: Not on file     Minutes per session: Not on file    Stress: Not on file   Relationships    Social connections     Talks on phone: Not on file     Gets together: Not on file     Attends Confucianist service: Not on file     Active member of club or organization: Not on file     Attends meetings of clubs or organizations: Not on file     Relationship status: Not on file    Intimate partner violence     Fear of current or ex partner: Not on file     Emotionally abused: Not on file     Physically abused: Not on file     Forced sexual activity: Not on file   Other Topics Concern    Not on file   Social History Narrative    Not on file         ALLERGIES: Latex, natural rubber; Codeine; Adhesive; and Cortisone    Review of Systems   Constitutional: Negative for fever. HENT: Negative for facial swelling. Eyes: Negative for visual disturbance. Respiratory: Negative for chest tightness. Cardiovascular: Negative for chest pain. Gastrointestinal: Negative for abdominal pain. Genitourinary: Negative for difficulty urinating and dysuria. Musculoskeletal: Negative for arthralgias. Skin: Negative for rash. Neurological: Negative for headaches. Hematological: Negative for adenopathy. Psychiatric/Behavioral: Negative for suicidal ideas. Vitals:    04/17/20 1206   BP: 157/90   Pulse: (!) 121   Resp: 18   Temp: 98.5 °F (36.9 °C)   SpO2: 97%   Weight: 62.6 kg (138 lb)            Physical Exam  Vitals signs and nursing note reviewed. Constitutional:       General: She is not in acute distress. Appearance: She is well-developed. HENT:      Head: Normocephalic and atraumatic. Eyes:      General: No scleral icterus. Conjunctiva/sclera: Conjunctivae normal.      Pupils: Pupils are equal, round, and reactive to light. Neck:      Musculoskeletal: Normal range of motion and neck supple. Cardiovascular:      Rate and Rhythm: Tachycardia present.  Rhythm irregular. Heart sounds: No murmur. Pulmonary:      Effort: Pulmonary effort is normal. No respiratory distress. Abdominal:      General: There is no distension. Musculoskeletal: Normal range of motion. Right lower leg: Edema present. Left lower leg: Edema present. Comments: Increased edema both lower extremities, right greater than left. Draining wound to the back of her left calf. Skin:     General: Skin is warm and dry. Findings: No rash. Comments: 2 large draining wounds on her back. One in her sacral area, 1 in her left upper shoulder blade area. Tender to touch. Foul odor. Neurological:      Mental Status: She is alert and oriented to person, place, and time. MDM  Number of Diagnoses or Management Options  Atrial fibrillation with rapid ventricular response [facility]):   Lower extremity edema:   Pressure injury of skin of left upper back, unspecified injury stage:   Pressure injury of skin of right lower back, unspecified injury stage:      Amount and/or Complexity of Data Reviewed  Tests in the medicine section of CPT®: reviewed      ED Course as of Apr 17 1420 Fri Apr 17, 2020   1211 ED EKG interpretation:  Rhythm: a. fib. Rate (approx.): 112. Axis: normal.  ST segment:  No concerning ST elevations or depressions. RBBB. This EKG was interpreted by Lesley Patel MD,ED Provider. EKG 12 LEAD INITIAL [JM]   1419 Discussed with Dr. Tracie Wilson. He will admit the patient to the hospital.  Heart rate currently in the 90s without intervention. Patient with evidence of some mild volume overload when her lower extremity edema. No DVT seen in ultrasound. We will plan on mild diuresis and have patient be seen by wound care to assess her decubitus wounds on her back. Patient will also likely need long-term placement as I doubt she is capable of returning to independent living facility that she came from.     [JM]      ED Course User Index  [JM] Neel Grimm Sonya Lowe MD       Procedures

## 2020-04-17 NOTE — PROGRESS NOTES
Admission Medication Reconciliation:        Comments/Recommendations: Updated PTA meds/reviewed patient's allergies. Unable to speak with patient face to face at this time due to general isolation precautions in the ED related to COVID-19 pandemic. Patient is from [facility], unable to reach anyone by telephone. RX query is available at this time, PTA med list [facility] Medication changes (since last review):  Deleted:  Torin      Thank you for allowing me to participate in the care of your patient. Lizet Dean PharmD, RN #[id number] benefit data reflects medications filled and processed through the patient's insurance, however   this data does NOT capture whether the medication was picked up or is currently being taken by the patient. Allergies:  Latex, natural rubber; Codeine; Adhesive; and Cortisone    Significant PMH/Disease States:   Past Medical History:   Diagnosis Date    A-fib ([facility] [address]     Arrhythmia     A FIB    Arthritis     Back pain     CAD (coronary artery disease)     PT DENIES    Chronic pain     Dementia ([facility] [address] 2/9/2016    Hypercholesterolemia     Psychiatric disorder     ANXIETY    Shoulder pain     Thyroid disease     Tremor, essential      Chief Complaint for this Admission:    Chief Complaint   Patient presents with    Leg Pain    Wound Check     Prior to Admission Medications:   Prior to Admission Medications   Prescriptions Last Dose Informant Taking? ALPRAZolam (XANAX) 0.5 mg tablet   Yes   Sig: TAKE ONE TABLET BY MOUTH 3 TIMES A DAY AS NEEDED ANXIETY   L. acidoph & paracasei- S therm- Bifido (DAVE-Q/RISAQUAD) 8 billion cell cap cap   Yes   Sig: Take 1 Cap by mouth daily. balsam peru-castor oiL (VENELEX) ointment   Yes   Sig: As dir   furosemide (LASIX) 40 mg tablet   Yes   Sig: Take 1 Tab by mouth daily.    levothyroxine (SYNTHROID) 100 mcg tablet   Yes   Sig: TAKE ONE TABLET BY MOUTH EVERY MORNING BEFORE BREAKFAST   loratadine (CLARITIN) 10 mg tablet   Yes   Sig: Take 10 mg by mouth daily. metoprolol tartrate (LOPRESSOR) 25 mg tablet   Yes   Sig: Take 0.5 Tabs by mouth two (2) times a day. pantoprazole (PROTONIX) 40 mg tablet   Yes   Sig: Take 1 Tab by mouth Daily (before breakfast). potassium chloride (K-DUR, KLOR-CON) 20 mEq tablet   No   Sig: TAKE 1 TABLET BY MOUTH EVERY DAY   spironolactone (ALDACTONE) 25 mg tablet   Yes   Sig: Take 0.5 Tabs by mouth daily. Facility-Administered Medications: None       Please contact the main inpatient pharmacy with any questions or concerns at [phone] and we will direct you to the clinical pharmacist covering this patient's care while in-house.    NOE Hurley

## 2020-04-17 NOTE — PROGRESS NOTES
Date of previous inpatient admission/ ED visit? 3/20/20 ED visit ([facility])    What brought the patient back to ED? Patient presents to ED for wound evaluation    Did patient decline recommended services during last admission/ ED visit (if yes, what)? Following hospitalization 2/22/20 - 3/6/20 Patient declined [address]. Has patient seen a provider since their last inpatient admission/ED visit (if yes, when)? Last PCP visit noted 10/16/19    PCP: First and Last name: Dr. Carla Rice   Name of Practice:    Are you a current patient: Yes/No:    Approximate date of last visit: 10/16/19    CM Interventions:  From previous inpatient admission/ED visit: Assessment  From current inpatient admission/ED visit:    EMR reviewed. History significant for atrial fibulation, CAD, dementia, anxiety, thyroid disease, tremor arthritis, and chronic pain. Patient resides at [address] [place] [address] and has caregivers. Noted Patient Outreach Opalcarmela Leon telephone encounter w/ sister [employer], HIPAA/ Agent ACP on 4/16/20. Sister expressed concern about wound, caregiver off/on attendance and need for SNF. This writer noted AMD from 1/13/14 listing agents [place] and [employer] and Revocation document 4/14/16 revoking [employer] and appointing ANNE Evans . [place] Medicare A/B and [facility] are insurance providers. Case Management will continue to follow for transitions of care needs. FYI left regarding Palliative Care Consult (RRAT >21) and clarification needed regarding MPOA. Care Management Interventions  PCP Verified by CM: Yes  Last Visit to PCP: 10/16/19  Palliative Care Criteria Met (RRAT>21 & CHF Dx)?: Yes  Transition of Care Consult (CM Consult): Other(assessment)  MyChart Signup: No  Discharge Durable Medical Equipment: No  Health Maintenance Reviewed: Yes  Physical Therapy Consult: No  Occupational Therapy Consult: No  Speech Therapy Consult: No  Current Support Network:  Has Personal Caregivers, Lives Alone  [employer] Information Provided?: No  Discharge Location  Discharge Placement: (TBD)     FYI left for Dr.Banks Preston.

## 2020-04-17 NOTE — ED TRIAGE NOTES
Pt arrives by EMS from [facility] with reports of right leg pain and needing wound care

## 2020-04-17 NOTE — WOUND CARE
WOCN Note:  
 
New consult for wound care. Chart shows: 
Admitted for a-fib and pressure injuries. History of dementia WBC = 7.1 Admitted from [facility] Assessment:  
Seen in ER#12 and assisted by GRETA Deng. Appropriately conversational and reports no pain. Tolerates wound care extremely well for such large areas. Able to assist in turning but does not achieve full turn. Cleaned her of small formed stool. Resting on a stretcher but P500 air mattress delivered to ER for her. Bilateral heel skin intact and without erythema. Generalized edema to right lower leg and foot with pale pinkness to lower leg. Heels offloaded with pillows. 1. POA, left proximal scapula unstageable pressure injury = 2.5 x 2.5 x 0.5 cm  100% tan/yellow devitalized tissue. 2. POA, left distal scapula unstageable pressure injury = 1.5 x 2 x 0.3 cm 50% moist yellow 50% moist red. Both areas on scapula with out exudate but with large areas of purply redness extending laterally - blanching. Cleaned with wound cleanser, applied moist plug of Opticel and covered with ABD. 3. POA, sacral stage 4 pressure injury = 6 x 10 x 3.5 cm with tunnel @ 3 o'clock = 5 cm. Malodorous. 75% devitalized, soft yellow tissue with palpable bone 25% scattered moist red that include shallow area on right buttock. Cleaned with saline, packed with moist gauze with hydrogel added, dry topper. Recommendations:   
Sacrum: not likely to need surgical debridement and may be able to move to NPWT after wound is a little . Irrigate with 1/4 Dakin's, apply Santyl to wound bed and pack with Dakins-moistened gauze with dry cover. Change daily. Back: clean with 1/4 Dakin's, apply Santyl to open areas and cover with bordered foam dressing. Change daily. Turn/reposition approximately every 2 hours and offload heels with pillows at all times in bed. P-500 air mattress: Use only flat sheet and one incontinence pad. Discussed above plan with patient & Dr. Alexei Carranza. Transition of Care: Plan to follow as needed while admitted to hospital.  
 
FLETCHER ArteagaN, RN, [facility] Certified Wound, Ostomy, Continence Nurse 
office [phone] 
pager [phone] or call  to page

## 2020-04-17 NOTE — H&P
History and Physical    Subjective:   HPI  Zina Levin is a 80 y.o.  female who presents with H/O a fib with worsening bak and buttock pressure sores her home aid is unable to care for. Tried to get her into [place] care as an outpatient but unsuccessful. .   Past Medical History:   Diagnosis Date    A-fib [facility])     Arrhythmia     A FIB    Arthritis     Back pain     CAD (coronary artery disease)     PT DENIES    Chronic pain     Dementia ([address] 2016    Hypercholesterolemia     Psychiatric disorder     ANXIETY    Shoulder pain     Thyroid disease     Tremor, essential       Past Surgical History:   Procedure Laterality Date    HX APPENDECTOMY      HX ORTHOPAEDIC      left knee replacement    HX KADIE AND BSO      AGE 39    HX TONSIL AND ADENOIDECTOMY      IR KYPHOPLASTY LUMBAR  2019     Family History   Problem Relation Age of Onset    Dementia Mother     Arthritis-osteo Father       Social History     Tobacco Use    Smoking status: Former Smoker     Packs/day: 0.50     Years: 10.00     Pack years: 5.00     Last attempt to quit: 1994     Years since quittin.2    Smokeless tobacco: Never Used   Substance Use Topics    Alcohol use: Yes     Comment: NIGHTLY 2 GLASSES WINE       Prior to Admission medications    Medication Sig Start Date End Date Taking? Authorizing Provider   ALPRAZolam Jenni Padmini) 0.5 mg tablet TAKE ONE TABLET BY MOUTH 3 TIMES A DAY AS NEEDED ANXIETY 4/15/20  Yes Jose Morales MD   furosemide (LASIX) 40 mg tablet Take 1 Tab by mouth daily. 3/6/20  Yes Jose Morales MD   spironolactone (ALDACTONE) 25 mg tablet Take 0.5 Tabs by mouth daily. 3/6/20  Yes Jose Morales MD   L. acidoph & paracasei- S therm- Bifido (DAVE-Q/RISAQUAD) 8 billion cell cap cap Take 1 Cap by mouth daily. 3/6/20  Yes Jose Morales MD   metoprolol tartrate (LOPRESSOR) 25 mg tablet Take 0.5 Tabs by mouth two (2) times a day.  3/6/20  Yes Jose Morales MD   pantoprazole (PROTONIX) 40 mg tablet Take 1 Tab by mouth Daily (before breakfast). 3/7/20  Yes Gerrianne Hunger, MD   balsam peru-castor oiL (VENELEX) ointment As dir 3/6/20  Yes Nino Perez MD   loratadine (CLARITIN) 10 mg tablet Take 10 mg by mouth daily. Yes Provider, Historical   levothyroxine (SYNTHROID) 100 mcg tablet TAKE ONE TABLET BY MOUTH EVERY MORNING BEFORE BREAKFAST 1/30/20  Yes Nino Perez MD   potassium chloride (K-DUR, KLOR-CON) 20 mEq tablet TAKE 1 TABLET BY MOUTH EVERY DAY 1/14/19   Nino Perez MD     Allergies   Allergen Reactions    Latex, Natural Rubber Other (comments)     Pt denies allergy to latex    Codeine Other (comments)     \"It just sends me up the wall\"    Adhesive Rash and Other (comments)     blisters    Cortisone Unknown (comments)      Health Maintenance   Topic Date Due    DTaP/Tdap/Td series (1 - Tdap) 06/12/1952    Shingrix Vaccine Age 50> (1 of 2) 06/12/1981    GLAUCOMA SCREENING Q2Y  06/12/1996    Bone Densitometry (Dexa) Screening  06/12/1996    Medicare Yearly Exam  07/03/2020    Influenza Age 5 to Adult  08/01/2020    Pneumococcal 65+ years  Completed       Review of Systems:  A comprehensive review of systems was negative except for that written in the History of Present Illness. Objective: Intake and Output:    No intake/output data recorded. No intake/output data recorded. Physical Exam:   Visit Vitals  /68 (BP 1 Location: Right arm, BP Patient Position: At rest)   Pulse 78   Temp 97.5 °F (36.4 °C)   Resp 22   Wt 138 lb (62.6 kg)   SpO2 99%   BMI 24.45 kg/m²     Lungs: clear to auscultation bilaterally  Heart: regular rate and rhythm, S1, S2 normal, no murmur, click, rub or gallop  Abdomen: soft, non-tender. Bowel sounds normal. No masses,  no organomegaly  Upper and lower back pressure sores- currently packed by wound care here today. 1-2+ R>L LE edema.          Data Review:   Recent Results (from the past 24 hour(s))   EKG, 12 LEAD, INITIAL    Collection Time: 04/17/20 12:08 PM   Result Value Ref Range    Ventricular Rate 112 BPM    Atrial Rate 111 BPM    QRS Duration 128 ms    Q-T Interval 302 ms    QTC Calculation (Bezet) 412 ms    Calculated R Axis 82 degrees    Calculated T Axis -82 degrees    Diagnosis       Atrial fibrillation  Right bundle branch block  T wave abnormality, consider inferolateral ischemia  When compared with ECG of 20-MAR-2020 14:54,  No significant change  Confirmed by Kylee Lobo M.D., [place] ([id number]) on 4/17/2020 12:56:54 PM     SAMPLES BEING HELD    Collection Time: 04/17/20 12:19 PM   Result Value Ref Range    SAMPLES BEING HELD 1RED,1BLU,1PST,1LAV,BC(1LAV,1NAVY)     COMMENT        Add-on orders for these samples will be processed based on acceptable specimen integrity and analyte stability, which may vary by analyte. CBC WITH AUTOMATED DIFF    Collection Time: 04/17/20 12:19 PM   Result Value Ref Range    WBC 7.1 3.6 - 11.0 K/uL    RBC 4.49 3.80 - 5.20 M/uL    HGB 13.2 11.5 - 16.0 g/dL    HCT 41.9 35.0 - 47.0 %    MCV 93.3 80.0 - 99.0 FL    MCH 29.4 26.0 - 34.0 PG    MCHC 31.5 30.0 - 36.5 g/dL    RDW 14.8 (H) 11.5 - 14.5 %    PLATELET 931 415 - 202 K/uL    MPV 11.5 8.9 - 12.9 FL    NRBC 0.0 0  WBC    ABSOLUTE NRBC 0.00 0.00 - 0.01 K/uL    NEUTROPHILS 75 32 - 75 %    LYMPHOCYTES 13 12 - 49 %    MONOCYTES 9 5 - 13 %    EOSINOPHILS 2 0 - 7 %    BASOPHILS 1 0 - 1 %    IMMATURE GRANULOCYTES 0 0.0 - 0.5 %    ABS. NEUTROPHILS 5.3 1.8 - 8.0 K/UL    ABS. LYMPHOCYTES 0.9 0.8 - 3.5 K/UL    ABS. MONOCYTES 0.6 0.0 - 1.0 K/UL    ABS. EOSINOPHILS 0.2 0.0 - 0.4 K/UL    ABS. BASOPHILS 0.1 0.0 - 0.1 K/UL    ABS. IMM.  GRANS. 0.0 0.00 - 0.04 K/UL    DF AUTOMATED     METABOLIC PANEL, COMPREHENSIVE    Collection Time: 04/17/20 12:19 PM   Result Value Ref Range    Sodium 134 (L) 136 - 145 mmol/L    Potassium 3.1 (L) 3.5 - 5.1 mmol/L    Chloride 98 97 - 108 mmol/L    CO2 30 21 - 32 mmol/L    Anion gap 6 5 - 15 mmol/L    Glucose 101 (H) 65 - 100 mg/dL BUN 12 6 - 20 MG/DL    Creatinine 0.57 0.55 - 1.02 MG/DL    BUN/Creatinine ratio 21 (H) 12 - 20      GFR est AA >60 >60 ml/min/1.73m2    GFR est non-AA >60 >60 ml/min/1.73m2    Calcium 10.2 (H) 8.5 - 10.1 MG/DL    Bilirubin, total 1.2 (H) 0.2 - 1.0 MG/DL    ALT (SGPT) 7 (L) 12 - 78 U/L    AST (SGOT) 16 15 - 37 U/L    Alk. phosphatase 139 (H) 45 - 117 U/L    Protein, total 7.2 6.4 - 8.2 g/dL    Albumin 2.4 (L) 3.5 - 5.0 g/dL    Globulin 4.8 (H) 2.0 - 4.0 g/dL    A-G Ratio 0.5 (L) 1.1 - 2.2     NT-PRO BNP    Collection Time: 04/17/20 12:19 PM   Result Value Ref Range    NT pro-BNP 2,943 (H) <450 PG/ML   PROTHROMBIN TIME + INR    Collection Time: 04/17/20 12:19 PM   Result Value Ref Range    INR 1.1 0.9 - 1.1      Prothrombin time 11.7 (H) 9.0 - 11.1 sec   PTT    Collection Time: 04/17/20 12:19 PM   Result Value Ref Range    aPTT 27.9 22.1 - 32.0 sec    aPTT, therapeutic range     58.0 - 77.0 SECS   TROPONIN I    Collection Time: 04/17/20 12:19 PM   Result Value Ref Range    Troponin-I, Qt. <0.05 <0.05 ng/mL           Assessment:     Active Problems:    Atrial fibrillation, rapid ([facility] [address] (4/17/2020)      Pressure injury of skin of upper back (4/17/2020)      Pressure injury of skin of contiguous region involving back and buttock (4/17/2020)        Plan:     1) Wound care for pressure sores  2) air mattress  3) Back on home meds- HR under better control.    4) Plan for discharge early next week to [facility].   5) DNR    Signed By: Ann Paniagua MD     April 17, 2020

## 2020-04-17 NOTE — H&P
H&P per Dr. Thyra Homans -- I am putting in orders for admission now. Pt being admitted with rapid afib & decubiti. I am increasing metoprolol to 25 mg BID. She was not on anticoagulation before, so I am not ordering this (but am ordering DVT prophylaxis dosing of Lovenox). Wound care consult for decubiti. When I rounded on her a couple of months ago, she requested DNR status.

## 2020-04-17 NOTE — ED NOTES
TRANSFER - OUT REPORT:    Verbal report given to Irvin Woodson RN(name) on Chante Covarrubias  being transferred to (unit) for routine progression of care       Report consisted of patients Situation, Background, Assessment and   Recommendations(SBAR). Information from the following report(s) SBAR, ED Summary and MAR was reviewed with the receiving nurse. Lines:   Peripheral IV 04/17/20 Right Forearm (Active)        Opportunity for questions and clarification was provided.       Patient transported with:   [facility]

## 2020-04-17 NOTE — ED NOTES
Pt sent by staff from [facility] for evaluation of right leg pain and wound evaluation, pt has swelling to right lower leg and foot, denies injury, pt has open wounds to sacral area and left upper back/shoulder blade with drainage, foul odor upon arrival to ED, pt arrived with stool incontinence, pt provided with forest care

## 2020-04-18 LAB
ANION GAP SERPL CALC-SCNC: 4 MMOL/L (ref 5–15)
BUN SERPL-MCNC: 11 MG/DL (ref 6–20)
BUN/CREAT SERPL: 24 (ref 12–20)
CALCIUM SERPL-MCNC: 9.7 MG/DL (ref 8.5–10.1)
CHLORIDE SERPL-SCNC: 101 MMOL/L (ref 97–108)
CO2 SERPL-SCNC: 29 MMOL/L (ref 21–32)
CREAT SERPL-MCNC: 0.45 MG/DL (ref 0.55–1.02)
GLUCOSE SERPL-MCNC: 90 MG/DL (ref 65–100)
POTASSIUM SERPL-SCNC: 3.6 MMOL/L (ref 3.5–5.1)
SODIUM SERPL-SCNC: 134 MMOL/L (ref 136–145)

## 2020-04-18 PROCEDURE — 80048 BASIC METABOLIC PNL TOTAL CA: CPT

## 2020-04-18 PROCEDURE — 74011000250 HC RX REV CODE- 250: Performed by: EMERGENCY MEDICINE

## 2020-04-18 PROCEDURE — 65660000000 HC RM CCU STEPDOWN

## 2020-04-18 PROCEDURE — 74011250637 HC RX REV CODE- 250/637: Performed by: FAMILY MEDICINE

## 2020-04-18 PROCEDURE — 36415 COLL VENOUS BLD VENIPUNCTURE: CPT

## 2020-04-18 PROCEDURE — 74011250637 HC RX REV CODE- 250/637: Performed by: INTERNAL MEDICINE

## 2020-04-18 PROCEDURE — 74011250636 HC RX REV CODE- 250/636: Performed by: INTERNAL MEDICINE

## 2020-04-18 RX ADMIN — Medication 1 CAPSULE: at 09:50

## 2020-04-18 RX ADMIN — FUROSEMIDE 40 MG: 40 TABLET ORAL at 09:52

## 2020-04-18 RX ADMIN — PANTOPRAZOLE SODIUM 40 MG: 40 TABLET, DELAYED RELEASE ORAL at 07:29

## 2020-04-18 RX ADMIN — COLLAGENASE SANTYL: 250 OINTMENT TOPICAL at 19:00

## 2020-04-18 RX ADMIN — ENOXAPARIN SODIUM 40 MG: 40 INJECTION SUBCUTANEOUS at 15:13

## 2020-04-18 RX ADMIN — METOPROLOL TARTRATE 25 MG: 25 TABLET, FILM COATED ORAL at 18:52

## 2020-04-18 RX ADMIN — LORATADINE 10 MG: 10 TABLET ORAL at 09:56

## 2020-04-18 RX ADMIN — SPIRONOLACTONE 12.5 MG: 25 TABLET ORAL at 09:54

## 2020-04-18 RX ADMIN — LEVOTHYROXINE SODIUM 100 MCG: 0.1 TABLET ORAL at 09:56

## 2020-04-18 RX ADMIN — DAKIN'S SOLUTION 0.125% (QUARTER STRENGTH): 0.12 SOLUTION at 19:01

## 2020-04-18 RX ADMIN — TRAMADOL HYDROCHLORIDE 50 MG: 50 TABLET, FILM COATED ORAL at 09:51

## 2020-04-18 RX ADMIN — METOPROLOL TARTRATE 25 MG: 25 TABLET, FILM COATED ORAL at 09:52

## 2020-04-18 RX ADMIN — POTASSIUM CHLORIDE 20 MEQ: 750 TABLET, FILM COATED, EXTENDED RELEASE ORAL at 09:50

## 2020-04-18 NOTE — PROGRESS NOTES
0700   Wound care performed after BM. Sacrum irrigated with 1/4 Dakin's, Santyl applied to wound bed and packed with Dakins-moistened gauze, dry cover applied. Bedside and Verbal shift change report given to Hemalatha Karimi (oncoming nurse) by William Babcock (offgoing nurse). Report included the following information SBAR, Kardex, Procedure Summary, Intake/Output, MAR, Accordion, Recent Results and Med Rec Status.

## 2020-04-18 NOTE — PROGRESS NOTES
Benito Gibbs, Lennox Niño, and Rey Short Date: 4/17/2020      Subjective:     Patient feeling OK. Minimal pain. .       Current Facility-Administered Medications   Medication Dose Route Frequency    ALPRAZolam (XANAX) tablet 0.5 mg  0.5 mg Oral TID PRN    furosemide (LASIX) tablet 40 mg  40 mg Oral BID    lactobac ac& pc-s.therm-b.anim (DAVE Q/RISAQUAD)  1 Cap Oral DAILY    levothyroxine (SYNTHROID) tablet 100 mcg  100 mcg Oral DAILY    loratadine (CLARITIN) tablet 10 mg  10 mg Oral DAILY    metoprolol tartrate (LOPRESSOR) tablet 25 mg  25 mg Oral BID    pantoprazole (PROTONIX) tablet 40 mg  40 mg Oral ACB    potassium chloride SR (KLOR-CON 10) tablet 20 mEq  20 mEq Oral DAILY    spironolactone (ALDACTONE) tablet 12.5 mg  12.5 mg Oral DAILY    ondansetron (ZOFRAN) injection 4 mg  4 mg IntraVENous Q6H PRN    acetaminophen (TYLENOL) tablet 650 mg  650 mg Oral Q4H PRN    enoxaparin (LOVENOX) injection 40 mg  40 mg SubCUTAneous Q24H    traMADoL (ULTRAM) tablet 50 mg  50 mg Oral Q6H PRN    sodium hypochlorite (QUARTER STRENGTH DAKIN'S) 0.125% irrigation (bottle)   Topical DAILY    collagenase (SANTYL) 250 unit/gram ointment   Topical DAILY          Objective:     Patient Vitals for the past 8 hrs:   BP Temp Pulse Resp SpO2 Weight   04/18/20 0829 125/71 97.6 °F (36.4 °C) 78 19 94 %    04/18/20 0405 99/55 98.2 °F (36.8 °C) 63 18 93 % 132 lb 15 oz (60.3 kg)     No intake/output data recorded. 04/16 1901 - 04/18 0700  In: 120 [P.O.:120]  Out: -     Physical Exam: Lungs: clear to auscultation bilaterally  Heart: regular rate and rhythm, S1, S2 normal, no murmur, click, rub or gallop  Abdomen: soft, non-tender.  Bowel sounds normal. No masses,  no organomegaly        Data Review   Recent Results (from the past 24 hour(s))   EKG, 12 LEAD, INITIAL    Collection Time: 04/17/20 12:08 PM   Result Value Ref Range    Ventricular Rate 112 BPM    Atrial Rate 111 BPM    QRS Duration 128 ms    Q-T Interval 302 ms    QTC Calculation (Bezet) 412 ms    Calculated R Axis 82 degrees    Calculated T Axis -82 degrees    Diagnosis       Atrial fibrillation  Right bundle branch block  T wave abnormality, consider inferolateral ischemia  When compared with ECG of 20-MAR-2020 14:54,  No significant change  Confirmed by Kimberlee Mims M.D., Tara Benji ([id number]) on 4/17/2020 12:56:54 PM     SAMPLES BEING HELD    Collection Time: 04/17/20 12:19 PM   Result Value Ref Range    SAMPLES BEING HELD 1RED,1BLU,1PST,1LAV,BC(1LAV,1NAVY)     COMMENT        Add-on orders for these samples will be processed based on acceptable specimen integrity and analyte stability, which may vary by analyte. CBC WITH AUTOMATED DIFF    Collection Time: 04/17/20 12:19 PM   Result Value Ref Range    WBC 7.1 3.6 - 11.0 K/uL    RBC 4.49 3.80 - 5.20 M/uL    HGB 13.2 11.5 - 16.0 g/dL    HCT 41.9 35.0 - 47.0 %    MCV 93.3 80.0 - 99.0 FL    MCH 29.4 26.0 - 34.0 PG    MCHC 31.5 30.0 - 36.5 g/dL    RDW 14.8 (H) 11.5 - 14.5 %    PLATELET 239 885 - 703 K/uL    MPV 11.5 8.9 - 12.9 FL    NRBC 0.0 0  WBC    ABSOLUTE NRBC 0.00 0.00 - 0.01 K/uL    NEUTROPHILS 75 32 - 75 %    LYMPHOCYTES 13 12 - 49 %    MONOCYTES 9 5 - 13 %    EOSINOPHILS 2 0 - 7 %    BASOPHILS 1 0 - 1 %    IMMATURE GRANULOCYTES 0 0.0 - 0.5 %    ABS. NEUTROPHILS 5.3 1.8 - 8.0 K/UL    ABS. LYMPHOCYTES 0.9 0.8 - 3.5 K/UL    ABS. MONOCYTES 0.6 0.0 - 1.0 K/UL    ABS. EOSINOPHILS 0.2 0.0 - 0.4 K/UL    ABS. BASOPHILS 0.1 0.0 - 0.1 K/UL    ABS. IMM.  GRANS. 0.0 0.00 - 0.04 K/UL    DF AUTOMATED     METABOLIC PANEL, COMPREHENSIVE    Collection Time: 04/17/20 12:19 PM   Result Value Ref Range    Sodium 134 (L) 136 - 145 mmol/L    Potassium 3.1 (L) 3.5 - 5.1 mmol/L    Chloride 98 97 - 108 mmol/L    CO2 30 21 - 32 mmol/L    Anion gap 6 5 - 15 mmol/L    Glucose 101 (H) 65 - 100 mg/dL    BUN 12 6 - 20 MG/DL    Creatinine 0.57 0.55 - 1.02 MG/DL    BUN/Creatinine ratio 21 (H) 12 - 20      GFR est AA >60 >60 ml/min/1.73m2    GFR est non-AA >60 >60 ml/min/1.73m2    Calcium 10.2 (H) 8.5 - 10.1 MG/DL    Bilirubin, total 1.2 (H) 0.2 - 1.0 MG/DL    ALT (SGPT) 7 (L) 12 - 78 U/L    AST (SGOT) 16 15 - 37 U/L    Alk.  phosphatase 139 (H) 45 - 117 U/L    Protein, total 7.2 6.4 - 8.2 g/dL    Albumin 2.4 (L) 3.5 - 5.0 g/dL    Globulin 4.8 (H) 2.0 - 4.0 g/dL    A-G Ratio 0.5 (L) 1.1 - 2.2     NT-PRO BNP    Collection Time: 04/17/20 12:19 PM   Result Value Ref Range    NT pro-BNP 2,943 (H) <450 PG/ML   PROTHROMBIN TIME + INR    Collection Time: 04/17/20 12:19 PM   Result Value Ref Range    INR 1.1 0.9 - 1.1      Prothrombin time 11.7 (H) 9.0 - 11.1 sec   PTT    Collection Time: 04/17/20 12:19 PM   Result Value Ref Range    aPTT 27.9 22.1 - 32.0 sec    aPTT, therapeutic range     58.0 - 77.0 SECS   TROPONIN I    Collection Time: 04/17/20 12:19 PM   Result Value Ref Range    Troponin-I, Qt. <0.05 <2.84 ng/mL   METABOLIC PANEL, BASIC    Collection Time: 04/18/20  6:16 AM   Result Value Ref Range    Sodium 134 (L) 136 - 145 mmol/L    Potassium 3.6 3.5 - 5.1 mmol/L    Chloride 101 97 - 108 mmol/L    CO2 29 21 - 32 mmol/L    Anion gap 4 (L) 5 - 15 mmol/L    Glucose 90 65 - 100 mg/dL    BUN 11 6 - 20 MG/DL    Creatinine 0.45 (L) 0.55 - 1.02 MG/DL    BUN/Creatinine ratio 24 (H) 12 - 20      GFR est AA >60 >60 ml/min/1.73m2    GFR est non-AA >60 >60 ml/min/1.73m2    Calcium 9.7 8.5 - 10.1 MG/DL           Assessment:     Active Problems:    Atrial fibrillation, rapid (HCC) (4/17/2020)      Pressure injury of skin of upper back (4/17/2020)      Pressure injury of skin of contiguous region involving back and buttock (4/17/2020)        Plan:     1) continue wound care  2) Cont current meds for A fib  3) start dispo next week

## 2020-04-19 PROCEDURE — 74011250637 HC RX REV CODE- 250/637: Performed by: FAMILY MEDICINE

## 2020-04-19 PROCEDURE — 74011250636 HC RX REV CODE- 250/636: Performed by: INTERNAL MEDICINE

## 2020-04-19 PROCEDURE — 74011250637 HC RX REV CODE- 250/637: Performed by: INTERNAL MEDICINE

## 2020-04-19 PROCEDURE — 65270000029 HC RM PRIVATE

## 2020-04-19 RX ADMIN — DAKIN'S SOLUTION 0.125% (QUARTER STRENGTH): 0.12 SOLUTION at 16:41

## 2020-04-19 RX ADMIN — POTASSIUM CHLORIDE 20 MEQ: 750 TABLET, FILM COATED, EXTENDED RELEASE ORAL at 10:24

## 2020-04-19 RX ADMIN — Medication 1 CAPSULE: at 10:22

## 2020-04-19 RX ADMIN — LEVOTHYROXINE SODIUM 100 MCG: 0.1 TABLET ORAL at 10:23

## 2020-04-19 RX ADMIN — LORATADINE 10 MG: 10 TABLET ORAL at 10:22

## 2020-04-19 RX ADMIN — ENOXAPARIN SODIUM 40 MG: 40 INJECTION SUBCUTANEOUS at 15:17

## 2020-04-19 RX ADMIN — SPIRONOLACTONE 12.5 MG: 25 TABLET ORAL at 10:23

## 2020-04-19 RX ADMIN — FUROSEMIDE 40 MG: 40 TABLET ORAL at 18:00

## 2020-04-19 RX ADMIN — FUROSEMIDE 40 MG: 40 TABLET ORAL at 10:23

## 2020-04-19 RX ADMIN — METOPROLOL TARTRATE 25 MG: 25 TABLET, FILM COATED ORAL at 10:21

## 2020-04-19 RX ADMIN — METOPROLOL TARTRATE 25 MG: 25 TABLET, FILM COATED ORAL at 17:59

## 2020-04-19 RX ADMIN — COLLAGENASE SANTYL: 250 OINTMENT TOPICAL at 16:41

## 2020-04-19 NOTE — PROGRESS NOTES
Problem: Impaired Skin Integrity/Pressure Injury Treatment  Goal: *Improvement of Existing Pressure Injury  Outcome: Progressing Towards Goal  Goal: *Prevention of pressure injury  Description: Document Tre Scale and appropriate interventions in the flowsheet. Outcome: Progressing Towards Goal  Note: Pressure Injury Interventions:       Moisture Interventions: Apply protective barrier, creams and emollients, Check for incontinence Q2 hours and as needed, Minimize layers    Activity Interventions: PT/OT evaluation, Pressure redistribution bed/mattress(bed type)    Mobility Interventions: Pressure redistribution bed/mattress (bed type), PT/OT evaluation    Nutrition Interventions: Document food/fluid/supplement intake, Offer support with meals,snacks and hydration    Friction and Shear Interventions: Lift sheet, Minimize layers, Apply protective barrier, creams and emollients                Problem: Patient Education: Go to Patient Education Activity  Goal: Patient/Family Education  Outcome: Progressing Towards Goal     Problem: Pressure Injury - Risk of  Goal: *Prevention of pressure injury  Description: Document Tre Scale and appropriate interventions in the flowsheet.   Outcome: Progressing Towards Goal  Note: Pressure Injury Interventions:       Moisture Interventions: Apply protective barrier, creams and emollients, Check for incontinence Q2 hours and as needed, Minimize layers    Activity Interventions: PT/OT evaluation, Pressure redistribution bed/mattress(bed type)    Mobility Interventions: Pressure redistribution bed/mattress (bed type), PT/OT evaluation    Nutrition Interventions: Document food/fluid/supplement intake, Offer support with meals,snacks and hydration    Friction and Shear Interventions: Lift sheet, Minimize layers, Apply protective barrier, creams and emollients                Problem: Patient Education: Go to Patient Education Activity  Goal: Patient/Family Education  Outcome: Progressing Towards Goal     Problem: Falls - Risk of  Goal: *Absence of Falls  Description: Document Cally Obdulio Fall Risk and appropriate interventions in the flowsheet.   Outcome: Progressing Towards Goal  Note: Fall Risk Interventions:            Medication Interventions: Patient to call before getting OOB    Elimination Interventions: Call light in reach, Patient to call for help with toileting needs, Toileting schedule/hourly rounds              Problem: Afib Pathway: Day 1  Goal: *Optimal pain control at patient's stated goal  Outcome: Progressing Towards Goal  Goal: *Lungs clear or at baseline  Outcome: Progressing Towards Goal  Goal: *Labs within defined limits  Outcome: Progressing Towards Goal  Goal: *Describes available resources and support systems  Outcome: Progressing Towards Goal     Problem: Afib Pathway: Day 2  Goal: Respiratory  Outcome: Progressing Towards Goal  Goal: Treatments/Interventions/Procedures  Outcome: Progressing Towards Goal  Goal: Psychosocial  Outcome: Progressing Towards Goal  Goal: *Optimal pain control at patient's stated goal  Outcome: Progressing Towards Goal  Goal: *Lungs clear or at baseline  Outcome: Progressing Towards Goal  Goal: *Describes available resources and support systems  Outcome: Progressing Towards Goal

## 2020-04-19 NOTE — PROGRESS NOTES
TRANSFER - OUT REPORT:    Verbal report given to Tyler Travis RN (name) on Lucas Player  being transferred to [address] [place] [address] (unit) for routine progression of care       Report consisted of patients Situation, Background, Assessment and   Recommendations(SBAR). Information from the following report(s) SBAR, Kardex, Intake/Output, MAR, Recent Results and Cardiac Rhythm A-fib with inverted T-waves was reviewed with the receiving nurse. Lines:   Peripheral IV 04/17/20 Right Forearm (Active)   Site Assessment Clean, dry, & intact 4/19/2020  4:49 PM   Phlebitis Assessment 0 4/19/2020  4:49 PM   Infiltration Assessment 0 4/19/2020  4:49 PM   Dressing Status Clean, dry, & intact 4/19/2020  4:49 PM   Dressing Type Tape;Transparent 4/19/2020  4:49 PM   Hub Color/Line Status Pink;Capped 4/19/2020  4:49 PM   Action Taken Open ports on tubing capped 4/19/2020  4:49 PM   Alcohol Cap Used Yes 4/19/2020  4:49 PM        Opportunity for questions and clarification was provided.       Patient transported with:   Registered Nurse

## 2020-04-19 NOTE — PROGRESS NOTES
Bedside shift change report given to GARFIELD Umanzor (oncoming nurse) by JENNIFER Araiza (offgoing nurse). Report included the following information SBAR, Procedure Summary, Intake/Output, MAR and Recent Results.

## 2020-04-20 PROCEDURE — 97530 THERAPEUTIC ACTIVITIES: CPT

## 2020-04-20 PROCEDURE — 74011250636 HC RX REV CODE- 250/636: Performed by: INTERNAL MEDICINE

## 2020-04-20 PROCEDURE — 97165 OT EVAL LOW COMPLEX 30 MIN: CPT

## 2020-04-20 PROCEDURE — 65270000029 HC RM PRIVATE

## 2020-04-20 PROCEDURE — 74011250637 HC RX REV CODE- 250/637: Performed by: INTERNAL MEDICINE

## 2020-04-20 PROCEDURE — 74011250637 HC RX REV CODE- 250/637: Performed by: FAMILY MEDICINE

## 2020-04-20 RX ADMIN — FUROSEMIDE 40 MG: 40 TABLET ORAL at 09:06

## 2020-04-20 RX ADMIN — COLLAGENASE SANTYL: 250 OINTMENT TOPICAL at 09:07

## 2020-04-20 RX ADMIN — DAKIN'S SOLUTION 0.125% (QUARTER STRENGTH): 0.12 SOLUTION at 09:07

## 2020-04-20 RX ADMIN — LORATADINE 10 MG: 10 TABLET ORAL at 09:06

## 2020-04-20 RX ADMIN — SPIRONOLACTONE 12.5 MG: 25 TABLET ORAL at 09:06

## 2020-04-20 RX ADMIN — Medication 1 CAPSULE: at 09:06

## 2020-04-20 RX ADMIN — METOPROLOL TARTRATE 25 MG: 25 TABLET, FILM COATED ORAL at 09:06

## 2020-04-20 RX ADMIN — FUROSEMIDE 40 MG: 40 TABLET ORAL at 18:33

## 2020-04-20 RX ADMIN — PANTOPRAZOLE SODIUM 40 MG: 40 TABLET, DELAYED RELEASE ORAL at 07:25

## 2020-04-20 RX ADMIN — POTASSIUM CHLORIDE 20 MEQ: 750 TABLET, FILM COATED, EXTENDED RELEASE ORAL at 09:06

## 2020-04-20 RX ADMIN — LEVOTHYROXINE SODIUM 100 MCG: 0.1 TABLET ORAL at 09:06

## 2020-04-20 RX ADMIN — METOPROLOL TARTRATE 25 MG: 25 TABLET, FILM COATED ORAL at 18:32

## 2020-04-20 RX ADMIN — ENOXAPARIN SODIUM 40 MG: 40 INJECTION SUBCUTANEOUS at 15:07

## 2020-04-20 NOTE — PROGRESS NOTES
Problem: Impaired Skin Integrity/Pressure Injury Treatment  Goal: *Improvement of Existing Pressure Injury  Outcome: Progressing Towards Goal  Goal: *Prevention of pressure injury  Description: Document Tre Scale and appropriate interventions in the flowsheet.   Outcome: Progressing Towards Goal  Note: Pressure Injury Interventions:  Sensory Interventions: Keep linens dry and wrinkle-free, Float heels    Moisture Interventions: Absorbent underpads, Apply protective barrier, creams and emollients    Activity Interventions: PT/OT evaluation, Pressure redistribution bed/mattress(bed type)    Mobility Interventions: HOB 30 degrees or less, Pressure redistribution bed/mattress (bed type)    Nutrition Interventions: Document food/fluid/supplement intake    Friction and Shear Interventions: Apply protective barrier, creams and emollients, Lift sheet                Problem: Patient Education: Go to Patient Education Activity  Goal: Patient/Family Education  Outcome: Progressing Towards Goal

## 2020-04-20 NOTE — PROGRESS NOTES
Bedside and Verbal shift change report given to Vahid Denson RN (oncoming nurse) by Josesito Boyle RN (offgoing nurse). Report included the following information SBAR, Kardex, Intake/Output, MAR and Recent Results.

## 2020-04-20 NOTE — PROGRESS NOTES
Problem: Self Care Deficits Care Plan (Adult)  Goal: *Acute Goals and Plan of Care (Insert Text)  Description:   FUNCTIONAL STATUS PRIOR TO ADMISSION: Patient is poor historian. Patient resides at [address] [place] [address] and has private caregivers during the day. She reports getting OOB to chair and reports requiring min A for ADLs. Pt becomes irritable with questioning therefore unclear how much assistance is required. HOME SUPPORT: The patient lived alone with home care aides to provide assistance. Occupational Therapy Goals  Initiated 4/20/2020  1. Patient will perform self-feeding with supervision/set-up in supported sitting within 7 day(s). 2.  Patient will tolerate sitting EOB for 2 minutes to perform simple grooming task with minimal assistance/contact guard assist within 7 day(s). 3.  Patient will perform upper body bathing with moderate assistance  within 7 day(s). 4.  Patient will perform rolling to R/L with min A to assist with toileting hygiene within 7 day(s). 5.  Patient will perform toilet transfers with maximal assistance using least restrictive DME within 7 day(s). 6.  Patient will participate in upper extremity therapeutic exercise/activities with minimal assistance/contact guard assist for 5 minutes within 7 day(s). Outcome: Progressing Towards Goal     OCCUPATIONAL THERAPY EVALUATION  Patient: Todd Macedo (32 y.o. female)  Date: 4/20/2020  Primary Diagnosis: Atrial fibrillation, rapid [facility]) [I48.91]        Precautions: Fall       ASSESSMENT  Based on the objective data described below, the patient presents with confusion, poor memory, LE and back pain, generalized weakness, impaired coordination, and poor activity tolerance following admission for Afib and wounds. Pt is alert and pleasant, however becomes irritable with increased questioning. She is oriented to name, place, and able to state the president's name.  Pt is a poor historian and reports she gets up OOB at home, however noted to have wounds on sacrum and back. This session, she is initially agreeable to sit EOB however once LEs are assisted in preparation for t/f, pt becomes irritable and refuses to sit EOB. Assisted pt to reposition in bed with mod A. She declines participation in ADLs at this time but RN reports pt required assist with lunch this date. Will follow pt in acute setting for trial of OT services, however feel she is unsafe to return back to [facility] at this time. Recommend discharge to SNF vs. LTC pending progress in acute setting. Current Level of Function Impacting Discharge (ADLs/self-care): max to total care    Functional Outcome Measure: The patient scored Total: 20/100 on the Barthel Index outcome measure which is indicative of 80% impaired ability to care for basic self needs/dependency on others; inferred 100% dependency on others for instrumental ADLs. Other factors to consider for discharge: wounds; confusion/poor historian; high fall risk     Patient will benefit from skilled therapy intervention to address the above noted impairments. PLAN :  Recommendations and Planned Interventions: self care training, functional mobility training, therapeutic exercise, balance training, visual/perceptual training, therapeutic activities, cognitive retraining, endurance activities, neuromuscular re-education, patient education and home safety training    Frequency/Duration: Patient will be followed by occupational therapy 3 times a week to address goals.     Recommendation for discharge: (in order for the patient to meet his/her long term goals)  To be determined: SNF-level rehab vs. LTC    This discharge recommendation:  Has been made in collaboration with the attending provider and/or case management    IF patient discharges home will need the following DME: TBD - unclear what pt has available to her       SUBJECTIVE:   Patient stated I'm tired of everyone prodding and peaking around my back, it hurts.     OBJECTIVE DATA SUMMARY:   HISTORY:   Past Medical History:   Diagnosis Date    [facility]fib [facility] [address]     Arrhythmia     A FIB    Arthritis     Back pain     CAD (coronary artery disease)     PT DENIES    Chronic pain     Dementia ([facility] [address] 2/9/2016    Hypercholesterolemia     Psychiatric disorder     ANXIETY    Shoulder pain     Thyroid disease     Tremor, essential      Past Surgical History:   Procedure Laterality Date    HX APPENDECTOMY      HX ORTHOPAEDIC      left knee replacement    HX KADIE AND BSO      AGE 45    HX TONSIL AND ADENOIDECTOMY      IR KYPHOPLASTY LUMBAR  4/23/2019       Expanded or extensive additional review of patient history:     Home Situation  Home Environment: [address] Name: [facility]  One/Two Story Residence: One story  Living Alone: Yes  Support Systems: Assisted living  Patient Expects to be Discharged to[de-identified] Rehabilitation facility  Current DME Used/Available at Home: None    Hand dominance: Right    EXAMINATION OF PERFORMANCE DEFICITS:  Cognitive/Behavioral Status:  Neurologic State: Alert;Confused  Orientation Level: Oriented to person;Oriented to place(difficulty with birthday; able to state \"Trump\" as president)  Cognition: Decreased attention/concentration; Impaired decision making;Memory loss;Poor safety awareness  Perception: Appears intact  Perseveration: No perseveration noted  Safety/Judgement: Decreased awareness of environment;Decreased awareness of need for assistance;Decreased awareness of need for safety; Lack of insight into deficits    Hearing:   Auditory  Auditory Impairment: None    Range of Motion:  AROM: Generally decreased, functional    Strength:  Strength: Generally decreased, functional    Coordination:  Coordination: Generally decreased, functional  Fine Motor Skills-Upper: Left Impaired;Right Impaired    Gross Motor Skills-Upper: Left Impaired;Right Impaired    Tone:  Tone: Normal    Balance:  Sitting: (pt refused to attempt sitting EOB)    Functional Mobility and Transfers for ADLs:  Bed Mobility:  Rolling: Moderate assistance(pt able to assist by pulling on bed rails)    Transfers:   Pt declined t/f to EOB this session. ADL Assessment:  Feeding: Moderate assistance(infer based on observations)    Oral Facial Hygiene/Grooming: Moderate assistance(infer based on observations)    Bathing: Maximum assistance; Total assistance(infer based on observations)    Upper Body Dressing: Maximum assistance(infer based on observations)    Lower Body Dressing: Total assistance    Toileting: Total assistance    ADL Intervention and task modifications:  Cognitive Retraining  Safety/Judgement: Decreased awareness of environment;Decreased awareness of need for assistance;Decreased awareness of need for safety; Lack of insight into deficits    Functional Measure:  Barthel Index:    Bathin  Bladder: 5  Bowels: 10  Groomin  Dressin  Feedin  Mobility: 0  Stairs: 0  Toilet Use: 0  Transfer (Bed to Chair and Back): 0  Total: 20/100        The Barthel ADL Index: Guidelines  1. The index should be used as a record of what a patient does, not as a record of what a patient could do. 2. The main aim is to establish degree of independence from any help, physical or verbal, however minor and for whatever reason. 3. The need for supervision renders the patient not independent. 4. A patient's performance should be established using the best available evidence. Asking the patient, friends/relatives and nurses are the usual sources, but direct observation and common sense are also important. However direct testing is not needed. 5. Usually the patient's performance over the preceding 24-48 hours is important, but occasionally longer periods will be relevant. 6. Middle categories imply that the patient supplies over 50 per cent of the effort. 7. Use of aids to be independent is allowed. Shemar Mora., Barthel, D.W. (2097).  Functional evaluation: the Barthel Index. [address] [facility] (14)2. FRANK Sterling, Keesha Marcos, Davi Desouza., [place], [address] (1999). Measuring the change indisability after inpatient rehabilitation; comparison of the responsiveness of the Barthel Index and Functional Hand Measure. Journal of Neurology, Neurosurgery, and Psychiatry, 66(4), 998-383. ADELAIDE Medellin, JOANIE Wolfe, & Zuri Torres M.A. (2004.) Assessment of post-stroke quality of life in cost-effectiveness studies: The usefulness of the Barthel Index and the EuroQoL-5D. Quality of Life Research, 15, 398-11     Occupational Therapy Evaluation Charge Determination   History Examination Decision-Making   LOW Complexity : Brief history review  HIGH Complexity : 5 or more performance deficits relating to physical, cognitive , or psychosocial skils that result in activity limitations and / or participation restrictions MEDIUM Complexity : Patient may present with comorbidities that affect occupational performnce. Miniml to moderate modification of tasks or assistance (eg, physical or verbal ) with assesment(s) is necessary to enable patient to complete evaluation       Based on the above components, the patient evaluation is determined to be of the following complexity level: LOW   Pain Rating:  Pt reporting elevated pain in back and LEs; not receptive to techniques for pain control; RN aware    Activity Tolerance:   Poor  Please refer to the flowsheet for vital signs taken during this treatment. After treatment patient left in no apparent distress:    Patient positioned in left sidelying for pressure relief, Call bell within reach, Bed / chair alarm activated, Side rails x 3 and RN notified    COMMUNICATION/EDUCATION:   The patients plan of care was discussed with: Registered nurse and Case management. Patient/family have participated as able in goal setting and plan of care.  and Patient/family agree to work toward stated goals and plan of care. This patients plan of care is appropriate for delegation to MATTY.     Thank you for this referral.  Maribel Cordoba, OT  Time Calculation: 21 mins

## 2020-04-20 NOTE — PROGRESS NOTES
Problem: Patient Education: Go to Patient Education Activity  Goal: Patient/Family Education  Outcome: Progressing Towards Goal     Problem: Falls - Risk of  Goal: *Absence of Falls  Description: Document Alaina Romero Fall Risk and appropriate interventions in the flowsheet.   Outcome: Progressing Towards Goal  Note: Fall Risk Interventions:       Mentation Interventions: Door open when patient unattended    Medication Interventions: Patient to call before getting OOB    Elimination Interventions: Call light in reach

## 2020-04-20 NOTE — PROGRESS NOTES
Spiritual Care Assessment/Progress Note  [facility]      NAME: Zina Levin      MRN: [id number]  AGE: 80 y.o.  SEX: female  Adventism Affiliation: Jain   Language: English     4/20/2020     Total Time (in minutes): 23     Spiritual Assessment begun in [facility] SURG through conversation with:         []Patient        [] Family    [] Friend(s)        Reason for Consult: Initial/Spiritual assessment, patient floor     Spiritual beliefs: (Please include comment if needed)     [x] Identifies with a julissa tradition:    Jain      [x] Supported by a julissa community:     [address]        [] Claims no spiritual orientation:           [] Seeking spiritual identity:                [] Adheres to an individual form of spirituality:           [] Not able to assess:                           Identified resources for coping:      [x] Prayer                               [] Music                  [] Guided Imagery     [] Family/friends                 [] Pet visits     [] Devotional reading                         [] Unknown     [] Other:                                             Interventions offered during this visit: (See comments for more details)    Patient Interventions: Affirmation of emotions/emotional suffering, Affirmation of julissa, Catharsis/review of pertinent events in supportive environment, Iconic (affirming the presence of God/Higher Power), Prayer (actual), Prayer (assurance of)           Plan of Care:     [x] Support spiritual and/or cultural needs    [] Support AMD and/or advance care planning process      [x] Support grieving process   [] Coordinate Rites and/or Rituals    [] Coordination with community clergy   [] No spiritual needs identified at this time   [] Detailed Plan of Care below (See Comments)  [] Make referral to Music Therapy  [] Make referral to Pet Therapy     [] Make referral to Addiction services  [] Make referral to [facility]  [] Make referral to Spiritual Care Partner  [] No future visits requested        [] Follow up visits as needed     Comments: Initial visit with patient. Patient was alert - at times she appeared to get a little confused, but not much. She shared of her marriage of 46 years and how her   2 years ago and how hard that had been. She has sisters she thinks the world of and their children. She is a member of [employer]. I offered presence and prayer and after we prayed she stated, \"you know, I don't know how to pray\". We spoke of prayer and I shared some verses about God hearing the cries of our heart and that it doesn't have to be fancy - Ms. Lauren Jaramillo said she liked that and that she was going to try that. Comfort and care given. Spiritual care will continue to follow as able and provide support as needed. Please page, [phone]. Visit by: Maximilian Mazariegos.  Jesenia Salcido D.Min, MA, [facility]    Lead  Profession Development & Advancement

## 2020-04-20 NOTE — PROGRESS NOTES
Benito Huston, Misbah Reich, Adrian Rob, and Henrry Pickard Date: 4/17/2020      Subjective:     Patient resting comfortably. Current Facility-Administered Medications   Medication Dose Route Frequency    ALPRAZolam (XANAX) tablet 0.5 mg  0.5 mg Oral TID PRN    furosemide (LASIX) tablet 40 mg  40 mg Oral BID    lactobac ac& pc-s.therm-b.anim (DAVE Q/RISAQUAD)  1 Cap Oral DAILY    levothyroxine (SYNTHROID) tablet 100 mcg  100 mcg Oral DAILY    loratadine (CLARITIN) tablet 10 mg  10 mg Oral DAILY    metoprolol tartrate (LOPRESSOR) tablet 25 mg  25 mg Oral BID    pantoprazole (PROTONIX) tablet 40 mg  40 mg Oral ACB    potassium chloride SR (KLOR-CON 10) tablet 20 mEq  20 mEq Oral DAILY    spironolactone (ALDACTONE) tablet 12.5 mg  12.5 mg Oral DAILY    ondansetron (ZOFRAN) injection 4 mg  4 mg IntraVENous Q6H PRN    acetaminophen (TYLENOL) tablet 650 mg  650 mg Oral Q4H PRN    enoxaparin (LOVENOX) injection 40 mg  40 mg SubCUTAneous Q24H    traMADoL (ULTRAM) tablet 50 mg  50 mg Oral Q6H PRN    sodium hypochlorite (QUARTER STRENGTH DAKIN'S) 0.125% irrigation (bottle)   Topical DAILY    collagenase (SANTYL) 250 unit/gram ointment   Topical DAILY          Objective:     Patient Vitals for the past 8 hrs:   BP Temp Pulse Resp SpO2 Weight   04/20/20 0428 126/79 98.2 °F (36.8 °C) 77 18 93 % 115 lb 11.9 oz (52.5 kg)     No intake/output data recorded. 04/18 1901 - 04/20 0700  In: 240 [P.O.:240]  Out: -     Physical Exam: Lungs: clear to auscultation bilaterally  Heart: regular rate and rhythm, S1, S2 normal, no murmur, click, rub or gallop  Abdomen: soft, non-tender. Bowel sounds normal. No masses,  no organomegaly        Data Review No results found for this or any previous visit (from the past 24 hour(s)).         Assessment:     Active Problems:    Atrial fibrillation, rapid ([address] (4/17/2020)      Pressure injury of skin of upper back (4/17/2020)      Pressure injury of skin of contiguous region involving back and buttock (4/17/2020)        Plan:     1) Wound care   2) NH placement

## 2020-04-20 NOTE — PROGRESS NOTES
ADRI:  1. SNF referral pending. 2. SNF vs. Longterm care. 3.BLS transport. [facility] phone [phone] : [facility] unable to accept patient, cm contacted Dr. Bernard Stone and her sister, \[facility]\"" to discuss plan. Dr. Bernard Stone stated patient would need care in a SNF. \[facility]\"" is agreeable for cm to send multiple referrals to facilities accepting patients. Referrals sent to [facility], and [facility]. Cm contacted patient sister, \[facility]\"" [phone] to discuss discharge plan. Patient presenting with confusion today. \[facility]\"" stated she would like patient to go to [facility], cm advised her if there are not available beds that additional choices would be needed. Referral sent, waiting response.                 Ashwini Miramontes [facility]

## 2020-04-21 PROCEDURE — 65270000029 HC RM PRIVATE

## 2020-04-21 PROCEDURE — 97535 SELF CARE MNGMENT TRAINING: CPT

## 2020-04-21 PROCEDURE — 97161 PT EVAL LOW COMPLEX 20 MIN: CPT | Performed by: PHYSICAL THERAPIST

## 2020-04-21 PROCEDURE — 74011250637 HC RX REV CODE- 250/637: Performed by: FAMILY MEDICINE

## 2020-04-21 PROCEDURE — 74011250637 HC RX REV CODE- 250/637: Performed by: INTERNAL MEDICINE

## 2020-04-21 PROCEDURE — 74011250636 HC RX REV CODE- 250/636: Performed by: INTERNAL MEDICINE

## 2020-04-21 PROCEDURE — 97530 THERAPEUTIC ACTIVITIES: CPT | Performed by: PHYSICAL THERAPIST

## 2020-04-21 RX ADMIN — DAKIN'S SOLUTION 0.125% (QUARTER STRENGTH): 0.12 SOLUTION at 14:13

## 2020-04-21 RX ADMIN — LORATADINE 10 MG: 10 TABLET ORAL at 09:58

## 2020-04-21 RX ADMIN — ENOXAPARIN SODIUM 40 MG: 40 INJECTION SUBCUTANEOUS at 17:29

## 2020-04-21 RX ADMIN — TRAMADOL HYDROCHLORIDE 50 MG: 50 TABLET, FILM COATED ORAL at 11:05

## 2020-04-21 RX ADMIN — FUROSEMIDE 40 MG: 40 TABLET ORAL at 17:29

## 2020-04-21 RX ADMIN — METOPROLOL TARTRATE 25 MG: 25 TABLET, FILM COATED ORAL at 17:29

## 2020-04-21 RX ADMIN — COLLAGENASE SANTYL: 250 OINTMENT TOPICAL at 14:13

## 2020-04-21 RX ADMIN — LEVOTHYROXINE SODIUM 100 MCG: 0.1 TABLET ORAL at 09:58

## 2020-04-21 RX ADMIN — FUROSEMIDE 40 MG: 40 TABLET ORAL at 09:57

## 2020-04-21 RX ADMIN — SPIRONOLACTONE 12.5 MG: 25 TABLET ORAL at 09:55

## 2020-04-21 RX ADMIN — Medication 1 CAPSULE: at 09:58

## 2020-04-21 RX ADMIN — PANTOPRAZOLE SODIUM 40 MG: 40 TABLET, DELAYED RELEASE ORAL at 07:55

## 2020-04-21 RX ADMIN — POTASSIUM CHLORIDE 20 MEQ: 750 TABLET, FILM COATED, EXTENDED RELEASE ORAL at 09:54

## 2020-04-21 RX ADMIN — METOPROLOL TARTRATE 25 MG: 25 TABLET, FILM COATED ORAL at 09:58

## 2020-04-21 NOTE — PROGRESS NOTES
0800 Bedside shift change report given to shimon (oncoming nurse) by edgar (offgoing nurse). Report included the following information SBAR, Kardex and MAR.

## 2020-04-21 NOTE — PROGRESS NOTES
Problem: Mobility Impaired (Adult and Pediatric)  Goal: *Acute Goals and Plan of Care (Insert Text)  Description: FUNCTIONAL STATUS PRIOR TO ADMISSION: Patient poor historian but known from previous admissions. Lives at [facility] and has caregiver. Utilizes w/c for mobility and unsure how often patient gets OOB. Very debilitated at baseline. HOME SUPPORT PRIOR TO ADMISSION: The patient lived alone with paid caregiver to provide assistance. Physical Therapy Goals  Initiated 4/21/2020  1. Patient will move from supine to sit and sit to supine  in bed with maximal assistance within 7 day(s). 2.  Patient will transfer from bed to chair and chair to bed with maximal assistance using the least restrictive device within 7 day(s). 3.  Patient will sit EOB x 5 mins for static task within 7 days. 4.  Patient will participate with LE exercises with Rhiannon within 7 days. Outcome: Progressing Towards Goal   PHYSICAL THERAPY EVALUATION  Patient: Cheikh Jeronimo (18 y.o. female)  Date: 4/21/2020  Primary Diagnosis: Atrial fibrillation, rapid [facility]) [I48.91]        Precautions:   Bed Alarm, Fall      ASSESSMENT  Based on the objective data described below, the patient presents with decreased functional mobility but is likely not too far from functional baseline. Patient needing totalA x 2 for all bed mobility and unwilling to attempt any EOB activities at this time. Visually patient does not appear that she gets out of bed much at home. She is ornery and overall disinterested in participating with PT. Will follow for trial but LTC is most appropriate. Current Level of Function Impacting Discharge (mobility/balance): totalA x 2 for all mobility, essentially bed bound at this time      Other factors to consider for discharge: at risk for falls, bed bound, poor historian     Patient will benefit from skilled therapy intervention to address the above noted impairments.        PLAN :  Recommendations and Planned Interventions: bed mobility training, transfer training, therapeutic exercises, and therapeutic activities      Frequency/Duration: Patient will be followed by physical therapy:  2 times a week to address goals. Recommendation for discharge: (in order for the patient to meet his/her long term goals)  Therapy up to 5 days/week in SNF setting--Likely more appropriate for Long term care      IF patient discharges home will need the following DME: has equipment she needs         SUBJECTIVE:   Patient stated I am just so uncomfortable in this bed.     OBJECTIVE DATA SUMMARY:   HISTORY:    Past Medical History:   Diagnosis Date    A-fib ([facility]     Arrhythmia     A FIB    Arthritis     Back pain     CAD (coronary artery disease)     PT DENIES    Chronic pain     Dementia ([facility] 2/9/2016    Hypercholesterolemia     Psychiatric disorder     ANXIETY    Shoulder pain     Thyroid disease     Tremor, essential      Past Surgical History:   Procedure Laterality Date    HX APPENDECTOMY      HX ORTHOPAEDIC      left knee replacement    HX KADIE AND BSO      AGE 45    HX TONSIL AND ADENOIDECTOMY      IR KYPHOPLASTY LUMBAR  4/23/2019       Personal factors and/or comorbidities impacting plan of care:     Home Situation  Home Environment: [address] Name: [facility]  One/Two Story Residence: One story  Living Alone: Yes  Support Systems: Assisted living  Patient Expects to be Discharged to[de-identified] Rehabilitation facility  Current DME Used/Available at Home: None    EXAMINATION/PRESENTATION/DECISION MAKING:   Critical Behavior:  Neurologic State: Alert  Orientation Level: Oriented to person  Cognition: Appropriate for age attention/concentration, Impaired decision making  Safety/Judgement: Decreased awareness of environment, Decreased awareness of need for assistance, Decreased awareness of need for safety, Decreased insight into deficits  Hearing:   Auditory  Auditory Impairment: None    Range Of Motion:  AROM: Generally decreased, functional                       Strength:    Strength: Generally decreased, functional       Functional Mobility:  Bed Mobility:  Rolling: Total assistance;Assist x2  Supine to Sit: Total assistance;Assist x2;Bed Modified(bed to chair position)     Scooting: Total assistance;Assist x2  Transfers:   Not tested                          Balance:   Sitting: Impaired(L lateral lean in supported sitting)    Pain Rating:  Reports pain with movement but does not rate    Activity Tolerance:   Poor and requires frequent rest breaks  Please refer to the flowsheet for vital signs taken during this treatment. After treatment patient left in no apparent distress:   Supine in bed, Call bell within reach, and Side rails x 3    COMMUNICATION/EDUCATION:   The patients plan of care was discussed with: Physical therapist, Occupational therapist, and Registered nurse. Patient understands intent and goals of therapy, but is neutral about his/her participation. and Patient is unable to participate in goal setting and plan of care.     Thank you for this referral.  Nick Solo, PT, DPT   Time Calculation: 13 mins

## 2020-04-21 NOTE — ROUTINE PROCESS
Bedside shift change report given to aleksander cortez rn (oncoming nurse) by shimon rn (offgoing nurse). Report included the following information SBAR and Kardex.

## 2020-04-21 NOTE — PROGRESS NOTES
ADRI:  [facility] accepted. 2. BLS transport. [facility] phone [phone]      Cm noted [facility] unable to accept patient, cm sent referrals to Private Practice. [facility]. [facility] can accept, and [facility] pending. CM to update Dana Petrfrank (sister) when appropriate. 1409: CM notified that [facility] can accept patient. CM spoke with Dana Felix, she is agreeable to plan.         Ashlie Joyner [facility]

## 2020-04-21 NOTE — PROGRESS NOTES
Benito Eduardo, Wolf Quarles, Kennedi, and Rey Short Date: 4/17/2020      Subjective:     Patient feeling OK with no new issues. .       Current Facility-Administered Medications   Medication Dose Route Frequency    ALPRAZolam (XANAX) tablet 0.5 mg  0.5 mg Oral TID PRN    furosemide (LASIX) tablet 40 mg  40 mg Oral BID    lactobac ac& pc-s.therm-b.anim (DAVE Q/RISAQUAD)  1 Cap Oral DAILY    levothyroxine (SYNTHROID) tablet 100 mcg  100 mcg Oral DAILY    loratadine (CLARITIN) tablet 10 mg  10 mg Oral DAILY    metoprolol tartrate (LOPRESSOR) tablet 25 mg  25 mg Oral BID    pantoprazole (PROTONIX) tablet 40 mg  40 mg Oral ACB    potassium chloride SR (KLOR-CON 10) tablet 20 mEq  20 mEq Oral DAILY    spironolactone (ALDACTONE) tablet 12.5 mg  12.5 mg Oral DAILY    ondansetron (ZOFRAN) injection 4 mg  4 mg IntraVENous Q6H PRN    acetaminophen (TYLENOL) tablet 650 mg  650 mg Oral Q4H PRN    enoxaparin (LOVENOX) injection 40 mg  40 mg SubCUTAneous Q24H    traMADoL (ULTRAM) tablet 50 mg  50 mg Oral Q6H PRN    sodium hypochlorite (QUARTER STRENGTH DAKIN'S) 0.125% irrigation (bottle)   Topical DAILY    collagenase (SANTYL) 250 unit/gram ointment   Topical DAILY          Objective:     Patient Vitals for the past 8 hrs:   BP Temp Pulse Resp SpO2 Weight   04/21/20 0847 120/63 97.9 °F (36.6 °C) 86 16 93 %    04/21/20 0351 106/59 98.3 °F (36.8 °C) 98 16 92 % 113 lb (51.3 kg)     No intake/output data recorded. 04/19 1901 - 04/21 0700  In: 200 [P.O.:200]  Out: 400 [Urine:400]    Physical Exam: Lungs: clear to auscultation bilaterally  Heart: regular rate and rhythm, S1, S2 normal, no murmur, click, rub or gallop  Abdomen: soft, non-tender. Bowel sounds normal. No masses,  no organomegaly        Data Review No results found for this or any previous visit (from the past 24 hour(s)).         Assessment:     Active Problems:    Atrial fibrillation, rapid ([address] (4/17/2020)      Pressure injury of skin of upper back (4/17/2020)      Pressure injury of skin of contiguous region involving back and buttock (4/17/2020)        Plan:     1) Cont wound care  2) NHP

## 2020-04-21 NOTE — PROGRESS NOTES
Problem: Self Care Deficits Care Plan (Adult)  Goal: *Acute Goals and Plan of Care (Insert Text)  Description:   FUNCTIONAL STATUS PRIOR TO ADMISSION: Patient is poor historian. Patient resides at [address] [place] [address] and has private caregivers during the day. She reports getting OOB to chair and reports requiring min A for ADLs. Pt becomes irritable with questioning therefore unclear how much assistance is required. HOME SUPPORT: The patient lived alone with home care aides to provide assistance. Occupational Therapy Goals  Initiated 4/20/2020  1. Patient will perform self-feeding with supervision/set-up in supported sitting within 7 day(s). 2.  Patient will tolerate sitting EOB for 2 minutes to perform simple grooming task with minimal assistance/contact guard assist within 7 day(s). 3.  Patient will perform upper body bathing with moderate assistance  within 7 day(s). 4.  Patient will perform rolling to R/L with min A to assist with toileting hygiene within 7 day(s). 5.  Patient will perform toilet transfers with maximal assistance using least restrictive DME within 7 day(s). 6.  Patient will participate in upper extremity therapeutic exercise/activities with minimal assistance/contact guard assist for 5 minutes within 7 day(s). Outcome: Not Met    OCCUPATIONAL THERAPY TREATMENT  Patient: Dk Taylor (51 y.o. female)  Date: 4/21/2020  Diagnosis: Atrial fibrillation, rapid ([facility] [address] [I48.91]   <principal problem not specified>       Precautions:    Chart, occupational therapy assessment, plan of care, and goals were reviewed. ASSESSMENT  Patient continues with skilled OT services and is progressing towards goals. Patient cleared by RN to be seen, received in bed and agreeable to treatment. Patient limited by fluctuating willingness to participate (initially agreeable then resisting mobility), impaired ROM, strength, balance, debility, confusion, and self limiting behavior.  Patient required max A to roll in bed in order to maximize proper positioning for self feeding. Patient with max A x2 to scoot to [facility] and bed mechanics utilized to place patient in upright sitting position. Patient required total A to manage containers on breakfast tray and and setup to bring drink to mouth. Patient left sitting upright in bed eating breakfast with call bell in reach, RN aware. Will continue to follow, recommend SNF with transition to LTC once medically cleared for D/C. Current Level of Function Impacting Discharge (ADLs): min-total A for ADLs    Other factors to consider for discharge: requiring assist x2 for mobility and up to total A for ADLs         PLAN :  Patient continues to benefit from skilled intervention to address the above impairments. Continue treatment per established plan of care. to address goals. Recommend with staff: Recommend with nursing patient to complete as able in order to maintain strength, endurance and independence: ADLs with supervision/setup and bed to chair position 3x/day and mobilizing self in bed in prep for toileting with 2 assist. Thank you for your assistance. Recommend next OT session: simple EOB ADLs    Recommendation for discharge: (in order for the patient to meet his/her long term goals)  Therapy up to 5 days/week in SNF setting with transition to LTC    This discharge recommendation:  Has been made in collaboration with the attending provider and/or case management    IF patient discharges home will need the following DME: bedside commode, hospital bed, and mechanical lift       SUBJECTIVE:   Patient stated No, don't do that.  re: assisting patient to roll from side to back in prep for self feeding    OBJECTIVE DATA SUMMARY:   Cognitive/Behavioral Status:  Neurologic State: Alert  Orientation Level: Oriented to person  Cognition: Appropriate for age attention/concentration; Impaired decision making  Perception: Appears intact  Perseveration: No perseveration noted  Safety/Judgement: Decreased awareness of environment;Decreased awareness of need for assistance;Decreased awareness of need for safety;Decreased insight into deficits    Functional Mobility and Transfers for ADLs:  Bed Mobility:  Rolling: Maximum assistance; Additional time  Supine to Sit: Bed Modified(bed mechanics utilized)  Scooting: Maximum assistance; Additional time;Assist x2(to scoot to [facility])    Transfers:  NT this session    Balance:  Sitting: Impaired(L lateral lean in supported sitting)    ADL Intervention:  Feeding  Container Management: Total assistance (dependent)  Food to Mouth: Set-up  Drink to Mouth: Set-up  Cues: Physical assistance;Verbal cues provided    Cognitive Retraining  Safety/Judgement: Decreased awareness of environment;Decreased awareness of need for assistance;Decreased awareness of need for safety;Decreased insight into deficits    Pain:  Did not quantify, however, with mobility, patient calling out that movement hurts    Activity Tolerance:   Poor, VSS  Please refer to the flowsheet for vital signs taken during this treatment. After treatment patient left in no apparent distress:   Call bell within reach, Side rails x 3, and sitting upright in bed, RN aware     COMMUNICATION/COLLABORATION:   The patients plan of care was discussed with: Physical therapist, Registered nurse, and Case management.      [place] OT  Time Calculation: 12 mins

## 2020-04-21 NOTE — PROGRESS NOTES
Problem: Impaired Skin Integrity/Pressure Injury Treatment  Goal: *Improvement of Existing Pressure Injury  Outcome: Progressing Towards Goal     Problem: Pressure Injury - Risk of  Goal: *Prevention of pressure injury  Description: Document Tre Scale and appropriate interventions in the flowsheet. Outcome: Progressing Towards Goal  Note: Pressure Injury Interventions:  Sensory Interventions: Assess changes in LOC    Moisture Interventions: Internal/External urinary devices    Activity Interventions: Pressure redistribution bed/mattress(bed type)    Mobility Interventions: HOB 30 degrees or less    Nutrition Interventions: Document food/fluid/supplement intake    Friction and Shear Interventions: HOB 30 degrees or less                Problem: Falls - Risk of  Goal: *Absence of Falls  Description: Document Jer Fall Risk and appropriate interventions in the flowsheet.   Outcome: Progressing Towards Goal  Note: Fall Risk Interventions:       Mentation Interventions: Door open when patient unattended    Medication Interventions: Bed/chair exit alarm    Elimination Interventions: Bed/chair exit alarm

## 2020-04-21 NOTE — PROGRESS NOTES
Bedside and Verbal shift change report given to Papa (oncoming nurse) by Cris Goldberg (offgoing nurse). Report included the following information SBAR, Kardex and MAR.

## 2020-04-22 VITALS
BODY MASS INDEX: 21.09 KG/M2 | OXYGEN SATURATION: 97 % | TEMPERATURE: 98.9 F | HEIGHT: 63 IN | RESPIRATION RATE: 16 BRPM | WEIGHT: 119 LBS | SYSTOLIC BLOOD PRESSURE: 125 MMHG | HEART RATE: 79 BPM | DIASTOLIC BLOOD PRESSURE: 70 MMHG

## 2020-04-22 PROCEDURE — 74011250637 HC RX REV CODE- 250/637: Performed by: FAMILY MEDICINE

## 2020-04-22 PROCEDURE — 74011250637 HC RX REV CODE- 250/637: Performed by: INTERNAL MEDICINE

## 2020-04-22 RX ORDER — FUROSEMIDE 40 MG/1
40 TABLET ORAL 2 TIMES DAILY
Qty: 60 TAB | Refills: 2 | Status: SHIPPED | OUTPATIENT
Start: 2020-04-22

## 2020-04-22 RX ADMIN — LORATADINE 10 MG: 10 TABLET ORAL at 09:51

## 2020-04-22 RX ADMIN — POTASSIUM CHLORIDE 20 MEQ: 750 TABLET, FILM COATED, EXTENDED RELEASE ORAL at 09:51

## 2020-04-22 RX ADMIN — PANTOPRAZOLE SODIUM 40 MG: 40 TABLET, DELAYED RELEASE ORAL at 07:07

## 2020-04-22 RX ADMIN — METOPROLOL TARTRATE 25 MG: 25 TABLET, FILM COATED ORAL at 09:51

## 2020-04-22 RX ADMIN — DAKIN'S SOLUTION 0.125% (QUARTER STRENGTH): 0.12 SOLUTION at 09:52

## 2020-04-22 RX ADMIN — LEVOTHYROXINE SODIUM 100 MCG: 0.1 TABLET ORAL at 09:51

## 2020-04-22 RX ADMIN — FUROSEMIDE 40 MG: 40 TABLET ORAL at 09:51

## 2020-04-22 RX ADMIN — COLLAGENASE SANTYL: 250 OINTMENT TOPICAL at 09:52

## 2020-04-22 RX ADMIN — SPIRONOLACTONE 12.5 MG: 25 TABLET ORAL at 09:51

## 2020-04-22 RX ADMIN — Medication 1 CAPSULE: at 09:50

## 2020-04-22 NOTE — DISCHARGE INSTRUCTIONS
Patient Discharge Instructions    Imelda Mack / [id number] : 1931    Admitted 2020 Discharged: 2020     Take Home Medications            · It is important that you take the medication exactly as they are prescribed. · Keep your medication in the bottles provided by the pharmacist and keep a list of the medication names, dosages, and times to be taken in your wallet. · Do not take other medications without consulting your doctor. What to do at Home    Recommended diet: Regular Diet,     Recommended activity: Activity as tolerated, PT/OT consult, Wound care consult    Please send wound care consult from [facility] with patient to [facility]. If you experience any of the following symptoms fever, SOB, please follow up with Dr Dinora Gandhi. Follow-up Appointments   Procedures    FOLLOW UP VISIT Appointment in: Other (Specify) Do not follow patients at [facility] F/U by facility MD     Do not follow patients at [facility]/ by facility MD     Standing Status:   Standing     Number of Occurrences:   1     Order Specific Question:   Appointment in     Answer: Other (Specify)            Information obtained by :  I understand that if any problems occur once I am at home I am to contact my physician. I understand and acknowledge receipt of the instructions indicated above.                                                                                                                                            Physician's or R.N.'s Signature                                                                  Date/Time                                                                                                                                              Patient or Representative Signature                                                          Date/Time

## 2020-04-22 NOTE — PROGRESS NOTES
Pt curled up in \"L shape\" in bed. Refuses to be repositioned. States she is comfortable. Unsuccessful attempt made to reposition her with pillows.

## 2020-04-22 NOTE — PROGRESS NOTES
Transition of Care Plan to SNF/Rehab    SNF/Rehab Transition:  Patient has been accepted to [facility]  and meets criteria for admission. Patient will transported by [facility] and expected to leave at12:00 p.m. Communication to Patient/Family:  Met with patient and contacted her sister, Jeovany Galan [phone] (identified care giver) and they are agreeable to the transition plan. Communication to SNF/Rehab:  Bedside RN, Alina Hyman, has been notified to update the transition plan to the facility and call report (phone number [phone]). Discharge information has been updated on the AVS.     Discharge instructions to be fax'd to facility at [facility] # [phone]). Patient has been identified as part of the Heart failure BCPI-A Program.  For Care Coordination associated with that Bundle Program, please contact  Bundle information has been communication to. Nursing Please include all hard scripts for controlled substances, med rec and dc summary, and AVS in packet. [facility] can manage the patient care needs for the following:     SNF/Rehab Transition:  Patient to follow-up with Home Health:Facility to determine   Reviewed and confirmed with facility, [facility] they can manage the patient care needs for the following:     Shameka Cota with (X) only those applicable:    Medication:  []  Medications will be available at the facility  []  IV Antibiotics   []  Controlled Substance - hard copy to be sent with patient   []  Weekly Labs   Documents:  [x] Hard RX  [x] MAR  [x] Kardex  [x] AVS  [x]Transfer Summary  [x]Discharge   Equipment:  []  CPAP/BiPAP  []  Wound Vacuum  []  Mock or Urinary Device  []  PICC/Central Line  []  Nebulizer  []  Ventilator   Treatment:  []Isolation (for MRSA, VRE, etc.)  []Surgical Drain Management  []Tracheostomy Care  []Dressing Changes  []Dialysis with transportation and chair time .   []PEG Care  []Oxygen  []Daily Weights for Heart Failure Dietary:  []Any diet limitations  []Tube Feedings   []Total Parenteral Management (TPN)   Eligible for Medicaid Long Term Services and Supports  Yes:  [] Eligible for medical assistance or will become eligible within 180 days and UAI completed. [] Provider/Patient and/or support system has requested screening. [x] UAI copy provided to patient or responsible party,Ileana Bloom  [] UAI unavailable at discharge will send once processed to SNF provider. [] UAI unavailable at discharged mailed to patient  No:   [] Private pay and is not financially eligible for Medicaid within the next 180 days. [] Reside out-of-state.   [] A residents of a state owned/operated facility that is licensed  by [address] [facility] or [facility]  [] Enrollment in [facility] hospice services  [] [address]  [] Patient /Family declines to have screening completed or provide financial information for screening     Financial Resources:  Medicaid    [x] Initiated and application pending   [] Full coverage     Advanced Care Plan:  []Surrogate Decision Maker of Care  []POA  []Communicated Code Status DNR (DDNR\", \"Full\")    Other

## 2020-04-22 NOTE — WOUND CARE
WOCN Note: Follow-up visit for scapula and sacral wounds. Chart reviewed. Admitted DX: Atrial fibrillation, rapid Past Medical History: dementia Admitted from [address] [place] [address] Dr Patton Assessment:  
Patient is alert, communicative and requires assist with repositioning. Bed: p500 air mattress Patient wearing briefs for incontinence and has a Pure wick. Patient reports no pain. Heels offloaded with pillows. Heels intact without erythema. 1. POA, left proximal scapula unstageable pressure injury: 2.5 x 2.5 x 0.5 cm  100% tan/yellow slough. no exudate; no odor. Periwound blanching red erythema. 2.  POA, left distal scapula unstageable pressure injury:  1.5 x 2 x 0.3 cm; 50% moist yellow 50% moist red; no exudate; no odor. periwound blanching red erythema. 3.  POA sacral, Pressure Injury stage 4:  6 x 10 x 3.5 cm; tunnel 5 cm at 3 o'clock. 50% yellow 50% red; no malodor; scant serosanguinous exudate. Wound, Pressure Prevention & Skin Care Recommendations: 1. Minimize layers of linen/pads under patient to optimize support surface. 2.  Turn/reposition approximately every 2 hours and offload heels;  
3. Manage incontinence / promote continence/minimize use of briefs when able. 4.  Sacrum:  Daily cleanse with 1/4 Dakins; apply Santyl and pack with dakins moist gauze; cover with dry dressing. 5.  Back:  Daily cleanse with 1/4 Dakins; apply Santyl; cover with bordered foam dressing. Discussed above plan with Jen Monreal. Transition of Care: Plan to follow as needed while admitted to hospital. 
 
ORI Conteh RN [address] Inpatient Wound Care Available on [employer] Pager [phone] Office [phone]

## 2020-04-22 NOTE — PROGRESS NOTES
Bedside shift change report given to Cierra Wakefield (oncoming nurse) by Soledad Prasad RN (offgoing nurse). Report included the following information SBAR, Kardex and MAR.

## 2020-04-22 NOTE — PROGRESS NOTES
Problem: Impaired Skin Integrity/Pressure Injury Treatment  Goal: *Improvement of Existing Pressure Injury  Outcome: Resolved/Not Met  Goal: *Prevention of pressure injury  Description: Document Tre Scale and appropriate interventions in the flowsheet. Outcome: Resolved/Not Met  Note: Pressure Injury Interventions:  Sensory Interventions: Assess changes in LOC    Moisture Interventions: Absorbent underpads, Apply protective barrier, creams and emollients, Check for incontinence Q2 hours and as needed, Internal/External urinary devices    Activity Interventions: Assess need for specialty bed    Mobility Interventions: Float heels, HOB 30 degrees or less, Pressure redistribution bed/mattress (bed type), Turn and reposition approx. every two hours(pillow and wedges)    Nutrition Interventions: Document food/fluid/supplement intake    Friction and Shear Interventions: HOB 30 degrees or less                Problem: Patient Education: Go to Patient Education Activity  Goal: Patient/Family Education  Outcome: Resolved/Not Met     Problem: Pressure Injury - Risk of  Goal: *Prevention of pressure injury  Description: Document Tre Scale and appropriate interventions in the flowsheet. Outcome: Resolved/Not Met  Note: Pressure Injury Interventions:  Sensory Interventions: Assess changes in LOC    Moisture Interventions: Absorbent underpads, Apply protective barrier, creams and emollients, Check for incontinence Q2 hours and as needed, Internal/External urinary devices    Activity Interventions: Assess need for specialty bed    Mobility Interventions: Float heels, HOB 30 degrees or less, Pressure redistribution bed/mattress (bed type), Turn and reposition approx.  every two hours(pillow and wedges)    Nutrition Interventions: Document food/fluid/supplement intake    Friction and Shear Interventions: HOB 30 degrees or less                Problem: Patient Education: Go to Patient Education Activity  Goal: Patient/Family Education  Outcome: Resolved/Not Met     Problem: Falls - Risk of  Goal: *Absence of Falls  Description: Document Jer Fall Risk and appropriate interventions in the flowsheet.   Outcome: Resolved/Not Met  Note: Fall Risk Interventions:       Mentation Interventions: Adequate sleep, hydration, pain control, Bed/chair exit alarm, Door open when patient unattended, Reorient patient    Medication Interventions: Bed/chair exit alarm, Patient to call before getting OOB    Elimination Interventions: Bed/chair exit alarm, Call light in reach              Problem: Patient Education: Go to Patient Education Activity  Goal: Patient/Family Education  Outcome: Resolved/Not Met     Problem: Afib Pathway: Day 1  Goal: Off Pathway (Use only if patient is Off Pathway)  Outcome: Resolved/Not Met  Goal: *Optimal pain control at patient's stated goal  Outcome: Resolved/Not Met  Goal: *Lungs clear or at baseline  Outcome: Resolved/Not Met  Goal: *Labs within defined limits  Outcome: Resolved/Not Met  Goal: *Describes available resources and support systems  Outcome: Resolved/Not Met     Problem: Afib Pathway: Day 2  Goal: Off Pathway (Use only if patient is Off Pathway)  Outcome: Resolved/Not Met  Goal: Respiratory  Outcome: Resolved/Not Met  Goal: Treatments/Interventions/Procedures  Outcome: Resolved/Not Met  Goal: Psychosocial  Outcome: Resolved/Not Met  Goal: *Optimal pain control at patient's stated goal  Outcome: Resolved/Not Met  Goal: *Lungs clear or at baseline  Outcome: Resolved/Not Met  Goal: *Describes available resources and support systems  Outcome: Resolved/Not Met     Problem: Afib Pathway: Day 3  Goal: Off Pathway (Use only if patient is Off Pathway)  Outcome: Resolved/Not Met  Goal: Activity/Safety  Outcome: Resolved/Not Met  Goal: Diagnostic Test/Procedures  Outcome: Resolved/Not Met  Goal: Nutrition/Diet  Outcome: Resolved/Not Met  Goal: Discharge Planning  Outcome: Resolved/Not Met  Goal: Medications  Outcome: Resolved/Not Met  Goal: Respiratory  Outcome: Resolved/Not Met  Goal: Treatments/Interventions/Procedures  Outcome: Resolved/Not Met  Goal: Psychosocial  Outcome: Resolved/Not Met     Problem: Afib: Discharge Outcomes (not in CAT)  Goal: *Hemodynamically stable  Outcome: Resolved/Not Met  Goal: *Stable cardiac rhythm  Outcome: Resolved/Not Met  Goal: *Lungs clear or at baseline  Outcome: Resolved/Not Met  Goal: *Optimal pain control at patient's stated goal  Outcome: Resolved/Not Met  Goal: *Identifies cardiac risk factors  Outcome: Resolved/Not Met  Goal: *Verbalizes home exercise program, activity guidelines, cardiac precautions  Outcome: Resolved/Not Met  Goal: *Verbalizes understanding and describes prescribed diet  Outcome: Resolved/Not Met  Goal: *Verbalizes understanding and describes medication purposes and frequencies  Outcome: Resolved/Not Met  Goal: *Anxiety reduced or absent  Outcome: Resolved/Not Met  Goal: *Understands and describes signs and symptoms to report to providers(Stroke Metric)  Outcome: Resolved/Not Met  Goal: *Describes follow-up/return visits to physicians  Outcome: Resolved/Not Met  Goal: *Describes available resources and support systems  Outcome: Resolved/Not Met  Goal: *Influenza immunization  Outcome: Resolved/Not Met  Goal: *Pneumococcal immunization  Outcome: Resolved/Not Met  Goal: *Describes smoking cessation resources  Outcome: Resolved/Not Met     Problem: Patient Education: Go to Patient Education Activity  Goal: Patient/Family Education  Outcome: Resolved/Not Met     Problem: Patient Education: Go to Patient Education Activity  Goal: Patient/Family Education  Outcome: Resolved/Not Met

## 2020-04-22 NOTE — PROGRESS NOTES
TRANSFER - OUT REPORT:    Verbal report given to Hakan RN(name) on Risa Ashley  being transferred to [facility] for routine progression of care       Report consisted of patients Situation, Background, Assessment and   Recommendations(SBAR). Information from the following report(s) SBAR, Kardex and MAR was reviewed with the receiving nurse. Lines:       Opportunity for questions and clarification was provided.       Patient transported with:   Nalini Mckeon

## 2020-04-22 NOTE — PROGRESS NOTES
NUTRITION COMPLETE ASSESSMENT    RECOMMENDATIONS:   1. Add MVI with minerals, Vit C 500 mg BID, and Zinc 220 mg daily for wound healing purposes  2. RD to liberalize diet to promote PO intake. Allow double protein sources. Preferences updated. Supplement trial as blow. Interventions/Plan:   Food/Nutrient Delivery:  Modify diet/texture/consistency/nutrients - adjust to regular, ANGELA to promote PO intake; add Magic Cup and Ensure Compact as milkshake once daily for pt to try    Nutrition Discharge Needs: high kcal/high protein diet for wound healing; daily MVI, Vit C, and zinc  Assessment:   Reason for Assessment: MST - wound    Diet: cardiac  Supplements: none at this time  Meal Intake:   Patient Vitals for the past 168 hrs:   % Diet Eaten   04/20/20 1304 95 %   04/19/20 0809 50 %   04/17/20 2003 50 %        Subjective: Assessment completed by chart review. Attempted to call pt, but did not answer. Previously interviewed during 10/2019 admission and stated \"I don't eat a whole lot, but I eat. Don't like scrambled eggs. Don't like red meat. Don't like supplements. I'll eat chicken. I'll eat yogurt. I'll eat hard boiled eggs. -130 lb\"    Objective:  80 y.o. female admitted with Atrial fibrillation, rapid ([facility] [address] [I48.91]    Pt has a past medical history of A-fib ([facility] [address] Arrhythmia, Arthritis, Back pain, CAD (coronary artery disease), Chronic pain, Dementia ([facility] [address] (2/9/2016), Hypercholesterolemia, Psychiatric disorder, Shoulder pain, Thyroid disease, and Tremor, essential.  During admission in May 2019, pt noted to have cardiomegaly and pulmonary edema POA with generalized anasarca. Weight significantly elevated d/t fluid on that admission and was 141# - estimated dry weight closer to 120# at that time. Current admission weight was 138# and likely skewed again d/t fluid or scale. Believe current weight of 119 lb is likely closer to dry/actual weight (although, still some edema noted).   She is on daily lasix. ~1 year ago she has a weight of 120 lb listed, so I cannot accurately state she has had weight loss over past year - seems to fluctuate d/t fluid and/or bed scale discrepancy. MST was negative for reported weight loss or poor appetite. Wt Readings from Last 20 Encounters:   04/22/20 54 kg (119 lb)   03/20/20 62.6 kg (138 lb)   03/06/20 62.8 kg (138 lb 7.2 oz)   10/25/19 58.9 kg (129 lb 12.8 oz)   10/16/19 68 kg (150 lb)   10/04/19 68.4 kg (150 lb 12.8 oz)   07/03/19 59.9 kg (132 lb)   06/05/19 60 kg (132 lb 4.4 oz)   04/24/19 61.9 kg (136 lb 7.4 oz)   04/23/19 54.4 kg (120 lb)   08/02/18 71 kg (156 lb 9.6 oz)   12/13/17 54.4 kg (120 lb)   08/30/17 54.4 kg (120 lb)   02/01/17 68 kg (150 lb)   08/08/16 75.8 kg (167 lb)   02/13/16 75.9 kg (167 lb 4.8 oz)   07/13/15 74.4 kg (164 lb)   03/24/15 74.4 kg (164 lb)   09/22/14 74.4 kg (164 lb)   07/31/14 74.4 kg (164 lb)       Nutritionally Significant Medications:   Lasix, Theresa-Q, Synthroid, Protonix, Klor    Intake/Output:    Intake/Output Summary (Last 24 hours) at 4/22/2020 1132  Last data filed at 4/21/2020 2256  Gross per 24 hour   Intake    Output 700 ml   Net -700 ml     Last BM: 4/21 - formed    Physical Findings:    Edema:   LUE: 1+  LLE: 1+  RLE: 2+; non-pitting    Skin Integrity: per [facility] 4/17  1. POA, left proximal scapula unstageable pressure injury  2. POA, left distal scapula unstageable pressure injury   3. POA, sacral stage 4 pressure injury     Nutrition focused physical exam: deferred at this time    Anthropometrics:  Weight Loss Metrics 4/22/2020 3/20/2020 3/6/2020 10/25/2019 10/16/2019 10/4/2019 9/27/2019   Today's Wt 119 lb 138 lb 138 lb 7.2 oz 129 lb 12.8 oz 150 lb 150 lb 12.8 oz -   BMI 21.08 kg/m2 24.45 kg/m2 24.53 kg/m2 23.74 kg/m2 26.57 kg/m2 - 26.71 kg/m2      Weight Source: Bed  Height: 5' 3\" (160 cm),    Body mass index is 21.08 kg/m².      IBW : 52.2 kg (115 lb), % IBW (Calculated): 103.48 %  Usual Body Weight: 59 kg (130 lb),      Labs:      No results for input(s): GLU, BUN, CREA, NA, K, CL, CO2, CA, PHOS, MG in the last 72 hours. No results for input(s): SGOT, GPT, ALT, AP, TBIL, TBILI, TP, ALB, GLOB, GGT, AML, LPSE in the last 72 hours. No lab exists for component: AMYP, HLPSE    No results for input(s): LAC in the last 72 hours. No results for input(s): WBC, HGB, HCT, PLT, HGBEXT, HCTEXT, PLTEXT, HGBEXT, HCTEXT, PLTEXT, HGBEXT, HCTEXT, PLTEXT in the last 72 hours. No results for input(s): PREALB in the last 72 hours. No results for input(s): TRIGL in the last 72 hours. No results for input(s): GLUCPOC in the last 72 hours. Lab Results   Component Value Date/Time    Hemoglobin A1c 5.8 01/07/2014 03:26 PM    Hemoglobin A1c (POC) 5.7 (A) 09/27/2012 12:00 PM       Cultural, Presybeterian and ethnic food preferences identified: None  Food Allergies: NKFA  Diet Restrictions: Cultural/Judaism Preference(s): None     Estimated Nutrition Needs:   Kcals/day: 1600 Kcals/day(minimal)  Protein: 80 g(-110 gm/day using 1.5-2 gm/kg)  Fluid: 1600 ml(30 mL/kg)  Based On: Kcal/kg - specify (Comment)(30+ kcal/kg)  Weight Used: Actual wt(54 kg)    Pt expected to meet estimated nutrient needs:  Unable to predict at this time  Nutrition Diagnosis:   1.  Increased nutrient needs related to wound healing as evidenced by unstageable and stage IV wounds      Goals:     PO >/=50% of meals with at least 1 supplement daily over next 5-7 days     Monitoring & Evaluation:   Total energy intake, Protein intake, Vitamin intake, Mineral/element intake  Weight/weight change, Electrolyte and renal profile, GI(wounds)  Behavior, Food/nutrition knowledge    Previous Nutrition Goals Met:   N/A  Previous Recommendations:    N/A    Education & Discharge Needs:   [] None Identified or too early to determine   [x] Identified and addressed - see intervention/plan   [x] Participated in care plan, discharge planning, and/or interdisciplinary rounds Esesnce Flores RD  Available via [employer]  Or Pager# [phone]

## 2020-04-23 ENCOUNTER — PATIENT OUTREACH (OUTPATIENT)
Dept: FAMILY MEDICINE CLINIC | Age: 85
End: 2020-04-23

## 2020-05-08 ENCOUNTER — PATIENT OUTREACH (OUTPATIENT)
Dept: FAMILY MEDICINE CLINIC | Age: 85
End: 2020-05-08

## 2020-05-18 NOTE — DISCHARGE SUMMARY
Physician Discharge Summary     Patient ID:  Genevieve Haynes  [id number]  80 y.o.  6/12/1931    Admit date: 4/17/2020    Discharge date and time: 5/18/2020    Admission Diagnoses: Atrial fibrillation, rapid [facility]) [I48.91]    Discharge Diagnoses:  Principal Diagnosis Pressure injury of skin of contiguous region involving back and buttock                                            Principal Problem:    Pressure injury of skin of contiguous region involving back and buttock (4/17/2020)    Active Problems:    Atrial fibrillation, rapid ([address] (4/17/2020)      Pressure injury of skin of upper back (4/17/2020)           Hospital Course: Admitted from her independent living apartment as her caretaker was no longer able to take care of her particularly regarding her upper and buttock wound care. The patient has very little ability to participate in her own mobility. She has a H/O a fib and dCHF and is compliant with her meds including diuretics when she decides to be. Also with a worsening dementia with poor judgment and decision making ability. Wound care was consulted who initiated wound care regimen. Air mattress was obtained. Case management was involved to help with placement as she had no other option but to go to a snf for further care- certainly for the rest of her life. PCP: JESSEE Preston    Consults: None        Discharge Exam:  Visit Vitals  /70 (BP 1 Location: Right arm, BP Patient Position: At rest)   Pulse 79   Temp 98.9 °F (37.2 °C)   Resp 16   Ht 5' 3\" (1.6 m)   Wt 119 lb (54 kg)   SpO2 97%   BMI 21.08 kg/m²     Lungs: clear to auscultation bilaterally  Heart: regular rate and rhythm, S1, S2 normal, no murmur, click, rub or gallop  Abdomen: soft, non-tender. Bowel sounds normal. No masses,  no organomegaly  Extremities: extremities normal, atraumatic, no cyanosis , 2+ edema ankles  Skin: 2 pressure wounds on back- one upper and one lower back. No evidence of infection.     Disposition: SNF    Patient Instructions:   Cannot display discharge medications since this patient is not currently admitted. Activity: Activity as tolerated  Diet: Regular Diet  Wound Care: continue wound care as per hospital wound care recs, continue air mattress. Follow-up Appointments   Procedures    FOLLOW UP VISIT Appointment in: Other (Specify) Do not follow patients at [facility] F/U by facility MD     Do not follow patients at [facility]    F/ by facility MD     Standing Status:   Standing     Number of Occurrences:   1     Order Specific Question:   Appointment in     Answer:    Other (Specify)          Signed:  Yan Sheets MD  5/18/2020  8:50 AM

## 2020-06-02 ENCOUNTER — PATIENT OUTREACH (OUTPATIENT)
Dept: FAMILY MEDICINE CLINIC | Age: 85
End: 2020-06-02

## 2020-06-17 ENCOUNTER — PATIENT OUTREACH (OUTPATIENT)
Dept: FAMILY MEDICINE CLINIC | Age: 85
End: 2020-06-17

## 2020-07-01 ENCOUNTER — PATIENT OUTREACH (OUTPATIENT)
Dept: FAMILY MEDICINE CLINIC | Age: 85
End: 2020-07-01

## 2020-07-20 ENCOUNTER — TELEPHONE (OUTPATIENT)
Dept: WOUND CARE | Age: 85
End: 2020-07-20

## 2020-07-20 NOTE — TELEPHONE ENCOUNTER
New Patient Appointment Reminder and [phone] Screening     Have you or anyone in your house hold been tested for or have had confirmed positive Covid-19 test or suspected of or have you been around anyone that has had confirmed or suspected Covid-19? No     Does Patient have fever and/or lower respiratory symptoms? (shortness of breath, difficulty breathing, cough) If yes continue with travel screening questions and cancel patient appointment, and refer to to PCP or ED. No     Referred to PCP or to the closest ED and notified ED of pending patient arrival:  No   Open travel questions and document patient responses. If No stop here and give patient reminder time for appointment and ask them please limit to having only 1 person accompany to appointment if necessary, no children allowed at visits. Remind all patients and caretakers they must wear a mask when entering into the wound clinic. Reminder Appointment time given:  Yes

## 2020-07-27 ENCOUNTER — TELEPHONE (OUTPATIENT)
Dept: WOUND CARE | Age: 85
End: 2020-07-27

## 2020-07-27 NOTE — TELEPHONE ENCOUNTER
New Patient Appointment Reminder and [phone] Screening     Have you or anyone in your house hold been tested for or have had confirmed positive Covid-19 test or suspected of or have you been around anyone that has had confirmed or suspected Covid-19? No     Does Patient have fever and/or lower respiratory symptoms? (shortness of breath, difficulty breathing, cough) If yes continue with travel screening questions and cancel patient appointment, and refer to to PCP or ED. No     Referred to PCP or to the closest ED and notified ED of pending patient arrival:  No   Open travel questions and document patient responses. If No stop here and give patient reminder time for appointment and ask them please limit to having only 1 person accompany to appointment if necessary, no children allowed at visits. Remind all patients and caretakers they must wear a mask when entering into the wound clinic. Reminder Appointment time given:  Yes

## 2020-07-28 ENCOUNTER — HOSPITAL ENCOUNTER (OUTPATIENT)
Dept: WOUND CARE | Age: 85
Discharge: HOME | End: 2020-07-28
Payer: MEDICARE

## 2020-07-28 PROBLEM — M86.9 OSTEOMYELITIS (HCC): Status: ACTIVE | Noted: 2020-07-28

## 2020-07-28 PROBLEM — S72.001A CLOSED FRACTURE OF NECK OF RIGHT FEMUR (HCC): Status: RESOLVED | Noted: 2018-09-11 | Resolved: 2020-07-28

## 2020-07-28 PROBLEM — S71.002A WOUND, OPEN, HIP OR THIGH, LEFT, INITIAL ENCOUNTER: Status: RESOLVED | Noted: 2019-04-23 | Resolved: 2020-07-28

## 2020-07-28 PROBLEM — B37.2 YEAST DERMATITIS: Status: RESOLVED | Noted: 2019-09-29 | Resolved: 2020-07-28

## 2020-07-28 PROBLEM — L89.40 PRESSURE INJURY OF SKIN OF CONTIGUOUS REGION INVOLVING BACK AND BUTTOCK: Status: RESOLVED | Noted: 2020-04-17 | Resolved: 2020-07-28

## 2020-07-28 PROBLEM — J18.9 PNA (PNEUMONIA): Status: RESOLVED | Noted: 2019-05-27 | Resolved: 2020-07-28

## 2020-07-28 PROBLEM — D69.2 SENILE PURPURA (HCC): Status: RESOLVED | Noted: 2019-09-14 | Resolved: 2020-07-28

## 2020-07-28 PROBLEM — S71.102A WOUND, OPEN, HIP OR THIGH, LEFT, INITIAL ENCOUNTER: Status: RESOLVED | Noted: 2019-04-23 | Resolved: 2020-07-28

## 2020-07-28 PROBLEM — L89.154 PRESSURE ULCER OF SACRAL REGION, STAGE 4 (HCC): Status: ACTIVE | Noted: 2020-07-28

## 2020-07-28 PROBLEM — S91.301A NON-HEALING WOUND OF RIGHT HEEL: Status: RESOLVED | Noted: 2019-04-23 | Resolved: 2020-07-28

## 2020-07-28 PROBLEM — S70.01XA HIP HEMATOMA, RIGHT, INITIAL ENCOUNTER: Status: RESOLVED | Noted: 2018-07-29 | Resolved: 2020-07-28

## 2020-07-28 PROBLEM — S81.812A LACERATION OF LEG, LEFT: Status: RESOLVED | Noted: 2020-02-25 | Resolved: 2020-07-28

## 2020-07-28 PROBLEM — N61.0 ACUTE MASTITIS OF LEFT BREAST: Status: RESOLVED | Noted: 2019-10-04 | Resolved: 2020-07-28

## 2020-07-28 PROBLEM — N64.4 BREAST PAIN, LEFT: Status: RESOLVED | Noted: 2019-09-29 | Resolved: 2020-07-28

## 2020-07-28 PROCEDURE — 99213 OFFICE O/P EST LOW 20 MIN: CPT

## 2020-07-28 PROCEDURE — 11043 DBRDMT MUSC&/FSCA 1ST 20/<: CPT

## 2020-07-28 RX ORDER — IBUPROFEN 800 MG/1
TABLET ORAL
COMMUNITY

## 2020-07-28 NOTE — PROGRESS NOTES
Past Medical History:   Diagnosis Date    A-fib [facility])     Arrhythmia     A FIB    Arthritis     Back pain     CAD (coronary artery disease)     PT DENIES    Chronic pain     Dementia ([facility] [address] 2016    GERD (gastroesophageal reflux disease)     Hypercholesterolemia     Ill-defined condition     Psychiatric disorder     ANXIETY    Shoulder pain     Thyroid disease     Tremor, essential        Past Surgical History:   Procedure Laterality Date    HX APPENDECTOMY      HX ORTHOPAEDIC      left knee replacement    HX KADIE AND BSO      AGE 39    HX TONSIL AND ADENOIDECTOMY      IR KYPHOPLASTY LUMBAR  2019       Family History   Problem Relation Age of Onset    Dementia Mother     Arthritis-osteo Father        Social History     Tobacco Use    Smoking status: Former Smoker     Packs/day: 0.50     Years: 10.00     Pack years: 5.00     Last attempt to quit: 1994     Years since quittin.5    Smokeless tobacco: Never Used   Substance Use Topics    Alcohol use: Yes     Comment: NIGHTLY 2 GLASSES WINE    Drug use: No       Allergies   Allergen Reactions    Latex, Natural Rubber Other (comments)     Pt denies allergy to latex    Codeine Other (comments)     \"It just sends me up the wall\"    Adhesive Rash and Other (comments)     blisters    Cortisone Unknown (comments)       Current Outpatient Medications on File Prior to Encounter   Medication Sig Dispense Refill    therapeutic multivitamin-minerals (THERAGRAN-M) tablet Take 1 Tab by mouth daily.  ibuprofen (MOTRIN) 800 mg tablet Take  by mouth every eight (8) hours as needed for Pain.  furosemide (LASIX) 40 mg tablet Take 1 Tab by mouth two (2) times a day. 60 Tab 2    spironolactone (ALDACTONE) 25 mg tablet Take 0.5 Tabs by mouth daily. 30 Tab 5    L. acidoph & paracasei- S therm- Bifido (DAVE-Q/RISAQUAD) 8 billion cell cap cap Take 1 Cap by mouth daily.  5 Cap 0    metoprolol tartrate (LOPRESSOR) 25 mg tablet Take 0.5 Tabs by mouth two (2) times a day. 60 Tab 5    pantoprazole (PROTONIX) 40 mg tablet Take 1 Tab by mouth Daily (before breakfast). 30 Tab 5    loratadine (CLARITIN) 10 mg tablet Take 10 mg by mouth daily.  levothyroxine (SYNTHROID) 100 mcg tablet TAKE ONE TABLET BY MOUTH EVERY MORNING BEFORE BREAKFAST 30 Tab 5    potassium chloride (K-DUR, KLOR-CON) 20 mEq tablet TAKE 1 TABLET BY MOUTH EVERY DAY 30 Tab 3    collagenase (SANTYL) 250 unit/gram ointment Apply pressure sores back as dir 90 g 0    sodium hypochlorite (QUARTER STRENGTH DAKIN'S) 0.125 % soln external solution Clean wounds daily as dir 473 mL 1    ALPRAZolam (XANAX) 0.5 mg tablet TAKE ONE TABLET BY MOUTH 3 TIMES A DAY AS NEEDED ANXIETY 90 Tab 0     No current facility-administered medications on file prior to encounter. No components found for: LABA1C    . Carmina Thao RN    Registered Nurse       Progress Notes    Signed    Date of Service:  07/28/20 1307                     []Hide copied text    []Kaleb for details      07/28/20 1303   Wound Sacrum 1   Date First Assessed/Time First Assessed: 07/28/20 1301   Present on Hospital Admission: Yes  Wound Approximate Age at First Assessment (Weeks): 8 weeks  Primary Wound Type: Pressure Injury  Location: Sacrum  Wound Description: 1   Dressing Status Removed   Dressing Type ABD pad;Honey; Foam   Pressure Injury Stage 4   Wound Length (cm) 6.3 cm   Wound Width (cm) 7.3 cm   Wound Depth (cm) 1.4 cm   Wound Surface Area (cm^2) 45.99 cm^2   Wound Volume (cm^3) 64.39 cm^3   Condition of Base Pink;Granulation   Condition of Edges Open   Drainage Amount Moderate   Drainage Color Serosanguinous   Wound Odor None   Ekta-wound Assessment Blanchable erythema   Cleansing and Cleansing Agents  Normal saline   Wound Back Left 2   Date First Assessed/Time First Assessed: 07/28/20 1302   Present on Hospital Admission: Yes  Wound Approximate Age at First Assessment (Weeks): 8 weeks  Location: Back  Wound Location Orientation: Left  Wound Description: 2   Dressing Status Removed   Dressing Type Foam   Wound Length (cm) 0.4 cm   Wound Width (cm) 0.2 cm   Wound Depth (cm) 0.2 cm   Wound Surface Area (cm^2) 0.08 cm^2   Wound Volume (cm^3) 0.02 cm^3   Condition of Base Pink   Drainage Amount Small   Drainage Color Serosanguinous   Wound Odor None   Ekta-wound Assessment Intact   Cleansing and Cleansing Agents  Normal saline   There were no vitals taken for this visit.                         Chief Complaint (CC): non healing wounds back and buttocks. .  Present Illness (PI): patient in nursing regular care with long term open wounds over sacrum and L scapula. Is being treated for osteomyelitis with IV meds. Pertinent Past History (PMedHx): low thryroid, DJD L  Knee, dementia. Afib, cholesterol, R femur FX treated, recurrent UTI  Also noted:                         Medications and Allergies: as per [facility] Med Rec. I have reviewed and concur. Illnesses: as per '[address] [place] [address]' recorded data noted today. Surgeries and Injuries: as per '[address] [place] [address]' recorded data noted today. IOLI, KADIE BSO, tonsiles, appy. ORIF. Review of Systems (ROS):                        Integumentary: Other than as noted in 'PI'; skin hair and nails normal for age, with no new rash, lumps, bumps, eruptions or bleeding. Lymph: no new prominent nodes or drainage near lymph nodes. Bones, Joints, and Muscles: Other than as noted in 'PI' no new fractures, dislocations, weakness or pain. Except L knee . Hematopoietic: no new bleeding or bruising or anemia changes. .                        Eyes: no recent trauma or inflammation. 0. Eye glasses. has. Intra Occular Lens Implants (IOLI)                        Ears: Hearing is unchanged and usually good. Nose: no new drainage, rhinorhea or epistaxis. Mouth, and throat: no soreness, drainage or lesions. no. Dentures. Neck: no new masses, drainage or pain                        Respiratory; no hemoptysis, current shortness of breath or pain with breath. Cardiovascular: No chest pain, palpitation or tachycardia. Note A Fib. Gastrointestinal: no recent change in appetite, stools or food tolerance. No jaundice, hematemesis, vomiting or diarrhea                        Genito-Urinary: urine color, frequency, sensation unchanged                        Neurologic: no syncope, dizzyness or unusual sensations. Psychologic and Mental Status: no change in mood, sleep or memory recently     Social History: '"[employer]' data today is noted. Lives in [facility]. Family History: '[facility]' data today is noted. As above. Physical Exam:      General:  alert fussy, thin laying on R side in bed with limbs flexed, speaking, oriented to place, not time , poor short term mem . Head, Eyes, Ears, Nose and Throat: normocephalic, PERRLA IOLI-OU, Tms, mucosa benign. Neck: supple without masses or adenopathy. No Bruit. Chest: full excursion without deformity, . Lungs: clear to ausc. Heart: irregular irregular. Abdomen: soft flat but tender to any touch? .  Neurology: Cranial nerves 2-12 grossly intact .           Reflexes: BJ, CBJ, KJ, AJ 1+ and =.  Vascular:                         Pulses:                                            R                    L                                               Radial                        +.                 +.                                              Femoral                     +.                 +.                                              DP                             +.                 +.                                              PT                             +.                 +.  Extremities: supple to my motion but all movement hurts or bothers her, no edema, no ulceration. Other: R sub scapular back with two small ulcer the upper is fixed to deep bone tissue, just below a small ulcer just into subQ with slough over all surface,  Mid-sacral ulcer wide with a satellite opening just caudad from the main, ulcer bed is into bone tissue inferior and R lateral with exposed muscle and fascial slough. noted     Vital signs and data recorded in [facility]' for this patient today is noted, reviewed and considered. Patient notes today: no pain right now. Procedure Note     Name of Procedure: sharp excisional debridement. PreOp diagnosis: pressure ulcers, ? Osteo sacrum or L thorax. .  Anaesthesia: Lidocaine; topical   Description: using a sharp curette I excised all non viable tissue to effect a clean bleeding base. Tissue level of debridement: bone. Post debridement dimensions changed as noted:    Depth, add 1.0 mm;    Width, add 0.0 mm   Length, add 0.0 mm. Blood Loss: 4. CCs. Bleeding abated post treatment . Post Op Diagnosis, and condition: no pain with Rx. Follow Up Plan: RTC 2 weeks, baby shampoo clean, maikol with aquacel. Silvia Sow Specimens: 0. Counseled regarding/Discussed: good progress with current wound care, may not need osteomyelitis bone debridement out side of wound debridement. Clinical considerations: alert to infection control, active off loading. Prognosis: may close with aggressive care, control of osteo.     Dx Codes: P61.336; .Q52.526

## 2020-07-28 NOTE — WOUND CARE
07/28/20 1345 Wound Sacrum 1 Date First Assessed/Time First Assessed: 07/28/20 1301   Present on Hospital Admission: Yes  Wound Approximate Age at First Assessment (Weeks): 8 weeks  Primary Wound Type: Pressure Injury  Location: Sacrum  Wound Description: 1 Dressing Type Applied 4 x 4;Collagens/cell matrix; Foam  
Procedure Tolerated Well Wound Back Left 2 Date First Assessed/Time First Assessed: 07/28/20 1302   Present on Hospital Admission: Yes  Wound Approximate Age at First Assessment (Weeks): 8 weeks  Location: Back  Wound Location Orientation: Left  Wound Description: 2 Dressing Type Applied Alginate; Foam  
Procedure Tolerated Well Discharge Condition: Stable Pain: 0 Ambulatory Status: Stretcher Discharge Destination: SNF/LTAC Transportation: Ambulance Accompanied by: EMT Discharge instructions reviewed with Patient and copy or written instructions have been provided. All questions/concerns have been addressed at this time.

## 2020-07-28 NOTE — DISCHARGE INSTRUCTIONS
Discharge Instructions for  [address], [address] [place] [address] Avenue  Telephone: [phone]    NAME:  Praveen Cook  YOB: 1931  MEDICAL RECORD NUMBER:  [id number]  DATE:  7/28/2020        Dressings:           Wound Location back and sacrum  Cleanse wound with baby shampoo and let set for 3 minutes then rinse with NS. Apply maikol and cover with gauze, ABD and tape. Apply aquacel ag to back and ABD. Secure with tape. Change both wounds every other day. Pressure Relief:  [x] When sitting, shift position or do seat lifts every 15 minutes. [x] Specialty Bed/Mattress  Make sure patient is on low air mattress  [x] Turn every 2 hours when in bed. Avoid position directing pressure on wound site. Limit side lying to 30 degree tilt. Limit HOB elevation to 30 degrees. Dietary:  [x] Diet as tolerated: [x] Increase Protein   Activity:  [x] Activity as tolerated:               Return Appointment:  [x] Return Appointment: With [employer]  in 2 [facility])     Electronically signed on 7/28/2020 at 1:14 PM     Griselda Jackson 281: Should you experience any significant changes in your wound(s) or have questions about your wound care, please contact the [facility] Main at [address] [place] [address] 8:00 am - 4:30. If you need help with your wound outside these hours and cannot wait until we are again available, contact your PCP or go to the hospital emergency room. PLEASE NOTE: IF YOU ARE UNABLE TO OBTAIN WOUND SUPPLIES, CONTINUE TO USE THE SUPPLIES YOU HAVE AVAILABLE UNTIL YOU ARE ABLE TO REACH US. IT IS MOST IMPORTANT TO KEEP THE WOUND COVERED AT ALL TIMES.       Physician Signature:_______________________    Date: ___________ Time:  ____________

## 2020-07-28 NOTE — PROGRESS NOTES
07/28/20 1303   Wound Sacrum 1   Date First Assessed/Time First Assessed: 07/28/20 1301   Present on Hospital Admission: Yes  Wound Approximate Age at First Assessment (Weeks): 8 weeks  Primary Wound Type: Pressure Injury  Location: Sacrum  Wound Description: 1   Dressing Status Removed   Dressing Type ABD pad;Honey; Foam   Pressure Injury Stage 4   Wound Length (cm) 6.3 cm   Wound Width (cm) 7.3 cm   Wound Depth (cm) 1.4 cm   Wound Surface Area (cm^2) 45.99 cm^2   Wound Volume (cm^3) 64.39 cm^3   Condition of Base Pink;Granulation   Condition of Edges Open   Drainage Amount Moderate   Drainage Color Serosanguinous   Wound Odor None   Ekta-wound Assessment Blanchable erythema   Cleansing and Cleansing Agents  Normal saline   Wound Back Left 2   Date First Assessed/Time First Assessed: 07/28/20 1302   Present on Hospital Admission: Yes  Wound Approximate Age at First Assessment (Weeks): 8 weeks  Location: Back  Wound Location Orientation: Left  Wound Description: 2   Dressing Status Removed   Dressing Type Foam   Wound Length (cm) 0.4 cm   Wound Width (cm) 0.2 cm   Wound Depth (cm) 0.2 cm   Wound Surface Area (cm^2) 0.08 cm^2   Wound Volume (cm^3) 0.02 cm^3   Condition of Base Pink   Drainage Amount Small   Drainage Color Serosanguinous   Wound Odor None   Ekta-wound Assessment Intact   Cleansing and Cleansing Agents  Normal saline   There were no vitals taken for this visit.

## 2020-08-06 ENCOUNTER — HOSPITAL ENCOUNTER (OUTPATIENT)
Dept: MRI IMAGING | Age: 85
Discharge: HOME | End: 2020-08-06
Attending: EMERGENCY MEDICINE

## 2020-08-06 VITALS — BODY MASS INDEX: 19.49 KG/M2 | WEIGHT: 110 LBS

## 2020-08-06 DIAGNOSIS — M86.9 OSTEOMYELITIS (HCC): ICD-10-CM

## 2020-08-06 RX ORDER — GADOTERATE MEGLUMINE 376.9 MG/ML
10 INJECTION INTRAVENOUS ONCE
Status: DISCONTINUED | OUTPATIENT
Start: 2020-08-06 | End: 2020-08-07 | Stop reason: HOSPADM

## 2020-08-17 ENCOUNTER — TELEPHONE (OUTPATIENT)
Dept: WOUND CARE | Age: 85
End: 2020-08-17

## 2020-08-17 NOTE — TELEPHONE ENCOUNTER
Attempted to call patient for appointment reminder, covid 19 prescreen but patients phone has been disconnected.  No other contact listed

## 2020-08-18 ENCOUNTER — HOSPITAL ENCOUNTER (OUTPATIENT)
Dept: WOUND CARE | Age: 85
Discharge: HOME | End: 2020-08-18
Payer: MEDICARE

## 2020-08-18 VITALS
RESPIRATION RATE: 18 BRPM | TEMPERATURE: 97.9 F | HEART RATE: 84 BPM | DIASTOLIC BLOOD PRESSURE: 67 MMHG | OXYGEN SATURATION: 94 % | SYSTOLIC BLOOD PRESSURE: 110 MMHG

## 2020-08-18 PROCEDURE — 11043 DBRDMT MUSC&/FSCA 1ST 20/<: CPT

## 2020-08-18 NOTE — PROGRESS NOTES
08/18/20 1327   Wound Sacrum 1   Date First Assessed/Time First Assessed: 07/28/20 1301   Present on Hospital Admission: Yes  Wound Approximate Age at First Assessment (Weeks): 8 weeks  Primary Wound Type: Pressure Injury  Location: Sacrum  Wound Description: 1   Dressing Status Removed; Old drainage   Dressing Type Adhesive wound dressing (Band-Aid)   Non-staged Wound Description Full thickness   Wound Length (cm) 3.6 cm   Wound Width (cm) 4.2 cm   Wound Depth (cm) 2.5 cm   Wound Surface Area (cm^2) 15.12 cm^2   Wound Volume (cm^3) 37.8 cm^3   Change in Wound Size % 67.12   Condition of Base Pink;Granulation   Condition of Edges Rolled/curled   Assessment Red   Drainage Amount Moderate   Drainage Color Serosanguinous   Wound Odor None   Ekta-wound Assessment Blanchable erythema;Pink   [REMOVED] Wound Back Left 2   Final Assessment Date/Final Assessment Time: 08/18/20 1344  Date First Assessed/Time First Assessed: 07/28/20 1302   Present on Hospital Admission: Yes  Wound Approximate Age at First Assessment (Weeks): 8 weeks  Location: Back  Wound Location [place]a. .. Dressing Status Removed   Dressing Type Adhesive wound dressing (Band-Aid)   Wound Length (cm) 0.1 cm   Wound Width (cm) 0.1 cm   Wound Depth (cm) 0.1 cm   Wound Surface Area (cm^2) 0.01 cm^2   Wound Volume (cm^3) 0 cm^3   Change in Wound Size % 87.5   Condition of Base Eschar   Assessment Pink   Drainage Amount None   Wound Odor None   Ekta-wound Assessment Pink   Cleansing and Cleansing Agents  Normal saline   Dressing Type Applied Foam;ABD pad;Collagens/cell matrix   Procedure Tolerated Well           Visit Vitals  /67 (BP 1 Location: Left arm, BP Patient Position: At rest)   Pulse 84   Temp 97.9 °F (36.6 °C)   Resp 18   SpO2 94%     Dressing applied per md order. Tolerated well, stable.  Dc via stretcher and transport service

## 2020-08-18 NOTE — PROGRESS NOTES
Enzo Medel, MARY JANE    Registered Nurse    Wound Care    Wound Care    Signed    Date of Service:  08/18/20 1330                     []Hide copied text    []Kaleb for details      08/18/20 1327   Wound Sacrum 1   Date First Assessed/Time First Assessed: 07/28/20 1301   Present on Hospital Admission: Yes  Wound Approximate Age at First Assessment (Weeks): 8 weeks  Primary Wound Type: Pressure Injury  Location: Sacrum  Wound Description: 1   Dressing Status Removed; Old drainage   Dressing Type Adhesive wound dressing (Band-Aid)   Non-staged Wound Description Full thickness   Wound Length (cm) 3.6 cm   Wound Width (cm) 4.2 cm   Wound Depth (cm) 2.5 cm   Wound Surface Area (cm^2) 15.12 cm^2   Wound Volume (cm^3) 37.8 cm^3   Change in Wound Size % 67.12   Condition of Base Pink;Granulation   Condition of Edges Rolled/curled   Assessment Red   Drainage Amount Moderate   Drainage Color Serosanguinous   Wound Odor None   Ekta-wound Assessment Blanchable erythema;Pink   Wound Back Left 2   Date First Assessed/Time First Assessed: 07/28/20 1302   Present on Hospital Admission: Yes  Wound Approximate Age at First Assessment (Weeks): 8 weeks  Location: Back  Wound Location Orientation: Left  Wound Description: 2   Dressing Status Removed   Dressing Type Adhesive wound dressing (Band-Aid)   Wound Length (cm) 0.1 cm   Wound Width (cm) 0.1 cm   Wound Depth (cm) 0.1 cm   Wound Surface Area (cm^2) 0.01 cm^2   Wound Volume (cm^3) 0 cm^3   Change in Wound Size % 87.5   Condition of Base Eschar   Assessment Pink   Drainage Amount None   Wound Odor None   Ekta-wound Assessment Pink     Visit Vitals  /67 (BP 1 Location: Left arm, BP Patient Position: At rest)   Pulse 84   Temp 97.9 °F (36.6 °C)   Resp 18   SpO2 94%                            I have noted, and reviewed today's data for this patient in [facility] and concur with same.  The focused physical exam, other physical findings, Medical history, Review of Symptoms and Medications today remains unchanged except as noted below. Patient notes today: doing ok, . Lesion/Wound, focused exam on Presentation today: L subscapular ulcer, closed, note tract is adherent to fascia,non mobile  Sacral ulcer much smaller, clean pink/purple border, slough over all base with red beefy ulcer base, little to no undermining. .    Procedure:   Wound # pressure ulcer. Procedure name: sharp excisional debridement. Anaesthesia: Lidocaine; topical    Description: using a sharp curette I excised all non viable tissue to effect a clean bleeding base. Tissue Level/depth of debridement: fascia. Post debridement dimensions changed as noted:    Depth, add 1.0 mm;    Width, add 0.0 mm   Length, add 0.0 mm. Blood Loss: 4 CCs. Bleeding abated post treatment . Post Procedure Condition/ Diagnosis: much improved, no pain with Rx today. Follow up and Future plans today: RTC 3 weeks, continue maikol this cycle will be able to retreat to [facility] next. Specimens: 0. Patient Counseled regarding/Discussed: steady healing suggests good care and adequate nutrition, much happier today. Clinical Considerations: alert to best off loading, wet control, infection alert. Dx Codes: R60.610. Chrystal Navarro

## 2020-08-18 NOTE — DISCHARGE INSTRUCTIONS
Discharge Instructions for  [address], [address] [place] [address] Avenue  Telephone: [phone]    NAME:  Dmitry Briggs  YOB: 1931  MEDICAL RECORD NUMBER:  [id number]  DATE:  8/18/2020      Dressings:           Wound Location: sacrum  Cleanse wound with baby shampoo and let set for 3 minutes then rinse with normal saline. Apply maikol and cover with gauze, ABD and tape. Secure with tape. Change every other day. Pressure Relief:  [x] When sitting, shift position or do seat lifts every 15 minutes. [x] Specialty Bed/Mattress  Make sure patient is on low air mattress  [x] Turn every 2 hours when in bed. Avoid position directing pressure on wound site. Limit side lying to 30 degree tilt. Limit HOB elevation to 30 degrees. Dietary:  [x] Diet as tolerated: [x] Increase Protein   Activity:  [x] Activity as tolerated:               Return Appointment:  [x] Return Appointment: With [employer]  in 3 [facility])     Electronically signed on 8/18/2020 at [address] [place], [place] [address]d: Should you experience any significant changes in your wound(s) or have questions about your wound care, please contact the [facility] Main at [address] [place] [address] 8:00 am - 4:30. If you need help with your wound outside these hours and cannot wait until we are again available, contact your PCP or go to the hospital emergency room. PLEASE NOTE: IF YOU ARE UNABLE TO OBTAIN WOUND SUPPLIES, CONTINUE TO USE THE SUPPLIES YOU HAVE AVAILABLE UNTIL YOU ARE ABLE TO REACH US. IT IS MOST IMPORTANT TO KEEP THE WOUND COVERED AT ALL TIMES.       Physician Signature:_______________________    Date: ___________ Time:  ____________

## 2020-08-18 NOTE — WOUND CARE
08/18/20 1327 Wound Sacrum 1 Date First Assessed/Time First Assessed: 07/28/20 1301   Present on Hospital Admission: Yes  Wound Approximate Age at First Assessment (Weeks): 8 weeks  Primary Wound Type: Pressure Injury  Location: Sacrum  Wound Description: 1 Dressing Status Removed; Old drainage Dressing Type Adhesive wound dressing (Band-Aid) Non-staged Wound Description Full thickness Wound Length (cm) 3.6 cm Wound Width (cm) 4.2 cm Wound Depth (cm) 2.5 cm Wound Surface Area (cm^2) 15.12 cm^2 Wound Volume (cm^3) 37.8 cm^3 Change in Wound Size % 67.12 Condition of Base Pink;Granulation Condition of Edges Rolled/curled Assessment Red Drainage Amount Moderate Drainage Color Serosanguinous Wound Odor None Ekta-wound Assessment Blanchable erythema;Pink Wound Back Left 2 Date First Assessed/Time First Assessed: 07/28/20 1302   Present on Hospital Admission: Yes  Wound Approximate Age at First Assessment (Weeks): 8 weeks  Location: Back  Wound Location Orientation: Left  Wound Description: 2 Dressing Status Removed Dressing Type Adhesive wound dressing (Band-Aid) Wound Length (cm) 0.1 cm Wound Width (cm) 0.1 cm Wound Depth (cm) 0.1 cm Wound Surface Area (cm^2) 0.01 cm^2 Wound Volume (cm^3) 0 cm^3 Change in Wound Size % 87.5 Condition of Base Eschar Assessment Pink Drainage Amount None Wound Odor None Ekta-wound Assessment Pink Visit Vitals /67 (BP 1 Location: Left arm, BP Patient Position: At rest) Pulse 84 Temp 97.9 °F (36.6 °C) Resp 18 SpO2 94%

## 2020-09-08 ENCOUNTER — HOSPITAL ENCOUNTER (OUTPATIENT)
Dept: WOUND CARE | Age: 85
Discharge: HOME | End: 2020-09-08

## 2020-09-08 NOTE — DISCHARGE INSTRUCTIONS
Discharge Instructions for  [address], [address] [place] [address] Avenue  Telephone: [phone]    NAME:  Echo Mace  YOB: 1931  MEDICAL RECORD NUMBER:  [id number]  DATE:  8/18/2020      Dressings:           Wound Location: sacrum  Cleanse wound with baby shampoo and let set for 3 minutes then rinse with normal saline. Apply maikol and cover with gauze, ABD and tape. Secure with tape. Change every other day. Pressure Relief:  [x] When sitting, shift position or do seat lifts every 15 minutes. [x] Specialty Bed/Mattress  Make sure patient is on low air mattress  [x] Turn every 2 hours when in bed. Avoid position directing pressure on wound site. Limit side lying to 30 degree tilt. Limit HOB elevation to 30 degrees. Dietary:  [x] Diet as tolerated: [x] Increase Protein   Activity:  [x] Activity as tolerated:               Return Appointment:  [x] Return Appointment: With Art Patel  in 3 [facility])     Electronically signed on 8/18/2020 at [address]: Should you experience any significant changes in your wound(s) or have questions about your wound care, please contact the [facility] Main at [address] [place] [address] 8:00 am - 4:30. If you need help with your wound outside these hours and cannot wait until we are again available, contact your PCP or go to the hospital emergency room. PLEASE NOTE: IF YOU ARE UNABLE TO OBTAIN WOUND SUPPLIES, CONTINUE TO USE THE SUPPLIES YOU HAVE AVAILABLE UNTIL YOU ARE ABLE TO REACH US. IT IS MOST IMPORTANT TO KEEP THE WOUND COVERED AT ALL TIMES.       Physician Signature:_______________________    Date: ___________ Time:  ____________

## 2021-05-19 NOTE — PROGRESS NOTES
Attempted to draw pts blood. Pt refused. Will attempt once more at a later time. Spontaneous, unlabored and symmetrical

## 2021-08-03 PROBLEM — I48.91 A-FIB (HCC): Status: RESOLVED | Noted: 2021-08-03 | Resolved: 2021-08-03

## 2022-09-07 NOTE — PROGRESS NOTES
2:11 PM 
TRANSFER - IN REPORT: 
 
Verbal report received from [place], [facility] (name) on [employer]  being received from PACU (unit) for routine post - op Report consisted of patients Situation, Background, Assessment and  
Recommendations(SBAR). Information from the following report(s) SBAR, Kardex, OR Summary, Intake/Output, MAR and Recent Results was reviewed with the receiving nurse. Opportunity for questions and clarification was provided. Assessment completed upon patients arrival to unit and care assumed. "Walk with no pain".

## 2022-12-20 NOTE — PROGRESS NOTES
Chief Complaint   Patient presents with    Cough     Vomiting,mucus,congestion,x1 day       HISTORY OF PRESENT ILLNESS: Patient is a 6 y.o. male who presents today with his mother, parent and patient provide history.  He notes onset of cough with cough induced vomiting since yesterday.  The patient has had nasal congestion for over 1 week.  She denies any fever, complaints of ear pain.  He denies any abdominal pain.  Patient does have a history of ear infections as well as seasonal allergies.  He has been prescribed Zyrtec in the past but no longer takes. He is otherwise a generally healthy child without chronic medical conditions, does not take daily medications, vaccinations are up to date and deny further pertinent medical history.     Patient Active Problem List    Diagnosis Date Noted    Chronic idiopathic constipation 12/05/2017       Allergies:Cefdinir    Current Outpatient Medications Ordered in Epic   Medication Sig Dispense Refill    azithromycin (ZITHROMAX) 200 MG/5ML Recon Susp 6.8 mL by mouth on day one followed by 3.4 mL on days 2-5. 22.5 mL 0    cetirizine (ZYRTEC) 5 MG/5ML Solution oral solution Take 5 mL by mouth every day. (Patient not taking: Reported on 12/20/2022) 118 mL 1     No current [facility]-ordered facility-administered medications on file.       Past Medical History:   Diagnosis Date    Healthy infant     Otitis media        Tobacco Use    Passive exposure: Never       Family Status   Relation Name Status    Mo  (Not Specified)    Fa  (Not Specified)    Sis  (Not Specified)     Family History   Problem Relation Age of Onset    No Known Problems Mother     No Known Problems Father     No Known Problems Sister        ROS:  Review of Systems   Constitutional: Negative for fever, reduction in appetite, reduction in activity level.   HENT:  Positive for congestion.  Negative for ear pulling or pain, nosebleeds.    Eyes: Negative for ocular drainage.   Neuro: Negative for neurological changes, HA.  Nurse went into patient's room to draw lab, pt declines blood draw. Nurse told patient that the order is from Brandon Smiley and Hien Cano. Patient declines stating that she does not want it and besides Dr Roberto Cranker is her cousin. Nurse reinforced that Dr Roberto Cranker is not going to be happy with her. Patient stated I do not care, I am the patient and I have the right. "  Respiratory: Positive for cough, cough induced vomiting. Negative for visible sputum production, signs of respiratory distress or wheezing.    Cardiovascular: Negative for cyanosis or syncope.   Gastrointestinal: Negative for nausea, vomiting or diarrhea. No change in bowel pattern.   Genitourinary: Negative for change in urinary pattern.  Musculoskeletal: Negative for falls, joint pain, back pain, myalgias.   Skin: Negative for rash.     Exam:  Pulse 98   Temp 36.3 °C (97.4 °F) (Temporal)   Resp 30   Ht 1.171 m (3' 10.1\")   Wt 27.2 kg (60 lb)   SpO2 97%   General: well nourished, well developed male in NAD, playful and engaged, non-toxic.  Head: normocephalic, atraumatic  Eyes: PERRLA, no conjunctival injection or drainage, lids normal.  Ears: normal shape and symmetry, no tenderness, no discharge. External canals are without any significant edema or erythema.  Bilateral tympanic membranes are erythematous, injected.  Nose: symmetrical without tenderness, clear discharge.  Mouth: moist mucosa, reasonable hygiene, no erythema, exudates or tonsillar enlargement.  Lymph: no cervical adenopathy, no supraclavicular adenopathy.   Neck: no masses, range of motion within normal limits, no tracheal deviation.   Neuro: neurologically appropriate for age. No sensory deficit.   Pulmonary: no distress, chest is symmetrical with respiration, no wheezes, crackles, or rhonchi.  Cardiovascular: regular rate and rhythm, no edema  GI: soft, non-tender, no guarding, no hepatosplenomegaly.   Musculoskeletal: no clubbing, appropriate muscle tone, gait is stable.  Skin: warm, dry, intact, no clubbing, no cyanosis, no rashes.         Assessment/Plan:  1. Acute bilateral otitis media  azithromycin (ZITHROMAX) 200 MG/5ML Recon Susp            Azithromycin as directed for bilateral otitis media.  Suspect vomiting secondary from coughing, mother has not tried any medication, OTC cough medication encouraged.  Oral allergy medication " also encouraged.  Pathogenesis of infections discussed including typical length and natural progression.   Symptomatic care discussed at length - nasal saline/sinus rinse, encourage fluids, humidifier, may prefer to sleep at incline.  Follow up if symptoms persist/worsen, new symptoms develop (fever, ear pain, etc) or any other concerns arise.  Instructed to return to clinic or nearest emergency department for any change in condition, further concerns, or worsening of symptoms.  Parent states understanding of the plan of care and discharge instructions.  Instructed to make an appointment, for follow up, with their primary care provider.         Please note that this dictation was created using voice recognition software. I have made every reasonable attempt to correct obvious errors, but I expect that there are errors of grammar and possibly content that I did not discover before finalizing the note.      ADRIANA Tim.

## 2023-03-06 NOTE — ED NOTES
RN checked pt brief. Pt is wet and refusing RN to change brief.  Sweta Parry can change it once I get upstairs\" [facility] is calling to request clarification on Skyrizi prescription--inquiring whether script is a starting or maintenance dose.  Please advise to pharmacy [phone].

## 2023-03-15 NOTE — PROGRESS NOTES
Bedside shift change report given to [address] (oncoming nurse) by Adrienne Smith RN (offgoing nurse). Report included the following information SBAR, Kardex, MAR and Recent Results. 4 = No assist / stand by assistance

## 2023-12-27 NOTE — NURSE NAVIGATOR
Pt is aware of date/time for induction with Dr Varela on Dec 30th.   Spoke with Care Manager. Patient's caregiver [facility]. Patient is on the Medicare Heart Failure Bundle. Contacted Bundle  and [facility].

## 2024-05-22 NOTE — PROGRESS NOTES
Patient Education       Proper skin care from [facility]:    -Eliminate harsh soaps as they strip the natural oils from the skin, often resulting in dry itchy skin ( i.e. Dial, Zest, [place])  -Use mild soaps such as Cetaphil or Dove Sensitive Skin in the shower. You do not need to use soap on arms, legs, and trunk every time you shower unless visibly soiled.   -Avoid hot or cold showers.  -After showering, lightly dry off and apply moisturizing within 2-3 minutes. This will help trap moisture in the skin.   -Aggressive use of a moisturizer at least 1-2 times a day to the entire body (including -Vanicream, Cetaphil, Aquaphor or Cerave) and moisturize hands after every washing.  -We recommend using moisturizers that come in a tub that needs to be scooped out, not a pump. This has more of an oil base. It will hold moisture in your skin much better than a water base moisturizer. The above recommended are non-pore clogging.      Wear a sunscreen with at least SPF 30 on your face, ears, neck and V of the chest daily. Wear sunscreen on other areas of the body if those areas are exposed to the sun throughout the day. Sunscreens can contain physical and/or chemical blockers. Physical blockers are less likely to clog pores, these include zinc oxide and titanium dioxide. Reapply every two hour and after swimming.     Sunscreen examples: https://www.ewg.org/sunscreen/    UV radiation  UVA radiation remains constant throughout the day and throughout the year. It is a longer wavelength than UVB and therefore penetrates deeper into the skin leading to immediate and delayed tanning, photoaging, and skin cancer. 70-80% of UVA and UVB radiation occurs between the hours of 10am-2pm.  UVB radiation  UVB radiation causes the most harmful effects and is more significant during the summer months. However, snow and ice can reflect UVB radiation leading to skin damage during the winter months as well. UVB radiation is  Pharmacist Discharge Medication Reconciliation    Discharging Provider: Dr. Klever Solano PMH:   Past Medical History:   Diagnosis Date    A-fib [facility])     Arrhythmia     A FIB    Arthritis     Back pain     CAD (coronary artery disease)     PT DENIES    Chronic pain     Dementia ([facility] [address] 2/9/2016    Hypercholesterolemia     Psychiatric disorder     ANXIETY    Shoulder pain     Thyroid disease     Tremor, essential      Chief Complaint for this Admission:   Chief Complaint   Patient presents with    Skin Problem     Allergies: Latex, natural rubber; Codeine; Adhesive; and Cortisone    Discharge Medications:   Current Discharge Medication List        START taking these medications    Details   L. acidoph & paracasei- S therm- Bifido (DAVE-Q/RISAQUAD) 8 billion cell cap cap Take 1 Cap by mouth daily. Qty: 5 Cap, Refills: 0      levoFLOXacin (LEVAQUIN) 250 mg tablet Take 1 Tab by mouth daily. Qty: 5 Tab, Refills: 0      metoprolol tartrate (LOPRESSOR) 25 mg tablet Take 0.5 Tabs by mouth two (2) times a day. Qty: 60 Tab, Refills: 5      pantoprazole (PROTONIX) 40 mg tablet Take 1 Tab by mouth Daily (before breakfast). Qty: 30 Tab, Refills: 5      balsam peru-castor oiL (VENELEX) ointment As dir  Qty: 30 g, Refills: 1           CONTINUE these medications which have CHANGED    Details   furosemide (LASIX) 40 mg tablet Take 1 Tab by mouth daily. Qty: 30 Tab, Refills: 5      spironolactone (ALDACTONE) 25 mg tablet Take 0.5 Tabs by mouth daily. Qty: 30 Tab, Refills: 5           CONTINUE these medications which have NOT CHANGED    Details   loratadine (CLARITIN) 10 mg tablet Take 10 mg by mouth daily.       ALPRAZolam (XANAX) 0.5 mg tablet TAKE ONE TABLET BY MOUTH 3 TIMES A DAY AS NEEDED ANXIETY  Qty: 90 Tab, Refills: 0    Associated Diagnoses: Anxiety      levothyroxine (SYNTHROID) 100 mcg tablet TAKE ONE TABLET BY MOUTH EVERY MORNING BEFORE BREAKFAST  Qty: 30 Tab, Refills: 5      potassium chloride (K-DUR, KLOR-CON) 20 mEq tablet TAKE 1 TABLET BY MOUTH EVERY DAY  Qty: 30 Tab, Refills: 3    Comments: Generic For:*K-DUR 20 MEQ TABLET   01/11/2019 10:29:13 AM           STOP taking these medications       atenolol (TENORMIN) 25 mg tablet Comments:   Reason for Stopping:               The patient's chart, MAR and AVS were reviewed by Beryle Meyers, [employer]. responsible for tanning, burning, inflammation, delayed erythema (pinkness), pigmentation (brown spots), and skin cancer.     I recommend self monthly full body exams and yearly full body exams with a dermatology provider. If you develop a new or changing lesion please follow up for examination. Most skin cancers are pink and scaly or pink and pearly. However, we do see blue/brown/black skin cancers.  Consider the ABCDEs of melanoma when giving yourself your monthly full body exam ( don't forget the groin, buttocks, feet, toes, etc). A-asymmetry, B-borders, C-color, D-diameter, E-elevation or evolving. If you see any of these changes please follow up in clinic. If you cannot see your back I recommend purchasing a hand held mirror to use with a larger wall mirror.       Checking for Skin Cancer  You can find cancer early by checking your skin each month. There are 3 kinds of skin cancer. They are melanoma, basal cell carcinoma, and squamous cell carcinoma. Doing monthly skin checks is the best way to find new marks or skin changes. Follow the instructions below for checking your skin.   The ABCDEs of checking moles for melanoma   Check your moles or growths for signs of melanoma using ABCDE:   Asymmetry: the sides of the mole or growth don t match  Border: the edges are ragged, notched, or blurred  Color: the color within the mole or growth varies  Diameter: the mole or growth is larger than 6 mm (size of a pencil eraser)  Evolving: the size, shape, or color of the mole or growth is changing (evolving is not shown in the images below)    Checking for other types of skin cancer  Basal cell carcinoma or squamous cell carcinoma have symptoms such as:     A spot or mole that looks different from all other marks on your skin  Changes in how an area feels, such as itching, tenderness, or pain  Changes in the skin's surface, such as oozing, bleeding, or scaliness  A sore that does not heal  New swelling or redness beyond  the border of a mole    Who s at risk?  Anyone can get skin cancer. But you are at greater risk if you have:   Fair skin, light-colored hair, or light-colored eyes  Many moles or abnormal moles on your skin  A history of sunburns from sunlight or tanning beds  A family history of skin cancer  A history of exposure to radiation or chemicals  A weakened immune system  If you have had skin cancer in the past, you are at risk for recurring skin cancer.   How to check your skin  Do your monthly skin checkups in front of a full-length mirror. Check all parts of your body, including your:   Head (ears, face, neck, and scalp)  Torso (front, back, and sides)  Arms (tops, undersides, upper, and lower armpits)  Hands (palms, backs, and fingers, including under the nails)  Buttocks and genitals  Legs (front, back, and sides)  Feet (tops, soles, toes, including under the nails, and between toes)  If you have a lot of moles, take digital photos of them each month. Make sure to take photos both up close and from a distance. These can help you see if any moles change over time.   Most skin changes are not cancer. But if you see any changes in your skin, call your doctor right away. Only he or she can diagnose a problem. If you have skin cancer, seeing your doctor can be the first step toward getting the treatment that could save your life.   [employer] last reviewed this educational content on 4/1/2019 2000-2020 [employer]. [address] [place] [address], [place] [address]. All rights reserved. This information is not intended as a substitute for professional medical care. Always follow your healthcare professional's instructions.

## 2024-10-14 NOTE — PROGRESS NOTES
Patient was open to [facility]  in the past and wants to chose it again. FOC signed and kept at bedside chart. CM faxed home health order, clinicals, and H&P to [phone], pending review. CM has been told that it takes about an hour to review and accept the patient. CM to follow up. CM updated same to the patient and pt's caregiver Vito Muhammad [phone]. Pt wants to leave. RN made aware.      [facility]  Clinical     [phone] [Mother] : mother

## 2024-12-11 NOTE — PROGRESS NOTES
Problem: Impaired Skin Integrity/Pressure Injury Treatment  Goal: *Improvement of Existing Pressure Injury  Outcome: Progressing Towards Goal  Goal: *Prevention of pressure injury  Description: Document Tre Scale and appropriate interventions in the flowsheet. Outcome: Progressing Towards Goal  Note: Pressure Injury Interventions:  Sensory Interventions: Keep linens dry and wrinkle-free, Float heels    Moisture Interventions: Apply protective barrier, creams and emollients, Absorbent underpads, Check for incontinence Q2 hours and as needed, Minimize layers    Activity Interventions: PT/OT evaluation, Pressure redistribution bed/mattress(bed type)    Mobility Interventions: Pressure redistribution bed/mattress (bed type)    Nutrition Interventions: Document food/fluid/supplement intake, Offer support with meals,snacks and hydration    Friction and Shear Interventions: Apply protective barrier, creams and emollients, Lift sheet, Minimize layers                Problem: Pressure Injury - Risk of  Goal: *Prevention of pressure injury  Description: Document Tre Scale and appropriate interventions in the flowsheet.   Outcome: Progressing Towards Goal  Note: Pressure Injury Interventions:  Sensory Interventions: Keep linens dry and wrinkle-free, Float heels    Moisture Interventions: Apply protective barrier, creams and emollients, Absorbent underpads, Check for incontinence Q2 hours and as needed, Minimize layers    Activity Interventions: PT/OT evaluation, Pressure redistribution bed/mattress(bed type)    Mobility Interventions: Pressure redistribution bed/mattress (bed type)    Nutrition Interventions: Document food/fluid/supplement intake, Offer support with meals,snacks and hydration    Friction and Shear Interventions: Apply protective barrier, creams and emollients, Lift sheet, Minimize layers                Problem: Falls - Risk of  Goal: *Absence of Falls  Description: Document Jer Fall Risk and appropriate interventions in the flowsheet.   Outcome: Progressing Towards Goal  Note: Fall Risk Interventions:       Mentation Interventions: Door open when patient unattended, Room close to nurse's station    Medication Interventions: Patient to call before getting OOB    Elimination Interventions: Call light in reach, Toileting schedule/hourly rounds              Problem: Afib Pathway: Day 3  Goal: Activity/Safety  Outcome: Progressing Towards Goal  Goal: Diagnostic Test/Procedures  Outcome: Progressing Towards Goal  Goal: Nutrition/Diet  Outcome: Progressing Towards Goal  Goal: Discharge Planning  Outcome: Progressing Towards Goal  Goal: Medications  Outcome: Progressing Towards Goal  Goal: Respiratory  Outcome: Progressing Towards Goal  Goal: Treatments/Interventions/Procedures  Outcome: Progressing Towards Goal  Goal: Psychosocial  Outcome: Progressing Towards Goal No risk alerts present

## (undated) DEVICE — SURGICAL PROCEDURE PACK BASIN MAJ SET CUST NO CAUT

## (undated) DEVICE — STERILE POLYISOPRENE POWDER-FREE SURGICAL GLOVES: Brand: PROTEXIS

## (undated) DEVICE — SUTURE VCRL 2-0 L27IN ABSRB CT BRAID COAT UD J275H

## (undated) DEVICE — ROCKER SWITCH PENCIL BLADE ELECTRODE, HOLSTER: Brand: EDGE

## (undated) DEVICE — 4-PORT MANIFOLD: Brand: NEPTUNE 2

## (undated) DEVICE — 6619 2 PTNT ISO SYS INCISE AREA&LT;(&GT;&&LT;)&GT;P: Brand: STERI-DRAPE™ IOBAN™ 2

## (undated) DEVICE — SOLUTION IV 1000ML 0.9% SOD CHL

## (undated) DEVICE — SUTURE VCRL SZ 1 L27IN ABSRB UD L36MM CP-1 1/2 CIR REV CUT J268H

## (undated) DEVICE — GOWN,SIRUS,NONRNF,SETINSLV,2XL,18/CS: Brand: MEDLINE

## (undated) DEVICE — 3.2MM GUIDE WIRE 400MM

## (undated) DEVICE — REM POLYHESIVE ADULT PATIENT RETURN ELECTRODE: Brand: VALLEYLAB

## (undated) DEVICE — ABDOMINAL PAD: Brand: DERMACEA

## (undated) DEVICE — BASIC PACK: Brand: CONVERTORS

## (undated) DEVICE — PADDING CST 4INX4YD --

## (undated) DEVICE — GAUZE SPONGES,12 PLY: Brand: CURITY

## (undated) DEVICE — SLIM BODY SKIN STAPLER: Brand: APPOSE ULC

## (undated) DEVICE — PREP SKN PREVAIL 40ML APPL --

## (undated) DEVICE — TOWEL SURG W17XL27IN STD BLU COT NONFENESTRATED PREWASHED

## (undated) DEVICE — PADDING CAST SPEC 6INX4YD COT --

## (undated) DEVICE — NON-ADHERENT STRIPS,OIL EMULSION: Brand: CURITY

## (undated) DEVICE — BIT DRL L330MM DIA4.2MM CALIB 100MM 3 FLUT QUIK CPL

## (undated) DEVICE — STOCKINETTE: Brand: DEROYAL